# Patient Record
Sex: MALE | Race: WHITE | NOT HISPANIC OR LATINO | Employment: OTHER | URBAN - METROPOLITAN AREA
[De-identification: names, ages, dates, MRNs, and addresses within clinical notes are randomized per-mention and may not be internally consistent; named-entity substitution may affect disease eponyms.]

---

## 2017-01-02 ENCOUNTER — COMMUNICATION - HEALTHEAST (OUTPATIENT)
Dept: FAMILY MEDICINE | Facility: CLINIC | Age: 82
End: 2017-01-02

## 2017-01-05 ENCOUNTER — COMMUNICATION - HEALTHEAST (OUTPATIENT)
Dept: FAMILY MEDICINE | Facility: CLINIC | Age: 82
End: 2017-01-05

## 2017-01-05 ENCOUNTER — AMBULATORY - HEALTHEAST (OUTPATIENT)
Dept: CARDIOLOGY | Facility: CLINIC | Age: 82
End: 2017-01-05

## 2017-01-05 DIAGNOSIS — Z95.0 PACEMAKER: ICD-10-CM

## 2017-01-05 DIAGNOSIS — M79.89 LEFT LEG SWELLING: ICD-10-CM

## 2017-01-06 ENCOUNTER — RECORDS - HEALTHEAST (OUTPATIENT)
Dept: GENERAL RADIOLOGY | Facility: CLINIC | Age: 82
End: 2017-01-06

## 2017-01-06 ENCOUNTER — COMMUNICATION - HEALTHEAST (OUTPATIENT)
Dept: NURSING | Facility: CLINIC | Age: 82
End: 2017-01-06

## 2017-01-06 ENCOUNTER — OFFICE VISIT - HEALTHEAST (OUTPATIENT)
Dept: FAMILY MEDICINE | Facility: CLINIC | Age: 82
End: 2017-01-06

## 2017-01-06 DIAGNOSIS — Z95.0 PACEMAKER: ICD-10-CM

## 2017-01-06 DIAGNOSIS — M79.18 LEFT BUTTOCK PAIN: ICD-10-CM

## 2017-01-06 DIAGNOSIS — R60.9 EDEMA: ICD-10-CM

## 2017-01-06 DIAGNOSIS — I63.9 CEREBRAL INFARCTION (H): ICD-10-CM

## 2017-01-06 DIAGNOSIS — R26.89 BALANCE DISORDER: ICD-10-CM

## 2017-01-06 DIAGNOSIS — M25.552 PAIN IN LEFT HIP: ICD-10-CM

## 2017-01-06 DIAGNOSIS — M25.552 LEFT HIP PAIN: ICD-10-CM

## 2017-01-06 DIAGNOSIS — M79.10 MYALGIA: ICD-10-CM

## 2017-01-06 DIAGNOSIS — I48.91 A-FIB (H): ICD-10-CM

## 2017-01-06 DIAGNOSIS — I48.91 ATRIAL FIBRILLATION, UNSPECIFIED TYPE (H): ICD-10-CM

## 2017-01-07 ENCOUNTER — HOME CARE/HOSPICE - HEALTHEAST (OUTPATIENT)
Dept: HOME HEALTH SERVICES | Facility: HOME HEALTH | Age: 82
End: 2017-01-07

## 2017-01-09 ENCOUNTER — COMMUNICATION - HEALTHEAST (OUTPATIENT)
Dept: HOME HEALTH SERVICES | Facility: HOME HEALTH | Age: 82
End: 2017-01-09

## 2017-01-09 ENCOUNTER — HOME CARE/HOSPICE - HEALTHEAST (OUTPATIENT)
Dept: HOME HEALTH SERVICES | Facility: HOME HEALTH | Age: 82
End: 2017-01-09

## 2017-01-09 ENCOUNTER — COMMUNICATION - HEALTHEAST (OUTPATIENT)
Dept: FAMILY MEDICINE | Facility: CLINIC | Age: 82
End: 2017-01-09

## 2017-01-10 ENCOUNTER — HOME CARE/HOSPICE - HEALTHEAST (OUTPATIENT)
Dept: HOME HEALTH SERVICES | Facility: HOME HEALTH | Age: 82
End: 2017-01-10

## 2017-01-13 ENCOUNTER — HOME CARE/HOSPICE - HEALTHEAST (OUTPATIENT)
Dept: HOME HEALTH SERVICES | Facility: HOME HEALTH | Age: 82
End: 2017-01-13

## 2017-01-15 ENCOUNTER — HOME CARE/HOSPICE - HEALTHEAST (OUTPATIENT)
Dept: HOME HEALTH SERVICES | Facility: HOME HEALTH | Age: 82
End: 2017-01-15

## 2017-01-17 ENCOUNTER — HOME CARE/HOSPICE - HEALTHEAST (OUTPATIENT)
Dept: HOME HEALTH SERVICES | Facility: HOME HEALTH | Age: 82
End: 2017-01-17

## 2017-01-18 ENCOUNTER — HOME CARE/HOSPICE - HEALTHEAST (OUTPATIENT)
Dept: HOME HEALTH SERVICES | Facility: HOME HEALTH | Age: 82
End: 2017-01-18

## 2017-01-19 ENCOUNTER — HOME CARE/HOSPICE - HEALTHEAST (OUTPATIENT)
Dept: HOME HEALTH SERVICES | Facility: HOME HEALTH | Age: 82
End: 2017-01-19

## 2017-01-20 ENCOUNTER — HOME CARE/HOSPICE - HEALTHEAST (OUTPATIENT)
Dept: HOME HEALTH SERVICES | Facility: HOME HEALTH | Age: 82
End: 2017-01-20

## 2017-01-20 ENCOUNTER — COMMUNICATION - HEALTHEAST (OUTPATIENT)
Dept: FAMILY MEDICINE | Facility: CLINIC | Age: 82
End: 2017-01-20

## 2017-01-20 DIAGNOSIS — Z95.0 PACEMAKER: ICD-10-CM

## 2017-01-20 DIAGNOSIS — I63.9 CEREBRAL INFARCTION (H): ICD-10-CM

## 2017-01-20 DIAGNOSIS — I48.91 A-FIB (H): ICD-10-CM

## 2017-01-24 ENCOUNTER — HOME CARE/HOSPICE - HEALTHEAST (OUTPATIENT)
Dept: HOME HEALTH SERVICES | Facility: HOME HEALTH | Age: 82
End: 2017-01-24

## 2017-01-26 ENCOUNTER — HOME CARE/HOSPICE - HEALTHEAST (OUTPATIENT)
Dept: HOME HEALTH SERVICES | Facility: HOME HEALTH | Age: 82
End: 2017-01-26

## 2017-01-27 ENCOUNTER — HOME CARE/HOSPICE - HEALTHEAST (OUTPATIENT)
Dept: HOME HEALTH SERVICES | Facility: HOME HEALTH | Age: 82
End: 2017-01-27

## 2017-01-31 ENCOUNTER — HOME CARE/HOSPICE - HEALTHEAST (OUTPATIENT)
Dept: HOME HEALTH SERVICES | Facility: HOME HEALTH | Age: 82
End: 2017-01-31

## 2017-02-02 ENCOUNTER — HOME CARE/HOSPICE - HEALTHEAST (OUTPATIENT)
Dept: HOME HEALTH SERVICES | Facility: HOME HEALTH | Age: 82
End: 2017-02-02

## 2017-02-03 ENCOUNTER — HOME CARE/HOSPICE - HEALTHEAST (OUTPATIENT)
Dept: HOME HEALTH SERVICES | Facility: HOME HEALTH | Age: 82
End: 2017-02-03

## 2017-02-07 ENCOUNTER — AMBULATORY - HEALTHEAST (OUTPATIENT)
Dept: LAB | Facility: CLINIC | Age: 82
End: 2017-02-07

## 2017-02-07 ENCOUNTER — COMMUNICATION - HEALTHEAST (OUTPATIENT)
Dept: NURSING | Facility: CLINIC | Age: 82
End: 2017-02-07

## 2017-02-07 DIAGNOSIS — Z95.0 PACEMAKER: ICD-10-CM

## 2017-02-07 DIAGNOSIS — I63.9 CEREBRAL INFARCTION (H): ICD-10-CM

## 2017-02-07 DIAGNOSIS — I48.91 ATRIAL FIBRILLATION, UNSPECIFIED TYPE (H): ICD-10-CM

## 2017-02-07 DIAGNOSIS — I48.91 A-FIB (H): ICD-10-CM

## 2017-02-09 ENCOUNTER — COMMUNICATION - HEALTHEAST (OUTPATIENT)
Dept: FAMILY MEDICINE | Facility: CLINIC | Age: 82
End: 2017-02-09

## 2017-02-09 DIAGNOSIS — E78.5 HYPERLIPIDEMIA: ICD-10-CM

## 2017-03-14 ENCOUNTER — AMBULATORY - HEALTHEAST (OUTPATIENT)
Dept: LAB | Facility: CLINIC | Age: 82
End: 2017-03-14

## 2017-03-14 ENCOUNTER — COMMUNICATION - HEALTHEAST (OUTPATIENT)
Dept: NURSING | Facility: CLINIC | Age: 82
End: 2017-03-14

## 2017-03-14 DIAGNOSIS — Z95.0 PACEMAKER: ICD-10-CM

## 2017-03-14 DIAGNOSIS — I48.91 A-FIB (H): ICD-10-CM

## 2017-03-14 DIAGNOSIS — I48.91 ATRIAL FIBRILLATION, UNSPECIFIED TYPE (H): ICD-10-CM

## 2017-03-14 DIAGNOSIS — I63.9 CEREBRAL INFARCTION (H): ICD-10-CM

## 2017-04-13 ENCOUNTER — AMBULATORY - HEALTHEAST (OUTPATIENT)
Dept: CARDIOLOGY | Facility: CLINIC | Age: 82
End: 2017-04-13

## 2017-04-13 DIAGNOSIS — Z95.0 PACEMAKER: ICD-10-CM

## 2017-04-17 ENCOUNTER — COMMUNICATION - HEALTHEAST (OUTPATIENT)
Dept: FAMILY MEDICINE | Facility: CLINIC | Age: 82
End: 2017-04-17

## 2017-04-17 DIAGNOSIS — I63.9 CEREBRAL INFARCTION (H): ICD-10-CM

## 2017-04-17 DIAGNOSIS — I48.91 ATRIAL FIBRILLATION (H): ICD-10-CM

## 2017-04-20 ENCOUNTER — COMMUNICATION - HEALTHEAST (OUTPATIENT)
Dept: NURSING | Facility: CLINIC | Age: 82
End: 2017-04-20

## 2017-04-20 ENCOUNTER — OFFICE VISIT - HEALTHEAST (OUTPATIENT)
Dept: FAMILY MEDICINE | Facility: CLINIC | Age: 82
End: 2017-04-20

## 2017-04-20 DIAGNOSIS — R73.01 IMPAIRED FASTING GLUCOSE: ICD-10-CM

## 2017-04-20 DIAGNOSIS — Z95.0 PACEMAKER: ICD-10-CM

## 2017-04-20 DIAGNOSIS — I48.91 ATRIAL FIBRILLATION, UNSPECIFIED TYPE (H): ICD-10-CM

## 2017-04-20 DIAGNOSIS — I48.91 A-FIB (H): ICD-10-CM

## 2017-04-20 DIAGNOSIS — I10 ESSENTIAL HYPERTENSION WITH GOAL BLOOD PRESSURE LESS THAN 140/90: ICD-10-CM

## 2017-04-20 DIAGNOSIS — I63.9 CEREBRAL INFARCTION (H): ICD-10-CM

## 2017-04-20 LAB — HBA1C MFR BLD: 6.3 % (ref 3.5–6)

## 2017-05-25 ENCOUNTER — COMMUNICATION - HEALTHEAST (OUTPATIENT)
Dept: NURSING | Facility: CLINIC | Age: 82
End: 2017-05-25

## 2017-05-25 ENCOUNTER — AMBULATORY - HEALTHEAST (OUTPATIENT)
Dept: LAB | Facility: CLINIC | Age: 82
End: 2017-05-25

## 2017-05-25 DIAGNOSIS — Z95.0 PACEMAKER: ICD-10-CM

## 2017-05-25 DIAGNOSIS — I63.9 CEREBRAL INFARCTION (H): ICD-10-CM

## 2017-05-25 DIAGNOSIS — I48.91 A-FIB (H): ICD-10-CM

## 2017-05-25 DIAGNOSIS — I48.91 ATRIAL FIBRILLATION, UNSPECIFIED TYPE (H): ICD-10-CM

## 2017-06-06 ENCOUNTER — COMMUNICATION - HEALTHEAST (OUTPATIENT)
Dept: FAMILY MEDICINE | Facility: CLINIC | Age: 82
End: 2017-06-06

## 2017-06-06 DIAGNOSIS — I48.91 A-FIB (H): ICD-10-CM

## 2017-06-08 ENCOUNTER — AMBULATORY - HEALTHEAST (OUTPATIENT)
Dept: LAB | Facility: CLINIC | Age: 82
End: 2017-06-08

## 2017-06-08 ENCOUNTER — COMMUNICATION - HEALTHEAST (OUTPATIENT)
Dept: NURSING | Facility: CLINIC | Age: 82
End: 2017-06-08

## 2017-06-08 DIAGNOSIS — I48.91 A-FIB (H): ICD-10-CM

## 2017-06-08 DIAGNOSIS — I63.9 CEREBRAL INFARCTION (H): ICD-10-CM

## 2017-06-08 DIAGNOSIS — Z95.0 PACEMAKER: ICD-10-CM

## 2017-06-08 DIAGNOSIS — I48.91 ATRIAL FIBRILLATION, UNSPECIFIED TYPE (H): ICD-10-CM

## 2017-06-16 ENCOUNTER — COMMUNICATION - HEALTHEAST (OUTPATIENT)
Dept: FAMILY MEDICINE | Facility: CLINIC | Age: 82
End: 2017-06-16

## 2017-06-16 DIAGNOSIS — I48.91 ATRIAL FIBRILLATION (H): ICD-10-CM

## 2017-06-16 DIAGNOSIS — I63.9 CEREBRAL INFARCTION (H): ICD-10-CM

## 2017-06-22 ENCOUNTER — AMBULATORY - HEALTHEAST (OUTPATIENT)
Dept: LAB | Facility: CLINIC | Age: 82
End: 2017-06-22

## 2017-06-22 ENCOUNTER — COMMUNICATION - HEALTHEAST (OUTPATIENT)
Dept: NURSING | Facility: CLINIC | Age: 82
End: 2017-06-22

## 2017-06-22 DIAGNOSIS — I48.91 A-FIB (H): ICD-10-CM

## 2017-06-22 DIAGNOSIS — I48.91 ATRIAL FIBRILLATION, UNSPECIFIED TYPE (H): ICD-10-CM

## 2017-06-22 DIAGNOSIS — I63.9 CEREBRAL INFARCTION (H): ICD-10-CM

## 2017-06-22 DIAGNOSIS — Z95.0 PACEMAKER: ICD-10-CM

## 2017-07-06 ENCOUNTER — AMBULATORY - HEALTHEAST (OUTPATIENT)
Dept: LAB | Facility: CLINIC | Age: 82
End: 2017-07-06

## 2017-07-06 ENCOUNTER — COMMUNICATION - HEALTHEAST (OUTPATIENT)
Dept: INTERNAL MEDICINE | Facility: CLINIC | Age: 82
End: 2017-07-06

## 2017-07-06 DIAGNOSIS — I63.9 CEREBRAL INFARCTION (H): ICD-10-CM

## 2017-07-06 DIAGNOSIS — I48.91 ATRIAL FIBRILLATION, UNSPECIFIED TYPE (H): ICD-10-CM

## 2017-07-06 DIAGNOSIS — Z95.0 PACEMAKER: ICD-10-CM

## 2017-07-06 DIAGNOSIS — I48.91 A-FIB (H): ICD-10-CM

## 2017-07-17 ENCOUNTER — COMMUNICATION - HEALTHEAST (OUTPATIENT)
Dept: FAMILY MEDICINE | Facility: CLINIC | Age: 82
End: 2017-07-17

## 2017-07-17 DIAGNOSIS — I63.9 CEREBRAL INFARCTION (H): ICD-10-CM

## 2017-07-17 DIAGNOSIS — I48.91 ATRIAL FIBRILLATION (H): ICD-10-CM

## 2017-07-20 ENCOUNTER — AMBULATORY - HEALTHEAST (OUTPATIENT)
Dept: CARDIOLOGY | Facility: CLINIC | Age: 82
End: 2017-07-20

## 2017-07-20 DIAGNOSIS — Z95.0 PACEMAKER: ICD-10-CM

## 2017-07-20 LAB — HCC DEVICE COMMENTS: NORMAL

## 2017-08-01 ENCOUNTER — COMMUNICATION - HEALTHEAST (OUTPATIENT)
Dept: FAMILY MEDICINE | Facility: CLINIC | Age: 82
End: 2017-08-01

## 2017-08-01 DIAGNOSIS — N13.8 BPH (BENIGN PROSTATIC HYPERTROPHY) WITH URINARY OBSTRUCTION: ICD-10-CM

## 2017-08-01 DIAGNOSIS — N40.1 BPH (BENIGN PROSTATIC HYPERTROPHY) WITH URINARY OBSTRUCTION: ICD-10-CM

## 2017-08-03 ENCOUNTER — AMBULATORY - HEALTHEAST (OUTPATIENT)
Dept: LAB | Facility: CLINIC | Age: 82
End: 2017-08-03

## 2017-08-03 ENCOUNTER — COMMUNICATION - HEALTHEAST (OUTPATIENT)
Dept: NURSING | Facility: CLINIC | Age: 82
End: 2017-08-03

## 2017-08-03 DIAGNOSIS — I48.91 ATRIAL FIBRILLATION (H): ICD-10-CM

## 2017-08-03 DIAGNOSIS — I48.91 A-FIB (H): ICD-10-CM

## 2017-08-03 DIAGNOSIS — Z95.0 PACEMAKER: ICD-10-CM

## 2017-08-03 DIAGNOSIS — I63.9 CEREBRAL INFARCTION (H): ICD-10-CM

## 2017-08-11 ENCOUNTER — COMMUNICATION - HEALTHEAST (OUTPATIENT)
Dept: FAMILY MEDICINE | Facility: CLINIC | Age: 82
End: 2017-08-11

## 2017-08-11 DIAGNOSIS — E78.5 HYPERLIPIDEMIA: ICD-10-CM

## 2017-08-11 DIAGNOSIS — I10 HTN (HYPERTENSION): ICD-10-CM

## 2017-08-29 ENCOUNTER — RECORDS - HEALTHEAST (OUTPATIENT)
Dept: ADMINISTRATIVE | Facility: OTHER | Age: 82
End: 2017-08-29

## 2017-09-12 ENCOUNTER — COMMUNICATION - HEALTHEAST (OUTPATIENT)
Dept: NURSING | Facility: CLINIC | Age: 82
End: 2017-09-12

## 2017-09-12 ENCOUNTER — AMBULATORY - HEALTHEAST (OUTPATIENT)
Dept: LAB | Facility: CLINIC | Age: 82
End: 2017-09-12

## 2017-09-12 ENCOUNTER — AMBULATORY - HEALTHEAST (OUTPATIENT)
Dept: NURSING | Facility: CLINIC | Age: 82
End: 2017-09-12

## 2017-09-12 DIAGNOSIS — I63.9 CEREBRAL INFARCTION (H): ICD-10-CM

## 2017-09-12 DIAGNOSIS — Z00.00 HEALTHCARE MAINTENANCE: ICD-10-CM

## 2017-09-12 DIAGNOSIS — I48.91 ATRIAL FIBRILLATION (H): ICD-10-CM

## 2017-09-12 DIAGNOSIS — Z95.0 PACEMAKER: ICD-10-CM

## 2017-09-12 DIAGNOSIS — I48.91 A-FIB (H): ICD-10-CM

## 2017-10-09 ENCOUNTER — COMMUNICATION - HEALTHEAST (OUTPATIENT)
Dept: FAMILY MEDICINE | Facility: CLINIC | Age: 82
End: 2017-10-09

## 2017-10-10 ENCOUNTER — AMBULATORY - HEALTHEAST (OUTPATIENT)
Dept: LAB | Facility: CLINIC | Age: 82
End: 2017-10-10

## 2017-10-10 ENCOUNTER — COMMUNICATION - HEALTHEAST (OUTPATIENT)
Dept: NURSING | Facility: CLINIC | Age: 82
End: 2017-10-10

## 2017-10-10 DIAGNOSIS — Z95.0 PACEMAKER: ICD-10-CM

## 2017-10-10 DIAGNOSIS — I63.9 CEREBRAL INFARCTION (H): ICD-10-CM

## 2017-10-10 DIAGNOSIS — I48.91 A-FIB (H): ICD-10-CM

## 2017-10-10 DIAGNOSIS — I48.91 ATRIAL FIBRILLATION (H): ICD-10-CM

## 2017-10-18 ENCOUNTER — OFFICE VISIT - HEALTHEAST (OUTPATIENT)
Dept: FAMILY MEDICINE | Facility: CLINIC | Age: 82
End: 2017-10-18

## 2017-10-18 DIAGNOSIS — I10 ESSENTIAL HYPERTENSION WITH GOAL BLOOD PRESSURE LESS THAN 140/90: ICD-10-CM

## 2017-10-18 DIAGNOSIS — I48.91 ATRIAL FIBRILLATION, UNSPECIFIED TYPE (H): ICD-10-CM

## 2017-10-18 DIAGNOSIS — Z95.0 PACEMAKER: ICD-10-CM

## 2017-10-18 DIAGNOSIS — E78.5 HYPERLIPIDEMIA: ICD-10-CM

## 2017-10-18 DIAGNOSIS — I63.511 STROKE DUE TO OCCLUSION OF RIGHT MIDDLE CEREBRAL ARTERY (H): ICD-10-CM

## 2017-10-18 DIAGNOSIS — R73.01 IMPAIRED FASTING GLUCOSE: ICD-10-CM

## 2017-10-18 LAB
CHOLEST SERPL-MCNC: 115 MG/DL
FASTING STATUS PATIENT QL REPORTED: YES
HBA1C MFR BLD: 6.1 % (ref 3.5–6)
HDLC SERPL-MCNC: 50 MG/DL
LDLC SERPL CALC-MCNC: 41 MG/DL
TRIGL SERPL-MCNC: 119 MG/DL

## 2017-11-07 ENCOUNTER — AMBULATORY - HEALTHEAST (OUTPATIENT)
Dept: LAB | Facility: CLINIC | Age: 82
End: 2017-11-07

## 2017-11-07 ENCOUNTER — COMMUNICATION - HEALTHEAST (OUTPATIENT)
Dept: NURSING | Facility: CLINIC | Age: 82
End: 2017-11-07

## 2017-11-07 DIAGNOSIS — I48.91 ATRIAL FIBRILLATION (H): ICD-10-CM

## 2017-11-07 DIAGNOSIS — I63.9 CEREBRAL INFARCTION (H): ICD-10-CM

## 2017-11-07 DIAGNOSIS — Z95.0 PACEMAKER: ICD-10-CM

## 2017-11-07 DIAGNOSIS — I48.91 A-FIB (H): ICD-10-CM

## 2017-11-15 ENCOUNTER — OFFICE VISIT - HEALTHEAST (OUTPATIENT)
Dept: CARDIOLOGY | Facility: CLINIC | Age: 82
End: 2017-11-15

## 2017-11-15 ENCOUNTER — AMBULATORY - HEALTHEAST (OUTPATIENT)
Dept: CARDIOLOGY | Facility: CLINIC | Age: 82
End: 2017-11-15

## 2017-11-15 DIAGNOSIS — I10 ESSENTIAL HYPERTENSION: ICD-10-CM

## 2017-11-15 DIAGNOSIS — I48.0 PAROXYSMAL ATRIAL FIBRILLATION (H): ICD-10-CM

## 2017-11-15 DIAGNOSIS — I05.0 MITRAL VALVE STENOSIS, UNSPECIFIED ETIOLOGY: ICD-10-CM

## 2017-11-15 DIAGNOSIS — Z95.0 PACEMAKER: ICD-10-CM

## 2017-11-15 ASSESSMENT — MIFFLIN-ST. JEOR: SCORE: 1581.56

## 2017-11-16 LAB — HCC DEVICE COMMENTS: NORMAL

## 2017-12-01 ENCOUNTER — HOSPITAL ENCOUNTER (OUTPATIENT)
Dept: CARDIOLOGY | Facility: HOSPITAL | Age: 82
Discharge: HOME | End: 2017-12-01
Attending: INTERNAL MEDICINE

## 2017-12-01 DIAGNOSIS — I05.0 MITRAL VALVE STENOSIS, UNSPECIFIED ETIOLOGY: ICD-10-CM

## 2017-12-01 ASSESSMENT — MIFFLIN-ST. JEOR: SCORE: 1577.93

## 2017-12-04 LAB
AORTIC ROOT: 3.9 CM
AORTIC VALVE MEAN VELOCITY: 96.6 CM/S
AV DIMENSIONLESS INDEX VTI: 0.6
AV MEAN GRADIENT: 4 MMHG
AV PEAK GRADIENT: 7.5 MMHG
AV VALVE AREA: 2.2 CM2
AV VELOCITY RATIO: 0.5
BSA FOR ECHO PROCEDURE: 2.15 M2
CV BLOOD PRESSURE: NORMAL MMHG
CV ECHO HEIGHT: 69 IN
CV ECHO WEIGHT: 210 LBS
DOP CALC AO PEAK VEL: 137 CM/S
DOP CALC AO VTI: 33.3 CM
DOP CALC LVOT AREA: 3.8 CM2
DOP CALC LVOT DIAMETER: 2.2 CM
DOP CALC LVOT PEAK VEL: 75 CM/S
DOP CALC LVOT STROKE VOLUME: 73.3 CM3
DOP CALC MV VTI: 45.3 CM
DOP CALCLVOT PEAK VEL VTI: 19.3 CM
ECHO EJECTION FRACTION ESTIMATED: 60 %
EJECTION FRACTION: 60 % (ref 55–75)
FRACTIONAL SHORTENING: 50.8 % (ref 28–44)
INTERVENTRICULAR SEPTUM IN END DIASTOLE: 1.4 CM (ref 0.6–1)
IVS/PW RATIO: 1.1
LA AREA 1: 39 CM2
LA AREA 2: 33 CM2
LEFT ATRIUM LENGTH: 7.7 CM
LEFT ATRIUM SIZE: 5.6 CM
LEFT ATRIUM TO AORTIC ROOT RATIO: 1.44 NO UNITS
LEFT ATRIUM VOLUME INDEX: 66.1 ML/M2
LEFT ATRIUM VOLUME: 142.1 CM3
LEFT VENTRICLE CARDIAC INDEX: 2 L/MIN/M2
LEFT VENTRICLE CARDIAC OUTPUT: 4.4 L/MIN
LEFT VENTRICLE DIASTOLIC VOLUME INDEX: 32.5 CM3/M2 (ref 34–74)
LEFT VENTRICLE DIASTOLIC VOLUME: 69.9 CM3 (ref 62–150)
LEFT VENTRICLE HEART RATE: 60 BPM
LEFT VENTRICLE MASS INDEX: 135.8 G/M2
LEFT VENTRICLE SYSTOLIC VOLUME INDEX: 13.1 CM3/M2 (ref 11–31)
LEFT VENTRICLE SYSTOLIC VOLUME: 28.2 CM3 (ref 21–61)
LEFT VENTRICULAR INTERNAL DIMENSION IN DIASTOLE: 5.18 CM (ref 4.2–5.8)
LEFT VENTRICULAR INTERNAL DIMENSION IN SYSTOLE: 2.55 CM (ref 2.5–4)
LEFT VENTRICULAR MASS: 292.1 G
LEFT VENTRICULAR OUTFLOW TRACT MEAN GRADIENT: 1 MMHG
LEFT VENTRICULAR OUTFLOW TRACT MEAN VELOCITY: 48.7 CM/S
LEFT VENTRICULAR OUTFLOW TRACT PEAK GRADIENT: 2 MMHG
LEFT VENTRICULAR POSTERIOR WALL IN END DIASTOLE: 1.3 CM (ref 0.6–1)
LV STROKE VOLUME INDEX: 34.1 ML/M2
MITRAL VALVE DECELERATION SLOPE: 6800 MM/S2
MITRAL VALVE MEAN INFLOW VELOCITY: 85.7 CM/S
MITRAL VALVE PEAK VELOCITY: 192 CM/S
MITRAL VALVE PRESSURE HALF-TIME: 91 MS
MV AREA VTI: 1.62 CM2
MV AVERAGE E/E' RATIO: 19.3 CM/S
MV DECELERATION TIME: 208 MS
MV E'TISSUE VEL-LAT: 9.89 CM/S
MV E'TISSUE VEL-MED: 7.9 CM/S
MV LATERAL E/E' RATIO: 17.4
MV MEAN GRADIENT: 4 MMHG
MV MEDIAL E/E' RATIO: 21.8
MV PEAK E VELOCITY: 172 CM/S
MV PEAK GRADIENT: 14.7 MMHG
MV VALVE AREA BY CONTINUITY EQUATION: 1.6 CM2
MV VALVE AREA PRESSURE 1/2 METHOD: 2.4 CM2
NUC REST DIASTOLIC VOLUME INDEX: 3360 LBS
NUC REST SYSTOLIC VOLUME INDEX: 69 IN
RIGHT VENTRICULAR INTERNAL DIMENSION IN DYSTOLE: 2.53 CM
TRICUSPID REGURGITATION PEAK PRESSURE GRADIENT: 37.2 MMHG
TRICUSPID VALVE ANULAR PLANE SYSTOLIC EXCURSION: 1.7 CM
TRICUSPID VALVE PEAK REGURGITANT VELOCITY: 305 CM/S

## 2017-12-12 ENCOUNTER — COMMUNICATION - HEALTHEAST (OUTPATIENT)
Dept: NURSING | Facility: CLINIC | Age: 82
End: 2017-12-12

## 2017-12-12 ENCOUNTER — AMBULATORY - HEALTHEAST (OUTPATIENT)
Dept: LAB | Facility: CLINIC | Age: 82
End: 2017-12-12

## 2017-12-12 DIAGNOSIS — I63.9 CEREBRAL INFARCTION (H): ICD-10-CM

## 2017-12-12 DIAGNOSIS — I48.91 A-FIB (H): ICD-10-CM

## 2017-12-12 DIAGNOSIS — I48.91 ATRIAL FIBRILLATION (H): ICD-10-CM

## 2017-12-12 DIAGNOSIS — Z95.0 PACEMAKER: ICD-10-CM

## 2017-12-15 ENCOUNTER — COMMUNICATION - HEALTHEAST (OUTPATIENT)
Dept: FAMILY MEDICINE | Facility: CLINIC | Age: 82
End: 2017-12-15

## 2017-12-15 DIAGNOSIS — I48.91 ATRIAL FIBRILLATION (H): ICD-10-CM

## 2017-12-15 DIAGNOSIS — I63.9 CEREBRAL INFARCTION (H): ICD-10-CM

## 2018-01-15 ENCOUNTER — COMMUNICATION - HEALTHEAST (OUTPATIENT)
Dept: NURSING | Facility: CLINIC | Age: 83
End: 2018-01-15

## 2018-01-15 ENCOUNTER — AMBULATORY - HEALTHEAST (OUTPATIENT)
Dept: LAB | Facility: CLINIC | Age: 83
End: 2018-01-15

## 2018-01-15 DIAGNOSIS — Z95.0 PACEMAKER: ICD-10-CM

## 2018-01-15 DIAGNOSIS — I48.91 A-FIB (H): ICD-10-CM

## 2018-01-15 DIAGNOSIS — I63.9 CEREBRAL INFARCTION (H): ICD-10-CM

## 2018-01-15 DIAGNOSIS — I48.91 ATRIAL FIBRILLATION (H): ICD-10-CM

## 2018-01-15 LAB — INR PPP: 1.9 (ref 0.9–1.1)

## 2018-01-19 ENCOUNTER — COMMUNICATION - HEALTHEAST (OUTPATIENT)
Dept: FAMILY MEDICINE | Facility: CLINIC | Age: 83
End: 2018-01-19

## 2018-01-19 DIAGNOSIS — I10 HTN (HYPERTENSION): ICD-10-CM

## 2018-01-19 DIAGNOSIS — E78.5 HYPERLIPIDEMIA: ICD-10-CM

## 2018-01-30 ENCOUNTER — COMMUNICATION - HEALTHEAST (OUTPATIENT)
Dept: FAMILY MEDICINE | Facility: CLINIC | Age: 83
End: 2018-01-30

## 2018-01-30 ENCOUNTER — AMBULATORY - HEALTHEAST (OUTPATIENT)
Dept: LAB | Facility: CLINIC | Age: 83
End: 2018-01-30

## 2018-01-30 ENCOUNTER — COMMUNICATION - HEALTHEAST (OUTPATIENT)
Dept: NURSING | Facility: CLINIC | Age: 83
End: 2018-01-30

## 2018-01-30 DIAGNOSIS — Z95.0 PACEMAKER: ICD-10-CM

## 2018-01-30 DIAGNOSIS — I63.9 CEREBRAL INFARCTION (H): ICD-10-CM

## 2018-01-30 DIAGNOSIS — I48.91 ATRIAL FIBRILLATION (H): ICD-10-CM

## 2018-01-30 DIAGNOSIS — I48.91 A-FIB (H): ICD-10-CM

## 2018-01-30 LAB — INR PPP: 2.6 (ref 0.9–1.1)

## 2018-02-08 ENCOUNTER — AMBULATORY - HEALTHEAST (OUTPATIENT)
Dept: CARDIOLOGY | Facility: CLINIC | Age: 83
End: 2018-02-08

## 2018-02-08 DIAGNOSIS — Z95.0 PACEMAKER: ICD-10-CM

## 2018-02-08 LAB — HCC DEVICE COMMENTS: NORMAL

## 2018-02-12 ENCOUNTER — COMMUNICATION - HEALTHEAST (OUTPATIENT)
Dept: FAMILY MEDICINE | Facility: CLINIC | Age: 83
End: 2018-02-12

## 2018-02-12 ENCOUNTER — COMMUNICATION - HEALTHEAST (OUTPATIENT)
Dept: NURSING | Facility: CLINIC | Age: 83
End: 2018-02-12

## 2018-02-12 ENCOUNTER — AMBULATORY - HEALTHEAST (OUTPATIENT)
Dept: LAB | Facility: CLINIC | Age: 83
End: 2018-02-12

## 2018-02-12 DIAGNOSIS — I48.91 A-FIB (H): ICD-10-CM

## 2018-02-12 DIAGNOSIS — I63.9 CEREBRAL INFARCTION (H): ICD-10-CM

## 2018-02-12 DIAGNOSIS — Z95.0 PACEMAKER: ICD-10-CM

## 2018-02-12 DIAGNOSIS — I48.91 ATRIAL FIBRILLATION (H): ICD-10-CM

## 2018-02-12 LAB — INR PPP: 2.3 (ref 0.9–1.1)

## 2018-02-21 ENCOUNTER — COMMUNICATION - HEALTHEAST (OUTPATIENT)
Dept: NURSING | Facility: CLINIC | Age: 83
End: 2018-02-21

## 2018-02-21 ENCOUNTER — AMBULATORY - HEALTHEAST (OUTPATIENT)
Dept: LAB | Facility: CLINIC | Age: 83
End: 2018-02-21

## 2018-02-21 DIAGNOSIS — I63.9 CEREBRAL INFARCTION (H): ICD-10-CM

## 2018-02-21 DIAGNOSIS — I48.91 ATRIAL FIBRILLATION (H): ICD-10-CM

## 2018-02-21 DIAGNOSIS — Z95.0 PACEMAKER: ICD-10-CM

## 2018-02-21 DIAGNOSIS — I48.91 A-FIB (H): ICD-10-CM

## 2018-02-21 LAB — INR PPP: 2.4 (ref 0.9–1.1)

## 2018-03-26 ENCOUNTER — AMBULATORY - HEALTHEAST (OUTPATIENT)
Dept: LAB | Facility: CLINIC | Age: 83
End: 2018-03-26

## 2018-03-26 ENCOUNTER — COMMUNICATION - HEALTHEAST (OUTPATIENT)
Dept: NURSING | Facility: CLINIC | Age: 83
End: 2018-03-26

## 2018-03-26 DIAGNOSIS — I48.91 A-FIB (H): ICD-10-CM

## 2018-03-26 DIAGNOSIS — I63.9 CEREBRAL INFARCTION (H): ICD-10-CM

## 2018-03-26 DIAGNOSIS — I48.91 ATRIAL FIBRILLATION (H): ICD-10-CM

## 2018-03-26 DIAGNOSIS — Z95.0 PACEMAKER: ICD-10-CM

## 2018-03-26 LAB — INR PPP: 2.3 (ref 0.9–1.1)

## 2018-03-30 ENCOUNTER — COMMUNICATION - HEALTHEAST (OUTPATIENT)
Dept: FAMILY MEDICINE | Facility: CLINIC | Age: 83
End: 2018-03-30

## 2018-04-17 ENCOUNTER — COMMUNICATION - HEALTHEAST (OUTPATIENT)
Dept: ANTICOAGULATION | Facility: CLINIC | Age: 83
End: 2018-04-17

## 2018-04-17 ENCOUNTER — OFFICE VISIT - HEALTHEAST (OUTPATIENT)
Dept: FAMILY MEDICINE | Facility: CLINIC | Age: 83
End: 2018-04-17

## 2018-04-17 DIAGNOSIS — R04.0 EPISTAXIS: ICD-10-CM

## 2018-04-17 DIAGNOSIS — R73.01 IMPAIRED FASTING GLUCOSE: ICD-10-CM

## 2018-04-17 DIAGNOSIS — I48.91 ATRIAL FIBRILLATION (H): ICD-10-CM

## 2018-04-17 DIAGNOSIS — I63.9 CEREBRAL INFARCTION (H): ICD-10-CM

## 2018-04-17 DIAGNOSIS — Z95.0 PACEMAKER: ICD-10-CM

## 2018-04-17 DIAGNOSIS — I48.91 A-FIB (H): ICD-10-CM

## 2018-04-17 DIAGNOSIS — I48.91 ATRIAL FIBRILLATION, UNSPECIFIED TYPE (H): ICD-10-CM

## 2018-04-17 DIAGNOSIS — I63.511 STROKE DUE TO OCCLUSION OF RIGHT MIDDLE CEREBRAL ARTERY (H): ICD-10-CM

## 2018-04-17 DIAGNOSIS — I10 ESSENTIAL HYPERTENSION WITH GOAL BLOOD PRESSURE LESS THAN 140/90: ICD-10-CM

## 2018-04-17 LAB
ANION GAP SERPL CALCULATED.3IONS-SCNC: 8 MMOL/L (ref 5–18)
BUN SERPL-MCNC: 14 MG/DL (ref 8–28)
CALCIUM SERPL-MCNC: 8.6 MG/DL (ref 8.5–10.5)
CHLORIDE BLD-SCNC: 109 MMOL/L (ref 98–107)
CO2 SERPL-SCNC: 25 MMOL/L (ref 22–31)
CREAT SERPL-MCNC: 0.76 MG/DL (ref 0.7–1.3)
GFR SERPL CREATININE-BSD FRML MDRD: >60 ML/MIN/1.73M2
GLUCOSE BLD-MCNC: 117 MG/DL (ref 70–125)
HBA1C MFR BLD: 6.2 % (ref 3.5–6)
INR PPP: 2.7 (ref 0.9–1.1)
POTASSIUM BLD-SCNC: 4.2 MMOL/L (ref 3.5–5)
SODIUM SERPL-SCNC: 142 MMOL/L (ref 136–145)

## 2018-04-30 ENCOUNTER — COMMUNICATION - HEALTHEAST (OUTPATIENT)
Dept: FAMILY MEDICINE | Facility: CLINIC | Age: 83
End: 2018-04-30

## 2018-04-30 DIAGNOSIS — I63.9 CEREBRAL INFARCTION (H): ICD-10-CM

## 2018-04-30 DIAGNOSIS — I48.91 ATRIAL FIBRILLATION (H): ICD-10-CM

## 2018-05-17 ENCOUNTER — AMBULATORY - HEALTHEAST (OUTPATIENT)
Dept: CARDIOLOGY | Facility: CLINIC | Age: 83
End: 2018-05-17

## 2018-05-17 DIAGNOSIS — Z95.0 PACEMAKER: ICD-10-CM

## 2018-05-18 LAB
HCC DEVICE COMMENTS: NORMAL
HCC DEVICE IMPLANTING PROVIDER: NORMAL
HCC DEVICE MANUFACTURE: NORMAL
HCC DEVICE MODEL: NORMAL
HCC DEVICE SERIAL NUMBER: NORMAL
HCC DEVICE TYPE: NORMAL

## 2018-05-29 ENCOUNTER — COMMUNICATION - HEALTHEAST (OUTPATIENT)
Dept: ANTICOAGULATION | Facility: CLINIC | Age: 83
End: 2018-05-29

## 2018-05-29 ENCOUNTER — AMBULATORY - HEALTHEAST (OUTPATIENT)
Dept: LAB | Facility: CLINIC | Age: 83
End: 2018-05-29

## 2018-05-29 DIAGNOSIS — I63.9 CEREBRAL INFARCTION (H): ICD-10-CM

## 2018-05-29 DIAGNOSIS — Z95.0 PACEMAKER: ICD-10-CM

## 2018-05-29 DIAGNOSIS — I48.91 ATRIAL FIBRILLATION (H): ICD-10-CM

## 2018-05-29 DIAGNOSIS — I48.91 A-FIB (H): ICD-10-CM

## 2018-05-29 LAB — INR PPP: 2.5 (ref 0.9–1.1)

## 2018-06-22 ENCOUNTER — COMMUNICATION - HEALTHEAST (OUTPATIENT)
Dept: ANTICOAGULATION | Facility: CLINIC | Age: 83
End: 2018-06-22

## 2018-06-22 DIAGNOSIS — I48.20 CHRONIC ATRIAL FIBRILLATION (H): ICD-10-CM

## 2018-07-09 ENCOUNTER — AMBULATORY - HEALTHEAST (OUTPATIENT)
Dept: LAB | Facility: CLINIC | Age: 83
End: 2018-07-09

## 2018-07-09 ENCOUNTER — COMMUNICATION - HEALTHEAST (OUTPATIENT)
Dept: ANTICOAGULATION | Facility: CLINIC | Age: 83
End: 2018-07-09

## 2018-07-09 DIAGNOSIS — Z95.0 PACEMAKER: ICD-10-CM

## 2018-07-09 DIAGNOSIS — I48.91 A-FIB (H): ICD-10-CM

## 2018-07-09 DIAGNOSIS — I63.9 CEREBRAL INFARCTION (H): ICD-10-CM

## 2018-07-09 DIAGNOSIS — I48.20 CHRONIC ATRIAL FIBRILLATION (H): ICD-10-CM

## 2018-07-09 LAB — INR PPP: 3.5 (ref 0.9–1.1)

## 2018-07-23 ENCOUNTER — COMMUNICATION - HEALTHEAST (OUTPATIENT)
Dept: ANTICOAGULATION | Facility: CLINIC | Age: 83
End: 2018-07-23

## 2018-07-23 ENCOUNTER — AMBULATORY - HEALTHEAST (OUTPATIENT)
Dept: LAB | Facility: CLINIC | Age: 83
End: 2018-07-23

## 2018-07-23 DIAGNOSIS — I48.91 A-FIB (H): ICD-10-CM

## 2018-07-23 DIAGNOSIS — I48.20 CHRONIC ATRIAL FIBRILLATION (H): ICD-10-CM

## 2018-07-23 DIAGNOSIS — I63.9 CEREBRAL INFARCTION (H): ICD-10-CM

## 2018-07-23 DIAGNOSIS — Z95.0 PACEMAKER: ICD-10-CM

## 2018-07-23 LAB — INR PPP: 2.3 (ref 0.9–1.1)

## 2018-08-06 ENCOUNTER — AMBULATORY - HEALTHEAST (OUTPATIENT)
Dept: LAB | Facility: CLINIC | Age: 83
End: 2018-08-06

## 2018-08-06 ENCOUNTER — COMMUNICATION - HEALTHEAST (OUTPATIENT)
Dept: ANTICOAGULATION | Facility: CLINIC | Age: 83
End: 2018-08-06

## 2018-08-06 DIAGNOSIS — Z95.0 PACEMAKER: ICD-10-CM

## 2018-08-06 DIAGNOSIS — I63.9 CEREBRAL INFARCTION (H): ICD-10-CM

## 2018-08-06 DIAGNOSIS — I48.20 CHRONIC ATRIAL FIBRILLATION (H): ICD-10-CM

## 2018-08-06 DIAGNOSIS — I48.91 A-FIB (H): ICD-10-CM

## 2018-08-06 LAB — INR PPP: 2.9 (ref 0.9–1.1)

## 2018-08-20 ENCOUNTER — COMMUNICATION - HEALTHEAST (OUTPATIENT)
Dept: FAMILY MEDICINE | Facility: CLINIC | Age: 83
End: 2018-08-20

## 2018-08-20 DIAGNOSIS — E78.5 HYPERLIPIDEMIA: ICD-10-CM

## 2018-08-23 ENCOUNTER — AMBULATORY - HEALTHEAST (OUTPATIENT)
Dept: CARDIOLOGY | Facility: CLINIC | Age: 83
End: 2018-08-23

## 2018-08-23 DIAGNOSIS — Z95.0 PACEMAKER: ICD-10-CM

## 2018-09-04 ENCOUNTER — AMBULATORY - HEALTHEAST (OUTPATIENT)
Dept: LAB | Facility: CLINIC | Age: 83
End: 2018-09-04

## 2018-09-04 ENCOUNTER — COMMUNICATION - HEALTHEAST (OUTPATIENT)
Dept: ANTICOAGULATION | Facility: CLINIC | Age: 83
End: 2018-09-04

## 2018-09-04 DIAGNOSIS — I48.20 CHRONIC ATRIAL FIBRILLATION (H): ICD-10-CM

## 2018-09-04 DIAGNOSIS — I63.9 CEREBRAL INFARCTION (H): ICD-10-CM

## 2018-09-04 DIAGNOSIS — Z95.0 PACEMAKER: ICD-10-CM

## 2018-09-04 DIAGNOSIS — I48.91 A-FIB (H): ICD-10-CM

## 2018-09-04 LAB — INR PPP: 2.9 (ref 0.9–1.1)

## 2018-10-02 ENCOUNTER — AMBULATORY - HEALTHEAST (OUTPATIENT)
Dept: LAB | Facility: CLINIC | Age: 83
End: 2018-10-02

## 2018-10-02 ENCOUNTER — COMMUNICATION - HEALTHEAST (OUTPATIENT)
Dept: ANTICOAGULATION | Facility: CLINIC | Age: 83
End: 2018-10-02

## 2018-10-02 DIAGNOSIS — I48.91 A-FIB (H): ICD-10-CM

## 2018-10-02 DIAGNOSIS — I63.9 CEREBRAL INFARCTION (H): ICD-10-CM

## 2018-10-02 DIAGNOSIS — I48.20 CHRONIC ATRIAL FIBRILLATION (H): ICD-10-CM

## 2018-10-02 DIAGNOSIS — Z95.0 PACEMAKER: ICD-10-CM

## 2018-10-02 LAB — INR PPP: 3.4 (ref 0.9–1.1)

## 2018-10-18 ENCOUNTER — COMMUNICATION - HEALTHEAST (OUTPATIENT)
Dept: ANTICOAGULATION | Facility: CLINIC | Age: 83
End: 2018-10-18

## 2018-10-18 ENCOUNTER — AMBULATORY - HEALTHEAST (OUTPATIENT)
Dept: LAB | Facility: CLINIC | Age: 83
End: 2018-10-18

## 2018-10-18 DIAGNOSIS — Z95.0 PACEMAKER: ICD-10-CM

## 2018-10-18 DIAGNOSIS — I48.91 A-FIB (H): ICD-10-CM

## 2018-10-18 DIAGNOSIS — I48.20 CHRONIC ATRIAL FIBRILLATION (H): ICD-10-CM

## 2018-10-18 DIAGNOSIS — I63.9 CEREBRAL INFARCTION (H): ICD-10-CM

## 2018-10-18 LAB — INR PPP: 2.1 (ref 0.9–1.1)

## 2018-10-23 ENCOUNTER — OFFICE VISIT - HEALTHEAST (OUTPATIENT)
Dept: FAMILY MEDICINE | Facility: CLINIC | Age: 83
End: 2018-10-23

## 2018-10-23 DIAGNOSIS — I10 ESSENTIAL HYPERTENSION: ICD-10-CM

## 2018-10-23 DIAGNOSIS — R73.01 IMPAIRED FASTING GLUCOSE: ICD-10-CM

## 2018-10-23 DIAGNOSIS — I63.511 STROKE DUE TO OCCLUSION OF RIGHT MIDDLE CEREBRAL ARTERY (H): ICD-10-CM

## 2018-10-23 DIAGNOSIS — Z23 NEED FOR VACCINATION: ICD-10-CM

## 2018-10-23 LAB
ANION GAP SERPL CALCULATED.3IONS-SCNC: 9 MMOL/L (ref 5–18)
BUN SERPL-MCNC: 11 MG/DL (ref 8–28)
CALCIUM SERPL-MCNC: 8.6 MG/DL (ref 8.5–10.5)
CHLORIDE BLD-SCNC: 107 MMOL/L (ref 98–107)
CO2 SERPL-SCNC: 25 MMOL/L (ref 22–31)
CREAT SERPL-MCNC: 0.72 MG/DL (ref 0.7–1.3)
GFR SERPL CREATININE-BSD FRML MDRD: >60 ML/MIN/1.73M2
GLUCOSE BLD-MCNC: 109 MG/DL (ref 70–125)
HBA1C MFR BLD: 5.9 % (ref 3.5–6)
POTASSIUM BLD-SCNC: 4.3 MMOL/L (ref 3.5–5)
SODIUM SERPL-SCNC: 141 MMOL/L (ref 136–145)

## 2018-10-26 ENCOUNTER — OFFICE VISIT - HEALTHEAST (OUTPATIENT)
Dept: CARDIOLOGY | Facility: CLINIC | Age: 83
End: 2018-10-26

## 2018-10-26 ENCOUNTER — AMBULATORY - HEALTHEAST (OUTPATIENT)
Dept: CARDIOLOGY | Facility: CLINIC | Age: 83
End: 2018-10-26

## 2018-10-26 DIAGNOSIS — I48.0 PAROXYSMAL ATRIAL FIBRILLATION (H): ICD-10-CM

## 2018-10-26 DIAGNOSIS — Z45.018 FITTING AND ADJUSTMENT OF CARDIAC PACEMAKER: ICD-10-CM

## 2018-10-26 DIAGNOSIS — I10 ESSENTIAL HYPERTENSION: ICD-10-CM

## 2018-10-26 DIAGNOSIS — I34.2 NON-RHEUMATIC MITRAL VALVE STENOSIS: ICD-10-CM

## 2018-10-26 ASSESSMENT — MIFFLIN-ST. JEOR: SCORE: 1550.72

## 2018-11-05 ENCOUNTER — AMBULATORY - HEALTHEAST (OUTPATIENT)
Dept: LAB | Facility: CLINIC | Age: 83
End: 2018-11-05

## 2018-11-05 ENCOUNTER — COMMUNICATION - HEALTHEAST (OUTPATIENT)
Dept: ANTICOAGULATION | Facility: CLINIC | Age: 83
End: 2018-11-05

## 2018-11-05 DIAGNOSIS — I48.20 CHRONIC ATRIAL FIBRILLATION (H): ICD-10-CM

## 2018-11-05 DIAGNOSIS — I48.91 A-FIB (H): ICD-10-CM

## 2018-11-05 DIAGNOSIS — Z95.0 PACEMAKER: ICD-10-CM

## 2018-11-05 DIAGNOSIS — I63.9 CEREBRAL INFARCTION (H): ICD-10-CM

## 2018-11-05 LAB — INR PPP: 2.4 (ref 0.9–1.1)

## 2018-11-07 ENCOUNTER — COMMUNICATION - HEALTHEAST (OUTPATIENT)
Dept: FAMILY MEDICINE | Facility: CLINIC | Age: 83
End: 2018-11-07

## 2018-11-07 DIAGNOSIS — I48.91 A-FIB (H): ICD-10-CM

## 2018-12-11 ENCOUNTER — AMBULATORY - HEALTHEAST (OUTPATIENT)
Dept: LAB | Facility: CLINIC | Age: 83
End: 2018-12-11

## 2018-12-11 ENCOUNTER — COMMUNICATION - HEALTHEAST (OUTPATIENT)
Dept: ANTICOAGULATION | Facility: CLINIC | Age: 83
End: 2018-12-11

## 2018-12-11 DIAGNOSIS — Z95.0 PACEMAKER: ICD-10-CM

## 2018-12-11 DIAGNOSIS — I63.9 CEREBRAL INFARCTION (H): ICD-10-CM

## 2018-12-11 DIAGNOSIS — I48.91 A-FIB (H): ICD-10-CM

## 2018-12-11 DIAGNOSIS — I48.20 CHRONIC ATRIAL FIBRILLATION (H): ICD-10-CM

## 2018-12-11 LAB — INR PPP: 2.3 (ref 0.9–1.1)

## 2018-12-17 ENCOUNTER — HOME CARE/HOSPICE - HEALTHEAST (OUTPATIENT)
Dept: HOME HEALTH SERVICES | Facility: HOME HEALTH | Age: 83
End: 2018-12-17

## 2018-12-18 ENCOUNTER — HOME CARE/HOSPICE - HEALTHEAST (OUTPATIENT)
Dept: HOME HEALTH SERVICES | Facility: HOME HEALTH | Age: 83
End: 2018-12-18

## 2018-12-18 ENCOUNTER — COMMUNICATION - HEALTHEAST (OUTPATIENT)
Dept: CARE COORDINATION | Facility: CLINIC | Age: 83
End: 2018-12-18

## 2018-12-18 ENCOUNTER — COMMUNICATION - HEALTHEAST (OUTPATIENT)
Dept: ANTICOAGULATION | Facility: CLINIC | Age: 83
End: 2018-12-18

## 2018-12-18 DIAGNOSIS — I48.91 A-FIB (H): ICD-10-CM

## 2018-12-18 DIAGNOSIS — Z95.0 PACEMAKER: ICD-10-CM

## 2018-12-18 DIAGNOSIS — I63.9 CEREBRAL INFARCTION (H): ICD-10-CM

## 2018-12-19 ENCOUNTER — COMMUNICATION - HEALTHEAST (OUTPATIENT)
Dept: ANTICOAGULATION | Facility: CLINIC | Age: 83
End: 2018-12-19

## 2018-12-19 ENCOUNTER — OFFICE VISIT - HEALTHEAST (OUTPATIENT)
Dept: FAMILY MEDICINE | Facility: CLINIC | Age: 83
End: 2018-12-19

## 2018-12-19 DIAGNOSIS — N13.8 BPH WITH URINARY OBSTRUCTION: ICD-10-CM

## 2018-12-19 DIAGNOSIS — I48.91 A-FIB (H): ICD-10-CM

## 2018-12-19 DIAGNOSIS — N40.1 BPH WITH URINARY OBSTRUCTION: ICD-10-CM

## 2018-12-19 DIAGNOSIS — I63.9 CEREBRAL INFARCTION (H): ICD-10-CM

## 2018-12-19 DIAGNOSIS — D64.9 ANEMIA, UNSPECIFIED TYPE: ICD-10-CM

## 2018-12-19 DIAGNOSIS — I48.20 CHRONIC ATRIAL FIBRILLATION (H): ICD-10-CM

## 2018-12-19 DIAGNOSIS — Z95.0 PACEMAKER: ICD-10-CM

## 2018-12-19 DIAGNOSIS — T14.8XXA TRAUMATIC HEMATOMA: ICD-10-CM

## 2018-12-19 DIAGNOSIS — I10 ESSENTIAL HYPERTENSION: ICD-10-CM

## 2018-12-19 DIAGNOSIS — Z09 HOSPITAL DISCHARGE FOLLOW-UP: ICD-10-CM

## 2018-12-19 LAB — INR PPP: 1.3 (ref 0.9–1.1)

## 2018-12-20 LAB
ANION GAP SERPL CALCULATED.3IONS-SCNC: 5 MMOL/L (ref 5–18)
BUN SERPL-MCNC: 14 MG/DL (ref 8–28)
CALCIUM SERPL-MCNC: 8.4 MG/DL (ref 8.5–10.5)
CHLORIDE BLD-SCNC: 105 MMOL/L (ref 98–107)
CO2 SERPL-SCNC: 27 MMOL/L (ref 22–31)
CREAT SERPL-MCNC: 0.67 MG/DL (ref 0.7–1.3)
GFR SERPL CREATININE-BSD FRML MDRD: >60 ML/MIN/1.73M2
GLUCOSE BLD-MCNC: 119 MG/DL (ref 70–125)
HGB BLD-MCNC: 9.9 G/DL (ref 14–18)
POTASSIUM BLD-SCNC: 3.6 MMOL/L (ref 3.5–5)
SODIUM SERPL-SCNC: 137 MMOL/L (ref 136–145)

## 2018-12-21 ENCOUNTER — COMMUNICATION - HEALTHEAST (OUTPATIENT)
Dept: FAMILY MEDICINE | Facility: CLINIC | Age: 83
End: 2018-12-21

## 2018-12-22 ENCOUNTER — HOME CARE/HOSPICE - HEALTHEAST (OUTPATIENT)
Dept: HOME HEALTH SERVICES | Facility: HOME HEALTH | Age: 83
End: 2018-12-22

## 2019-01-04 ENCOUNTER — AMBULATORY - HEALTHEAST (OUTPATIENT)
Dept: ANTICOAGULATION | Facility: CLINIC | Age: 84
End: 2019-01-04

## 2019-01-04 ENCOUNTER — MEDICAL CORRESPONDENCE (OUTPATIENT)
Dept: HEALTH INFORMATION MANAGEMENT | Facility: CLINIC | Age: 84
End: 2019-01-04

## 2019-01-08 ENCOUNTER — AMBULATORY - HEALTHEAST (OUTPATIENT)
Dept: ANTICOAGULATION | Facility: CLINIC | Age: 84
End: 2019-01-08

## 2019-01-09 ENCOUNTER — RECORDS - HEALTHEAST (OUTPATIENT)
Dept: LAB | Facility: CLINIC | Age: 84
End: 2019-01-09

## 2019-01-09 ENCOUNTER — COMMUNICATION - HEALTHEAST (OUTPATIENT)
Dept: GERIATRICS | Facility: CLINIC | Age: 84
End: 2019-01-09

## 2019-01-09 LAB — INR PPP: 2.41 (ref 0.9–1.1)

## 2019-01-10 ENCOUNTER — RECORDS - HEALTHEAST (OUTPATIENT)
Dept: LAB | Facility: CLINIC | Age: 84
End: 2019-01-10

## 2019-01-11 ENCOUNTER — OFFICE VISIT - HEALTHEAST (OUTPATIENT)
Dept: GERIATRICS | Facility: CLINIC | Age: 84
End: 2019-01-11

## 2019-01-11 DIAGNOSIS — I48.91 ATRIAL FIBRILLATION, UNSPECIFIED TYPE (H): ICD-10-CM

## 2019-01-11 DIAGNOSIS — Z51.81 ANTICOAGULATION MANAGEMENT ENCOUNTER: ICD-10-CM

## 2019-01-11 DIAGNOSIS — Z79.01 ANTICOAGULATION MANAGEMENT ENCOUNTER: ICD-10-CM

## 2019-01-11 DIAGNOSIS — E87.6 HYPOKALEMIA: ICD-10-CM

## 2019-01-11 LAB
ALBUMIN SERPL-MCNC: 2.7 G/DL (ref 3.5–5)
ALP SERPL-CCNC: 49 U/L (ref 45–120)
ALT SERPL W P-5'-P-CCNC: 10 U/L (ref 0–45)
ANION GAP SERPL CALCULATED.3IONS-SCNC: 8 MMOL/L (ref 5–18)
AST SERPL W P-5'-P-CCNC: 20 U/L (ref 0–40)
BASOPHILS # BLD AUTO: 0 THOU/UL (ref 0–0.2)
BASOPHILS NFR BLD AUTO: 1 % (ref 0–2)
BILIRUB SERPL-MCNC: 0.6 MG/DL (ref 0–1)
BUN SERPL-MCNC: 6 MG/DL (ref 8–28)
CALCIUM SERPL-MCNC: 8.1 MG/DL (ref 8.5–10.5)
CHLORIDE BLD-SCNC: 107 MMOL/L (ref 98–107)
CO2 SERPL-SCNC: 23 MMOL/L (ref 22–31)
CREAT SERPL-MCNC: 0.66 MG/DL (ref 0.7–1.3)
EOSINOPHIL # BLD AUTO: 0.2 THOU/UL (ref 0–0.4)
EOSINOPHIL NFR BLD AUTO: 3 % (ref 0–6)
ERYTHROCYTE [DISTWIDTH] IN BLOOD BY AUTOMATED COUNT: 14.7 % (ref 11–14.5)
GFR SERPL CREATININE-BSD FRML MDRD: >60 ML/MIN/1.73M2
GLUCOSE BLD-MCNC: 99 MG/DL (ref 70–125)
HCT VFR BLD AUTO: 32.4 % (ref 40–54)
HGB BLD-MCNC: 10.5 G/DL (ref 14–18)
INR PPP: 2.09 (ref 0.9–1.1)
LYMPHOCYTES # BLD AUTO: 1.5 THOU/UL (ref 0.8–4.4)
LYMPHOCYTES NFR BLD AUTO: 24 % (ref 20–40)
MAGNESIUM SERPL-MCNC: 2 MG/DL (ref 1.8–2.6)
MCH RBC QN AUTO: 30.3 PG (ref 27–34)
MCHC RBC AUTO-ENTMCNC: 32.4 G/DL (ref 32–36)
MCV RBC AUTO: 94 FL (ref 80–100)
MONOCYTES # BLD AUTO: 0.8 THOU/UL (ref 0–0.9)
MONOCYTES NFR BLD AUTO: 12 % (ref 2–10)
NEUTROPHILS # BLD AUTO: 3.9 THOU/UL (ref 2–7.7)
NEUTROPHILS NFR BLD AUTO: 61 % (ref 50–70)
PHOSPHATE SERPL-MCNC: 2.6 MG/DL (ref 2.5–4.5)
PLATELET # BLD AUTO: 225 THOU/UL (ref 140–440)
PMV BLD AUTO: 9.9 FL (ref 8.5–12.5)
POTASSIUM BLD-SCNC: 3.4 MMOL/L (ref 3.5–5)
PROT SERPL-MCNC: 5.6 G/DL (ref 6–8)
RBC # BLD AUTO: 3.46 MILL/UL (ref 4.4–6.2)
SODIUM SERPL-SCNC: 138 MMOL/L (ref 136–145)
WBC: 6.5 THOU/UL (ref 4–11)

## 2019-01-12 ENCOUNTER — RECORDS - HEALTHEAST (OUTPATIENT)
Dept: LAB | Facility: CLINIC | Age: 84
End: 2019-01-12

## 2019-01-12 LAB — POTASSIUM BLD-SCNC: 3.8 MMOL/L (ref 3.5–5)

## 2019-01-14 ENCOUNTER — RECORDS - HEALTHEAST (OUTPATIENT)
Dept: ADMINISTRATIVE | Facility: OTHER | Age: 84
End: 2019-01-14

## 2019-01-14 ENCOUNTER — RECORDS - HEALTHEAST (OUTPATIENT)
Dept: LAB | Facility: CLINIC | Age: 84
End: 2019-01-14

## 2019-01-14 LAB — INR PPP: 2.06 (ref 0.9–1.1)

## 2019-01-15 ENCOUNTER — OFFICE VISIT - HEALTHEAST (OUTPATIENT)
Dept: GERIATRICS | Facility: CLINIC | Age: 84
End: 2019-01-15

## 2019-01-15 DIAGNOSIS — Z79.01 ANTICOAGULATION MANAGEMENT ENCOUNTER: ICD-10-CM

## 2019-01-15 DIAGNOSIS — Z51.81 ANTICOAGULATION MANAGEMENT ENCOUNTER: ICD-10-CM

## 2019-01-15 DIAGNOSIS — R33.9 RETENTION, URINE: ICD-10-CM

## 2019-01-15 DIAGNOSIS — A41.9 SEPSIS, DUE TO UNSPECIFIED ORGANISM: ICD-10-CM

## 2019-01-15 DIAGNOSIS — R31.9 URINARY TRACT INFECTION WITH HEMATURIA, SITE UNSPECIFIED: ICD-10-CM

## 2019-01-15 DIAGNOSIS — I48.91 ATRIAL FIBRILLATION, UNSPECIFIED TYPE (H): ICD-10-CM

## 2019-01-15 DIAGNOSIS — R33.9 RETENTION OF URINE, UNSPECIFIED: ICD-10-CM

## 2019-01-15 DIAGNOSIS — I63.532 ACUTE ISCHEMIC LEFT PCA STROKE (H): ICD-10-CM

## 2019-01-15 DIAGNOSIS — N39.0 URINARY TRACT INFECTION WITH HEMATURIA, SITE UNSPECIFIED: ICD-10-CM

## 2019-01-16 ENCOUNTER — OFFICE VISIT - HEALTHEAST (OUTPATIENT)
Dept: GERIATRICS | Facility: CLINIC | Age: 84
End: 2019-01-16

## 2019-01-16 DIAGNOSIS — R31.0 GROSS HEMATURIA: ICD-10-CM

## 2019-01-28 ENCOUNTER — OFFICE VISIT - HEALTHEAST (OUTPATIENT)
Dept: GERIATRICS | Facility: CLINIC | Age: 84
End: 2019-01-28

## 2019-01-28 ENCOUNTER — AMBULATORY - HEALTHEAST (OUTPATIENT)
Dept: ANTICOAGULATION | Facility: CLINIC | Age: 84
End: 2019-01-28

## 2019-01-28 DIAGNOSIS — Z93.59 SUPRAPUBIC CATHETER (H): ICD-10-CM

## 2019-01-28 DIAGNOSIS — Z51.81 ANTICOAGULATION MANAGEMENT ENCOUNTER: ICD-10-CM

## 2019-01-28 DIAGNOSIS — I48.91 ATRIAL FIBRILLATION, UNSPECIFIED TYPE (H): ICD-10-CM

## 2019-01-28 DIAGNOSIS — Z79.01 ANTICOAGULATION MANAGEMENT ENCOUNTER: ICD-10-CM

## 2019-01-29 ENCOUNTER — RECORDS - HEALTHEAST (OUTPATIENT)
Dept: LAB | Facility: CLINIC | Age: 84
End: 2019-01-29

## 2019-01-29 LAB — INR PPP: 1.89 (ref 0.9–1.1)

## 2019-01-31 ENCOUNTER — RECORDS - HEALTHEAST (OUTPATIENT)
Dept: ADMINISTRATIVE | Facility: OTHER | Age: 84
End: 2019-01-31

## 2019-02-01 ENCOUNTER — RECORDS - HEALTHEAST (OUTPATIENT)
Dept: LAB | Facility: CLINIC | Age: 84
End: 2019-02-01

## 2019-02-01 LAB
TSH SERPL DL<=0.005 MIU/L-ACNC: 1.91 UIU/ML (ref 0.3–5)
VIT B12 SERPL-MCNC: 273 PG/ML (ref 213–816)

## 2019-02-04 ENCOUNTER — RECORDS - HEALTHEAST (OUTPATIENT)
Dept: LAB | Facility: CLINIC | Age: 84
End: 2019-02-04

## 2019-02-05 LAB — INR PPP: 2.51 (ref 0.9–1.1)

## 2019-02-07 ENCOUNTER — AMBULATORY - HEALTHEAST (OUTPATIENT)
Dept: CARDIOLOGY | Facility: CLINIC | Age: 84
End: 2019-02-07

## 2019-02-07 ENCOUNTER — OFFICE VISIT - HEALTHEAST (OUTPATIENT)
Dept: GERIATRICS | Facility: CLINIC | Age: 84
End: 2019-02-07

## 2019-02-07 DIAGNOSIS — Z79.01 ANTICOAGULATION MANAGEMENT ENCOUNTER: ICD-10-CM

## 2019-02-07 DIAGNOSIS — Z51.81 ANTICOAGULATION MANAGEMENT ENCOUNTER: ICD-10-CM

## 2019-02-07 DIAGNOSIS — I48.91 ATRIAL FIBRILLATION, UNSPECIFIED TYPE (H): ICD-10-CM

## 2019-02-07 DIAGNOSIS — Z95.0 PACEMAKER: ICD-10-CM

## 2019-02-07 DIAGNOSIS — Z93.59 SUPRAPUBIC CATHETER (H): ICD-10-CM

## 2019-02-11 ENCOUNTER — RECORDS - HEALTHEAST (OUTPATIENT)
Dept: LAB | Facility: CLINIC | Age: 84
End: 2019-02-11

## 2019-02-12 ENCOUNTER — COMMUNICATION - HEALTHEAST (OUTPATIENT)
Dept: GERIATRICS | Facility: CLINIC | Age: 84
End: 2019-02-12

## 2019-02-12 LAB
HCC DEVICE COMMENTS: NORMAL
HCC DEVICE IMPLANTING PROVIDER: NORMAL
HCC DEVICE MANUFACTURE: NORMAL
HCC DEVICE MODEL: NORMAL
HCC DEVICE SERIAL NUMBER: NORMAL
HCC DEVICE TYPE: NORMAL
INR PPP: 3.09 (ref 0.9–1.1)

## 2019-02-14 ENCOUNTER — RECORDS - HEALTHEAST (OUTPATIENT)
Dept: LAB | Facility: CLINIC | Age: 84
End: 2019-02-14

## 2019-02-15 LAB — INR PPP: 2.48 (ref 0.9–1.1)

## 2019-02-21 ENCOUNTER — RECORDS - HEALTHEAST (OUTPATIENT)
Dept: LAB | Facility: CLINIC | Age: 84
End: 2019-02-21

## 2019-02-21 ENCOUNTER — RECORDS - HEALTHEAST (OUTPATIENT)
Dept: ADMINISTRATIVE | Facility: OTHER | Age: 84
End: 2019-02-21

## 2019-02-22 ENCOUNTER — COMMUNICATION - HEALTHEAST (OUTPATIENT)
Dept: GERIATRICS | Facility: CLINIC | Age: 84
End: 2019-02-22

## 2019-02-22 LAB — INR PPP: 2.4 (ref 0.9–1.1)

## 2019-02-26 ENCOUNTER — OFFICE VISIT - HEALTHEAST (OUTPATIENT)
Dept: GERIATRICS | Facility: CLINIC | Age: 84
End: 2019-02-26

## 2019-02-26 DIAGNOSIS — R33.9 URINARY RETENTION: ICD-10-CM

## 2019-02-26 DIAGNOSIS — I48.0 PAROXYSMAL ATRIAL FIBRILLATION (H): ICD-10-CM

## 2019-02-26 DIAGNOSIS — Z86.73 HISTORY OF STROKE: ICD-10-CM

## 2019-02-26 DIAGNOSIS — I10 ESSENTIAL HYPERTENSION: ICD-10-CM

## 2019-03-05 ENCOUNTER — RECORDS - HEALTHEAST (OUTPATIENT)
Dept: LAB | Facility: CLINIC | Age: 84
End: 2019-03-05

## 2019-03-06 LAB — INR PPP: 2.65 (ref 0.9–1.1)

## 2019-03-07 ENCOUNTER — OFFICE VISIT - HEALTHEAST (OUTPATIENT)
Dept: GERIATRICS | Facility: CLINIC | Age: 84
End: 2019-03-07

## 2019-03-07 DIAGNOSIS — I10 ESSENTIAL HYPERTENSION: ICD-10-CM

## 2019-03-07 DIAGNOSIS — R33.9 URINARY RETENTION: ICD-10-CM

## 2019-03-07 DIAGNOSIS — I48.91 ATRIAL FIBRILLATION, UNSPECIFIED TYPE (H): ICD-10-CM

## 2019-03-07 DIAGNOSIS — Z51.81 ANTICOAGULATION MANAGEMENT ENCOUNTER: ICD-10-CM

## 2019-03-07 DIAGNOSIS — Z79.01 ANTICOAGULATION MANAGEMENT ENCOUNTER: ICD-10-CM

## 2019-03-13 ENCOUNTER — RECORDS - HEALTHEAST (OUTPATIENT)
Dept: LAB | Facility: CLINIC | Age: 84
End: 2019-03-13

## 2019-03-13 LAB — INR PPP: 2.81 (ref 0.9–1.1)

## 2019-03-26 ENCOUNTER — RECORDS - HEALTHEAST (OUTPATIENT)
Dept: ADMINISTRATIVE | Facility: OTHER | Age: 84
End: 2019-03-26

## 2019-03-27 ENCOUNTER — RECORDS - HEALTHEAST (OUTPATIENT)
Dept: LAB | Facility: CLINIC | Age: 84
End: 2019-03-27

## 2019-03-27 ENCOUNTER — COMMUNICATION - HEALTHEAST (OUTPATIENT)
Dept: GERIATRICS | Facility: CLINIC | Age: 84
End: 2019-03-27

## 2019-03-27 LAB — INR PPP: 1.96 (ref 0.9–1.1)

## 2019-04-03 ENCOUNTER — RECORDS - HEALTHEAST (OUTPATIENT)
Dept: LAB | Facility: CLINIC | Age: 84
End: 2019-04-03

## 2019-04-03 ENCOUNTER — COMMUNICATION - HEALTHEAST (OUTPATIENT)
Dept: GERIATRICS | Facility: CLINIC | Age: 84
End: 2019-04-03

## 2019-04-03 LAB — INR PPP: 1.83 (ref 0.9–1.1)

## 2019-04-05 ENCOUNTER — COMMUNICATION - HEALTHEAST (OUTPATIENT)
Dept: ANTICOAGULATION | Facility: CLINIC | Age: 84
End: 2019-04-05

## 2019-04-05 DIAGNOSIS — I48.20 CHRONIC ATRIAL FIBRILLATION (H): ICD-10-CM

## 2019-04-05 DIAGNOSIS — Z95.0 PACEMAKER: ICD-10-CM

## 2019-04-05 DIAGNOSIS — I63.9 CEREBRAL INFARCTION (H): ICD-10-CM

## 2019-04-09 ENCOUNTER — RECORDS - HEALTHEAST (OUTPATIENT)
Dept: LAB | Facility: CLINIC | Age: 84
End: 2019-04-09

## 2019-04-10 LAB — INR PPP: 2.2 (ref 0.9–1.1)

## 2019-04-17 ENCOUNTER — RECORDS - HEALTHEAST (OUTPATIENT)
Dept: LAB | Facility: CLINIC | Age: 84
End: 2019-04-17

## 2019-04-17 LAB — INR PPP: 2.19 (ref 0.9–1.1)

## 2019-04-30 ENCOUNTER — OFFICE VISIT - HEALTHEAST (OUTPATIENT)
Dept: GERIATRICS | Facility: CLINIC | Age: 84
End: 2019-04-30

## 2019-04-30 ENCOUNTER — RECORDS - HEALTHEAST (OUTPATIENT)
Dept: LAB | Facility: CLINIC | Age: 84
End: 2019-04-30

## 2019-04-30 DIAGNOSIS — Z86.73 HISTORY OF STROKE: ICD-10-CM

## 2019-04-30 DIAGNOSIS — I48.0 PAROXYSMAL ATRIAL FIBRILLATION (H): ICD-10-CM

## 2019-04-30 DIAGNOSIS — I10 ESSENTIAL HYPERTENSION: ICD-10-CM

## 2019-04-30 DIAGNOSIS — R33.9 URINARY RETENTION: ICD-10-CM

## 2019-05-01 LAB — INR PPP: 2.25 (ref 0.9–1.1)

## 2019-05-09 ENCOUNTER — AMBULATORY - HEALTHEAST (OUTPATIENT)
Dept: CARDIOLOGY | Facility: CLINIC | Age: 84
End: 2019-05-09

## 2019-05-09 DIAGNOSIS — Z95.0 PACEMAKER: ICD-10-CM

## 2019-05-21 ENCOUNTER — RECORDS - HEALTHEAST (OUTPATIENT)
Dept: LAB | Facility: CLINIC | Age: 84
End: 2019-05-21

## 2019-05-22 LAB — INR PPP: 2.14 (ref 0.9–1.1)

## 2019-06-05 ENCOUNTER — OFFICE VISIT - HEALTHEAST (OUTPATIENT)
Dept: GERIATRICS | Facility: CLINIC | Age: 84
End: 2019-06-05

## 2019-06-05 DIAGNOSIS — R33.9 URINARY RETENTION: ICD-10-CM

## 2019-06-05 DIAGNOSIS — H35.30 MACULAR DEGENERATION (SENILE) OF RETINA: ICD-10-CM

## 2019-06-05 DIAGNOSIS — I48.0 PAROXYSMAL ATRIAL FIBRILLATION (H): ICD-10-CM

## 2019-06-19 ENCOUNTER — RECORDS - HEALTHEAST (OUTPATIENT)
Dept: LAB | Facility: CLINIC | Age: 84
End: 2019-06-19

## 2019-06-20 LAB — INR PPP: 2.09 (ref 0.9–1.1)

## 2019-06-25 ENCOUNTER — RECORDS - HEALTHEAST (OUTPATIENT)
Dept: ADMINISTRATIVE | Facility: OTHER | Age: 84
End: 2019-06-25

## 2019-07-16 ENCOUNTER — RECORDS - HEALTHEAST (OUTPATIENT)
Dept: LAB | Facility: CLINIC | Age: 84
End: 2019-07-16

## 2019-07-17 LAB — INR PPP: 2.22 (ref 0.9–1.1)

## 2019-08-06 ENCOUNTER — RECORDS - HEALTHEAST (OUTPATIENT)
Dept: ADMINISTRATIVE | Facility: OTHER | Age: 84
End: 2019-08-06

## 2019-08-14 ENCOUNTER — RECORDS - HEALTHEAST (OUTPATIENT)
Dept: LAB | Facility: CLINIC | Age: 84
End: 2019-08-14

## 2019-08-15 ENCOUNTER — COMMUNICATION - HEALTHEAST (OUTPATIENT)
Dept: GERIATRICS | Facility: CLINIC | Age: 84
End: 2019-08-15

## 2019-08-15 ENCOUNTER — AMBULATORY - HEALTHEAST (OUTPATIENT)
Dept: CARDIOLOGY | Facility: CLINIC | Age: 84
End: 2019-08-15

## 2019-08-15 DIAGNOSIS — Z95.0 PACEMAKER: ICD-10-CM

## 2019-08-15 LAB
HCC DEVICE COMMENTS: NORMAL
HCC DEVICE IMPLANTING PROVIDER: NORMAL
HCC DEVICE MANUFACTURE: NORMAL
HCC DEVICE MODEL: NORMAL
HCC DEVICE SERIAL NUMBER: NORMAL
HCC DEVICE TYPE: NORMAL
INR PPP: 1.91 (ref 0.9–1.1)

## 2019-08-20 ENCOUNTER — OFFICE VISIT - HEALTHEAST (OUTPATIENT)
Dept: GERIATRICS | Facility: CLINIC | Age: 84
End: 2019-08-20

## 2019-08-20 DIAGNOSIS — I10 ESSENTIAL HYPERTENSION: ICD-10-CM

## 2019-08-20 DIAGNOSIS — R33.9 URINARY RETENTION: ICD-10-CM

## 2019-08-20 DIAGNOSIS — Z86.73 HISTORY OF STROKE: ICD-10-CM

## 2019-08-20 DIAGNOSIS — I48.0 PAROXYSMAL ATRIAL FIBRILLATION (H): ICD-10-CM

## 2019-08-21 ENCOUNTER — RECORDS - HEALTHEAST (OUTPATIENT)
Dept: LAB | Facility: CLINIC | Age: 84
End: 2019-08-21

## 2019-08-22 LAB — INR PPP: 1.96 (ref 0.9–1.1)

## 2019-08-28 ENCOUNTER — RECORDS - HEALTHEAST (OUTPATIENT)
Dept: LAB | Facility: CLINIC | Age: 84
End: 2019-08-28

## 2019-08-29 LAB — INR PPP: 1.77 (ref 0.9–1.1)

## 2019-08-30 ENCOUNTER — RECORDS - HEALTHEAST (OUTPATIENT)
Dept: LAB | Facility: CLINIC | Age: 84
End: 2019-08-30

## 2019-09-03 LAB — INR PPP: 1.97 (ref 0.9–1.1)

## 2019-09-10 ENCOUNTER — RECORDS - HEALTHEAST (OUTPATIENT)
Dept: LAB | Facility: CLINIC | Age: 84
End: 2019-09-10

## 2019-09-10 ENCOUNTER — COMMUNICATION - HEALTHEAST (OUTPATIENT)
Dept: GERIATRICS | Facility: CLINIC | Age: 84
End: 2019-09-10

## 2019-09-10 LAB — INR PPP: 2.09 (ref 0.9–1.1)

## 2019-09-11 ENCOUNTER — OFFICE VISIT - HEALTHEAST (OUTPATIENT)
Dept: GERIATRICS | Facility: CLINIC | Age: 84
End: 2019-09-11

## 2019-09-11 DIAGNOSIS — G51.0 RIGHT-SIDED BELL'S PALSY: ICD-10-CM

## 2019-09-12 ENCOUNTER — RECORDS - HEALTHEAST (OUTPATIENT)
Dept: LAB | Facility: CLINIC | Age: 84
End: 2019-09-12

## 2019-09-12 LAB
ANION GAP SERPL CALCULATED.3IONS-SCNC: 7 MMOL/L (ref 5–18)
BASOPHILS # BLD AUTO: 0 THOU/UL (ref 0–0.2)
BASOPHILS NFR BLD AUTO: 0 % (ref 0–2)
BUN SERPL-MCNC: 12 MG/DL (ref 8–28)
CALCIUM SERPL-MCNC: 9.1 MG/DL (ref 8.5–10.5)
CHLORIDE BLD-SCNC: 104 MMOL/L (ref 98–107)
CO2 SERPL-SCNC: 24 MMOL/L (ref 22–31)
CREAT SERPL-MCNC: 0.71 MG/DL (ref 0.7–1.3)
EOSINOPHIL # BLD AUTO: 0 THOU/UL (ref 0–0.4)
EOSINOPHIL NFR BLD AUTO: 0 % (ref 0–6)
ERYTHROCYTE [DISTWIDTH] IN BLOOD BY AUTOMATED COUNT: 15.3 % (ref 11–14.5)
GFR SERPL CREATININE-BSD FRML MDRD: >60 ML/MIN/1.73M2
GLUCOSE BLD-MCNC: 138 MG/DL (ref 70–125)
HCT VFR BLD AUTO: 39.7 % (ref 40–54)
HGB BLD-MCNC: 13.3 G/DL (ref 14–18)
LYMPHOCYTES # BLD AUTO: 1.3 THOU/UL (ref 0.8–4.4)
LYMPHOCYTES NFR BLD AUTO: 19 % (ref 20–40)
MCH RBC QN AUTO: 27.9 PG (ref 27–34)
MCHC RBC AUTO-ENTMCNC: 33.5 G/DL (ref 32–36)
MCV RBC AUTO: 83 FL (ref 80–100)
MONOCYTES # BLD AUTO: 0.6 THOU/UL (ref 0–0.9)
MONOCYTES NFR BLD AUTO: 9 % (ref 2–10)
NEUTROPHILS # BLD AUTO: 4.7 THOU/UL (ref 2–7.7)
NEUTROPHILS NFR BLD AUTO: 71 % (ref 50–70)
PLATELET # BLD AUTO: 201 THOU/UL (ref 140–440)
PMV BLD AUTO: 10.6 FL (ref 8.5–12.5)
POTASSIUM BLD-SCNC: 4.2 MMOL/L (ref 3.5–5)
RBC # BLD AUTO: 4.77 MILL/UL (ref 4.4–6.2)
SODIUM SERPL-SCNC: 135 MMOL/L (ref 136–145)
WBC: 6.6 THOU/UL (ref 4–11)

## 2019-09-16 ENCOUNTER — COMMUNICATION - HEALTHEAST (OUTPATIENT)
Dept: GERIATRICS | Facility: CLINIC | Age: 84
End: 2019-09-16

## 2019-09-17 ENCOUNTER — RECORDS - HEALTHEAST (OUTPATIENT)
Dept: LAB | Facility: CLINIC | Age: 84
End: 2019-09-17

## 2019-09-17 LAB — INR PPP: 3.21 (ref 0.9–1.1)

## 2019-09-19 ENCOUNTER — RECORDS - HEALTHEAST (OUTPATIENT)
Dept: LAB | Facility: CLINIC | Age: 84
End: 2019-09-19

## 2019-09-20 LAB
ANION GAP SERPL CALCULATED.3IONS-SCNC: 7 MMOL/L (ref 5–18)
BUN SERPL-MCNC: 15 MG/DL (ref 8–28)
CALCIUM SERPL-MCNC: 8.9 MG/DL (ref 8.5–10.5)
CHLORIDE BLD-SCNC: 100 MMOL/L (ref 98–107)
CO2 SERPL-SCNC: 28 MMOL/L (ref 22–31)
CREAT SERPL-MCNC: 0.75 MG/DL (ref 0.7–1.3)
GFR SERPL CREATININE-BSD FRML MDRD: >60 ML/MIN/1.73M2
GLUCOSE BLD-MCNC: 115 MG/DL (ref 70–125)
POTASSIUM BLD-SCNC: 4.6 MMOL/L (ref 3.5–5)
SODIUM SERPL-SCNC: 135 MMOL/L (ref 136–145)

## 2019-09-23 ENCOUNTER — RECORDS - HEALTHEAST (OUTPATIENT)
Dept: LAB | Facility: CLINIC | Age: 84
End: 2019-09-23

## 2019-09-23 LAB — INR PPP: 2.8 (ref 0.9–1.1)

## 2019-09-28 ENCOUNTER — RECORDS - HEALTHEAST (OUTPATIENT)
Dept: LAB | Facility: CLINIC | Age: 84
End: 2019-09-28

## 2019-09-30 LAB — INR PPP: 2.42 (ref 0.9–1.1)

## 2019-10-04 ENCOUNTER — RECORDS - HEALTHEAST (OUTPATIENT)
Dept: LAB | Facility: CLINIC | Age: 84
End: 2019-10-04

## 2019-10-07 LAB — INR PPP: 2.81 (ref 0.9–1.1)

## 2019-10-08 ENCOUNTER — RECORDS - HEALTHEAST (OUTPATIENT)
Dept: LAB | Facility: CLINIC | Age: 84
End: 2019-10-08

## 2019-10-08 ENCOUNTER — OFFICE VISIT - HEALTHEAST (OUTPATIENT)
Dept: GERIATRICS | Facility: CLINIC | Age: 84
End: 2019-10-08

## 2019-10-08 DIAGNOSIS — R33.9 URINARY RETENTION: ICD-10-CM

## 2019-10-08 DIAGNOSIS — Z86.73 HISTORY OF STROKE: ICD-10-CM

## 2019-10-08 DIAGNOSIS — I48.0 PAROXYSMAL ATRIAL FIBRILLATION (H): ICD-10-CM

## 2019-10-08 DIAGNOSIS — R41.82 ALTERED MENTAL STATUS, UNSPECIFIED ALTERED MENTAL STATUS TYPE: ICD-10-CM

## 2019-10-08 LAB
ALBUMIN SERPL-MCNC: 3.3 G/DL (ref 3.5–5)
ALBUMIN UR-MCNC: ABNORMAL MG/DL
ALP SERPL-CCNC: 50 U/L (ref 45–120)
ALT SERPL W P-5'-P-CCNC: <9 U/L (ref 0–45)
AMORPH CRY #/AREA URNS HPF: ABNORMAL /[HPF]
ANION GAP SERPL CALCULATED.3IONS-SCNC: 8 MMOL/L (ref 5–18)
APPEARANCE UR: ABNORMAL
AST SERPL W P-5'-P-CCNC: 14 U/L (ref 0–40)
BACTERIA #/AREA URNS HPF: ABNORMAL HPF
BASOPHILS # BLD AUTO: 0 THOU/UL (ref 0–0.2)
BASOPHILS NFR BLD AUTO: 0 % (ref 0–2)
BILIRUB SERPL-MCNC: 0.8 MG/DL (ref 0–1)
BILIRUB UR QL STRIP: NEGATIVE
BUN SERPL-MCNC: 13 MG/DL (ref 8–28)
CALCIUM SERPL-MCNC: 8.2 MG/DL (ref 8.5–10.5)
CHLORIDE BLD-SCNC: 100 MMOL/L (ref 98–107)
CO2 SERPL-SCNC: 23 MMOL/L (ref 22–31)
COLOR UR AUTO: YELLOW
CREAT SERPL-MCNC: 0.74 MG/DL (ref 0.7–1.3)
EOSINOPHIL # BLD AUTO: 0.1 THOU/UL (ref 0–0.4)
EOSINOPHIL NFR BLD AUTO: 1 % (ref 0–6)
ERYTHROCYTE [DISTWIDTH] IN BLOOD BY AUTOMATED COUNT: 17.2 % (ref 11–14.5)
GFR SERPL CREATININE-BSD FRML MDRD: >60 ML/MIN/1.73M2
GLUCOSE BLD-MCNC: 100 MG/DL (ref 70–125)
GLUCOSE UR STRIP-MCNC: NEGATIVE MG/DL
HCT VFR BLD AUTO: 39 % (ref 40–54)
HGB BLD-MCNC: 12.7 G/DL (ref 14–18)
HGB UR QL STRIP: ABNORMAL
INR PPP: 2.83 (ref 0.9–1.1)
KETONES UR STRIP-MCNC: NEGATIVE MG/DL
LEUKOCYTE ESTERASE UR QL STRIP: ABNORMAL
LYMPHOCYTES # BLD AUTO: 1.5 THOU/UL (ref 0.8–4.4)
LYMPHOCYTES NFR BLD AUTO: 12 % (ref 20–40)
MCH RBC QN AUTO: 28 PG (ref 27–34)
MCHC RBC AUTO-ENTMCNC: 32.6 G/DL (ref 32–36)
MCV RBC AUTO: 86 FL (ref 80–100)
MONOCYTES # BLD AUTO: 1.4 THOU/UL (ref 0–0.9)
MONOCYTES NFR BLD AUTO: 12 % (ref 2–10)
NEUTROPHILS # BLD AUTO: 9.5 THOU/UL (ref 2–7.7)
NEUTROPHILS NFR BLD AUTO: 76 % (ref 50–70)
NITRATE UR QL: NEGATIVE
PH UR STRIP: 6 [PH] (ref 4.5–8)
PLATELET # BLD AUTO: 173 THOU/UL (ref 140–440)
PMV BLD AUTO: 10.5 FL (ref 8.5–12.5)
POTASSIUM BLD-SCNC: 4.2 MMOL/L (ref 3.5–5)
PROCALCITONIN SERPL-MCNC: 0.02 NG/ML (ref 0–0.49)
PROT SERPL-MCNC: 5.8 G/DL (ref 6–8)
RBC # BLD AUTO: 4.54 MILL/UL (ref 4.4–6.2)
RBC #/AREA URNS AUTO: ABNORMAL HPF
SODIUM SERPL-SCNC: 131 MMOL/L (ref 136–145)
SP GR UR STRIP: 1.03 (ref 1–1.03)
SQUAMOUS #/AREA URNS AUTO: ABNORMAL LPF
UROBILINOGEN UR STRIP-ACNC: ABNORMAL
WBC #/AREA URNS AUTO: >100 HPF
WBC CLUMPS #/AREA URNS HPF: PRESENT /[HPF]
WBC: 12.6 THOU/UL (ref 4–11)

## 2019-10-10 ENCOUNTER — RECORDS - HEALTHEAST (OUTPATIENT)
Dept: LAB | Facility: CLINIC | Age: 84
End: 2019-10-10

## 2019-10-10 ENCOUNTER — OFFICE VISIT - HEALTHEAST (OUTPATIENT)
Dept: GERIATRICS | Facility: CLINIC | Age: 84
End: 2019-10-10

## 2019-10-10 DIAGNOSIS — N39.0 URINARY TRACT INFECTION WITHOUT HEMATURIA, SITE UNSPECIFIED: ICD-10-CM

## 2019-10-10 DIAGNOSIS — R33.9 URINARY RETENTION: ICD-10-CM

## 2019-10-10 DIAGNOSIS — H35.30 MACULAR DEGENERATION (SENILE) OF RETINA: ICD-10-CM

## 2019-10-10 DIAGNOSIS — G51.0 BELL'S PALSY: ICD-10-CM

## 2019-10-11 ENCOUNTER — COMMUNICATION - HEALTHEAST (OUTPATIENT)
Dept: GERIATRICS | Facility: CLINIC | Age: 84
End: 2019-10-11

## 2019-10-11 LAB
ANION GAP SERPL CALCULATED.3IONS-SCNC: 7 MMOL/L (ref 5–18)
BACTERIA SPEC CULT: ABNORMAL
BUN SERPL-MCNC: 10 MG/DL (ref 8–28)
CALCIUM SERPL-MCNC: 8.6 MG/DL (ref 8.5–10.5)
CHLORIDE BLD-SCNC: 103 MMOL/L (ref 98–107)
CO2 SERPL-SCNC: 24 MMOL/L (ref 22–31)
CREAT SERPL-MCNC: 0.82 MG/DL (ref 0.7–1.3)
GFR SERPL CREATININE-BSD FRML MDRD: >60 ML/MIN/1.73M2
GLUCOSE BLD-MCNC: 86 MG/DL (ref 70–125)
INR PPP: 3.21 (ref 0.9–1.1)
POTASSIUM BLD-SCNC: 4.4 MMOL/L (ref 3.5–5)
SODIUM SERPL-SCNC: 134 MMOL/L (ref 136–145)

## 2019-10-14 ENCOUNTER — RECORDS - HEALTHEAST (OUTPATIENT)
Dept: LAB | Facility: CLINIC | Age: 84
End: 2019-10-14

## 2019-10-14 ENCOUNTER — COMMUNICATION - HEALTHEAST (OUTPATIENT)
Dept: GERIATRICS | Facility: CLINIC | Age: 84
End: 2019-10-14

## 2019-10-14 LAB — INR PPP: 3.44 (ref 0.9–1.1)

## 2019-10-21 ENCOUNTER — RECORDS - HEALTHEAST (OUTPATIENT)
Dept: LAB | Facility: CLINIC | Age: 84
End: 2019-10-21

## 2019-10-21 LAB — INR PPP: 2.14 (ref 0.9–1.1)

## 2019-10-24 ENCOUNTER — RECORDS - HEALTHEAST (OUTPATIENT)
Dept: LAB | Facility: CLINIC | Age: 84
End: 2019-10-24

## 2019-10-25 ENCOUNTER — COMMUNICATION - HEALTHEAST (OUTPATIENT)
Dept: GERIATRICS | Facility: CLINIC | Age: 84
End: 2019-10-25

## 2019-10-25 LAB — INR PPP: 2.32 (ref 0.9–1.1)

## 2019-10-31 ENCOUNTER — COMMUNICATION - HEALTHEAST (OUTPATIENT)
Dept: GERIATRICS | Facility: CLINIC | Age: 84
End: 2019-10-31

## 2019-11-05 ENCOUNTER — RECORDS - HEALTHEAST (OUTPATIENT)
Dept: LAB | Facility: CLINIC | Age: 84
End: 2019-11-05

## 2019-11-06 LAB — INR PPP: 1.96 (ref 0.9–1.1)

## 2019-11-12 ENCOUNTER — RECORDS - HEALTHEAST (OUTPATIENT)
Dept: LAB | Facility: CLINIC | Age: 84
End: 2019-11-12

## 2019-11-13 ENCOUNTER — COMMUNICATION - HEALTHEAST (OUTPATIENT)
Dept: GERIATRICS | Facility: CLINIC | Age: 84
End: 2019-11-13

## 2019-11-13 LAB — INR PPP: 2.24 (ref 0.9–1.1)

## 2019-11-22 ENCOUNTER — OFFICE VISIT - HEALTHEAST (OUTPATIENT)
Dept: CARDIOLOGY | Facility: CLINIC | Age: 84
End: 2019-11-22

## 2019-11-22 ENCOUNTER — AMBULATORY - HEALTHEAST (OUTPATIENT)
Dept: CARDIOLOGY | Facility: CLINIC | Age: 84
End: 2019-11-22

## 2019-11-22 DIAGNOSIS — I10 ESSENTIAL HYPERTENSION: ICD-10-CM

## 2019-11-22 DIAGNOSIS — I49.5 SSS (SICK SINUS SYNDROME) (H): ICD-10-CM

## 2019-11-22 DIAGNOSIS — I49.5 BRADY-TACHY SYNDROME (H): ICD-10-CM

## 2019-11-22 DIAGNOSIS — I34.2 NONRHEUMATIC MITRAL VALVE STENOSIS: ICD-10-CM

## 2019-11-22 DIAGNOSIS — I48.20 CHRONIC ATRIAL FIBRILLATION (H): ICD-10-CM

## 2019-11-22 DIAGNOSIS — Z95.0 CARDIAC PACEMAKER IN SITU: ICD-10-CM

## 2019-11-22 ASSESSMENT — MIFFLIN-ST. JEOR: SCORE: 1503.08

## 2019-11-26 ENCOUNTER — RECORDS - HEALTHEAST (OUTPATIENT)
Dept: LAB | Facility: CLINIC | Age: 84
End: 2019-11-26

## 2019-11-27 LAB — INR PPP: 2.27 (ref 0.9–1.1)

## 2019-12-10 ENCOUNTER — RECORDS - HEALTHEAST (OUTPATIENT)
Dept: LAB | Facility: CLINIC | Age: 84
End: 2019-12-10

## 2019-12-10 ENCOUNTER — OFFICE VISIT - HEALTHEAST (OUTPATIENT)
Dept: GERIATRICS | Facility: CLINIC | Age: 84
End: 2019-12-10

## 2019-12-10 DIAGNOSIS — G51.0 RIGHT-SIDED BELL'S PALSY: ICD-10-CM

## 2019-12-10 DIAGNOSIS — I10 ESSENTIAL HYPERTENSION: ICD-10-CM

## 2019-12-10 DIAGNOSIS — I48.0 PAROXYSMAL ATRIAL FIBRILLATION (H): ICD-10-CM

## 2019-12-10 DIAGNOSIS — S05.91XD RIGHT EYE INJURY, SUBSEQUENT ENCOUNTER: ICD-10-CM

## 2019-12-11 ENCOUNTER — COMMUNICATION - HEALTHEAST (OUTPATIENT)
Dept: GERIATRICS | Facility: CLINIC | Age: 84
End: 2019-12-11

## 2019-12-11 LAB — INR PPP: 2.42 (ref 0.9–1.1)

## 2019-12-16 ENCOUNTER — RECORDS - HEALTHEAST (OUTPATIENT)
Dept: LAB | Facility: CLINIC | Age: 84
End: 2019-12-16

## 2019-12-17 LAB — INR PPP: 1.14 (ref 0.9–1.1)

## 2019-12-18 ENCOUNTER — RECORDS - HEALTHEAST (OUTPATIENT)
Dept: LAB | Facility: CLINIC | Age: 84
End: 2019-12-18

## 2019-12-19 ENCOUNTER — COMMUNICATION - HEALTHEAST (OUTPATIENT)
Dept: GERIATRICS | Facility: CLINIC | Age: 84
End: 2019-12-19

## 2019-12-19 LAB — INR PPP: 1.27 (ref 0.9–1.1)

## 2019-12-20 ENCOUNTER — RECORDS - HEALTHEAST (OUTPATIENT)
Dept: ADMINISTRATIVE | Facility: OTHER | Age: 84
End: 2019-12-20

## 2019-12-23 ENCOUNTER — RECORDS - HEALTHEAST (OUTPATIENT)
Dept: LAB | Facility: CLINIC | Age: 84
End: 2019-12-23

## 2019-12-23 LAB — INR PPP: 1.56 (ref 0.9–1.1)

## 2019-12-26 ENCOUNTER — RECORDS - HEALTHEAST (OUTPATIENT)
Dept: LAB | Facility: CLINIC | Age: 84
End: 2019-12-26

## 2019-12-26 LAB — INR PPP: 1.82 (ref 0.9–1.1)

## 2020-01-02 ENCOUNTER — COMMUNICATION - HEALTHEAST (OUTPATIENT)
Dept: GERIATRICS | Facility: CLINIC | Age: 85
End: 2020-01-02

## 2020-01-02 ENCOUNTER — RECORDS - HEALTHEAST (OUTPATIENT)
Dept: LAB | Facility: CLINIC | Age: 85
End: 2020-01-02

## 2020-01-02 LAB — INR PPP: 2.31 (ref 0.9–1.1)

## 2020-01-06 ENCOUNTER — RECORDS - HEALTHEAST (OUTPATIENT)
Dept: LAB | Facility: CLINIC | Age: 85
End: 2020-01-06

## 2020-01-06 LAB — INR PPP: 2.32 (ref 0.9–1.1)

## 2020-01-17 ENCOUNTER — RECORDS - HEALTHEAST (OUTPATIENT)
Dept: LAB | Facility: CLINIC | Age: 85
End: 2020-01-17

## 2020-01-20 LAB — INR PPP: 2.71 (ref 0.9–1.1)

## 2020-02-07 ENCOUNTER — RECORDS - HEALTHEAST (OUTPATIENT)
Dept: LAB | Facility: CLINIC | Age: 85
End: 2020-02-07

## 2020-02-10 LAB — INR PPP: 2.21 (ref 0.9–1.1)

## 2020-02-20 ENCOUNTER — AMBULATORY - HEALTHEAST (OUTPATIENT)
Dept: CARDIOLOGY | Facility: CLINIC | Age: 85
End: 2020-02-20

## 2020-02-20 DIAGNOSIS — Z45.018 FITTING AND ADJUSTMENT OF CARDIAC PACEMAKER: ICD-10-CM

## 2020-02-20 DIAGNOSIS — I49.5 SSS (SICK SINUS SYNDROME) (H): ICD-10-CM

## 2020-02-25 ENCOUNTER — OFFICE VISIT - HEALTHEAST (OUTPATIENT)
Dept: GERIATRICS | Facility: CLINIC | Age: 85
End: 2020-02-25

## 2020-02-25 DIAGNOSIS — R53.81 DEBILITY: ICD-10-CM

## 2020-02-25 DIAGNOSIS — Z86.73 HISTORY OF STROKE: ICD-10-CM

## 2020-02-25 DIAGNOSIS — I48.0 PAROXYSMAL ATRIAL FIBRILLATION (H): ICD-10-CM

## 2020-02-25 DIAGNOSIS — R33.9 URINARY RETENTION: ICD-10-CM

## 2020-03-02 ENCOUNTER — RECORDS - HEALTHEAST (OUTPATIENT)
Dept: LAB | Facility: CLINIC | Age: 85
End: 2020-03-02

## 2020-03-02 LAB
ALBUMIN UR-MCNC: ABNORMAL MG/DL
AMORPH CRY #/AREA URNS HPF: ABNORMAL /[HPF]
APPEARANCE UR: ABNORMAL
BACTERIA #/AREA URNS HPF: ABNORMAL HPF
BILIRUB UR QL STRIP: NEGATIVE
COLOR UR AUTO: YELLOW
GLUCOSE UR STRIP-MCNC: NEGATIVE MG/DL
HGB UR QL STRIP: ABNORMAL
KETONES UR STRIP-MCNC: NEGATIVE MG/DL
LEUKOCYTE ESTERASE UR QL STRIP: ABNORMAL
MUCOUS THREADS #/AREA URNS LPF: ABNORMAL LPF
NITRATE UR QL: NEGATIVE
PH UR STRIP: 5 [PH] (ref 4.5–8)
RBC #/AREA URNS AUTO: ABNORMAL HPF
SP GR UR STRIP: 1.01 (ref 1–1.03)
SQUAMOUS #/AREA URNS AUTO: ABNORMAL LPF
UROBILINOGEN UR STRIP-ACNC: ABNORMAL
WBC #/AREA URNS AUTO: ABNORMAL HPF
WBC CLUMPS #/AREA URNS HPF: PRESENT /[HPF]

## 2020-03-05 LAB
BACTERIA SPEC CULT: ABNORMAL
BACTERIA SPEC CULT: ABNORMAL

## 2020-03-09 ENCOUNTER — RECORDS - HEALTHEAST (OUTPATIENT)
Dept: LAB | Facility: CLINIC | Age: 85
End: 2020-03-09

## 2020-03-10 ENCOUNTER — COMMUNICATION - HEALTHEAST (OUTPATIENT)
Dept: GERIATRICS | Facility: CLINIC | Age: 85
End: 2020-03-10

## 2020-03-10 LAB — INR PPP: 2.41 (ref 0.9–1.1)

## 2020-03-17 ENCOUNTER — RECORDS - HEALTHEAST (OUTPATIENT)
Dept: LAB | Facility: CLINIC | Age: 85
End: 2020-03-17

## 2020-03-18 LAB — INR PPP: 2.38 (ref 0.9–1.1)

## 2020-03-26 ENCOUNTER — AMBULATORY - HEALTHEAST (OUTPATIENT)
Dept: CARDIOLOGY | Facility: CLINIC | Age: 85
End: 2020-03-26

## 2020-03-26 DIAGNOSIS — I49.5 SSS (SICK SINUS SYNDROME) (H): ICD-10-CM

## 2020-03-26 DIAGNOSIS — Z95.0 CARDIAC PACEMAKER IN SITU: ICD-10-CM

## 2020-04-14 ENCOUNTER — OFFICE VISIT - HEALTHEAST (OUTPATIENT)
Dept: GERIATRICS | Facility: CLINIC | Age: 85
End: 2020-04-14

## 2020-04-14 DIAGNOSIS — R54 AGE-RELATED PHYSICAL DEBILITY: ICD-10-CM

## 2020-04-14 DIAGNOSIS — H35.30 MACULAR DEGENERATION (SENILE) OF RETINA: ICD-10-CM

## 2020-04-14 DIAGNOSIS — I10 ESSENTIAL HYPERTENSION WITH GOAL BLOOD PRESSURE LESS THAN 140/90: ICD-10-CM

## 2020-04-14 DIAGNOSIS — I48.91 ATRIAL FIBRILLATION, UNSPECIFIED TYPE (H): ICD-10-CM

## 2020-04-22 ENCOUNTER — RECORDS - HEALTHEAST (OUTPATIENT)
Dept: LAB | Facility: CLINIC | Age: 85
End: 2020-04-22

## 2020-04-22 ENCOUNTER — COMMUNICATION - HEALTHEAST (OUTPATIENT)
Dept: GERIATRICS | Facility: CLINIC | Age: 85
End: 2020-04-22

## 2020-04-22 LAB
INR PPP: 2.04 (ref 0.9–1.1)
TSH SERPL DL<=0.005 MIU/L-ACNC: 3.1 UIU/ML (ref 0.3–5)

## 2020-04-23 ENCOUNTER — AMBULATORY - HEALTHEAST (OUTPATIENT)
Dept: CARDIOLOGY | Facility: CLINIC | Age: 85
End: 2020-04-23

## 2020-04-23 DIAGNOSIS — I49.5 SICK SINUS SYNDROME (H): ICD-10-CM

## 2020-04-23 DIAGNOSIS — Z95.0 CARDIAC PACEMAKER IN SITU: ICD-10-CM

## 2020-05-18 ENCOUNTER — COMMUNICATION - HEALTHEAST (OUTPATIENT)
Dept: GERIATRICS | Facility: CLINIC | Age: 85
End: 2020-05-18

## 2020-05-18 ENCOUNTER — RECORDS - HEALTHEAST (OUTPATIENT)
Dept: LAB | Facility: CLINIC | Age: 85
End: 2020-05-18

## 2020-05-18 LAB — INR PPP: 2.37 (ref 0.9–1.1)

## 2020-05-28 ENCOUNTER — AMBULATORY - HEALTHEAST (OUTPATIENT)
Dept: CARDIOLOGY | Facility: CLINIC | Age: 85
End: 2020-05-28

## 2020-05-28 DIAGNOSIS — I48.91 ATRIAL FIBRILLATION, UNSPECIFIED TYPE (H): ICD-10-CM

## 2020-05-28 DIAGNOSIS — Z95.0 CARDIAC PACEMAKER IN SITU: ICD-10-CM

## 2020-06-08 ENCOUNTER — OFFICE VISIT - HEALTHEAST (OUTPATIENT)
Dept: GERIATRICS | Facility: CLINIC | Age: 85
End: 2020-06-08

## 2020-06-08 DIAGNOSIS — M62.81 GENERALIZED MUSCLE WEAKNESS: ICD-10-CM

## 2020-06-08 DIAGNOSIS — I48.91 ATRIAL FIBRILLATION, UNSPECIFIED TYPE (H): ICD-10-CM

## 2020-06-08 DIAGNOSIS — H35.30 MACULAR DEGENERATION (SENILE) OF RETINA: ICD-10-CM

## 2020-06-15 ENCOUNTER — RECORDS - HEALTHEAST (OUTPATIENT)
Dept: LAB | Facility: CLINIC | Age: 85
End: 2020-06-15

## 2020-06-15 ENCOUNTER — COMMUNICATION - HEALTHEAST (OUTPATIENT)
Dept: GERIATRICS | Facility: CLINIC | Age: 85
End: 2020-06-15

## 2020-06-15 LAB — INR PPP: 2.29 (ref 0.9–1.1)

## 2020-06-30 ENCOUNTER — OFFICE VISIT - HEALTHEAST (OUTPATIENT)
Dept: GERIATRICS | Facility: CLINIC | Age: 85
End: 2020-06-30

## 2020-06-30 DIAGNOSIS — R33.9 URINARY RETENTION: ICD-10-CM

## 2020-06-30 DIAGNOSIS — I48.0 PAROXYSMAL ATRIAL FIBRILLATION (H): ICD-10-CM

## 2020-06-30 DIAGNOSIS — Z86.73 HISTORY OF STROKE: ICD-10-CM

## 2020-06-30 DIAGNOSIS — R53.81 DEBILITY: ICD-10-CM

## 2020-07-02 ENCOUNTER — AMBULATORY - HEALTHEAST (OUTPATIENT)
Dept: CARDIOLOGY | Facility: CLINIC | Age: 85
End: 2020-07-02

## 2020-07-02 DIAGNOSIS — I48.91 ATRIAL FIBRILLATION, UNSPECIFIED TYPE (H): ICD-10-CM

## 2020-07-02 DIAGNOSIS — Z95.0 CARDIAC PACEMAKER IN SITU: ICD-10-CM

## 2020-07-13 ENCOUNTER — RECORDS - HEALTHEAST (OUTPATIENT)
Dept: LAB | Facility: CLINIC | Age: 85
End: 2020-07-13

## 2020-07-13 ENCOUNTER — COMMUNICATION - HEALTHEAST (OUTPATIENT)
Dept: GERIATRICS | Facility: CLINIC | Age: 85
End: 2020-07-13

## 2020-07-13 LAB — INR PPP: 2.45 (ref 0.9–1.1)

## 2020-08-10 ENCOUNTER — RECORDS - HEALTHEAST (OUTPATIENT)
Dept: LAB | Facility: CLINIC | Age: 85
End: 2020-08-10

## 2020-08-10 ENCOUNTER — COMMUNICATION - HEALTHEAST (OUTPATIENT)
Dept: GERIATRICS | Facility: CLINIC | Age: 85
End: 2020-08-10

## 2020-08-10 LAB
ALBUMIN UR-MCNC: NEGATIVE MG/DL
APPEARANCE UR: ABNORMAL
BACTERIA #/AREA URNS HPF: ABNORMAL HPF
BILIRUB UR QL STRIP: NEGATIVE
COLOR UR AUTO: YELLOW
GLUCOSE UR STRIP-MCNC: NEGATIVE MG/DL
HGB UR QL STRIP: NEGATIVE
INR PPP: 3.24 (ref 0.9–1.1)
KETONES UR STRIP-MCNC: NEGATIVE MG/DL
LEUKOCYTE ESTERASE UR QL STRIP: ABNORMAL
NITRATE UR QL: NEGATIVE
PH UR STRIP: 5.5 [PH] (ref 4.5–8)
RBC #/AREA URNS AUTO: ABNORMAL HPF
SP GR UR STRIP: 1.02 (ref 1–1.03)
SQUAMOUS #/AREA URNS AUTO: ABNORMAL LPF
UROBILINOGEN UR STRIP-ACNC: ABNORMAL
WBC #/AREA URNS AUTO: ABNORMAL HPF
WBC CLUMPS #/AREA URNS HPF: PRESENT /[HPF]

## 2020-08-11 ENCOUNTER — COMMUNICATION - HEALTHEAST (OUTPATIENT)
Dept: GERIATRICS | Facility: CLINIC | Age: 85
End: 2020-08-11

## 2020-08-11 LAB — BACTERIA SPEC CULT: NORMAL

## 2020-08-12 ENCOUNTER — COMMUNICATION - HEALTHEAST (OUTPATIENT)
Dept: GERIATRICS | Facility: CLINIC | Age: 85
End: 2020-08-12

## 2020-08-12 ENCOUNTER — RECORDS - HEALTHEAST (OUTPATIENT)
Dept: LAB | Facility: CLINIC | Age: 85
End: 2020-08-12

## 2020-08-12 LAB
ALBUMIN UR-MCNC: ABNORMAL MG/DL
APPEARANCE UR: ABNORMAL
BACTERIA #/AREA URNS HPF: ABNORMAL HPF
BILIRUB UR QL STRIP: NEGATIVE
COLOR UR AUTO: YELLOW
GLUCOSE UR STRIP-MCNC: NEGATIVE MG/DL
HGB UR QL STRIP: ABNORMAL
KETONES UR STRIP-MCNC: NEGATIVE MG/DL
LEUKOCYTE ESTERASE UR QL STRIP: ABNORMAL
MUCOUS THREADS #/AREA URNS LPF: ABNORMAL LPF
NITRATE UR QL: NEGATIVE
PH UR STRIP: 6 [PH] (ref 4.5–8)
RBC #/AREA URNS AUTO: ABNORMAL HPF
SP GR UR STRIP: 1.02 (ref 1–1.03)
SQUAMOUS #/AREA URNS AUTO: ABNORMAL LPF
UROBILINOGEN UR STRIP-ACNC: ABNORMAL
WBC #/AREA URNS AUTO: >100 HPF
WBC CLUMPS #/AREA URNS HPF: PRESENT /[HPF]

## 2020-08-13 ENCOUNTER — COMMUNICATION - HEALTHEAST (OUTPATIENT)
Dept: GERIATRICS | Facility: CLINIC | Age: 85
End: 2020-08-13

## 2020-08-13 ENCOUNTER — RECORDS - HEALTHEAST (OUTPATIENT)
Dept: LAB | Facility: CLINIC | Age: 85
End: 2020-08-13

## 2020-08-13 LAB
ANION GAP SERPL CALCULATED.3IONS-SCNC: 9 MMOL/L (ref 5–18)
BASOPHILS # BLD AUTO: 0 THOU/UL (ref 0–0.2)
BASOPHILS NFR BLD AUTO: 0 % (ref 0–2)
BUN SERPL-MCNC: 19 MG/DL (ref 8–28)
CALCIUM SERPL-MCNC: 8 MG/DL (ref 8.5–10.5)
CHLORIDE BLD-SCNC: 99 MMOL/L (ref 98–107)
CO2 SERPL-SCNC: 21 MMOL/L (ref 22–31)
CREAT SERPL-MCNC: 0.77 MG/DL (ref 0.7–1.3)
EOSINOPHIL # BLD AUTO: 0.1 THOU/UL (ref 0–0.4)
EOSINOPHIL NFR BLD AUTO: 1 % (ref 0–6)
ERYTHROCYTE [DISTWIDTH] IN BLOOD BY AUTOMATED COUNT: 14.3 % (ref 11–14.5)
GFR SERPL CREATININE-BSD FRML MDRD: >60 ML/MIN/1.73M2
GLUCOSE BLD-MCNC: 126 MG/DL (ref 70–125)
HCT VFR BLD AUTO: 19.3 % (ref 40–54)
HGB BLD-MCNC: 6.2 G/DL (ref 14–18)
LYMPHOCYTES # BLD AUTO: 1.5 THOU/UL (ref 0.8–4.4)
LYMPHOCYTES NFR BLD AUTO: 20 % (ref 20–40)
MAGNESIUM SERPL-MCNC: 2 MG/DL (ref 1.8–2.6)
MCH RBC QN AUTO: 29.8 PG (ref 27–34)
MCHC RBC AUTO-ENTMCNC: 32.1 G/DL (ref 32–36)
MCV RBC AUTO: 93 FL (ref 80–100)
MONOCYTES # BLD AUTO: 1.1 THOU/UL (ref 0–0.9)
MONOCYTES NFR BLD AUTO: 15 % (ref 2–10)
NEUTROPHILS # BLD AUTO: 4.7 THOU/UL (ref 2–7.7)
NEUTROPHILS NFR BLD AUTO: 63 % (ref 50–70)
PLATELET # BLD AUTO: 209 THOU/UL (ref 140–440)
PMV BLD AUTO: 10.5 FL (ref 8.5–12.5)
POTASSIUM BLD-SCNC: 3.9 MMOL/L (ref 3.5–5)
RBC # BLD AUTO: 2.08 MILL/UL (ref 4.4–6.2)
SODIUM SERPL-SCNC: 129 MMOL/L (ref 136–145)
WBC: 7.4 THOU/UL (ref 4–11)

## 2020-08-13 ASSESSMENT — MIFFLIN-ST. JEOR
SCORE: 1489.48
SCORE: 1461.81

## 2020-08-15 ENCOUNTER — COMMUNICATION - HEALTHEAST (OUTPATIENT)
Dept: SCHEDULING | Facility: CLINIC | Age: 85
End: 2020-08-15

## 2020-08-15 ENCOUNTER — SURGERY - HEALTHEAST (OUTPATIENT)
Dept: GASTROENTEROLOGY | Facility: HOSPITAL | Age: 85
End: 2020-08-15

## 2020-08-15 ASSESSMENT — MIFFLIN-ST. JEOR: SCORE: 1475.41

## 2020-08-16 LAB
BACTERIA SPEC CULT: ABNORMAL
BACTERIA SPEC CULT: ABNORMAL

## 2020-08-17 ASSESSMENT — MIFFLIN-ST. JEOR: SCORE: 1519.87

## 2020-08-20 ENCOUNTER — OFFICE VISIT - HEALTHEAST (OUTPATIENT)
Dept: GERIATRICS | Facility: CLINIC | Age: 85
End: 2020-08-20

## 2020-08-20 ENCOUNTER — RECORDS - HEALTHEAST (OUTPATIENT)
Dept: LAB | Facility: CLINIC | Age: 85
End: 2020-08-20

## 2020-08-20 DIAGNOSIS — K26.9 DUODENAL ULCER: ICD-10-CM

## 2020-08-20 DIAGNOSIS — D64.9 ANEMIA, UNSPECIFIED TYPE: ICD-10-CM

## 2020-08-20 DIAGNOSIS — Z86.73 HISTORY OF STROKE: ICD-10-CM

## 2020-08-20 DIAGNOSIS — I48.0 PAROXYSMAL ATRIAL FIBRILLATION (H): ICD-10-CM

## 2020-08-20 LAB — INR PPP: 1.8 (ref 0.9–1.1)

## 2020-08-21 ENCOUNTER — RECORDS - HEALTHEAST (OUTPATIENT)
Dept: LAB | Facility: CLINIC | Age: 85
End: 2020-08-21

## 2020-08-21 ENCOUNTER — OFFICE VISIT - HEALTHEAST (OUTPATIENT)
Dept: GERIATRICS | Facility: CLINIC | Age: 85
End: 2020-08-21

## 2020-08-21 DIAGNOSIS — D62 ANEMIA DUE TO BLOOD LOSS, ACUTE: ICD-10-CM

## 2020-08-21 DIAGNOSIS — K92.1 GASTROINTESTINAL HEMORRHAGE WITH MELENA: ICD-10-CM

## 2020-08-21 DIAGNOSIS — E03.9 HYPOTHYROIDISM, UNSPECIFIED TYPE: ICD-10-CM

## 2020-08-21 LAB
HGB BLD-MCNC: 7.9 G/DL (ref 14–18)
INR PPP: 1.65 (ref 0.9–1.1)

## 2020-08-21 RX ORDER — BISACODYL 10 MG
10 SUPPOSITORY, RECTAL RECTAL DAILY PRN
Status: ON HOLD | COMMUNITY
Start: 2020-08-21 | End: 2023-01-01

## 2020-08-24 ENCOUNTER — OFFICE VISIT - HEALTHEAST (OUTPATIENT)
Dept: GERIATRICS | Facility: CLINIC | Age: 85
End: 2020-08-24

## 2020-08-24 ENCOUNTER — RECORDS - HEALTHEAST (OUTPATIENT)
Dept: LAB | Facility: CLINIC | Age: 85
End: 2020-08-24

## 2020-08-24 DIAGNOSIS — R63.5 ABNORMAL WEIGHT GAIN: ICD-10-CM

## 2020-08-24 DIAGNOSIS — R71.0 DECREASED HEMOGLOBIN: ICD-10-CM

## 2020-08-24 DIAGNOSIS — R79.1 SUBTHERAPEUTIC INTERNATIONAL NORMALIZED RATIO (INR): ICD-10-CM

## 2020-08-24 LAB
HGB BLD-MCNC: 7.6 G/DL (ref 14–18)
INR PPP: 1.67 (ref 0.9–1.1)

## 2020-08-25 ENCOUNTER — RECORDS - HEALTHEAST (OUTPATIENT)
Dept: LAB | Facility: CLINIC | Age: 85
End: 2020-08-25

## 2020-08-26 ENCOUNTER — COMMUNICATION - HEALTHEAST (OUTPATIENT)
Dept: GERIATRICS | Facility: CLINIC | Age: 85
End: 2020-08-26

## 2020-08-26 LAB
HGB BLD-MCNC: 7.7 G/DL (ref 14–18)
INR PPP: 1.53 (ref 0.9–1.1)

## 2020-08-26 RX ORDER — FERROUS SULFATE 325(65) MG
1 TABLET ORAL DAILY
Status: SHIPPED | COMMUNITY
Start: 2020-08-26 | End: 2023-01-01

## 2020-08-29 ENCOUNTER — RECORDS - HEALTHEAST (OUTPATIENT)
Dept: LAB | Facility: CLINIC | Age: 85
End: 2020-08-29

## 2020-08-31 ENCOUNTER — OFFICE VISIT - HEALTHEAST (OUTPATIENT)
Dept: GERIATRICS | Facility: CLINIC | Age: 85
End: 2020-08-31

## 2020-08-31 DIAGNOSIS — Z71.89 ENCOUNTER FOR ANTICOAGULATION DISCUSSION AND COUNSELING: ICD-10-CM

## 2020-08-31 LAB
HGB BLD-MCNC: 8 G/DL (ref 14–18)
INR PPP: 1.59 (ref 0.9–1.1)

## 2020-09-06 ENCOUNTER — RECORDS - HEALTHEAST (OUTPATIENT)
Dept: LAB | Facility: CLINIC | Age: 85
End: 2020-09-06

## 2020-09-07 ENCOUNTER — RECORDS - HEALTHEAST (OUTPATIENT)
Dept: LAB | Facility: CLINIC | Age: 85
End: 2020-09-07

## 2020-09-08 ENCOUNTER — COMMUNICATION - HEALTHEAST (OUTPATIENT)
Dept: GERIATRICS | Facility: CLINIC | Age: 85
End: 2020-09-08

## 2020-09-08 LAB
HGB BLD-MCNC: 7.9 G/DL (ref 14–18)
INR PPP: 2.22 (ref 0.9–1.1)

## 2020-09-14 ENCOUNTER — RECORDS - HEALTHEAST (OUTPATIENT)
Dept: LAB | Facility: CLINIC | Age: 85
End: 2020-09-14

## 2020-09-16 ENCOUNTER — OFFICE VISIT - HEALTHEAST (OUTPATIENT)
Dept: GERIATRICS | Facility: CLINIC | Age: 85
End: 2020-09-16

## 2020-09-16 DIAGNOSIS — I10 ESSENTIAL HYPERTENSION: ICD-10-CM

## 2020-09-16 DIAGNOSIS — R53.81 PHYSICAL DECONDITIONING: ICD-10-CM

## 2020-09-16 DIAGNOSIS — D64.9 ANEMIA, UNSPECIFIED TYPE: ICD-10-CM

## 2020-09-17 ENCOUNTER — COMMUNICATION - HEALTHEAST (OUTPATIENT)
Dept: GERIATRICS | Facility: CLINIC | Age: 85
End: 2020-09-17

## 2020-09-17 LAB — INR PPP: 2.13 (ref 0.9–1.1)

## 2020-09-27 ENCOUNTER — RECORDS - HEALTHEAST (OUTPATIENT)
Dept: LAB | Facility: CLINIC | Age: 85
End: 2020-09-27

## 2020-09-28 ENCOUNTER — SURGERY - HEALTHEAST (OUTPATIENT)
Dept: CARDIOLOGY | Facility: CLINIC | Age: 85
End: 2020-09-28

## 2020-09-28 ENCOUNTER — AMBULATORY - HEALTHEAST (OUTPATIENT)
Dept: CARDIOLOGY | Facility: CLINIC | Age: 85
End: 2020-09-28

## 2020-09-28 ENCOUNTER — COMMUNICATION - HEALTHEAST (OUTPATIENT)
Dept: CARDIOLOGY | Facility: CLINIC | Age: 85
End: 2020-09-28

## 2020-09-28 ENCOUNTER — RECORDS - HEALTHEAST (OUTPATIENT)
Dept: ADMINISTRATIVE | Facility: OTHER | Age: 85
End: 2020-09-28

## 2020-09-28 DIAGNOSIS — Z45.010 PACEMAKER BATTERY DEPLETION: ICD-10-CM

## 2020-09-28 LAB — INR PPP: 1.99 (ref 0.9–1.1)

## 2020-09-29 ENCOUNTER — COMMUNICATION - HEALTHEAST (OUTPATIENT)
Dept: GERIATRICS | Facility: CLINIC | Age: 85
End: 2020-09-29

## 2020-09-29 ENCOUNTER — AMBULATORY - HEALTHEAST (OUTPATIENT)
Dept: CARDIOLOGY | Facility: CLINIC | Age: 85
End: 2020-09-29

## 2020-09-29 ENCOUNTER — COMMUNICATION - HEALTHEAST (OUTPATIENT)
Dept: CARDIOLOGY | Facility: CLINIC | Age: 85
End: 2020-09-29

## 2020-09-29 ENCOUNTER — OFFICE VISIT - HEALTHEAST (OUTPATIENT)
Dept: GERIATRICS | Facility: CLINIC | Age: 85
End: 2020-09-29

## 2020-09-29 DIAGNOSIS — Z11.59 ENCOUNTER FOR SCREENING FOR OTHER VIRAL DISEASES: ICD-10-CM

## 2020-09-29 DIAGNOSIS — I49.5 SSS (SICK SINUS SYNDROME) (H): ICD-10-CM

## 2020-09-29 DIAGNOSIS — I48.0 PAROXYSMAL ATRIAL FIBRILLATION (H): ICD-10-CM

## 2020-09-29 DIAGNOSIS — D64.9 ANEMIA, UNSPECIFIED TYPE: ICD-10-CM

## 2020-09-29 DIAGNOSIS — K26.9 DUODENAL ULCER: ICD-10-CM

## 2020-09-30 ENCOUNTER — RECORDS - HEALTHEAST (OUTPATIENT)
Dept: LAB | Facility: CLINIC | Age: 85
End: 2020-09-30

## 2020-10-01 ENCOUNTER — AMBULATORY - HEALTHEAST (OUTPATIENT)
Dept: CARDIOLOGY | Facility: CLINIC | Age: 85
End: 2020-10-01

## 2020-10-01 LAB
ANION GAP SERPL CALCULATED.3IONS-SCNC: 8 MMOL/L (ref 5–18)
BUN SERPL-MCNC: 12 MG/DL (ref 8–28)
CALCIUM SERPL-MCNC: 9.1 MG/DL (ref 8.5–10.5)
CHLORIDE BLD-SCNC: 102 MMOL/L (ref 98–107)
CO2 SERPL-SCNC: 28 MMOL/L (ref 22–31)
CREAT SERPL-MCNC: 0.79 MG/DL (ref 0.7–1.3)
ERYTHROCYTE [DISTWIDTH] IN BLOOD BY AUTOMATED COUNT: 17.9 % (ref 11–14.5)
GFR SERPL CREATININE-BSD FRML MDRD: >60 ML/MIN/1.73M2
GLUCOSE BLD-MCNC: 99 MG/DL (ref 70–125)
HCT VFR BLD AUTO: 35.9 % (ref 40–54)
HGB BLD-MCNC: 11.1 G/DL (ref 14–18)
MCH RBC QN AUTO: 26.2 PG (ref 27–34)
MCHC RBC AUTO-ENTMCNC: 30.9 G/DL (ref 32–36)
MCV RBC AUTO: 85 FL (ref 80–100)
PLATELET # BLD AUTO: 269 THOU/UL (ref 140–440)
PMV BLD AUTO: 10.7 FL (ref 8.5–12.5)
POTASSIUM BLD-SCNC: 4.1 MMOL/L (ref 3.5–5)
RBC # BLD AUTO: 4.23 MILL/UL (ref 4.4–6.2)
SODIUM SERPL-SCNC: 138 MMOL/L (ref 136–145)
WBC: 5.9 THOU/UL (ref 4–11)

## 2020-10-02 ENCOUNTER — SURGERY - HEALTHEAST (OUTPATIENT)
Dept: CARDIOLOGY | Facility: CLINIC | Age: 85
End: 2020-10-02

## 2020-10-02 ENCOUNTER — RECORDS - HEALTHEAST (OUTPATIENT)
Dept: LAB | Facility: CLINIC | Age: 85
End: 2020-10-02

## 2020-10-02 ASSESSMENT — MIFFLIN-ST. JEOR: SCORE: 1430.51

## 2020-10-05 ENCOUNTER — COMMUNICATION - HEALTHEAST (OUTPATIENT)
Dept: GERIATRICS | Facility: CLINIC | Age: 85
End: 2020-10-05

## 2020-10-05 LAB — HGB BLD-MCNC: 10.1 G/DL (ref 14–18)

## 2020-10-08 ENCOUNTER — COMMUNICATION - HEALTHEAST (OUTPATIENT)
Dept: CARDIOLOGY | Facility: CLINIC | Age: 85
End: 2020-10-08

## 2020-10-08 ENCOUNTER — AMBULATORY - HEALTHEAST (OUTPATIENT)
Dept: CARDIOLOGY | Facility: CLINIC | Age: 85
End: 2020-10-08

## 2020-10-08 DIAGNOSIS — Z95.0 CARDIAC PACEMAKER IN SITU: ICD-10-CM

## 2020-10-08 DIAGNOSIS — I48.20 CHRONIC ATRIAL FIBRILLATION (H): ICD-10-CM

## 2020-10-09 ENCOUNTER — RECORDS - HEALTHEAST (OUTPATIENT)
Dept: LAB | Facility: CLINIC | Age: 85
End: 2020-10-09

## 2020-10-12 ENCOUNTER — COMMUNICATION - HEALTHEAST (OUTPATIENT)
Dept: GERIATRICS | Facility: CLINIC | Age: 85
End: 2020-10-12

## 2020-10-12 LAB — INR PPP: 1.9 (ref 0.9–1.1)

## 2020-10-13 ENCOUNTER — AMBULATORY - HEALTHEAST (OUTPATIENT)
Dept: CARDIOLOGY | Facility: CLINIC | Age: 85
End: 2020-10-13

## 2020-10-13 DIAGNOSIS — Z95.0 CARDIAC PACEMAKER IN SITU: ICD-10-CM

## 2020-10-13 DIAGNOSIS — I48.20 CHRONIC ATRIAL FIBRILLATION (H): ICD-10-CM

## 2020-10-21 ENCOUNTER — OFFICE VISIT - HEALTHEAST (OUTPATIENT)
Dept: GERIATRICS | Facility: CLINIC | Age: 85
End: 2020-10-21

## 2020-10-21 DIAGNOSIS — Z00.00 ROUTINE GENERAL MEDICAL EXAMINATION AT A HEALTH CARE FACILITY: ICD-10-CM

## 2020-10-24 ENCOUNTER — RECORDS - HEALTHEAST (OUTPATIENT)
Dept: LAB | Facility: CLINIC | Age: 85
End: 2020-10-24

## 2020-10-26 ENCOUNTER — COMMUNICATION - HEALTHEAST (OUTPATIENT)
Dept: GERIATRICS | Facility: CLINIC | Age: 85
End: 2020-10-26

## 2020-10-26 LAB — INR PPP: 1.69 (ref 0.9–1.1)

## 2020-11-01 ENCOUNTER — RECORDS - HEALTHEAST (OUTPATIENT)
Dept: LAB | Facility: CLINIC | Age: 85
End: 2020-11-01

## 2020-11-02 ENCOUNTER — COMMUNICATION - HEALTHEAST (OUTPATIENT)
Dept: GERIATRICS | Facility: CLINIC | Age: 85
End: 2020-11-02

## 2020-11-02 LAB — INR PPP: 1.76 (ref 0.9–1.1)

## 2020-11-06 ENCOUNTER — RECORDS - HEALTHEAST (OUTPATIENT)
Dept: LAB | Facility: CLINIC | Age: 85
End: 2020-11-06

## 2020-11-09 LAB — INR PPP: 1.76 (ref 0.9–1.1)

## 2020-11-14 ENCOUNTER — RECORDS - HEALTHEAST (OUTPATIENT)
Dept: LAB | Facility: CLINIC | Age: 85
End: 2020-11-14

## 2020-11-16 ENCOUNTER — COMMUNICATION - HEALTHEAST (OUTPATIENT)
Dept: GERIATRICS | Facility: CLINIC | Age: 85
End: 2020-11-16

## 2020-11-16 ENCOUNTER — OFFICE VISIT - HEALTHEAST (OUTPATIENT)
Dept: GERIATRICS | Facility: CLINIC | Age: 85
End: 2020-11-16

## 2020-11-16 DIAGNOSIS — U07.1 LAB TEST POSITIVE FOR DETECTION OF COVID-19 VIRUS: ICD-10-CM

## 2020-11-16 LAB — INR PPP: 1.72 (ref 0.9–1.1)

## 2020-11-16 ASSESSMENT — MIFFLIN-ST. JEOR: SCORE: 1480.4

## 2020-11-20 ENCOUNTER — RECORDS - HEALTHEAST (OUTPATIENT)
Dept: LAB | Facility: CLINIC | Age: 85
End: 2020-11-20

## 2020-11-23 ENCOUNTER — COMMUNICATION - HEALTHEAST (OUTPATIENT)
Dept: GERIATRICS | Facility: CLINIC | Age: 85
End: 2020-11-23

## 2020-11-23 LAB — INR PPP: 2.03 (ref 0.9–1.1)

## 2020-11-28 ENCOUNTER — RECORDS - HEALTHEAST (OUTPATIENT)
Dept: LAB | Facility: CLINIC | Age: 85
End: 2020-11-28

## 2020-11-30 ENCOUNTER — COMMUNICATION - HEALTHEAST (OUTPATIENT)
Dept: GERIATRICS | Facility: CLINIC | Age: 85
End: 2020-11-30

## 2020-11-30 LAB — INR PPP: 2.63 (ref 0.9–1.1)

## 2020-12-12 ENCOUNTER — RECORDS - HEALTHEAST (OUTPATIENT)
Dept: LAB | Facility: CLINIC | Age: 85
End: 2020-12-12

## 2020-12-14 ENCOUNTER — COMMUNICATION - HEALTHEAST (OUTPATIENT)
Dept: GERIATRICS | Facility: CLINIC | Age: 85
End: 2020-12-14

## 2020-12-14 LAB — INR PPP: 2.69 (ref 0.9–1.1)

## 2020-12-21 ENCOUNTER — COMMUNICATION - HEALTHEAST (OUTPATIENT)
Dept: GERIATRICS | Facility: CLINIC | Age: 85
End: 2020-12-21

## 2020-12-30 ENCOUNTER — OFFICE VISIT - HEALTHEAST (OUTPATIENT)
Dept: GERIATRICS | Facility: CLINIC | Age: 85
End: 2020-12-30

## 2020-12-30 DIAGNOSIS — D64.9 ANEMIA, UNSPECIFIED TYPE: ICD-10-CM

## 2020-12-30 DIAGNOSIS — Z86.73 HISTORY OF STROKE: ICD-10-CM

## 2020-12-30 DIAGNOSIS — K26.9 DUODENAL ULCER: ICD-10-CM

## 2020-12-30 DIAGNOSIS — I48.20 CHRONIC ATRIAL FIBRILLATION (H): ICD-10-CM

## 2021-01-10 ENCOUNTER — RECORDS - HEALTHEAST (OUTPATIENT)
Dept: LAB | Facility: CLINIC | Age: 86
End: 2021-01-10

## 2021-01-13 ENCOUNTER — RECORDS - HEALTHEAST (OUTPATIENT)
Dept: LAB | Facility: CLINIC | Age: 86
End: 2021-01-13

## 2021-01-13 LAB — INR PPP: 2.73 (ref 0.9–1.1)

## 2021-01-14 LAB
HGB BLD-MCNC: 12.9 G/DL (ref 14–18)
IRON SERPL-MCNC: 62 UG/DL (ref 42–175)

## 2021-01-21 ENCOUNTER — COMMUNICATION - HEALTHEAST (OUTPATIENT)
Dept: GERIATRICS | Facility: CLINIC | Age: 86
End: 2021-01-21

## 2021-01-22 ENCOUNTER — AMBULATORY - HEALTHEAST (OUTPATIENT)
Dept: CARDIOLOGY | Facility: CLINIC | Age: 86
End: 2021-01-22

## 2021-01-22 DIAGNOSIS — Z95.0 CARDIAC PACEMAKER IN SITU: ICD-10-CM

## 2021-01-22 DIAGNOSIS — I48.20 CHRONIC ATRIAL FIBRILLATION (H): ICD-10-CM

## 2021-02-03 ENCOUNTER — OFFICE VISIT - HEALTHEAST (OUTPATIENT)
Dept: GERIATRICS | Facility: CLINIC | Age: 86
End: 2021-02-03

## 2021-02-03 DIAGNOSIS — D64.9 ANEMIA, UNSPECIFIED TYPE: ICD-10-CM

## 2021-02-03 DIAGNOSIS — E03.9 HYPOTHYROIDISM, UNSPECIFIED TYPE: ICD-10-CM

## 2021-02-03 DIAGNOSIS — R54 AGE-RELATED PHYSICAL DEBILITY: ICD-10-CM

## 2021-02-10 ENCOUNTER — RECORDS - HEALTHEAST (OUTPATIENT)
Dept: LAB | Facility: CLINIC | Age: 86
End: 2021-02-10

## 2021-02-11 ENCOUNTER — COMMUNICATION - HEALTHEAST (OUTPATIENT)
Dept: GERIATRICS | Facility: CLINIC | Age: 86
End: 2021-02-11

## 2021-02-11 LAB — INR PPP: 2.87 (ref 0.9–1.1)

## 2021-03-03 ENCOUNTER — RECORDS - HEALTHEAST (OUTPATIENT)
Dept: LAB | Facility: CLINIC | Age: 86
End: 2021-03-03

## 2021-03-04 LAB — FERRITIN SERPL-MCNC: 41 NG/ML (ref 27–300)

## 2021-03-10 ENCOUNTER — RECORDS - HEALTHEAST (OUTPATIENT)
Dept: LAB | Facility: CLINIC | Age: 86
End: 2021-03-10

## 2021-03-11 ENCOUNTER — COMMUNICATION - HEALTHEAST (OUTPATIENT)
Dept: GERIATRICS | Facility: CLINIC | Age: 86
End: 2021-03-11

## 2021-03-11 LAB — INR PPP: 2.53 (ref 0.9–1.1)

## 2021-03-13 ENCOUNTER — COMMUNICATION - HEALTHEAST (OUTPATIENT)
Dept: TELEHEALTH | Facility: CLINIC | Age: 86
End: 2021-03-13

## 2021-03-13 ENCOUNTER — COMMUNICATION - HEALTHEAST (OUTPATIENT)
Dept: FAMILY MEDICINE | Facility: CLINIC | Age: 86
End: 2021-03-13

## 2021-03-15 ENCOUNTER — OFFICE VISIT - HEALTHEAST (OUTPATIENT)
Dept: GERIATRICS | Facility: CLINIC | Age: 86
End: 2021-03-15

## 2021-03-15 DIAGNOSIS — D22.9 CHANGE IN SKIN MOLE: ICD-10-CM

## 2021-03-29 ENCOUNTER — OFFICE VISIT - HEALTHEAST (OUTPATIENT)
Dept: GERIATRICS | Facility: CLINIC | Age: 86
End: 2021-03-29

## 2021-03-29 DIAGNOSIS — L03.114 CELLULITIS OF LEFT UPPER EXTREMITY: ICD-10-CM

## 2021-04-01 ENCOUNTER — COMMUNICATION - HEALTHEAST (OUTPATIENT)
Dept: GERIATRICS | Facility: CLINIC | Age: 86
End: 2021-04-01

## 2021-04-01 ENCOUNTER — RECORDS - HEALTHEAST (OUTPATIENT)
Dept: LAB | Facility: CLINIC | Age: 86
End: 2021-04-01

## 2021-04-01 LAB — INR PPP: 2.44 (ref 0.9–1.1)

## 2021-04-02 ENCOUNTER — RECORDS - HEALTHEAST (OUTPATIENT)
Dept: LAB | Facility: CLINIC | Age: 86
End: 2021-04-02

## 2021-04-05 ENCOUNTER — COMMUNICATION - HEALTHEAST (OUTPATIENT)
Dept: GERIATRICS | Facility: CLINIC | Age: 86
End: 2021-04-05

## 2021-04-05 LAB — INR PPP: 2.39 (ref 0.9–1.1)

## 2021-04-14 ENCOUNTER — COMMUNICATION - HEALTHEAST (OUTPATIENT)
Dept: GERIATRICS | Facility: CLINIC | Age: 86
End: 2021-04-14

## 2021-04-15 ENCOUNTER — COMMUNICATION - HEALTHEAST (OUTPATIENT)
Dept: GERIATRICS | Facility: CLINIC | Age: 86
End: 2021-04-15

## 2021-04-15 ENCOUNTER — RECORDS - HEALTHEAST (OUTPATIENT)
Dept: LAB | Facility: CLINIC | Age: 86
End: 2021-04-15

## 2021-04-15 LAB — INR PPP: 2.81 (ref 0.9–1.1)

## 2021-04-23 ENCOUNTER — AMBULATORY - HEALTHEAST (OUTPATIENT)
Dept: CARDIOLOGY | Facility: CLINIC | Age: 86
End: 2021-04-23

## 2021-04-23 DIAGNOSIS — I49.5 SSS (SICK SINUS SYNDROME) (H): ICD-10-CM

## 2021-04-23 DIAGNOSIS — Z95.0 CARDIAC PACEMAKER IN SITU: ICD-10-CM

## 2021-04-30 ENCOUNTER — OFFICE VISIT - HEALTHEAST (OUTPATIENT)
Dept: GERIATRICS | Facility: CLINIC | Age: 86
End: 2021-04-30

## 2021-04-30 DIAGNOSIS — I48.20 CHRONIC ATRIAL FIBRILLATION (H): ICD-10-CM

## 2021-04-30 DIAGNOSIS — L98.9 SKIN LESION: ICD-10-CM

## 2021-04-30 DIAGNOSIS — D64.9 ANEMIA, UNSPECIFIED TYPE: ICD-10-CM

## 2021-04-30 DIAGNOSIS — K26.9 DUODENAL ULCER: ICD-10-CM

## 2021-05-04 ENCOUNTER — RECORDS - HEALTHEAST (OUTPATIENT)
Dept: LAB | Facility: CLINIC | Age: 86
End: 2021-05-04

## 2021-05-05 LAB — INR PPP: 2.81 (ref 0.9–1.1)

## 2021-05-22 ENCOUNTER — HEALTH MAINTENANCE LETTER (OUTPATIENT)
Age: 86
End: 2021-05-22

## 2021-05-26 ENCOUNTER — RECORDS - HEALTHEAST (OUTPATIENT)
Dept: ADMINISTRATIVE | Facility: CLINIC | Age: 86
End: 2021-05-26

## 2021-05-27 ENCOUNTER — RECORDS - HEALTHEAST (OUTPATIENT)
Dept: ADMINISTRATIVE | Facility: CLINIC | Age: 86
End: 2021-05-27

## 2021-05-27 VITALS
OXYGEN SATURATION: 98 % | HEART RATE: 62 BPM | RESPIRATION RATE: 18 BRPM | DIASTOLIC BLOOD PRESSURE: 63 MMHG | TEMPERATURE: 98.7 F | SYSTOLIC BLOOD PRESSURE: 122 MMHG

## 2021-05-27 NOTE — TELEPHONE ENCOUNTER
[facility] Nurse Triage Anticoagulation Note      Provider: YECENIA Olivera  Facility: [facility] Type: LT    Caller: Vilma  Call Back Number:  [phone]    Reason for call: INR    Today s INR: 1.96  Previous INR: 3/13 2.81(7mg Mon and Thurs and 5mg AOD), 3/6 2.65(7mg Mon and Thurs and 5mg AOD)    Diagnosis/Goal: AFIB  Heparin/Lovenox: No   Currently on ABX: No  Other interacting Medications: None  Missed or refused doses: No    Verbal Order/Direction given by Provider: Continue Warfarin 7mg on Mon and Thurs and 5mg all other days.  Next INR 4/3/19.      Provider giving order: YECENIA Olivera    Verbal order given to: Vilma Ramirez RN

## 2021-05-27 NOTE — TELEPHONE ENCOUNTER
ANTICOAGULATION  MANAGEMENT PROGRAM    Riley Rodriguez is being discharged from the [facility] Anticoagulation Management Program (AC).    Reason for discharge: care has been transferred to physician at LT facility    ACM referral closed, anticoagulation episode resolved and INR standing order discontinued.     If Riley needs warfarin management in the future, please send a new referral.    Audra Bui RN

## 2021-05-27 NOTE — TELEPHONE ENCOUNTER
[facility] Nurse Triage Anticoagulation Note      Provider: YECENIA Olivera  Facility: [facility] Type: LT    Caller: Zakiya  Call Back Number:  [phone]    Reason for call: INR    Today s INR: 1.83  Previous INR: 3/27 1.96(7mg Mon and Thurs and 5mg AOD)    Diagnosis/Goal: AFIB  Heparin/Lovenox: No   Currently on ABX: No  Other interacting Medications: None  Missed or refused doses: No    Verbal Order/Direction given by Provider: Warfarin 7mg M-W-F and 5mg all other days.  Next INR 4/10/19.      Provider giving order: YECENIA Olivera    Verbal order given to: Priscila Ramirez RN

## 2021-05-28 NOTE — PROGRESS NOTES
[facility]    Facility:   [facility] [[id number]]   Code Status: DNR/DNI     Chief Complaint   Patient presents with     Review Of Multiple Medical Conditions       HPI:   Riley is a 89 y.o. male with hx of Afib on warfarin, prior stroke, BPH, HTN, admitted to the hospital in late Dec 2018 with confusion, acute ischemic stroke. Warfarin had been on hold due to recent gluteal hematoma. He was ultimately sent to TCU. However, he was sent back to the hospital from TCU on 1/4/19 due to AMS. DC summary partially excerpted below.     89 years old male with history of A. fib on warfarin develop acute CVA from holding warfarin for right gluteal hematoma and found to have E. coli UTI at that time and discharged with Keflex came back for fever and white count and found to have catheter associated UTI and admitted for sepsis secondary to catheter associated UTI.  Urine culture growing Pseudomonas and change to ciprofloxacin and clinically improved and discharged back to TCU today. Because of the ciprofloxacin, INR has been elevated.  Holding warfarin since 1/6 and recent INR 3.24.  We will hold it today and recheck tomorrow.     Overall stabilized and discharged to TCU on 1/8/19 for PT, OT, nursing cares, medical management and monitoring.    Subsequently transitioned to LTC status.       Past Medical History:  Past Medical History:   Diagnosis Date     Atrial fibrillation (H)     On coumadin     CVA (cerebral vascular accident) (H)      Hypertension      Pacemaker      Urinary retention        Medications:  Current Outpatient Medications   Medication Sig     acetaminophen (TYLENOL) 325 MG tablet Take 2 tablets (650 mg total) by mouth every 6 (six) hours as needed for pain.     bisacodyl (DULCOLAX) 10 mg suppository Insert 10 mg into the rectum daily as needed.     carboxymethylcellulose sodium (REFRESH CELLUVISC) 1 % DpGe Administer 1 drop into the left eye 3 (three) times a  day.     carboxymethylcellulose sodium (REFRESH CELLUVISC) 1 % DpGe Administer 1 drop to the right eye every 3 (three) hours while awake.     cyanocobalamin 1000 MCG tablet Take 1 tablet (1,000 mcg total) by mouth daily. Low b12     dorzolamide-timolol (COSOPT) 22.3-6.8 mg/mL ophthalmic solution 1 drop to both eyes two times a day for macular degeneration and glaucoma     erythromycin base (ERYTHROMYCIN OPHT) Apply 5 mg to eye. Instill 1 ribbon in both eyes at HS     latanoprost (XALATAN) 0.005 % ophthalmic solution 1 drop both eyes at bedtime for macular degeneration/glaucoma     levothyroxine (SYNTHROID, LEVOTHROID) 25 MCG tablet Take 1 tablet (25 mcg total) by mouth Daily at 6:00 am. Hypothyroid     polyethylene glycol (MIRALAX) 17 gram packet Take 17 g by mouth daily as needed.     senna-docusate (SENNOSIDES-DOCUSATE SODIUM) 8.6-50 mg tablet Take 1 tablet by mouth 2 (two) times a day.     simvastatin (ZOCOR) 5 MG tablet TAKE ONE TABLET BY MOUTH AT BEDTIME     VIT C/MARIE AC/LUT/COPPER/ZNOX (PRESERVISION LUTEIN ORAL) Take 1 capsule by mouth 2 (two) times a day .           warfarin sodium (WARFARIN ORAL) Take by mouth. 4/3/19 INR 1.83  Take 7mg M-W-F and 5mg AOD.  Next INR 4/10/19.    3/27/19 INR 1.96  Cont 7mg Mon and Thurs and 5mg AOD.  Next INR 4/3/19.    3/13/19 INR 2.81  Cont 7mg Mon and Thurs and 5mg AOD.  Next INR 3/27/19.  3/6/19 INR 2.65  7mg Mon and Thurs and 5mg AOD.  Next INR 3/13/19.    2/22/19 INR 2.40 7mg M & TH, 5mg AOD. Next INR 3/6.  2/15/19 INR 2.48 7mg M & TH, 5mg AOD.             Physical Exam:   General: Patient is alert, elderly male, no distress.   HEENT: Head is NCAT. Eyes show no injection or icterus. Nares negative. Oropharynx well hydrated.  Neck: Supple. No tenderness or adenopathy. No JVD.  Lungs: Clear bilaterally. No wheezes.  Cardiovascular: Regular rate and rhythm, normal S1. S2.  Back: No spinal or CVA tenderness.  Abdomen: Soft, no tenderness on exam. Bowel sounds present. No  guarding rebound or rigidity.  : SP catheter.  Extremities: No edema is noted.  Musculoskeletal: Age related degen changes.   Skin: No rashes.   Psych: Mood appears good.      Labs:  Results for orders placed or performed during the hospital encounter of 01/16/19   Basic Metabolic Panel   Result Value Ref Range    Sodium 138 136 - 145 mmol/L    Potassium 3.7 3.5 - 5.0 mmol/L    Chloride 105 98 - 107 mmol/L    CO2 26 22 - 31 mmol/L    Anion Gap, Calculation 7 5 - 18 mmol/L    Glucose 105 70 - 125 mg/dL    Calcium 8.4 (L) 8.5 - 10.5 mg/dL    BUN 5 (L) 8 - 28 mg/dL    Creatinine 0.65 (L) 0.70 - 1.30 mg/dL    GFR MDRD Af Amer >60 >60 mL/min/1.73m2    GFR MDRD Non Af Amer >60 >60 mL/min/1.73m2       Lab Results   Component Value Date    WBC 5.3 01/25/2019    HGB 10.7 (L) 01/26/2019    HCT 31.4 (L) 01/25/2019    MCV 91 01/25/2019     01/25/2019       Assessment/Plan:  1. Urinary retention. Has SP cathter. Follow up per urology.   2. Hx of stroke. Ischemic in nature, Warfarin for Afib had been on hold due to a recent hematoma at the time.  3. Afib. On warfarin. Monitor and adjust per INRs.  4. HTN. On lisinopril. Monitor BPs, acceptable.  5. Hypothyroidism. Continue home replacement.    Overall he is stable, no new orders.         Electronically signed by: Nancy Fair MD

## 2021-05-29 NOTE — PROGRESS NOTES
[facility]    Facility:   [facility] [[id number]]   Code Status: DNR      CHIEF COMPLAINT/REASON FOR VISIT:  Chief Complaint   Patient presents with     Review Of Multiple Medical Conditions       HISTORY:      HPI: Riley is a 89 y.o. male now residing in the LTC at [facility]. He was recently discharged from the TCU.   He is  with history of A. fib on warfarin develop acute CVA from holding warfarin for right gluteal hematoma and found to have E. Coli UTI at that time and discharged with Keflex came back for fever and white count and found to have catheter associated UTI and admitted for sepsis secondary to catheter associated UTI.  Urine culture growing Pseudomonas and change to ciprofloxacin and clinically improved and discharged back to TCU    Today he being seen in his room as a routine visit to review multiple medical issues.  He denied CP or shortness of breath. He denied  constipation.   His suprapubic is intact and draining clear yellow urine. Site intact. INR due 6/20. He denied cough or congestion and tells me he has no concerns.     Past Medical History:   Diagnosis Date     Atrial fibrillation (H)     On coumadin     CVA (cerebral vascular accident) (H)      Hypertension      Pacemaker      Urinary retention              Family History   Problem Relation Age of Onset     COPD Father      Social History     Socioeconomic History     Marital status:      Spouse name: Not on file     Number of children: Not on file     Years of education: Not on file     Highest education level: Not on file   Occupational History     Not on file   Social Needs     Financial resource strain: Not on file     Food insecurity:     Worry: Not on file     Inability: Not on file     Transportation needs:     Medical: Not on file     Non-medical: Not on file   Tobacco Use     Smoking status: Former Smoker     Smokeless tobacco: Never Used   Substance and Sexual  Activity     Alcohol use: No     Drug use: No     Sexual activity: Not on file   Lifestyle     Physical activity:     Days per week: Not on file     Minutes per session: Not on file     Stress: Not on file   Relationships     Social connections:     Talks on phone: Not on file     Gets together: Not on file     Attends Rastafari service: Not on file     Active member of club or organization: Not on file     Attends meetings of clubs or organizations: Not on file     Relationship status: Not on file     Intimate partner violence:     Fear of current or ex partner: Not on file     Emotionally abused: Not on file     Physically abused: Not on file     Forced sexual activity: Not on file   Other Topics Concern     Not on file   Social History Narrative    03/07/15 - The patient lives alone in his own home.         Review of Systems   Constitutional: Positive for activity change and fatigue. Negative for appetite change, chills and fever.   HENT: Negative for congestion and sore throat.    Eyes: Negative for visual disturbance.   Respiratory: Negative for cough, shortness of breath and wheezing.    Cardiovascular: Negative for chest pain and leg swelling.        Pacemaker   Gastrointestinal: Negative for abdominal distention, abdominal pain, constipation, diarrhea and nausea.   Genitourinary: Negative for dysuria.   Musculoskeletal: Negative for arthralgias and myalgias.   Skin: Negative for color change, rash and wound.   Neurological: Negative for dizziness, weakness and numbness.   Psychiatric/Behavioral: Negative for agitation, behavioral problems and sleep disturbance.       .  Vitals:    06/05/19 0929   BP: 128/61   Pulse: 60   Resp: 22   Temp: 97.8  F (36.6  C)   SpO2: 96%   Weight: 195 lb 8 oz (88.7 kg)       Physical Exam   Constitutional: He appears well-developed and well-nourished.   Pleasant gentleman   HENT:   Head: Normocephalic and atraumatic.   Eyes: Conjunctivae are normal. Pupils are equal, round, and  reactive to light. poor vision with the right worse than the left   Neck: Normal range of motion. Neck supple.   Cardiovascular: Normal rate, regular rhythm and normal heart sounds.   No murmur heard.  Pulmonary/Chest: Effort normal and breath sounds normal. He has no wheezes. He has no rales.   Abdominal: Soft. Bowel sounds are normal. He exhibits no distension. There is no tenderness.   Genitourinary: suprapubic catheter  Musculoskeletal: Normal range of motion. He exhibits no edema.   Neurological: He is alert.   Skin: Skin is warm and dry.   Psychiatric: He has a normal mood and affect. His behavior is normal.         LABS:   No results found for this or any previous visit (from the past 240 hour(s)).  Current Outpatient Medications   Medication Sig     acetaminophen (TYLENOL) 325 MG tablet Take 2 tablets (650 mg total) by mouth every 6 (six) hours as needed for pain.     bisacodyl (DULCOLAX) 10 mg suppository Insert 10 mg into the rectum daily as needed.     carboxymethylcellulose sodium (REFRESH CELLUVISC) 1 % DpGe Administer 1 drop into the left eye 3 (three) times a day.     carboxymethylcellulose sodium (REFRESH CELLUVISC) 1 % DpGe Administer 1 drop to the right eye every 3 (three) hours while awake.     cyanocobalamin 1000 MCG tablet Take 1 tablet (1,000 mcg total) by mouth daily. Low b12     dorzolamide-timolol (COSOPT) 22.3-6.8 mg/mL ophthalmic solution 1 drop to both eyes two times a day for macular degeneration and glaucoma     erythromycin base (ERYTHROMYCIN OPHT) Apply 5 mg to eye. Instill 1 ribbon in both eyes at HS     latanoprost (XALATAN) 0.005 % ophthalmic solution 1 drop both eyes at bedtime for macular degeneration/glaucoma     levothyroxine (SYNTHROID, LEVOTHROID) 25 MCG tablet Take 1 tablet (25 mcg total) by mouth Daily at 6:00 am. Hypothyroid     polyethylene glycol (MIRALAX) 17 gram packet Take 17 g by mouth daily as needed.     senna-docusate (SENNOSIDES-DOCUSATE SODIUM) 8.6-50 mg tablet  Take 1 tablet by mouth 2 (two) times a day.     simvastatin (ZOCOR) 5 MG tablet TAKE ONE TABLET BY MOUTH AT BEDTIME     VIT C/MARIE AC/LUT/COPPER/ZNOX (PRESERVISION LUTEIN ORAL) Take 1 capsule by mouth 2 (two) times a day .           warfarin sodium (WARFARIN ORAL) Take by mouth. 4/3/19 INR 1.83  Take 7mg M-W-F and 5mg AOD.  Next INR 4/10/19.    3/27/19 INR 1.96  Cont 7mg Mon and Thurs and 5mg AOD.  Next INR 4/3/19.    3/13/19 INR 2.81  Cont 7mg Mon and Thurs and 5mg AOD.  Next INR 3/27/19.  3/6/19 INR 2.65  7mg Mon and Thurs and 5mg AOD.  Next INR 3/13/19.    2/22/19 INR 2.40 7mg M & TH, 5mg AOD. Next INR 3/6.  2/15/19 INR 2.48 7mg M & TH, 5mg AOD.             ASSESSMENT:    Suprapubic catheter- cleanse daily, monitor for infection  Atrial fibrillation   Anticoagulation management     PLAN:    Atrial fibrillation on coumadin and lisinopril  Pt  with a  suprapubic catheter  Anticoagulation management- monitor and adjust per INRS   Poor vision- is followed closely by the eye doctor.  on refresh and EES ointment   Constipation-resolved.   Hypothyroidism- TSH 1.91 on 2/1/19      Electronically signed by: Polly Marroquin CNP

## 2021-05-30 ENCOUNTER — RECORDS - HEALTHEAST (OUTPATIENT)
Dept: ADMINISTRATIVE | Facility: CLINIC | Age: 86
End: 2021-05-30

## 2021-05-30 VITALS — BODY MASS INDEX: 31.92 KG/M2 | WEIGHT: 213 LBS

## 2021-05-30 VITALS — BODY MASS INDEX: 30.57 KG/M2 | WEIGHT: 204 LBS

## 2021-05-31 VITALS — HEIGHT: 69 IN | BODY MASS INDEX: 31.1 KG/M2 | WEIGHT: 210 LBS

## 2021-05-31 VITALS — WEIGHT: 205 LBS | BODY MASS INDEX: 30.72 KG/M2

## 2021-05-31 VITALS — WEIGHT: 210.8 LBS | HEIGHT: 69 IN | BODY MASS INDEX: 31.22 KG/M2

## 2021-05-31 NOTE — TELEPHONE ENCOUNTER
[facility] Nurse Triage Anticoagulation Note      Provider: YECENIA Olivera  Facility: [facility] Type: LT    Caller: Gabriela  Call Back Number:  [phone]    Reason for call: INR    Today s INR: 1.91  Previous INR: 7/17 2.22(7mg M-W-F and 5mg AOD), 6/20 2.09(7mg M-W-F and 5mg AOD)    Diagnosis/Goal: AFIB  Heparin/Lovenox: No   Currently on ABX: No  Other interacting Medications: None  Missed or refused doses: No    Verbal Order/Direction given by Provider: Continue Warfarin 7mg M-W-F and 5mg all other days.  Next INR 8/22/19.      Provider giving order: YECENIA Olivera    Verbal order given to: Priscila Ramirez RN

## 2021-05-31 NOTE — PROGRESS NOTES
[facility]    Facility:   [facility] [[id number]]   Code Status: DNR/DNI       Chief Complaint   Patient presents with     Review Of Multiple Medical Conditions     LTC 8/20/19.       HPI:   Riley is a 89 y.o. male with hx of Afib on warfarin, prior stroke, BPH, HTN, admitted to the hospital in late Dec 2018 with confusion, acute ischemic stroke. Warfarin had been on hold due to recent gluteal hematoma. He was ultimately sent to TCU. However, he was sent back to the hospital from TCU on 1/4/19 due to AMS. DC summary partially excerpted below.     89 years old male with history of A. fib on warfarin develop acute CVA from holding warfarin for right gluteal hematoma and found to have E. coli UTI at that time and discharged with Keflex came back for fever and white count and found to have catheter associated UTI and admitted for sepsis secondary to catheter associated UTI.  Urine culture growing Pseudomonas and change to ciprofloxacin and clinically improved and discharged back to TCU today. Because of the ciprofloxacin, INR has been elevated.  Holding warfarin since 1/6 and recent INR 3.24.  We will hold it today and recheck tomorrow.     Overall stabilized and discharged to TCU on 1/8/19 for PT, OT, nursing cares, medical management and monitoring.    Subsequently transitioned to LTC status.     Today:  He has been stable. Patient has no complaints. No concerns per nursing. He has not been ill recently with cough cold or congestion. He continues on warfarin for afib. He has a long term suprapubic catheter. No functional concerns. Per documentation his latest BIMS score was 8 indicating cognitive impairment. Last CC on 8/13/19 with no concerns raised.       Past Medical History:  Past Medical History:   Diagnosis Date     Atrial fibrillation (H)     On coumadin     CVA (cerebral vascular accident) (H)      Hypertension      Pacemaker      Urinary retention         Medications:  Current Outpatient Medications   Medication Sig     acetaminophen (TYLENOL) 325 MG tablet Take 2 tablets (650 mg total) by mouth every 6 (six) hours as needed for pain.     bisacodyl (DULCOLAX) 10 mg suppository Insert 10 mg into the rectum daily as needed.     carboxymethylcellulose sodium (REFRESH CELLUVISC) 1 % DpGe Administer 1 drop into the left eye 3 (three) times a day.     carboxymethylcellulose sodium (REFRESH CELLUVISC) 1 % DpGe Administer 1 drop to the right eye every 3 (three) hours while awake.     cyanocobalamin 1000 MCG tablet Take 1 tablet (1,000 mcg total) by mouth daily. Low b12     dorzolamide-timolol (COSOPT) 22.3-6.8 mg/mL ophthalmic solution 1 drop to both eyes two times a day for macular degeneration and glaucoma     erythromycin base (ERYTHROMYCIN OPHT) Apply 5 mg to eye. Instill 1 ribbon in both eyes at HS     latanoprost (XALATAN) 0.005 % ophthalmic solution 1 drop both eyes at bedtime for macular degeneration/glaucoma     levothyroxine (SYNTHROID, LEVOTHROID) 25 MCG tablet Take 1 tablet (25 mcg total) by mouth Daily at 6:00 am. Hypothyroid     polyethylene glycol (MIRALAX) 17 gram packet Take 17 g by mouth daily as needed.     senna-docusate (SENNOSIDES-DOCUSATE SODIUM) 8.6-50 mg tablet Take 1 tablet by mouth 2 (two) times a day.     simvastatin (ZOCOR) 5 MG tablet TAKE ONE TABLET BY MOUTH AT BEDTIME     VIT C/MARIE AC/LUT/COPPER/ZNOX (PRESERVISION LUTEIN ORAL) Take 1 capsule by mouth 2 (two) times a day .           warfarin sodium (WARFARIN ORAL) Take by mouth. 8/15/19 INR 1.91  Cont 7mg M-W-F and 5mg AOD.  Next INR 8/22/19.    4/3/19 INR 1.83  Take 7mg M-W-F and 5mg AOD.  Next INR 4/10/19.    3/27/19 INR 1.96  Cont 7mg Mon and Thurs and 5mg AOD.  Next INR 4/3/19.    3/13/19 INR 2.81  Cont 7mg Mon and Thurs and 5mg AOD.  Next INR 3/27/19.  3/6/19 INR 2.65  7mg Mon and Thurs and 5mg AOD.  Next INR 3/13/19.    2/22/19 INR 2.40 7mg M & TH, 5mg AOD. Next INR 3/6.  2/15/19  INR 2.48 7mg M & TH, 5mg AOD.             Physical Exam:   General: Patient is alert, elderly male, no distress.   Vitals: /54, Temp 97.2, Pulse 68, RR 18, O2 sat 97% RA  HEENT: Head is NCAT. Eyes show no injection or icterus. Nares negative. Oropharynx well hydrated.  Neck: Supple. No tenderness or adenopathy. No JVD.  Lungs: Clear bilaterally. No wheezes.  Cardiovascular: Regular rate and rhythm, normal S1, S2.  Back: No spinal or CVA tenderness.  Abdomen: Soft, no tenderness on exam. Bowel sounds present. No guarding rebound or rigidity.  : SP catheter.  Extremities: No edema is noted.  Musculoskeletal: Age related degen changes.   Psych: Mood appears good.      Labs:  Lab Results   Component Value Date    WBC 5.3 01/25/2019    HGB 10.7 (L) 01/26/2019    HCT 31.4 (L) 01/25/2019    MCV 91 01/25/2019     01/25/2019     Results for orders placed or performed during the hospital encounter of 01/16/19   Basic Metabolic Panel   Result Value Ref Range    Sodium 138 136 - 145 mmol/L    Potassium 3.7 3.5 - 5.0 mmol/L    Chloride 105 98 - 107 mmol/L    CO2 26 22 - 31 mmol/L    Anion Gap, Calculation 7 5 - 18 mmol/L    Glucose 105 70 - 125 mg/dL    Calcium 8.4 (L) 8.5 - 10.5 mg/dL    BUN 5 (L) 8 - 28 mg/dL    Creatinine 0.65 (L) 0.70 - 1.30 mg/dL    GFR MDRD Af Amer >60 >60 mL/min/1.73m2    GFR MDRD Non Af Amer >60 >60 mL/min/1.73m2       Lab Results   Component Value Date    TSH 1.91 02/01/2019       Assessment/Plan:  1. Afib. Anticoagulated with warfarin. Adequate rate control.   2. Urinary retention. Has SP cathter. Follow up per urology.   3. Hx of stroke. Ischemic in nature. Warfarin for Afib had been on hold due to a recent hematoma at the time.  4. HTN. On lisinopril. BPs acceptable, continue to monitor.   5. Hypothyroidism. Continue home replacement.          Electronically signed by: Nancy Fair MD

## 2021-06-01 ENCOUNTER — RECORDS - HEALTHEAST (OUTPATIENT)
Dept: ADMINISTRATIVE | Facility: CLINIC | Age: 86
End: 2021-06-01

## 2021-06-01 VITALS — BODY MASS INDEX: 31.01 KG/M2 | WEIGHT: 210 LBS

## 2021-06-01 NOTE — TELEPHONE ENCOUNTER
[facility] Nurse Triage Anticoagulation Note      Provider: YECENIA Olivera  Facility: [facility] Type: LT    Caller: Priscila  Call Back Number:  [phone]    Reason for call: INR    ALSO R sided facial droop. VSS Talking normal, can stick out tongue normally. Ambulating per normal. Daughter notified & coming now to see Dad & decide if sending to ER. Pt ambulating per normal.      Today s INR: 2.09  Previous INR: 9/3/19 INR 1.97 7mg M-W-F, 5mg AOD.    Diagnosis/Goal: AFIB, H/O stroke  Heparin/Lovenox: No   Currently on ABX: No  Other interacting Medications: None  Missed or refused doses: No    Verbal Order/Direction given by Provider: Send to ER for Eval of Right sided facial droop if OK with daughter. Coumading 7,g M-W-F, 5mg AOD. Next INR 9/17.    Provider giving order: YECENIA Olivera    Verbal order given to: Priscila Pham RN

## 2021-06-01 NOTE — PROGRESS NOTES
[facility]    Facility:   [facility] [[id number]]   Code Status: DNR      CHIEF COMPLAINT/REASON FOR VISIT:  Chief Complaint   Patient presents with     Problem Visit     right facial paralysis        HISTORY:      HPI: Riley is a 89 y.o. male now residing in the LTC at [facility].  He is  with history of A. fib on warfarin develop acute CVA from holding warfarin for right gluteal hematoma. Hypertension , urinary retention has a suprapubic catheter.    Today he being seen right facial paralysis.  He was noted to have a right facial droop which started yesterday.  Yesterday his right eye was red and today the redness has resolved.  He does report having a recent cold and cough with clear sputum.  However staff was unaware of this.  I did have him raise his eyebrows and only the left one lifted.  He does have a noticeable right facial droop.  He denies any pain or any ear pain.  He did not have any pronator drift.  He was able to move all extremities.  He has been eating and reports no difficulties with swallowing I will treat him with prednisone for diagnosis of right-sided Bell's palsy he denied any numbness or tingling.  He is currently on lubricating eyedrops multiple times a day due to poor vision and is followed closely by an eye doctor that comes to the facility to see him.  He denied CP or shortness of breath. He denied  constipation.   His suprapubic is intact and draining clear yellow urine. Site intact.  He is up-to-date on his TSH and tells me he feels fine and has no concerns.  Will have speech evaluate him to be sure that he is swallowing properly due to that facial droop.    Past Medical History:   Diagnosis Date     Atrial fibrillation (H)     On coumadin     CVA (cerebral vascular accident) (H)      Hypertension      Pacemaker      Urinary retention              Family History   Problem Relation Age of Onset     COPD Father      Social  History     Socioeconomic History     Marital status:      Spouse name: Not on file     Number of children: Not on file     Years of education: Not on file     Highest education level: Not on file   Occupational History     Not on file   Social Needs     Financial resource strain: Not on file     Food insecurity:     Worry: Not on file     Inability: Not on file     Transportation needs:     Medical: Not on file     Non-medical: Not on file   Tobacco Use     Smoking status: Former Smoker     Smokeless tobacco: Never Used   Substance and Sexual Activity     Alcohol use: No     Drug use: No     Sexual activity: Not on file   Lifestyle     Physical activity:     Days per week: Not on file     Minutes per session: Not on file     Stress: Not on file   Relationships     Social connections:     Talks on phone: Not on file     Gets together: Not on file     Attends Gnosticist service: Not on file     Active member of club or organization: Not on file     Attends meetings of clubs or organizations: Not on file     Relationship status: Not on file     Intimate partner violence:     Fear of current or ex partner: Not on file     Emotionally abused: Not on file     Physically abused: Not on file     Forced sexual activity: Not on file   Other Topics Concern     Not on file   Social History Narrative    03/07/15 - The patient lives alone in his own home.         Review of Systems   Constitutional: Positive for activity change and fatigue. Negative for appetite change, chills and fever.   HENT: Negative for congestion and sore throat.    Eyes: positive  for visual disturbance. right sided facial paralysis   Respiratory: Negative for cough, shortness of breath and wheezing.    Cardiovascular: Negative for chest pain and leg swelling.        Pacemaker   Gastrointestinal: Negative for abdominal distention, abdominal pain, constipation, diarrhea and nausea.   Genitourinary: Negative for dysuria.   Musculoskeletal: Negative for  arthralgias and myalgias.   Skin: Negative for color change, rash and wound.   Neurological: Negative for dizziness, weakness and numbness.   Psychiatric/Behavioral: Negative for agitation, behavioral problems and sleep disturbance.       .  Vitals:    09/11/19 0930   BP: 140/76   Pulse: 72   Resp: 18   Temp: 97.4  F (36.3  C)   SpO2: 92%   Weight: 199 lb 6.4 oz (90.4 kg)       Physical Exam   Constitutional: He appears well-developed and well-nourished.   Pleasant gentleman   HENT:   Head: Normocephalic and atraumatic.   Eyes: Conjunctivae are normal. Pupils are equal, round, and reactive to light. poor vision with the right worse than the left right sided facial paralysis   Neck: Normal range of motion. Neck supple.   Cardiovascular: Normal rate, regular rhythm and normal heart sounds.   No murmur heard.  Pulmonary/Chest: Effort normal and breath sounds normal. He has no wheezes. He has no rales.   Abdominal: Soft. Bowel sounds are normal. He exhibits no distension. There is no tenderness.   Genitourinary: suprapubic catheter  Musculoskeletal: Normal range of motion. He exhibits no edema.   Neurological: He is alert.   Skin: Skin is warm and dry.   Psychiatric: He has a normal mood and affect. His behavior is normal.         LABS:   Recent Results (from the past 240 hour(s))   INR   Result Value Ref Range    INR 1.97 (H) 0.90 - 1.10   INR   Result Value Ref Range    INR 2.09 (H) 0.90 - 1.10     Current Outpatient Medications   Medication Sig     acetaminophen (TYLENOL) 325 MG tablet Take 2 tablets (650 mg total) by mouth every 6 (six) hours as needed for pain.     bisacodyl (DULCOLAX) 10 mg suppository Insert 10 mg into the rectum daily as needed.     cycloSPORINE (RESTASIS) 0.05 % ophthalmic emulsion Administer 1 drop to both eyes 2 (two) times a day.     dorzolamide-timolol (COSOPT) 22.3-6.8 mg/mL ophthalmic solution 1 drop to both eyes two times a day for macular degeneration and glaucoma     erythromycin base  (ERYTHROMYCIN OPHT) Apply 5 mg to eye. Instill 1 ribbon in both eyes at HS     latanoprost (XALATAN) 0.005 % ophthalmic solution 1 drop both eyes at bedtime for macular degeneration/glaucoma     levothyroxine (SYNTHROID, LEVOTHROID) 25 MCG tablet Take 1 tablet (25 mcg total) by mouth Daily at 6:00 am. Hypothyroid     polyethylene glycol (MIRALAX) 17 gram packet Take 17 g by mouth daily as needed.     PREDNISONE ORAL Take 60 mg by mouth. 60 mg po x 5 days then 50 mg x1 day, 40 mg x 1 day, 30 mg x 1 day, 20 mg x 1 day then 10 mg x 1 day then stop total 10 days of prednisone.     propylene glycol (SYSTANE COMPLETE OPHT) Apply 1 drop to eye 2 (two) times a day. Both eyes     simvastatin (ZOCOR) 5 MG tablet TAKE ONE TABLET BY MOUTH AT BEDTIME     warfarin sodium (WARFARIN ORAL) Take by mouth. 9/10/19  INR 2.09 Cont 7mg M-W-F, 5mg AOD. Next INR 9/17.  9/3/19 INR 1.97 Cont 7mg M-W-F, 5mg AOD.  8/15/19 INR 1.91  Cont 7mg M-W-F and 5mg AOD.  Next INR 8/22/19.             ASSESSMENT:    Plan:    Right sided [place] Palsy prednisone as above. Pt currently on eye lubricating drops multiple times a day.     Suprapubic catheter- cleanse daily, monitor for infection    Atrial fibrillation on coumadin and lisinopril    Anticoagulation management- monitor and adjust per INRS     Poor vision- is followed closely by the eye doctor.  on refresh and EES ointment      Hypothyroidism- TSH 1.91 on 2/1/19      Electronically signed by: Polly Marroquin, CNP

## 2021-06-01 NOTE — TELEPHONE ENCOUNTER
"[facility] Nurse Triage Telephone Note      Provider: YECENIA Olivera  Facility: [facility] Type: LTC    Caller: Zakiya  Call Back Number:  [phone]    Allergies: Patient has no known allergies.    Reason for call: Since Dx 9/11 of Abisai Thompson, having a lot of pain \"9\". On scheduled ES Tyl & Prednisone taper 60mg daily. Also unable to close the eyelid. Has many different eye qtts & artificial tear oin q 2hr PRN. Even dtr have tried to devise something to try to help. He does like using cool compress on the eye/face. But concerned about drooping lower lid & unable to close lid. Should they see his eye Dr.?    Verbal Order/Direction given by Provider: Yes, that is good idea.    Provider giving order: YECENIA Olivera    Verbal order given to: Priscila Pham RN      "

## 2021-06-02 VITALS — WEIGHT: 204 LBS | BODY MASS INDEX: 30.13 KG/M2

## 2021-06-02 VITALS — WEIGHT: 204 LBS | HEIGHT: 69 IN | BODY MASS INDEX: 30.21 KG/M2

## 2021-06-02 VITALS — BODY MASS INDEX: 27.29 KG/M2 | WEIGHT: 190.2 LBS

## 2021-06-02 VITALS — BODY MASS INDEX: 28.07 KG/M2 | WEIGHT: 195.6 LBS

## 2021-06-02 VITALS — BODY MASS INDEX: 27.49 KG/M2 | WEIGHT: 191.6 LBS

## 2021-06-02 VITALS — BODY MASS INDEX: 30.78 KG/M2 | WEIGHT: 214.5 LBS

## 2021-06-02 VITALS — WEIGHT: 190 LBS | BODY MASS INDEX: 27.26 KG/M2

## 2021-06-02 NOTE — PROGRESS NOTES
[facility]    Facility:   [facility] [[id number]]   Code Status: DNR/DNI      Chief Complaint   Patient presents with     Problem Visit     LTC 10/8/19.       HPI:   Riley is a 89 y.o. male with hx of Afib on warfarin, prior stroke, BPH, HTN, admitted to the hospital in late Dec 2018 with confusion, acute ischemic stroke. Warfarin had been on hold due to recent gluteal hematoma. He was ultimately sent to TCU. However, he was sent back to the hospital from TCU on 1/4/19 due to AMS.  Found to have E. coli UTI, admitted for sepsis secondary to catheter associated UTI. Urine culture grew Pseudomonas. Discharged back to TCU on 1/8/19 for PT, OT, nursing cares, medical management and monitoring. Subsequently transitioned to LTC status.     Today:  Was asked to see urgently due to AMS, lethargy, fever yesterday and today. daughters here, very concerned. Not acting like himself, tired, keeps eyes closed. Note that he has [place] palsy affecting right side, he has been seeing eye doctor for that and usual eye problems including lack of vision due to prior stroke and mac degen. He recently had sutures placed in right eyelid due to [place]. He is on multiple eye drops including antibiotic drops. He is right eye seems reddened. He had low grade fever yest and today. No cough noted. Has SP catheter, yellow urine, looks darker today though he hasn't been eating much. He is on warfarin for hx of afib.     After discussion with daughters and evaluation of patient, they do not want him sent to ED, will do stat labs, CXR, UA here. Discussed that he could be having stroke and imaging of head would be warranted due to anticoagulation with warfarin. Dtrs understand risks of not going to hospital. Discussed code status at length and goals of care, no hospitalization for now, await workup with labs, urine and xray in facility, follow up when results in, further treatment dependent on results  and clinical situation.      Past Medical History:  Past Medical History:   Diagnosis Date     Atrial fibrillation (H)     On coumadin     CVA (cerebral vascular accident) (H)      Hypertension      Pacemaker      Urinary retention        Medications:  Current Outpatient Medications   Medication Sig     acetaminophen (TYLENOL) 325 MG tablet Take 2 tablets (650 mg total) by mouth every 6 (six) hours as needed for pain.     bisacodyl (DULCOLAX) 10 mg suppository Insert 10 mg into the rectum daily as needed.     cycloSPORINE (RESTASIS) 0.05 % ophthalmic emulsion Administer 1 drop to both eyes 2 (two) times a day.     dorzolamide-timolol (COSOPT) 22.3-6.8 mg/mL ophthalmic solution 1 drop to both eyes two times a day for macular degeneration and glaucoma     erythromycin base (ERYTHROMYCIN OPHT) Apply 5 mg to eye. Instill 1 ribbon in both eyes at HS     latanoprost (XALATAN) 0.005 % ophthalmic solution 1 drop both eyes at bedtime for macular degeneration/glaucoma     levothyroxine (SYNTHROID, LEVOTHROID) 25 MCG tablet Take 1 tablet (25 mcg total) by mouth Daily at 6:00 am. Hypothyroid     moxifloxacin (VIGAMOX) 0.5 % ophthalmic solution Administer 1 drop to the right eye 4 (four) times a day.     petrolat,wht/min oil/sod chl (ARTIFICIAL TEARS OINTMENT OPHT) Apply 2 drops to eye every 2 (two) hours as needed. Right eye q 2 hours PRN     polyethylene glycol (MIRALAX) 17 gram packet Take 17 g by mouth daily as needed.     polymyxin B-trimethoprim (FOR POLYTRIM) 10,000 unit- 1 mg/mL Drop ophthalmic drops 1 drop 4 (four) times a day. Right eye     propylene glycol (SYSTANE COMPLETE OPHT) Apply 1 drop to eye 2 (two) times a day. Both eyes     simvastatin (ZOCOR) 5 MG tablet TAKE ONE TABLET BY MOUTH AT BEDTIME     traMADol (ULTRAM) 50 mg tablet Take 25 mg by mouth every 4 (four) hours as needed for pain.     warfarin sodium (WARFARIN ORAL) Take 4.5-7 mg by mouth. 10/14/19 INR 3.44 Cont 4.5mg daily. NExt INR 10/21.  10/11/19 INR  3.21 Give 4.5mg daily. Next INR 10/14.  10/7/19 INR 2.81 Cont 7mg M-W-F, 4.5mg AOD. Next INR 10/11  On Bactrim DS.  9/30/19 INR 2.42  9/23/19 INR 2.80             Physical Exam:   General: Patient is a lethargic, elderly male, in recliner, follows some commands with urging, would not open eyes however.   Vitals: /66, Temp 100.2, Pulse 60, RR 18, O2 sat 94% RA  HEENT: Right facial droop secondary to [place]. Eyes show mild right upper lid medial eversion with injection. No purulent drainage. Nares negative. Oropharynx moist. Frontal brow bone seems prominent, possibly swollen, question baseline, no erythema to suggest cellulitis.   Neck: Supple. No tenderness or adenopathy. No JVD.  Lungs: Clear bilaterally. No wheezes.  Cardiovascular: Regular rate and rhythm, normal S1, S2.  Back: No spinal or CVA tenderness.  Abdomen: Soft, no tenderness on exam. Bowel sounds present. No guarding rebound or rigidity.  : SP catheter, dark urine.  Extremities: No LE edema is noted.  Musculoskeletal: Age related degen changes.   Psych: Unable to assess.  Neuro:  weak though approx equal.      Labs:  Lab Results   Component Value Date    WBC 5.3 01/25/2019    HGB 10.7 (L) 01/26/2019    HCT 31.4 (L) 01/25/2019    MCV 91 01/25/2019     01/25/2019     Results for orders placed or performed during the hospital encounter of 01/16/19   Basic Metabolic Panel   Result Value Ref Range    Sodium 138 136 - 145 mmol/L    Potassium 3.7 3.5 - 5.0 mmol/L    Chloride 105 98 - 107 mmol/L    CO2 26 22 - 31 mmol/L    Anion Gap, Calculation 7 5 - 18 mmol/L    Glucose 105 70 - 125 mg/dL    Calcium 8.4 (L) 8.5 - 10.5 mg/dL    BUN 5 (L) 8 - 28 mg/dL    Creatinine 0.65 (L) 0.70 - 1.30 mg/dL    GFR MDRD Af Amer >60 >60 mL/min/1.73m2    GFR MDRD Non Af Amer >60 >60 mL/min/1.73m2       Lab Results   Component Value Date    TSH 1.91 02/01/2019       Assessment/Plan:  1. AMS. Lethargy, low grade fever. Slow recent decline with more rapid  presentation today. Discussion with dtrs-stat CXR, labs including procalcitonin, INR, CMP, Heme 1, UA. Family declines hospitalization, understand possible detriment due to not obtaining head imaging, patient on anticoagulation, cannot rule out stroke.  2. Afib. Anticoagulated with warfarin. Adequate rate control.   3. Urinary retention. Has SP cathter.   4. Hx of stroke. Ischemic in nature. Warfarin for Afib had been on hold due to a recent hematoma at the time.  5. HTN. On lisinopril. BPs acceptable, continue to monitor.   6. Hypothyroidism. Continue home replacement.  7. Herman palsy, right.  8. Code status confirmed DNR/DNI. Discussed with dtrs, patient. Comfort based approach, ok with workup, labs, abx if needed, no hospitalization now but possible consideration in the future.      Total time spent was greater than 35 minutes, more than 50% counseling and coordination of care including as above regarding disease state, work up, treatment, side effects, and coordination of care.  At least 17 additional minutes spent face to face discussing advanced care planning as above.         Electronically signed by: Nancy Fair MD

## 2021-06-02 NOTE — TELEPHONE ENCOUNTER
[facility] Nurse Triage Telephone Note      Provider: YECENIA Olivera  Facility: [facility] Type: LTC    Caller: Zakiya  Call Back Number:  [phone]    Allergies: Patient has no known allergies.    Reason for call: Pt on level 3 Diet since Bell's Palsy, request ST re-eval to see if diet can be advanced.     Verbal Order/Direction given by Provider: Ok St re-eval to advance diet.    Provider giving order: YECENIA Olivera    Verbal order given to: Priscila Pham RN

## 2021-06-02 NOTE — TELEPHONE ENCOUNTER
[facility] Nurse Triage Anticoagulation Note      Provider: YECENIA Olivera  Facility: [facility] Type: LTC    Caller: Zakiya  Call Back Number:  [phone]    Reason for call: INR    Today s INR: 3.44  Previous INR: 10/11/19 INR 3.21 4.5mg daily over the weekend.    Diagnosis/Goal: AFIB  Heparin/Lovenox: No   Currently on ABX: Yes on Bactrim  LD 10/13  Other interacting Medications: None  Missed or refused doses: No    Verbal Order/Direction given by Provider: Cont 4.5mg daily. NExt INR 10/21.    Provider giving order: YECENIA Ryan    Verbal order given to: Priscila Pham RN

## 2021-06-02 NOTE — PROGRESS NOTES
[facility]    Facility:   [facility] [[id number]]   Code Status: DNR      CHIEF COMPLAINT/REASON FOR VISIT:  Chief Complaint   Patient presents with     FVP Care Coordination - Regulatory       HISTORY:      HPI: Riley is a 89 y.o. male now residing in the LTC at [facility].  He is  with history of A. fib on warfarin develop acute CVA from holding warfarin for right gluteal hematoma. Hypertension , urinary retention has a suprapubic catheter.     Today he being seen  for routine visit to review multiple medical issues.  He was recently diagnosed with Bell's palsy and has a noted right facial droop.  He has been having issues with his right eye and has been out multiple times to see ophthalmology.  Currently part of his right eye is sutured to help keep it closed.  He was also recently diagnosed with a UTI and is currently completing a course of Bactrim DS.   He is able to move all extremities.  He has been eating and reports no difficulties with swallowing He denied any numbness or tingling.  He is followed closely by an eye doctor.  He denied CP or shortness of breath. He denied  constipation.   His suprapubic is intact and draining clear yellow urine today. Site intact.  He is up-to-date on his TSH. He reports his eye pain is getting better and he does get releif with the tramadol when he takes it.        Past Medical History:   Diagnosis Date     Atrial fibrillation (H)     On coumadin     CVA (cerebral vascular accident) (H)      Hypertension      Pacemaker      Urinary retention              Family History   Problem Relation Age of Onset     COPD Father      Social History     Socioeconomic History     Marital status:      Spouse name: Not on file     Number of children: Not on file     Years of education: Not on file     Highest education level: Not on file   Occupational History     Not on file   Social Needs     Financial resource strain:  Not on file     Food insecurity:     Worry: Not on file     Inability: Not on file     Transportation needs:     Medical: Not on file     Non-medical: Not on file   Tobacco Use     Smoking status: Former Smoker     Smokeless tobacco: Never Used   Substance and Sexual Activity     Alcohol use: No     Drug use: No     Sexual activity: Not on file   Lifestyle     Physical activity:     Days per week: Not on file     Minutes per session: Not on file     Stress: Not on file   Relationships     Social connections:     Talks on phone: Not on file     Gets together: Not on file     Attends Congregation service: Not on file     Active member of club or organization: Not on file     Attends meetings of clubs or organizations: Not on file     Relationship status: Not on file     Intimate partner violence:     Fear of current or ex partner: Not on file     Emotionally abused: Not on file     Physically abused: Not on file     Forced sexual activity: Not on file   Other Topics Concern     Not on file   Social History Narrative    03/07/15 - The patient lives alone in his own home.         Review of Systems   Eyes: Positive for pain, discharge, redness and visual disturbance.        Being followed closely by opthalmology. He did have part of his right sutured closed with an opening left to instill multiple eye medications.      Constitutional: Positive for activity change and fatigue. Negative for appetite change, chills and fever.   HENT: Negative for congestion and sore throat.    Eyes: positive  for visual disturbance. right sided facial paralysis   Respiratory: Negative for cough, shortness of breath and wheezing.    Cardiovascular: Negative for chest pain and leg swelling.        Pacemaker   Gastrointestinal: Negative for abdominal distention, abdominal pain, constipation, diarrhea and nausea.   Genitourinary: Negative for dysuria.   Musculoskeletal: Negative for arthralgias and myalgias.   Skin: Negative for color change, rash  and wound.   Neurological: Negative for dizziness, weakness and numbness.   Psychiatric/Behavioral: Negative for agitation, behavioral problems and sleep disturbance.   Vitals:    10/10/19 0844   BP: 150/74   Pulse: 64   Resp: 18   Temp: 97.6  F (36.4  C)   SpO2: 96%   Weight: 192 lb (87.1 kg)       Physical Exam   Constitutional: He appears well-developed and well-nourished.   Pleasant gentleman   HENT:   Head: Normocephalic and atraumatic.   Eyes: Conjunctivae are normal. Pupils are equal, round, and reactive to light. poor vision with the right worse than the left right sided facial paralysis   Neck: Normal range of motion. Neck supple.   Cardiovascular: Normal rate, regular rhythm and normal heart sounds.   No murmur heard.  Pulmonary/Chest: Effort normal and breath sounds normal. He has no wheezes. He has no rales.   Abdominal: Soft. Bowel sounds are normal. He exhibits no distension. There is no tenderness.   Genitourinary: suprapubic catheter  Musculoskeletal: Normal range of motion. He exhibits no edema.   Neurological: He is alert.   Skin: Skin is warm and dry.   Psychiatric: He has a normal mood and affect. His behavior is normal.       LABS:   Recent Results (from the past 240 hour(s))   INR   Result Value Ref Range    INR 2.81 (H) 0.90 - 1.10   Urinalysis-UC if Indicated   Result Value Ref Range    Color, UA Yellow Colorless, Yellow, Straw, Light Yellow    Clarity, UA Turbid (!) Clear    Glucose, UA Negative Negative    Bilirubin, UA Negative Negative    Ketones, UA Negative Negative    Specific Gravity, UA 1.033 (H) 1.001 - 1.030    Blood, UA Moderate (!) Negative    pH, UA 6.0 4.5 - 8.0    Protein,  mg/dL (!) Negative mg/dL    Urobilinogen, UA <2.0 E.U./dL <2.0 E.U./dL, 2.0 E.U./dL    Nitrite, UA Negative Negative    Leukocytes, UA Large (!) Negative    Bacteria, UA Many (!) None Seen hpf    RBC, UA 10-25 (!) None Seen, 0-2 hpf    WBC, UA >100 (!) None Seen, 0-5 hpf    Squam Epithel, UA 0-5 None  Seen, 0-5 lpf    WBC Clumps Present (!) None Seen    Amorphous, UA Few (!) None Seen   Culture, Urine   Result Value Ref Range    Culture Mixture of urogenital organisms with     Culture >100,000 col/ml Pseudomonas aeruginosa (!)     Culture >100,000 col/ml Enterococcus species (!)    Comprehensive Metabolic Panel   Result Value Ref Range    Sodium 131 (L) 136 - 145 mmol/L    Potassium 4.2 3.5 - 5.0 mmol/L    Chloride 100 98 - 107 mmol/L    CO2 23 22 - 31 mmol/L    Anion Gap, Calculation 8 5 - 18 mmol/L    Glucose 100 70 - 125 mg/dL    BUN 13 8 - 28 mg/dL    Creatinine 0.74 0.70 - 1.30 mg/dL    GFR MDRD Af Amer >60 >60 mL/min/1.73m2    GFR MDRD Non Af Amer >60 >60 mL/min/1.73m2    Bilirubin, Total 0.8 0.0 - 1.0 mg/dL    Calcium 8.2 (L) 8.5 - 10.5 mg/dL    Protein, Total 5.8 (L) 6.0 - 8.0 g/dL    Albumin 3.3 (L) 3.5 - 5.0 g/dL    Alkaline Phosphatase 50 45 - 120 U/L    AST 14 0 - 40 U/L    ALT <9 0 - 45 U/L   INR   Result Value Ref Range    INR 2.83 (H) 0.90 - 1.10   Procalcitonin   Result Value Ref Range    Procalcitonin 0.02 0.00 - 0.49 ng/mL   HM1 (CBC with Diff)   Result Value Ref Range    WBC 12.6 (H) 4.0 - 11.0 thou/uL    RBC 4.54 4.40 - 6.20 mill/uL    Hemoglobin 12.7 (L) 14.0 - 18.0 g/dL    Hematocrit 39.0 (L) 40.0 - 54.0 %    MCV 86 80 - 100 fL    MCH 28.0 27.0 - 34.0 pg    MCHC 32.6 32.0 - 36.0 g/dL    RDW 17.2 (H) 11.0 - 14.5 %    Platelets 173 140 - 440 thou/uL    MPV 10.5 8.5 - 12.5 fL    Neutrophils % 76 (H) 50 - 70 %    Lymphocytes % 12 (L) 20 - 40 %    Monocytes % 12 (H) 2 - 10 %    Eosinophils % 1 0 - 6 %    Basophils % 0 0 - 2 %    Neutrophils Absolute 9.5 (H) 2.0 - 7.7 thou/uL    Lymphocytes Absolute 1.5 0.8 - 4.4 thou/uL    Monocytes Absolute 1.4 (H) 0.0 - 0.9 thou/uL    Eosinophils Absolute 0.1 0.0 - 0.4 thou/uL    Basophils Absolute 0.0 0.0 - 0.2 thou/uL     Current Outpatient Medications   Medication Sig     acetaminophen (TYLENOL) 325 MG tablet Take 2 tablets (650 mg total) by mouth every 6  (six) hours as needed for pain.     bisacodyl (DULCOLAX) 10 mg suppository Insert 10 mg into the rectum daily as needed.     cycloSPORINE (RESTASIS) 0.05 % ophthalmic emulsion Administer 1 drop to both eyes 2 (two) times a day.     dorzolamide-timolol (COSOPT) 22.3-6.8 mg/mL ophthalmic solution 1 drop to both eyes two times a day for macular degeneration and glaucoma     erythromycin base (ERYTHROMYCIN OPHT) Apply 5 mg to eye. Instill 1 ribbon in both eyes at HS     latanoprost (XALATAN) 0.005 % ophthalmic solution 1 drop both eyes at bedtime for macular degeneration/glaucoma     levothyroxine (SYNTHROID, LEVOTHROID) 25 MCG tablet Take 1 tablet (25 mcg total) by mouth Daily at 6:00 am. Hypothyroid     moxifloxacin (VIGAMOX) 0.5 % ophthalmic solution Administer 1 drop to the right eye 4 (four) times a day.     petrolat,wht/min oil/sod chl (ARTIFICIAL TEARS OINTMENT OPHT) Apply 2 drops to eye every 2 (two) hours as needed. Right eye q 2 hours PRN     polyethylene glycol (MIRALAX) 17 gram packet Take 17 g by mouth daily as needed.     polymyxin B-trimethoprim (FOR POLYTRIM) 10,000 unit- 1 mg/mL Drop ophthalmic drops 1 drop 4 (four) times a day. Right eye     propylene glycol (SYSTANE COMPLETE OPHT) Apply 1 drop to eye 2 (two) times a day. Both eyes     simvastatin (ZOCOR) 5 MG tablet TAKE ONE TABLET BY MOUTH AT BEDTIME     sulfamethoxazole-trimethoprim (SEPTRA DS) 800-160 mg per tablet Take 1 tablet by mouth 2 (two) times a day.     traMADol (ULTRAM) 50 mg tablet Take 25 mg by mouth every 4 (four) hours as needed for pain.     warfarin sodium (WARFARIN ORAL) Take 4.5-7 mg by mouth. Next INR 10/20/19           Case Management:  I have reviewed the facility/SNF care plan/MDS which was done 2/1/19, including the falls risk, nutrition and pain screening. I also reviewed the current immunizations, and preventive care.. Future cancer screening is not clinically indicated secondary to age/goals of care.   Patient's desire to  return to the community is present, but is not able due to care needs .    Information reviewed:  Medications, vital signs, orders, and nursing notes.    ASSESSMENT:      ICD-10-CM    1. Bell's palsy G51.0    2. Urinary retention R33.9    3. Macular Degeneration H35.30    4. Urinary tract infection without hematuria, site unspecified N39.0        PLAN:      Right eye trauma - on multiple medications and being followed closely by opthalmology.    UTI- on Bactrim DS    Right sided [place] Palsy prednisone as above. Pt currently on eye lubricating drops multiple times a day.      Suprapubic catheter- cleanse daily, monitor for infection around  the site      Atrial fibrillation on coumadin and lisinopril     Anticoagulation management- monitor and adjust per INRS      Poor vision- is followed closely by the eye doctor.  on multiple eye gtts.      Hypothyroidism- TSH 1.91 on 2/1/19    Electronically signed by: Polly Marroquin CNP

## 2021-06-02 NOTE — TELEPHONE ENCOUNTER
[facility] Nurse Triage Anticoagulation Note      Provider: YECENIA Olivera  Facility: [facility] Type: LTC    Caller: Zakiya  Call Back Number:  [phone]    Reason for call: INR    Today s INR: 2.32  Previous INR:   10/21/19 INR 2.14 4.5mg daily,. 10/14/19 INR 3.44 4.5mg daily.    Diagnosis/Goal: AFIB, CVA  Heparin/Lovenox: No   Currently on ABX: No  Other interacting Medications: None  Missed or refused doses: No    Verbal Order/Direction given by Provider: Continue 4.5mg daily. NExt INR 11/6.    Provider giving order: YECENIA Olivera    Verbal order given to: Priscila Pham RN

## 2021-06-02 NOTE — TELEPHONE ENCOUNTER
[facility] Nurse Triage Anticoagulation Note      Provider: YECENIA Olivera  Facility: [facility] Type: LTC    Caller: Zakiya  Call Back Number:  [phone]    Reason for call: INR    Today s INR: 3.21  Previous INR: 10/7/19 INR 2.81 7mg M-W-F, 4.5mg AOD.    Diagnosis/Goal: AFIB  Heparin/Lovenox: No   Currently on ABX: Yes  Bactrim DS  Other interacting Medications: None  Missed or refused doses: No    Verbal Order/Direction given by Provider: Give only 4.5mg today & thru weekend. Next INR 10/14    Provider giving order: YECENIA Olivera    Verbal order given to: Priscila Pham RN

## 2021-06-03 ENCOUNTER — RECORDS - HEALTHEAST (OUTPATIENT)
Dept: ADMINISTRATIVE | Facility: CLINIC | Age: 86
End: 2021-06-03

## 2021-06-03 VITALS
HEART RATE: 72 BPM | TEMPERATURE: 97.4 F | WEIGHT: 199.4 LBS | OXYGEN SATURATION: 92 % | BODY MASS INDEX: 28.61 KG/M2 | RESPIRATION RATE: 18 BRPM | SYSTOLIC BLOOD PRESSURE: 140 MMHG | DIASTOLIC BLOOD PRESSURE: 76 MMHG

## 2021-06-03 VITALS
BODY MASS INDEX: 27.55 KG/M2 | TEMPERATURE: 97.6 F | SYSTOLIC BLOOD PRESSURE: 150 MMHG | HEART RATE: 64 BPM | OXYGEN SATURATION: 96 % | RESPIRATION RATE: 18 BRPM | DIASTOLIC BLOOD PRESSURE: 74 MMHG | WEIGHT: 192 LBS

## 2021-06-03 VITALS — BODY MASS INDEX: 28.05 KG/M2 | WEIGHT: 195.5 LBS

## 2021-06-03 NOTE — TELEPHONE ENCOUNTER
[facility] Nurse Triage Anticoagulation Note      Provider: YECENIA Olivera  Facility: [facility] Type: LT    Caller: Priscila   Call Back Number:  [phone]    Reason for call: INR    Today s INR: 2.24  Previous INR: 11/6 1.96 4.5mg daily     Diagnosis/Goal: AFIB and CVA  Heparin/Lovenox: No   Currently on ABX: No  Other interacting Medications: None  Missed or refused doses: No    Verbal Order/Direction given by Provider: 4.5mg daily Next INR 11/27    Provider giving order: YECENIA Olivera    Verbal order given to: Priscila Rosario RN

## 2021-06-03 NOTE — PROGRESS NOTES
In clinic device check with Dr. Carrillo.  Please see link for full device report.  Patient was informed of results and next follow up during today's visit.

## 2021-06-04 ENCOUNTER — RECORDS - HEALTHEAST (OUTPATIENT)
Dept: LAB | Facility: CLINIC | Age: 86
End: 2021-06-04

## 2021-06-04 ENCOUNTER — OFFICE VISIT - HEALTHEAST (OUTPATIENT)
Dept: GERIATRICS | Facility: CLINIC | Age: 86
End: 2021-06-04

## 2021-06-04 VITALS
BODY MASS INDEX: 27.2 KG/M2 | HEIGHT: 70 IN | SYSTOLIC BLOOD PRESSURE: 136 MMHG | DIASTOLIC BLOOD PRESSURE: 66 MMHG | WEIGHT: 190 LBS | HEART RATE: 64 BPM | RESPIRATION RATE: 16 BRPM

## 2021-06-04 VITALS
OXYGEN SATURATION: 98 % | BODY MASS INDEX: 28.01 KG/M2 | WEIGHT: 195.2 LBS | RESPIRATION RATE: 16 BRPM | DIASTOLIC BLOOD PRESSURE: 92 MMHG | SYSTOLIC BLOOD PRESSURE: 130 MMHG | TEMPERATURE: 98.9 F | HEART RATE: 81 BPM

## 2021-06-04 VITALS
TEMPERATURE: 99.2 F | BODY MASS INDEX: 27.58 KG/M2 | DIASTOLIC BLOOD PRESSURE: 80 MMHG | SYSTOLIC BLOOD PRESSURE: 147 MMHG | HEART RATE: 60 BPM | RESPIRATION RATE: 20 BRPM | WEIGHT: 192.2 LBS | OXYGEN SATURATION: 97 %

## 2021-06-04 VITALS
WEIGHT: 192 LBS | OXYGEN SATURATION: 95 % | HEART RATE: 68 BPM | RESPIRATION RATE: 20 BRPM | TEMPERATURE: 97 F | BODY MASS INDEX: 27.55 KG/M2 | DIASTOLIC BLOOD PRESSURE: 80 MMHG | SYSTOLIC BLOOD PRESSURE: 128 MMHG

## 2021-06-04 VITALS
DIASTOLIC BLOOD PRESSURE: 72 MMHG | OXYGEN SATURATION: 97 % | BODY MASS INDEX: 26.86 KG/M2 | RESPIRATION RATE: 16 BRPM | WEIGHT: 187.2 LBS | SYSTOLIC BLOOD PRESSURE: 140 MMHG | TEMPERATURE: 98.2 F | HEART RATE: 74 BPM

## 2021-06-04 VITALS
BODY MASS INDEX: 28.01 KG/M2 | OXYGEN SATURATION: 93 % | RESPIRATION RATE: 18 BRPM | SYSTOLIC BLOOD PRESSURE: 146 MMHG | TEMPERATURE: 98.4 F | WEIGHT: 195.2 LBS | HEART RATE: 63 BPM | DIASTOLIC BLOOD PRESSURE: 58 MMHG

## 2021-06-04 VITALS
WEIGHT: 186.8 LBS | BODY MASS INDEX: 26.8 KG/M2 | DIASTOLIC BLOOD PRESSURE: 80 MMHG | SYSTOLIC BLOOD PRESSURE: 144 MMHG | TEMPERATURE: 97.3 F | HEART RATE: 60 BPM | OXYGEN SATURATION: 97 % | RESPIRATION RATE: 18 BRPM

## 2021-06-04 VITALS — HEIGHT: 70 IN | BODY MASS INDEX: 27.73 KG/M2 | WEIGHT: 193.7 LBS

## 2021-06-04 DIAGNOSIS — E03.9 HYPOTHYROIDISM, UNSPECIFIED TYPE: ICD-10-CM

## 2021-06-04 DIAGNOSIS — L82.1 OTHER SEBORRHEIC KERATOSIS: ICD-10-CM

## 2021-06-04 DIAGNOSIS — I48.20 CHRONIC ATRIAL FIBRILLATION (H): ICD-10-CM

## 2021-06-04 RX ORDER — MENTHOL AND ZINC OXIDE .44; 20.625 G/100G; G/100G
OINTMENT TOPICAL PRN
Status: ON HOLD | COMMUNITY
Start: 2021-06-04 | End: 2023-01-01

## 2021-06-04 NOTE — PROGRESS NOTES
[facility]    Facility:   [facility] [[id number]]   Code Status: DNR      CHIEF COMPLAINT/REASON FOR VISIT:  Chief Complaint   Patient presents with     FVP Care Coordination - Regulatory       HISTORY:      HPI: Riley is a 89 y.o. male Riley is a 89 y.o. male now residing in the LTC at [facility].  He is  with history of A. fib on warfarin develop acute CVA from holding warfarin for right gluteal hematoma. Hypertension , urinary retention has a suprapubic catheter.     Today he being seen  for routine visit to review multiple medical issues.    He is currently experiencing a right eye injury.  He is being followed closely by ophthalmology And he will undergo surgery on 12/17/2019 for ectropion procedure.  He will be bridged with Lovenox and new orders were written.   Currently part of his right eye is sutured to help keep it closed.    He also just recently completed a course of Bactrim DS for a UTI.  He is able to move all extremities.  He has been eating and reports no difficulties with swallowing He denied any numbness or tingling.  He is followed closely by an eye doctor.  He denied CP or shortness of breath. He denied  constipation.   His suprapubic is intact and draining clear yellow urine today. Site intact.  He is up-to-date on his TSH. He reports his eye pain is getting better and he does get releif with the tramadol when he takes it.     Past Medical History:   Diagnosis Date     Atrial fibrillation (H)     On coumadin     CVA (cerebral vascular accident) (H)      Hypertension      Pacemaker      Urinary retention              Family History   Problem Relation Age of Onset     COPD Father      Social History     Socioeconomic History     Marital status:      Spouse name: Not on file     Number of children: Not on file     Years of education: Not on file     Highest education level: Not on file   Occupational History     Not on file    Social Needs     Financial resource strain: Not on file     Food insecurity:     Worry: Not on file     Inability: Not on file     Transportation needs:     Medical: Not on file     Non-medical: Not on file   Tobacco Use     Smoking status: Former Smoker     Smokeless tobacco: Never Used   Substance and Sexual Activity     Alcohol use: No     Drug use: No     Sexual activity: Not on file   Lifestyle     Physical activity:     Days per week: Not on file     Minutes per session: Not on file     Stress: Not on file   Relationships     Social connections:     Talks on phone: Not on file     Gets together: Not on file     Attends [employer] service: Not on file     Active member of club or organization: Not on file     Attends meetings of clubs or organizations: Not on file     Relationship status: Not on file     Intimate partner violence:     Fear of current or ex partner: Not on file     Emotionally abused: Not on file     Physically abused: Not on file     Forced sexual activity: Not on file   Other Topics Concern     Not on file   Social History Narrative    03/07/15 - The patient lives alone in his own home.         Review of Systems  Eyes: Positive for pain, discharge, redness and visual disturbance.        Being followed closely by opthalmology. He did have part of his right sutured closed with an opening left to instill multiple eye medications.       Constitutional: Positive for activity change and fatigue. Negative for appetite change, chills and fever.   HENT: Negative for congestion and sore throat.    Eyes: positive  for visual disturbance. right sided facial paralysis   Respiratory: Negative for cough, shortness of breath and wheezing.    Cardiovascular: Negative for chest pain and leg swelling.        Pacemaker   Gastrointestinal: Negative for abdominal distention, abdominal pain, constipation, diarrhea and nausea.   Genitourinary: Negative for dysuria.   Musculoskeletal: Negative for arthralgias and  myalgias.   Skin: Negative for color change, rash and wound.   Neurological: Negative for dizziness, weakness and numbness.   Psychiatric/Behavioral: Negative for agitation, behavioral problems and sleep disturbance.   Vitals:    12/10/19 0854   BP: 128/80   Pulse: 68   Resp: 20   Temp: 97  F (36.1  C)   SpO2: 95%   Weight: 192 lb (87.1 kg)       Physical Exam    Constitutional: He appears well-developed and well-nourished.   Pleasant gentleman   HENT:   Head: Normocephalic and atraumatic.   Eyes: Conjunctivae are normal. Pupils are equal, round, and reactive to light. poor vision with the right worse than the left right sided facial paralysis   Neck: Normal range of motion. Neck supple.   Cardiovascular: Normal rate, regular rhythm and normal heart sounds.   No murmur heard.  Pulmonary/Chest: Effort normal and breath sounds normal. He has no wheezes. He has no rales.   Abdominal: Soft. Bowel sounds are normal. He exhibits no distension. There is no tenderness.   Genitourinary: suprapubic catheter  Musculoskeletal: Normal range of motion. He exhibits no edema.   Neurological: He is alert.   Skin: Skin is warm and dry.   Psychiatric: He has a normal mood and affect. His behavior is normal.   LABS:   Recent Results (from the past 240 hour(s))   INR   Result Value Ref Range    INR 2.42 (H) 0.90 - 1.10     Case Management:  I have reviewed the facility/SNF care plan/MDS which was done 10/15/19, including the falls risk, nutrition and pain screening. I also reviewed the current immunizations, and preventive care.. Future cancer screening is not clinically indicated secondary to age/goals of care.   Patient's desire to return to the community is not assessible due to cognitive impairment.    Information reviewed:  Medications, vital signs, orders, and nursing notes.    ASSESSMENT:      ICD-10-CM    1. Paroxysmal atrial fibrillation (H) I48.0    2. Right-sided Bell's palsy G51.0    3. Essential hypertension I10    4. Right  eye injury, subsequent encounter S05.91XD        PLAN:    Right eye trauma - on multiple medications and being followed closely by opthalmology.     UTI- on Bactrim DS     Right sided [place] Palsy prednisone as above. Pt currently on eye lubricating drops multiple times a day.  Patient to have troponin on procedure done on December 17, 2019     Suprapubic catheter- cleanse daily, monitor for infection around  the site      Atrial fibrillation on coumadin and lisinopril     Anticoagulation management- monitor and adjust per INRS      Poor vision- is followed closely by the eye doctor.  on multiple eye gtts.      Hypothyroidism- TSH 1.91 on 2/1/19    Electronically signed by: Polly Marroquin CNP

## 2021-06-04 NOTE — TELEPHONE ENCOUNTER
[facility] Nurse Triage Anticoagulation Note      Provider: YECENIA Olivera  Facility: [facility] Type: LT    Caller: Gabriela   Call Back Number:  [phone]    Reason for call: INR    Today s INR: 1.27  Previous INR: 12/17 1.14 5mg daily     Diagnosis/Goal: AFIB  Heparin/Lovenox: No   Currently on ABX: No  Other interacting Medications: None  Missed or refused doses: No-- was on hold for a surgery and started warfarin again on 12/17 at 5mg daily was on 4.5mg daily prior to surgery     Verbal Order/Direction given by Provider: 5mg daily Next INR 12/23    Provider giving order: YECENIA Olivera    Verbal order given to: Gabriela Rosario RN

## 2021-06-04 NOTE — TELEPHONE ENCOUNTER
[facility] Nurse Triage Telephone Note      Provider: YECENIA Olivera  Facility: [facility] Type: LTC    Caller: Zakiya  Call Back Number:  [phone]    Allergies: Patient has no known allergies.    Reason for call: INR today 2.42 (on 4.5mg daily.) Pt having procedure 12/17 & have Lovenox orders for 87mg two times a day. Pharmacy states cant get that exact dose, to go with 80 or 90mg. Starting 12/14. Stopping Coumadin 12/12, need dose for tonight.     Verbal Order/Direction given by Provider: Give coumadin 4.5mg tonight, stop tomorrow. Change Lovenox dose to 80mg two times a day starting 12/14 as ordered.    Provider giving order: YECENIA Olivera    Verbal order given to: Priscila Pham RN

## 2021-06-04 NOTE — TELEPHONE ENCOUNTER
[facility] Nurse Triage Anticoagulation Note      Provider: YECENIA Olivera  Facility: [facility]    Facility Type: LT    Caller: Gabriela  Call Back Number:  [phone]    Reason for call: INR    Today s INR: 2.31  Previous INR: 12/26 1.82(5.5mg Mon and Wed and 5mg AOD), 12/23 1.56(5.5mg Mon and Wed and 5mg AOD), 12/19 1.27(5mg daily), 12/17 1.14(5mg daily---Warfarin restarted after eye surgery.  Was bridged with Lovenox prior to surgery while Warfarin on hold---had been on 4.5mg daily).      Diagnosis/Goal: AFIB/CVA  Heparin/Lovenox: No   Currently on ABX: No  Other interacting Medications: None  Missed or refused doses: No    Verbal Order/Direction given by Provider: Continue Warfarin 5.5mg Mon and Wed and 5mg all other days.  Next INR 1/6/20.      Provider giving order: YECENIA Arteaga    Verbal order given to: Priscila Ramirez RN

## 2021-06-05 ENCOUNTER — RECORDS - HEALTHEAST (OUTPATIENT)
Dept: LAB | Facility: CLINIC | Age: 86
End: 2021-06-05

## 2021-06-05 VITALS
DIASTOLIC BLOOD PRESSURE: 92 MMHG | TEMPERATURE: 98.9 F | HEART RATE: 72 BPM | RESPIRATION RATE: 18 BRPM | OXYGEN SATURATION: 97 % | WEIGHT: 180.8 LBS | BODY MASS INDEX: 25.94 KG/M2 | SYSTOLIC BLOOD PRESSURE: 160 MMHG

## 2021-06-05 VITALS
BODY MASS INDEX: 26.03 KG/M2 | OXYGEN SATURATION: 97 % | HEART RATE: 61 BPM | TEMPERATURE: 98.5 F | RESPIRATION RATE: 20 BRPM | SYSTOLIC BLOOD PRESSURE: 142 MMHG | WEIGHT: 181.4 LBS | DIASTOLIC BLOOD PRESSURE: 74 MMHG

## 2021-06-05 VITALS
TEMPERATURE: 99.1 F | OXYGEN SATURATION: 100 % | HEART RATE: 81 BPM | HEIGHT: 70 IN | DIASTOLIC BLOOD PRESSURE: 73 MMHG | SYSTOLIC BLOOD PRESSURE: 138 MMHG | BODY MASS INDEX: 26.48 KG/M2 | RESPIRATION RATE: 18 BRPM | WEIGHT: 185 LBS

## 2021-06-05 VITALS
BODY MASS INDEX: 27.09 KG/M2 | TEMPERATURE: 98.9 F | OXYGEN SATURATION: 96 % | HEART RATE: 72 BPM | DIASTOLIC BLOOD PRESSURE: 70 MMHG | SYSTOLIC BLOOD PRESSURE: 124 MMHG | RESPIRATION RATE: 18 BRPM | WEIGHT: 188.8 LBS

## 2021-06-05 VITALS
HEART RATE: 60 BPM | SYSTOLIC BLOOD PRESSURE: 114 MMHG | TEMPERATURE: 98.9 F | DIASTOLIC BLOOD PRESSURE: 79 MMHG | BODY MASS INDEX: 26.54 KG/M2 | OXYGEN SATURATION: 97 % | WEIGHT: 185 LBS | RESPIRATION RATE: 16 BRPM

## 2021-06-05 VITALS
DIASTOLIC BLOOD PRESSURE: 54 MMHG | SYSTOLIC BLOOD PRESSURE: 122 MMHG | HEART RATE: 59 BPM | TEMPERATURE: 98.4 F | OXYGEN SATURATION: 98 % | RESPIRATION RATE: 16 BRPM

## 2021-06-05 VITALS — BODY MASS INDEX: 24.91 KG/M2 | HEIGHT: 70 IN | WEIGHT: 174 LBS

## 2021-06-05 VITALS
RESPIRATION RATE: 18 BRPM | SYSTOLIC BLOOD PRESSURE: 165 MMHG | BODY MASS INDEX: 26.03 KG/M2 | WEIGHT: 181.4 LBS | HEART RATE: 80 BPM | DIASTOLIC BLOOD PRESSURE: 88 MMHG | OXYGEN SATURATION: 98 % | TEMPERATURE: 98.7 F

## 2021-06-06 NOTE — TELEPHONE ENCOUNTER
[facility] Nurse Triage Anticoagulation Note      Provider: YECENIA Olivera  Facility: [facility] Type: LTC    Caller: Zakiya  Call Back Number:  [phone]    Reason for call: INR    Today s INR: 2.41  Previous INR: 2/10/20 INR 2.21 5.5mg M & W, 5mg AOD.    Diagnosis/Goal: AFIB  Heparin/Lovenox: No   Currently on ABX: Yes  Tetra & Cipro for 2 more days  Other interacting Medications: None  Missed or refused doses: No    Verbal Order/Direction given by Provider: Cont 5.5mg M & W, 5mg AOD.    Provider giving order: YECENIA Olivera    Verbal order given to: Priscila Pham RN

## 2021-06-07 ENCOUNTER — COMMUNICATION - HEALTHEAST (OUTPATIENT)
Dept: GERIATRICS | Facility: CLINIC | Age: 86
End: 2021-06-07

## 2021-06-07 LAB
INR PPP: 2.53 (ref 0.9–1.1)
TSH SERPL DL<=0.005 MIU/L-ACNC: 3.14 UIU/ML (ref 0.3–5)

## 2021-06-07 NOTE — TELEPHONE ENCOUNTER
[facility] Nurse Triage Anticoagulation Note      Provider: YECENIA Olivera  Facility: [facility] Type: LT    Caller: Gabriela  Call Back Number:  [phone]    Reason for call: INR    Today s INR: 2.04  Previous INR: 3/18 2.38(5.5mg Mon and Wed and 5mg AOD)    Diagnosis/Goal: AFIB  Heparin/Lovenox: No   Currently on ABX: No  Other interacting Medications: None  Missed or refused doses: No    Verbal Order/Direction given by Provider: Continue Warfarin 5.5mg Mon and Wed and 5mg all other days.  Next INR 5/18/20.      Provider giving order: YECENIA Olivera    Verbal order given to: Sukhwinder Ramirez RN

## 2021-06-07 NOTE — PROGRESS NOTES
[facility]    Facility:   [facility] [[id number]]   Code Status: DNR      CHIEF COMPLAINT/REASON FOR VISIT:  Chief Complaint   Patient presents with     FVP Care Coordination - Regulatory       HISTORY:      HPI: Riley is a 89 y.o. male  now residing in the LTC at [facility].  He is  with history of A. fib on warfarin develop acute CVA from holding warfarin for right gluteal hematoma. Hypertension , urinary retention has a suprapubic catheter.     Today he being seen  for routine regulatory visit.  He denied any concerns  however he then reported slight discomfort  in his right hand in which he believes is from using a hand gripper.  He denied numbness or tingling but just said it felt weird.  He is on scheduled Tylenol and reports that helps.  He ambulates frequently with the restorative aid.  He did undergo  surgery on 12/17/2019 for an  ectropion procedure.   He is able to move all extremities.  He is eating well and reports no difficulties with swallowing.    He denied CP or shortness of breath. He denied  constipation.   His suprapubic is intact and draining clear yellow urine.  TSH pending.    Past Medical History:   Diagnosis Date     Atrial fibrillation (H)     On coumadin     CVA (cerebral vascular accident) (H)      Hypertension      Pacemaker      Urinary retention              Family History   Problem Relation Age of Onset     COPD Father      Social History     Socioeconomic History     Marital status:      Spouse name: Not on file     Number of children: Not on file     Years of education: Not on file     Highest education level: Not on file   Occupational History     Not on file   Social Needs     Financial resource strain: Not on file     Food insecurity     Worry: Not on file     Inability: Not on file     Transportation needs     Medical: Not on file     Non-medical: Not on file   Tobacco Use     Smoking status: Former Smoker      Smokeless tobacco: Never Used   Substance and Sexual Activity     Alcohol use: No     Drug use: No     Sexual activity: Not on file   Lifestyle     Physical activity     Days per week: Not on file     Minutes per session: Not on file     Stress: Not on file   Relationships     Social connections     Talks on phone: Not on file     Gets together: Not on file     Attends Advent service: Not on file     Active member of club or organization: Not on file     Attends meetings of clubs or organizations: Not on file     Relationship status: Not on file     Intimate partner violence     Fear of current or ex partner: Not on file     Emotionally abused: Not on file     Physically abused: Not on file     Forced sexual activity: Not on file   Other Topics Concern     Not on file   Social History Narrative    03/07/15 - The patient lives alone in his own home.         Review of Systems  Eyes: negative for pain, discharge, redness and visual disturbance.   He is followed  closely by opthalmology.  He has had multiple procedures done on his eye right  Constitutional: Negative for activity change and fatigue. Negative for appetite change, chills and fever.   HENT: Negative for congestion and sore throat.    Eyes: positive  for visual disturbance. right sided facial paralysis   Respiratory: Negative for cough, shortness of breath and wheezing.    Cardiovascular: Negative for chest pain and leg swelling.        Pacemaker   Gastrointestinal: Negative for abdominal distention, abdominal pain, constipation, diarrhea and nausea.   Genitourinary: Negative for dysuria.   Musculoskeletal: Negative for arthralgias and myalgias.   Skin: Negative for color change, rash and wound.   Neurological: Negative for dizziness, weakness and numbness.   Psychiatric/Behavioral: Negative for agitation, behavioral problems and sleep disturbance.   Vitals:    04/14/20 1128   BP: 144/80   Pulse: 60   Resp: 18   Temp: 97.3  F (36.3  C)   SpO2: 97%    Weight: 186 lb 12.8 oz (84.7 kg)       Physical Exam  Musculoskeletal:      Comments: Mild discomfort right hand     Constitutional: He appears well-developed and well-nourished.   Pleasant gentleman   HENT:   Head: Normocephalic and atraumatic.   Eyes: Conjunctivae are normal. Pupils are equal, round, and reactive to light. poor vision with the right worse than the left right sided facial paralysis   Neck: Normal range of motion. Neck supple.   Cardiovascular: Normal rate, regular rhythm and normal heart sounds.   No murmur heard.  Pulmonary/Chest: Effort normal and breath sounds normal. He has no wheezes. He has no rales.   Abdominal: Soft. Bowel sounds are normal. He exhibits no distension. There is no tenderness.   Genitourinary: suprapubic catheter  Musculoskeletal: Normal range of motion. He exhibits no edema.   Neurological: He is alert.   Skin: Skin is warm and dry.   Psychiatric: He has a normal mood and affect. His behavior is normal.   LABS:   No results found for this or any previous visit (from the past 240 hour(s)).  TSH pending     Current Outpatient Medications   Medication Sig     acetaminophen (TYLENOL) 325 MG tablet Take 2 tablets (650 mg total) by mouth every 6 (six) hours as needed for pain.     acetaminophen (TYLENOL) 325 MG tablet Take 650 mg by mouth 3 (three) times a day. Do not exceed 3000 mg daily     atorvastatin (LIPITOR) 40 MG tablet Take 40 mg by mouth at bedtime.     bisacodyl (DULCOLAX) 10 mg suppository Insert 10 mg into the rectum daily as needed.     dorzolamide-timolol (COSOPT) 22.3-6.8 mg/mL ophthalmic solution 1 drop to both eyes two times a day for macular degeneration and glaucoma     erythromycin base (ERYTHROMYCIN OPHT) Apply 5 mg to eye. Instill 1 ribbon in both eyes at HS and QID     latanoprost (XALATAN) 0.005 % ophthalmic solution 1 drop both eyes at bedtime for macular degeneration/glaucoma     levothyroxine (SYNTHROID, LEVOTHROID) 25 MCG tablet Take 1 tablet (25 mcg  total) by mouth Daily at 6:00 am. Hypothyroid     petrolat,wht/min oil/sod chl (ARTIFICIAL TEARS OINTMENT OPHT) Apply 2 drops to eye every 2 (two) hours as needed. Right eye q 2 hours PRN     polyethylene glycol (MIRALAX) 17 gram packet Take 17 g by mouth daily as needed.     polyvinyl alcohol (LIQUIFILM TEARS) 1.4 % ophthalmic solution Administer 1 drop to the right eye every 2 (two) hours as needed for dry eyes.     propylene glycol (SYSTANE COMPLETE OPHT) Apply 1 drop to eye 2 (two) times a day. Both eyes     traMADol (ULTRAM) 50 mg tablet Take 25 mg by mouth daily.     UNABLE TO FIND Med Name:docu liquis- 5 gtts each ear as needed prior to irrigation     warfarin sodium (WARFARIN ORAL) Take 5-5.5 mg by mouth. 3/10/20 INR 2.41 Cont 5.5 M & W, 5mg AOD. Next INR 3/18.  2/10/20 INR 2.21 Cont 5.5mg M & W, 5mg AOD. Next INR 3/10.  1/2/20 INR 2.31  Cont 5.5mg Mon and Wed and 5mg AOD.  Next INR 1/6/20.     traMADol (ULTRAM) 50 mg tablet Take 25 mg by mouth every 4 (four) hours as needed for pain.     Case Management:  I have reviewed the facility/SNF care plan/MDS which was done 4/9/2020 TSH pending including the falls risk, nutrition and pain screening. I also reviewed the current immunizations, and preventive care.. Future cancer screening is not clinically indicated secondary to age/goals of care.   Patient's desire to return to the community is not assessible due to cognitive impairment.    Information reviewed:  Medications, vital signs, orders, and nursing notes.    ASSESSMENT:      ICD-10-CM    1. Atrial fibrillation, unspecified type (H)  I48.91    2. Essential hypertension with goal blood pressure less than 140/90  I10    3. Macular Degeneration  H35.30    4. Age-related physical debility  R54        PLAN:      Right eye trauma - on eyedrops  he no longer has pain and being followed closely by opthalmology.    HX Right sided [place] Palsy . Pt currently on eye lubricating drops multiple times a day.        Suprapubic catheter- cleanse daily, monitor for infection around  the site.  Cares per protocol     Atrial fibrillation on coumadin and lisinopril     Anticoagulation management- monitor and adjust per INRS      Poor vision- is followed closely by ophthalmology.  on multiple eye gtts.      Hypothyroidism- TSH 1.91 on 2/1/19 TSH pending     Electronically signed by: Polly Marroquin CNP

## 2021-06-07 NOTE — PROGRESS NOTES
TTM device check.  Please see link for full device report.  Patient was informed of results and next follow up during today's visit.

## 2021-06-08 NOTE — TELEPHONE ENCOUNTER
[facility] Nurse Triage Anticoagulation Note      Provider: YECENIA Olivera  Facility: [facility] Type: LT    Caller: Zakiya  Call Back Number:  [phone]    Reason for call: INR    Today s INR: 2.29  Previous INR: 5/18/20 INR 2.37 5.5mg M & W, 5mg AOD.    Diagnosis/Goal: AFIB  Heparin/Lovenox: No   Currently on ABX: No  Other interacting Medications: None  Missed or refused doses: No    Verbal Order/Direction given by Provider: Cont 5.5mg M & We, 5mg AOD. Next INR 7/13.    Provider giving order: YECENIA Olivera    Verbal order given to: Zakiya Pham RN

## 2021-06-08 NOTE — PROGRESS NOTES
TTM device check.  Please see link for full device report.  Patient was informed of results and next follow up via phone call.

## 2021-06-08 NOTE — PROGRESS NOTES
"Subjective:    Riley Rodriguez is seen today for concern with recent fall.  Occurred last Thursday while at home.  Resides at [facility].  Does not provide assisted living but instead meant for independent living.  Unclear reason for fall.  Denies loss of consciousness.  No head injury.  Landed on left side and arm.  Not complaining of arm pain.  Was able to crawl and get up on his own.  Did not notify people initially.  Subsequently evaluated by family members with daughter being an RN.  Significant ecchymosis left gluteal region extending down into the thigh posteriorly as well as left ankle swelling.  Now using a walker for ambulation.  States that his balance has been off.  Patient is right handed.  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  Uses acetaminophen for pain control.  Not requiring stronger pain medication currently.  Had purchased a lift chair however it broke after 1 day and is hoping to get a replacement chair tomorrow.     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 fib   Mom -  (unknown reason after \"female operation\" with post-op blood clot)   Dad -  emphysema   1 sis -   Retired -    [place] [facility], visits frequently in the summer     Past Surgical History   Procedure Laterality Date     Pr remv pilonidal lesion " simple       Description: Pilonidal Cyst Resection;  Recorded: 03/24/2008;     Pr partial excision thyroid,unilat       Description: Thyroid Surgery Sub-Total Thyroidectomy;  Recorded: 03/24/2008;     Pr transurethral elec-surg prostatectom       Description: Transurethral Resection Of Prostate (TURP);  Recorded: 03/24/2008;     Total hip arthroplasty Right         Family History   Problem Relation Age of Onset     COPD Father         Past Medical History   Diagnosis Date     Atrial fibrillation      On coumadin     CVA (cerebral vascular accident)      Hypertension      Pacemaker         Social History   Substance Use Topics     Smoking status: Former Smoker     Smokeless tobacco: None     Alcohol use No        Current Outpatient Prescriptions   Medication Sig Dispense Refill     AVODART 0.5 mg capsule TAKE ONE CAPSULE BY MOUTH EVERY DAY 90 capsule 2     BILBERRY ORAL CAPS       colchicine (COLCRYS) 0.6 mg tablet Take 2 tablets by mouth at onset; may repeat every hour.  Max of 8 tablets per day.       dorzolamide-timolol (COSOPT) 2-0.5 % ophthalmic solution Administer 1 drop to both eyes 2 (two) times a day.       latanoprost (XALATAN) 0.005 % ophthalmic solution Administer 1 drop to both eyes bedtime.       lisinopril (PRINIVIL,ZESTRIL) 10 MG tablet TAKE ONE TABLET BY MOUTH EVERY DAY 30 tablet 11     simvastatin (ZOCOR) 5 MG tablet TAKE ONE TABLET BY MOUTH EVERY DAY AT BEDTIME 90 tablet 3     tamsulosin (FLOMAX) 0.4 mg Cp24 TAKE ONE CAPSULE BY MOUTH EVERY DAY 90 capsule 3     VIT C/MARIE AC/LUT/COPPER/ZNOX (PRESERVISION LUTEIN ORAL) Take 2 capsules by mouth daily.       warfarin (COUMADIN) 5 MG tablet 10 mg on Fri; 7.5 mg all other days or as last directed 60 tablet 2     No current facility-administered medications for this visit.           Objective:    Vitals:    01/06/17 1517   BP: 130/60   Pulse: 90   SpO2: 96%   Weight: 213 lb (96.6 kg)      Body mass index is 31.92 kg/(m^2).    Alert.  No apparent distress  at rest.  Significant difficulty standing however is able to do this on his own with use of arms.  Then uses walker to assist with standing or ambulation activities.  Is able to ambulate with walker relatively well however does favor her left side slightly.  This demonstrate left issue tuberosity tenderness on exam.  Large hematoma involving the left gluteal region.  Ecchymosis extending into the posterior left thigh.  1-2+ peripheral edema left lower extremity compared to trace to 1+ edema right lower extremity.  Left shoulder range of motion intact.  No other focal neurologic deficits identified.    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 Left hip otherwise negative.This report was electronically interpreted by: Dr. Nba Serrano MD ON 01/06/2017 at 16:24                 Assessment:    1. Left hip pain  XR Pelvis W 2 Vw Hip Left   2. Left buttock pain  XR Pelvis W 2 Vw Hip Left   3. Edema     4. Atrial fibrillation, unspecified type  INR   5. Pacemaker  INR   6. Cerebral infarction  INR   7. Balance disorder  Ambulatory referral to Home Health         Plan:    40 minutes total time with patient, > 50% with counseling and coordination of cares.  Did discuss recent fall with left hip contusion and significant hematoma.  Lower extremity deep tendon edema noted.  INR supratherapeutic at 4.1.  Anticoagulation nurse to adjust otherwise did recommend holding warfarin this evening.  X-ray of pelvis as well as bilateral hip without evidence for pelvis fracture or left hip fracture/dislocation.  Face-to-face encounter was completed today with ambulatory referral for home health services including skilled nursing, PT and OT evaluate and treat as well as safety evaluation.   Patient will ambulate with walker.  Daughter Homa available by cell phone [phone] to help arrange home health services etc.

## 2021-06-08 NOTE — PROGRESS NOTES
[facility]    Facility:   [facility] [[id number]]   Code Status: DNR      CHIEF COMPLAINT/REASON FOR VISIT:  Chief Complaint   Patient presents with     FVP Care Coordination - Regulatory       HISTORY:      HPI: Riley is a 89 y.o. male  now residing in the LTC at [facility].  He is  with history of A. fib on warfarin develop acute CVA from holding warfarin for right gluteal hematoma.  Currently back on Coumadin, Hypertension , urinary retention has a suprapubic catheter and with a pacemaker      Today he being seen  for routine regulatory visit.  He denied CP or shortness of breath, He no longer requires pain medication for his right eye.  He ambulates frequently with the restorative aid.  He did undergo  surgery on 12/17/2019 for an  ectropion procedure.   He is able to move all extremities.  He is eating well and reports no difficulties with swallowing.    He denied   constipation.   His suprapubic is intact and draining clear yellow urine.  TSH stable at 3.10 and done on 4/22/20.  His weights were reviewed and have been stable over the last month.    Past Medical History:   Diagnosis Date     Atrial fibrillation (H)     On coumadin     CVA (cerebral vascular accident) (H)      Hypertension      Pacemaker      Urinary retention              Family History   Problem Relation Age of Onset     COPD Father      Social History     Socioeconomic History     Marital status:      Spouse name: Not on file     Number of children: Not on file     Years of education: Not on file     Highest education level: Not on file   Occupational History     Not on file   Social Needs     Financial resource strain: Not on file     Food insecurity     Worry: Not on file     Inability: Not on file     Transportation needs     Medical: Not on file     Non-medical: Not on file   Tobacco Use     Smoking status: Former Smoker     Smokeless tobacco: Never Used   Substance and  Sexual Activity     Alcohol use: No     Drug use: No     Sexual activity: Not on file   Lifestyle     Physical activity     Days per week: Not on file     Minutes per session: Not on file     Stress: Not on file   Relationships     Social connections     Talks on phone: Not on file     Gets together: Not on file     Attends Synagogue service: Not on file     Active member of club or organization: Not on file     Attends meetings of clubs or organizations: Not on file     Relationship status: Not on file     Intimate partner violence     Fear of current or ex partner: Not on file     Emotionally abused: Not on file     Physically abused: Not on file     Forced sexual activity: Not on file   Other Topics Concern     Not on file   Social History Narrative    03/07/15 - The patient lives alone in his own home.         Review of Systems  Eyes: negative for pain, discharge, redness He is followed  closely by opthalmology.  He has had multiple procedures done on his eye right  Constitutional: Negative for activity change and fatigue. Negative for appetite change, chills and fever.   HENT: Negative for congestion and sore throat.    Eyes: positive  for visual disturbance. right sided facial paralysis   Respiratory: Negative for cough, shortness of breath and wheezing.    Cardiovascular: Negative for chest pain and leg swelling.        Pacemaker   Gastrointestinal: Negative for abdominal distention, abdominal pain, constipation, diarrhea and nausea.   Genitourinary: Negative for dysuria.   Musculoskeletal: Negative for arthralgias and myalgias.   Skin: Negative for color change, rash and wound.   Neurological: Negative for dizziness, weakness and numbness.   Psychiatric/Behavioral: Negative for agitation, behavioral problems and sleep disturbance.   Vitals:    06/08/20 0916   BP: 140/72   Pulse: 74   Resp: 16   Temp: 98.2  F (36.8  C)   SpO2: 97%   Weight: 187 lb 3.2 oz (84.9 kg)       Constitutional: He appears  well-developed and well-nourished.   Pleasant gentleman   HENT:   Head: Normocephalic and atraumatic.   Eyes: Conjunctivae are normal. Pupils are equal, round, and reactive to light. poor vision with the right worse than the left right sided facial paralysis   Neck: Normal range of motion. Neck supple.   Cardiovascular: Normal rate, regular rhythm and normal heart sounds.   No murmur heard.  Pulmonary/Chest: Effort normal and breath sounds normal. He has no wheezes. He has no rales.   Abdominal: Soft. Bowel sounds are normal. He exhibits no distension. There is no tenderness.   Genitourinary: suprapubic catheter  Musculoskeletal: Normal range of motion. He exhibits no edema.   Neurological: He is alert.   Skin: Skin is warm and dry.   Psychiatric: He has a normal mood and affect. His behavior is normal.     LABS:   No results found for this or any previous visit (from the past 240 hour(s)).     Current Outpatient Medications   Medication Sig     acetaminophen (TYLENOL) 325 MG tablet Take 2 tablets (650 mg total) by mouth every 6 (six) hours as needed for pain.     acetaminophen (TYLENOL) 325 MG tablet Take 650 mg by mouth 3 (three) times a day. Do not exceed 3000 mg daily     atorvastatin (LIPITOR) 40 MG tablet Take 40 mg by mouth at bedtime.     bisacodyl (DULCOLAX) 10 mg suppository Insert 10 mg into the rectum daily as needed.     dorzolamide-timolol (COSOPT) 22.3-6.8 mg/mL ophthalmic solution 1 drop to both eyes two times a day for macular degeneration and glaucoma     erythromycin base (ERYTHROMYCIN OPHT) Apply 5 mg to eye. Instill 1 ribbon in left eye at HS and right eye four times a day     latanoprost (XALATAN) 0.005 % ophthalmic solution 1 drop both eyes at bedtime for macular degeneration/glaucoma     levothyroxine (SYNTHROID, LEVOTHROID) 25 MCG tablet Take 1 tablet (25 mcg total) by mouth Daily at 6:00 am. Hypothyroid     petrolat,wht/min oil/sod chl (ARTIFICIAL TEARS OINTMENT OPHT) Apply 2 drops to eye  every 2 (two) hours as needed. Right eye q 2 hours PRN     polyethylene glycol (MIRALAX) 17 gram packet Take 17 g by mouth daily as needed.     polyvinyl alcohol (LIQUIFILM TEARS) 1.4 % ophthalmic solution Administer 1 drop to the right eye every 2 (two) hours as needed for dry eyes.     propylene glycol (SYSTANE COMPLETE OPHT) Apply 1 drop to eye 2 (two) times a day. Both eyes     UNABLE TO FIND Med Name:docu liquis- 5 gtts each ear as needed prior to irrigation     warfarin sodium (WARFARIN ORAL) Take 5-5.5 mg by mouth. 5/18/20 INR 2.37 Cont 5.5 M & W, 5mg AOD. Next INR 6/15.  4/22/20 INR 2.04  Cont 5.5mg Mon and Wed and 5mg AOD.  Next INR 5/18/20.    3/10/20 INR 2.41 Cont 5.5 M & W, 5mg AOD. Next INR 3/18.     Case Management:  I have reviewed the facility/SNF care plan/MDS which was done 4/9/2020 TSH pending including the falls risk, nutrition and pain screening. I also reviewed the current immunizations, and preventive care.. Future cancer screening is not clinically indicated secondary to age/goals of care.   Patient's desire to return to the community is not assessible due to cognitive impairment.    Information reviewed:  Medications, vital signs, orders, and nursing notes.    ASSESSMENT:      ICD-10-CM    1. Generalized muscle weakness  M62.81    2. Atrial fibrillation, unspecified type (H)  I48.91    3. Macular Degeneration  H35.30        PLAN:      Right eye trauma - on eyedrops  he no longer has pain and being followed closely by opthalmology.    HX Right sided [place] Palsy . Pt currently on eye lubricating drops multiple times a day.       Suprapubic catheter- cleanse daily, monitor for infection around  the site.  Cares per protocol     Atrial fibrillation on coumadin and lisinopril     Anticoagulation management- monitor and adjust per INRS      Poor vision- is followed closely by ophthalmology.  on multiple eye gtts.      Hypothyroidism- on Synthroid, TSH 4/22/20 was 3.10    Electronically signed by:  Polly Marroquin, CNP

## 2021-06-09 ENCOUNTER — RECORDS - HEALTHEAST (OUTPATIENT)
Dept: ADMINISTRATIVE | Facility: CLINIC | Age: 86
End: 2021-06-09

## 2021-06-09 NOTE — TELEPHONE ENCOUNTER
[facility] Nurse Triage Anticoagulation Note      Provider: YECENIA Olivera  Facility: [facility] Type: LT    Caller: Zakiya  Call Back Number:  [phone]    Reason for call: INR    Today s INR: 2.45  Previous INR: 6/15 2.29(5.5mg Mon and Wed and 5mg AOD)    Diagnosis/Goal: AFIB  Heparin/Lovenox: No   Currently on ABX: No  Other interacting Medications: None  Missed or refused doses: No    Verbal Order/Direction given by Provider: Continue Warfarin 5.5mg on Mondays and Wednesdays and 5mg all other days.  Next INR 8/10/20.      Provider giving order: YECENIA Olivera    Verbal order given to: Priscila Ramirez RN

## 2021-06-09 NOTE — PROGRESS NOTES
[facility]    Facility:   [facility] [[id number]]   Code Status: DNR/DNI      Chief Complaint   Patient presents with     Review Of Multiple Medical Conditions     LTC 6/30.2020. Afib, SP catheter, general debilitation.        HPI:   Riley is a 89 y.o. male with hx of Afib on warfarin, prior stroke, BPH, HTN, admitted to the hospital in late Dec 2018 with confusion, acute ischemic stroke. Warfarin had been on hold due to recent gluteal hematoma. He was ultimately sent to TCU. However, he was sent back to the hospital from TCU on 1/4/19 due to AMS.  Found to have E. coli UTI, admitted for sepsis secondary to catheter associated UTI. Urine culture grew Pseudomonas. Discharged back to TCU on 1/8/19 for PT, OT, nursing cares, medical management and monitoring. Subsequently transitioned to LTC status.     Today:  No acute concerns on todays visit. He is in his wheelchair, self propels in the hallways. He is pleasantly confused, cooperative per nursing staff. Checked frequently due to history of self transfers. No recent falls. Takes warfarin for hx of Afib, no BP meds, pulses generally mid 50-60s. He is on levothyroxine for hypothyroidism. Has chronic eye issues for which he sees ophtho. He has [place] palsy affecting right side. He is on multiple eye drops and some ointment. Has SP catheter. Appetite is good. No recent illness. No fever or cough. No open areas of skin.       Past Medical History:  Past Medical History:   Diagnosis Date     Atrial fibrillation (H)     On coumadin     CVA (cerebral vascular accident) (H)      Hypertension      Pacemaker      Urinary retention        Medications:  Current Outpatient Medications   Medication Sig     acetaminophen (TYLENOL) 325 MG tablet Take 2 tablets (650 mg total) by mouth every 6 (six) hours as needed for pain.     acetaminophen (TYLENOL) 325 MG tablet Take 650 mg by mouth 3 (three) times a day. Do not exceed 3000 mg daily      atorvastatin (LIPITOR) 40 MG tablet Take 40 mg by mouth at bedtime.     bisacodyl (DULCOLAX) 10 mg suppository Insert 10 mg into the rectum daily as needed.     dorzolamide-timolol (COSOPT) 22.3-6.8 mg/mL ophthalmic solution 1 drop to both eyes two times a day for macular degeneration and glaucoma     erythromycin base (ERYTHROMYCIN OPHT) Apply 5 mg to eye. Instill 1 ribbon in left eye at HS and right eye four times a day     latanoprost (XALATAN) 0.005 % ophthalmic solution 1 drop both eyes at bedtime for macular degeneration/glaucoma     levothyroxine (SYNTHROID, LEVOTHROID) 25 MCG tablet Take 1 tablet (25 mcg total) by mouth Daily at 6:00 am. Hypothyroid     petrolat,wht/min oil/sod chl (ARTIFICIAL TEARS OINTMENT OPHT) Apply 2 drops to eye every 2 (two) hours as needed. Right eye q 2 hours PRN     polyethylene glycol (MIRALAX) 17 gram packet Take 17 g by mouth daily as needed.     polyvinyl alcohol (LIQUIFILM TEARS) 1.4 % ophthalmic solution Administer 1 drop to the right eye every 2 (two) hours as needed for dry eyes.     propylene glycol (SYSTANE COMPLETE OPHT) Apply 1 drop to eye 2 (two) times a day. Both eyes     UNABLE TO FIND Med Name:docu liquis- 5 gtts each ear as needed prior to irrigation     warfarin sodium (WARFARIN ORAL) Take 5-5.5 mg by mouth. 6/15/20 INR 2.29 Cont 5.5mg M & W, 5mg AOD. Next INR 7/13.  5/18/20 INR 2.37 Cont 5.5 M & W, 5mg AOD. Next INR 6/15.  4/22/20 INR 2.04  Cont 5.5mg Mon and Wed and 5mg AOD.  Next INR 5/18/20.    3/10/20 INR 2.41 Cont 5.5 M & W, 5mg AOD. Next INR 3/18.       Physical Exam:   Note: COVID-19 pandemic precautions in place. Physical exam performed with social distancing considerations.  General: Patient is alert, elderly, [place] male, no distress.   Vitals: /83, Temp 98.5, Pulse 63, RR 16, O2 sat 98%RA.  HEENT: Right facial droop secondary to [place]. Right eye with ointment. Nares negative. Oropharynx moist.   Neck: No JVD.  Lungs: Non labored respirations.    Abdomen: Soft  : SP catheter.  Extremities: Mild LE edema is noted.  Musculoskeletal: Age related degen changes.       Labs:  Lab Results   Component Value Date    WBC 12.6 (H) 10/08/2019    HGB 12.7 (L) 10/08/2019    HCT 39.0 (L) 10/08/2019    MCV 86 10/08/2019     10/08/2019     Results for orders placed or performed in visit on 10/11/19   Basic Metabolic Panel   Result Value Ref Range    Sodium 134 (L) 136 - 145 mmol/L    Potassium 4.4 3.5 - 5.0 mmol/L    Chloride 103 98 - 107 mmol/L    CO2 24 22 - 31 mmol/L    Anion Gap, Calculation 7 5 - 18 mmol/L    Glucose 86 70 - 125 mg/dL    Calcium 8.6 8.5 - 10.5 mg/dL    BUN 10 8 - 28 mg/dL    Creatinine 0.82 0.70 - 1.30 mg/dL    GFR MDRD Af Amer >60 >60 mL/min/1.73m2    GFR MDRD Non Af Amer >60 >60 mL/min/1.73m2       Lab Results   Component Value Date    TSH 3.10 04/22/2020         Assessment/Plan:  1. Debilitation. Advanced age, multiple co morbidities.   2. Afib. Anticoagulated with warfarin. Adequate rate control. Monitor and adjust per INR.  3. Urinary retention. Has SP catheter. No issues.  4. Hx of stroke. Ischemic in nature. On warfarin.   5. HTN. Not on BP meds, previously had lisinopril. BPs acceptable, continue to monitor per protocol.  6. Hypothyroidism. Continue replacement. Last TSH within range.   7. [place] palsy, right.          Electronically signed by: Nancy Fair MD

## 2021-06-09 NOTE — PROGRESS NOTES
Heart Care Device Change-Out Checklist (SHERON Checklist)    Device Data  Pacemaker, single    : [employer] (SJM)  Model:  5626 [place] XL SR  Serial Number:  [id number]  Implant location: Left chest    Implant Date: 4/20/2009  SHERON Date:  Between 5/28/2020-7/2/2020  Device Diagnosis:  AF/SSS    Device Alert(s):  Yes  Description:  When this device is exposed to electrocautery (as well as the PEAK PlasmaBlade blade), they may exhibit a temporary change in function that could persist for 30 seconds or longer after the electrocautery exposure has been terminated. The most clinically significant observation has been loss of capture due to a transient reduction in the pacing output voltage. Placing a magnet over the device or programming to an asynchronous pacing mode will not prevent this temporary reduction in pacing output.    Lead Data  (Print Device History and attach to this document. Enter each lead  and model number.)  Last Interrogation Date: 11/22/19      Atrium: n/a         Right Ventricle: SJM 5626   Lead Imp:  409Ohms, Pacing Threshold:  0.75V @ 0.4ms and Sensing Threshold:  >12mV       Old Leads Present/Abandoned: No and See scanned Device Summary Data sheets for Model & Serial Number    Lead Alert(s):  No    Diagnostic Information  Intrinsic Rhythm:  AF, v-rate 40-60bpm    Atrial Fibrillation:  Chronic Atrial-Fib  Takes Anticoagulant? Yes  Description:  warfarin    Pacing Percentages  Ventricle Pacing 81%  Pacemaker Dependent? No      Ejection Fraction  Last EF Date:  12/1/2017    By Echocardiogram  Last EF Measurement:  60%      Special Instructions/Timeframe for change-out:  Within two months because we do not know the exact date of SHERON.    Routed to EP:  Yes: Dr. Byers Villa  [facility] staff asks that you call his daughter Homa to schedule the change out. Her number is [phone]     Device RN:   Frances Chiang RN BSN  [facility]

## 2021-06-10 NOTE — TELEPHONE ENCOUNTER
[facility] Nurse Triage Anticoagulation Note      Provider: Nancy Fair MD  Facility: [facility]    Facility Type: LT    Caller: Zakiya  Call Back Number:  [phone]    Reason for call: INR    Today s INR: 3.24  Previous INR: 7/13 2.45 5.5mg M, W and 5mg AOD.     Diagnosis/Goal: AFIB  Heparin/Lovenox: No   Currently on ABX: No  Other interacting Medications: None  Missed or refused doses: No    Pt is also c/o 7/8-10 mid low back pain, and aches all over. Vitals: BP:  100/73  P:: 71  R:: 18  SPO2: 99% R/A Temp.:  98.2   Pt has a s/p cath with clear yellow urine.   Decreased appetite, tired and lethargic. Last BM was this am. No abd pain or distension        Verbal Order/Direction given by Provider: Hold warfarin today, then resume 5.5mg M, W and 5mg AOD. Next INR 8/17  Change SP cath and collect a UA cond     Provider giving order: Nancy Fair MD    Verbal order given to: Zakiya Rosario, RN

## 2021-06-10 NOTE — TELEPHONE ENCOUNTER
[facility] Nurse Triage Telephone Note      Provider: YECENIA Olivera  Facility: [facility] Type: LT    Caller: Sukhwinder  Call Back Number:  [phone]    Allergies: Patient has no known allergies.    Reason for call: Nurse calling to report Heme 1, BMP, and Mg levels.  Patient recently started Cipro 250mg two times a day x 7 days with his first dose being yesterday evening.  Family was concerned about increased confusion and decline, however today he was sleepy this morning but perked up by the afternoon.  VS this afternoon:  T=98.7, P=55, R=16, MB=987/80, O2=95%RA.  Patient is also on Warfarin.  Last INR on 8/10 was 3.24 and was given orders to hold Warfarin on 8/10 then resume 5.5mg Mondays and Wednesdays and 5mg all other days with his next INR due on 8/17/20.  Of note, patient is DNR.  No signs of bleeding noted.       Verbal Order/Direction given by Provider: Update family on lab results.  We recommend the patient be sent to the ER for possible transfusion and work up, but if the family has other wishes, update MD tomorrow.      Provider giving order: Nancy Fair MD    Verbal order given to: Sukhwinder Ramirez RN

## 2021-06-10 NOTE — PROGRESS NOTES
[facility]    Facility:   [facility] [[id number]]   Code Status: DNR      CHIEF COMPLAINT/REASON FOR VISIT:  Chief Complaint   Patient presents with     FVP Care Coordination - Regulatory       HISTORY:      HPI: Riley is a 89 y.o. male  now residing in the LTC at [facility].  He is  with history of A. fib on warfarin develop acute CVA from holding warfarin for right gluteal hematoma.  Currently back on Coumadin, Hypertension , urinary retention has a suprapubic catheter and with a pacemaker       Today he being seen  for routine regulatory visit and follow up hgb and INR. He now on the TCU recovering from a recent hospitalization in which he developed a GI bleed. He was hospitalized from 8/13-8/19/20. He was found to have a duodenal ulcer.He was slowly restarted on his coumadin and being bridged with lovenox until he reaches 2..0.  INR subtherapeutic today at 1.65, adjustments made and INR check on 8/24/20.    He denied CP or shortness of breath,  He is able to move all extremities.  He is eating well and reports no difficulties with swallowing.    He denied   constipation.   His suprapubic is intact and draining clear yellow urine.  TSH stable at 3.10 and done on 4/22/20.  His weights were reviewed and he is up 7 pounds  In the last 2 weeks, LS clear and no shortness of breath>     Past Medical History:   Diagnosis Date     Atrial fibrillation (H)     On coumadin     Bell's palsy     right sided facial droop     CVA (cerebral vascular accident) (H)      Hypertension      Pacemaker      Urinary retention              Family History   Problem Relation Age of Onset     COPD Father      Social History     Socioeconomic History     Marital status:      Spouse name: Not on file     Number of children: Not on file     Years of education: Not on file     Highest education level: Not on file   Occupational History     Not on file   Social Needs      Financial resource strain: Not on file     Food insecurity     Worry: Not on file     Inability: Not on file     Transportation needs     Medical: Not on file     Non-medical: Not on file   Tobacco Use     Smoking status: Former Smoker     Smokeless tobacco: Never Used   Substance and Sexual Activity     Alcohol use: No     Drug use: No     Sexual activity: Not on file   Lifestyle     Physical activity     Days per week: Not on file     Minutes per session: Not on file     Stress: Not on file   Relationships     Social connections     Talks on phone: Not on file     Gets together: Not on file     Attends Jainism service: Not on file     Active member of club or organization: Not on file     Attends meetings of clubs or organizations: Not on file     Relationship status: Not on file     Intimate partner violence     Fear of current or ex partner: Not on file     Emotionally abused: Not on file     Physically abused: Not on file     Forced sexual activity: Not on file   Other Topics Concern     Not on file   Social History Narrative    03/07/15 - The patient lives alone in his own home.         Review of Systems  Eyes: negative for pain, discharge, redness He is followed  closely by opthalmology.  He has had multiple procedures done on his eye right  Constitutional: Negative for activity change and fatigue. Negative for appetite change, chills and fever.   HENT: Negative for congestion and sore throat.    Eyes: positive  for visual disturbance. right sided facial paralysis   Respiratory: Negative for cough, shortness of breath and wheezing.    Cardiovascular: Negative for chest pain and leg swelling.        Pacemaker   Gastrointestinal: Negative for abdominal distention, abdominal pain, constipation, diarrhea and nausea.   Genitourinary: Negative for dysuria.   Musculoskeletal: Negative for arthralgias and myalgias.   Skin: Negative for color change, rash and wound.   Neurological: Negative for dizziness, weakness  and numbness.   Psychiatric/Behavioral: Negative for agitation, behavioral problems and sleep disturbance.   Vitals:    08/21/20 0918   BP: (!) 130/92   Pulse: 81   Resp: 16   Temp: 98.9  F (37.2  C)   SpO2: 98%   Weight: 195 lb 3.2 oz (88.5 kg)       Constitutional: He appears well-developed and well-nourished.   Pleasant gentleman   HENT:   Head: Normocephalic and atraumatic.   Eyes: Conjunctivae are normal. Pupils are equal, round, and reactive to light. poor vision with the right worse than the left right sided facial paralysis   Neck: Normal range of motion. Neck supple.   Cardiovascular: Normal rate, regular rhythm and normal heart sounds.   No murmur heard.  Pulmonary/Chest: Effort normal and breath sounds normal. He has no wheezes. He has no rales.   Abdominal: Soft. Bowel sounds are normal. He exhibits no distension. There is no tenderness.   Genitourinary: suprapubic catheter  Musculoskeletal: Normal range of motion. He exhibits no edema.   Neurological: He is alert.   Skin: Skin is warm and dry.   Psychiatric: He has a normal mood and affect. His behavior is normal.     LABS:   Recent Results (from the past 240 hour(s))   Urinalysis-UC if Indicated   Result Value Ref Range    Color, UA Yellow Colorless, Yellow, Straw, Light Yellow    Clarity, UA Cloudy (!) Clear    Glucose, UA Negative Negative    Bilirubin, UA Negative Negative    Ketones, UA Negative Negative    Specific Gravity, UA 1.018 1.001 - 1.030    Blood, UA Trace (!) Negative    pH, UA 6.0 4.5 - 8.0    Protein, UA 30 mg/dL (!) Negative mg/dL    Urobilinogen, UA <2.0 E.U./dL <2.0 E.U./dL, 2.0 E.U./dL    Nitrite, UA Negative Negative    Leukocytes, UA Large (!) Negative    Bacteria, UA Moderate (!) None Seen hpf    RBC, UA 3-5 (!) None Seen, 0-2 hpf    WBC, UA >100 (!) None Seen, 0-5 hpf    Squam Epithel, UA 0-5 None Seen, 0-5 lpf    WBC Clumps Present (!) None Seen    Mucus, UA Few (!) None Seen lpf   Culture, Urine    Specimen: Urine   Result  Value Ref Range    Culture Mixture of urogenital organisms with     Culture 50,000-100,000 col/ml Staphylococcus aureus (!)        Susceptibility    Staphylococcus aureus - RACHAEL     Cefazolin <=2 Sensitive      Doxycycline <=0.5 Sensitive      Nitrofurantoin <=16 Sensitive      Levofloxacin <=0.5 Sensitive      Oxacillin 0.5 Sensitive      Trimethoprim + Sulfamethoxazole <=1/19 Sensitive      Vancomycin 1 Sensitive    Basic Metabolic Panel   Result Value Ref Range    Sodium 129 (L) 136 - 145 mmol/L    Potassium 3.9 3.5 - 5.0 mmol/L    Chloride 99 98 - 107 mmol/L    CO2 21 (L) 22 - 31 mmol/L    Anion Gap, Calculation 9 5 - 18 mmol/L    Glucose 126 (H) 70 - 125 mg/dL    Calcium 8.0 (L) 8.5 - 10.5 mg/dL    BUN 19 8 - 28 mg/dL    Creatinine 0.77 0.70 - 1.30 mg/dL    GFR MDRD Af Amer >60 >60 mL/min/1.73m2    GFR MDRD Non Af Amer >60 >60 mL/min/1.73m2   Magnesium   Result Value Ref Range    Magnesium 2.0 1.8 - 2.6 mg/dL   HM1 (CBC with Diff)   Result Value Ref Range    WBC 7.4 4.0 - 11.0 thou/uL    RBC 2.08 (L) 4.40 - 6.20 mill/uL    Hemoglobin 6.2 (LL) 14.0 - 18.0 g/dL    Hematocrit 19.3 (L) 40.0 - 54.0 %    MCV 93 80 - 100 fL    MCH 29.8 27.0 - 34.0 pg    MCHC 32.1 32.0 - 36.0 g/dL    RDW 14.3 11.0 - 14.5 %    Platelets 209 140 - 440 thou/uL    MPV 10.5 8.5 - 12.5 fL    Neutrophils % 63 50 - 70 %    Lymphocytes % 20 20 - 40 %    Monocytes % 15 (H) 2 - 10 %    Eosinophils % 1 0 - 6 %    Basophils % 0 0 - 2 %    Neutrophils Absolute 4.7 2.0 - 7.7 thou/uL    Lymphocytes Absolute 1.5 0.8 - 4.4 thou/uL    Monocytes Absolute 1.1 (H) 0.0 - 0.9 thou/uL    Eosinophils Absolute 0.1 0.0 - 0.4 thou/uL    Basophils Absolute 0.0 0.0 - 0.2 thou/uL   Basic Metabolic Panel   Result Value Ref Range    Sodium 128 (L) 136 - 145 mmol/L    Potassium 3.7 3.5 - 5.0 mmol/L    Chloride 98 98 - 107 mmol/L    CO2 19 (L) 22 - 31 mmol/L    Anion Gap, Calculation 11 5 - 18 mmol/L    Glucose 145 (H) 70 - 125 mg/dL    Calcium 7.8 (L) 8.5 - 10.5 mg/dL     BUN 18 8 - 28 mg/dL    Creatinine 0.77 0.70 - 1.30 mg/dL    GFR MDRD Af Amer >60 >60 mL/min/1.73m2    GFR MDRD Non Af Amer >60 >60 mL/min/1.73m2   INR   Result Value Ref Range    INR 3.59 (H) 0.90 - 1.10   APTT   Result Value Ref Range    PTT 43 (H) 24 - 37 seconds   Troponin I   Result Value Ref Range    Troponin I 0.06 0.00 - 0.29 ng/mL   HM1 (CBC with Diff)   Result Value Ref Range    WBC 7.1 4.0 - 11.0 thou/uL    RBC 1.99 (L) 4.40 - 6.20 mill/uL    Hemoglobin 5.9 (LL) 14.0 - 18.0 g/dL    Hematocrit 18.1 (L) 40.0 - 54.0 %    MCV 91 80 - 100 fL    MCH 29.6 27.0 - 34.0 pg    MCHC 32.6 32.0 - 36.0 g/dL    RDW 14.2 11.0 - 14.5 %    Platelets 196 140 - 440 thou/uL    MPV 10.0 8.5 - 12.5 fL    Neutrophils % 60 50 - 70 %    Lymphocytes % 23 20 - 40 %    Monocytes % 15 (H) 2 - 10 %    Eosinophils % 1 0 - 6 %    Basophils % 0 0 - 2 %    Neutrophils Absolute 4.2 2.0 - 7.7 thou/uL    Lymphocytes Absolute 1.6 0.8 - 4.4 thou/uL    Monocytes Absolute 1.1 (H) 0.0 - 0.9 thou/uL    Eosinophils Absolute 0.1 0.0 - 0.4 thou/uL    Basophils Absolute 0.0 0.0 - 0.2 thou/uL   Type and screen   Result Value Ref Range    ABORh A POS     Antibody Screen Negative Negative   ECG 12 lead nursing unit performed   Result Value Ref Range    SYSTOLIC BLOOD PRESSURE      DIASTOLIC BLOOD PRESSURE      VENTRICULAR RATE 62 BPM    ATRIAL RATE 60 BPM    P-R INTERVAL      QRS DURATION 98 ms    Q-T INTERVAL 440 ms    QTC CALCULATION (BEZET) 446 ms    P Axis      R AXIS 46 degrees    T AXIS 237 degrees    MUSE DIAGNOSIS       Demand pacemaker; interpretation is based on intrinsic rhythm  Atrial fibrillation with premature ventricular or aberrantly conducted complexes  Nonspecific ST and T wave abnormality , probably digitalis effect  Abnormal ECG  When compared with ECG of 20-DEC-2018 02:40,  Electronic demand pacing is now Present  Nonspecific T wave abnormality, worse in Inferior leads  T wave inversion no longer evident in Anterior leads  Confirmed by  SEE ED PROVIDER NOTE FOR, ECG INTERPRETATION ([id number]),  OSEAS RODRIGUEZ ([id number]) on 8/14/2020 12:51:47 AM     Folate, Serum   Result Value Ref Range    Folate 11.3 >=3.5 ng/mL   Urinalysis-UC if Indicated   Result Value Ref Range    Color, UA Yellow Colorless, Yellow, Straw, Light Yellow    Clarity, UA Cloudy (!) Clear    Glucose, UA Negative Negative    Bilirubin, UA Negative Negative    Ketones, UA Negative Negative, 60 mg/dL    Specific Gravity, UA 1.008 1.001 - 1.030    Blood, UA Trace (!) Negative    pH, UA 5.5 4.5 - 8.0    Protein, UA Negative Negative mg/dL    Urobilinogen, UA <2.0 E.U./dL <2.0 E.U./dL, 2.0 E.U./dL    Nitrite, UA Negative Negative    Leukocytes, UA Large (!) Negative    Bacteria, UA Few (!) None Seen hpf    RBC, UA 0-2 None Seen, 0-2 hpf    WBC, UA 25-50 (!) None Seen, 0-5 hpf    Squam Epithel, UA 0-5 None Seen, 0-5 lpf    Trans Epithel, UA 0-5 (!) None Seen lpf   Culture, Urine    Specimen: Urine, Catheter   Result Value Ref Range    Culture No Growth    COVID-19 Virus PCR MRF    Specimen: Respiratory   Result Value Ref Range    COVID-19 VIRUS SPECIMEN SOURCE Nasopharyngeal     2019-nCOV Not Detected    Troponin I   Result Value Ref Range    Troponin I 0.07 0.00 - 0.29 ng/mL   Hemoglobin   Result Value Ref Range    Hemoglobin 6.7 (LL) 14.0 - 18.0 g/dL   Basic Metabolic Panel   Result Value Ref Range    Sodium 135 (L) 136 - 145 mmol/L    Potassium 3.5 3.5 - 5.0 mmol/L    Chloride 104 98 - 107 mmol/L    CO2 23 22 - 31 mmol/L    Anion Gap, Calculation 8 5 - 18 mmol/L    Glucose 116 70 - 125 mg/dL    Calcium 7.7 (L) 8.5 - 10.5 mg/dL    BUN 12 8 - 28 mg/dL    Creatinine 0.72 0.70 - 1.30 mg/dL    GFR MDRD Af Amer >60 >60 mL/min/1.73m2    GFR MDRD Non Af Amer >60 >60 mL/min/1.73m2   HM2(CBC w/o Differential)   Result Value Ref Range    WBC 6.1 4.0 - 11.0 thou/uL    RBC 2.36 (L) 4.40 - 6.20 mill/uL    Hemoglobin 6.9 (LL) 14.0 - 18.0 g/dL    Hematocrit 20.7 (L) 40.0 - 54.0 %    MCV 88 80 - 100 fL     MCH 29.2 27.0 - 34.0 pg    MCHC 33.3 32.0 - 36.0 g/dL    RDW 14.6 (H) 11.0 - 14.5 %    Platelets 170 140 - 440 thou/uL    MPV 10.3 8.5 - 12.5 fL   Hepatic Profile   Result Value Ref Range    Bilirubin, Total 0.6 0.0 - 1.0 mg/dL    Bilirubin, Direct 0.3 <=0.5 mg/dL    Protein, Total 5.4 (L) 6.0 - 8.0 g/dL    Albumin 3.0 (L) 3.5 - 5.0 g/dL    Alkaline Phosphatase 48 45 - 120 U/L    AST 19 0 - 40 U/L    ALT 13 0 - 45 U/L   Protime-INR   Result Value Ref Range    INR 1.83 (H) 0.90 - 1.10   Troponin I   Result Value Ref Range    Troponin I 0.08 0.00 - 0.29 ng/mL   Morphology, Path Smear Review (MORP)   Result Value Ref Range    Pathology, Smear Review See Separate Pathology Report (!) (none)    WBC 6.1 4.0 - 11.0 thou/uL    RBC 2.36 (L) 4.40 - 6.20 mill/uL    Hemoglobin 6.9 (LL) 14.0 - 18.0 g/dL    Hematocrit 20.7 (L) 40.0 - 54.0 %    MCV 88 80 - 100 fL    MCH 29.2 27.0 - 34.0 pg    MCHC 33.3 32.0 - 36.0 g/dL    RDW 14.6 (H) 11.0 - 14.5 %    Platelets 170 140 - 440 thou/uL    MPV 10.3 8.5 - 12.5 fL    Neutrophils % 64 50 - 70 %    Lymphocytes % 19 (L) 20 - 40 %    Monocytes % 15 (H) 2 - 10 %    Eosinophils % 2 0 - 6 %    Basophils % 0 0 - 2 %    Neutrophils Absolute 3.9 2.0 - 7.7 thou/uL    Lymphocytes Absolute 1.1 0.8 - 4.4 thou/uL    Monocytes Absolute 0.9 0.0 - 0.9 thou/uL    Eosinophils Absolute 0.1 0.0 - 0.4 thou/uL    Basophils Absolute 0.0 0.0 - 0.2 thou/uL   Haptoglobin   Result Value Ref Range    Haptoglobin 154 33 - 171 mg/dL   Iron and Transferrin Iron Binding Capacity   Result Value Ref Range    Iron 9 (L) 42 - 175 ug/dL    Transferrin 276 212 - 360 mg/dL    Transferrin Saturation, Calculated 3 (L) 20 - 50 %    Transferrin IBC, Calculated 345 313 - 563 ug/dL   Reticulocytes   Result Value Ref Range    Retic Absolute Count 0.103 0.010 - 0.110 mill/uL    Retic Ct Pct 4.47 (H) 0.8 - 2.7 %   Peripheral Blood Smear, Path Review   Result Value Ref Range    Case Report       Peripheral Blood Morphology                        Case: [id number]                                   Authorizing Provider:  Dalia Elizabeth MD        Collected:           08/14/2020 0724              Ordering Location:     [facility] ICU    Received:            08/14/2020 1305                                     [place]                                                                         Pathologist:           Darren Mann MD                                                        Specimen:    Peripheral Blood                                                                           Final Diagnosis       PERIPHERAL BLOOD:     -  NORMOCHROMIC-NORMOCYTIC ANEMIA     -  OCCASIONAL SCHISTOCYTES ARE PRESENT    -  FEW SPHEROCYTES ARE PRESENT     -  NEGATIVE FOR ACUTE LEUKEMIA    -  UNREMARKABLE PLATELETS    -  PLEASE SEE COMMENT    Comment       Normocytic anemia can be caused by multiple etiologies including intrinsic bone marrow disease, renal disease, hepatic disease, endocrine disease, anemia of chronic disease, post-hemorrhagic anemia, and bone marrow infiltration by tumors. Due to the presence of schistocytes and spherocytes, the differential diagnosis must include hemolytic anemia with intravascular and extravascular components. Recommend assessment of serum haptoglobin, LDH and a direct antiglobulin test. Recommend clinical correlation and follow-up.    Clinical Information Not provided     Charges CPT:  11754    ICD-10:  D64.9    Lactate Dehydrogenase (LDH)   Result Value Ref Range    LD (LDH) 216 125 - 220 U/L   Vitamin B12   Result Value Ref Range    Vitamin B-12 486 213 - 816 pg/mL   Hemoglobin   Result Value Ref Range    Hemoglobin 8.3 (L) 14.0 - 18.0 g/dL   Potassium   Result Value Ref Range    Potassium 3.6 3.5 - 5.0 mmol/L   Hemoglobin   Result Value Ref Range    Hemoglobin 8.1 (L) 14.0 - 18.0 g/dL   Potassium - Next AM   Result Value Ref Range    Potassium 3.7 3.5 - 5.0 mmol/L   INR   Result Value Ref Range    INR 1.27 (H) 0.90 -  1.10   HM2(CBC w/o Differential)   Result Value Ref Range    WBC 7.5 4.0 - 11.0 thou/uL    RBC 2.62 (L) 4.40 - 6.20 mill/uL    Hemoglobin 7.8 (L) 14.0 - 18.0 g/dL    Hematocrit 23.4 (L) 40.0 - 54.0 %    MCV 89 80 - 100 fL    MCH 29.8 27.0 - 34.0 pg    MCHC 33.3 32.0 - 36.0 g/dL    RDW 14.6 (H) 11.0 - 14.5 %    Platelets 196 140 - 440 thou/uL    MPV 9.5 8.5 - 12.5 fL   Creatinine   Result Value Ref Range    Creatinine 0.67 (L) 0.70 - 1.30 mg/dL    GFR MDRD Af Amer >60 >60 mL/min/1.73m2    GFR MDRD Non Af Amer >60 >60 mL/min/1.73m2   Surgical Pathology Exam   Result Value Ref Range    Case Report       Surgical Pathology                                Case: [id number]                                    Authorizing Provider:  Paxton Villalpando MD      Collected:           08/15/2020 1348              Ordering Location:     [facility]     Received:            08/17/2020 0805              Pathologist:           Darren Mann MD                                                        Specimen:    Gastric, Biopsy, R/O H. pylori                                                             Final Diagnosis       STOMACH, BIOPSY:     -   CHRONIC GASTRITIS, MODERATE TO SEVERE     -   NO EVIDENCE OF INTESTINAL METAPLASIA OR NEOPLASIA     -   IMMUNOSTAIN FOR HELICOBACTER IS NEGATIVE    Microscopic Description       Microscopic examination performed, substantiating the above diagnosis. All controls stain appropriately.    Clinical Information       Pre-op Diagnosis:  Gastrointestinal hemorrhage with melena [K92.1]    Gross Description       Received in formalin labeled with the patient's name and gastric biopsy are three, minute to 0.3 cm, irregular, tan soft tissues. TE-1C RJR:sg    Charges CPT: 22842, 24629  ICD-10: K29.51     Result Flag     Hemoglobin   Result Value Ref Range    Hemoglobin 8.4 (L) 14.0 - 18.0 g/dL   Hemoglobin   Result Value Ref Range    Hemoglobin 8.1 (L) 14.0 - 18.0 g/dL   INR   Result Value  Ref Range    INR 1.18 (H) 0.90 - 1.10   HM2(CBC w/o Differential)   Result Value Ref Range    WBC 6.7 4.0 - 11.0 thou/uL    RBC 2.53 (L) 4.40 - 6.20 mill/uL    Hemoglobin 7.4 (L) 14.0 - 18.0 g/dL    Hematocrit 23.1 (L) 40.0 - 54.0 %    MCV 91 80 - 100 fL    MCH 29.2 27.0 - 34.0 pg    MCHC 32.0 32.0 - 36.0 g/dL    RDW 14.7 (H) 11.0 - 14.5 %    Platelets 210 140 - 440 thou/uL    MPV 9.5 8.5 - 12.5 fL   Basic Metabolic Panel   Result Value Ref Range    Sodium 136 136 - 145 mmol/L    Potassium 3.9 3.5 - 5.0 mmol/L    Chloride 107 98 - 107 mmol/L    CO2 24 22 - 31 mmol/L    Anion Gap, Calculation 5 5 - 18 mmol/L    Glucose 99 70 - 125 mg/dL    Calcium 7.4 (L) 8.5 - 10.5 mg/dL    BUN 9 8 - 28 mg/dL    Creatinine 0.70 0.70 - 1.30 mg/dL    GFR MDRD Af Amer >60 >60 mL/min/1.73m2    GFR MDRD Non Af Amer >60 >60 mL/min/1.73m2   Hemoglobin   Result Value Ref Range    Hemoglobin 8.1 (L) 14.0 - 18.0 g/dL   Crossmatch   Result Value Ref Range    Crossmatch Compatible     Unit Type A Pos     Unit Number [id number]     Status Transfused     Component Red Blood Cells     PRODUCT CODE H6749I72     Issue Date and Time [id number]     Blood Type 6200     CODING SYSTEM [id number]    Crossmatch   Result Value Ref Range    Crossmatch Compatible     Unit Type A Pos     Unit Number [id number]     Status Transfused     Component Red Blood Cells     PRODUCT CODE [id number]     Issue Date and Time [id number]     Blood Type 6200     CODING SYSTEM [id number]    Plasma Product Information   Result Value Ref Range    Unit Type A Pos     Unit Number [id number]     Status Transfused     Component FROZEN PLASMA     PRODUCT CODE [id number]     Issue Date and Time [id number]     Blood Type 6200     CODING SYSTEM [id number]    Plasma Product Information   Result Value Ref Range    Unit Type A Pos     Unit Number [id number]     Status Transfused     Component FROZEN PLASMA     PRODUCT CODE K8034Z83     Issue Date and Time [id number]      Blood Type 6200     CODING SYSTEM HAHX825    Crossmatch   Result Value Ref Range    Crossmatch Compatible     Unit Type A Pos     Unit Number [id number]     Status Transfused     Component Red Blood Cells     PRODUCT CODE I5013T09     Issue Date and Time [id number]     Blood Type 6200     CODING SYSTEM [id number]    INR   Result Value Ref Range    INR 1.32 (H) 0.90 - 1.10   Potassium - Next AM   Result Value Ref Range    Potassium 4.1 3.5 - 5.0 mmol/L   Hemoglobin   Result Value Ref Range    Hemoglobin 7.8 (L) 14.0 - 18.0 g/dL   COVID-19 Virus PCR MRF    Specimen: Respiratory   Result Value Ref Range    COVID-19 VIRUS SPECIMEN SOURCE Nasopharyngeal     2019-nCOV       Test received-See reflex to IDDL test SARS CoV2 (COVID-19) Virus RT-PCR   SARS-CoV-2 (COVID-19) RT-PCR-IDDL    Specimen: Respiratory   Result Value Ref Range    SARS-CoV-2 Virus Specimen Source Nasopharyngeal     SARS-CoV-2 PCR Result NEGATIVE     SARS-COV-2 PCR COMMENT       Testing was performed using the [employer] COVID-19 Direct Assay on the [employer]   INR   Result Value Ref Range    INR 1.39 (H) 0.90 - 1.10   Hemoglobin   Result Value Ref Range    Hemoglobin 7.8 (L) 14.0 - 18.0 g/dL   Basic Metabolic Panel   Result Value Ref Range    Sodium 137 136 - 145 mmol/L    Potassium 3.8 3.5 - 5.0 mmol/L    Chloride 106 98 - 107 mmol/L    CO2 26 22 - 31 mmol/L    Anion Gap, Calculation 5 5 - 18 mmol/L    Glucose 102 70 - 125 mg/dL    Calcium 7.9 (L) 8.5 - 10.5 mg/dL    BUN 10 8 - 28 mg/dL    Creatinine 0.70 0.70 - 1.30 mg/dL    GFR MDRD Af Amer >60 >60 mL/min/1.73m2    GFR MDRD Non Af Amer >60 >60 mL/min/1.73m2   Magnesium   Result Value Ref Range    Magnesium 2.0 1.8 - 2.6 mg/dL   INR   Result Value Ref Range    INR 1.67 (H) 0.90 - 1.10   Hemoglobin   Result Value Ref Range    Hemoglobin 8.2 (L) 14.0 - 18.0 g/dL   Basic Metabolic Panel   Result Value Ref Range    Sodium 138 136 - 145 mmol/L    Potassium 4.3 3.5 - 5.0 mmol/L    Chloride 107 98 - 107  mmol/L    CO2 26 22 - 31 mmol/L    Anion Gap, Calculation 5 5 - 18 mmol/L    Glucose 101 70 - 125 mg/dL    Calcium 8.0 (L) 8.5 - 10.5 mg/dL    BUN 9 8 - 28 mg/dL    Creatinine 0.74 0.70 - 1.30 mg/dL    GFR MDRD Af Amer >60 >60 mL/min/1.73m2    GFR MDRD Non Af Amer >60 >60 mL/min/1.73m2   INR   Result Value Ref Range    INR 1.80 (H) 0.90 - 1.10   Hemoglobin   Result Value Ref Range    Hemoglobin 7.9 (L) 14.0 - 18.0 g/dL   INR   Result Value Ref Range    INR 1.65 (H) 0.90 - 1.10        Current Outpatient Medications   Medication Sig     acetaminophen (TYLENOL) 325 MG tablet Take 2 tablets (650 mg total) by mouth every 6 (six) hours as needed for pain.     atorvastatin (LIPITOR) 40 MG tablet Take 1 tablet (40 mg total) by mouth at bedtime. For hx of cva     bisacodyL (DULCOLAX) 10 mg suppository Insert 10 mg into the rectum daily as needed.     docusate sodium (COLACE) 100 MG capsule Take 1 capsule (100 mg total) by mouth 2 (two) times a day. For constipation     dorzolamide-timoloL (COSOPT) 22.3-6.8 mg/mL ophthalmic solution Administer 1 drop into the left eye 2 (two) times a day. glaucoma     enoxaparin ANTICOAGULANT (LOVENOX) 80 mg/0.8 mL syringe Inject 0.8 mL (80 mg total) under the skin 2 (two) times a day for 8 doses. MUST HAVE INR CHECKED DAILY AND WHEN INR 2.0 OR HIGHER--STOP LOVENOX, HX OF AFIB, CVA GETTING BACK ON WARFARIN     erythromycin base (ERYTHROMYCIN OPHT) Apply 5 mg to eye. Instill 1 ribbon in left eye at HS and right eye four times a day     latanoprost (XALATAN) 0.005 % ophthalmic solution Administer 1 drop into the left eye at bedtime. glaucoma     levothyroxine (SYNTHROID, LEVOTHROID) 25 MCG tablet Take 1 tablet (25 mcg total) by mouth Daily at 6:00 am. Hypothyroid     omeprazole (PRILOSEC) 20 MG capsule Take 1 capsule (20 mg total) by mouth 2 (two) times a day before meals. Needs two times a day for 2 months, then daily for ulcer     polyethylene glycol (MIRALAX) 17 gram packet Take 1 packet (17  g total) by mouth daily as needed. For constipation     propylene glycol (SYSTANE COMPLETE) 0.6 % Drop Apply 1 drop to eye 2 (two) times a day. Both eyes for dry eyes     warfarin ANTICOAGULANT (COUMADIN/JANTOVEN) 5 MG tablet 5 mg po daily and call MD to dose daily.for hx of CVA (Patient taking differently: 5-5.5 mg. Next INR 8/24/20   call MD to dose daily.for hx of CVA)     Case Management:  I have reviewed the facility/SNF care plan/MDS which was done 8/19/20. TSH pending (3.10 0n 4/22/20)  including the falls risk, nutrition and pain screening. I also reviewed the current immunizations, and preventive care.. Future cancer screening is not clinically indicated secondary to age/goals of care.   Patient's desire to return to the community is not assessible due to cognitive impairment.    Information reviewed:  Medications, vital signs, orders, and nursing notes.    ASSESSMENT:      ICD-10-CM    1. Anemia due to blood loss, acute  D62    2. Gastrointestinal hemorrhage with melena  K92.1    3. Hypothyroidism, unspecified type  E03.9        PLAN:      Right eye trauma - on eyedrops  he no longer has pain and being followed closely by opthalmology.    HX Right sided [place] Palsy . Pt currently on eye lubricating drops multiple times a day.       Suprapubic catheter- cleanse daily, monitor for infection around  the site.  Cares per protocol     Atrial fibrillation on coumadin and lisinopril, currently being bridged with lovenox.      Anticoagulation management- monitor and adjust per INRS      Poor vision- is followed closely by ophthalmology.  on multiple eye gtts.      Hypothyroidism- on Synthroid, TSH 4/22/20 was 3.10    GI bleed- No active bleeding , received multiple blood  transfusions in the hospital HGB 8/21/20 was 7.9 monitor labs.     UTI- completed antibiotics.     Electronically signed by: Polly Marroquin CNP

## 2021-06-10 NOTE — TELEPHONE ENCOUNTER
[facility] Nurse Triage Anticoagulation Note      Provider: YECENIA Olivera  Facility: [facility]    Facility Type: [facility]    Caller: Margo  Call Back Number:  [phone]    Reason for call: INR    Today s INR: 1.53  Previous INR: 8/24 1.67(5mg Mon and Thurs and 5.5mg AOD), 8/21 1.65(5.5mg on 8/21 and 8/22 and 5mg on 8/23---Lovenox ended on 8/23).  Prior to hospitalization Warfarin dose:  5.5mg Mon and Wed and 5mg AOD.      Diagnosis/Goal: AFIB  Heparin/Lovenox: No   Currently on ABX: No  Other interacting Medications: None  Missed or refused doses: No    Nurse also reporting Hgb results.  Patient is not currently receiving an iron supplement nor is having dark stools.      Verbal Order/Direction given by Provider: Warfarin 5.5mg daily.  Next INR 8/31/20.  Start ferrous sulfate 325mg daily.  Check Hgb on 8/31/20.      Provider giving order: YECENIA Olivera    Verbal order given to: Margo Ramirez RN

## 2021-06-10 NOTE — TELEPHONE ENCOUNTER
[facility] Nurse Triage Telephone Note      Provider: YECENIA Olivera  Facility: [facility] Type: LTC    Caller: Zakiya  Call Back Number:  [phone]    Allergies: Patient has no known allergies.    Reason for call: Nurse reporting UA/UC results.  VSS today.  Patient continues to not eat much, but is drinking better today.  Staff also performed a COVID test and results are pending.       Verbal Order/Direction given by Provider: No new orders.      Provider giving order: Nancy Fair MD    Verbal order given to: Priscila Ramirez RN

## 2021-06-10 NOTE — PROGRESS NOTES
[facility]      Facility:    [facility] [[id number]]  Code Status: DNR/DNI       Chief Complaint/Reason for Visit:  Chief Complaint   Patient presents with     H & P     Re-admit to LTC-anemia sec to duodenal ulcer.        HPI:   Riley is a 89 y.o. male with hx of Afib on warfarin, prior stroke, BPH, HTN, [place] palsy, hypothyroidism, SP catheter, resides in LTC at [facility]. He was sent to the hospital from nursing home on 8/13/2020. He had labs drawn in NH as he was not doing well with general fatigue, decreased appetite. NO respiratory sx. He started feeling a little better on his own but then labs came back with hgb down to 6.2. He was worked up in the hospital with discharge summary as partially excerpted below.     89 y.o. male with history of stroke, cognitive impairment, visual impairment, Bell's palsy, chronic urine retention status post SP catheter, A. fib presented for evaluation of incidentally noted anemia found to have duodenal ulcer     1.Normocytic anemia  -had Melena  -found to have Duodenal ulcer  - baseline hemoglobin around 10, incidentally found to have hemoglobin of 6.2 at his nursing home, sent in for evaluation.  By the time he got here his hemoglobin had dropped to 5.9  He has received 3 units pRBCs since admission  -Underwent EGD showed duodenal ulcer, had already stopped bleeding  - cautiously restarted anticoagulation again and he seems to be tolerating  -Continue  PPI   -GI did not recommend any specific followup likely given his age. He should continue on oral PPI lifelong  -now on 8/19/20 hgb is 8.2     2.Iron deficiency  - He received 3 units pRBCs which will help replenish his iron stores.      3.Need for anticoagulation  -On warfarin chronically for history of A fib.  -He has history of stroke during a brief period that warfarin was held for bleeding in the past.  -As above presented with anemia unknown source, so INR  was reversed with vitamin K and FFP. He seemed to develop tarry stools earlier in hospital stay shortly after Lovenox was given.  -Had EGD and ulcer was nonbleeding. Ok to restart anticoagulation per GI. We restarted Lovenox--will stop lovenox when inr is 2.0      4.BPH   -CAUTI  -Has SP cath-changed on 8/17, gets change q 4 weeks  -Urine culture from 8/12 growing MSSA  -Had been on Vanc now amoxicillin  -Plan 7 days abx     5.Hx CVA  -Home statin  -Full anticoagulation as above (warfarin bridging with Lovenox)  -PT/OT     6.Hypothyroidism  -Home Synthroid     Overall stabilized and discharged back to LTC facility on 8/19/2020.     Today:  He came back to nursing home on the TCU unit for isolation due to covid precautions. He will be receiving therapies. He is on warfarin, currently bridged with Lovenox. INR today subtherapeutic, will be checked tomorrow along with hgb. He has not had any signs of bleeding. He is more alert and interactive, near baseline. No specific complaints. He has not had fever or cough. SP catheter with clear yellow urine. He will complete course of amoxicillin for catheter associated UTI per hospital notes. He had 8/12/2020 culture with  K Staph aureus though had UC with no growth from 8/13/2020. He is on PPI omeprazole with hospital directions per GI recommendations for two times a day dosing for 2 months then once daily lifetime. He has baseline vision and hearing impairments, on multiple eye drops. He denies dizziness, abdominal pain.       Past Medical History:  Past Medical History:   Diagnosis Date     Atrial fibrillation (H)     On coumadin     Bell's palsy     right sided facial droop     CVA (cerebral vascular accident) (H)      Hypertension      Pacemaker      Urinary retention            Surgical History:  Past Surgical History:   Procedure Laterality Date     IR BLADDER SUPRAPUBIC CATHETER INSERTION  1/18/2019     ME ESOPHAGOGASTRODUODENOSCOPY TRANSORAL DIAGNOSTIC N/A  8/15/2020    Procedure: ESOPHAGOGASTRODUODENOSCOPY (EGD) with biopsy;  Surgeon: Paxton Villalpando MD;  Location: [facility];  Service: Gastroenterology     RI PARTIAL EXCISION THYROID,UNILAT      Description: Thyroid Surgery Sub-Total Thyroidectomy;  Recorded: 03/24/2008;     RI REMV PILONIDAL LESION SIMPLE      Description: Pilonidal Cyst Resection;  Recorded: 03/24/2008;     RI TRANSURETHRAL ELEC-SURG PROSTATECTOM      Description: Transurethral Resection Of Prostate (TURP);  Recorded: 03/24/2008;     TOTAL HIP ARTHROPLASTY Right        Family History:   Family History   Problem Relation Age of Onset     COPD Father        Social History:    Social History     Socioeconomic History     Marital status:      Spouse name: Not on file     Number of children: Not on file     Years of education: Not on file     Highest education level: Not on file   Occupational History     Not on file   Social Needs     Financial resource strain: Not on file     Food insecurity     Worry: Not on file     Inability: Not on file     Transportation needs     Medical: Not on file     Non-medical: Not on file   Tobacco Use     Smoking status: Former Smoker     Smokeless tobacco: Never Used   Substance and Sexual Activity     Alcohol use: No     Drug use: No     Sexual activity: Not on file   Lifestyle     Physical activity     Days per week: Not on file     Minutes per session: Not on file     Stress: Not on file   Relationships     Social connections     Talks on phone: Not on file     Gets together: Not on file     Attends Zoroastrian service: Not on file     Active member of club or organization: Not on file     Attends meetings of clubs or organizations: Not on file     Relationship status: Not on file     Intimate partner violence     Fear of current or ex partner: Not on file     Emotionally abused: Not on file     Physically abused: Not on file     Forced sexual activity: Not on file   Other Topics Concern     Not on file    Social History Narrative    03/07/15 - The patient lives alone in his own home.       Medications:  Current Outpatient Medications   Medication Sig     acetaminophen (TYLENOL) 325 MG tablet Take 2 tablets (650 mg total) by mouth every 6 (six) hours as needed for pain.     atorvastatin (LIPITOR) 40 MG tablet Take 1 tablet (40 mg total) by mouth at bedtime. For hx of cva     bisacodyL (DULCOLAX) 10 mg suppository Insert 10 mg into the rectum daily as needed.     docusate sodium (COLACE) 100 MG capsule Take 1 capsule (100 mg total) by mouth 2 (two) times a day. For constipation     dorzolamide-timoloL (COSOPT) 22.3-6.8 mg/mL ophthalmic solution Administer 1 drop into the left eye 2 (two) times a day. glaucoma     erythromycin base (ERYTHROMYCIN OPHT) Apply 5 mg to eye. Instill 1 ribbon in left eye at HS and right eye four times a day     latanoprost (XALATAN) 0.005 % ophthalmic solution Administer 1 drop into the left eye at bedtime. glaucoma     levothyroxine (SYNTHROID, LEVOTHROID) 25 MCG tablet Take 1 tablet (25 mcg total) by mouth Daily at 6:00 am. Hypothyroid     omeprazole (PRILOSEC) 20 MG capsule Take 1 capsule (20 mg total) by mouth 2 (two) times a day before meals. Needs two times a day for 2 months, then daily for ulcer     polyethylene glycol (MIRALAX) 17 gram packet Take 1 packet (17 g total) by mouth daily as needed. For constipation     propylene glycol (SYSTANE COMPLETE) 0.6 % Drop Apply 1 drop to eye 2 (two) times a day. Both eyes for dry eyes     warfarin ANTICOAGULANT (COUMADIN/JANTOVEN) 5 MG tablet 5 mg po daily and call MD to dose daily.for hx of CVA (Patient taking differently: 5-5.5 mg. Next INR 8/24/20   call MD to dose daily.for hx of CVA)       Allergies:  No Known Allergies       Review of Systems:  Pertinent items are noted in HPI.   Cognitive impairment.      Physical Exam:   Note: COVID-19 pandemic precautions in place. Physical exam performed with social distancing  considerations.  General: Patient is alert, elderly male, no distress.    Vitals: /62, Temp 98.5, Pulse 61, RR 20, O2 sat 98%RA.  HEENT: Head is NCAT. Nares negative. Oropharynx well hydrated. Right facial droop, baseline.  Neck: No JVD.  Lungs: Nonlabored respirations.   Abdomen: Soft, no tenderness on exam. No guarding rebound or rigidity.  : SP cath with leg bag..  Extremities: Trace LE edema is noted.  Musculoskeletal: Age related degen changes.   Skin: No rashes noted.   Psych: Mood appears good.      Labs:  Component      Latest Ref Rng & Units 8/13/2020 8/13/2020 8/14/2020 8/14/2020           3:30 PM  7:36 PM  1:14 AM  7:24 AM   Pathology, Smear Review      (none)       WBC      4.0 - 11.0 thou/uL 7.4 7.1  6.1   RBC      4.40 - 6.20 mill/uL 2.08 (L) 1.99 (L)  2.36 (L)   Hemoglobin      14.0 - 18.0 g/dL 6.2 (LL) 5.9 (LL) 6.7 (LL) 6.9 (LL)   Hematocrit      40.0 - 54.0 % 19.3 (L) 18.1 (L)  20.7 (L)   MCV      80 - 100 fL 93 91  88   MCH      27.0 - 34.0 pg 29.8 29.6  29.2   MCHC      32.0 - 36.0 g/dL 32.1 32.6  33.3   RDW      11.0 - 14.5 % 14.3 14.2  14.6 (H)   Platelets      140 - 440 thou/uL 209 196  170   MPV      8.5 - 12.5 fL 10.5 10.0  10.3   Neutrophils %      50 - 70 % 63 60     Lymphocytes %      20 - 40 % 20 23     Monocytes %      2 - 10 % 15 (H) 15 (H)     Eosinophils %      0 - 6 % 1 1     Basophils %      0 - 2 % 0 0     Neutrophils Absolute      2.0 - 7.7 thou/uL 4.7 4.2     Lymphocytes Absolute      0.8 - 4.4 thou/uL 1.5 1.6     Monocytes Absolute      0.0 - 0.9 thou/uL 1.1 (H) 1.1 (H)     Eosinophils Absolute      0.0 - 0.4 thou/uL 0.1 0.1     Basophils Absolute      0.0 - 0.2 thou/uL 0.0 0.0       Component      Latest Ref Rng & Units 8/14/2020 8/14/2020 8/14/2020           7:24 AM  2:46 PM 11:39 PM   Pathology, Smear Review      (none) See Separate Pathology Report (A)     WBC      4.0 - 11.0 thou/uL 6.1     RBC      4.40 - 6.20 mill/uL 2.36 (L)     Hemoglobin      14.0 - 18.0 g/dL  6.9 (LL) 8.3 (L) 8.1 (L)   Hematocrit      40.0 - 54.0 % 20.7 (L)     MCV      80 - 100 fL 88     MCH      27.0 - 34.0 pg 29.2     MCHC      32.0 - 36.0 g/dL 33.3     RDW      11.0 - 14.5 % 14.6 (H)     Platelets      140 - 440 thou/uL 170     MPV      8.5 - 12.5 fL 10.3     Neutrophils %      50 - 70 % 64     Lymphocytes %      20 - 40 % 19 (L)     Monocytes %      2 - 10 % 15 (H)     Eosinophils %      0 - 6 % 2     Basophils %      0 - 2 % 0     Neutrophils Absolute      2.0 - 7.7 thou/uL 3.9     Lymphocytes Absolute      0.8 - 4.4 thou/uL 1.1     Monocytes Absolute      0.0 - 0.9 thou/uL 0.9     Eosinophils Absolute      0.0 - 0.4 thou/uL 0.1     Basophils Absolute      0.0 - 0.2 thou/uL 0.0       Component      Latest Ref Rng & Units 8/15/2020 8/15/2020 8/15/2020 8/16/2020           7:30 AM  3:40 PM 11:59 PM  7:56 AM   Pathology, Smear Review      (none)       WBC      4.0 - 11.0 thou/uL 7.5   6.7   RBC      4.40 - 6.20 mill/uL 2.62 (L)   2.53 (L)   Hemoglobin      14.0 - 18.0 g/dL 7.8 (L) 8.4 (L) 8.1 (L) 7.4 (L)   Hematocrit      40.0 - 54.0 % 23.4 (L)   23.1 (L)   MCV      80 - 100 fL 89   91   MCH      27.0 - 34.0 pg 29.8   29.2   MCHC      32.0 - 36.0 g/dL 33.3   32.0   RDW      11.0 - 14.5 % 14.6 (H)   14.7 (H)   Platelets      140 - 440 thou/uL 196   210   MPV      8.5 - 12.5 fL 9.5   9.5   Neutrophils %      50 - 70 %       Lymphocytes %      20 - 40 %       Monocytes %      2 - 10 %       Eosinophils %      0 - 6 %       Basophils %      0 - 2 %       Neutrophils Absolute      2.0 - 7.7 thou/uL       Lymphocytes Absolute      0.8 - 4.4 thou/uL       Monocytes Absolute      0.0 - 0.9 thou/uL       Eosinophils Absolute      0.0 - 0.4 thou/uL       Basophils Absolute      0.0 - 0.2 thou/uL         Component      Latest Ref Rng & Units 8/16/2020 8/17/2020 8/18/2020 8/19/2020           7:26 PM      Pathology, Smear Review      (none)       WBC      4.0 - 11.0 thou/uL       RBC      4.40 - 6.20 mill/uL        Hemoglobin      14.0 - 18.0 g/dL 8.1 (L) 7.8 (L) 7.8 (L) 8.2 (L)   Hematocrit      40.0 - 54.0 %       MCV      80 - 100 fL       MCH      27.0 - 34.0 pg       MCHC      32.0 - 36.0 g/dL       RDW      11.0 - 14.5 %       Platelets      140 - 440 thou/uL       MPV      8.5 - 12.5 fL       Neutrophils %      50 - 70 %       Lymphocytes %      20 - 40 %       Monocytes %      2 - 10 %       Eosinophils %      0 - 6 %       Basophils %      0 - 2 %       Neutrophils Absolute      2.0 - 7.7 thou/uL       Lymphocytes Absolute      0.8 - 4.4 thou/uL       Monocytes Absolute      0.0 - 0.9 thou/uL       Eosinophils Absolute      0.0 - 0.4 thou/uL       Basophils Absolute      0.0 - 0.2 thou/uL         Results for orders placed or performed during the hospital encounter of 08/13/20   Basic Metabolic Panel   Result Value Ref Range    Sodium 138 136 - 145 mmol/L    Potassium 4.3 3.5 - 5.0 mmol/L    Chloride 107 98 - 107 mmol/L    CO2 26 22 - 31 mmol/L    Anion Gap, Calculation 5 5 - 18 mmol/L    Glucose 101 70 - 125 mg/dL    Calcium 8.0 (L) 8.5 - 10.5 mg/dL    BUN 9 8 - 28 mg/dL    Creatinine 0.74 0.70 - 1.30 mg/dL    GFR MDRD Af Amer >60 >60 mL/min/1.73m2    GFR MDRD Non Af Amer >60 >60 mL/min/1.73m2     Lab Results   Component Value Date    TSH 3.10 04/22/2020       Assessment/Plan:  1. Anemia. He received transfusion in the hospital. Work up revealed duodenal ulcer, no active bleeding. Hgb pending for tomorrow.   2. Duodenal ulcer. Seen by GI, had EGD on 8/15/2020. No active bleeding so no specific procedural intervention needed. He Is on PPI two times a day for 2 months then once daily indefinitely.  3. Afib. Chronically anticoagulated with warfarin. Currently bridged with Lovenox. INR tomorrow.  4. Hx of stroke. Continue statin, also on warfarin as noted above.  5. Hypothyroidism. On replacement. Last TSH within goal range.  6. SP catheter. Finishing abx amoxicillin for CAUTI. Hx of urinary retention.   7. HTN. Not on  BP meds, had been on lisinopril in the past. BPs satisfactory.  8. [place] Palsy, right.   9. Vision impairment. On multiple drops, follows with ophthalmology.  10. Code status is DNR/DNI.          Electronically signed by: Nancy Fair MD

## 2021-06-10 NOTE — PROGRESS NOTES
"Subjective:    Riley Rodriguez is seen today for follow-up evaluation.  Had been seen 2017 status post fall.  No further concerns.  Using a walker for ambulation.  Did have home services which were very helpful.  Still has some dizziness however baseline.  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  No chest pain.  No shortness of breath.  Continues lisinopril 10 mg daily for hypertension and simvastatin 5 mg at bedtime for lipid management.  Patient is fasting today.  Prior fasting glucose 126 last fall as well as A1c of 6% 2016.     (\"[place]\") in  after 56 years   Lives in Senior Living environment (\"[facility]")   4 daughters (Homa, Elizabeth (she is a nurse), etc)   No smoke (h/o heavy smoker \"3 ppd\" but quit in age 40s)   No EtOH   Surgeries: pilonidal cyst (), left thyroid (), C6-7 laminectomy (10/93), left cataract (), s/p TURP (); pacemaker x    Hospitalizations: -11 ([facility]) for right middle cerebral artery CVA (likely cardioembolic);  - h/o spontaneous hemothorax while on Coumadin for h/o a. fib   Mom -  (unknown reason after \"female operation\" with post-op blood clot)   Dad -  emphysema   1 sis -   Retired -    [place] [facility], visits frequently in the summer     Past Surgical History:   Procedure Laterality Date     PA PARTIAL EXCISION THYROID,UNILAT      Description: Thyroid Surgery Sub-Total Thyroidectomy;  Recorded: 2008;     PA REMV PILONIDAL LESION SIMPLE      Description: Pilonidal Cyst Resection;  Recorded: 2008;     PA TRANSURETHRAL ELEC-SURG PROSTATECTOM      Description: Transurethral Resection Of Prostate (TURP);  Recorded: 2008;     TOTAL HIP ARTHROPLASTY Right         Family History   Problem Relation Age of Onset     COPD Father         Past Medical History:   Diagnosis Date     Atrial fibrillation     On coumadin "     CVA (cerebral vascular accident)      Hypertension      Pacemaker         Social History   Substance Use Topics     Smoking status: Former Smoker     Smokeless tobacco: None     Alcohol use No        Current Outpatient Prescriptions   Medication Sig Dispense Refill     AVODART 0.5 mg capsule TAKE ONE CAPSULE BY MOUTH EVERY DAY 90 capsule 2     BILBERRY ORAL CAPS       colchicine (COLCRYS) 0.6 mg tablet Take 2 tablets by mouth at onset; may repeat every hour.  Max of 8 tablets per day.       dorzolamide-timolol (COSOPT) 2-0.5 % ophthalmic solution Administer 1 drop to both eyes 2 (two) times a day.       latanoprost (XALATAN) 0.005 % ophthalmic solution Administer 1 drop to both eyes bedtime.       lisinopril (PRINIVIL,ZESTRIL) 10 MG tablet TAKE ONE TABLET BY MOUTH EVERY DAY 30 tablet 11     simvastatin (ZOCOR) 5 MG tablet TAKE ONE TABLET BY MOUTH EVERY DAY AT BEDTIME 90 tablet 1     tamsulosin (FLOMAX) 0.4 mg Cp24 TAKE ONE CAPSULE BY MOUTH EVERY DAY 90 capsule 3     VIT C/MARIE AC/LUT/COPPER/ZNOX (PRESERVISION LUTEIN ORAL) Take 2 capsules by mouth daily.       warfarin (COUMADIN) 5 MG tablet Take 10mg (2 tabs) on Fridays, and 7.5mg (1 and 1/2 tabs) on all other days.  OR AS DIRECTED.  Adjust dose based on INR. 140 tablet 1     No current facility-administered medications for this visit.           Objective:    Vitals:    04/20/17 0954   BP: 124/60   Pulse: 80   Weight: 204 lb (92.5 kg)      Body mass index is 30.57 kg/(m^2).    Alert.  No apparent distress.  HEENT exam with moist mucous membranes.  Chest appears clear.  Cardiac exam seems relatively regular however occasional cardiac ectopy with pacemaker in place with history of likely chronic atrial fibrillation.  Extremities warm and dry.  Right greater than left ankle swelling, baseline.  No calf tenderness.      Assessment:    1. Atrial fibrillation, unspecified type  HM2(CBC w/o Differential)   2. Impaired fasting glucose  Glycosylated Hemoglobin A1c     Basic Metabolic Panel   3. Essential hypertension with goal blood pressure less than 140/90  Basic Metabolic Panel   4. Pacemaker           Plan:    Check INR today for chronic A. fib management.  CBC for medication monitoring.  Basic metabolic panel for fasting glucose as well as A1c with history of impaired fasting glucose.  Ensure stable renal function on lisinopril 10 mg daily for hypertension management blood pressure at goal 124/60.  Anticipate recheck in office no later than 6 months.  Continue to use walker to assist with ambulation to decrease risk for falls.

## 2021-06-10 NOTE — TELEPHONE ENCOUNTER
[facility] Nurse Triage Telephone Note      Provider: Nancy Fair MD  Facility: [facility] Type: LTC    Caller: Zakiya  Call Back Number:  [phone]    Allergies: Patient has no known allergies.    Reason for call: Order to update Dr today. Pt is weak, appetite 50%, is drinking well. Has C/O pain L low back. Is on scheduled tylenol. UA/UC done, showed Mixture-non predominant. Nagy changed after urine collected. CoVID pending. Family questioning why labs not ordered until Thurs & wanting Antib for UA/UC .    Verbal Order/Direction given by Provider: No antibiotic ordered as urine specimen collected from nagy bag & showed Mixture of organisms, and pt is already improving. If able, clamp nagy tubing above bag & obtain new UA/UC specimen. Then start Cipro 250mg two times a day X 7 days. Can give Cipro 500mg from [facility] tonight after speciemen obtained. Call with CoVID results & UC culture results.    Provider giving order: Nancy Fair MD    Verbal order given to: Sukhwinder Pham, RN

## 2021-06-10 NOTE — PROGRESS NOTES
[facility]    Facility:   [facility] [[id number]]   Code Status: DNR      CHIEF COMPLAINT/REASON FOR VISIT:  Chief Complaint   Patient presents with     Problem Visit     lab review.       HISTORY:      HPI: Riley is a 89 y.o. male  now residing in the LTC at [facility].  He is  with history of A. fib on warfarin develop acute CVA from holding warfarin for right gluteal hematoma.  Currently back on Coumadin, Hypertension , urinary retention has a suprapubic catheter and with a pacemaker       Today he being seen  for  follow up hgb and INR along with review of weights. . He now on the TCU recovering from a recent hospitalization in which he developed a GI bleed. He was hospitalized from 8/13-8/19/20. He was found to have a duodenal ulcer.He was slowly restarted on his coumadin and and  Lovenox.  His lovenox has been stopped and  He is subtherapeutic today at 1.67. Coumadin adjusted.  INR check on 8/26/20.  His HGB is 7.6. previous HGB 7.9. No active bleeding noted and urine is clear yellow in nagy bag. HGB to be rechecked in 2 days. . .   He is able to move all extremities.  He is eating well and reports no difficulties with swallowing.    He denied   constipation.   His suprapubic is intact and draining clear yellow urine.   His weights were reviewed and he was up 7 pounds in 12 days and weight today he is down 3 pounds.     Past Medical History:   Diagnosis Date     Atrial fibrillation (H)     On coumadin     Bell's palsy     right sided facial droop     CVA (cerebral vascular accident) (H)      Hypertension      Pacemaker      Urinary retention              Family History   Problem Relation Age of Onset     COPD Father      Social History     Socioeconomic History     Marital status:      Spouse name: Not on file     Number of children: Not on file     Years of education: Not on file     Highest education level: Not on file   Occupational  History     Not on file   Social Needs     Financial resource strain: Not on file     Food insecurity     Worry: Not on file     Inability: Not on file     Transportation needs     Medical: Not on file     Non-medical: Not on file   Tobacco Use     Smoking status: Former Smoker     Smokeless tobacco: Never Used   Substance and Sexual Activity     Alcohol use: No     Drug use: No     Sexual activity: Not on file   Lifestyle     Physical activity     Days per week: Not on file     Minutes per session: Not on file     Stress: Not on file   Relationships     Social connections     Talks on phone: Not on file     Gets together: Not on file     Attends Congregation service: Not on file     Active member of club or organization: Not on file     Attends meetings of clubs or organizations: Not on file     Relationship status: Not on file     Intimate partner violence     Fear of current or ex partner: Not on file     Emotionally abused: Not on file     Physically abused: Not on file     Forced sexual activity: Not on file   Other Topics Concern     Not on file   Social History Narrative    03/07/15 - The patient lives alone in his own home.         Review of Systems  Eyes: negative for pain, discharge, redness He is followed  closely by opthalmology.  He has had multiple procedures done on his eye right  Constitutional: Negative for activity change and fatigue. Negative for appetite change, chills and fever.   HENT: Negative for congestion and sore throat.    Eyes: positive  for visual disturbance. right sided facial paralysis   Respiratory: Negative for cough, shortness of breath and wheezing.    Cardiovascular: Negative for chest pain and leg swelling.        Pacemaker   Gastrointestinal: Negative for abdominal distention, abdominal pain, constipation, diarrhea and nausea.   Genitourinary: Negative for dysuria.   Musculoskeletal: Negative for arthralgias and myalgias.   Skin: Negative for color change, rash and wound.    Neurological: Negative for dizziness, weakness and numbness.   Psychiatric/Behavioral: Negative for agitation, behavioral problems and sleep disturbance.   Vitals:    08/24/20 1052   BP: 147/80   Pulse: 60   Resp: 20   Temp: 99.2  F (37.3  C)   SpO2: 97%   Weight: 192 lb 3.2 oz (87.2 kg)       Constitutional: He appears well-developed and well-nourished.   Pleasant gentleman   HENT:   Head: Normocephalic and atraumatic.   Eyes: Conjunctivae are normal. Pupils are equal, round, and reactive to light. poor vision with the right worse than the left right sided facial paralysis   Neck: Normal range of motion. Neck supple.   Cardiovascular: Normal rate, regular rhythm and normal heart sounds.   No murmur heard.  Pulmonary/Chest: Effort normal and breath sounds normal. He has no wheezes. He has no rales.   Abdominal: Soft. Bowel sounds are normal. He exhibits no distension. There is no tenderness.   Genitourinary: suprapubic catheter  Musculoskeletal: Normal range of motion. He exhibits no edema.   Neurological: He is alert.   Skin: Skin is warm and dry.   Psychiatric: He has a normal mood and affect. His behavior is normal.     LABS:   Recent Results (from the past 240 hour(s))   Lactate Dehydrogenase (LDH)   Result Value Ref Range    LD (LDH) 216 125 - 220 U/L   Vitamin B12   Result Value Ref Range    Vitamin B-12 486 213 - 816 pg/mL   Hemoglobin   Result Value Ref Range    Hemoglobin 8.3 (L) 14.0 - 18.0 g/dL   Potassium   Result Value Ref Range    Potassium 3.6 3.5 - 5.0 mmol/L   Hemoglobin   Result Value Ref Range    Hemoglobin 8.1 (L) 14.0 - 18.0 g/dL   Potassium - Next AM   Result Value Ref Range    Potassium 3.7 3.5 - 5.0 mmol/L   INR   Result Value Ref Range    INR 1.27 (H) 0.90 - 1.10   HM2(CBC w/o Differential)   Result Value Ref Range    WBC 7.5 4.0 - 11.0 thou/uL    RBC 2.62 (L) 4.40 - 6.20 mill/uL    Hemoglobin 7.8 (L) 14.0 - 18.0 g/dL    Hematocrit 23.4 (L) 40.0 - 54.0 %    MCV 89 80 - 100 fL    MCH 29.8  27.0 - 34.0 pg    MCHC 33.3 32.0 - 36.0 g/dL    RDW 14.6 (H) 11.0 - 14.5 %    Platelets 196 140 - 440 thou/uL    MPV 9.5 8.5 - 12.5 fL   Creatinine   Result Value Ref Range    Creatinine 0.67 (L) 0.70 - 1.30 mg/dL    GFR MDRD Af Amer >60 >60 mL/min/1.73m2    GFR MDRD Non Af Amer >60 >60 mL/min/1.73m2   Surgical Pathology Exam   Result Value Ref Range    Case Report       Surgical Pathology                                Case: [id number]                                    Authorizing Provider:  Paxton Villalpando MD      Collected:           08/15/2020 1348              Ordering Location:     [facility]     Received:            08/17/2020 0805              Pathologist:           Darren Mann MD                                                        Specimen:    Gastric, Biopsy, R/O H. pylori                                                             Final Diagnosis       STOMACH, BIOPSY:     -   CHRONIC GASTRITIS, MODERATE TO SEVERE     -   NO EVIDENCE OF INTESTINAL METAPLASIA OR NEOPLASIA     -   IMMUNOSTAIN FOR HELICOBACTER IS NEGATIVE    Microscopic Description       Microscopic examination performed, substantiating the above diagnosis. All controls stain appropriately.    Clinical Information       Pre-op Diagnosis:  Gastrointestinal hemorrhage with melena [K92.1]    Gross Description       Received in formalin labeled with the patient's name and gastric biopsy are three, minute to 0.3 cm, irregular, tan soft tissues. TE-1C RJR:sg    Charges CPT: 88846, 98561  ICD-10: K29.51     Result Flag     Hemoglobin   Result Value Ref Range    Hemoglobin 8.4 (L) 14.0 - 18.0 g/dL   Hemoglobin   Result Value Ref Range    Hemoglobin 8.1 (L) 14.0 - 18.0 g/dL   INR   Result Value Ref Range    INR 1.18 (H) 0.90 - 1.10   HM2(CBC w/o Differential)   Result Value Ref Range    WBC 6.7 4.0 - 11.0 thou/uL    RBC 2.53 (L) 4.40 - 6.20 mill/uL    Hemoglobin 7.4 (L) 14.0 - 18.0 g/dL    Hematocrit 23.1 (L) 40.0 - 54.0 %     MCV 91 80 - 100 fL    MCH 29.2 27.0 - 34.0 pg    MCHC 32.0 32.0 - 36.0 g/dL    RDW 14.7 (H) 11.0 - 14.5 %    Platelets 210 140 - 440 thou/uL    MPV 9.5 8.5 - 12.5 fL   Basic Metabolic Panel   Result Value Ref Range    Sodium 136 136 - 145 mmol/L    Potassium 3.9 3.5 - 5.0 mmol/L    Chloride 107 98 - 107 mmol/L    CO2 24 22 - 31 mmol/L    Anion Gap, Calculation 5 5 - 18 mmol/L    Glucose 99 70 - 125 mg/dL    Calcium 7.4 (L) 8.5 - 10.5 mg/dL    BUN 9 8 - 28 mg/dL    Creatinine 0.70 0.70 - 1.30 mg/dL    GFR MDRD Af Amer >60 >60 mL/min/1.73m2    GFR MDRD Non Af Amer >60 >60 mL/min/1.73m2   Hemoglobin   Result Value Ref Range    Hemoglobin 8.1 (L) 14.0 - 18.0 g/dL   Crossmatch   Result Value Ref Range    Crossmatch Compatible     Unit Type A Pos     Unit Number [id number]     Status Transfused     Component Red Blood Cells     PRODUCT CODE [id number]     Issue Date and Time [id number]     Blood Type 6200     CODING SYSTEM [id number]    Crossmatch   Result Value Ref Range    Crossmatch Compatible     Unit Type A Pos     Unit Number [id number]     Status Transfused     Component Red Blood Cells     PRODUCT CODE L2896D17     Issue Date and Time [id number]     Blood Type 6200     CODING SYSTEM [id number]    Plasma Product Information   Result Value Ref Range    Unit Type A Pos     Unit Number [id number]     Status Transfused     Component FROZEN PLASMA     PRODUCT CODE Y2865P80     Issue Date and Time [id number]     Blood Type 6200     CODING SYSTEM [id number]    Plasma Product Information   Result Value Ref Range    Unit Type A Pos     Unit Number [id number]     Status Transfused     Component FROZEN PLASMA     PRODUCT CODE E5969M80     Issue Date and Time [id number]     Blood Type 6200     CODING SYSTEM MIEX849    Crossmatch   Result Value Ref Range    Crossmatch Compatible     Unit Type A Pos     Unit Number [id number]     Status Transfused     Component Red Blood Cells     PRODUCT CODE U4102A19      Issue Date and Time [id number]     Blood Type 6200     CODING SYSTEM IPHR795    INR   Result Value Ref Range    INR 1.32 (H) 0.90 - 1.10   Potassium - Next AM   Result Value Ref Range    Potassium 4.1 3.5 - 5.0 mmol/L   Hemoglobin   Result Value Ref Range    Hemoglobin 7.8 (L) 14.0 - 18.0 g/dL   COVID-19 Virus PCR MRF    Specimen: Respiratory   Result Value Ref Range    COVID-19 VIRUS SPECIMEN SOURCE Nasopharyngeal     2019-nCOV       Test received-See reflex to IDDL test SARS CoV2 (COVID-19) Virus RT-PCR   SARS-CoV-2 (COVID-19) RT-PCR-IDDL    Specimen: Respiratory   Result Value Ref Range    SARS-CoV-2 Virus Specimen Source Nasopharyngeal     SARS-CoV-2 PCR Result NEGATIVE     SARS-COV-2 PCR COMMENT       Testing was performed using the [employer] COVID-19 Direct Assay on the [employer]   INR   Result Value Ref Range    INR 1.39 (H) 0.90 - 1.10   Hemoglobin   Result Value Ref Range    Hemoglobin 7.8 (L) 14.0 - 18.0 g/dL   Basic Metabolic Panel   Result Value Ref Range    Sodium 137 136 - 145 mmol/L    Potassium 3.8 3.5 - 5.0 mmol/L    Chloride 106 98 - 107 mmol/L    CO2 26 22 - 31 mmol/L    Anion Gap, Calculation 5 5 - 18 mmol/L    Glucose 102 70 - 125 mg/dL    Calcium 7.9 (L) 8.5 - 10.5 mg/dL    BUN 10 8 - 28 mg/dL    Creatinine 0.70 0.70 - 1.30 mg/dL    GFR MDRD Af Amer >60 >60 mL/min/1.73m2    GFR MDRD Non Af Amer >60 >60 mL/min/1.73m2   Magnesium   Result Value Ref Range    Magnesium 2.0 1.8 - 2.6 mg/dL   INR   Result Value Ref Range    INR 1.67 (H) 0.90 - 1.10   Hemoglobin   Result Value Ref Range    Hemoglobin 8.2 (L) 14.0 - 18.0 g/dL   Basic Metabolic Panel   Result Value Ref Range    Sodium 138 136 - 145 mmol/L    Potassium 4.3 3.5 - 5.0 mmol/L    Chloride 107 98 - 107 mmol/L    CO2 26 22 - 31 mmol/L    Anion Gap, Calculation 5 5 - 18 mmol/L    Glucose 101 70 - 125 mg/dL    Calcium 8.0 (L) 8.5 - 10.5 mg/dL    BUN 9 8 - 28 mg/dL    Creatinine 0.74 0.70 - 1.30 mg/dL    GFR MDRD Af Amer >60 >60 mL/min/1.73m2     GFR MDRD Non Af Amer >60 >60 mL/min/1.73m2   INR   Result Value Ref Range    INR 1.80 (H) 0.90 - 1.10   Hemoglobin   Result Value Ref Range    Hemoglobin 7.9 (L) 14.0 - 18.0 g/dL   INR   Result Value Ref Range    INR 1.65 (H) 0.90 - 1.10   Hemoglobin   Result Value Ref Range    Hemoglobin 7.6 (L) 14.0 - 18.0 g/dL   INR   Result Value Ref Range    INR 1.67 (H) 0.90 - 1.10        Current Outpatient Medications   Medication Sig     acetaminophen (TYLENOL) 325 MG tablet Take 2 tablets (650 mg total) by mouth every 6 (six) hours as needed for pain.     atorvastatin (LIPITOR) 40 MG tablet Take 1 tablet (40 mg total) by mouth at bedtime. For hx of cva     bisacodyL (DULCOLAX) 10 mg suppository Insert 10 mg into the rectum daily as needed.     docusate sodium (COLACE) 100 MG capsule Take 1 capsule (100 mg total) by mouth 2 (two) times a day. For constipation     dorzolamide-timoloL (COSOPT) 22.3-6.8 mg/mL ophthalmic solution Administer 1 drop into the left eye 2 (two) times a day. glaucoma     erythromycin base (ERYTHROMYCIN OPHT) Apply 5 mg to eye. Instill 1 ribbon in left eye at HS and right eye four times a day     latanoprost (XALATAN) 0.005 % ophthalmic solution Administer 1 drop into the left eye at bedtime. glaucoma     levothyroxine (SYNTHROID, LEVOTHROID) 25 MCG tablet Take 1 tablet (25 mcg total) by mouth Daily at 6:00 am. Hypothyroid     omeprazole (PRILOSEC) 20 MG capsule Take 1 capsule (20 mg total) by mouth 2 (two) times a day before meals. Needs two times a day for 2 months, then daily for ulcer     polyethylene glycol (MIRALAX) 17 gram packet Take 1 packet (17 g total) by mouth daily as needed. For constipation     propylene glycol (SYSTANE COMPLETE) 0.6 % Drop Apply 1 drop to eye 2 (two) times a day. Both eyes for dry eyes     warfarin ANTICOAGULANT (COUMADIN/JANTOVEN) 5 MG tablet 5 mg po daily and call MD to dose daily.for hx of CVA (Patient taking differently: 5-5.5 mg. Next INR 8/24/20   call MD to  dose daily.for hx of CVA)     Case Management:  I have reviewed the facility/SNF care plan/MDS which was done 8/19/20. TSH pending (3.10 0n 4/22/20)  including the falls risk, nutrition and pain screening. I also reviewed the current immunizations, and preventive care.. Future cancer screening is not clinically indicated secondary to age/goals of care.   Patient's desire to return to the community is not assessible due to cognitive impairment.    Information reviewed:  Medications, vital signs, orders, and nursing notes.    ASSESSMENT:      ICD-10-CM    1. Subtherapeutic international normalized ratio (INR)  R79.1    2. Decreased hemoglobin  R71.0        PLAN:      Right eye trauma - on eyedrops  he no longer has pain and being followed closely by opthalmology.       Suprapubic catheter- cleanse daily, monitor for infection around  the site.  Cares per protocol     Atrial fibrillation on coumadin and lisinopril,       Anticoagulation management- monitor and adjust coumadin  per INRS      Poor vision- is followed closely by ophthalmology.  on multiple eye gtts.      Hypothyroidism- on Synthroid, TSH 4/22/20 was 3.10    GI bleed- No active bleeding , received multiple blood  transfusions in the hospital HGB 8/21/20 was 7.9 monitor labs however it has dropped to 7.6 on 8/24. Lab to be rechecked in 2 days.     UTI- completed antibiotics.     Electronically signed by: Polly Marroquin CNP

## 2021-06-11 NOTE — PROGRESS NOTES
H&P  Teach  I Order X    9-28 P Order X   9-28 Letter    COVID  Anticoag Warfarin- continue Meds  All ok am of procedure     4/15/1926  Home:[phone] (home) Cell:[phone] (mobile)  Emergency Contact: Homa Rodriguez [phone]    +++Important patient information for CSC/Cath Lab staff : None+++    [facility] EP Cath Lab Procedure Order     Device Implant/Revision:  Procedure: Generator Change Out-PLEASE CALL DAUGHTER TO SCHEDULE -Homa  Device Type: Single Pacemaker  Device Company/Device Rep Needed for Procedure: [employer]    Diagnosis:  Device at [facility]  Anticipated Case Duration:  Standard  Scheduling Needs/Timeframe:  COVID Scheduling- urgent within 1-2 weeks  Pre-Procedural Testing needed: COVID 19 nasal/lab test within 48hrs of procedure  Anesthesia:  Conscious Sedation  Research Protocol:  No    [facility] EP Cath Lab Prep   Ordering Provider: Dr Driver  Ordering Date: 9/28/2020  Orders Status: Intial order placed and Order set placed    H&P:  Pt to schedule with PMD to complete  PCP: Polly Marroquin, SACHA, [phone]    Pre-op Labs: Ordered AM of procedure    Medical Records Pertinent for Procedure:  Echo 12-1-17 EF 60%, EKG 8-13-20 paced @62 and Device Implant Record Implant 4-20-09 w/SWA    Patient Education:    Teach with Patient: Will be completed via phone prior to procedure, and letter was also sent to pt via mail/[employer] with written pre-procedural instructions.    Risks Reviewed:     Pacemaker Insertion    <1% for each of the following:  infection, bleeding, hematoma, pneumothorax, subclavian vein thrombosis, cardiac perforation, cardiac tamponade, arrhythmias, pectoral or diaphragmatic stimulation, air embolus, pocket erosion, device interactions.    <4% lead dislodgment, <1% lead fracture or generator  malfunction.    <0.5% CVA or MI.    <0.1% death.    If external defibrillation or CV is needed, 25% risk for superficial burn.    For patients on anticoagulation, the risk of bleeding, hematoma and tamponade  are increased.      Pre-Procedure Instructions that were Reviewed with Patient:  NPO after midnight, Remove all jewelry prior to coming in for procedure, Shower prior to arrival, Notified patient of time and date of procedure by CV , Transportation arrangements needed s/p procedure, Post-procedure follow up process, Sedation plan/orders and Pre-procedure letter was sent to pt by CV     Pre-Procedure Medication Instructions:  Instructions given to pt regarding anticoagulants: Coumadin- instructed to continue anticoagulation uninterrupted through their procedure  Instructions given to pt regarding antiarrhythmic medication: N/A for this type of procedure; N/A  Instructions for medication, other than anticoagulants/antiarrhythmics listed above, given to pt: to take all morning medications with small sips of water, with the exception of OTC supplements and MVI  Allergies: Reviewed allergies, no concerns regarding orders for procedure    No Known Allergies    Current Outpatient Medications:      acetaminophen (TYLENOL) 325 MG tablet, Take 2 tablets (650 mg total) by mouth every 6 (six) hours as needed for pain., Disp: 30 tablet, Rfl: 0     atorvastatin (LIPITOR) 40 MG tablet, Take 1 tablet (40 mg total) by mouth at bedtime. For hx of cva, Disp: 30 tablet, Rfl: 0     bisacodyL (DULCOLAX) 10 mg suppository, Insert 10 mg into the rectum daily as needed., Disp: , Rfl:      docusate sodium (COLACE) 100 MG capsule, Take 1 capsule (100 mg total) by mouth 2 (two) times a day. For constipation, Disp: 30 capsule, Rfl: 0     dorzolamide-timoloL (COSOPT) 22.3-6.8 mg/mL ophthalmic solution, Administer 1 drop into the left eye 2 (two) times a day. glaucoma, Disp: 10 mL, Rfl: 12     erythromycin base (ERYTHROMYCIN OPHT), Apply 5 mg to eye. Instill 1 ribbon in left eye at HS and right eye four times a day, Disp: , Rfl:      ferrous sulfate 325 (65 FE) MG tablet, Take 1 tablet by mouth daily., Disp: , Rfl:       latanoprost (XALATAN) 0.005 % ophthalmic solution, Administer 1 drop into the left eye at bedtime. glaucoma, Disp: 2.5 mL, Rfl: 12     levothyroxine (SYNTHROID, LEVOTHROID) 25 MCG tablet, Take 1 tablet (25 mcg total) by mouth Daily at 6:00 am. Hypothyroid, Disp: 30 tablet, Rfl: 0     omeprazole (PRILOSEC) 20 MG capsule, Take 1 capsule (20 mg total) by mouth 2 (two) times a day before meals. Needs two times a day for 2 months, then daily for ulcer, Disp: 60 capsule, Rfl: 0     polyethylene glycol (MIRALAX) 17 gram packet, Take 1 packet (17 g total) by mouth daily as needed. For constipation, Disp: 30 packet, Rfl: 0     propylene glycol (SYSTANE COMPLETE) 0.6 % Drop, Apply 1 drop to eye 2 (two) times a day. Both eyes for dry eyes, Disp: 1.5 mL, Rfl: 0     warfarin sodium (WARFARIN ORAL), Take by mouth. 9/17/20 INR 2.13 Take 6mg daily Next INR 9/28 9/8/20 INR 2.22  Cont 6mg daily.  Next INR 9/14/20.   8/31/20 INR 1.59  Take 6mg daily.  Next INR 9/8/20. 8/26/20 INR 1.53  Take 5.5mg daily.  Next INR 8/31/20., Disp: , Rfl:     Documentation Date:9/28/2020 3:51 PM  Chrissie Gonzalez RN

## 2021-06-11 NOTE — TELEPHONE ENCOUNTER
[facility] Nurse Triage Anticoagulation Note      Provider: YECENIA Olivera  Facility: [facility] Type: LT    Caller: Egdar  Call Back Number:  [phone]    Reason for call: INR    Today s INR: 2.13  Previous INR: 9/8 2.22 6mg daily     Diagnosis/Goal: AFIB  Heparin/Lovenox: No   Currently on ABX: No  Other interacting Medications: None  Missed or refused doses: No    Verbal Order/Direction given by Provider: 6mg daily Next INR 9/28    Provider giving order: YECENIA Olivera    Verbal order given to: Cora Rosario RN

## 2021-06-11 NOTE — PROGRESS NOTES
ANH Rob/Kimberly Piper RN     1. NPO after midnight night prior to procedure, meds ok with small sips of water morning of.    2. Patient needs COVID swab (was told this was done this morning 9/29, please fax results to [phone])    3. Patient needs pre-op physical by PMD (please fax results to [phone])    4. No need for further INRS, patient does not need to hold warfarin.     5. Please shower the night/day before procedure if able.    Please call with further questions or concerns, thank you!  CHITRA Barry [phone]

## 2021-06-11 NOTE — TELEPHONE ENCOUNTER
Phone call to patients daughter to discuss education prior to generator change scheduled for Friday 10-2-20.  No answer and left message for return call.

## 2021-06-11 NOTE — TELEPHONE ENCOUNTER
[facility] Nurse Triage Anticoagulation Note      Provider: YECENIA Olivera  Facility: [facility] Type: LT    Caller: Priscila  Call Back Number:  [phone]    Reason for call: INR    Today s INR: 2.22  Previous INR: 8/31 1.59(6mg daily), 8/26 1.53(5.5mg daily)    Diagnosis/Goal: AFIB  Heparin/Lovenox: No   Currently on ABX: No  Other interacting Medications: None  Missed or refused doses: No    Nurse also reporting Hgb result.  Currently on ferrous sulfate 325mg daily.  Of note, also taking Omeprazole 20mg two times a day.      Verbal Order/Direction given by Provider: Continue Warfarin 6mg daily.  Next INR 9/14/20.  Check Hgb on 10/5/20.      Provider giving order: YECENIA Olivera    Verbal order given to: Priscila Ramirez RN

## 2021-06-11 NOTE — PROGRESS NOTES
[facility]    Facility:   [facility] [[id number]]   Code Status: DNR      CHIEF COMPLAINT/REASON FOR VISIT:  Chief Complaint   Patient presents with     Problem Visit     INR review, monitor labs       HISTORY:      HPI: Riley is a 89 y.o. male  now residing in the LTC at [facility].  He is  with history of A. fib on warfarin develop acute CVA from holding warfarin for right gluteal hematoma.  Currently back on Coumadin, Hypertension , urinary retention has a suprapubic catheter and with a pacemaker       Today he being seen  for  follow up hgb and INR. Today he is subtherapeutic at 1.59, Coumadin increased. His HGB is 8.0 which is up from 7.7.  He now on the TCU recovering from a recent hospitalization in which he developed a GI bleed. He was hospitalized from 8/13-8/19/20. He was found to have a duodenal ulcer.He was slowly restarted on his coumadin and and  Lovenox.   No active bleeding noted and urine is clear yellow in nagy bag.He is able to move all extremities.  He is eating well and reports no difficulties with swallowing.    He denied   constipation. His suprapubic is intact and draining clear yellow urine.        Past Medical History:   Diagnosis Date     Atrial fibrillation (H)     On coumadin     Bell's palsy     right sided facial droop     CVA (cerebral vascular accident) (H)      Hypertension      Pacemaker      Urinary retention              Family History   Problem Relation Age of Onset     COPD Father      Social History     Socioeconomic History     Marital status:      Spouse name: Not on file     Number of children: Not on file     Years of education: Not on file     Highest education level: Not on file   Occupational History     Not on file   Social Needs     Financial resource strain: Not on file     Food insecurity     Worry: Not on file     Inability: Not on file     Transportation needs     Medical: Not on file      Non-medical: Not on file   Tobacco Use     Smoking status: Former Smoker     Smokeless tobacco: Never Used   Substance and Sexual Activity     Alcohol use: No     Drug use: No     Sexual activity: Not on file   Lifestyle     Physical activity     Days per week: Not on file     Minutes per session: Not on file     Stress: Not on file   Relationships     Social connections     Talks on phone: Not on file     Gets together: Not on file     Attends Yazdanism service: Not on file     Active member of club or organization: Not on file     Attends meetings of clubs or organizations: Not on file     Relationship status: Not on file     Intimate partner violence     Fear of current or ex partner: Not on file     Emotionally abused: Not on file     Physically abused: Not on file     Forced sexual activity: Not on file   Other Topics Concern     Not on file   Social History Narrative    03/07/15 - The patient lives alone in his own home.         Review of Systems  Eyes: negative for pain, discharge, redness He is followed  closely by opthalmology.  He has had multiple procedures done on his eye right  Constitutional: Negative for activity change and fatigue. Negative for appetite change, chills and fever.   HENT: Negative for congestion and sore throat.    Eyes: positive  for visual disturbance. right sided facial paralysis   Respiratory: Negative for cough, shortness of breath and wheezing.    Cardiovascular: Negative for chest pain and leg swelling.        Pacemaker   Gastrointestinal: Negative for abdominal distention, abdominal pain, constipation, diarrhea and nausea.   Genitourinary: Negative for dysuria.   Musculoskeletal: Negative for arthralgias and myalgias.   Skin: Negative for color change, rash and wound.   Neurological: Negative for dizziness, weakness and numbness.   Psychiatric/Behavioral: Negative for agitation, behavioral problems and sleep disturbance.   Vitals:    08/31/20 1030   BP: 146/58   Pulse: 63    Resp: 18   Temp: 98.4  F (36.9  C)   SpO2: 93%   Weight: 195 lb 3.2 oz (88.5 kg)       Constitutional: He appears well-developed and well-nourished.   Pleasant gentleman   HENT:   Head: Normocephalic and atraumatic.   Eyes: Conjunctivae are normal. Pupils are equal, round, and reactive to light. poor vision with the right worse than the left right sided facial paralysis   Neck: Normal range of motion. Neck supple.   Cardiovascular: Normal rate, regular rhythm and normal heart sounds.   No murmur heard.  Pulmonary/Chest: Effort normal and breath sounds normal. He has no wheezes. He has no rales.   Abdominal: Soft. Bowel sounds are normal. He exhibits no distension. There is no tenderness.   Genitourinary: suprapubic catheter  Musculoskeletal: Normal range of motion. He exhibits no edema.   Neurological: He is alert.   Skin: Skin is warm and dry.   Psychiatric: He has a normal mood and affect. His behavior is normal.     LABS:   Recent Results (from the past 240 hour(s))   Hemoglobin   Result Value Ref Range    Hemoglobin 7.6 (L) 14.0 - 18.0 g/dL   INR   Result Value Ref Range    INR 1.67 (H) 0.90 - 1.10   COVID-19 Virus PCR MRF    Specimen: Respiratory   Result Value Ref Range    COVID-19 VIRUS SPECIMEN SOURCE Nasopharyngeal     2019-nCOV Not Detected    Hemoglobin   Result Value Ref Range    Hemoglobin 7.7 (L) 14.0 - 18.0 g/dL   INR   Result Value Ref Range    INR 1.53 (H) 0.90 - 1.10   INR   Result Value Ref Range    INR 1.59 (H) 0.90 - 1.10   Hemoglobin   Result Value Ref Range    Hemoglobin 8.0 (L) 14.0 - 18.0 g/dL        Current Outpatient Medications   Medication Sig     acetaminophen (TYLENOL) 325 MG tablet Take 2 tablets (650 mg total) by mouth every 6 (six) hours as needed for pain.     atorvastatin (LIPITOR) 40 MG tablet Take 1 tablet (40 mg total) by mouth at bedtime. For hx of cva     bisacodyL (DULCOLAX) 10 mg suppository Insert 10 mg into the rectum daily as needed.     docusate sodium (COLACE) 100 MG  capsule Take 1 capsule (100 mg total) by mouth 2 (two) times a day. For constipation     dorzolamide-timoloL (COSOPT) 22.3-6.8 mg/mL ophthalmic solution Administer 1 drop into the left eye 2 (two) times a day. glaucoma     erythromycin base (ERYTHROMYCIN OPHT) Apply 5 mg to eye. Instill 1 ribbon in left eye at HS and right eye four times a day     ferrous sulfate 325 (65 FE) MG tablet Take 1 tablet by mouth daily.     latanoprost (XALATAN) 0.005 % ophthalmic solution Administer 1 drop into the left eye at bedtime. glaucoma     levothyroxine (SYNTHROID, LEVOTHROID) 25 MCG tablet Take 1 tablet (25 mcg total) by mouth Daily at 6:00 am. Hypothyroid     omeprazole (PRILOSEC) 20 MG capsule Take 1 capsule (20 mg total) by mouth 2 (two) times a day before meals. Needs two times a day for 2 months, then daily for ulcer     polyethylene glycol (MIRALAX) 17 gram packet Take 1 packet (17 g total) by mouth daily as needed. For constipation     propylene glycol (SYSTANE COMPLETE) 0.6 % Drop Apply 1 drop to eye 2 (two) times a day. Both eyes for dry eyes     warfarin sodium (WARFARIN ORAL) Take by mouth. 8/26/20 INR 1.53  Take 5.5mg daily.  Next INR 8/31/20.         ASSESSMENT:      ICD-10-CM    1. Encounter for anticoagulation discussion and counseling  Z71.89        PLAN:      Right eye trauma - on eyedrops  he no longer has pain and being followed closely by opthalmology.       Suprapubic catheter- cleanse daily, monitor for infection around  the site.  Cares per protocol     Atrial fibrillation on coumadin and lisinopril,       Anticoagulation management- monitor and adjust coumadin  per INRS      Poor vision- is followed closely by ophthalmology.  on multiple eye gtts.      Hypothyroidism- on Synthroid, TSH 4/22/20 was 3.10    GI bleed- No active bleeding , received multiple blood  transfusions in the hospital HGB up to 8.0 from 7.7     UTI- completed antibiotics.     Electronically signed by: Polly Marroquin CNP

## 2021-06-11 NOTE — TELEPHONE ENCOUNTER
Reviewed Pre-Intra-Post education and instructions reviewed via phone with Daughter, orders faxed to care center and PMD  COIVD scheduled on 9/29  Pre-Op has not been scheduled, pt instructed to arrange asap  9/29/2020 9:52 AM  Kimberly Piper RN

## 2021-06-11 NOTE — TELEPHONE ENCOUNTER
Homa, Pt's daughter calls to inquire when he is going to have his gen change completed. Device went to [facility] 7/2/2020 (approximately, due to TTM only). Discussed with EPRNs and answered Homa's questions the best I could. EP RNs will be calling her ASAP.     Verbalized understanding.       Nelly Montejo RN BSN PHN  Device Nurse   [facility]

## 2021-06-11 NOTE — PROGRESS NOTES
[facility]    Facility:   [facility] [[id number]]   Code Status: DNR/DNI  PCP: Polly Marroquin CNP   Phone: [phone]   Fax: [phone]      Assessment/Plan:        Visit for Preoperative Exam.      1. SSS. Has pacemaker, now in need of generator change, scheduled for Fri 10/2/2020. Dr. Rob.  2. Anemia. He received transfusion in the hospital in August due to duodenal ulcer. Labs to be checked 10/1/2020, day prior to procedure.    3. Duodenal ulcer. On PPI.   4. Afib. Chronically anticoagulated with warfarin, continue uninterrupted.  5. Hx of stroke. Continue statin, also on warfarin as noted above.  6. Hypothyroidism. On replacement. Last TSH within goal range.  7. SP catheter. Hx of urinary retention.   8. HTN. Not on BP meds, had been on lisinopril in the past. BPs satisfactory.  9. [place] Palsy, right.   10. Vision impairment. On multiple drops, follows with ophthalmology.  11. Code status is DNR/DNI.      Labs will be done as indicated.         Subjective:     Scheduled Procedure: Pacemaker generator change  Surgery Date:  10/2/2020  Surgery Location:  [facility]  Surgeon:  Dr. Rob      HPI:   Riley is a 89 y.o. male with hx of Afib on warfarin, prior stroke, BPH, HTN, [place] palsy, hypothyroidism, SP catheter, resides in LTC at [facility]. He had a recent hospitalization in which he was sent to the hospital from nursing home on 8/13/2020. He had labs drawn in NH as he was not doing well with general fatigue, decreased appetite. No respiratory sx. He started feeling a little better on his own but then labs came back with hgb down to 6.2. He was worked up in the hospital and noted to have duodenal ulcer. He required blood transfusion, had EGD noting the bleeding had already stopped. He was able to be restarted on chronic anticoagulation with warfarin. He continues on PPI. He returned to NH on 8/19/2020. No subsequent episodes of  apparent bleeding or bleeding concerns. His last hgb checked was 7.9 on 9/8/2020. He is on iron. Note he has hx of chronic suprapubic catheter. Hx of CVA, hypothyroidism.    Cardiology has identified that he needs pacemaker generator battery change which is scheduled for Fri 10/2/2020. He is cleared for this cardiac procedure. Cardiology has provided pre op instructions and medication guidance. He will continue on anticoagulation uninterrupted. Last INR was 1.99 on 9/28/2020. Labs will be checked on 10/1/2020 and cardiology to be updated if hgb is less than 10 due to surgery considerations.       Medications:  Current Outpatient Medications   Medication Sig Dispense Refill     acetaminophen (TYLENOL) 325 MG tablet Take 2 tablets (650 mg total) by mouth every 6 (six) hours as needed for pain. 30 tablet 0     atorvastatin (LIPITOR) 40 MG tablet Take 1 tablet (40 mg total) by mouth at bedtime. For hx of cva 30 tablet 0     bisacodyL (DULCOLAX) 10 mg suppository Insert 10 mg into the rectum daily as needed.       docusate sodium (COLACE) 100 MG capsule Take 1 capsule (100 mg total) by mouth 2 (two) times a day. For constipation 30 capsule 0     dorzolamide-timoloL (COSOPT) 22.3-6.8 mg/mL ophthalmic solution Administer 1 drop into the left eye 2 (two) times a day. glaucoma 10 mL 12     erythromycin base (ERYTHROMYCIN OPHT) Apply 5 mg to eye. Instill 1 ribbon in left eye at HS and right eye four times a day       ferrous sulfate 325 (65 FE) MG tablet Take 1 tablet by mouth daily.       latanoprost (XALATAN) 0.005 % ophthalmic solution Administer 1 drop into the left eye at bedtime. glaucoma 2.5 mL 12     levothyroxine (SYNTHROID, LEVOTHROID) 25 MCG tablet Take 1 tablet (25 mcg total) by mouth Daily at 6:00 am. Hypothyroid 30 tablet 0     omeprazole (PRILOSEC) 20 MG capsule Take 1 capsule (20 mg total) by mouth 2 (two) times a day before meals. Needs two times a day for 2 months, then daily for ulcer 60 capsule 0      polyethylene glycol (MIRALAX) 17 gram packet Take 1 packet (17 g total) by mouth daily as needed. For constipation 30 packet 0     propylene glycol (SYSTANE COMPLETE) 0.6 % Drop Apply 1 drop to eye 2 (two) times a day. Both eyes for dry eyes 1.5 mL 0     warfarin sodium (WARFARIN ORAL) Take by mouth. 9/17/20 INR 2.13 Take 6mg daily Next INR 9/28 9/8/20 INR 2.22  Cont 6mg daily.  Next INR 9/14/20.    8/31/20 INR 1.59  Take 6mg daily.  Next INR 9/8/20.  8/26/20 INR 1.53  Take 5.5mg daily.  Next INR 8/31/20.       No current facility-administered medications for this visit.        No Known Allergies    Immunization History   Administered Date(s) Administered     DT (pediatric) 10/11/2004     Influenza high dose,seasonal,PF, 65+ yrs 10/01/2010, 09/29/2015, 10/19/2016, 09/12/2017, 10/23/2018     Influenza, inj, historic,unspecified 10/12/2007, 10/13/2019     Influenza, seasonal,quad inj 6-35 mos 11/10/2008, 09/16/2009, 09/28/2011, 09/19/2012, 09/20/2013     Influenza,seasonal quad, PF 10/10/2014     Pneumo Conj 13-V (2010&after) 04/10/2015     Pneumo Polysac 23-V 11/01/2001     Td,adult,historic,unspecified 10/11/2004     Tdap 10/19/2012     ZOSTER, LIVE 10/27/2015       Patient Active Problem List   Diagnosis     Seborrheic Keratosis     Ventral Hernia     Macular Degeneration     Callus     Trochanteric Bursitis     Pacemaker Placement     Osteoarthrosis Of The Hip     Acute Urinary Retention     Gout     Normochromic, Normocytic Anemia     Thrombocytopenia     Tingling (Paresthesia)     Carpal Tunnel Syndrome     Abnormal Weight Loss     Hyperlipidemia     Impaired Fasting Glucose     Unspecified glaucoma     Osteoarthritis Of The Knee     Hypertension     Urinary tract infection associated with cystostomy catheter, initial encounter (H)     Benign prostatic hyperplasia with urinary hesitancy     Urinary Frequency     Atrial Fibrillation     Stroke due to occlusion of right middle cerebral artery (H)     A-fib (H)      Cardiac pacemaker in situ     CVA (cerebral infarction)     Anemia     Left inguinal hernia     Essential hypertension with goal blood pressure less than 140/90     Traumatic hematoma of buttock, initial encounter     Traumatic hematoma     Hospital discharge follow-up     Altered mental state     Confusion     Hyponatremia     TSH elevation     Encephalopathy     Acute ischemic left PCA stroke (H)     Sepsis (H)     Acute urinary retention     Pyelonephritis, acute     History of atrial fibrillation     History of CVA (cerebrovascular accident)     Generalized muscle weakness     Urinary retention     Gross hematuria     Bell's palsy     SSS (sick sinus syndrome) (H)     Normocytic anemia     Hypothyroidism, unspecified type     Gastrointestinal hemorrhage with melena     Anemia due to blood loss, acute     Iron deficiency     Pacemaker battery depletion       Past Medical History:   Diagnosis Date     Atrial fibrillation (H)     On coumadin     Bell's palsy     right sided facial droop     CVA (cerebral vascular accident) (H)      Hypertension      Pacemaker      Urinary retention        Social History     Socioeconomic History     Marital status:      Spouse name: Not on file     Number of children: Not on file     Years of education: Not on file     Highest education level: Not on file   Occupational History     Not on file   Social Needs     Financial resource strain: Not on file     Food insecurity     Worry: Not on file     Inability: Not on file     Transportation needs     Medical: Not on file     Non-medical: Not on file   Tobacco Use     Smoking status: Former Smoker     Smokeless tobacco: Never Used   Substance and Sexual Activity     Alcohol use: No     Drug use: No     Sexual activity: Not on file   Lifestyle     Physical activity     Days per week: Not on file     Minutes per session: Not on file     Stress: Not on file   Relationships     Social connections     Talks on phone: Not on file      Gets together: Not on file     Attends Caodaism service: Not on file     Active member of club or organization: Not on file     Attends meetings of clubs or organizations: Not on file     Relationship status: Not on file     Intimate partner violence     Fear of current or ex partner: Not on file     Emotionally abused: Not on file     Physically abused: Not on file     Forced sexual activity: Not on file   Other Topics Concern     Not on file   Social History Narrative    03/07/15 - The patient lives alone in his own home.       Past Surgical History:   Procedure Laterality Date     IR BLADDER SUPRAPUBIC CATHETER INSERTION  1/18/2019     SC ESOPHAGOGASTRODUODENOSCOPY TRANSORAL DIAGNOSTIC N/A 8/15/2020    Procedure: ESOPHAGOGASTRODUODENOSCOPY (EGD) with biopsy;  Surgeon: Paxton Villalpando MD;  Location: [facility];  Service: Gastroenterology     SC PARTIAL EXCISION THYROID,UNILAT      Description: Thyroid Surgery Sub-Total Thyroidectomy;  Recorded: 03/24/2008;     SC REMV PILONIDAL LESION SIMPLE      Description: Pilonidal Cyst Resection;  Recorded: 03/24/2008;     SC TRANSURETHRAL ELEC-SURG PROSTATECTOM      Description: Transurethral Resection Of Prostate (TURP);  Recorded: 03/24/2008;     TOTAL HIP ARTHROPLASTY Right        History of Present Illness  Recent Health  Fever: no  Chills: no  Fatigue: no  Chest Pain: no  Cough: no  Dyspnea: no  Urinary Frequency: no  Nausea: no  Vomiting: no  Diarrhea: no  Abdominal Pain: no  Easy Bruising: no  Lower Extremity Swelling: trace  Poor Exercise Tolerance: yes    Pertinent History  Prior Anesthesia: yes  Previous Anesthesia Reaction:  no  Diabetes: no  Cardiovascular Disease: yes  Pulmonary Disease: no  Renal Disease: no  GI Disease: yes, recent dx duodenal ulcer  Sleep Apnea: no  Thromboembolic Problems: no  Clotting Disorder: no  Bleeding Disorder: no  Transfusion Reaction: no  Impaired Immunity: no  Steroid use in the last 6 months: no  Frequent Aspirin use:  no      After surgery, the patient plans to recover at a nursing home.    Review of Systems  Review of Systems          Objective:         Vitals:    09/29/20 0800   BP: 122/63   Pulse: 62   Resp: 18   Temp: 98.7  F (37.1  C)   SpO2: 98%         Labs:  Lab Results   Component Value Date    HGB 11.1 (L) 10/01/2020       Physical Exam:  Physical Exam     General: Patient is alert, elderly male, no distress.    Vitals: Above.  HEENT: Head is NCAT. Nares negative. Oropharynx well hydrated. Right facial droop, baseline.  Neck: No JVD.  Lungs: Clear.  CV: Regular rate and rhythm..   Abdomen: Soft, no tenderness on exam. No guarding rebound or rigidity.  : SP cath with leg bag.  Extremities: Trace LE edema is noted.  Musculoskeletal: Age related degen changes.   Skin: No rashes noted.   Psych: Mood appears pretty good.          Electronically signed by: Nancy Fair MD

## 2021-06-11 NOTE — TELEPHONE ENCOUNTER
[facility] Nurse Triage Telephone Note      Provider: YECENIA Olivera  Facility: [facility] Type: LTC    Caller: Sukhwinder  Call Back Number:  [phone]    Allergies: Patient has no known allergies.    Reason for call: Dtr called to notify us that pt is having pacemaker replacement on Fri 10/2 & needs a Pre-op. He had the CoVID test this am.     Verbal Order/Direction given by Provider: Check with Cardiology about which meds to take or hold prior to procedure, buddy warfarin. I will see pt later today.    Provider giving order: Nancy Fair MD    Verbal order given to: Zakiya Pham RN

## 2021-06-11 NOTE — PROGRESS NOTES
Dr. Driver, this is the patient we spoke about over the phone today at 1pm. It would have been a documention encounter like this one.       Let me know if you have questions.     Nelly Montejo RN BSN PHN  Device Nurse   [facility]

## 2021-06-11 NOTE — PROGRESS NOTES
[facility]    Facility:   [facility] [[id number]]   Code Status: DNR      CHIEF COMPLAINT/REASON FOR VISIT:  Chief Complaint   Patient presents with     FVP Care Coordination - Regulatory       HISTORY:      HPI: Riley is a 89 y.o. male  now residing in the LTC at [facility].  He is  with history of A. fib on warfarin develop acute CVA from holding warfarin for right gluteal hematoma.  Currently back on Coumadin, Hypertension , urinary retention has a suprapubic catheter and with a pacemaker       Today he being seen  for routine regulatory visit and follow up HGB. He recently was on the TCU recovering from a recent hospitalization in which he developed a GI bleed. He was hospitalized from 8/13-8/19/20. He was found to have a duodenal ulcer.He was slowly restarted on his coumadin.     He denied CP or shortness of breath,  He is able to move all extremities.  He is eating well and reports no difficulties with swallowing.    He denied   constipation.   His suprapubic is intact and draining clear yellow urine.  TSH stable at 3.10 and done on 4/22/20. LS clear and no shortness of breath     Past Medical History:   Diagnosis Date     Atrial fibrillation (H)     On coumadin     Bell's palsy     right sided facial droop     CVA (cerebral vascular accident) (H)      Hypertension      Pacemaker      Urinary retention              Family History   Problem Relation Age of Onset     COPD Father      Social History     Socioeconomic History     Marital status:      Spouse name: Not on file     Number of children: Not on file     Years of education: Not on file     Highest education level: Not on file   Occupational History     Not on file   Social Needs     Financial resource strain: Not on file     Food insecurity     Worry: Not on file     Inability: Not on file     Transportation needs     Medical: Not on file     Non-medical: Not on file   Tobacco Use      Smoking status: Former Smoker     Smokeless tobacco: Never Used   Substance and Sexual Activity     Alcohol use: No     Drug use: No     Sexual activity: Not on file   Lifestyle     Physical activity     Days per week: Not on file     Minutes per session: Not on file     Stress: Not on file   Relationships     Social connections     Talks on phone: Not on file     Gets together: Not on file     Attends Cheondoism service: Not on file     Active member of club or organization: Not on file     Attends meetings of clubs or organizations: Not on file     Relationship status: Not on file     Intimate partner violence     Fear of current or ex partner: Not on file     Emotionally abused: Not on file     Physically abused: Not on file     Forced sexual activity: Not on file   Other Topics Concern     Not on file   Social History Narrative    03/07/15 - The patient lives alone in his own home.         Review of Systems  Eyes: negative for pain, discharge, redness He is followed  closely by opthalmology.  He has had multiple procedures done on his eye right  Constitutional: Negative for activity change and fatigue. Negative for appetite change, chills and fever.   HENT: Negative for congestion and sore throat.    Eyes: positive  for visual disturbance. right sided facial paralysis   Respiratory: Negative for cough, shortness of breath and wheezing.    Cardiovascular: Negative for chest pain and leg swelling.        Pacemaker   Gastrointestinal: Negative for abdominal distention, abdominal pain, constipation, diarrhea and nausea.   Genitourinary: Negative for dysuria.   Musculoskeletal: Negative for arthralgias and myalgias.   Skin: Negative for color change, rash and wound.   Neurological: Negative for dizziness, weakness and numbness.   Psychiatric/Behavioral: Negative for agitation, behavioral problems and sleep disturbance.   Vitals:    09/16/20 1011   BP: 124/70   Pulse: 72   Resp: 18   Temp: 98.9  F (37.2  C)   SpO2: 96%    Weight: 188 lb 12.8 oz (85.6 kg)       Constitutional: He appears well-developed and well-nourished.   Pleasant gentleman   HENT:   Head: Normocephalic and atraumatic.   Eyes: Conjunctivae are normal. Pupils are equal, round, and reactive to light. poor vision with the right worse than the left right sided facial paralysis   Neck: Normal range of motion. Neck supple.   Cardiovascular: Normal rate, regular rhythm and normal heart sounds.   No murmur heard.  Pulmonary/Chest: Effort normal and breath sounds normal. He has no wheezes. He has no rales.   Abdominal: Soft. Bowel sounds are normal. He exhibits no distension. There is no tenderness.   Genitourinary: suprapubic catheter  Musculoskeletal: Normal range of motion. He exhibits no edema.   Neurological: He is alert.   Skin: Skin is warm and dry.   Psychiatric: He has a normal mood and affect. His behavior is normal.     LABS:   Recent Results (from the past 240 hour(s))   INR   Result Value Ref Range    INR 2.22 (H) 0.90 - 1.10   Hemoglobin   Result Value Ref Range    Hemoglobin 7.9 (L) 14.0 - 18.0 g/dL        Current Outpatient Medications   Medication Sig     acetaminophen (TYLENOL) 325 MG tablet Take 2 tablets (650 mg total) by mouth every 6 (six) hours as needed for pain.     atorvastatin (LIPITOR) 40 MG tablet Take 1 tablet (40 mg total) by mouth at bedtime. For hx of cva     bisacodyL (DULCOLAX) 10 mg suppository Insert 10 mg into the rectum daily as needed.     docusate sodium (COLACE) 100 MG capsule Take 1 capsule (100 mg total) by mouth 2 (two) times a day. For constipation     dorzolamide-timoloL (COSOPT) 22.3-6.8 mg/mL ophthalmic solution Administer 1 drop into the left eye 2 (two) times a day. glaucoma     erythromycin base (ERYTHROMYCIN OPHT) Apply 5 mg to eye. Instill 1 ribbon in left eye at HS and right eye four times a day     ferrous sulfate 325 (65 FE) MG tablet Take 1 tablet by mouth daily.     latanoprost (XALATAN) 0.005 % ophthalmic solution  Administer 1 drop into the left eye at bedtime. glaucoma     levothyroxine (SYNTHROID, LEVOTHROID) 25 MCG tablet Take 1 tablet (25 mcg total) by mouth Daily at 6:00 am. Hypothyroid     omeprazole (PRILOSEC) 20 MG capsule Take 1 capsule (20 mg total) by mouth 2 (two) times a day before meals. Needs two times a day for 2 months, then daily for ulcer     polyethylene glycol (MIRALAX) 17 gram packet Take 1 packet (17 g total) by mouth daily as needed. For constipation     propylene glycol (SYSTANE COMPLETE) 0.6 % Drop Apply 1 drop to eye 2 (two) times a day. Both eyes for dry eyes     warfarin sodium (WARFARIN ORAL) Take by mouth. 9/8/20 INR 2.22  Cont 6mg daily.  Next INR 9/14/20.    8/31/20 INR 1.59  Take 6mg daily.  Next INR 9/8/20.  8/26/20 INR 1.53  Take 5.5mg daily.  Next INR 8/31/20.     Case Management:  I have reviewed the facility/SNF care plan/MDS which was done 8/19/20. TSH  (3.10 0n 4/22/20)  including the falls risk, nutrition and pain screening. I also reviewed the current immunizations, and preventive care.. Future cancer screening is not clinically indicated secondary to age/goals of care.   Patient's desire to return to the community is not assessible due to cognitive impairment.    Information reviewed:  Medications, vital signs, orders, and nursing notes.    ASSESSMENT:      ICD-10-CM    1. Anemia, unspecified type  D64.9    2. Hypertension  I10    3. Physical deconditioning  R53.81        PLAN:      Right eye trauma - on eyedrops  he no longer has pain and being followed closely by opthalmology.    HX Right sided [place] Palsy . Pt currently on eye lubricating drops multiple times a day.       Suprapubic catheter- cleanse daily, monitor for infection around  the site.  Cares per protocol     Atrial fibrillation on coumadin and lisinopril, currently being bridged with lovenox.      Anticoagulation management- monitor and adjust per INRS      Poor vision- is followed closely by ophthalmology.  on  multiple eye gtts.      Hypothyroidism- on Synthroid, TSH 4/22/20 was 3.10    GI bleed- No active bleeding , received multiple blood  transfusions in the hospital HGB 9/8/20 was 7.9 monitor labs.         Electronically signed by: Polly Marroquin CNP

## 2021-06-11 NOTE — PROGRESS NOTES
Nelly,  This response should appear in different format within the chart.  Per our conversation the patient should be scheduled as soon as possible for generator change out.

## 2021-06-12 NOTE — TELEPHONE ENCOUNTER
Type: routine PO pacemaker remote transmission.   Presenting rhythm: ventricular sensing and pacing 60-80 bpm.  Battery/lead status: RV lead sensing/impedance stable. Capture threshold unable to record recently, output is at max currently 5V and appropriately capturing.   Arrhythmias: since 10/2/20; no ventricular arrhythmias detected. 24 noise reversion episodes, EGMs show RV lead noise.  CommentS: Normal magnet and pacemaker function. Routed to device RN for review. DANA. ADD: See note in Epic. Routed to Dr. Rob for review. Changed post-op visit to in-clinic for further troubleshooting. MLG     Transmission reviewed, new RV lead noise seen via remotes post-gen change on 10/2/20. RV lead impedance/sensing stable. Auto-capture is on and currently at max output due to inability to get reading. Appropriate capture seen on presenting EGM. Noise reversion safety pacing is on/activated and in use. No significant pauses seen, not dependent.      Reviewed with patient's daughter, denies any troubles with lightheadedness/dizziness. States he has been doing quite well post-procedure. Back at [facility] and incision per daughter appears to be healing well (even though steri-strips were not placed at time of discharge).      Reviewed with daughter that we will need to change post-op visit to an in-clinic check to do some further lead testing at this time. Patient is now scheduled for next Tuesday 10/13 at  clinic for post-op visit. Verbalized understanding.     Also called Edward BOWMAN (Lucia RN) to discuss changes with RN. States she thinks the incision was looking good last time she saw it but had not seen recently. I asked they assess incision and if any question on healing to call back and send a picture for us to review. She understands, also advised to call if any changes in symptoms. Instructed to make sure home monitor is plugged in at all times.  Verbalized understanding.     Will update Dr. Rob at  this time.   Krystal Chun RN

## 2021-06-12 NOTE — PROGRESS NOTES
Assessment and Plan:        HX Right sided [place] Palsy . Pt currently on eye lubricating drops multiple times a day.       Suprapubic catheter- cleanse daily, monitor for infection around  the site.  Cares per protocol     Atrial fibrillation on coumadin and lisinopril,      Anticoagulation management- monitor and adjust per INRS      Poor vision- is followed closely by ophthalmology.  on multiple eye gtts.      Hypothyroidism- on Synthroid, TSH 4/22/20 was 3.10       Patient has been advised of split billing requirements and indicates understanding: No      The patient's current medical problems were reviewed.      The following health maintenance schedule was reviewed with the patient and provided in printed form in the after visit summary:   Health Maintenance   Topic Date Due     MEDICARE ANNUAL WELLNESS VISIT  04/15/1991     ZOSTER VACCINES (2 of 3) 12/22/2015     FALL RISK ASSESSMENT  12/19/2019     INFLUENZA VACCINE RULE BASED (1) 08/01/2020     ADVANCE CARE PLANNING  04/20/2022     TD 18+ HE  10/19/2022     Pneumococcal Vaccine: 65+ Years  Completed     Pneumococcal Vaccine: Pediatrics (0 to 5 Years) and At-Risk Patients (6 to 64 Years)  Aged Out     HEPATITIS B VACCINES  Aged Out        Subjective:   Chief Complaint:Annual Wellness Visit      Riley Rodriguez is an 89 y.o. male here for an Annual Wellness visit.   HPI:  Riley is a 89 y.o. male  now residing in the LTC at [facility].  He is  with history of A. fib on warfarin develop acute CVA from holding warfarin for right gluteal hematoma.  Currently back on Coumadin, Hypertension , urinary retention has a suprapubic catheter and with a pacemaker       Today he being seen  for an annual wellness visit    He denied CP or shortness of breath,  He is able to move all extremities.  He is eating well and reports no difficulties with swallowing.    He denied   constipation.   His suprapubic is intact and draining clear yellow urine.  TSH stable at  3.10 and done on 4/22/20. LS clear and no shortness of breath     Review of Systems:    Eyes: negative for pain, discharge, redness He is followed  closely by opthalmology.  He has had multiple procedures done on his eye right  Constitutional: Negative for activity change and fatigue. Negative for appetite change, chills and fever.   HENT: Negative for congestion and sore throat.    Eyes: positive  for visual disturbance. right sided facial paralysis   Respiratory: Negative for cough, shortness of breath and wheezing.    Cardiovascular: Negative for chest pain and leg swelling.        Pacemaker   Gastrointestinal: Negative for abdominal distention, abdominal pain, constipation, diarrhea and nausea.   Genitourinary: Negative for dysuria.   Musculoskeletal: Negative for arthralgias and myalgias.   Skin: Negative for color change, rash and wound.   Neurological: Negative for dizziness, weakness and numbness.   Psychiatric/Behavioral: Negative for agitation, behavioral problems and sleep disturbance.   Patient Care Team:  Polly Marroquin CNP as PCP - General (Nurse Practitioner)  Polly Marroquin CNP as Assigned PCP  [facility] as Nursing Home Facility (Generic Provider)     Patient Active Problem List   Diagnosis     Seborrheic Keratosis     Ventral Hernia     Macular Degeneration     Callus     Trochanteric Bursitis     Pacemaker Placement     Osteoarthrosis Of The Hip     Acute Urinary Retention     Gout     Normochromic, Normocytic Anemia     Thrombocytopenia     Tingling (Paresthesia)     Carpal Tunnel Syndrome     Abnormal Weight Loss     Hyperlipidemia     Impaired Fasting Glucose     Unspecified glaucoma     Osteoarthritis Of The Knee     Hypertension     Urinary tract infection associated with cystostomy catheter, initial encounter (H)     Benign prostatic hyperplasia with urinary hesitancy     Urinary Frequency     Atrial Fibrillation     Stroke due to occlusion of right  middle cerebral artery (H)     A-fib (H)     Cardiac pacemaker in situ, single chamber     CVA (cerebral infarction)     Anemia     Left inguinal hernia     Essential hypertension with goal blood pressure less than 140/90     Traumatic hematoma of buttock, initial encounter     Traumatic hematoma     Hospital discharge follow-up     Altered mental state     Confusion     Hyponatremia     TSH elevation     Encephalopathy     Acute ischemic left PCA stroke (H)     Sepsis (H)     Acute urinary retention     Pyelonephritis, acute     History of atrial fibrillation     History of CVA (cerebrovascular accident)     Generalized muscle weakness     Urinary retention     Gross hematuria     Bell's palsy     SSS (sick sinus syndrome) (H)     Normocytic anemia     Hypothyroidism, unspecified type     Gastrointestinal hemorrhage with melena     Anemia due to blood loss, acute     Iron deficiency     Pacemaker battery depletion     Past Medical History:   Diagnosis Date     Atrial fibrillation (H)     On coumadin     Bell's palsy     right sided facial droop     CVA (cerebral vascular accident) (H)      Hypertension      Pacemaker      Urinary retention       Past Surgical History:   Procedure Laterality Date     IR BLADDER SUPRAPUBIC CATHETER INSERTION  1/18/2019     ME ESOPHAGOGASTRODUODENOSCOPY TRANSORAL DIAGNOSTIC N/A 8/15/2020    Procedure: ESOPHAGOGASTRODUODENOSCOPY (EGD) with biopsy;  Surgeon: Paxton Villalpando MD;  Location: [facility];  Service: Gastroenterology     ME PARTIAL EXCISION THYROID,UNILAT      Description: Thyroid Surgery Sub-Total Thyroidectomy;  Recorded: 03/24/2008;     ME REMV PILONIDAL LESION SIMPLE      Description: Pilonidal Cyst Resection;  Recorded: 03/24/2008;     ME TRANSURETHRAL ELEC-SURG PROSTATECTOM      Description: Transurethral Resection Of Prostate (TURP);  Recorded: 03/24/2008;     TOTAL HIP ARTHROPLASTY Right       Family History   Problem Relation Age of Onset     COPD Father        Social History     Socioeconomic History     Marital status:      Spouse name: Not on file     Number of children: Not on file     Years of education: Not on file     Highest education level: Not on file   Occupational History     Not on file   Social Needs     Financial resource strain: Not on file     Food insecurity     Worry: Not on file     Inability: Not on file     Transportation needs     Medical: Not on file     Non-medical: Not on file   Tobacco Use     Smoking status: Former Smoker     Smokeless tobacco: Never Used   Substance and Sexual Activity     Alcohol use: No     Drug use: No     Sexual activity: Not on file   Lifestyle     Physical activity     Days per week: Not on file     Minutes per session: Not on file     Stress: Not on file   Relationships     Social connections     Talks on phone: Not on file     Gets together: Not on file     Attends Adventism service: Not on file     Active member of club or organization: Not on file     Attends meetings of clubs or organizations: Not on file     Relationship status: Not on file     Intimate partner violence     Fear of current or ex partner: Not on file     Emotionally abused: Not on file     Physically abused: Not on file     Forced sexual activity: Not on file   Other Topics Concern     Not on file   Social History Narrative    03/07/15 - The patient lives alone in his own home.      Current Outpatient Medications   Medication Sig Dispense Refill     acetaminophen (TYLENOL) 325 MG tablet Take 2 tablets (650 mg total) by mouth every 6 (six) hours as needed for pain. 30 tablet 0     atorvastatin (LIPITOR) 40 MG tablet Take 1 tablet (40 mg total) by mouth at bedtime. For hx of cva 30 tablet 0     bisacodyL (DULCOLAX) 10 mg suppository Insert 10 mg into the rectum daily as needed.       docusate sodium (COLACE) 100 MG capsule Take 1 capsule (100 mg total) by mouth 2 (two) times a day. For constipation 30 capsule 0     dorzolamide-timoloL (COSOPT)  22.3-6.8 mg/mL ophthalmic solution Administer 1 drop into the left eye 2 (two) times a day. glaucoma 10 mL 12     erythromycin base (ERYTHROMYCIN OPHT) Apply 5 mg to eye. Instill 1 ribbon in left eye at HS and right eye four times a day       ferrous sulfate 325 (65 FE) MG tablet Take 1 tablet by mouth daily.       latanoprost (XALATAN) 0.005 % ophthalmic solution Administer 1 drop into the left eye at bedtime. glaucoma 2.5 mL 12     levothyroxine (SYNTHROID, LEVOTHROID) 25 MCG tablet Take 1 tablet (25 mcg total) by mouth Daily at 6:00 am. Hypothyroid 30 tablet 0     omeprazole (PRILOSEC) 20 MG capsule Take 1 capsule (20 mg total) by mouth 2 (two) times a day before meals. Needs two times a day for 2 months, then daily for ulcer 60 capsule 0     polyethylene glycol (MIRALAX) 17 gram packet Take 1 packet (17 g total) by mouth daily as needed. For constipation 30 packet 0     propylene glycol (SYSTANE COMPLETE) 0.6 % Drop Apply 1 drop to eye 2 (two) times a day. Both eyes for dry eyes 1.5 mL 0     warfarin sodium (WARFARIN ORAL) Take by mouth. 10/12/20 INR 1.90 Cont 6mg daily. Next INR 10/26  9/28/20 1.99 6mg daily.  9/17/20 INR 2.13 Take 6mg daily Next INR 9/28 9/8/20 INR 2.22  Cont 6mg daily.  Next INR 9/14/20.    8/31/20 INR 1.59  Take 6mg daily.  Next INR 9/8/20.  8/26/20 INR 1.53  Take 5.5mg daily.  Next INR 8/31/20.       No current facility-administered medications for this visit.       Objective:   Vital Signs:   Visit Vitals  /79   Pulse 60   Temp 98.9  F (37.2  C)   Resp 16   Wt 185 lb (83.9 kg)   SpO2 97%   BMI 26.54 kg/m           VisionScreening:  No exam data present   He follows up closely with ophthalmology    PHYSICAL EXAM  Constitutional: He appears well-developed and well-nourished.   Pleasant gentleman   HENT:   Head: Normocephalic and atraumatic.   Eyes: Conjunctivae are normal. Pupils are equal, round, and reactive to light. poor vision with the right worse than the left right sided facial  paralysis   Neck: Normal range of motion. Neck supple.   Cardiovascular: Normal rate, regular rhythm and normal heart sounds.   No murmur heard.  Pulmonary/Chest: Effort normal and breath sounds normal. He has no wheezes. He has no rales.   Abdominal: Soft. Bowel sounds are normal. He exhibits no distension. There is no tenderness.   Genitourinary: suprapubic catheter  Musculoskeletal: Normal range of motion. He exhibits no edema.   Neurological: He is alert.   Skin: Skin is warm and dry.   Psychiatric: He has a normal mood and affect. His behavior is normal.     No flowsheet data found.  A Mini-Cog score of 0-2 suggests the possibility of dementia, score of 3-5 suggests no dementia  Cognitive Screen not completed due to known dementia.  Fall Risk not completed for medical reasons.    Identified Health Risks:       Polly Marroquin CNP

## 2021-06-12 NOTE — PATIENT INSTRUCTIONS - HE
Pacemaker Post-operative Checklist      The Device Registered Nurse (RN) reviewed the pacemaker function.      The Device RN did a wound assessment and wound care teaching.    Please call the Device Nurses with any signs of infection or questions regarding wound healing. Device Nurse Line: [phone], Option #3      The Device RN demonstrated and displayed the specific remote monitoring system for your pacemaker.      The Device RN reviewed the Partnership Agreement Form.    Patient Instructions    You have no arm or lifting restrictions because your pacing lead is not newly implanted.  You may apply an ice pack to the surgical site for 10 minutes at a time 4-6 times daily if needed for discomfort.      To reduce the risk of infection, try to avoid any dental procedures for the first 6 weeks, through 11/13/2020 after your pacemaker implant. If you have an emergent or urgent dental need during this time, contact the device clinic for a prescription for an antibiotic.      You will receive a device identification (ID) card in the mail from the device  within 6 weeks to replace the temporary ID card you were given in the hospital.      You may travel by any mode of transportation; just show your pacemaker ID card. You may be subject to a hand search or use of a handheld wand, but official should not keep the wand over the implant site for greater than 5-10 seconds.      For any surgery, let your doctor know you have a pacemaker.       Your pacemaker is Not MRI safe       Most household appliances, including a microwave, will not interfere with your pacemaker function. If you suspect interference, simply move away from the source. Cell phones do not cause a problem. Please refer to the device booklet from the  or their website under the section on electromagnetic interference (HOWARD) for further guidelines on things that may interfere with your pacemaker.       Device Clinic Contact  Information  Device Nurses: [phone], Option #3   Device Remote Specialists: [phone], Option #2. For questions about your Remote Transmission or Transmission Schedule  Device Schedulers: [phone], Option #1

## 2021-06-12 NOTE — TELEPHONE ENCOUNTER
[facility] Nurse Triage Anticoagulation Note      Provider: YECENIA Olivera  Facility: [facility] Type: LT    Caller: Zakiya  Call Back Number:  [phone]    Reason for call: INR    Today s INR: 1.76  Previous INR: 10/26/20 INR 1.69 8mg X 1 then 6mg daily.    Diagnosis/Goal: AFIB  Heparin/Lovenox: No   Currently on ABX: No  Other interacting Medications: None  Missed or refused doses: No    Verbal Order/Direction given by Provider: Coumadin 6.5mg M & Th, 6mg AOD. Next INR 11/9.    Provider giving order: YECENIA Olivera    Verbal order given to: Zakiya Pham RN

## 2021-06-12 NOTE — TELEPHONE ENCOUNTER
[facility] Nurse Triage Telephone Note      Provider: YECENIA Olivera  Facility: [facility] Type: LT    Caller: Zakiya  Call Back Number:  [phone]    Allergies: Patient has no known allergies.    Reason for call: Nurse reporting Hgb results.  Patient recently came back from the hospital after getting his pacemaker generator exchanged.  Of note, patient has a history of a recent GI bleed.  Notable meds:  Ferrous sulfate 325mg daily, Omeprazole 20mg daily.  VSS.       Verbal Order/Direction given by Provider: No new orders.      Provider giving order: YECENIA Olivera    Verbal order given to: Priscila Ramirez RN

## 2021-06-12 NOTE — TELEPHONE ENCOUNTER
[facility] Nurse Triage Anticoagulation Note      Provider: YECENIA Olivera  Facility: [facility] Type: LT    Caller: Zakiya  Call Back Number:  [phone]    Reason for call: INR    Today s INR: 1.69  Previous INR: 10/12/20 INR 1.90 6mg daily.  9/28/20 INR 1.99 6mg daily.    Diagnosis/Goal: AFIB  Heparin/Lovenox: No   Currently on ABX: No  Other interacting Medications: None  Missed or refused doses: No    Verbal Order/Direction given by Provider: Give Coumadin 8mg today, then resume 6mg daily. Next INR 11/2    Provider giving order: YECENIA Olivera    Verbal order given to: Zakiya Pham RN

## 2021-06-12 NOTE — TELEPHONE ENCOUNTER
[facility] Nurse Triage Anticoagulation Note      Provider: YECENIA Olivera  Facility: [facility] Type: LT    Caller: Zakiya  Call Back Number:  [phone]    Reason for call: INR    Today s INR: 1.90  Previous INR: 9/28/20    Diagnosis/Goal: AFIB  Heparin/Lovenox: No   Currently on ABX: No  Other interacting Medications: None  Missed or refused doses: No    Verbal Order/Direction given by Provider: Yes continue 6mg daily. Next INR 10/26    Provider giving order: YECENIA Olivera    Verbal order given to: Zakiya Pham RN

## 2021-06-13 NOTE — TELEPHONE ENCOUNTER
"[facility] Nurse Triage Telephone Note      Provider: YECENIA Olivera  Facility: [facility] Type: [facility]    Caller: Gabriela  Call Back Number:  [phone]    Allergies: Patient has no known allergies.    Reason for call: Daughter called saying Dad is C/O \"Pooping to much\". Is on Colace 100mg two times a day.     Verbal Order/Direction given by Provider: Change to 1 daily & 1 daily PRN.    Provider giving order: YECENIA Olivera    Verbal order given to: Gabriela Pham RN      "

## 2021-06-13 NOTE — TELEPHONE ENCOUNTER
[facility] Nurse Triage Anticoagulation Note      Provider: YECENIA Olivera  Facility: [facility] Type: LT    Caller: Zakiya  Call Back Number:  [phone]    Reason for call: INR    Today s INR: 2.69  Previous INR: 11/30/20 INR 2.63 7mg M & TH, 6.5mg AOD.    Diagnosis/Goal: AFIB  Heparin/Lovenox: No   Currently on ABX: No  Other interacting Medications: None  Missed or refused doses: No    Verbal Order/Direction given by Provider: Continue 7mg M & Th, 6.5mg AOD. Next INR 1/13/21    Provider giving order: YECENIA Olivera    Verbal order given to: Zakiya Pham RN

## 2021-06-13 NOTE — PROGRESS NOTES
[facility]    Facility:   [facility] [[id number]]   Code Status: unknown         Assessment and Plan:        HX Right sided Leeds Palsy . Pt currently on eye lubricating drops multiple times a day.       Suprapubic catheter- cleanse daily, monitor for infection around  the site.  Cares per protocol     Atrial fibrillation on coumadin and lisinopril,      Anticoagulation management- monitor and adjust per INRS      Poor vision- is followed closely by ophthalmology.  on multiple eye gtts.      Hypothyroidism- on Synthroid, TSH 4/22/20 was 3.10       Patient has been advised of split billing requirements and indicates understanding: No      The patient's current medical problems were reviewed.      The following health maintenance schedule was reviewed with the patient and provided in printed form in the after visit summary:   Health Maintenance   Topic Date Due     MEDICARE ANNUAL WELLNESS VISIT  04/15/1991     ZOSTER VACCINES (2 of 3) 12/22/2015     FALL RISK ASSESSMENT  12/19/2019     INFLUENZA VACCINE RULE BASED (1) 08/01/2020     ADVANCE CARE PLANNING  04/20/2022     TD 18+ HE  10/19/2022     Pneumococcal Vaccine: 65+ Years  Completed     Pneumococcal Vaccine: Pediatrics (0 to 5 Years) and At-Risk Patients (6 to 64 Years)  Aged Out     HEPATITIS B VACCINES  Aged Out        Subjective:   Chief Complaint: Riley Rodirguez is an 89 y.o. male here for an Annual Wellness visit.   HPI:  Riley is a 89 y.o. male  now residing in the LTC at [facility].  He is  with history of A. fib on warfarin develop acute CVA from holding warfarin for right gluteal hematoma.  Currently back on Coumadin, Hypertension , urinary retention has a suprapubic catheter and with a pacemaker       Today he being seen  for an annual wellness visit    He denied CP or shortness of breath,  He is able to move all extremities.  He is eating well and reports no difficulties with swallowing.     He denied   constipation.   His suprapubic is intact and draining clear yellow urine.  TSH stable at 3.10 and done on 4/22/20. LS clear and no shortness of breath     Review of Systems:    Eyes: negative for pain, discharge, redness He is followed  closely by opthalmology.  He has had multiple procedures done on his eye right  Constitutional: Negative for activity change and fatigue. Negative for appetite change, chills and fever.   HENT: Negative for congestion and sore throat.    Eyes: positive  for visual disturbance. right sided facial paralysis   Respiratory: Negative for cough, shortness of breath and wheezing.    Cardiovascular: Negative for chest pain and leg swelling.        Pacemaker   Gastrointestinal: Negative for abdominal distention, abdominal pain, constipation, diarrhea and nausea.   Genitourinary: Negative for dysuria.   Musculoskeletal: Negative for arthralgias and myalgias.   Skin: Negative for color change, rash and wound.   Neurological: Negative for dizziness, weakness and numbness.   Psychiatric/Behavioral: Negative for agitation, behavioral problems and sleep disturbance.       Patient Active Problem List   Diagnosis     Seborrheic Keratosis     Ventral Hernia     Macular Degeneration     Callus     Trochanteric Bursitis     Pacemaker Placement     Osteoarthrosis Of The Hip     Acute Urinary Retention     Gout     Normochromic, Normocytic Anemia     Thrombocytopenia     Tingling (Paresthesia)     Carpal Tunnel Syndrome     Abnormal Weight Loss     Hyperlipidemia     Impaired Fasting Glucose     Unspecified glaucoma     Osteoarthritis Of The Knee     Hypertension     Urinary tract infection associated with cystostomy catheter, initial encounter (H)     Benign prostatic hyperplasia with urinary hesitancy     Urinary Frequency     Atrial Fibrillation     Stroke due to occlusion of right middle cerebral artery (H)     A-fib (H)     Cardiac pacemaker in situ, single chamber     CVA (cerebral  infarction)     Anemia     Left inguinal hernia     Essential hypertension with goal blood pressure less than 140/90     Traumatic hematoma of buttock, initial encounter     Traumatic hematoma     Hospital discharge follow-up     Altered mental state     Confusion     Hyponatremia     TSH elevation     Encephalopathy     Acute ischemic left PCA stroke (H)     Sepsis (H)     Acute urinary retention     Pyelonephritis, acute     History of atrial fibrillation     History of CVA (cerebrovascular accident)     Generalized muscle weakness     Urinary retention     Gross hematuria     Bell's palsy     SSS (sick sinus syndrome) (H)     Normocytic anemia     Hypothyroidism, unspecified type     Gastrointestinal hemorrhage with melena     Anemia due to blood loss, acute     Iron deficiency     Pacemaker battery depletion     Past Medical History:   Diagnosis Date     Atrial fibrillation (H)     On coumadin     Bell's palsy     right sided facial droop     CVA (cerebral vascular accident) (H)      Hypertension      Pacemaker      Urinary retention       Past Surgical History:   Procedure Laterality Date     IR BLADDER SUPRAPUBIC CATHETER INSERTION  1/18/2019     NM ESOPHAGOGASTRODUODENOSCOPY TRANSORAL DIAGNOSTIC N/A 8/15/2020    Procedure: ESOPHAGOGASTRODUODENOSCOPY (EGD) with biopsy;  Surgeon: Paxton Villalpando MD;  Location: [facility];  Service: Gastroenterology     NM PARTIAL EXCISION THYROID,UNILAT      Description: Thyroid Surgery Sub-Total Thyroidectomy;  Recorded: 03/24/2008;     NM REMV PILONIDAL LESION SIMPLE      Description: Pilonidal Cyst Resection;  Recorded: 03/24/2008;     NM TRANSURETHRAL ELEC-SURG PROSTATECTOM      Description: Transurethral Resection Of Prostate (TURP);  Recorded: 03/24/2008;     TOTAL HIP ARTHROPLASTY Right       Family History   Problem Relation Age of Onset     COPD Father       Social History     Socioeconomic History     Marital status:      Spouse name: Not on file      Number of children: Not on file     Years of education: Not on file     Highest education level: Not on file   Occupational History     Not on file   Social Needs     Financial resource strain: Not on file     Food insecurity     Worry: Not on file     Inability: Not on file     Transportation needs     Medical: Not on file     Non-medical: Not on file   Tobacco Use     Smoking status: Former Smoker     Smokeless tobacco: Never Used   Substance and Sexual Activity     Alcohol use: No     Drug use: No     Sexual activity: Not on file   Lifestyle     Physical activity     Days per week: Not on file     Minutes per session: Not on file     Stress: Not on file   Relationships     Social connections     Talks on phone: Not on file     Gets together: Not on file     Attends Rastafarian service: Not on file     Active member of club or organization: Not on file     Attends meetings of clubs or organizations: Not on file     Relationship status: Not on file     Intimate partner violence     Fear of current or ex partner: Not on file     Emotionally abused: Not on file     Physically abused: Not on file     Forced sexual activity: Not on file   Other Topics Concern     Not on file   Social History Narrative    03/07/15 - The patient lives alone in his own home.      Current Outpatient Medications   Medication Sig Dispense Refill     acetaminophen (TYLENOL) 325 MG tablet Take 2 tablets (650 mg total) by mouth every 6 (six) hours as needed for pain. 30 tablet 0     atorvastatin (LIPITOR) 40 MG tablet Take 1 tablet (40 mg total) by mouth at bedtime. For hx of cva 30 tablet 0     bisacodyL (DULCOLAX) 10 mg suppository Insert 10 mg into the rectum daily as needed.       docusate sodium (COLACE) 100 MG capsule Take 1 capsule (100 mg total) by mouth 2 (two) times a day. For constipation 30 capsule 0     dorzolamide-timoloL (COSOPT) 22.3-6.8 mg/mL ophthalmic solution Administer 1 drop into the left eye 2 (two) times a day. glaucoma 10  "mL 12     erythromycin base (ERYTHROMYCIN OPHT) Apply 5 mg to eye. Instill 1 ribbon in left eye at HS and right eye four times a day       ferrous sulfate 325 (65 FE) MG tablet Take 1 tablet by mouth daily.       latanoprost (XALATAN) 0.005 % ophthalmic solution Administer 1 drop into the left eye at bedtime. glaucoma 2.5 mL 12     levothyroxine (SYNTHROID, LEVOTHROID) 25 MCG tablet Take 1 tablet (25 mcg total) by mouth Daily at 6:00 am. Hypothyroid 30 tablet 0     omeprazole (PRILOSEC) 20 MG capsule Take 1 capsule (20 mg total) by mouth 2 (two) times a day before meals. Needs two times a day for 2 months, then daily for ulcer 60 capsule 0     polyethylene glycol (MIRALAX) 17 gram packet Take 1 packet (17 g total) by mouth daily as needed. For constipation 30 packet 0     propylene glycol (SYSTANE COMPLETE) 0.6 % Drop Apply 1 drop to eye 2 (two) times a day. Both eyes for dry eyes 1.5 mL 0     warfarin sodium (WARFARIN ORAL) Take by mouth. 11/23/20 INR 2.03  Cont 7mg Mon and Thurs and 6.5mg AOD.  Next INR 11/30/20.    11/16/20 INR 1.72 Increase to 78mg M & Th, 6.5mg AOD. Next INR 11/23.  11/9/20 INR 1.76 6.5mg daily. Next 11/16 11/2/20 INR 1.76 6.5mg M & Th, 6mg AOD. Next INR 11/9.  10/26/20 INR 1.69 Give 8mg today, then resume 6mg daily. Next INR 11/2  10/12/20 INR 1.90 Cont 6mg daily. Next INR 10/26  9/28/20 1.99 6mg daily.       No current facility-administered medications for this visit.       Objective:   Vital Signs:   Visit Vitals  /73   Pulse 81   Temp 99.1  F (37.3  C)   Resp 18   Ht 5' 10\" (1.778 m)   Wt 185 lb (83.9 kg)   SpO2 100%   BMI 26.54 kg/m           VisionScreening:  No exam data present   He follows up closely with ophthalmology    PHYSICAL EXAM  Constitutional: He appears well-developed and well-nourished.   Pleasant gentleman   HENT:   Head: Normocephalic and atraumatic.   Eyes: Conjunctivae are normal. Pupils are equal, round, and reactive to light. poor vision with the right worse " than the left right sided facial paralysis   Neck: Normal range of motion. Neck supple.   Cardiovascular: Normal rate, regular rhythm and normal heart sounds.   No murmur heard.  Pulmonary/Chest: Effort normal and breath sounds normal. He has no wheezes. He has no rales.   Abdominal: Soft. Bowel sounds are normal. He exhibits no distension. There is no tenderness.   Genitourinary: suprapubic catheter  Musculoskeletal: Normal range of motion. He exhibits no edema.   Neurological: He is alert.   Skin: Skin is warm and dry.   Psychiatric: He has a normal mood and affect. His behavior is normal.              Electronically signed by: Polly Marroquin CNP

## 2021-06-13 NOTE — TELEPHONE ENCOUNTER
[facility] Nurse Triage Anticoagulation Note      Provider: YECENIA Olivera  Facility: [facility] Type: LT    Caller: Zakiya  Call Back Number:  [phone]    Reason for call: INR    Today s INR: 2.63  Previous INR: 11/23/20 INR 2.03 7mg M & TH, 6.5mg AOD.    Diagnosis/Goal: AFIB  Heparin/Lovenox: No   Currently on ABX: No  Other interacting Medications: None  Missed or refused doses: No    Verbal Order/Direction given by Provider: Cont 7mg M & Th, 6.5mg AOD. Next INR 12/14.    Provider giving order: YECENIA Olivera    Verbal order given to: Zakiya Phma RN

## 2021-06-13 NOTE — TELEPHONE ENCOUNTER
[facility] Nurse Triage Anticoagulation Note      Provider: YECENIA Olivera  Facility: [facility] Type: LT    Caller: Ros  Call Back Number:  [phone]    Reason for call: INR    Today s INR: 2.03  Previous INR: 11/16 1.72(7mg Mon and Thurs and 6.5mg AOD), 11/9 1.76(6.5mg daily), 11/2 1.76(6.5mg on Thurs and 6mg AOD).      Diagnosis/Goal: AFIB  Heparin/Lovenox: No   Currently on ABX: No  Other interacting Medications: None  Missed or refused doses: No    Verbal Order/Direction given by Provider: Continue Warfarin 7mg Mon and Thurs and 6.5mg all other days.  Next INR 11/30/20.      Provider giving order: YECENIA Olivera    Verbal order given to: Ros Ramirez RN

## 2021-06-13 NOTE — PROGRESS NOTES
Assessment:    1. Essential hypertension with goal blood pressure less than 140/90  Basic Metabolic Panel   2. Impaired fasting glucose  Glycosylated Hemoglobin A1c    Basic Metabolic Panel   3. Atrial fibrillation, unspecified type     4. Pacemaker     5. Hyperlipidemia  Lipid Cascade   6. Stroke due to occlusion of right middle cerebral artery           Plan:    Recent INR 2.00 on October 10, 2017 and will continue warfarin 5 mg using 2 tablets on Tuesdays Thursdays and Saturdays otherwise 1-1/2 tablets other days of the week.  Check lipid cascade today while fasting.  Check basic metabolic panel as well as A1c with history of impaired fasting glucose noted with prior A1c of 6.3% on April 20, 2017 as well as prior fasting glucose of 126 October 19, 2016.  Discussed left lateral hip discomfort since fall one year ago.  Had x-rays January 6, 2017 reviewed with moderate osteoarthritis of hip without obvious fracture.  Will  copies of x-rays and evaluate with Dr. Royal with [facility] orth[facility] next Tuesday as scheduled.  Patient continues remainder of home medication.  Pacemaker in place and is in regular rhythm at this time continue simvastatin 5 mg at bedtime for lipid management.        Subjective:    Riley Rodriguez is seen today for follow-up evaluation.  Known history of chronic atrial fibrillation.  Permanent pacemaker in place.  Continues warfarin anticoagulation 10 mg on Tuesdays Thursdays and Saturdays otherwise 7.5 mg daily.  Continues lisinopril 10 mg daily for hypertension.  Prior prediabetes with A1c of 6.3% April 2017 as well as fasting glucose 126 October 19, 2016.  Has eliminated hard candy that he used to enjoy.  Hoping that the blood sugars go down so that he can have root beer barrels that he has at home.  Simvastatin 5 mg at bedtime for lipid management.  Left hip pain more with direct pressure otherwise does better when he is ambulating.  Ambulates with walker.  Daughter has noticed  "that he limps some however.  Has an appointment with Dr. Crandall next Tuesday through [facility] who had performed prior right total hip arthroplasty in the past.     (\"[place]\") in  after 56 years   Lives in Senior Living environment (\"[place]")   4 daughters (Elizabeth Luther (she is a nurse), etc)   No smoke (h/o heavy smoker \"3 ppd\" but quit in age 40s)   No EtOH   Surgeries: pilonidal cyst (), left thyroid (), C6-7 laminectomy (10/93), left cataract (), s/p TURP (); pacemaker x    Hospitalizations: -11 ([facility]) for right middle cerebral artery CVA (likely cardioembolic);  - h/o spontaneous hemothorax while on Coumadin for h/o a. fib   Mom -  (unknown reason after \"female operation\" with post-op blood clot)   Dad -  emphysema   1 sis -   Retired -    [employer], visits frequently in the summer     [place] - [phone] (cell)    Past Surgical History:   Procedure Laterality Date     CO PARTIAL EXCISION THYROID,UNILAT      Description: Thyroid Surgery Sub-Total Thyroidectomy;  Recorded: 2008;     CO REMV PILONIDAL LESION SIMPLE      Description: Pilonidal Cyst Resection;  Recorded: 2008;     CO TRANSURETHRAL ELEC-SURG PROSTATECTOM      Description: Transurethral Resection Of Prostate (TURP);  Recorded: 2008;     TOTAL HIP ARTHROPLASTY Right         Family History   Problem Relation Age of Onset     COPD Father         Past Medical History:   Diagnosis Date     Atrial fibrillation     On coumadin     CVA (cerebral vascular accident)      Hypertension      Pacemaker         Social History   Substance Use Topics     Smoking status: Former Smoker     Smokeless tobacco: None     Alcohol use No        Current Outpatient Prescriptions   Medication Sig Dispense Refill     AVODART 0.5 mg capsule TAKE ONE CAPSULE BY MOUTH EVERY DAY 90 capsule 2     BILBERRY ORAL CAPS       dorzolamide-timolol (COSOPT) 2-0.5 % " ophthalmic solution Administer 1 drop to both eyes 2 (two) times a day.       latanoprost (XALATAN) 0.005 % ophthalmic solution Administer 1 drop to both eyes bedtime.       lisinopril (PRINIVIL,ZESTRIL) 10 MG tablet TAKE ONE TABLET BY MOUTH EVERY DAY 90 tablet 1     simvastatin (ZOCOR) 5 MG tablet TAKE ONE TABLET BY MOUTH AT BEDTIME 90 tablet 1     tamsulosin (FLOMAX) 0.4 mg Cp24 TAKE ONE CAPSULE BY MOUTH EVERY DAY 90 capsule 2     VIT C/MARIE AC/LUT/COPPER/ZNOX (PRESERVISION LUTEIN ORAL) Take 2 capsules by mouth daily.       warfarin (COUMADIN) 5 MG tablet Take 1.5 to 2 tablets (7.5 to 10 mg) by mouth daily. Adjust dose per INR results as instructed. 140 tablet 1     colchicine (COLCRYS) 0.6 mg tablet Take 2 tablets by mouth at onset; may repeat every hour.  Max of 8 tablets per day.       warfarin (COUMADIN) 5 MG tablet TAKE TWO TABLETS BY MOUTH EVERY FRIDAY, AND TAKE 1.5 TABLETS ALL OTHER DAYS OF THE WEEK OR AS DIRECTED 60 tablet 2     No current facility-administered medications for this visit.           Objective:    Vitals:    10/18/17 1036   BP: 120/60   Pulse: 64   Weight: 205 lb (93 kg)      Body mass index is 30.72 kg/(m^2).    Alert.  No apparent distress.  Does demonstrate ability to stand without assistance.  Does ambulate with slight limp.  Cardiac exam appears regular, rate controlled.  Chest clear.  Nasopharynx without evidence of recent epistaxis which was described from right nasopharynx.  Cooperative and forthcoming without cognitive deficit identified.        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 Left hip otherwise negative.This report was electronically interpreted by: Dr. Nba Serrano MD ON 01/06/2017 at 16:24

## 2021-06-14 NOTE — TELEPHONE ENCOUNTER
[facility] Nurse Triage Telephone Note      Provider: YECENIA Olivera  Facility: [facility] Type: LT    Caller: Zakiya  Call Back Number:  [phone]    Allergies: Patient has no known allergies.    Reason for call: Nurse calling to report that patient has a red, yeasty groin.       Verbal Order/Direction given by Provider: Nystatin powder to groin two times a day until clear, then two times a day PRN.      Provider giving order: YECENIA Olivera    Verbal order given to: Priscila Ramirez RN

## 2021-06-14 NOTE — PROGRESS NOTES
[facility]    Facility:   [facility] [[id number]]   Code Status: DNR/DNI       Chief Complaint   Patient presents with     Review Of Multiple Medical Conditions     LTC 12/30/2020. GIB Aug 2020. Anemia. Afib on warfarin.        HPI:   Riley is a 89 y.o. male with hx of Afib on warfarin, prior stroke, BPH, HTN, [place] palsy, hypothyroidism, SP catheter, resides in LTC at [facility]. He was sent to the hospital from nursing home on 8/13/2020. He had labs drawn in NH as he was not doing well with general fatigue, decreased appetite. NO respiratory sx. He started feeling a little better on his own but then labs came back with hgb down to 6.2. He was worked up in the hospital with discharge summary as partially excerpted below.     89 y.o. male with history of stroke, cognitive impairment, visual impairment, Bell's palsy, chronic urine retention status post SP catheter, A. fib presented for evaluation of incidentally noted anemia found to have duodenal ulcer     1.Normocytic anemia  -had Melena  -found to have Duodenal ulcer  - baseline hemoglobin around 10, incidentally found to have hemoglobin of 6.2 at his nursing home, sent in for evaluation.  By the time he got here his hemoglobin had dropped to 5.9  He has received 3 units pRBCs since admission  -Underwent EGD showed duodenal ulcer, had already stopped bleeding  - cautiously restarted anticoagulation again and he seems to be tolerating  -Continue  PPI   -GI did not recommend any specific followup likely given his age. He should continue on oral PPI lifelong  -now on 8/19/20 hgb is 8.2     2.Iron deficiency  - He received 3 units pRBCs which will help replenish his iron stores.      3.Need for anticoagulation  -On warfarin chronically for history of A fib.  -He has history of stroke during a brief period that warfarin was held for bleeding in the past.  -As above presented with anemia unknown source, so  "INR was reversed with vitamin K and FFP. He seemed to develop tarry stools earlier in hospital stay shortly after Lovenox was given.  -Had EGD and ulcer was nonbleeding. Ok to restart anticoagulation per GI. We restarted Lovenox--will stop lovenox when inr is 2.0      4.BPH   -CAUTI  -Has SP cath-changed on 8/17, gets change q 4 weeks  -Urine culture from 8/12 growing MSSA  -Had been on Vanc now amoxicillin  -Plan 7 days abx     5.Hx CVA  -Home statin  -Full anticoagulation as above (warfarin bridging with Lovenox)  -PT/OT     6.Hypothyroidism  -Home Synthroid     Overall stabilized and discharged back to LTC facility on 8/19/2020.     Today:  No interim concerns since my last visit. He seems stable. He has consistently tested negative on facility wide surveillance COVID-19 testing. He has no fever or cough. He continues on iron and PPI due to duodenal ulcer with GIB Aug 2020. No reports of abdominal pain. No complaints at all today. He stated he wanted to go to the bathroom, he believes he can do it himself, but he has called for help and states with a smile \"because thats what they want me to do\". Endorsed that is exactly correct, for his safety. He is pleasant and cooperative with staff. He has not had any falls. No open areas of skin. Has chronic SP catheter, no issues. His last BIMS core was 7 out of 15 in October. He has baseline vision impairment, on multiple eye drops.       Past Medical History:  Past Medical History:   Diagnosis Date     Atrial fibrillation (H)     On coumadin     Bell's palsy     right sided facial droop     CVA (cerebral vascular accident) (H)      Hypertension      Pacemaker      Urinary retention        Medications:  Current Outpatient Medications   Medication Sig     acetaminophen (TYLENOL) 325 MG tablet Take 2 tablets (650 mg total) by mouth every 6 (six) hours as needed for pain.     atorvastatin (LIPITOR) 40 MG tablet Take 1 tablet (40 mg total) by mouth at bedtime. For hx of cva "     bisacodyL (DULCOLAX) 10 mg suppository Insert 10 mg into the rectum daily as needed.     docusate sodium (COLACE) 100 MG capsule Take 1 capsule (100 mg total) by mouth 2 (two) times a day. For constipation (Patient taking differently: Take 100 mg by mouth daily. And daily PRN.)     dorzolamide-timoloL (COSOPT) 22.3-6.8 mg/mL ophthalmic solution Administer 1 drop into the left eye 2 (two) times a day. glaucoma     erythromycin base (ERYTHROMYCIN OPHT) Apply 5 mg to eye. Instill 1 ribbon in left eye at HS and right eye four times a day     ferrous sulfate 325 (65 FE) MG tablet Take 1 tablet by mouth daily.     latanoprost (XALATAN) 0.005 % ophthalmic solution Administer 1 drop into the left eye at bedtime. glaucoma     levothyroxine (SYNTHROID, LEVOTHROID) 25 MCG tablet Take 1 tablet (25 mcg total) by mouth Daily at 6:00 am. Hypothyroid     omeprazole (PRILOSEC) 20 MG capsule Take 1 capsule (20 mg total) by mouth 2 (two) times a day before meals. Needs two times a day for 2 months, then daily for ulcer     polyethylene glycol (MIRALAX) 17 gram packet Take 1 packet (17 g total) by mouth daily as needed. For constipation     propylene glycol (SYSTANE COMPLETE) 0.6 % Drop Apply 1 drop to eye 2 (two) times a day. Both eyes for dry eyes     warfarin sodium (WARFARIN ORAL) Take by mouth. 12/14/20 INR 2.69 Cont 7mg M & Th, 6.5mg AOD. Next INR 1/13/21 11/30/20 INR 2.63 Cont 7mg M & Th, 6.5mg AOD. NExt INR 12/14.  11/23/20 INR 2.03  Cont 7mg Mon and Thurs and 6.5mg AOD.  Next INR 11/30/20.    11/16/20 INR 1.72 Increase to 78mg M & Th, 6.5mg AOD. Next INR 11/23.  11/9/20 INR 1.76 6.5mg daily. Next 11/16       Physical Exam:   Note: COVID-19 pandemic precautions in place. Physical exam performed with social distancing considerations.  General: Patient is alert, elderly male, no distress.    Vitals: /74, Temp 98.8, Pulse 70, RR 18, O2 sat 98%RA.  HEENT: Head is NCAT. Nares negative. Oropharynx well hydrated. Right  facial droop, baseline.  Neck: No JVD.  Lungs: Non labored respirations.   : SP cath with leg bag.  Extremities: Trace LE edema is noted.  Musculoskeletal: Age related degen changes.   Skin: No rashes noted.   Psych: Mood appears good.      Labs:  Lab Results   Component Value Date    WBC 5.9 10/01/2020    HGB 10.1 (L) 10/05/2020    HCT 35.9 (L) 10/01/2020    MCV 85 10/01/2020     10/01/2020     Results for orders placed or performed in visit on 10/01/20   Basic Metabolic Panel   Result Value Ref Range    Sodium 138 136 - 145 mmol/L    Potassium 4.1 3.5 - 5.0 mmol/L    Chloride 102 98 - 107 mmol/L    CO2 28 22 - 31 mmol/L    Anion Gap, Calculation 8 5 - 18 mmol/L    Glucose 99 70 - 125 mg/dL    Calcium 9.1 8.5 - 10.5 mg/dL    BUN 12 8 - 28 mg/dL    Creatinine 0.79 0.70 - 1.30 mg/dL    GFR MDRD Af Amer >60 >60 mL/min/1.73m2    GFR MDRD Non Af Amer >60 >60 mL/min/1.73m2       Lab Results   Component Value Date    TSH 3.10 04/22/2020       Assessment/Plan:  1. Afib. Chronically anticoagulated with warfarin. Monitor and adjust per INR. He sees cardiology .  2. Duodenal ulcer. Aug 2020 hospitalization. Seen by GI, had EGD on 8/15/2020. No active bleeding so no specific procedural intervention needed. He Is on PPI.  3. Anemia. He received transfusion in the hospital. Work up revealed duodenal ulcer, no active bleeding. Continue iron. Last hgb noted   Lab Results   Component Value Date    HGB 10.1 (L) 10/05/2020     4. Hx of stroke. Continue statin, also on warfarin as noted above.  5. Hypothyroidism. On replacement. Last TSH within goal range.  6. SP catheter.   7. HTN. Not on BP meds, had been on lisinopril in the past. BPs satisfactory.  8. [place] Palsy, right.   9. Vision impairment. On multiple drops, follows with ophthalmology.          Electronically signed by: Nancy Fair MD

## 2021-06-14 NOTE — PROGRESS NOTES
"[facility]    Facility:   [facility] [[id number]]   Code Status: DNR      CHIEF COMPLAINT/REASON FOR VISIT:  Chief Complaint   Patient presents with     FVP Care Coordination - Regulatory       HISTORY:      HPI: Riley is a 89 y.o. male  now residing in the LTC at [facility].  He is  with history of A. fib on warfarin develop acute CVA from holding warfarin for right gluteal hematoma.  Currently back on Coumadin, Hypertension , urinary retention has a suprapubic catheter and with a pacemaker       Today he being seen  for routine regulatory visit. He was seen in his room listening to music. He had no concerns. When asked if he was moving his bowels he responded, \"you are the provider and should know this.\". He denied CP or shortness of breath,  He is able to move all extremities.  He is eating well and reports no difficulties with swallowing.    Staff reported he is moving his bowels and had no issues.   His suprapubic is intact and draining clear yellow urine.  TSH stable at 3.10 and done on 4/22/20. LS clear and no shortness of breath     Past Medical History:   Diagnosis Date     Atrial fibrillation (H)     On coumadin     Bell's palsy     right sided facial droop     CVA (cerebral vascular accident) (H)      Hypertension      Pacemaker      Urinary retention              Family History   Problem Relation Age of Onset     COPD Father      Social History     Socioeconomic History     Marital status:      Spouse name: Not on file     Number of children: Not on file     Years of education: Not on file     Highest education level: Not on file   Occupational History     Not on file   Social Needs     Financial resource strain: Not on file     Food insecurity     Worry: Not on file     Inability: Not on file     Transportation needs     Medical: Not on file     Non-medical: Not on file   Tobacco Use     Smoking status: Former Smoker     Smokeless " tobacco: Never Used   Substance and Sexual Activity     Alcohol use: No     Drug use: No     Sexual activity: Not on file   Lifestyle     Physical activity     Days per week: Not on file     Minutes per session: Not on file     Stress: Not on file   Relationships     Social connections     Talks on phone: Not on file     Gets together: Not on file     Attends Anabaptism service: Not on file     Active member of club or organization: Not on file     Attends meetings of clubs or organizations: Not on file     Relationship status: Not on file     Intimate partner violence     Fear of current or ex partner: Not on file     Emotionally abused: Not on file     Physically abused: Not on file     Forced sexual activity: Not on file   Other Topics Concern     Not on file   Social History Narrative    03/07/15 - The patient lives alone in his own home.         Review of Systems  Eyes:  He is followed  closely by opthalmology.  He has had multiple eye  procedures   Constitutional: Negative for activity change and fatigue. Negative for appetite change, chills and fever.   HENT: Negative for congestion and sore throat.    Eyes: positive  for visual disturbance. right sided facial paralysis   Respiratory: Negative for cough, shortness of breath and wheezing.    Cardiovascular: Negative for chest pain and leg swelling.        Pacemaker   Gastrointestinal: Negative for abdominal distention, abdominal pain, constipation, diarrhea and nausea.   Genitourinary: Negative for dysuria.   Musculoskeletal: Negative for arthralgias and myalgias.   Skin: Negative for color change, rash and wound.   Neurological: Negative for dizziness, weakness and numbness.   Psychiatric/Behavioral: Negative for agitation, behavioral problems and sleep disturbance.   Vitals:    02/03/21 0748   BP: (!) 160/92   Pulse: 72   Resp: 18   Temp: 98.9  F (37.2  C)   SpO2: 97%   Weight: 180 lb 12.8 oz (82 kg)       Constitutional: He appears well-developed and  well-nourished.   Pleasant gentleman   HENT:   Head: Normocephalic and atraumatic.   Eyes: Conjunctivae are normal. Pupils are equal, round, and reactive to light. poor vision with the right worse than the left right sided facial paralysis   Neck: Normal range of motion. Neck supple.   Cardiovascular: Normal rate, regular rhythm and normal heart sounds.   No murmur heard.  Pulmonary/Chest: Effort normal and breath sounds normal. He has no wheezes. He has no rales.   Abdominal: Soft. Bowel sounds are normal. He exhibits no distension. There is no tenderness.   Genitourinary: suprapubic catheter  Musculoskeletal: Normal range of motion. He exhibits no edema.   Neurological: He is alert.   Skin: Skin is warm and dry.   Psychiatric: He has a normal mood and affect. His behavior is normal.     LABS:   No results found for this or any previous visit (from the past 240 hour(s)).     Current Outpatient Medications   Medication Sig     acetaminophen (TYLENOL) 325 MG tablet Take 2 tablets (650 mg total) by mouth every 6 (six) hours as needed for pain.     atorvastatin (LIPITOR) 40 MG tablet Take 1 tablet (40 mg total) by mouth at bedtime. For hx of cva     bisacodyL (DULCOLAX) 10 mg suppository Insert 10 mg into the rectum daily as needed.     docusate sodium (COLACE) 100 MG capsule Take 1 capsule (100 mg total) by mouth 2 (two) times a day. For constipation (Patient taking differently: Take 100 mg by mouth daily. And daily PRN.)     dorzolamide-timoloL (COSOPT) 22.3-6.8 mg/mL ophthalmic solution Administer 1 drop into the left eye 2 (two) times a day. glaucoma     erythromycin base (ERYTHROMYCIN OPHT) Apply 5 mg to eye. Instill 1 ribbon in left eye at HS and right eye four times a day     ferrous sulfate 325 (65 FE) MG tablet Take 1 tablet by mouth daily.     guaiFENesin (ROBITUSSIN) 100 mg/5 mL syrup Take 200 mg by mouth every 4 (four) hours as needed for cough.     latanoprost (XALATAN) 0.005 % ophthalmic solution  Administer 1 drop into the left eye at bedtime. glaucoma     levothyroxine (SYNTHROID, LEVOTHROID) 25 MCG tablet Take 1 tablet (25 mcg total) by mouth Daily at 6:00 am. Hypothyroid     nystatin (MYCOSTATIN) powder Apply topically. (apply to groin two times a day until clear, then two times a day PRN)     omeprazole (PRILOSEC) 20 MG capsule Take 1 capsule (20 mg total) by mouth 2 (two) times a day before meals. Needs two times a day for 2 months, then daily for ulcer     propylene glycol (SYSTANE COMPLETE) 0.6 % Drop Apply 1 drop to eye 2 (two) times a day. Both eyes for dry eyes     warfarin sodium (WARFARIN ORAL) Take by mouth. 12/14/20 INR 2.69 Cont 7mg M & Th, 6.5mg AOD. Next INR 1/13/21 11/30/20 INR 2.63 Cont 7mg M & Th, 6.5mg AOD. NExt INR 12/14.  11/23/20 INR 2.03  Cont 7mg Mon and Thurs and 6.5mg AOD.  Next INR 11/30/20.    11/16/20 INR 1.72 Increase to 78mg M & Th, 6.5mg AOD. Next INR 11/23.  11/9/20 INR 1.76 6.5mg daily. Next 11/16     Case Management:  I have reviewed the facility/SNF care plan/MDS which was done 12/3/20 TSH  (3.10 0n 4/22/20)  including the falls risk, nutrition and pain screening. I also reviewed the current immunizations, and preventive care.. Future cancer screening is not clinically indicated secondary to age/goals of care.   Patient's desire to return to the community is not assessible due to cognitive impairment.    Information reviewed:  Medications, vital signs, orders, and nursing notes.    ASSESSMENT:      ICD-10-CM    1. Hypothyroidism, unspecified type  E03.9    2. Anemia, unspecified type  D64.9    3. Age-related physical debility  R54        PLAN:      Right eye trauma - on eyedrops  he no longer has pain and being followed closely by opthalmology.    HX Right sided [place] Palsy . Pt currently on eye lubricating drops multiple times a day.       Suprapubic catheter- cleanse daily, monitor for infection around  the site.  Cares per protocol     Atrial fibrillation on coumadin  and lisinopril, currently being bridged with lovenox.      Anticoagulation management- monitor and adjust per INRS      Poor vision- is followed closely by ophthalmology.  on multiple eye gtts.      Hypothyroidism- on Synthroid, TSH 4/22/20 was 3.10    Anemia HGB 12.9 on 1/14/21        Electronically signed by: Polly Marroquin CNP

## 2021-06-14 NOTE — PROGRESS NOTES
In clinic device check with Device Nurse followed by visit with Dr. Carrillo.  Please see link for full device report.  Patient was informed of results and next follow up during today's visit.

## 2021-06-15 NOTE — TELEPHONE ENCOUNTER
[facility] Nurse Triage Anticoagulation Note      Provider: YECENIA Olivera  Facility: [facility] Type: LT    Caller: Gabriela  Call Back Number:  [phone]    Reason for call: INR    Today s INR: 2.87  Previous INR: 1/3 2.73 7mg M,Th and 6.5mg AOD.     Diagnosis/Goal: AFIB  Heparin/Lovenox: No   Currently on ABX: No  Other interacting Medications: None  Missed or refused doses: No    Verbal Order/Direction given by Provider: Cont 7mg M, Th and 6.5mg AOD. Next INR 3/11    Provider giving order: YECENIA Olivera    Verbal order given to: Gabriela Rosario RN

## 2021-06-15 NOTE — TELEPHONE ENCOUNTER
[facility] Nurse Triage Anticoagulation Note      Provider: YECENIA Olivera  Facility: [facility] Type: LT    Caller: Gabriela  Call Back Number:  [phone]    Reason for call: INR    Today s INR: 2.53  Previous INR: 2.87    Diagnosis/Goal: AFIB  Heparin/Lovenox: No   Currently on ABX: No  Other interacting Medications: None  Missed or refused doses: No    Verbal Order/Direction given by Provider: Continue same Coumadin dose (7 mg po Mon/Thur and 6.5 mg AOD).  Re-check INR on 4/8/21.    Provider giving order: YECENIA Olivera    Verbal order given to: Gabriela Jose RN

## 2021-06-15 NOTE — PROGRESS NOTES
[facility]    Facility:   [facility] [[id number]]   Code Status: DNR      CHIEF COMPLAINT/REASON FOR VISIT:  Chief Complaint   Patient presents with     Problem Visit     chnage in mole uppr back        HISTORY:      HPI: Riley is a 89 y.o. male  now residing in the LTC at [facility].  He is  with history of A. fib on warfarin develop acute CVA from holding warfarin for right gluteal hematoma.  Currently back on Coumadin, Hypertension , urinary retention has a suprapubic catheter and with a pacemaker       Today he being seen  for a change in mole . He was brought to his room to review a mole on is back. He was found to have multiple moles entire back however he had one that was different and scabbed in color on his mid upper back. He denied  Pain.    He denied CP or shortness of breath,  He is able to move all extremities.  He is eating well and reports no difficulties with swallowing.      His suprapubic is intact and draining clear yellow urine.      Past Medical History:   Diagnosis Date     Atrial fibrillation (H)     On coumadin     Bell's palsy     right sided facial droop     CVA (cerebral vascular accident) (H)      Hypertension      Pacemaker      Urinary retention              Family History   Problem Relation Age of Onset     COPD Father      Social History     Socioeconomic History     Marital status:      Spouse name: Not on file     Number of children: Not on file     Years of education: Not on file     Highest education level: Not on file   Occupational History     Not on file   Social Needs     Financial resource strain: Not on file     Food insecurity     Worry: Not on file     Inability: Not on file     Transportation needs     Medical: Not on file     Non-medical: Not on file   Tobacco Use     Smoking status: Former Smoker     Smokeless tobacco: Never Used   Substance and Sexual Activity     Alcohol use: No     Drug use: No      Sexual activity: Not on file   Lifestyle     Physical activity     Days per week: Not on file     Minutes per session: Not on file     Stress: Not on file   Relationships     Social connections     Talks on phone: Not on file     Gets together: Not on file     Attends Holiness service: Not on file     Active member of club or organization: Not on file     Attends meetings of clubs or organizations: Not on file     Relationship status: Not on file     Intimate partner violence     Fear of current or ex partner: Not on file     Emotionally abused: Not on file     Physically abused: Not on file     Forced sexual activity: Not on file   Other Topics Concern     Not on file   Social History Narrative    03/07/15 - The patient lives alone in his own home.         Review of Systems  Eyes:  He is followed  closely by opthalmology.  He has had multiple eye  procedures   Constitutional: Negative for activity change and fatigue. Negative for appetite change, chills and fever.   HENT: Negative for congestion and sore throat.    Eyes: positive  for visual disturbance. right sided facial paralysis   Respiratory: Negative for cough, shortness of breath and wheezing.    Cardiovascular: Negative for chest pain and leg swelling.        Pacemaker   Gastrointestinal: Negative for abdominal distention, abdominal pain, constipation, diarrhea and nausea.   Genitourinary: Negative for dysuria.   Musculoskeletal: Negative for arthralgias and myalgias.   Skin: Negative for color change, rash and wound.   Neurological: Negative for dizziness, weakness and numbness.   Psychiatric/Behavioral: Negative for agitation, behavioral problems and sleep disturbance.   Vitals:    03/15/21 0930   BP: 142/74   Pulse: 61   Resp: 20   Temp: 98.5  F (36.9  C)   SpO2: 97%   Weight: 181 lb 6.4 oz (82.3 kg)       Constitutional: He appears well-developed and well-nourished.   Pleasant gentleman   HENT:   Head: Normocephalic and atraumatic.   Eyes: Conjunctivae  are normal. Pupils are equal, round, and reactive to light. poor vision with the right worse than the left right sided facial paralysis   Neck: Normal range of motion. Neck supple.   Cardiovascular: Normal rate, regular rhythm and normal heart sounds.   No murmur heard.  Pulmonary/Chest: Effort normal and breath sounds normal. He has no wheezes. He has no rales.   Abdominal: Soft. Bowel sounds are normal. He exhibits no distension. There is no tenderness.   Genitourinary: suprapubic catheter  Musculoskeletal: Normal range of motion. He exhibits no edema.   Neurological: He is alert.   Skin: Skin is warm and dry Multiple moles on his back with a change in color on one mid upper back .   Psychiatric: He has a normal mood and affect. His behavior is normal.      LABS:   Recent Results (from the past 240 hour(s))   INR   Result Value Ref Range    INR 2.53 (H) 0.90 - 1.10        Current Outpatient Medications   Medication Sig     acetaminophen (TYLENOL) 325 MG tablet Take 2 tablets (650 mg total) by mouth every 6 (six) hours as needed for pain.     atorvastatin (LIPITOR) 40 MG tablet Take 1 tablet (40 mg total) by mouth at bedtime. For hx of cva     bisacodyL (DULCOLAX) 10 mg suppository Insert 10 mg into the rectum daily as needed.     docusate sodium (COLACE) 100 MG capsule Take 1 capsule (100 mg total) by mouth 2 (two) times a day. For constipation (Patient taking differently: Take 100 mg by mouth daily. And daily PRN.)     dorzolamide-timoloL (COSOPT) 22.3-6.8 mg/mL ophthalmic solution Administer 1 drop into the left eye 2 (two) times a day. glaucoma     erythromycin base (ERYTHROMYCIN OPHT) Apply 5 mg to eye. Instill 1 ribbon in left eye at HS and right eye four times a day     ferrous sulfate 325 (65 FE) MG tablet Take 1 tablet by mouth daily.     guaiFENesin (ROBITUSSIN) 100 mg/5 mL syrup Take 200 mg by mouth every 4 (four) hours as needed for cough.     latanoprost (XALATAN) 0.005 % ophthalmic solution Administer  1 drop into the left eye at bedtime. glaucoma     levothyroxine (SYNTHROID, LEVOTHROID) 25 MCG tablet Take 1 tablet (25 mcg total) by mouth Daily at 6:00 am. Hypothyroid     nystatin (MYCOSTATIN) powder Apply topically. (apply to groin two times a day until clear, then two times a day PRN)     omeprazole (PRILOSEC) 20 MG capsule Take 1 capsule (20 mg total) by mouth 2 (two) times a day before meals. Needs two times a day for 2 months, then daily for ulcer     propylene glycol (SYSTANE COMPLETE) 0.6 % Drop Apply 1 drop to eye 2 (two) times a day. Both eyes for dry eyes     warfarin sodium (WARFARIN ORAL) Take by mouth. 3/11/21 INR 2.53 Cont 7 mg M, Th and 6.5 mg AOD.  Next INR 4/8/21.  2/11/21 INR 2.87 Cont 7mg M, Th and 6.5mg AOD. Next INR 3/11  12/14/20 INR 2.69 Cont 7mg M & Th, 6.5mg AOD. Next INR 1/13/21 11/30/20 INR 2.63 Cont 7mg M & Th, 6.5mg AOD. NExt INR 12/14.  11/23/20 INR 2.03  Cont 7mg Mon and Thurs and 6.5mg AOD.  Next INR 11/30/20.    11/16/20 INR 1.72 Increase to 78mg M & Th, 6.5mg AOD. Next INR 11/23.  11/9/20 INR 1.76 6.5mg daily. Next 11/16       ASSESSMENT:      ICD-10-CM    1. Change in skin mole  D22.9        PLAN:      Mole- Multiple moles on his back however one has changed in color mid upper back- F/U dermatology. [facility] number given unless pt has his own dermatologist.     HX Right sided [place] Palsy . Pt currently on eye lubricating drops multiple times a day.       Suprapubic catheter- cleanse daily, monitor for infection around  the site.  Cares per protocol     Atrial fibrillation on coumadin and lisinopril, currently being bridged with lovenox.      Anticoagulation management- monitor and adjust per INRS      Poor vision- is followed closely by ophthalmology.  on multiple eye gtts.      Hypothyroidism- on Synthroid, TSH 4/22/20 was 3.10    Anemia HGB 12.9 on 1/14/21        Electronically signed by: Polly Marroquin CNP

## 2021-06-16 PROBLEM — R31.0 GROSS HEMATURIA: Status: ACTIVE | Noted: 2019-01-16

## 2021-06-16 PROBLEM — R41.0 CONFUSION: Status: ACTIVE | Noted: 2018-12-20

## 2021-06-16 PROBLEM — S30.0XXA TRAUMATIC HEMATOMA OF BUTTOCK, INITIAL ENCOUNTER: Status: ACTIVE | Noted: 2018-12-13

## 2021-06-16 PROBLEM — D64.9 NORMOCYTIC ANEMIA: Status: ACTIVE | Noted: 2020-08-14

## 2021-06-16 PROBLEM — K92.1 GASTROINTESTINAL HEMORRHAGE WITH MELENA: Status: ACTIVE | Noted: 2020-08-13

## 2021-06-16 PROBLEM — Z09 HOSPITAL DISCHARGE FOLLOW-UP: Status: ACTIVE | Noted: 2018-12-19

## 2021-06-16 PROBLEM — R33.9 URINARY RETENTION: Status: ACTIVE | Noted: 2019-01-16

## 2021-06-16 PROBLEM — G51.0 BELL'S PALSY: Status: ACTIVE | Noted: 2019-10-10

## 2021-06-16 PROBLEM — R41.82 ALTERED MENTAL STATE: Status: ACTIVE | Noted: 2018-12-20

## 2021-06-16 PROBLEM — I49.5 SSS (SICK SINUS SYNDROME) (H): Status: ACTIVE | Noted: 2020-02-20

## 2021-06-16 PROBLEM — A41.9 SEPSIS (H): Status: ACTIVE | Noted: 2019-01-05

## 2021-06-16 PROBLEM — I63.532 ACUTE ISCHEMIC LEFT PCA STROKE (H): Status: ACTIVE | Noted: 2018-12-26

## 2021-06-16 PROBLEM — Z45.010 PACEMAKER BATTERY DEPLETION: Status: ACTIVE | Noted: 2020-09-28

## 2021-06-16 PROBLEM — G93.40 ENCEPHALOPATHY: Status: ACTIVE | Noted: 2018-12-21

## 2021-06-16 PROBLEM — T14.8XXA TRAUMATIC HEMATOMA: Status: ACTIVE | Noted: 2018-12-13

## 2021-06-16 NOTE — TELEPHONE ENCOUNTER
[facility] Nurse Triage Telephone Note      Provider: YECENIA Olivera  Facility: [facility] Type: LT    Caller: Zakiya  Call Back Number:  [phone]    Allergies: Patient has no known allergies.    Reason for call: Nurse reports INR was scheduled to be drawn today 4/14 however lab thought it was scheduled for 4/19. Need dosing for today 4/14 until INR can be checked tomorrow 4/15.     Verbal Order/Direction given by Provider: [facility] STANDING ORDER GIVEN:  - If missed blood draw or refusal and stable (if within therapeutic range on last blood draw) give recurring dose of Coumadin and check INR next day (4/15/21)  Give 6.5mg tonight 4/14/21.     Provider giving order: YECENIA Olivera    Verbal order given to: Zakiya Pat RN

## 2021-06-16 NOTE — TELEPHONE ENCOUNTER
[facility] Nurse Triage Anticoagulation Note      Provider: YECENIA Olivera  Facility: [facility] Type: LTC    Caller: Zakiya  Call Back Number:  [phone]    Reason for call: INR    Today s INR: 2.81  Previous INR: 4/5/21 INR 2.39 7mg M & Th, 6.5mg AOD    Diagnosis/Goal: AFIB  Heparin/Lovenox: No   Currently on ABX: No  Other interacting Medications:  none  Missed or refused doses: No    Verbal Order/Direction given by Provider: Cont 7mg M & TH, 6.5mg AOD. NExt INR 5/5.    Provider giving order: YECENIA Olivera    Verbal order given to: Priscila Pham RN

## 2021-06-16 NOTE — PROGRESS NOTES
[facility]    Facility:   [facility] [[id number]]   Code Status: DNR      CHIEF COMPLAINT/REASON FOR VISIT:  Chief Complaint   Patient presents with     Problem Visit     F/u cellulitis left arm        HISTORY:      HPI: Riley is a 89 y.o. male  now residing in the LTC at [facility].  He is  with history of A. fib on warfarin develop acute CVA from holding warfarin for right gluteal hematoma.  Currently back on Coumadin, Hypertension , urinary retention has a suprapubic catheter and with a pacemaker       Today he being seen  post mole removal. He recently had a mole removed from his upper back and left forearm. Back incision was C/D/I and left forearm was with slough and scant greenish drainage and redness.  Keflex started.  He is having minimal discomfort.    He denied CP or shortness of breath,  He is able to move all extremities.  He is eating well and reports no difficulties with swallowing. His suprapubic is intact and draining clear yellow urine.      Past Medical History:   Diagnosis Date     Atrial fibrillation (H)     On coumadin     Bell's palsy     right sided facial droop     CVA (cerebral vascular accident) (H)      Hypertension      Pacemaker      Urinary retention              Family History   Problem Relation Age of Onset     COPD Father      Social History     Socioeconomic History     Marital status:      Spouse name: Not on file     Number of children: Not on file     Years of education: Not on file     Highest education level: Not on file   Occupational History     Not on file   Social Needs     Financial resource strain: Not on file     Food insecurity     Worry: Not on file     Inability: Not on file     Transportation needs     Medical: Not on file     Non-medical: Not on file   Tobacco Use     Smoking status: Former Smoker     Smokeless tobacco: Never Used   Substance and Sexual Activity     Alcohol use: No     Drug use:  No     Sexual activity: Not on file   Lifestyle     Physical activity     Days per week: Not on file     Minutes per session: Not on file     Stress: Not on file   Relationships     Social connections     Talks on phone: Not on file     Gets together: Not on file     Attends [employer] service: Not on file     Active member of club or organization: Not on file     Attends meetings of clubs or organizations: Not on file     Relationship status: Not on file     Intimate partner violence     Fear of current or ex partner: Not on file     Emotionally abused: Not on file     Physically abused: Not on file     Forced sexual activity: Not on file   Other Topics Concern     Not on file   Social History Narrative    03/07/15 - The patient lives alone in his own home.         Review of Systems  Eyes:  He is followed  closely by opthalmology.  He has had multiple eye  procedures   Constitutional: Negative for activity change and fatigue. Negative for appetite change, chills and fever.   HENT: Negative for congestion and sore throat.    Eyes: positive  for visual disturbance. right sided facial paralysis   Respiratory: Negative for cough, shortness of breath and wheezing.    Cardiovascular: Negative for chest pain and leg swelling.        Pacemaker   Gastrointestinal: Negative for abdominal distention, abdominal pain, constipation, diarrhea and nausea.   Genitourinary: Negative for dysuria.   Musculoskeletal: Negative for arthralgias and myalgias.   Skin: Negative for color change, rash and wound.   Neurological: Negative for dizziness, weakness and numbness.   Psychiatric/Behavioral: Negative for agitation, behavioral problems and sleep disturbance.   Vitals:    03/29/21 1121   BP: 165/88   Pulse: 80   Resp: 18   Temp: 98.7  F (37.1  C)   SpO2: 98%   Weight: 181 lb 6.4 oz (82.3 kg)       Constitutional: He appears well-developed and well-nourished.   Pleasant gentleman   HENT:   Head: Normocephalic and atraumatic.   Eyes:  Conjunctivae are normal. Pupils are equal, round, and reactive to light. poor vision with the right worse than the left right sided facial paralysis   Neck: Normal range of motion. Neck supple.   Cardiovascular: Normal rate, regular rhythm and normal heart sounds.   No murmur heard.  Pulmonary/Chest: Effort normal and breath sounds normal. He has no wheezes. He has no rales.   Abdominal: Soft. Bowel sounds are normal. He exhibits no distension. There is no tenderness.   Genitourinary: suprapubic catheter  Musculoskeletal: Normal range of motion. He exhibits no edema.   Neurological: He is alert.   Skin: Skin is warm and dry Mole removed from upper back and left forearm. .   Psychiatric: He has a normal mood and affect. His behavior is normal.      LABS:   No results found for this or any previous visit (from the past 240 hour(s)).     Current Outpatient Medications   Medication Sig     acetaminophen (TYLENOL) 325 MG tablet Take 2 tablets (650 mg total) by mouth every 6 (six) hours as needed for pain.     atorvastatin (LIPITOR) 40 MG tablet Take 1 tablet (40 mg total) by mouth at bedtime. For hx of cva     bisacodyL (DULCOLAX) 10 mg suppository Insert 10 mg into the rectum daily as needed.     docusate sodium (COLACE) 100 MG capsule Take 1 capsule (100 mg total) by mouth 2 (two) times a day. For constipation (Patient taking differently: Take 100 mg by mouth daily. And daily PRN.)     dorzolamide-timoloL (COSOPT) 22.3-6.8 mg/mL ophthalmic solution Administer 1 drop into the left eye 2 (two) times a day. glaucoma     erythromycin base (ERYTHROMYCIN OPHT) Apply 5 mg to eye. Instill 1 ribbon in left eye at HS and right eye four times a day     ferrous sulfate 325 (65 FE) MG tablet Take 1 tablet by mouth daily.     guaiFENesin (ROBITUSSIN) 100 mg/5 mL syrup Take 200 mg by mouth every 4 (four) hours as needed for cough.     latanoprost (XALATAN) 0.005 % ophthalmic solution Administer 1 drop into the left eye at bedtime.  glaucoma     levothyroxine (SYNTHROID, LEVOTHROID) 25 MCG tablet Take 1 tablet (25 mcg total) by mouth Daily at 6:00 am. Hypothyroid     nystatin (MYCOSTATIN) powder Apply topically. (apply to groin two times a day until clear, then two times a day PRN)     omeprazole (PRILOSEC) 20 MG capsule Take 1 capsule (20 mg total) by mouth 2 (two) times a day before meals. Needs two times a day for 2 months, then daily for ulcer     propylene glycol (SYSTANE COMPLETE) 0.6 % Drop Apply 1 drop to eye 2 (two) times a day. Both eyes for dry eyes     warfarin sodium (WARFARIN ORAL) Take by mouth. 3/11/21 INR 2.53 Cont 7 mg M, Th and 6.5 mg AOD.  Next INR 4/8/21.  2/11/21 INR 2.87 Cont 7mg M, Th and 6.5mg AOD. Next INR 3/11  12/14/20 INR 2.69 Cont 7mg M & Th, 6.5mg AOD. Next INR 1/13/21 11/30/20 INR 2.63 Cont 7mg M & Th, 6.5mg AOD. NExt INR 12/14.  11/23/20 INR 2.03  Cont 7mg Mon and Thurs and 6.5mg AOD.  Next INR 11/30/20.    11/16/20 INR 1.72 Increase to 78mg M & Th, 6.5mg AOD. Next INR 11/23.  11/9/20 INR 1.76 6.5mg daily. Next 11/16       ASSESSMENT:      ICD-10-CM    1. Cellulitis of left upper extremity  L03.114        PLAN:      Mole- removed from upper back and left forearm. Left forearm wound with scant green drainage and redness - Keflex four times a day x 7 days     HX Right sided [place] Palsy . Pt currently on eye lubricating drops multiple times a day.       Suprapubic catheter- cleanse daily, monitor for infection around  the site.  Cares per protocol     Atrial fibrillation on coumadin and lisinopril, currently being bridged with lovenox.      Anticoagulation management- monitor and adjust per INRS      Poor vision- is followed closely by ophthalmology.  on multiple eye gtts.      Hypothyroidism- on Synthroid, TSH 4/22/20 was 3.10    Anemia  Last HGB 12.9 on 1/14/21        Electronically signed by: Polly Marroquin, CNP

## 2021-06-16 NOTE — TELEPHONE ENCOUNTER
[facility] Nurse Triage Anticoagulation Note      Provider: YECENIA Olivera  Facility: [facility] Type: LTC    Caller: Zakiya  Call Back Number:  [phone]    Reason for call: INR    Today s INR: 2.39  Previous INR: 4/1/21 INR 2.44 Cont 7mg M & Th, 6.5mg AOD.    Diagnosis/Goal: AFIB  Heparin/Lovenox: No   Currently on ABX: Yes  Last dose today  Other interacting Medications: None  Missed or refused doses: No    Verbal Order/Direction given by Provider: Cont 7mg M & Th, 6.5mg AOD. Next INR 4/14    Provider giving order: YECENIA Olivera    Verbal order given to: Sukhwinder Pham RN

## 2021-06-16 NOTE — TELEPHONE ENCOUNTER
[facility] Nurse Triage Anticoagulation Note      Provider: YECENIA Olivera  Facility: [facility] Type: LTC    Caller: Zakiya  Call Back Number:  [phone]    Reason for call: INR    Today s INR: 2.44  Previous INR: 3/11/21 INR 2.53  7mg M & Th, 6.5mg AOD.    Diagnosis/Goal: AFIB  Heparin/Lovenox: No   Currently on ABX: Yes  Keflex 3/29-4/5  Other interacting Medications: None  Missed or refused doses: No    Verbal Order/Direction given by Provider: Cont 7mg M & Th, 6.5mg AOD. Next INR 4/5.    Provider giving order: YECENIA Olivera    Verbal order given to: Priscila Pham RN

## 2021-06-17 NOTE — PROGRESS NOTES
[facility]    Facility:   [facility] [[id number]]   Code Status: DNR/DNI       Chief Complaint   Patient presents with     Review Of Multiple Medical Conditions     LTC 4/30/2021. Afib. General debility.        HPI:   Riley is a 89 y.o. male seen for routine physician follow up in LTC at [facility]. He has hx of Afib on warfarin, prior stroke, BPH, HTN, [place] palsy, hypothyroidism, SP catheter. He was last hospitalized 8/13/2020-8/19/2020. He had labs drawn in NH as he was not doing well with general fatigue, decreased appetite. No respiratory sx. He started feeling a little better on his own but then labs came back with hgb down to 6.2. He was worked up in the hospital found to have duodenal ulcer.       Today:  He has been pretty stable. New issue since my last visit of skin cancer. Had some moles for which he was sent to dermatology late March and then mid April had additional surgery for excision of a back lesion. Wounds now healing well. 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 He reports no pain at biopsy sites. No other skin concerns. He continues on iron and PPI due to duodenal ulcer with GIB Aug 2020. No reports of abdominal pain. No complaints at all today. He is pleasant and cooperative with staff. He has not had any falls. Has chronic SP catheter, no issues. He has baseline vision impairment, on multiple eye drops.       Past Medical History:  Past Medical History:   Diagnosis Date     Atrial fibrillation (H)     On coumadin     Bell's palsy     right sided facial droop     CVA (cerebral vascular accident) (H)      Hypertension      Pacemaker      Urinary retention        Medications:  Current Outpatient Medications   Medication Instructions     acetaminophen (TYLENOL) 650 mg, Oral, Every 6 hours PRN     atorvastatin (LIPITOR) 40 mg, Oral, Bedtime, For hx of  cva     bisacodyL (DULCOLAX) 10 mg, Rectal, Daily PRN     docusate sodium (COLACE) 100 mg, Oral, 2 times daily, For constipation     dorzolamide-timoloL (COSOPT) 22.3-6.8 mg/mL ophthalmic solution 1 drop, Left Eye, 2 times daily, glaucoma     erythromycin base (ERYTHROMYCIN OPHT) 5 mg, Ophthalmic, Instill 1 ribbon in left eye at HS and right eye four times a day     ferrous sulfate 325 (65 FE) MG tablet 1 tablet, Oral, DAILY     guaiFENesin (ROBITUSSIN) 200 mg, Oral, Every 4 hours PRN     latanoprost (XALATAN) 0.005 % ophthalmic solution 1 drop, Left Eye, Bedtime, glaucoma     levothyroxine (SYNTHROID, LEVOTHROID) 25 mcg, Oral, QDaily, Hypothyroid     nystatin (MYCOSTATIN) powder Topical, (apply to groin two times a day until clear, then two times a day PRN)      omeprazole (PRILOSEC) 20 mg, Oral, 2 times daily before meals, Needs two times a day for 2 months, then daily for ulcer     propylene glycol (SYSTANE COMPLETE) 0.6 % Drop 1 drop, Ophthalmic, 2 times daily, Both eyes for dry eyes     warfarin sodium (WARFARIN ORAL) Oral, 4/15/21 INR 2.81 Cont 7mg M & TH, 6.5mg AOD. Next INR 5/54/5/21 INR 2.39 Cont 7mg M & Th, 6.5mg AOD. Next INR 4/144/1/21 INR 2.44 Cont 7mg M & Th, 6.5mg AOD. Next INR 4/5.3/11/21 INR 2.53 Cont 7 mg M, Th and 6.5 mg AOD.  Next INR 4/8/21.2/11/21 INR 2.87 Cont 7mg M, Th and 6.5mg AOD. Next INR 3/11       Physical Exam:   Note: COVID-19 pandemic precautions in place. Physical exam performed with social distancing considerations.  General: Patient is alert, elderly male, no distress.    Vitals: /79, Temp 98.5, Pulse 60, RR 18, O2 sat 98%RA.  HEENT: Head is NCAT. Nares negative. Oropharynx well hydrated. Right facial droop, baseline.  Neck: No JVD.  Lungs: Non labored respirations.   : SP cath with leg bag.  Extremities: Trace LE edema is noted.  Musculoskeletal: Age related degen changes.   Skin: Bandage at biopsy site upper back.   Psych: Mood appears good.      Labs:  Lab Results    Component Value Date    WBC 5.9 10/01/2020    HGB 12.9 (L) 01/14/2021    HCT 35.9 (L) 10/01/2020    MCV 85 10/01/2020     10/01/2020     Results for orders placed or performed in visit on 10/01/20   Basic Metabolic Panel   Result Value Ref Range    Sodium 138 136 - 145 mmol/L    Potassium 4.1 3.5 - 5.0 mmol/L    Chloride 102 98 - 107 mmol/L    CO2 28 22 - 31 mmol/L    Anion Gap, Calculation 8 5 - 18 mmol/L    Glucose 99 70 - 125 mg/dL    Calcium 9.1 8.5 - 10.5 mg/dL    BUN 12 8 - 28 mg/dL    Creatinine 0.79 0.70 - 1.30 mg/dL    GFR MDRD Af Amer >60 >60 mL/min/1.73m2    GFR MDRD Non Af Amer >60 >60 mL/min/1.73m2     Lab Results   Component Value Date    TSH 3.10 04/22/2020         Assessment/Plan:  1. Moles, skin cancer, left arm and upper back. Has seen derm, continue wound treatment orders to biopsy sites. Follow up per derm instructions.   2. Afib. Chronically anticoagulated with warfarin. Monitor and adjust per INR. He sees cardiology.  3. Duodenal ulcer. Aug 2020 hospitalization. Seen by GI, had EGD on 8/15/2020. No active bleeding so no specific procedural intervention needed. He Is on PPI.  4. Anemia. He received transfusion in the hospital. Work up revealed duodenal ulcer, no active bleeding. Continue iron. Last hgb noted   Lab Results   Component Value Date    HGB 12.9 (L) 01/14/2021      5. Hx of stroke. Continue statin, also on warfarin as noted above.  6. Hypothyroidism. On replacement. Last TSH within goal range.  7. SP catheter.   8. HTN. Not on BP meds, had been on lisinopril in the past. BPs satisfactory.  9. [place] Palsy, right.   10. Vision impairment. On multiple drops, follows with ophthalmology.        Electronically signed by: Nancy Fair MD

## 2021-06-17 NOTE — PROGRESS NOTES
"Assessment/Plan:    1. Atrial fibrillation, unspecified type  Chronic atrial fibrillation, rate controlled.  Continues warfarin anticoagulation.  Check INR today as well as basic metabolic panel.  Follows up with Dr. Carrillo November, 2018.  - Basic Metabolic Panel    2. Essential hypertension with goal blood pressure less than 140/90  Hypertension at goal.  Continues lisinopril 10 mg daily  - Basic Metabolic Panel    3. Impaired fasting glucose  Check fasting glucose and A1c today otherwise dietary and exercise modifications for weight goal less than 200 pounds initially.  - Basic Metabolic Panel  - Glycosylated Hemoglobin A1c    4. Stroke due to occlusion of right middle cerebral artery  History of right middle cerebral artery CVA.  Daughter describes some loss of sensation distally at fingertips as residual.  Ambulating with walker.  No recurrent concerns identified.  Continues warfarin chronic anticoagulation for [id number] = 5 with embolic risk 7.2% to 10%.    5. Epistaxis  Epistaxis over winter months.  Right nasopharynx.  Mucosal irritation without ulceration or evidence for recent bleed.  We will continue to monitor and apply petroleum jelly and use moisturizing techniques to avoid recurrence.          Subjective:    Canton L Michael is seen today for follow-up evaluation.  Atrial fibrillation.  Continues chronic warfarin anticoagulation.  Did have epistaxis concerns more right nasopharynx over the winter months however no recent concerns.  Continues lisinopril 10 mg daily for hypertension simvastatin 5 mg at bedtime for lipid management.  History of impaired fasting glucose with prior blood sugar 122 and A1c of 6.1% October 18, 2017.  Dietary indiscretions where he resides in independent living.  Ambulating with walker to assist with history of right middle cerebral artery CVA historically.  No further concerns or recurrence.     (\"[place]\") in 2002 after 56 years   Lives in Senior Living independent " "environment (\"[place]")   4 daughters (Elizabeth Luther (she is a nurse), etc)   No smoke (h/o heavy smoker \"3 ppd\" but quit in age 40s)   No EtOH   Surgeries: pilonidal cyst (), left thyroid (), C6-7 laminectomy (10/93), left cataract (), s/p TURP (); pacemaker x ; right ARON (Dr. Crandall with [facility])  Hospitalizations: -11 ([facility]) for right middle cerebral artery CVA (likely cardioembolic);  - h/o spontaneous hemothorax while on Coumadin for h/o a. fib   Mom -  (unknown reason after \"female operation\" with post-op blood clot)   Dad -  emphysema   1 sis -   Retired - [employer]   [place] [facility], visits frequently in the summer     Past Surgical History:   Procedure Laterality Date     [place] PARTIAL EXCISION THYROID,UNILAT      Description: Thyroid Surgery Sub-Total Thyroidectomy;  Recorded: 2008;     SD REMV PILONIDAL LESION SIMPLE      Description: Pilonidal Cyst Resection;  Recorded: 2008;     SD TRANSURETHRAL ELEC-SURG PROSTATECTOM      Description: Transurethral Resection Of Prostate (TURP);  Recorded: 2008;     TOTAL HIP ARTHROPLASTY Right         Family History   Problem Relation Age of Onset     COPD Father         Past Medical History:   Diagnosis Date     Atrial fibrillation     On coumadin     CVA (cerebral vascular accident)      Hypertension      Pacemaker         Social History   Substance Use Topics     Smoking status: Former Smoker     Smokeless tobacco: Never Used     Alcohol use No        Current Outpatient Prescriptions   Medication Sig Dispense Refill     AVODART 0.5 mg capsule TAKE ONE CAPSULE BY MOUTH EVERY DAY 90 capsule 2     BILBERRY ORAL CAPS       colchicine (COLCRYS) 0.6 mg tablet Take 2 tablets by mouth at onset; may repeat every hour.  Max of 8 tablets per day.       dorzolamide-timolol (COSOPT) 2-0.5 % ophthalmic solution Administer 1 drop to both eyes 2 (two) times a day.       latanoprost (XALATAN) " 0.005 % ophthalmic solution Administer 1 drop to both eyes bedtime.       lisinopril (PRINIVIL,ZESTRIL) 10 MG tablet TAKE ONE TABLET BY MOUTH EVERY DAY 90 tablet 2     simvastatin (ZOCOR) 5 MG tablet TAKE ONE TABLET BY MOUTH EVERY DAY AT BEDTIME 90 tablet 2     tamsulosin (FLOMAX) 0.4 mg Cp24 TAKE ONE CAPSULE BY MOUTH EVERY DAY 90 capsule 2     VIT C/MARIE AC/LUT/COPPER/ZNOX (PRESERVISION LUTEIN ORAL) Take 2 capsules by mouth daily.       warfarin (COUMADIN) 5 MG tablet TAKE TWO TABLETS BY MOUTH EVERY FRIDAY, AND TAKE 1.5 TABLETS ALL OTHER DAYS OF THE WEEK OR AS DIRECTED 60 tablet 2     warfarin (COUMADIN) 5 MG tablet TAKE ONE AND ONE-HALF TO TWO TABLETS BY MOUTH ONCE A DAY, ADJUST DOSE PER INR RESULTS AS INSTRUCTED 140 tablet 1     No current facility-administered medications for this visit.           Objective:    Vitals:    04/17/18 0936   BP: 120/60   Pulse: 72   Weight: 210 lb (95.3 kg)      Body mass index is 31.01 kg/(m^2).    Alert.  No apparent distress.  Chest appears clear.  Cardiac exam rate controlled.  Rhythm appears relatively regular with pacemaker.  HEENT exam with normal left nasopharynx.  Right nasal septum with resolving irritation without ulceration or evidence for recent bleed.        Echocardiogram 10 September 2014:  1. Normal left ventricular size and systolic performance with ejection fraction of 60%.  2. Mild concentric increased LV wall thickness.  3. Mild mitral stenosis.  4. Severe left atrial enlargement. Moderate right atrial enlargement.        This note has been dictated using voice recognition software and as a result may contain minor grammatical errors and unintended word substitutions.

## 2021-06-17 NOTE — TELEPHONE ENCOUNTER
Telephone Encounter by Fior Alfaro MD at 3/13/2021  2:45 PM     Author: Fior Alfaro MD Service: -- Author Type: Physician    Filed: 3/13/2021  2:45 PM Encounter Date: 3/13/2021 Status: Signed    : Fior Alfaro MD (Physician)    From: Riley Rodriguez  Sent: 3/7/2021  1:07 PM CST  To: Fior Alfaro MD  Subject: RE:Covid vaccine appointments available    Riley Rodriguez has already received his two Moderna vaccine shots.

## 2021-06-18 NOTE — LETTER
Letter by Nancy Fair MD at      Author: Nancy Fair MD Service: -- Author Type: --    Filed:  Encounter Date: 1/15/2019 Status: (Other)         Patient: Riley Rodriguez   MR Number: [id number]   YOB: 1926   Date of Visit: 1/15/2019     [facility]      Facility:    [facility] [[id number]]  Code Status: DNR/DNI       Chief Complaint/Reason for Visit:  Chief Complaint   Patient presents with   ? H & P     New admit to TCU for confusion, stroke, AMS.       HPI:   Riley is a 89 y.o. male with hx of afib on warfarin, prior stroke, BPH, HTN, admitted to the hospital in late Dec 2018 with confusion, acute ischemic stroke. Warfarin had been on hold due to recent gluteal hematoma. He was ultimately sent to TCU. However, he was sent back to the hospital from TCU on 1/4/19 due to AMS. DC summary partially excerpted below.     89 years old male with history of A. fib on warfarin develop acute CVA from holding warfarin for right gluteal hematoma and found to have E. coli UTI at that time and discharged with Keflex came back for fever and white count and found to have catheter associated UTI and admitted for sepsis secondary to catheter associated UTI.  Urine culture growing Pseudomonas and change to ciprofloxacin and clinically improved and discharged back to TCU today. Because of the ciprofloxacin, INR has been elevated.  Holding warfarin since 1/6 and recent INR 3.24.  We will hold it today and recheck tomorrow.     Overall stabilized and discharged to TCU on 1/8/19 for PT, OT, nursing cares, medical management and monitoring.     Today:  He has nagy in place. Follow up with urology. Will finish course on Cipro for UTI with sepsis. Denies chest pain or shortness of breath. Does feel very tired. Appetite is poor. No hematuria noted. No abdominal pain. No dizziness reported. He denies new vision or hearing problems.       Past Medical  History:  Past Medical History:   Diagnosis Date   ? Atrial fibrillation (H)     On coumadin   ? CVA (cerebral vascular accident) (H)    ? Hypertension    ? Pacemaker    ? Urinary retention            Surgical History:  Past Surgical History:   Procedure Laterality Date   ? IR BLADDER SUPRAPUBIC CATHETER INSERTION  1/18/2019   ? [place] PARTIAL EXCISION THYROID,UNILAT      Description: Thyroid Surgery Sub-Total Thyroidectomy;  Recorded: 03/24/2008;   ? [place] REMV PILONIDAL LESION SIMPLE      Description: Pilonidal Cyst Resection;  Recorded: 03/24/2008;   ? [place] TRANSURETHRAL ELEC-SURG PROSTATECTOM      Description: Transurethral Resection Of Prostate (TURP);  Recorded: 03/24/2008;   ? TOTAL HIP ARTHROPLASTY Right        Family History:   Family History   Problem Relation Age of Onset   ? COPD Father        Social History:    Social History     Socioeconomic History   ? Marital status:      Spouse name: Not on file   ? Number of children: Not on file   ? Years of education: Not on file   ? Highest education level: Not on file   Social Needs   ? Financial resource strain: Not on file   ? Food insecurity - worry: Not on file   ? Food insecurity - inability: Not on file   ? Transportation needs - medical: Not on file   ? Transportation needs - non-medical: Not on file   Occupational History   ? Not on file   Tobacco Use   ? Smoking status: Former Smoker   ? Smokeless tobacco: Never Used   Substance and Sexual Activity   ? Alcohol use: No   ? Drug use: No   ? Sexual activity: Not on file   Other Topics Concern   ? Not on file   Social History Narrative    03/07/15 - The patient lives alone in his own home.       Medications:  Current Outpatient Medications   Medication Sig   ? acetaminophen (TYLENOL) 325 MG tablet Take 2 tablets (650 mg total) by mouth every 6 (six) hours as needed for pain.   ? bisacodyl (DULCOLAX) 10 mg suppository Insert 10 mg into the rectum daily as needed.   ? ciprofloxacin HCl (CIPRO) 500 MG tablet  Take 1 tablet (500 mg total) by mouth 2 (two) times a day for 10 days.   ? cyanocobalamin 1000 MCG tablet Take 1 tablet (1,000 mcg total) by mouth daily. Low b12   ? dorzolamide-timolol (COSOPT) 22.3-6.8 mg/mL ophthalmic solution 1 drop to both eyes two times a day for macular degeneration and glaucoma   ? latanoprost (XALATAN) 0.005 % ophthalmic solution 1 drop both eyes at bedtime for macular degeneration/glaucoma   ? levothyroxine (SYNTHROID, LEVOTHROID) 25 MCG tablet Take 1 tablet (25 mcg total) by mouth Daily at 6:00 am. Hypothyroid   ? lisinopril (PRINIVIL,ZESTRIL) 10 MG tablet Take 1 tablet (10 mg total) by mouth daily. For htn. Hold if sbp<110   ? polyethylene glycol (MIRALAX) 17 gram packet Take 17 g by mouth daily as needed.   ? senna-docusate (SENNOSIDES-DOCUSATE SODIUM) 8.6-50 mg tablet Take 1 tablet by mouth 2 (two) times a day.   ? simvastatin (ZOCOR) 5 MG tablet TAKE ONE TABLET BY MOUTH AT BEDTIME   ? tamsulosin (FLOMAX) 0.4 mg cap Take 1 capsule (0.4 mg total) by mouth daily after supper. bph   ? VIT C/MARIE AC/LUT/COPPER/ZNOX (PRESERVISION LUTEIN ORAL) Take 1 capsule by mouth 2 (two) times a day .         ? warfarin (COUMADIN/JANTOVEN) 1 MG tablet Hold warfarin tonight and check INR tomorrow (1/9) and further management per primary. (Patient taking differently: 5 mg. 5 mg daily until 1/20, next INR 1/21 1/11/19 5 mg daily next INR 1/14/19 1/9/19 INR 2.41 5mg daily, next INR 1/11.  1/8/19 INR 3.24 Hold warfarin tonight and check INR tomorrow (1/9)   1/7/19 INR 3.37 Held  1/6/19 INR 2.87 6mg  1/5/19 INR 2.36 7.5mg   (Home dose 7.5mg daily, 10mg q Wed.)      )       Allergies:  No Known Allergies       Review of Systems:  Pertinent items are noted in HPI.      Physical Exam:   General: Patient is alert elderly male, no distress.  Vitals: /68, Temp 98.8, Pulse 73, RR 16, O2 sat 97% RA.  HEENT: Head is NCAT. Eyes show no injection or icterus. Nares negative. Oropharynx well hydrated.  Neck:  Supple. No tenderness or adenopathy. No JVD.  Lungs: Clear bilaterally. No wheezes.  Cardiovascular: Regular rate and rhythm, normal S1, S2.  Back: No spinal or CVA tenderness.  Abdomen: Soft, no tenderness on exam. Bowel sounds present. No guarding rebound or rigidity.  : Nagy.  Extremities: No edema is noted.  Musculoskeletal: Age related degen changes.   Skin: No rashes.  Psych: Mood appears good.      Labs:  Results for orders placed or performed in visit on 01/11/19   Comprehensive Metabolic Panel   Result Value Ref Range    Sodium 138 136 - 145 mmol/L    Potassium 3.4 (L) 3.5 - 5.0 mmol/L    Chloride 107 98 - 107 mmol/L    CO2 23 22 - 31 mmol/L    Anion Gap, Calculation 8 5 - 18 mmol/L    Glucose 99 70 - 125 mg/dL    BUN 6 (L) 8 - 28 mg/dL    Creatinine 0.66 (L) 0.70 - 1.30 mg/dL    GFR MDRD Af Amer >60 >60 mL/min/1.73m2    GFR MDRD Non Af Amer >60 >60 mL/min/1.73m2    Bilirubin, Total 0.6 0.0 - 1.0 mg/dL    Calcium 8.1 (L) 8.5 - 10.5 mg/dL    Protein, Total 5.6 (L) 6.0 - 8.0 g/dL    Albumin 2.7 (L) 3.5 - 5.0 g/dL    Alkaline Phosphatase 49 45 - 120 U/L    AST 20 0 - 40 U/L    ALT 10 0 - 45 U/L         Assessment/Plan:  1. Stroke. Ischemic in nature, Warfarin for afib had been on hold due to recent hematoma.  2. Afib. On warfarin. Monitor and adjust per INRs.  3. Urinary retention. Has nagy in place. Follow up per urology.  4. UTI. With sepsis. Finish course of abx.   5. Hyponatremia. Monitor and trend sodium as needed.   6. HTN. On lisinopril. Monitor BPs.  7. Hypothyroidism. Continue home replacement.  8. Code status is DNR/DNI.      Total time greater than 60 minutes, greater than 50% counseling and coordination of care, time spent in interview and examination of patient, review of records, discussion with nursing staff. CC includes management of multiple medical conditions, follow up with urology, anticoagulation management, expected course in TCU.       Electronically signed by: Nancy Fair,  MD

## 2021-06-18 NOTE — LETTER
Letter by Polly Marroquin CNP at      Author: Polly Marroquin CNP Service: -- Author Type: --    Filed:  Encounter Date: 1/15/2019 Status: (Other)         Patient: Riley Rodriguez   MR Number: [id number]   YOB: 1926   Date of Visit: 1/15/2019     [facility]    Facility:   [facility] [[id number]]   Code Status: DNR      CHIEF COMPLAINT/REASON FOR VISIT:  Chief Complaint   Patient presents with   ? Review Of Multiple Medical Conditions       HISTORY:      HPI: Riley is a 89 y.o. male undergoing physical, occupational and speech therapy at [facility]. He is  with history of A. fib on warfarin develop acute CVA from holding warfarin for right gluteal hematoma and found to have E. coli UTI at that time and discharged with Keflex came back for fever and white count and found to have catheter associated UTI and admitted for sepsis secondary to catheter associated UTI.  Urine culture growing Pseudomonas and change to ciprofloxacin and clinically improved and discharged back to TCU    Today he being seen as a routine visit to review multiple medical issues and labs. His 2 daughters are present in the room  He denied CP or shortness of breath. He reported he is moving his bowels. . He is with a leg bag and it was draining clear yellow urine.  He followed up with Urology on 1/14/19 and he could either straight cath or keep the nagy. Dure to patients impaired cognition and visual disturbance he is unable to self cath. Family reported they have a care conference on Friday and will decide if they want to try a trial of void. His potassium was replaced my last visit and last potassium level was 3.8 on 1/12/19.      Past Medical History:   Diagnosis Date   ? Atrial fibrillation (H)     On coumadin   ? CVA (cerebral vascular accident) (H)    ? Hypertension    ? Pacemaker              Family History   Problem Relation Age of Onset   ? COPD Father       Social History     Socioeconomic History   ? Marital status:      Spouse name: Not on file   ? Number of children: Not on file   ? Years of education: Not on file   ? Highest education level: Not on file   Social Needs   ? Financial resource strain: Not on file   ? Food insecurity - worry: Not on file   ? Food insecurity - inability: Not on file   ? Transportation needs - medical: Not on file   ? Transportation needs - non-medical: Not on file   Occupational History   ? Not on file   Tobacco Use   ? Smoking status: Former Smoker   ? Smokeless tobacco: Never Used   Substance and Sexual Activity   ? Alcohol use: No   ? Drug use: No   ? Sexual activity: Not on file   Other Topics Concern   ? Not on file   Social History Narrative    03/07/15 - The patient lives alone in his own home.         Review of Systems   Constitutional: Positive for activity change and fatigue. Negative for appetite change, chills and fever.   HENT: Negative for congestion and sore throat.    Eyes: Negative for visual disturbance.   Respiratory: Negative for cough, shortness of breath and wheezing.    Cardiovascular: Negative for chest pain and leg swelling.        Pacemaker   Gastrointestinal: Negative for abdominal distention, abdominal pain, constipation, diarrhea and nausea.   Genitourinary: Negative for dysuria.   Musculoskeletal: Negative for arthralgias and myalgias.   Skin: Negative for color change, rash and wound.   Neurological: Negative for dizziness, weakness and numbness.   Psychiatric/Behavioral: Negative for agitation, behavioral problems and sleep disturbance.       .  Vitals:    01/15/19 0859   BP: 123/63   Pulse: 73   Resp: 16   Temp: 98.8  F (37.1  C)   SpO2: 97%   Weight: 195 lb 9.6 oz (88.7 kg)       Physical Exam   Constitutional: He appears well-developed and well-nourished.   Pleasant gentleman in no acute distress.    HENT:   Head: Normocephalic and atraumatic.   Eyes: Conjunctivae are normal. Pupils are  equal, round, and reactive to light.   Neck: Normal range of motion. Neck supple.   Cardiovascular: Normal rate, regular rhythm and normal heart sounds.   No murmur heard.  Pulmonary/Chest: Effort normal and breath sounds normal. He has no wheezes. He has no rales.   Abdominal: Soft. Bowel sounds are normal. He exhibits no distension. There is no tenderness.   Genitourinary:   Genitourinary Comments: [place]   Musculoskeletal: Normal range of motion. He exhibits no edema.   Neurological: He is alert.   Skin: Skin is warm and dry.   Psychiatric: He has a normal mood and affect. His behavior is normal.         LABS:   Recent Results (from the past 240 hour(s))   Comprehensive metabolic panel   Result Value Ref Range    Sodium 135 (L) 136 - 145 mmol/L    Potassium 3.8 3.5 - 5.0 mmol/L    Chloride 108 (H) 98 - 107 mmol/L    CO2 22 22 - 31 mmol/L    Anion Gap, Calculation 5 5 - 18 mmol/L    Glucose 103 70 - 125 mg/dL    BUN 12 8 - 28 mg/dL    Creatinine 0.73 0.70 - 1.30 mg/dL    GFR MDRD Af Amer >60 >60 mL/min/1.73m2    GFR MDRD Non Af Amer >60 >60 mL/min/1.73m2    Bilirubin, Total 0.7 0.0 - 1.0 mg/dL    Calcium 7.3 (L) 8.5 - 10.5 mg/dL    Protein, Total 5.1 (L) 6.0 - 8.0 g/dL    Albumin 2.5 (L) 3.5 - 5.0 g/dL    Alkaline Phosphatase 53 45 - 120 U/L    AST 18 0 - 40 U/L    ALT 10 0 - 45 U/L   INR   Result Value Ref Range    INR 2.87 (H) 0.90 - 1.10   HM1 (CBC with Diff)   Result Value Ref Range    WBC 14.1 (H) 4.0 - 11.0 thou/uL    RBC 3.13 (L) 4.40 - 6.20 mill/uL    Hemoglobin 9.9 (L) 14.0 - 18.0 g/dL    Hematocrit 29.3 (L) 40.0 - 54.0 %    MCV 94 80 - 100 fL    MCH 31.6 27.0 - 34.0 pg    MCHC 33.8 32.0 - 36.0 g/dL    RDW 14.9 (H) 11.0 - 14.5 %    Platelets 202 140 - 440 thou/uL    MPV 9.3 8.5 - 12.5 fL    Neutrophils % 79 (H) 50 - 70 %    Lymphocytes % 10 (L) 20 - 40 %    Monocytes % 10 2 - 10 %    Eosinophils % 1 0 - 6 %    Basophils % 0 0 - 2 %    Neutrophils Absolute 11.0 (H) 2.0 - 7.7 thou/uL    Lymphocytes Absolute  1.5 0.8 - 4.4 thou/uL    Monocytes Absolute 1.4 (H) 0.0 - 0.9 thou/uL    Eosinophils Absolute 0.1 0.0 - 0.4 thou/uL    Basophils Absolute 0.0 0.0 - 0.2 thou/uL   INR   Result Value Ref Range    INR 3.37 (H) 0.90 - 1.10   HM2(CBC w/o Differential)   Result Value Ref Range    WBC 8.8 4.0 - 11.0 thou/uL    RBC 3.23 (L) 4.40 - 6.20 mill/uL    Hemoglobin 9.8 (L) 14.0 - 18.0 g/dL    Hematocrit 30.0 (L) 40.0 - 54.0 %    MCV 93 80 - 100 fL    MCH 30.3 27.0 - 34.0 pg    MCHC 32.7 32.0 - 36.0 g/dL    RDW 14.8 (H) 11.0 - 14.5 %    Platelets 193 140 - 440 thou/uL    MPV 9.3 8.5 - 12.5 fL   Basic Metabolic Panel   Result Value Ref Range    Sodium 137 136 - 145 mmol/L    Potassium 3.7 3.5 - 5.0 mmol/L    Chloride 110 (H) 98 - 107 mmol/L    CO2 23 22 - 31 mmol/L    Anion Gap, Calculation 4 (L) 5 - 18 mmol/L    Glucose 105 70 - 125 mg/dL    Calcium 7.6 (L) 8.5 - 10.5 mg/dL    BUN 8 8 - 28 mg/dL    Creatinine 0.70 0.70 - 1.30 mg/dL    GFR MDRD Af Amer >60 >60 mL/min/1.73m2    GFR MDRD Non Af Amer >60 >60 mL/min/1.73m2   INR   Result Value Ref Range    INR 3.24 (H) 0.90 - 1.10   INR   Result Value Ref Range    INR 2.41 (H) 0.90 - 1.10   Comprehensive Metabolic Panel   Result Value Ref Range    Sodium 138 136 - 145 mmol/L    Potassium 3.4 (L) 3.5 - 5.0 mmol/L    Chloride 107 98 - 107 mmol/L    CO2 23 22 - 31 mmol/L    Anion Gap, Calculation 8 5 - 18 mmol/L    Glucose 99 70 - 125 mg/dL    BUN 6 (L) 8 - 28 mg/dL    Creatinine 0.66 (L) 0.70 - 1.30 mg/dL    GFR MDRD Af Amer >60 >60 mL/min/1.73m2    GFR MDRD Non Af Amer >60 >60 mL/min/1.73m2    Bilirubin, Total 0.6 0.0 - 1.0 mg/dL    Calcium 8.1 (L) 8.5 - 10.5 mg/dL    Protein, Total 5.6 (L) 6.0 - 8.0 g/dL    Albumin 2.7 (L) 3.5 - 5.0 g/dL    Alkaline Phosphatase 49 45 - 120 U/L    AST 20 0 - 40 U/L    ALT 10 0 - 45 U/L   Magnesium   Result Value Ref Range    Magnesium 2.0 1.8 - 2.6 mg/dL   Phosphorus   Result Value Ref Range    Phosphorus 2.6 2.5 - 4.5 mg/dL   INR   Result Value Ref Range     INR 2.09 (H) 0.90 - 1.10   HM1 (CBC with Diff)   Result Value Ref Range    WBC 6.5 4.0 - 11.0 thou/uL    RBC 3.46 (L) 4.40 - 6.20 mill/uL    Hemoglobin 10.5 (L) 14.0 - 18.0 g/dL    Hematocrit 32.4 (L) 40.0 - 54.0 %    MCV 94 80 - 100 fL    MCH 30.3 27.0 - 34.0 pg    MCHC 32.4 32.0 - 36.0 g/dL    RDW 14.7 (H) 11.0 - 14.5 %    Platelets 225 140 - 440 thou/uL    MPV 9.9 8.5 - 12.5 fL    Neutrophils % 61 50 - 70 %    Lymphocytes % 24 20 - 40 %    Monocytes % 12 (H) 2 - 10 %    Eosinophils % 3 0 - 6 %    Basophils % 1 0 - 2 %    Neutrophils Absolute 3.9 2.0 - 7.7 thou/uL    Lymphocytes Absolute 1.5 0.8 - 4.4 thou/uL    Monocytes Absolute 0.8 0.0 - 0.9 thou/uL    Eosinophils Absolute 0.2 0.0 - 0.4 thou/uL    Basophils Absolute 0.0 0.0 - 0.2 thou/uL   Potassium   Result Value Ref Range    Potassium 3.8 3.5 - 5.0 mmol/L   INR   Result Value Ref Range    INR 2.06 (H) 0.90 - 1.10     Current Outpatient Medications   Medication Sig   ? acetaminophen (TYLENOL) 325 MG tablet Take 2 tablets (650 mg total) by mouth every 6 (six) hours as needed for pain.   ? bisacodyl (DULCOLAX) 10 mg suppository Insert 10 mg into the rectum daily as needed.   ? ciprofloxacin HCl (CIPRO) 500 MG tablet Take 1 tablet (500 mg total) by mouth 2 (two) times a day for 10 days.   ? cyanocobalamin 1000 MCG tablet Take 1 tablet (1,000 mcg total) by mouth daily. Low b12   ? dorzolamide-timolol (COSOPT) 22.3-6.8 mg/mL ophthalmic solution 1 drop to both eyes two times a day for macular degeneration and glaucoma   ? latanoprost (XALATAN) 0.005 % ophthalmic solution 1 drop both eyes at bedtime for macular degeneration/glaucoma   ? levothyroxine (SYNTHROID, LEVOTHROID) 25 MCG tablet Take 1 tablet (25 mcg total) by mouth Daily at 6:00 am. Hypothyroid   ? lisinopril (PRINIVIL,ZESTRIL) 10 MG tablet Take 1 tablet (10 mg total) by mouth daily. For htn. Hold if sbp<110   ? polyethylene glycol (MIRALAX) 17 gram packet Take 17 g by mouth daily as needed.   ?  senna-docusate (SENNOSIDES-DOCUSATE SODIUM) 8.6-50 mg tablet Take 1 tablet by mouth 2 (two) times a day.   ? simvastatin (ZOCOR) 5 MG tablet TAKE ONE TABLET BY MOUTH AT BEDTIME   ? tamsulosin (FLOMAX) 0.4 mg cap Take 1 capsule (0.4 mg total) by mouth daily after supper. bph   ? VIT C/MARIE AC/LUT/COPPER/ZNOX (PRESERVISION LUTEIN ORAL) Take 1 capsule by mouth 2 (two) times a day .         ? warfarin (COUMADIN/JANTOVEN) 1 MG tablet Hold warfarin tonight and check INR tomorrow (1/9) and further management per primary. (Patient taking differently: 5 mg. 5 mg daily until 1/20, next INR 1/21 1/11/19 5 mg daily next INR 1/14/19 1/9/19 INR 2.41 5mg daily, next INR 1/11.  1/8/19 INR 3.24 Hold warfarin tonight and check INR tomorrow (1/9)   1/7/19 INR 3.37 Held  1/6/19 INR 2.87 6mg  1/5/19 INR 2.36 7.5mg   (Home dose 7.5mg daily, 10mg q Wed.)      )       ASSESSMENT:    Atrial fibrillation   UTI   Anticoagulation management  Hypokalemia      PLAN:    Atrial fibrillation on coumadin and lisinopril  UTI- on Cipro for a total of 10 days- ends 1/18.Has a nagy- followed up with Urology and will keep the catheter., family will decide on Friday if they want to try a trial of void. He will f/u with Urology in 2 months    Anticoagulation management- monitor and adjust per INRS   Hypokalemia- received  20 meq potassium and potassium now 3.8.      Electronically signed by: Polly Marroquin CNP

## 2021-06-18 NOTE — LETTER
Letter by Polly Marroquin CNP at      Author: Polly Marroquin CNP Service: -- Author Type: --    Filed:  Encounter Date: 2/7/2019 Status: (Other)         Patient: Riley Rodirguez   MR Number: [id number]   YOB: 1926   Date of Visit: 2/7/2019     [facility]    Facility:   [facility] [[id number]]   Code Status: DNR      CHIEF COMPLAINT/REASON FOR VISIT:  Chief Complaint   Patient presents with   ? Review Of Multiple Medical Conditions       HISTORY:      HPI: Riley is a 89 y.o. male undergoing physical, occupational and speech therapy at [facility]. He is  with history of A. fib on warfarin develop acute CVA from holding warfarin for right gluteal hematoma and found to have E. Coli UTI at that time and discharged with Keflex came back for fever and white count and found to have catheter associated UTI and admitted for sepsis secondary to catheter associated UTI.  Urine culture growing Pseudomonas and change to ciprofloxacin and clinically improved and discharged back to TCU    Today he being seen in his room as a routine visit to review multiple medical issues.He denied CP or shortness of breath. He is moving his bowels. He was recently seen by the eye doctor and placed on EES ointment and refresh.  His suprapubic is intact and draining clear yellow urine.     Past Medical History:   Diagnosis Date   ? Atrial fibrillation (H)     On coumadin   ? CVA (cerebral vascular accident) (H)    ? Hypertension    ? Pacemaker    ? Urinary retention              Family History   Problem Relation Age of Onset   ? COPD Father      Social History     Socioeconomic History   ? Marital status:      Spouse name: Not on file   ? Number of children: Not on file   ? Years of education: Not on file   ? Highest education level: Not on file   Social Needs   ? Financial resource strain: Not on file   ? Food insecurity - worry: Not on file   ? Food insecurity -  inability: Not on file   ? Transportation needs - medical: Not on file   ? Transportation needs - non-medical: Not on file   Occupational History   ? Not on file   Tobacco Use   ? Smoking status: Former Smoker   ? Smokeless tobacco: Never Used   Substance and Sexual Activity   ? Alcohol use: No   ? Drug use: No   ? Sexual activity: Not on file   Other Topics Concern   ? Not on file   Social History Narrative    03/07/15 - The patient lives alone in his own home.         Review of Systems   Constitutional: Positive for activity change and fatigue. Negative for appetite change, chills and fever.   HENT: Negative for congestion and sore throat.    Eyes: Negative for visual disturbance.   Respiratory: Negative for cough, shortness of breath and wheezing.    Cardiovascular: Negative for chest pain and leg swelling.        Pacemaker   Gastrointestinal: Negative for abdominal distention, abdominal pain, constipation, diarrhea and nausea.   Genitourinary: Negative for dysuria.   Musculoskeletal: Negative for arthralgias and myalgias.   Skin: Negative for color change, rash and wound.   Neurological: Negative for dizziness, weakness and numbness.   Psychiatric/Behavioral: Negative for agitation, behavioral problems and sleep disturbance.       .  Vitals:    02/07/19 0706   BP: 143/78   Pulse: 74   Resp: 16   Temp: 98.5  F (36.9  C)   SpO2: 97%   Weight: 190 lb 3.2 oz (86.3 kg)       Physical Exam   Constitutional: He appears well-developed and well-nourished.   Pleasant gentleman   HENT:   Head: Normocephalic and atraumatic.   Eyes: Conjunctivae are normal. Pupils are equal, round, and reactive to light. poor vision with the right worse than the left   Neck: Normal range of motion. Neck supple.   Cardiovascular: Normal rate, regular rhythm and normal heart sounds.   No murmur heard.  Pulmonary/Chest: Effort normal and breath sounds normal. He has no wheezes. He has no rales.   Abdominal: Soft. Bowel sounds are normal. He  exhibits no distension. There is no tenderness.   Genitourinary: suprapubic catheter  Musculoskeletal: Normal range of motion. He exhibits no edema.   Neurological: He is alert.   Skin: Skin is warm and dry.   Psychiatric: He has a normal mood and affect. His behavior is normal.         LABS:   Recent Results (from the past 240 hour(s))   INR   Result Value Ref Range    INR 1.89 (H) 0.90 - 1.10   Thyroid Stimulating Hormone (TSH)   Result Value Ref Range    TSH 1.91 0.30 - 5.00 uIU/mL   Vitamin B12   Result Value Ref Range    Vitamin B-12 273 213 - 816 pg/mL   INR   Result Value Ref Range    INR 2.51 (H) 0.90 - 1.10     Current Outpatient Medications   Medication Sig   ? acetaminophen (TYLENOL) 325 MG tablet Take 2 tablets (650 mg total) by mouth every 6 (six) hours as needed for pain.   ? bisacodyl (DULCOLAX) 10 mg suppository Insert 10 mg into the rectum daily as needed.   ? carboxymethylcellulose sodium (REFRESH CELLUVISC) 1 % DpGe Administer 1 drop into the left eye 3 (three) times a day.   ? carboxymethylcellulose sodium (REFRESH CELLUVISC) 1 % DpGe Administer 1 drop to the right eye every 3 (three) hours while awake.   ? cyanocobalamin 1000 MCG tablet Take 1 tablet (1,000 mcg total) by mouth daily. Low b12   ? dorzolamide-timolol (COSOPT) 22.3-6.8 mg/mL ophthalmic solution 1 drop to both eyes two times a day for macular degeneration and glaucoma   ? erythromycin base (ERYTHROMYCIN OPHT) Apply 5 mg to eye. Instill 1 ribbon in both eyes at HS   ? latanoprost (XALATAN) 0.005 % ophthalmic solution 1 drop both eyes at bedtime for macular degeneration/glaucoma   ? levothyroxine (SYNTHROID, LEVOTHROID) 25 MCG tablet Take 1 tablet (25 mcg total) by mouth Daily at 6:00 am. Hypothyroid   ? polyethylene glycol (MIRALAX) 17 gram packet Take 17 g by mouth daily as needed.   ? senna-docusate (SENNOSIDES-DOCUSATE SODIUM) 8.6-50 mg tablet Take 1 tablet by mouth 2 (two) times a day.   ? simvastatin (ZOCOR) 5 MG tablet TAKE ONE  TABLET BY MOUTH AT BEDTIME   ? VIT C/MARIE AC/LUT/COPPER/ZNOX (PRESERVISION LUTEIN ORAL) Take 1 capsule by mouth 2 (two) times a day .         ? warfarin (COUMADIN/JANTOVEN) 5 MG tablet Take 1.5 tab (7.5mg) PO daily on Tues & Sat, take 1 tab (5mg) PO daily the remaining days. (Patient taking differently: 5-7.5 mg. Next INR 2/12/19  Take 1.5 tab (7.5mg) PO daily on Tues & Sat, take 1 tab (5mg) PO daily the remaining days.      )       ASSESSMENT:    Suprapubic catheter- cleanse daily, monitor for infection  Atrial fibrillation   Anticoagulation management     PLAN:    Atrial fibrillation on coumadin and lisinopril  UTI- completed Cipro1/18. Pt now with a new suprapubic catheter  Anticoagulation management- monitor and adjust per INRS   Recently seen by the eye doctor and on refresh and EES ointment       Electronically signed by: Polly Marroquin CNP

## 2021-06-18 NOTE — LETTER
Letter by Polly Marroquin CNP at      Author: Polly Marroquin CNP Service: -- Author Type: --    Filed:  Encounter Date: 1/16/2019 Status: (Other)         Patient: Riley Rodriguez   MR Number: [id number]   YOB: 1926   Date of Visit: 1/16/2019     [facility]    Facility:   [facility] [[id number]]   Code Status: DNR      CHIEF COMPLAINT/REASON FOR VISIT:  Chief Complaint   Patient presents with   ? Problem Visit     gross hematuria       HISTORY:      HPI: Riley is a 89 y.o. male undergoing physical, occupational and speech therapy at [facility]. He is  with history of A. fib on warfarin develop acute CVA from holding warfarin for right gluteal hematoma and found to have E. coli UTI at that time and discharged with Keflex came back for fever and white count and found to have catheter associated UTI and admitted for sepsis secondary to catheter associated UTI.  Urine culture growing Pseudomonas and change to ciprofloxacin and clinically improved and discharged back to TCU    Today he being seen in the BR to observe hematuria.  Staff was unable to successfully irrigate his catheter due to non draining and was unable to advance the catheter.  It was pulled in hopes to replace it with a new one. As the catheter was being removed,   he developed gross hematuria with large clots.   He denied CP or shortness of breath. He was seen in the BR where large clots were noted in the toilet. He was sent to the ER for further evaluation.    Past Medical History:   Diagnosis Date   ? Atrial fibrillation (H)     On coumadin   ? CVA (cerebral vascular accident) (H)    ? Hypertension    ? Pacemaker    ? Urinary retention              Family History   Problem Relation Age of Onset   ? COPD Father      Social History     Socioeconomic History   ? Marital status:      Spouse name: Not on file   ? Number of children: Not on file   ? Years of education: Not on  file   ? Highest education level: Not on file   Social Needs   ? Financial resource strain: Not on file   ? Food insecurity - worry: Not on file   ? Food insecurity - inability: Not on file   ? Transportation needs - medical: Not on file   ? Transportation needs - non-medical: Not on file   Occupational History   ? Not on file   Tobacco Use   ? Smoking status: Former Smoker   ? Smokeless tobacco: Never Used   Substance and Sexual Activity   ? Alcohol use: No   ? Drug use: No   ? Sexual activity: Not on file   Other Topics Concern   ? Not on file   Social History Narrative    03/07/15 - The patient lives alone in his own home.         Review of Systems   Constitutional: Positive for activity change and fatigue. Negative for appetite change, chills and fever.   HENT: Negative for congestion and sore throat.    Eyes: Negative for visual disturbance.   Respiratory: Negative for cough, shortness of breath and wheezing.    Cardiovascular: Negative for chest pain and leg swelling.        Pacemaker   Gastrointestinal: Negative for abdominal distention, abdominal pain, constipation, diarrhea and nausea.   Genitourinary: Negative for dysuria.   Musculoskeletal: Negative for arthralgias and myalgias.   Skin: Negative for color change, rash and wound.   Neurological: Negative for dizziness, weakness and numbness.   Psychiatric/Behavioral: Negative for agitation, behavioral problems and sleep disturbance.       .  Vitals:    01/16/19 2125   BP: 117/68   Pulse: 82   Resp: 20   Temp: 99.5  F (37.5  C)   SpO2: 96%       Physical Exam   Constitutional: He appears well-developed and well-nourished.   Pleasant gentleman   HENT:   Head: Normocephalic and atraumatic.   Eyes: Conjunctivae are normal. Pupils are equal, round, and reactive to light.   Neck: Normal range of motion. Neck supple.   Cardiovascular: Normal rate, regular rhythm and normal heart sounds.   No murmur heard.  Pulmonary/Chest: Effort normal and breath sounds normal.  He has no wheezes. He has no rales.   Abdominal: Soft. Bowel sounds are normal. He exhibits no distension. There is no tenderness.   Genitourinary:   Genitourinary Comments: Pierson removed in an attempt to replace a new one and he suddenly developed gross hematuria   Musculoskeletal: Normal range of motion. He exhibits no edema.   Neurological: He is alert.   Skin: Skin is warm and dry.   Psychiatric: He has a normal mood and affect. His behavior is normal.         LABS:   Recent Results (from the past 240 hour(s))   INR   Result Value Ref Range    INR 3.37 (H) 0.90 - 1.10   HM2(CBC w/o Differential)   Result Value Ref Range    WBC 8.8 4.0 - 11.0 thou/uL    RBC 3.23 (L) 4.40 - 6.20 mill/uL    Hemoglobin 9.8 (L) 14.0 - 18.0 g/dL    Hematocrit 30.0 (L) 40.0 - 54.0 %    MCV 93 80 - 100 fL    MCH 30.3 27.0 - 34.0 pg    MCHC 32.7 32.0 - 36.0 g/dL    RDW 14.8 (H) 11.0 - 14.5 %    Platelets 193 140 - 440 thou/uL    MPV 9.3 8.5 - 12.5 fL   Basic Metabolic Panel   Result Value Ref Range    Sodium 137 136 - 145 mmol/L    Potassium 3.7 3.5 - 5.0 mmol/L    Chloride 110 (H) 98 - 107 mmol/L    CO2 23 22 - 31 mmol/L    Anion Gap, Calculation 4 (L) 5 - 18 mmol/L    Glucose 105 70 - 125 mg/dL    Calcium 7.6 (L) 8.5 - 10.5 mg/dL    BUN 8 8 - 28 mg/dL    Creatinine 0.70 0.70 - 1.30 mg/dL    GFR MDRD Af Amer >60 >60 mL/min/1.73m2    GFR MDRD Non Af Amer >60 >60 mL/min/1.73m2   INR   Result Value Ref Range    INR 3.24 (H) 0.90 - 1.10   INR   Result Value Ref Range    INR 2.41 (H) 0.90 - 1.10   Comprehensive Metabolic Panel   Result Value Ref Range    Sodium 138 136 - 145 mmol/L    Potassium 3.4 (L) 3.5 - 5.0 mmol/L    Chloride 107 98 - 107 mmol/L    CO2 23 22 - 31 mmol/L    Anion Gap, Calculation 8 5 - 18 mmol/L    Glucose 99 70 - 125 mg/dL    BUN 6 (L) 8 - 28 mg/dL    Creatinine 0.66 (L) 0.70 - 1.30 mg/dL    GFR MDRD Af Amer >60 >60 mL/min/1.73m2    GFR MDRD Non Af Amer >60 >60 mL/min/1.73m2    Bilirubin, Total 0.6 0.0 - 1.0 mg/dL     Calcium 8.1 (L) 8.5 - 10.5 mg/dL    Protein, Total 5.6 (L) 6.0 - 8.0 g/dL    Albumin 2.7 (L) 3.5 - 5.0 g/dL    Alkaline Phosphatase 49 45 - 120 U/L    AST 20 0 - 40 U/L    ALT 10 0 - 45 U/L   Magnesium   Result Value Ref Range    Magnesium 2.0 1.8 - 2.6 mg/dL   Phosphorus   Result Value Ref Range    Phosphorus 2.6 2.5 - 4.5 mg/dL   INR   Result Value Ref Range    INR 2.09 (H) 0.90 - 1.10   HM1 (CBC with Diff)   Result Value Ref Range    WBC 6.5 4.0 - 11.0 thou/uL    RBC 3.46 (L) 4.40 - 6.20 mill/uL    Hemoglobin 10.5 (L) 14.0 - 18.0 g/dL    Hematocrit 32.4 (L) 40.0 - 54.0 %    MCV 94 80 - 100 fL    MCH 30.3 27.0 - 34.0 pg    MCHC 32.4 32.0 - 36.0 g/dL    RDW 14.7 (H) 11.0 - 14.5 %    Platelets 225 140 - 440 thou/uL    MPV 9.9 8.5 - 12.5 fL    Neutrophils % 61 50 - 70 %    Lymphocytes % 24 20 - 40 %    Monocytes % 12 (H) 2 - 10 %    Eosinophils % 3 0 - 6 %    Basophils % 1 0 - 2 %    Neutrophils Absolute 3.9 2.0 - 7.7 thou/uL    Lymphocytes Absolute 1.5 0.8 - 4.4 thou/uL    Monocytes Absolute 0.8 0.0 - 0.9 thou/uL    Eosinophils Absolute 0.2 0.0 - 0.4 thou/uL    Basophils Absolute 0.0 0.0 - 0.2 thou/uL   Potassium   Result Value Ref Range    Potassium 3.8 3.5 - 5.0 mmol/L   INR   Result Value Ref Range    INR 2.06 (H) 0.90 - 1.10   Basic Metabolic Panel   Result Value Ref Range    Sodium 139 136 - 145 mmol/L    Potassium 3.8 3.5 - 5.0 mmol/L    Chloride 105 98 - 107 mmol/L    CO2 24 22 - 31 mmol/L    Anion Gap, Calculation 10 5 - 18 mmol/L    Glucose 127 (H) 70 - 125 mg/dL    Calcium 8.6 8.5 - 10.5 mg/dL    BUN 8 8 - 28 mg/dL    Creatinine 0.81 0.70 - 1.30 mg/dL    GFR MDRD Af Amer >60 >60 mL/min/1.73m2    GFR MDRD Non Af Amer >60 >60 mL/min/1.73m2   HM1 (CBC with Diff)   Result Value Ref Range    WBC 12.2 (H) 4.0 - 11.0 thou/uL    RBC 4.05 (L) 4.40 - 6.20 mill/uL    Hemoglobin 12.5 (L) 14.0 - 18.0 g/dL    Hematocrit 37.7 (L) 40.0 - 54.0 %    MCV 93 80 - 100 fL    MCH 30.9 27.0 - 34.0 pg    MCHC 33.2 32.0 - 36.0 g/dL     RDW 14.6 (H) 11.0 - 14.5 %    Platelets 255 140 - 440 thou/uL    MPV 9.8 8.5 - 12.5 fL    Neutrophils % 82 (H) 50 - 70 %    Lymphocytes % 8 (L) 20 - 40 %    Monocytes % 10 2 - 10 %    Eosinophils % 0 0 - 6 %    Basophils % 0 0 - 2 %    Neutrophils Absolute 9.9 (H) 2.0 - 7.7 thou/uL    Lymphocytes Absolute 1.0 0.8 - 4.4 thou/uL    Monocytes Absolute 1.2 (H) 0.0 - 0.9 thou/uL    Eosinophils Absolute 0.0 0.0 - 0.4 thou/uL    Basophils Absolute 0.0 0.0 - 0.2 thou/uL   INR   Result Value Ref Range    INR 2.20 (H) 0.90 - 1.10   Urinalysis-UC if Indicated   Result Value Ref Range    Color, UA Red (!) Colorless, Yellow, Straw, Light Yellow    Clarity, UA Cloudy (!) Clear    Glucose, UA Negative Negative    Bilirubin, UA Negative Negative    Ketones, UA Trace (!) Negative, 60 mg/dL    Specific Gravity, UA 1.016 1.001 - 1.030    Blood, UA Large (!) Negative    pH, UA 6.5 4.5 - 8.0    Protein, UA 30 mg/dL (!) Negative mg/dL    Urobilinogen, UA <2.0 E.U./dL <2.0 E.U./dL, 2.0 E.U./dL    Nitrite, UA Negative Negative    Leukocytes, UA Large (!) Negative    Bacteria, UA Few (!) None Seen hpf    RBC, UA >100 (!) None Seen, 0-2 hpf    WBC, UA >100 (!) None Seen, 0-5 hpf    Squam Epithel, UA 0-5 None Seen, 0-5 lpf    WBC Clumps Present (!) None Seen     No current outpatient medications on file.       ASSESSMENT:    Atrial fibrillation   UTI   Anticoagulation management        PLAN:    Gross hematuria- send to ER for further evaluation      Electronically signed by: Polly Marroquin CNP

## 2021-06-18 NOTE — LETTER
Letter by Polly Marroquin CNP at      Author: Polly Marroquin CNP Service: -- Author Type: --    Filed:  Encounter Date: 1/11/2019 Status: (Other)         Patient: Riley Rodriguez   MR Number: [id number]   YOB: 1926   Date of Visit: 1/11/2019       [facility]    Facility:   [facility] [[id number]]   Code Status: DNR      CHIEF COMPLAINT/REASON FOR VISIT:  Chief Complaint   Patient presents with   ? Review Of Multiple Medical Conditions       HISTORY:      HPI: Riley is a 89 y.o. male undergoing physical, occupational and speech therapy at [facility]. He is  with history of A. fib on warfarin develop acute CVA from holding warfarin for right gluteal hematoma and found to have E. coli UTI at that time and discharged with Keflex came back for fever and white count and found to have catheter associated UTI and admitted for sepsis secondary to catheter associated UTI.  Urine culture growing Pseudomonas and change to ciprofloxacin and clinically improved and discharged back to TCU    Today he being seen as a routine visit to review multiple medical issues and labs. He denied CP or shortness of breath. He reported he had a BM yesterday. He is with a leg bag and it was draining clear yellow urine. His potassium was noted to be at 3.4 and he was given 20 meq x 1. INR is stable at 2.09 and next INR 1/14/19. In review of his weights they were inconsistent. He was asked to be re-weighed  today and his weight was 201.8    Past Medical History:   Diagnosis Date   ? Atrial fibrillation (H)     On coumadin   ? CVA (cerebral vascular accident) (H)    ? Hypertension    ? Pacemaker              Family History   Problem Relation Age of Onset   ? COPD Father      Social History     Socioeconomic History   ? Marital status:      Spouse name: Not on file   ? Number of children: Not on file   ? Years of education: Not on file   ? Highest education level:  Not on file   Social Needs   ? Financial resource strain: Not on file   ? Food insecurity - worry: Not on file   ? Food insecurity - inability: Not on file   ? Transportation needs - medical: Not on file   ? Transportation needs - non-medical: Not on file   Occupational History   ? Not on file   Tobacco Use   ? Smoking status: Former Smoker   ? Smokeless tobacco: Never Used   Substance and Sexual Activity   ? Alcohol use: No   ? Drug use: No   ? Sexual activity: Not on file   Other Topics Concern   ? Not on file   Social History Narrative    03/07/15 - The patient lives alone in his own home.         Review of Systems   Constitutional: Positive for activity change and fatigue. Negative for appetite change, chills and fever.   HENT: Negative for congestion and sore throat.    Eyes: Negative for visual disturbance.   Respiratory: Negative for cough, shortness of breath and wheezing.    Cardiovascular: Negative for chest pain and leg swelling.        Pacemaker   Gastrointestinal: Negative for abdominal distention, abdominal pain, constipation, diarrhea and nausea.   Genitourinary: Negative for dysuria.   Musculoskeletal: Negative for arthralgias and myalgias.   Skin: Negative for color change, rash and wound.   Neurological: Negative for dizziness, weakness and numbness.   Psychiatric/Behavioral: Negative for agitation, behavioral problems and sleep disturbance.       .  Vitals:    01/11/19 1125   BP: 115/56   Pulse: 73   Resp: 18   Temp: 99  F (37.2  C)   SpO2: 96%   Weight: 214 lb 8 oz (97.3 kg)       Physical Exam   Constitutional: He appears well-developed and well-nourished.   Pleasant gentleman in no acute distress.    HENT:   Head: Normocephalic and atraumatic.   Eyes: Conjunctivae are normal. Pupils are equal, round, and reactive to light.   Neck: Normal range of motion. Neck supple.   Cardiovascular: Normal rate, regular rhythm and normal heart sounds.   No murmur heard.  Pulmonary/Chest: Effort normal and  breath sounds normal. He has no wheezes. He has no rales.   Abdominal: Soft. Bowel sounds are normal. He exhibits no distension. There is no tenderness.   Musculoskeletal: Normal range of motion. He exhibits no edema.   Neurological: He is alert.   Skin: Skin is warm and dry.   Psychiatric: He has a normal mood and affect. His behavior is normal.         LABS:   Recent Results (from the past 240 hour(s))   INR   Result Value Ref Range    INR 2.45 (H) 0.90 - 1.10   Phosphorus Level > 2.4 no replacement required   Result Value Ref Range    Phosphorus 3.2 2.5 - 4.5 mg/dL   Comprehensive Metabolic Panel   Result Value Ref Range    Sodium 134 (L) 136 - 145 mmol/L    Potassium 4.2 3.5 - 5.0 mmol/L    Chloride 102 98 - 107 mmol/L    CO2 24 22 - 31 mmol/L    Anion Gap, Calculation 8 5 - 18 mmol/L    Glucose 123 70 - 125 mg/dL    BUN 16 8 - 28 mg/dL    Creatinine 0.78 0.70 - 1.30 mg/dL    GFR MDRD Af Amer >60 >60 mL/min/1.73m2    GFR MDRD Non Af Amer >60 >60 mL/min/1.73m2    Bilirubin, Total 1.2 (H) 0.0 - 1.0 mg/dL    Calcium 8.3 (L) 8.5 - 10.5 mg/dL    Protein, Total 6.2 6.0 - 8.0 g/dL    Albumin 3.3 (L) 3.5 - 5.0 g/dL    Alkaline Phosphatase 71 45 - 120 U/L    AST 24 0 - 40 U/L    ALT 16 0 - 45 U/L   APTT   Result Value Ref Range    PTT 44 (H) 24 - 37 seconds   INR   Result Value Ref Range    INR 2.36 (H) 0.90 - 1.10   Type and Screen   Result Value Ref Range    ABORh A POS     Antibody Screen Negative Negative   Repeat Lactic Acid after 30 cc/kg bolus completion   Result Value Ref Range    Lactic Acid 1.2 0.5 - 2.2 mmol/L   Blood Culture 1st   Result Value Ref Range    Anaerobic Blood Culture Bottle No Growth No Growth, No organisms seen, bottle returned to instrument, Specimen not received    Aerobic Blood Culture Bottle Specimen not received No Growth, No organisms seen, bottle returned to instrument, Specimen not received   HM1 (CBC with Diff)   Result Value Ref Range    WBC 19.9 (H) 4.0 - 11.0 thou/uL    RBC 3.85 (L)  4.40 - 6.20 mill/uL    Hemoglobin 11.9 (L) 14.0 - 18.0 g/dL    Hematocrit 35.2 (L) 40.0 - 54.0 %    MCV 91 80 - 100 fL    MCH 30.9 27.0 - 34.0 pg    MCHC 33.8 32.0 - 36.0 g/dL    RDW 14.6 (H) 11.0 - 14.5 %    Platelets 270 140 - 440 thou/uL    MPV 9.4 8.5 - 12.5 fL    Neutrophils % 84 (H) 50 - 70 %    Lymphocytes % 3 (L) 20 - 40 %    Monocytes % 12 (H) 2 - 10 %    Eosinophils % 0 0 - 6 %    Basophils % 0 0 - 2 %    Neutrophils Absolute 16.5 (H) 2.0 - 7.7 thou/uL    Lymphocytes Absolute 0.7 (L) 0.8 - 4.4 thou/uL    Monocytes Absolute 2.4 (H) 0.0 - 0.9 thou/uL    Eosinophils Absolute 0.0 0.0 - 0.4 thou/uL    Basophils Absolute 0.0 0.0 - 0.2 thou/uL   C-reactive protein   Result Value Ref Range    CRP 0.9 (H) 0.0 - 0.8 mg/dL   Thyroid Cascade   Result Value Ref Range    TSH 1.47 0.30 - 5.00 uIU/mL   Urinalysis-UC if Indicated   Result Value Ref Range    Color, UA Yellow Colorless, Yellow, Straw, Light Yellow    Clarity, UA Turbid (!) Clear    Glucose, UA Negative Negative    Bilirubin, UA Negative Negative    Ketones, UA Negative Negative, 60 mg/dL    Specific Gravity, UA 1.022 1.001 - 1.030    Blood, UA Small (!) Negative    pH, UA 5.5 4.5 - 8.0    Protein, UA 50 mg/dL (!) Negative mg/dL    Urobilinogen, UA 2.0 E.U./dL <2.0 E.U./dL, 2.0 E.U./dL    Nitrite, UA Positive (!) Negative    Leukocytes, UA Large (!) Negative    Bacteria, UA Moderate (!) None Seen hpf    RBC, UA 5-10 (!) None Seen, 0-2 hpf    WBC, UA >100 (!) None Seen, 0-5 hpf    Squam Epithel, UA 25-50 (!) None Seen, 0-5 lpf    WBC Clumps Present (!) None Seen    Mucus, UA Many (!) None Seen lpf   Culture, Urine   Result Value Ref Range    Culture 50,000-100,000 col/ml Pseudomonas aeruginosa (!)        Susceptibility    Pseudomonas aeruginosa - RACHAEL     Aztreonam 8 Sensitive      Cefepime 2 Sensitive      Ceftazidime 4 Sensitive      Ciprofloxacin <=0.5 Sensitive      Gentamicin <=2 Sensitive      Levofloxacin <=1 Sensitive      Meropenem <=0.25 Sensitive       Nitrofurantoin  Resistant      Piperacillin + Tazobactam 8/4 Sensitive      Tobramycin <=2 Sensitive    Influenza A/B Rapid Test   Result Value Ref Range    Influenza  A, Rapid Antigen No Influenza A antigen detected No Influenza A antigen detected    Influenza B, Rapid Antigen No Influenza B antigen detected No Influenza B antigen detected   Comprehensive metabolic panel   Result Value Ref Range    Sodium 135 (L) 136 - 145 mmol/L    Potassium 3.8 3.5 - 5.0 mmol/L    Chloride 108 (H) 98 - 107 mmol/L    CO2 22 22 - 31 mmol/L    Anion Gap, Calculation 5 5 - 18 mmol/L    Glucose 103 70 - 125 mg/dL    BUN 12 8 - 28 mg/dL    Creatinine 0.73 0.70 - 1.30 mg/dL    GFR MDRD Af Amer >60 >60 mL/min/1.73m2    GFR MDRD Non Af Amer >60 >60 mL/min/1.73m2    Bilirubin, Total 0.7 0.0 - 1.0 mg/dL    Calcium 7.3 (L) 8.5 - 10.5 mg/dL    Protein, Total 5.1 (L) 6.0 - 8.0 g/dL    Albumin 2.5 (L) 3.5 - 5.0 g/dL    Alkaline Phosphatase 53 45 - 120 U/L    AST 18 0 - 40 U/L    ALT 10 0 - 45 U/L   INR   Result Value Ref Range    INR 2.87 (H) 0.90 - 1.10   HM1 (CBC with Diff)   Result Value Ref Range    WBC 14.1 (H) 4.0 - 11.0 thou/uL    RBC 3.13 (L) 4.40 - 6.20 mill/uL    Hemoglobin 9.9 (L) 14.0 - 18.0 g/dL    Hematocrit 29.3 (L) 40.0 - 54.0 %    MCV 94 80 - 100 fL    MCH 31.6 27.0 - 34.0 pg    MCHC 33.8 32.0 - 36.0 g/dL    RDW 14.9 (H) 11.0 - 14.5 %    Platelets 202 140 - 440 thou/uL    MPV 9.3 8.5 - 12.5 fL    Neutrophils % 79 (H) 50 - 70 %    Lymphocytes % 10 (L) 20 - 40 %    Monocytes % 10 2 - 10 %    Eosinophils % 1 0 - 6 %    Basophils % 0 0 - 2 %    Neutrophils Absolute 11.0 (H) 2.0 - 7.7 thou/uL    Lymphocytes Absolute 1.5 0.8 - 4.4 thou/uL    Monocytes Absolute 1.4 (H) 0.0 - 0.9 thou/uL    Eosinophils Absolute 0.1 0.0 - 0.4 thou/uL    Basophils Absolute 0.0 0.0 - 0.2 thou/uL   INR   Result Value Ref Range    INR 3.37 (H) 0.90 - 1.10   HM2(CBC w/o Differential)   Result Value Ref Range    WBC 8.8 4.0 - 11.0 thou/uL    RBC 3.23 (L)  4.40 - 6.20 mill/uL    Hemoglobin 9.8 (L) 14.0 - 18.0 g/dL    Hematocrit 30.0 (L) 40.0 - 54.0 %    MCV 93 80 - 100 fL    MCH 30.3 27.0 - 34.0 pg    MCHC 32.7 32.0 - 36.0 g/dL    RDW 14.8 (H) 11.0 - 14.5 %    Platelets 193 140 - 440 thou/uL    MPV 9.3 8.5 - 12.5 fL   Basic Metabolic Panel   Result Value Ref Range    Sodium 137 136 - 145 mmol/L    Potassium 3.7 3.5 - 5.0 mmol/L    Chloride 110 (H) 98 - 107 mmol/L    CO2 23 22 - 31 mmol/L    Anion Gap, Calculation 4 (L) 5 - 18 mmol/L    Glucose 105 70 - 125 mg/dL    Calcium 7.6 (L) 8.5 - 10.5 mg/dL    BUN 8 8 - 28 mg/dL    Creatinine 0.70 0.70 - 1.30 mg/dL    GFR MDRD Af Amer >60 >60 mL/min/1.73m2    GFR MDRD Non Af Amer >60 >60 mL/min/1.73m2   INR   Result Value Ref Range    INR 3.24 (H) 0.90 - 1.10   INR   Result Value Ref Range    INR 2.41 (H) 0.90 - 1.10   Comprehensive Metabolic Panel   Result Value Ref Range    Sodium 138 136 - 145 mmol/L    Potassium 3.4 (L) 3.5 - 5.0 mmol/L    Chloride 107 98 - 107 mmol/L    CO2 23 22 - 31 mmol/L    Anion Gap, Calculation 8 5 - 18 mmol/L    Glucose 99 70 - 125 mg/dL    BUN 6 (L) 8 - 28 mg/dL    Creatinine 0.66 (L) 0.70 - 1.30 mg/dL    GFR MDRD Af Amer >60 >60 mL/min/1.73m2    GFR MDRD Non Af Amer >60 >60 mL/min/1.73m2    Bilirubin, Total 0.6 0.0 - 1.0 mg/dL    Calcium 8.1 (L) 8.5 - 10.5 mg/dL    Protein, Total 5.6 (L) 6.0 - 8.0 g/dL    Albumin 2.7 (L) 3.5 - 5.0 g/dL    Alkaline Phosphatase 49 45 - 120 U/L    AST 20 0 - 40 U/L    ALT 10 0 - 45 U/L   Magnesium   Result Value Ref Range    Magnesium 2.0 1.8 - 2.6 mg/dL   Phosphorus   Result Value Ref Range    Phosphorus 2.6 2.5 - 4.5 mg/dL   INR   Result Value Ref Range    INR 2.09 (H) 0.90 - 1.10   HM1 (CBC with Diff)   Result Value Ref Range    WBC 6.5 4.0 - 11.0 thou/uL    RBC 3.46 (L) 4.40 - 6.20 mill/uL    Hemoglobin 10.5 (L) 14.0 - 18.0 g/dL    Hematocrit 32.4 (L) 40.0 - 54.0 %    MCV 94 80 - 100 fL    MCH 30.3 27.0 - 34.0 pg    MCHC 32.4 32.0 - 36.0 g/dL    RDW 14.7 (H) 11.0  - 14.5 %    Platelets 225 140 - 440 thou/uL    MPV 9.9 8.5 - 12.5 fL    Neutrophils % 61 50 - 70 %    Lymphocytes % 24 20 - 40 %    Monocytes % 12 (H) 2 - 10 %    Eosinophils % 3 0 - 6 %    Basophils % 1 0 - 2 %    Neutrophils Absolute 3.9 2.0 - 7.7 thou/uL    Lymphocytes Absolute 1.5 0.8 - 4.4 thou/uL    Monocytes Absolute 0.8 0.0 - 0.9 thou/uL    Eosinophils Absolute 0.2 0.0 - 0.4 thou/uL    Basophils Absolute 0.0 0.0 - 0.2 thou/uL   Potassium   Result Value Ref Range    Potassium 3.8 3.5 - 5.0 mmol/L     Current Outpatient Medications   Medication Sig   ? acetaminophen (TYLENOL) 325 MG tablet Take 2 tablets (650 mg total) by mouth every 6 (six) hours as needed for pain.   ? bisacodyl (DULCOLAX) 10 mg suppository Insert 10 mg into the rectum daily as needed.   ? ciprofloxacin HCl (CIPRO) 500 MG tablet Take 1 tablet (500 mg total) by mouth 2 (two) times a day for 10 days.   ? cyanocobalamin 1000 MCG tablet Take 1 tablet (1,000 mcg total) by mouth daily. Low b12   ? dorzolamide-timolol (COSOPT) 22.3-6.8 mg/mL ophthalmic solution 1 drop to both eyes two times a day for macular degeneration and glaucoma   ? latanoprost (XALATAN) 0.005 % ophthalmic solution 1 drop both eyes at bedtime for macular degeneration/glaucoma   ? levothyroxine (SYNTHROID, LEVOTHROID) 25 MCG tablet Take 1 tablet (25 mcg total) by mouth Daily at 6:00 am. Hypothyroid   ? lisinopril (PRINIVIL,ZESTRIL) 10 MG tablet Take 1 tablet (10 mg total) by mouth daily. For htn. Hold if sbp<110   ? polyethylene glycol (MIRALAX) 17 gram packet Take 17 g by mouth daily as needed.   ? senna-docusate (SENNOSIDES-DOCUSATE SODIUM) 8.6-50 mg tablet Take 1 tablet by mouth 2 (two) times a day.   ? simvastatin (ZOCOR) 5 MG tablet TAKE ONE TABLET BY MOUTH AT BEDTIME   ? tamsulosin (FLOMAX) 0.4 mg cap Take 1 capsule (0.4 mg total) by mouth daily after supper. bph   ? VIT C/MARIE AC/LUT/COPPER/ZNOX (PRESERVISION LUTEIN ORAL) Take 1 capsule by mouth 2 (two) times a day  .         ? warfarin (COUMADIN/JANTOVEN) 1 MG tablet Hold warfarin tonight and check INR tomorrow (1/9) and further management per primary. (Patient taking differently: 5 mg. 1/11/19 5 mg daily next INR 1/14/19 1/9/19 INR 2.41 5mg daily, next INR 1/11.  1/8/19 INR 3.24 Hold warfarin tonight and check INR tomorrow (1/9)   1/7/19 INR 3.37 Held  1/6/19 INR 2.87 6mg  1/5/19 INR 2.36 7.5mg   (Home dose 7.5mg daily, 10mg q Wed.)      )       ASSESSMENT:    Atrial fibrillation   UTI   Anticoagulation management  Hypokalemia      PLAN:    Atrial fibrillation on coumadin and lisinopril  UTI- on Cipro for a total of 10 days- ends 1/18.Has a nagy- follow up with Urology.   Anticoagulation management- monitor and adjust per INRS   Hypokalemia- give 20 meq potassium and check lab        Electronically signed by: Polly Marroquin CNP

## 2021-06-18 NOTE — LETTER
Letter by Polly Marroquin CNP at      Author: Polly Marroquin CNP Service: -- Author Type: --    Filed:  Encounter Date: 3/7/2019 Status: (Other)         Patient: Riley Rodriguez   MR Number: [id number]   YOB: 1926   Date of Visit: 3/7/2019     [facility]    Facility:   [facility] [[id number]]   Code Status: DNR      CHIEF COMPLAINT/REASON FOR VISIT:  Chief Complaint   Patient presents with   ? Review Of Multiple Medical Conditions       HISTORY:      HPI: Riley is a 89 y.o. male now residing in the LTC at [facility]. He was recently discharged from the TCU.   He is  with history of A. fib on warfarin develop acute CVA from holding warfarin for right gluteal hematoma and found to have E. Coli UTI at that time and discharged with Keflex came back for fever and white count and found to have catheter associated UTI and admitted for sepsis secondary to catheter associated UTI.  Urine culture growing Pseudomonas and change to ciprofloxacin and clinically improved and discharged back to TCU    Today he being seen in his room as a routine visit to review multiple medical issues. He is settling into his new environment . He denied CP or shortness of breath. He reports constipation.   His suprapubic is intact and draining clear yellow urine., which is fairly new. Prior to my visit with him he was ambulated the halls on the TCU. I am told he is walked daily by staff.      Past Medical History:   Diagnosis Date   ? Atrial fibrillation (H)     On coumadin   ? CVA (cerebral vascular accident) (H)    ? Hypertension    ? Pacemaker    ? Urinary retention              Family History   Problem Relation Age of Onset   ? COPD Father      Social History     Socioeconomic History   ? Marital status:      Spouse name: Not on file   ? Number of children: Not on file   ? Years of education: Not on file   ? Highest education level: Not on file   Occupational  History   ? Not on file   Social Needs   ? Financial resource strain: Not on file   ? Food insecurity:     Worry: Not on file     Inability: Not on file   ? Transportation needs:     Medical: Not on file     Non-medical: Not on file   Tobacco Use   ? Smoking status: Former Smoker   ? Smokeless tobacco: Never Used   Substance and Sexual Activity   ? Alcohol use: No   ? Drug use: No   ? Sexual activity: Not on file   Lifestyle   ? Physical activity:     Days per week: Not on file     Minutes per session: Not on file   ? Stress: Not on file   Relationships   ? Social connections:     Talks on phone: Not on file     Gets together: Not on file     Attends [employer] service: Not on file     Active member of club or organization: Not on file     Attends meetings of clubs or organizations: Not on file     Relationship status: Not on file   ? Intimate partner violence:     Fear of current or ex partner: Not on file     Emotionally abused: Not on file     Physically abused: Not on file     Forced sexual activity: Not on file   Other Topics Concern   ? Not on file   Social History Narrative    03/07/15 - The patient lives alone in his own home.         Review of Systems   Constitutional: Positive for activity change and fatigue. Negative for appetite change, chills and fever.   HENT: Negative for congestion and sore throat.    Eyes: Negative for visual disturbance.   Respiratory: Negative for cough, shortness of breath and wheezing.    Cardiovascular: Negative for chest pain and leg swelling.        Pacemaker   Gastrointestinal: Negative for abdominal distention, abdominal pain, constipation, diarrhea and nausea.   Genitourinary: Negative for dysuria.   Musculoskeletal: Negative for arthralgias and myalgias.   Skin: Negative for color change, rash and wound.   Neurological: Negative for dizziness, weakness and numbness.   Psychiatric/Behavioral: Negative for agitation, behavioral problems and sleep disturbance.        .  Vitals:    03/07/19 0939   BP: 132/67   Pulse: 72   Resp: 18   Temp: 97.9  F (36.6  C)   SpO2: 98%   Weight: 191 lb 9.6 oz (86.9 kg)       Physical Exam   Constitutional: He appears well-developed and well-nourished.   Pleasant gentleman   HENT:   Head: Normocephalic and atraumatic.   Eyes: Conjunctivae are normal. Pupils are equal, round, and reactive to light. poor vision with the right worse than the left   Neck: Normal range of motion. Neck supple.   Cardiovascular: Normal rate, regular rhythm and normal heart sounds.   No murmur heard.  Pulmonary/Chest: Effort normal and breath sounds normal. He has no wheezes. He has no rales.   Abdominal: Soft. Bowel sounds are normal. He exhibits no distension. There is no tenderness.   Genitourinary: suprapubic catheter  Musculoskeletal: Normal range of motion. He exhibits no edema.   Neurological: He is alert.   Skin: Skin is warm and dry.   Psychiatric: He has a normal mood and affect. His behavior is normal.         LABS:   Recent Results (from the past 240 hour(s))   INR   Result Value Ref Range    INR 2.65 (H) 0.90 - 1.10     Current Outpatient Medications   Medication Sig   ? acetaminophen (TYLENOL) 325 MG tablet Take 2 tablets (650 mg total) by mouth every 6 (six) hours as needed for pain.   ? bisacodyl (DULCOLAX) 10 mg suppository Insert 10 mg into the rectum daily as needed.   ? carboxymethylcellulose sodium (REFRESH CELLUVISC) 1 % DpGe Administer 1 drop into the left eye 3 (three) times a day.   ? carboxymethylcellulose sodium (REFRESH CELLUVISC) 1 % DpGe Administer 1 drop to the right eye every 3 (three) hours while awake.   ? cyanocobalamin 1000 MCG tablet Take 1 tablet (1,000 mcg total) by mouth daily. Low b12   ? dorzolamide-timolol (COSOPT) 22.3-6.8 mg/mL ophthalmic solution 1 drop to both eyes two times a day for macular degeneration and glaucoma   ? erythromycin base (ERYTHROMYCIN OPHT) Apply 5 mg to eye. Instill 1 ribbon in both eyes at HS   ?  latanoprost (XALATAN) 0.005 % ophthalmic solution 1 drop both eyes at bedtime for macular degeneration/glaucoma   ? levothyroxine (SYNTHROID, LEVOTHROID) 25 MCG tablet Take 1 tablet (25 mcg total) by mouth Daily at 6:00 am. Hypothyroid   ? polyethylene glycol (MIRALAX) 17 gram packet Take 17 g by mouth daily as needed.   ? senna-docusate (SENNOSIDES-DOCUSATE SODIUM) 8.6-50 mg tablet Take 1 tablet by mouth 2 (two) times a day.   ? simvastatin (ZOCOR) 5 MG tablet TAKE ONE TABLET BY MOUTH AT BEDTIME   ? VIT C/MARIE AC/LUT/COPPER/ZNOX (PRESERVISION LUTEIN ORAL) Take 1 capsule by mouth 2 (two) times a day .         ? warfarin sodium (WARFARIN ORAL) Take 5-7 mg by mouth. Next INR 3/13/19  2/22/19 INR 2.40 7mg M & TH, 5mg AOD. Next INR 3/6.  2/15/19 INR 2.48 7mg M & TH, 5mg AOD.  2/12/19 INR 3.09  Take 7mg Mon and Thurs and 5mg all other days.  Next INR 2/15/19.    Take 1.5 tab (7.5mg) PO daily on Tues & Sat, take 1 tab (5mg) PO daily the remaining days.  Next INR 2/12/19.             ASSESSMENT:    Suprapubic catheter- cleanse daily, monitor for infection  Atrial fibrillation   Anticoagulation management     PLAN:    Atrial fibrillation on coumadin and lisinopril  Pt now with a new suprapubic catheter  Anticoagulation management- monitor and adjust per INRS   Poor vision- is followed closely by the eye doctor. Recently placed on refresh and EES ointment   Constipation- increase bowel medications  Hypothyroidism- TSH 1.91 on 2/1/19      Electronically signed by: Polly Marroquin CNP

## 2021-06-18 NOTE — LETTER
Letter by Nancy Fair MD at      Author: Nancy Fair MD Service: -- Author Type: --    Filed:  Encounter Date: 2/26/2019 Status: (Other)         Patient: Riley Rodriguez   MR Number: [id number]   YOB: 1926   Date of Visit: 2/26/2019     [facility]      Facility:    [facility] [[id number]]  Code Status: DNR/DNI       Chief Complaint/Reason for Visit:  Chief Complaint   Patient presents with   ? H & P     Transfer to LTC following TCU stay.       HPI:   Riley is a 89 y.o. male with hx of Afib on warfarin, prior stroke, BPH, HTN, admitted to the hospital in late Dec 2018 with confusion, acute ischemic stroke. Warfarin had been on hold due to recent gluteal hematoma. He was ultimately sent to TCU. However, he was sent back to the hospital from TCU on 1/4/19 due to AMS. DC summary partially excerpted below.     89 years old male with history of A. fib on warfarin develop acute CVA from holding warfarin for right gluteal hematoma and found to have E. coli UTI at that time and discharged with Keflex came back for fever and white count and found to have catheter associated UTI and admitted for sepsis secondary to catheter associated UTI.  Urine culture growing Pseudomonas and change to ciprofloxacin and clinically improved and discharged back to TCU today. Because of the ciprofloxacin, INR has been elevated.  Holding warfarin since 1/6 and recent INR 3.24.  We will hold it today and recheck tomorrow.     Overall stabilized and discharged to TCU on 1/8/19 for PT, OT, nursing cares, medical management and monitoring.    Subsequently transitioned to LTC status.       Past Medical History:  Past Medical History:   Diagnosis Date   ? Atrial fibrillation (H)     On coumadin   ? CVA (cerebral vascular accident) (H)    ? Hypertension    ? Pacemaker    ? Urinary retention            Surgical History:  Past Surgical History:   Procedure Laterality Date    ? IR BLADDER SUPRAPUBIC CATHETER INSERTION  1/18/2019   ? FL PARTIAL EXCISION THYROID,UNILAT      Description: Thyroid Surgery Sub-Total Thyroidectomy;  Recorded: 03/24/2008;   ? FL REMV PILONIDAL LESION SIMPLE      Description: Pilonidal Cyst Resection;  Recorded: 03/24/2008;   ? FL TRANSURETHRAL ELEC-SURG PROSTATECTOM      Description: Transurethral Resection Of Prostate (TURP);  Recorded: 03/24/2008;   ? TOTAL HIP ARTHROPLASTY Right        Family History:   Family History   Problem Relation Age of Onset   ? COPD Father        Social History:    Social History     Socioeconomic History   ? Marital status:      Spouse name: Not on file   ? Number of children: Not on file   ? Years of education: Not on file   ? Highest education level: Not on file   Occupational History   ? Not on file   Social Needs   ? Financial resource strain: Not on file   ? Food insecurity:     Worry: Not on file     Inability: Not on file   ? Transportation needs:     Medical: Not on file     Non-medical: Not on file   Tobacco Use   ? Smoking status: Former Smoker   ? Smokeless tobacco: Never Used   Substance and Sexual Activity   ? Alcohol use: No   ? Drug use: No   ? Sexual activity: Not on file   Lifestyle   ? Physical activity:     Days per week: Not on file     Minutes per session: Not on file   ? Stress: Not on file   Relationships   ? Social connections:     Talks on phone: Not on file     Gets together: Not on file     Attends [employer] service: Not on file     Active member of club or organization: Not on file     Attends meetings of clubs or organizations: Not on file     Relationship status: Not on file   ? Intimate partner violence:     Fear of current or ex partner: Not on file     Emotionally abused: Not on file     Physically abused: Not on file     Forced sexual activity: Not on file   Other Topics Concern   ? Not on file   Social History Narrative    03/07/15 - The patient lives alone in his own home.        Medications:  Current Outpatient Medications   Medication Sig   ? acetaminophen (TYLENOL) 325 MG tablet Take 2 tablets (650 mg total) by mouth every 6 (six) hours as needed for pain.   ? bisacodyl (DULCOLAX) 10 mg suppository Insert 10 mg into the rectum daily as needed.   ? carboxymethylcellulose sodium (REFRESH CELLUVISC) 1 % DpGe Administer 1 drop into the left eye 3 (three) times a day.   ? carboxymethylcellulose sodium (REFRESH CELLUVISC) 1 % DpGe Administer 1 drop to the right eye every 3 (three) hours while awake.   ? cyanocobalamin 1000 MCG tablet Take 1 tablet (1,000 mcg total) by mouth daily. Low b12   ? dorzolamide-timolol (COSOPT) 22.3-6.8 mg/mL ophthalmic solution 1 drop to both eyes two times a day for macular degeneration and glaucoma   ? erythromycin base (ERYTHROMYCIN OPHT) Apply 5 mg to eye. Instill 1 ribbon in both eyes at HS   ? latanoprost (XALATAN) 0.005 % ophthalmic solution 1 drop both eyes at bedtime for macular degeneration/glaucoma   ? levothyroxine (SYNTHROID, LEVOTHROID) 25 MCG tablet Take 1 tablet (25 mcg total) by mouth Daily at 6:00 am. Hypothyroid   ? polyethylene glycol (MIRALAX) 17 gram packet Take 17 g by mouth daily as needed.   ? senna-docusate (SENNOSIDES-DOCUSATE SODIUM) 8.6-50 mg tablet Take 1 tablet by mouth 2 (two) times a day.   ? simvastatin (ZOCOR) 5 MG tablet TAKE ONE TABLET BY MOUTH AT BEDTIME   ? VIT C/MARIE AC/LUT/COPPER/ZNOX (PRESERVISION LUTEIN ORAL) Take 1 capsule by mouth 2 (two) times a day .         ? warfarin sodium (WARFARIN ORAL) Take by mouth. 2/22/19 INR 2.40 7mg M & TH, 5mg AOD. Next INR 3/6.  2/15/19 INR 2.48 7mg M & TH, 5mg AOD.  2/12/19 INR 3.09  Take 7mg Mon and Thurs and 5mg all other days.  Next INR 2/15/19.    Take 1.5 tab (7.5mg) PO daily on Tues & Sat, take 1 tab (5mg) PO daily the remaining days.  Next INR 2/12/19.             Allergies:  No Known Allergies       Review of Systems:  Pertinent items are noted in HPI.      Physical Exam:    General: Patient is alert, elderly male, no distress.   Vitals: Reviewed.  HEENT: Head is NCAT. Eyes show no injection or icterus. Nares negative. Oropharynx well hydrated.  Neck: Supple. No tenderness or adenopathy. No JVD.  Lungs: Clear bilaterally. No wheezes.  Cardiovascular: Regular rate and rhythm, normal S1. S2.  Back: No spinal or CVA tenderness.  Abdomen: Soft, no tenderness on exam. Bowel sounds present. No guarding rebound or rigidity.  : SP catheter.  Extremities: No edema is noted.  Musculoskeletal: Age related degen changes.   Skin: No rashes.   Psych: Mood appears good.      Labs:  Results for orders placed or performed during the hospital encounter of 01/16/19   Basic Metabolic Panel   Result Value Ref Range    Sodium 138 136 - 145 mmol/L    Potassium 3.7 3.5 - 5.0 mmol/L    Chloride 105 98 - 107 mmol/L    CO2 26 22 - 31 mmol/L    Anion Gap, Calculation 7 5 - 18 mmol/L    Glucose 105 70 - 125 mg/dL    Calcium 8.4 (L) 8.5 - 10.5 mg/dL    BUN 5 (L) 8 - 28 mg/dL    Creatinine 0.65 (L) 0.70 - 1.30 mg/dL    GFR MDRD Af Amer >60 >60 mL/min/1.73m2    GFR MDRD Non Af Amer >60 >60 mL/min/1.73m2       Lab Results   Component Value Date    WBC 5.3 01/25/2019    HGB 10.7 (L) 01/26/2019    HCT 31.4 (L) 01/25/2019    MCV 91 01/25/2019     01/25/2019         Assessment/Plan:  1. Urinary retention. Now with long term SP cathter. Follow up per urology.   2. Hx of stroke. Ischemic in nature, Warfarin for Afib had been on hold due to a recent hematoma.  3. Afib. On warfarin. Monitor and adjust per INRs.  4. HTN. On lisinopril. Monitor BPs.  5. Hypothyroidism. Continue home replacement.  6. Code status is DNR/DNI.    Total time greater than 35 minutes, greater than 50% counseling and coordination of care, time spent in interview and examination of patient, review of records, discussion with nursing staff. CC includes management of medical conditions, new SP catheter, transition to LTC  status.        Electronically signed by: Nancy Fair MD

## 2021-06-18 NOTE — LETTER
Letter by Polly Marroquin CNP at      Author: Polly Marroquin CNP Service: -- Author Type: --    Filed:  Encounter Date: 1/28/2019 Status: (Other)         Patient: Riley Rodriguez   MR Number: [id number]   YOB: 1926   Date of Visit: 1/28/2019     [facility]    Facility:   [facility] [[id number]]   Code Status: DNR      CHIEF COMPLAINT/REASON FOR VISIT:  Chief Complaint   Patient presents with   ? Review Of Multiple Medical Conditions       HISTORY:      HPI: Riley is a 89 y.o. male undergoing physical, occupational and speech therapy at [facility]. He is  with history of A. fib on warfarin develop acute CVA from holding warfarin for right gluteal hematoma and found to have E. coli UTI at that time and discharged with Keflex came back for fever and white count and found to have catheter associated UTI and admitted for sepsis secondary to catheter associated UTI.  Urine culture growing Pseudomonas and change to ciprofloxacin and clinically improved and discharged back to TCU    Today he being seen in his room after returning from a recent hospitalization in which a suprapubic catheter was placed after he developed gross hematuria at the TCU.  His catheter was intact and I did write to clean it daily and apply a ne drain sponge. He denied ant pain at the site and his urine was clear yellow. He denied CP or shortness of breath. He is moving his bowels. His only concern was his failing eye site. The right being worse than the left. He does follow up with a specialist.     Past Medical History:   Diagnosis Date   ? Atrial fibrillation (H)     On coumadin   ? CVA (cerebral vascular accident) (H)    ? Hypertension    ? Pacemaker    ? Urinary retention              Family History   Problem Relation Age of Onset   ? COPD Father      Social History     Socioeconomic History   ? Marital status:      Spouse name: Not on file   ? Number of  children: Not on file   ? Years of education: Not on file   ? Highest education level: Not on file   Social Needs   ? Financial resource strain: Not on file   ? Food insecurity - worry: Not on file   ? Food insecurity - inability: Not on file   ? Transportation needs - medical: Not on file   ? Transportation needs - non-medical: Not on file   Occupational History   ? Not on file   Tobacco Use   ? Smoking status: Former Smoker   ? Smokeless tobacco: Never Used   Substance and Sexual Activity   ? Alcohol use: No   ? Drug use: No   ? Sexual activity: Not on file   Other Topics Concern   ? Not on file   Social History Narrative    03/07/15 - The patient lives alone in his own home.         Review of Systems   Constitutional: Positive for activity change and fatigue. Negative for appetite change, chills and fever.   HENT: Negative for congestion and sore throat.    Eyes: Negative for visual disturbance.   Respiratory: Negative for cough, shortness of breath and wheezing.    Cardiovascular: Negative for chest pain and leg swelling.        Pacemaker   Gastrointestinal: Negative for abdominal distention, abdominal pain, constipation, diarrhea and nausea.   Genitourinary: Negative for dysuria.   Musculoskeletal: Negative for arthralgias and myalgias.   Skin: Negative for color change, rash and wound.   Neurological: Negative for dizziness, weakness and numbness.   Psychiatric/Behavioral: Negative for agitation, behavioral problems and sleep disturbance.       .  Vitals:    01/28/19 0827   BP: 132/73   Pulse: 74   Resp: 18   Temp: 97.7  F (36.5  C)   SpO2: 98%   Weight: 190 lb (86.2 kg)       Physical Exam   Constitutional: He appears well-developed and well-nourished.   Pleasant gentleman   HENT:   Head: Normocephalic and atraumatic.   Eyes: Conjunctivae are normal. Pupils are equal, round, and reactive to light. poor vision with the right worse than the left   Neck: Normal range of motion. Neck supple.   Cardiovascular:  Normal rate, regular rhythm and normal heart sounds.   No murmur heard.  Pulmonary/Chest: Effort normal and breath sounds normal. He has no wheezes. He has no rales.   Abdominal: Soft. Bowel sounds are normal. He exhibits no distension. There is no tenderness.   Genitourinary: suprapubic catheter  Musculoskeletal: Normal range of motion. He exhibits no edema.   Neurological: He is alert.   Skin: Skin is warm and dry.   Psychiatric: He has a normal mood and affect. His behavior is normal.         LABS:   Recent Results (from the past 240 hour(s))   INR   Result Value Ref Range    INR 1.23 (H) 0.90 - 1.10   Platelet Count - DO NOT remove unless platelet count already ordered by other source   Result Value Ref Range    Platelets 200 140 - 440 thou/uL   Hemoglobin   Result Value Ref Range    Hemoglobin 10.2 (L) 14.0 - 18.0 g/dL   Anti-Xa Heparin Level   Result Value Ref Range    Anti-Xa Heparin Assay 0.15 (L) 0.30 - 0.70 IU/mL   Basic Metabolic Panel   Result Value Ref Range    Sodium 136 136 - 145 mmol/L    Potassium 3.5 3.5 - 5.0 mmol/L    Chloride 105 98 - 107 mmol/L    CO2 26 22 - 31 mmol/L    Anion Gap, Calculation 5 5 - 18 mmol/L    Glucose 108 70 - 125 mg/dL    Calcium 8.1 (L) 8.5 - 10.5 mg/dL    BUN 6 (L) 8 - 28 mg/dL    Creatinine 0.65 (L) 0.70 - 1.30 mg/dL    GFR MDRD Af Amer >60 >60 mL/min/1.73m2    GFR MDRD Non Af Amer >60 >60 mL/min/1.73m2   Anti-Xa Heparin Level   Result Value Ref Range    Anti-Xa Heparin Assay 0.52 0.30 - 0.70 IU/mL   Hemoglobin   Result Value Ref Range    Hemoglobin 10.9 (L) 14.0 - 18.0 g/dL   Anti-Xa Heparin Level   Result Value Ref Range    Anti-Xa Heparin Assay <0.10 (L) 0.30 - 0.70 IU/mL   Hemoglobin   Result Value Ref Range    Hemoglobin 10.4 (L) 14.0 - 18.0 g/dL   INR   Result Value Ref Range    INR 1.29 (H) 0.90 - 1.10   Hemoglobin   Result Value Ref Range    Hemoglobin 10.3 (L) 14.0 - 18.0 g/dL   Basic Metabolic Panel   Result Value Ref Range    Sodium 138 136 - 145 mmol/L     Potassium 3.7 3.5 - 5.0 mmol/L    Chloride 105 98 - 107 mmol/L    CO2 26 22 - 31 mmol/L    Anion Gap, Calculation 7 5 - 18 mmol/L    Glucose 105 70 - 125 mg/dL    Calcium 8.4 (L) 8.5 - 10.5 mg/dL    BUN 5 (L) 8 - 28 mg/dL    Creatinine 0.65 (L) 0.70 - 1.30 mg/dL    GFR MDRD Af Amer >60 >60 mL/min/1.73m2    GFR MDRD Non Af Amer >60 >60 mL/min/1.73m2   Anti-Xa Heparin Level   Result Value Ref Range    Anti-Xa Heparin Assay 0.19 (L) 0.30 - 0.70 IU/mL   Anti-Xa Heparin Level   Result Value Ref Range    Anti-Xa Heparin Assay 0.20 (L) 0.30 - 0.70 IU/mL   Anti-Xa Heparin Level   Result Value Ref Range    Anti-Xa Heparin Assay 0.53 0.30 - 0.70 IU/mL   INR   Result Value Ref Range    INR 1.45 (H) 0.90 - 1.10   Platelet Count - DO NOT remove unless platelet count already ordered by other source   Result Value Ref Range    Platelets 220 140 - 440 thou/uL   Hemoglobin   Result Value Ref Range    Hemoglobin 10.4 (L) 14.0 - 18.0 g/dL   Anti-Xa Heparin Level   Result Value Ref Range    Anti-Xa Heparin Assay 0.56 0.30 - 0.70 IU/mL   INR   Result Value Ref Range    INR 1.75 (H) 0.90 - 1.10   Anti-Xa Heparin Level   Result Value Ref Range    Anti-Xa Heparin Assay 0.40 0.30 - 0.70 IU/mL   Hemoglobin   Result Value Ref Range    Hemoglobin 10.6 (L) 14.0 - 18.0 g/dL   Hemoglobin   Result Value Ref Range    Hemoglobin 10.3 (L) 14.0 - 18.0 g/dL   INR   Result Value Ref Range    INR 1.87 (H) 0.90 - 1.10   Anti-Xa Heparin Level   Result Value Ref Range    Anti-Xa Heparin Assay 0.39 0.30 - 0.70 IU/mL   Magnesium   Result Value Ref Range    Magnesium 1.9 1.8 - 2.6 mg/dL   Renal Function Profile   Result Value Ref Range    Albumin 2.9 (L) 3.5 - 5.0 g/dL    Calcium 8.5 8.5 - 10.5 mg/dL    Phosphorus 3.2 2.5 - 4.5 mg/dL    Glucose 111 70 - 125 mg/dL    BUN 5 (L) 8 - 28 mg/dL    Creatinine 0.68 (L) 0.70 - 1.30 mg/dL    Sodium 135 (L) 136 - 145 mmol/L    Potassium 4.1 3.5 - 5.0 mmol/L    Chloride 102 98 - 107 mmol/L    CO2 26 22 - 31 mmol/L     Anion Gap, Calculation 7 5 - 18 mmol/L    GFR MDRD Af Amer >60 >60 mL/min/1.73m2    GFR MDRD Non Af Amer >60 >60 mL/min/1.73m2   HM1 (CBC with Diff)   Result Value Ref Range    WBC 6.0 4.0 - 11.0 thou/uL    RBC 3.52 (L) 4.40 - 6.20 mill/uL    Hemoglobin 10.5 (L) 14.0 - 18.0 g/dL    Hematocrit 32.1 (L) 40.0 - 54.0 %    MCV 91 80 - 100 fL    MCH 29.8 27.0 - 34.0 pg    MCHC 32.7 32.0 - 36.0 g/dL    RDW 14.4 11.0 - 14.5 %    Platelets 243 140 - 440 thou/uL    MPV 9.5 8.5 - 12.5 fL    Neutrophils % 56 50 - 70 %    Lymphocytes % 27 20 - 40 %    Monocytes % 14 (H) 2 - 10 %    Eosinophils % 3 0 - 6 %    Basophils % 1 0 - 2 %    Neutrophils Absolute 3.3 2.0 - 7.7 thou/uL    Lymphocytes Absolute 1.6 0.8 - 4.4 thou/uL    Monocytes Absolute 0.8 0.0 - 0.9 thou/uL    Eosinophils Absolute 0.2 0.0 - 0.4 thou/uL    Basophils Absolute 0.0 0.0 - 0.2 thou/uL   INR   Result Value Ref Range    INR 2.05 (H) 0.90 - 1.10   Magnesium   Result Value Ref Range    Magnesium 1.9 1.8 - 2.6 mg/dL   Renal Function Profile   Result Value Ref Range    Albumin 2.9 (L) 3.5 - 5.0 g/dL    Calcium 8.6 8.5 - 10.5 mg/dL    Phosphorus 3.3 2.5 - 4.5 mg/dL    Glucose 103 70 - 125 mg/dL    BUN 6 (L) 8 - 28 mg/dL    Creatinine 0.67 (L) 0.70 - 1.30 mg/dL    Sodium 133 (L) 136 - 145 mmol/L    Potassium 4.0 3.5 - 5.0 mmol/L    Chloride 101 98 - 107 mmol/L    CO2 25 22 - 31 mmol/L    Anion Gap, Calculation 7 5 - 18 mmol/L    GFR MDRD Af Amer >60 >60 mL/min/1.73m2    GFR MDRD Non Af Amer >60 >60 mL/min/1.73m2   Anti-Xa Heparin Level   Result Value Ref Range    Anti-Xa Heparin Assay 0.50 0.30 - 0.70 IU/mL   HM1 (CBC with Diff)   Result Value Ref Range    WBC 5.3 4.0 - 11.0 thou/uL    RBC 3.45 (L) 4.40 - 6.20 mill/uL    Hemoglobin 10.4 (L) 14.0 - 18.0 g/dL    Hematocrit 31.4 (L) 40.0 - 54.0 %    MCV 91 80 - 100 fL    MCH 30.1 27.0 - 34.0 pg    MCHC 33.1 32.0 - 36.0 g/dL    RDW 14.3 11.0 - 14.5 %    Platelets 246 140 - 440 thou/uL    MPV 9.6 8.5 - 12.5 fL    Neutrophils  % 53 50 - 70 %    Lymphocytes % 29 20 - 40 %    Monocytes % 14 (H) 2 - 10 %    Eosinophils % 3 0 - 6 %    Basophils % 0 0 - 2 %    Neutrophils Absolute 2.8 2.0 - 7.7 thou/uL    Lymphocytes Absolute 1.5 0.8 - 4.4 thou/uL    Monocytes Absolute 0.7 0.0 - 0.9 thou/uL    Eosinophils Absolute 0.2 0.0 - 0.4 thou/uL    Basophils Absolute 0.0 0.0 - 0.2 thou/uL   INR   Result Value Ref Range    INR 1.98 (H) 0.90 - 1.10   Hemoglobin   Result Value Ref Range    Hemoglobin 10.7 (L) 14.0 - 18.0 g/dL   INR   Result Value Ref Range    INR 1.89 (H) 0.90 - 1.10     Current Outpatient Medications   Medication Sig   ? acetaminophen (TYLENOL) 325 MG tablet Take 2 tablets (650 mg total) by mouth every 6 (six) hours as needed for pain.   ? bisacodyl (DULCOLAX) 10 mg suppository Insert 10 mg into the rectum daily as needed.   ? cyanocobalamin 1000 MCG tablet Take 1 tablet (1,000 mcg total) by mouth daily. Low b12   ? dorzolamide-timolol (COSOPT) 22.3-6.8 mg/mL ophthalmic solution 1 drop to both eyes two times a day for macular degeneration and glaucoma   ? latanoprost (XALATAN) 0.005 % ophthalmic solution 1 drop both eyes at bedtime for macular degeneration/glaucoma   ? levothyroxine (SYNTHROID, LEVOTHROID) 25 MCG tablet Take 1 tablet (25 mcg total) by mouth Daily at 6:00 am. Hypothyroid   ? polyethylene glycol (MIRALAX) 17 gram packet Take 17 g by mouth daily as needed.   ? senna-docusate (SENNOSIDES-DOCUSATE SODIUM) 8.6-50 mg tablet Take 1 tablet by mouth 2 (two) times a day.   ? simvastatin (ZOCOR) 5 MG tablet TAKE ONE TABLET BY MOUTH AT BEDTIME   ? VIT C/MARIE AC/LUT/COPPER/ZNOX (PRESERVISION LUTEIN ORAL) Take 1 capsule by mouth 2 (two) times a day .         ? warfarin (COUMADIN/JANTOVEN) 5 MG tablet Take 1.5 tab (7.5mg) PO daily on Tues & Sat, take 1 tab (5mg) PO daily the remaining days.       ASSESSMENT:    Suprapubic catheter- cleanse daily, monitor for infection  Atrial fibrillation   Anticoagulation management     PLAN:    Atrial  fibrillation on coumadin and lisinopril  UTI- completed Cipro1/18. Pt now with a new suprapubic catheter  Anticoagulation management- monitor and adjust per INRS       Electronically signed by: Polly Marroquin CNP

## 2021-06-19 NOTE — LETTER
Letter by Polly Marroquin CNP at      Author: Polly Marroquin CNP Service: -- Author Type: --    Filed:  Encounter Date: 9/11/2019 Status: (Other)         Patient: Riley Rodriguez   MR Number: [id number]   YOB: 1926   Date of Visit: 9/11/2019     [facility]    Facility:   [facility] [[id number]]   Code Status: DNR      CHIEF COMPLAINT/REASON FOR VISIT:  Chief Complaint   Patient presents with   ? Problem Visit     right facial paralysis        HISTORY:      HPI: Riley is a 89 y.o. male now residing in the LTC at [facility].  He is  with history of A. fib on warfarin develop acute CVA from holding warfarin for right gluteal hematoma. Hypertension , urinary retention has a suprapubic catheter.    Today he being seen right facial paralysis.  He was noted to have a right facial droop which started yesterday.  Yesterday his right eye was red and today the redness has resolved.  He does report having a recent cold and cough with clear sputum.  However staff was unaware of this.  I did have him raise his eyebrows and only the left one lifted.  He does have a noticeable right facial droop.  He denies any pain or any ear pain.  He did not have any pronator drift.  He was able to move all extremities.  He has been eating and reports no difficulties with swallowing I will treat him with prednisone for diagnosis of right-sided Bell's palsy he denied any numbness or tingling.  He is currently on lubricating eyedrops multiple times a day due to poor vision and is followed closely by an eye doctor that comes to the facility to see him.  He denied CP or shortness of breath. He denied  constipation.   His suprapubic is intact and draining clear yellow urine. Site intact.  He is up-to-date on his TSH and tells me he feels fine and has no concerns.  Will have speech evaluate him to be sure that he is swallowing properly due to that facial droop.    Past  Medical History:   Diagnosis Date   ? Atrial fibrillation (H)     On coumadin   ? CVA (cerebral vascular accident) (H)    ? Hypertension    ? Pacemaker    ? Urinary retention              Family History   Problem Relation Age of Onset   ? COPD Father      Social History     Socioeconomic History   ? Marital status:      Spouse name: Not on file   ? Number of children: Not on file   ? Years of education: Not on file   ? Highest education level: Not on file   Occupational History   ? Not on file   Social Needs   ? Financial resource strain: Not on file   ? Food insecurity:     Worry: Not on file     Inability: Not on file   ? Transportation needs:     Medical: Not on file     Non-medical: Not on file   Tobacco Use   ? Smoking status: Former Smoker   ? Smokeless tobacco: Never Used   Substance and Sexual Activity   ? Alcohol use: No   ? Drug use: No   ? Sexual activity: Not on file   Lifestyle   ? Physical activity:     Days per week: Not on file     Minutes per session: Not on file   ? Stress: Not on file   Relationships   ? Social connections:     Talks on phone: Not on file     Gets together: Not on file     Attends [employer] service: Not on file     Active member of club or organization: Not on file     Attends meetings of clubs or organizations: Not on file     Relationship status: Not on file   ? Intimate partner violence:     Fear of current or ex partner: Not on file     Emotionally abused: Not on file     Physically abused: Not on file     Forced sexual activity: Not on file   Other Topics Concern   ? Not on file   Social History Narrative    03/07/15 - The patient lives alone in his own home.         Review of Systems   Constitutional: Positive for activity change and fatigue. Negative for appetite change, chills and fever.   HENT: Negative for congestion and sore throat.    Eyes: positive  for visual disturbance. right sided facial paralysis   Respiratory: Negative for cough, shortness of breath and  wheezing.    Cardiovascular: Negative for chest pain and leg swelling.        Pacemaker   Gastrointestinal: Negative for abdominal distention, abdominal pain, constipation, diarrhea and nausea.   Genitourinary: Negative for dysuria.   Musculoskeletal: Negative for arthralgias and myalgias.   Skin: Negative for color change, rash and wound.   Neurological: Negative for dizziness, weakness and numbness.   Psychiatric/Behavioral: Negative for agitation, behavioral problems and sleep disturbance.       .  Vitals:    09/11/19 0930   BP: 140/76   Pulse: 72   Resp: 18   Temp: 97.4  F (36.3  C)   SpO2: 92%   Weight: 199 lb 6.4 oz (90.4 kg)       Physical Exam   Constitutional: He appears well-developed and well-nourished.   Pleasant gentleman   HENT:   Head: Normocephalic and atraumatic.   Eyes: Conjunctivae are normal. Pupils are equal, round, and reactive to light. poor vision with the right worse than the left right sided facial paralysis   Neck: Normal range of motion. Neck supple.   Cardiovascular: Normal rate, regular rhythm and normal heart sounds.   No murmur heard.  Pulmonary/Chest: Effort normal and breath sounds normal. He has no wheezes. He has no rales.   Abdominal: Soft. Bowel sounds are normal. He exhibits no distension. There is no tenderness.   Genitourinary: suprapubic catheter  Musculoskeletal: Normal range of motion. He exhibits no edema.   Neurological: He is alert.   Skin: Skin is warm and dry.   Psychiatric: He has a normal mood and affect. His behavior is normal.         LABS:   Recent Results (from the past 240 hour(s))   INR   Result Value Ref Range    INR 1.97 (H) 0.90 - 1.10   INR   Result Value Ref Range    INR 2.09 (H) 0.90 - 1.10     Current Outpatient Medications   Medication Sig   ? acetaminophen (TYLENOL) 325 MG tablet Take 2 tablets (650 mg total) by mouth every 6 (six) hours as needed for pain.   ? bisacodyl (DULCOLAX) 10 mg suppository Insert 10 mg into the rectum daily as needed.   ?  cycloSPORINE (RESTASIS) 0.05 % ophthalmic emulsion Administer 1 drop to both eyes 2 (two) times a day.   ? dorzolamide-timolol (COSOPT) 22.3-6.8 mg/mL ophthalmic solution 1 drop to both eyes two times a day for macular degeneration and glaucoma   ? erythromycin base (ERYTHROMYCIN OPHT) Apply 5 mg to eye. Instill 1 ribbon in both eyes at HS   ? latanoprost (XALATAN) 0.005 % ophthalmic solution 1 drop both eyes at bedtime for macular degeneration/glaucoma   ? levothyroxine (SYNTHROID, LEVOTHROID) 25 MCG tablet Take 1 tablet (25 mcg total) by mouth Daily at 6:00 am. Hypothyroid   ? polyethylene glycol (MIRALAX) 17 gram packet Take 17 g by mouth daily as needed.   ? PREDNISONE ORAL Take 60 mg by mouth. 60 mg po x 5 days then 50 mg x1 day, 40 mg x 1 day, 30 mg x 1 day, 20 mg x 1 day then 10 mg x 1 day then stop total 10 days of prednisone.   ? propylene glycol (SYSTANE COMPLETE OPHT) Apply 1 drop to eye 2 (two) times a day. Both eyes   ? simvastatin (ZOCOR) 5 MG tablet TAKE ONE TABLET BY MOUTH AT BEDTIME   ? warfarin sodium (WARFARIN ORAL) Take by mouth. 9/10/19  INR 2.09 Cont 7mg M-W-F, 5mg AOD. Next INR 9/17.  9/3/19 INR 1.97 Cont 7mg M-W-F, 5mg AOD.  8/15/19 INR 1.91  Cont 7mg M-W-F and 5mg AOD.  Next INR 8/22/19.             ASSESSMENT:    Plan:    Right sided [place] Palsy prednisone as above. Pt currently on eye lubricating drops multiple times a day.     Suprapubic catheter- cleanse daily, monitor for infection    Atrial fibrillation on coumadin and lisinopril    Anticoagulation management- monitor and adjust per INRS     Poor vision- is followed closely by the eye doctor.  on refresh and EES ointment      Hypothyroidism- TSH 1.91 on 2/1/19      Electronically signed by: Polly Marroquin CNP

## 2021-06-19 NOTE — LETTER
Letter by Polly Marroquin CNP at      Author: Polly Marroquin CNP Service: -- Author Type: --    Filed:  Encounter Date: 6/5/2019 Status: (Other)         Patient: Riley Rodriguez   MR Number: [id number]   YOB: 1926   Date of Visit: 6/5/2019     [facility]    Facility:   [facility] [[id number]]   Code Status: DNR      CHIEF COMPLAINT/REASON FOR VISIT:  Chief Complaint   Patient presents with   ? Review Of Multiple Medical Conditions       HISTORY:      HPI: Riley is a 89 y.o. male now residing in the LTC at [facility]. He was recently discharged from the TCU.   He is  with history of A. fib on warfarin develop acute CVA from holding warfarin for right gluteal hematoma and found to have E. Coli UTI at that time and discharged with Keflex came back for fever and white count and found to have catheter associated UTI and admitted for sepsis secondary to catheter associated UTI.  Urine culture growing Pseudomonas and change to ciprofloxacin and clinically improved and discharged back to TCU    Today he being seen in his room as a routine visit to review multiple medical issues.  He denied CP or shortness of breath. He denied  constipation.   His suprapubic is intact and draining clear yellow urine. Site intact. INR due 6/20. He denied cough or congestion and tells me he has no concerns.     Past Medical History:   Diagnosis Date   ? Atrial fibrillation (H)     On coumadin   ? CVA (cerebral vascular accident) (H)    ? Hypertension    ? Pacemaker    ? Urinary retention              Family History   Problem Relation Age of Onset   ? COPD Father      Social History     Socioeconomic History   ? Marital status:      Spouse name: Not on file   ? Number of children: Not on file   ? Years of education: Not on file   ? Highest education level: Not on file   Occupational History   ? Not on file   Social Needs   ? Financial resource strain: Not on  file   ? Food insecurity:     Worry: Not on file     Inability: Not on file   ? Transportation needs:     Medical: Not on file     Non-medical: Not on file   Tobacco Use   ? Smoking status: Former Smoker   ? Smokeless tobacco: Never Used   Substance and Sexual Activity   ? Alcohol use: No   ? Drug use: No   ? Sexual activity: Not on file   Lifestyle   ? Physical activity:     Days per week: Not on file     Minutes per session: Not on file   ? Stress: Not on file   Relationships   ? Social connections:     Talks on phone: Not on file     Gets together: Not on file     Attends Sikhism service: Not on file     Active member of club or organization: Not on file     Attends meetings of clubs or organizations: Not on file     Relationship status: Not on file   ? Intimate partner violence:     Fear of current or ex partner: Not on file     Emotionally abused: Not on file     Physically abused: Not on file     Forced sexual activity: Not on file   Other Topics Concern   ? Not on file   Social History Narrative    03/07/15 - The patient lives alone in his own home.         Review of Systems   Constitutional: Positive for activity change and fatigue. Negative for appetite change, chills and fever.   HENT: Negative for congestion and sore throat.    Eyes: Negative for visual disturbance.   Respiratory: Negative for cough, shortness of breath and wheezing.    Cardiovascular: Negative for chest pain and leg swelling.        Pacemaker   Gastrointestinal: Negative for abdominal distention, abdominal pain, constipation, diarrhea and nausea.   Genitourinary: Negative for dysuria.   Musculoskeletal: Negative for arthralgias and myalgias.   Skin: Negative for color change, rash and wound.   Neurological: Negative for dizziness, weakness and numbness.   Psychiatric/Behavioral: Negative for agitation, behavioral problems and sleep disturbance.       .  Vitals:    06/05/19 0929   BP: 128/61   Pulse: 60   Resp: 22   Temp: 97.8  F (36.6   C)   SpO2: 96%   Weight: 195 lb 8 oz (88.7 kg)       Physical Exam   Constitutional: He appears well-developed and well-nourished.   Pleasant gentleman   HENT:   Head: Normocephalic and atraumatic.   Eyes: Conjunctivae are normal. Pupils are equal, round, and reactive to light. poor vision with the right worse than the left   Neck: Normal range of motion. Neck supple.   Cardiovascular: Normal rate, regular rhythm and normal heart sounds.   No murmur heard.  Pulmonary/Chest: Effort normal and breath sounds normal. He has no wheezes. He has no rales.   Abdominal: Soft. Bowel sounds are normal. He exhibits no distension. There is no tenderness.   Genitourinary: suprapubic catheter  Musculoskeletal: Normal range of motion. He exhibits no edema.   Neurological: He is alert.   Skin: Skin is warm and dry.   Psychiatric: He has a normal mood and affect. His behavior is normal.         LABS:   No results found for this or any previous visit (from the past 240 hour(s)).  Current Outpatient Medications   Medication Sig   ? acetaminophen (TYLENOL) 325 MG tablet Take 2 tablets (650 mg total) by mouth every 6 (six) hours as needed for pain.   ? bisacodyl (DULCOLAX) 10 mg suppository Insert 10 mg into the rectum daily as needed.   ? carboxymethylcellulose sodium (REFRESH CELLUVISC) 1 % DpGe Administer 1 drop into the left eye 3 (three) times a day.   ? carboxymethylcellulose sodium (REFRESH CELLUVISC) 1 % DpGe Administer 1 drop to the right eye every 3 (three) hours while awake.   ? cyanocobalamin 1000 MCG tablet Take 1 tablet (1,000 mcg total) by mouth daily. Low b12   ? dorzolamide-timolol (COSOPT) 22.3-6.8 mg/mL ophthalmic solution 1 drop to both eyes two times a day for macular degeneration and glaucoma   ? erythromycin base (ERYTHROMYCIN OPHT) Apply 5 mg to eye. Instill 1 ribbon in both eyes at HS   ? latanoprost (XALATAN) 0.005 % ophthalmic solution 1 drop both eyes at bedtime for macular degeneration/glaucoma   ?  levothyroxine (SYNTHROID, LEVOTHROID) 25 MCG tablet Take 1 tablet (25 mcg total) by mouth Daily at 6:00 am. Hypothyroid   ? polyethylene glycol (MIRALAX) 17 gram packet Take 17 g by mouth daily as needed.   ? senna-docusate (SENNOSIDES-DOCUSATE SODIUM) 8.6-50 mg tablet Take 1 tablet by mouth 2 (two) times a day.   ? simvastatin (ZOCOR) 5 MG tablet TAKE ONE TABLET BY MOUTH AT BEDTIME   ? VIT C/MARIE AC/LUT/COPPER/ZNOX (PRESERVISION LUTEIN ORAL) Take 1 capsule by mouth 2 (two) times a day .         ? warfarin sodium (WARFARIN ORAL) Take by mouth. 4/3/19 INR 1.83  Take 7mg M-W-F and 5mg AOD.  Next INR 4/10/19.    3/27/19 INR 1.96  Cont 7mg Mon and Thurs and 5mg AOD.  Next INR 4/3/19.    3/13/19 INR 2.81  Cont 7mg Mon and Thurs and 5mg AOD.  Next INR 3/27/19.  3/6/19 INR 2.65  7mg Mon and Thurs and 5mg AOD.  Next INR 3/13/19.    2/22/19 INR 2.40 7mg M & TH, 5mg AOD. Next INR 3/6.  2/15/19 INR 2.48 7mg M & TH, 5mg AOD.             ASSESSMENT:    Suprapubic catheter- cleanse daily, monitor for infection  Atrial fibrillation   Anticoagulation management     PLAN:    Atrial fibrillation on coumadin and lisinopril  Pt  with a  suprapubic catheter  Anticoagulation management- monitor and adjust per INRS   Poor vision- is followed closely by the eye doctor.  on refresh and EES ointment   Constipation-resolved.   Hypothyroidism- TSH 1.91 on 2/1/19    Electronically signed by: Polly Marroquin CNP

## 2021-06-19 NOTE — LETTER
Letter by Polly Marroquin CNP at      Author: Polly Marroquin CNP Service: -- Author Type: --    Filed:  Encounter Date: 10/10/2019 Status: Signed         Patient: Riley Rodriguez   MR Number: [id number]   YOB: 1926   Date of Visit: 10/10/2019     [facility]    Facility:   [facility] [[id number]]   Code Status: DNR      CHIEF COMPLAINT/REASON FOR VISIT:  Chief Complaint   Patient presents with   ? FVP Care Coordination - Regulatory       HISTORY:      HPI: Riley is a 89 y.o. male now residing in the LTC at [facility].  He is  with history of A. fib on warfarin develop acute CVA from holding warfarin for right gluteal hematoma. Hypertension , urinary retention has a suprapubic catheter.     Today he being seen   for routine visit to review multiple medical issues.  He was recently diagnosed with Bell's palsy and has a noted right facial droop.  He has been having issues with his right eye and has been out multiple times to see ophthalmology.  Currently part of his right eye is sutured to help keep it closed.  He was also recently diagnosed with a UTI and is currently completing a course of Bactrim DS.   He  is able to move all extremities.  He has been eating and reports no difficulties with swallowing He denied any numbness or tingling.  He is followed closely by an eye doctor.  He denied CP or shortness of breath. He denied  constipation.   His suprapubic is intact and draining clear yellow urine today. Site intact.  He is up-to-date on his TSH. He reports his eye pain is getting better and he does get releif with the tramadol when he takes it.        Past Medical History:   Diagnosis Date   ? Atrial fibrillation (H)     On coumadin   ? CVA (cerebral vascular accident) (H)    ? Hypertension    ? Pacemaker    ? Urinary retention              Family History   Problem Relation Age of Onset   ? COPD Father      Social History     Socioeconomic  History   ? Marital status:      Spouse name: Not on file   ? Number of children: Not on file   ? Years of education: Not on file   ? Highest education level: Not on file   Occupational History   ? Not on file   Social Needs   ? Financial resource strain: Not on file   ? Food insecurity:     Worry: Not on file     Inability: Not on file   ? Transportation needs:     Medical: Not on file     Non-medical: Not on file   Tobacco Use   ? Smoking status: Former Smoker   ? Smokeless tobacco: Never Used   Substance and Sexual Activity   ? Alcohol use: No   ? Drug use: No   ? Sexual activity: Not on file   Lifestyle   ? Physical activity:     Days per week: Not on file     Minutes per session: Not on file   ? Stress: Not on file   Relationships   ? Social connections:     Talks on phone: Not on file     Gets together: Not on file     Attends Restorationist service: Not on file     Active member of club or organization: Not on file     Attends meetings of clubs or organizations: Not on file     Relationship status: Not on file   ? Intimate partner violence:     Fear of current or ex partner: Not on file     Emotionally abused: Not on file     Physically abused: Not on file     Forced sexual activity: Not on file   Other Topics Concern   ? Not on file   Social History Narrative    03/07/15 - The patient lives alone in his own home.         Review of Systems   Eyes: Positive for pain, discharge, redness and visual disturbance.        Being followed closely by opthalmology. He did have part of his right sutured closed with an opening left to instill multiple eye medications.      Constitutional: Positive for activity change and fatigue. Negative for appetite change, chills and fever.   HENT: Negative for congestion and sore throat.    Eyes: positive  for visual disturbance. right sided facial paralysis   Respiratory: Negative for cough, shortness of breath and wheezing.    Cardiovascular: Negative for chest pain and leg  swelling.        Pacemaker   Gastrointestinal: Negative for abdominal distention, abdominal pain, constipation, diarrhea and nausea.   Genitourinary: Negative for dysuria.   Musculoskeletal: Negative for arthralgias and myalgias.   Skin: Negative for color change, rash and wound.   Neurological: Negative for dizziness, weakness and numbness.   Psychiatric/Behavioral: Negative for agitation, behavioral problems and sleep disturbance.   Vitals:    10/10/19 0844   BP: 150/74   Pulse: 64   Resp: 18   Temp: 97.6  F (36.4  C)   SpO2: 96%   Weight: 192 lb (87.1 kg)       Physical Exam   Constitutional: He appears well-developed and well-nourished.   Pleasant gentleman   HENT:   Head: Normocephalic and atraumatic.   Eyes: Conjunctivae are normal. Pupils are equal, round, and reactive to light. poor vision with the right worse than the left right sided facial paralysis   Neck: Normal range of motion. Neck supple.   Cardiovascular: Normal rate, regular rhythm and normal heart sounds.   No murmur heard.  Pulmonary/Chest: Effort normal and breath sounds normal. He has no wheezes. He has no rales.   Abdominal: Soft. Bowel sounds are normal. He exhibits no distension. There is no tenderness.   Genitourinary: suprapubic catheter  Musculoskeletal: Normal range of motion. He exhibits no edema.   Neurological: He is alert.   Skin: Skin is warm and dry.   Psychiatric: He has a normal mood and affect. His behavior is normal.       LABS:   Recent Results (from the past 240 hour(s))   INR   Result Value Ref Range    INR 2.81 (H) 0.90 - 1.10   Urinalysis-UC if Indicated   Result Value Ref Range    Color, UA Yellow Colorless, Yellow, Straw, Light Yellow    Clarity, UA Turbid (!) Clear    Glucose, UA Negative Negative    Bilirubin, UA Negative Negative    Ketones, UA Negative Negative    Specific Gravity, UA 1.033 (H) 1.001 - 1.030    Blood, UA Moderate (!) Negative    pH, UA 6.0 4.5 - 8.0    Protein,  mg/dL (!) Negative mg/dL     Urobilinogen, UA <2.0 E.U./dL <2.0 E.U./dL, 2.0 E.U./dL    Nitrite, UA Negative Negative    Leukocytes, UA Large (!) Negative    Bacteria, UA Many (!) None Seen hpf    RBC, UA 10-25 (!) None Seen, 0-2 hpf    WBC, UA >100 (!) None Seen, 0-5 hpf    Squam Epithel, UA 0-5 None Seen, 0-5 lpf    WBC Clumps Present (!) None Seen    Amorphous, UA Few (!) None Seen   Culture, Urine   Result Value Ref Range    Culture Mixture of urogenital organisms with     Culture >100,000 col/ml Pseudomonas aeruginosa (!)     Culture >100,000 col/ml Enterococcus species (!)    Comprehensive Metabolic Panel   Result Value Ref Range    Sodium 131 (L) 136 - 145 mmol/L    Potassium 4.2 3.5 - 5.0 mmol/L    Chloride 100 98 - 107 mmol/L    CO2 23 22 - 31 mmol/L    Anion Gap, Calculation 8 5 - 18 mmol/L    Glucose 100 70 - 125 mg/dL    BUN 13 8 - 28 mg/dL    Creatinine 0.74 0.70 - 1.30 mg/dL    GFR MDRD Af Amer >60 >60 mL/min/1.73m2    GFR MDRD Non Af Amer >60 >60 mL/min/1.73m2    Bilirubin, Total 0.8 0.0 - 1.0 mg/dL    Calcium 8.2 (L) 8.5 - 10.5 mg/dL    Protein, Total 5.8 (L) 6.0 - 8.0 g/dL    Albumin 3.3 (L) 3.5 - 5.0 g/dL    Alkaline Phosphatase 50 45 - 120 U/L    AST 14 0 - 40 U/L    ALT <9 0 - 45 U/L   INR   Result Value Ref Range    INR 2.83 (H) 0.90 - 1.10   Procalcitonin   Result Value Ref Range    Procalcitonin 0.02 0.00 - 0.49 ng/mL   HM1 (CBC with Diff)   Result Value Ref Range    WBC 12.6 (H) 4.0 - 11.0 thou/uL    RBC 4.54 4.40 - 6.20 mill/uL    Hemoglobin 12.7 (L) 14.0 - 18.0 g/dL    Hematocrit 39.0 (L) 40.0 - 54.0 %    MCV 86 80 - 100 fL    MCH 28.0 27.0 - 34.0 pg    MCHC 32.6 32.0 - 36.0 g/dL    RDW 17.2 (H) 11.0 - 14.5 %    Platelets 173 140 - 440 thou/uL    MPV 10.5 8.5 - 12.5 fL    Neutrophils % 76 (H) 50 - 70 %    Lymphocytes % 12 (L) 20 - 40 %    Monocytes % 12 (H) 2 - 10 %    Eosinophils % 1 0 - 6 %    Basophils % 0 0 - 2 %    Neutrophils Absolute 9.5 (H) 2.0 - 7.7 thou/uL    Lymphocytes Absolute 1.5 0.8 - 4.4 thou/uL     Monocytes Absolute 1.4 (H) 0.0 - 0.9 thou/uL    Eosinophils Absolute 0.1 0.0 - 0.4 thou/uL    Basophils Absolute 0.0 0.0 - 0.2 thou/uL     Current Outpatient Medications   Medication Sig   ? acetaminophen (TYLENOL) 325 MG tablet Take 2 tablets (650 mg total) by mouth every 6 (six) hours as needed for pain.   ? bisacodyl (DULCOLAX) 10 mg suppository Insert 10 mg into the rectum daily as needed.   ? cycloSPORINE (RESTASIS) 0.05 % ophthalmic emulsion Administer 1 drop to both eyes 2 (two) times a day.   ? dorzolamide-timolol (COSOPT) 22.3-6.8 mg/mL ophthalmic solution 1 drop to both eyes two times a day for macular degeneration and glaucoma   ? erythromycin base (ERYTHROMYCIN OPHT) Apply 5 mg to eye. Instill 1 ribbon in both eyes at HS   ? latanoprost (XALATAN) 0.005 % ophthalmic solution 1 drop both eyes at bedtime for macular degeneration/glaucoma   ? levothyroxine (SYNTHROID, LEVOTHROID) 25 MCG tablet Take 1 tablet (25 mcg total) by mouth Daily at 6:00 am. Hypothyroid   ? moxifloxacin (VIGAMOX) 0.5 % ophthalmic solution Administer 1 drop to the right eye 4 (four) times a day.   ? petrolat,wht/min oil/sod chl (ARTIFICIAL TEARS OINTMENT OPHT) Apply 2 drops to eye every 2 (two) hours as needed. Right eye q 2 hours PRN   ? polyethylene glycol (MIRALAX) 17 gram packet Take 17 g by mouth daily as needed.   ? polymyxin B-trimethoprim (FOR POLYTRIM) 10,000 unit- 1 mg/mL Drop ophthalmic drops 1 drop 4 (four) times a day. Right eye   ? propylene glycol (SYSTANE COMPLETE OPHT) Apply 1 drop to eye 2 (two) times a day. Both eyes   ? simvastatin (ZOCOR) 5 MG tablet TAKE ONE TABLET BY MOUTH AT BEDTIME   ? sulfamethoxazole-trimethoprim (SEPTRA DS) 800-160 mg per tablet Take 1 tablet by mouth 2 (two) times a day.   ? traMADol (ULTRAM) 50 mg tablet Take 25 mg by mouth every 4 (four) hours as needed for pain.   ? warfarin sodium (WARFARIN ORAL) Take 4.5-7 mg by mouth. Next INR 10/20/19           Case Management:  I have reviewed the  facility/SNF care plan/MDS which was done 2/1/19, including the falls risk, nutrition and pain screening. I also reviewed the current immunizations, and preventive care.. Future cancer screening is not clinically indicated secondary to age/goals of care.   Patient's desire to return to the community is present, but is not able due to care needs .    Information reviewed:  Medications, vital signs, orders, and nursing notes.    ASSESSMENT:      ICD-10-CM    1. Bell's palsy G51.0    2. Urinary retention R33.9    3. Macular Degeneration H35.30    4. Urinary tract infection without hematuria, site unspecified N39.0        PLAN:      Right eye trauma - on multiple medications and being followed closely by opthalmology.    UTI- on Bactrim DS    Right sided [place] Palsy prednisone as above. Pt currently on eye lubricating drops multiple times a day.      Suprapubic catheter- cleanse daily, monitor for infection around  the site      Atrial fibrillation on coumadin and lisinopril     Anticoagulation management- monitor and adjust per INRS      Poor vision- is followed closely by the eye doctor.   on multiple eye gtts.      Hypothyroidism- TSH 1.91 on 2/1/19    Electronically signed by: Polly Marroquin CNP

## 2021-06-19 NOTE — LETTER
Letter by Nancy Fair MD at      Author: Nancy Fair MD Service: -- Author Type: --    Filed:  Encounter Date: 10/8/2019 Status: Signed         Patient: Riley Rodriguez   MR Number: [id number]   YOB: 1926   Date of Visit: 10/8/2019     [facility]    Facility:   [facility] [[id number]]   Code Status: DNR/DNI      Chief Complaint   Patient presents with   ? Problem Visit     LTC 10/8/19.       HPI:   Riley is a 89 y.o. male with hx of Afib on warfarin, prior stroke, BPH, HTN, admitted to the hospital in late Dec 2018 with confusion, acute ischemic stroke. Warfarin had been on hold due to recent gluteal hematoma. He was ultimately sent to TCU. However, he was sent back to the hospital from TCU on 1/4/19 due to AMS.  Found to have E. coli UTI, admitted for sepsis secondary to catheter associated UTI. Urine culture grew Pseudomonas. Discharged back to TCU on 1/8/19 for PT, OT, nursing cares, medical management and monitoring. Subsequently transitioned to LTC status.     Today:  Was asked to see urgently due to AMS, lethargy, fever yesterday and today. daughters here, very concerned. Not acting like himself, tired, keeps eyes closed. Note that he has [place] palsy affecting right side, he has been seeing eye doctor for that and usual eye problems including lack of vision due to prior stroke and mac degen. He recently had sutures placed in right eyelid due to [place]. He is on multiple eye drops including antibiotic drops. He is right eye seems reddened. He had low grade fever yest and today. No cough noted. Has SP catheter, yellow urine, looks darker today though he hasn't been eating much. He is on warfarin for hx of afib.     After discussion with daughters and evaluation of patient, they do not want him sent to ED, will do stat labs, CXR, UA here. Discussed that he could be having stroke and imaging of head would be warranted due to  anticoagulation with warfarin. Dtrs understand risks of not going to hospital. Discussed code status at length and goals of care, no hospitalization for now, await workup with labs, urine and xray in facility, follow up when results in, further treatment dependent on results and clinical situation.      Past Medical History:  Past Medical History:   Diagnosis Date   ? Atrial fibrillation (H)     On coumadin   ? CVA (cerebral vascular accident) (H)    ? Hypertension    ? Pacemaker    ? Urinary retention        Medications:  Current Outpatient Medications   Medication Sig   ? acetaminophen (TYLENOL) 325 MG tablet Take 2 tablets (650 mg total) by mouth every 6 (six) hours as needed for pain.   ? bisacodyl (DULCOLAX) 10 mg suppository Insert 10 mg into the rectum daily as needed.   ? cycloSPORINE (RESTASIS) 0.05 % ophthalmic emulsion Administer 1 drop to both eyes 2 (two) times a day.   ? dorzolamide-timolol (COSOPT) 22.3-6.8 mg/mL ophthalmic solution 1 drop to both eyes two times a day for macular degeneration and glaucoma   ? erythromycin base (ERYTHROMYCIN OPHT) Apply 5 mg to eye. Instill 1 ribbon in both eyes at HS   ? latanoprost (XALATAN) 0.005 % ophthalmic solution 1 drop both eyes at bedtime for macular degeneration/glaucoma   ? levothyroxine (SYNTHROID, LEVOTHROID) 25 MCG tablet Take 1 tablet (25 mcg total) by mouth Daily at 6:00 am. Hypothyroid   ? moxifloxacin (VIGAMOX) 0.5 % ophthalmic solution Administer 1 drop to the right eye 4 (four) times a day.   ? petrolat,wht/min oil/sod chl (ARTIFICIAL TEARS OINTMENT OPHT) Apply 2 drops to eye every 2 (two) hours as needed. Right eye q 2 hours PRN   ? polyethylene glycol (MIRALAX) 17 gram packet Take 17 g by mouth daily as needed.   ? polymyxin B-trimethoprim (FOR POLYTRIM) 10,000 unit- 1 mg/mL Drop ophthalmic drops 1 drop 4 (four) times a day. Right eye   ? propylene glycol (SYSTANE COMPLETE OPHT) Apply 1 drop to eye 2 (two) times a day. Both eyes   ? simvastatin  (ZOCOR) 5 MG tablet TAKE ONE TABLET BY MOUTH AT BEDTIME   ? traMADol (ULTRAM) 50 mg tablet Take 25 mg by mouth every 4 (four) hours as needed for pain.   ? warfarin sodium (WARFARIN ORAL) Take 4.5-7 mg by mouth. 10/14/19 INR 3.44 Cont 4.5mg daily. NExt INR 10/21.  10/11/19 INR 3.21 Give 4.5mg daily. Next INR 10/14.  10/7/19 INR 2.81 Cont 7mg M-W-F, 4.5mg AOD. Next INR 10/11  On Bactrim DS.  9/30/19 INR 2.42  9/23/19 INR 2.80             Physical Exam:   General: Patient is a lethargic, elderly male, in recliner, follows some commands with urging, would not open eyes however.   Vitals: /66, Temp 100.2, Pulse 60, RR 18, O2 sat 94% RA  HEENT: Right facial droop secondary to [place]. Eyes show mild right upper lid medial eversion with injection. No purulent drainage. Nares negative. Oropharynx moist. Frontal brow bone seems prominent, possibly swollen, question baseline, no erythema to suggest cellulitis.   Neck: Supple. No tenderness or adenopathy. No JVD.  Lungs: Clear bilaterally. No wheezes.  Cardiovascular: Regular rate and rhythm, normal S1, S2.  Back: No spinal or CVA tenderness.  Abdomen: Soft, no tenderness on exam. Bowel sounds present. No guarding rebound or rigidity.  : SP catheter, dark urine.  Extremities: No LE edema is noted.  Musculoskeletal: Age related degen changes.   Psych: Unable to assess.  Neuro:  weak though approx equal.      Labs:  Lab Results   Component Value Date    WBC 5.3 01/25/2019    HGB 10.7 (L) 01/26/2019    HCT 31.4 (L) 01/25/2019    MCV 91 01/25/2019     01/25/2019     Results for orders placed or performed during the hospital encounter of 01/16/19   Basic Metabolic Panel   Result Value Ref Range    Sodium 138 136 - 145 mmol/L    Potassium 3.7 3.5 - 5.0 mmol/L    Chloride 105 98 - 107 mmol/L    CO2 26 22 - 31 mmol/L    Anion Gap, Calculation 7 5 - 18 mmol/L    Glucose 105 70 - 125 mg/dL    Calcium 8.4 (L) 8.5 - 10.5 mg/dL    BUN 5 (L) 8 - 28 mg/dL    Creatinine  0.65 (L) 0.70 - 1.30 mg/dL    GFR MDRD Af Amer >60 >60 mL/min/1.73m2    GFR MDRD Non Af Amer >60 >60 mL/min/1.73m2       Lab Results   Component Value Date    TSH 1.91 02/01/2019       Assessment/Plan:  1. AMS. Lethargy, low grade fever. Slow recent decline with more rapid presentation today. Discussion with dtrs-stat CXR, labs including procalcitonin, INR, CMP, Heme 1, UA. Family declines hospitalization, understand possible detriment due to not obtaining head imaging, patient on anticoagulation, cannot rule out stroke.  2. Afib. Anticoagulated with warfarin. Adequate rate control.   3. Urinary retention. Has SP cathter.   4. Hx of stroke. Ischemic in nature. Warfarin for Afib had been on hold due to a recent hematoma at the time.  5. HTN. On lisinopril. BPs acceptable, continue to monitor.   6. Hypothyroidism. Continue home replacement.  7. [place] palsy, right.  8. Code status confirmed DNR/DNI. Discussed with dtrs, patient. Comfort based approach, ok with workup, labs, abx if needed, no hospitalization now but possible consideration in the future.      Total time spent was greater than 35 minutes, more than 50% counseling and coordination of care including as above regarding disease state, work up, treatment, side effects, and coordination of care.  At least 17 additional minutes spent face to face discussing advanced care planning as above.         Electronically signed by: Nancy Fair MD

## 2021-06-19 NOTE — LETTER
Letter by Nancy Fair MD at      Author: Nancy Fair MD Service: -- Author Type: --    Filed:  Encounter Date: 4/30/2019 Status: (Other)         Patient: Riley Rodriguez   MR Number: [id number]   YOB: 1926   Date of Visit: 4/30/2019     [facility]    Facility:   [facility] [[id number]]   Code Status: DNR/DNI     Chief Complaint   Patient presents with   ? Review Of Multiple Medical Conditions       HPI:   Riley is a 89 y.o. male with hx of Afib on warfarin, prior stroke, BPH, HTN, admitted to the hospital in late Dec 2018 with confusion, acute ischemic stroke. Warfarin had been on hold due to recent gluteal hematoma. He was ultimately sent to TCU. However, he was sent back to the hospital from TCU on 1/4/19 due to AMS. DC summary partially excerpted below.     89 years old male with history of A. fib on warfarin develop acute CVA from holding warfarin for right gluteal hematoma and found to have E. coli UTI at that time and discharged with Keflex came back for fever and white count and found to have catheter associated UTI and admitted for sepsis secondary to catheter associated UTI.  Urine culture growing Pseudomonas and change to ciprofloxacin and clinically improved and discharged back to TCU today. Because of the ciprofloxacin, INR has been elevated.  Holding warfarin since 1/6 and recent INR 3.24.  We will hold it today and recheck tomorrow.     Overall stabilized and discharged to TCU on 1/8/19 for PT, OT, nursing cares, medical management and monitoring.    Subsequently transitioned to LTC status.       Past Medical History:  Past Medical History:   Diagnosis Date   ? Atrial fibrillation (H)     On coumadin   ? CVA (cerebral vascular accident) (H)    ? Hypertension    ? Pacemaker    ? Urinary retention        Medications:  Current Outpatient Medications   Medication Sig   ? acetaminophen (TYLENOL) 325 MG tablet Take 2 tablets (650  mg total) by mouth every 6 (six) hours as needed for pain.   ? bisacodyl (DULCOLAX) 10 mg suppository Insert 10 mg into the rectum daily as needed.   ? carboxymethylcellulose sodium (REFRESH CELLUVISC) 1 % DpGe Administer 1 drop into the left eye 3 (three) times a day.   ? carboxymethylcellulose sodium (REFRESH CELLUVISC) 1 % DpGe Administer 1 drop to the right eye every 3 (three) hours while awake.   ? cyanocobalamin 1000 MCG tablet Take 1 tablet (1,000 mcg total) by mouth daily. Low b12   ? dorzolamide-timolol (COSOPT) 22.3-6.8 mg/mL ophthalmic solution 1 drop to both eyes two times a day for macular degeneration and glaucoma   ? erythromycin base (ERYTHROMYCIN OPHT) Apply 5 mg to eye. Instill 1 ribbon in both eyes at HS   ? latanoprost (XALATAN) 0.005 % ophthalmic solution 1 drop both eyes at bedtime for macular degeneration/glaucoma   ? levothyroxine (SYNTHROID, LEVOTHROID) 25 MCG tablet Take 1 tablet (25 mcg total) by mouth Daily at 6:00 am. Hypothyroid   ? polyethylene glycol (MIRALAX) 17 gram packet Take 17 g by mouth daily as needed.   ? senna-docusate (SENNOSIDES-DOCUSATE SODIUM) 8.6-50 mg tablet Take 1 tablet by mouth 2 (two) times a day.   ? simvastatin (ZOCOR) 5 MG tablet TAKE ONE TABLET BY MOUTH AT BEDTIME   ? VIT C/MARIE AC/LUT/COPPER/ZNOX (PRESERVISION LUTEIN ORAL) Take 1 capsule by mouth 2 (two) times a day .         ? warfarin sodium (WARFARIN ORAL) Take by mouth. 4/3/19 INR 1.83  Take 7mg M-W-F and 5mg AOD.  Next INR 4/10/19.    3/27/19 INR 1.96  Cont 7mg Mon and Thurs and 5mg AOD.  Next INR 4/3/19.    3/13/19 INR 2.81  Cont 7mg Mon and Thurs and 5mg AOD.  Next INR 3/27/19.  3/6/19 INR 2.65  7mg Mon and Thurs and 5mg AOD.  Next INR 3/13/19.    2/22/19 INR 2.40 7mg M & TH, 5mg AOD. Next INR 3/6.  2/15/19 INR 2.48 7mg M & TH, 5mg AOD.             Physical Exam:   General: Patient is alert, elderly male, no distress.   HEENT: Head is NCAT. Eyes show no injection or icterus. Nares negative. Oropharynx  well hydrated.  Neck: Supple. No tenderness or adenopathy. No JVD.  Lungs: Clear bilaterally. No wheezes.  Cardiovascular: Regular rate and rhythm, normal S1. S2.  Back: No spinal or CVA tenderness.  Abdomen: Soft, no tenderness on exam. Bowel sounds present. No guarding rebound or rigidity.  : SP catheter.  Extremities: No edema is noted.  Musculoskeletal: Age related degen changes.   Skin: No rashes.   Psych: Mood appears good.      Labs:  Results for orders placed or performed during the hospital encounter of 01/16/19   Basic Metabolic Panel   Result Value Ref Range    Sodium 138 136 - 145 mmol/L    Potassium 3.7 3.5 - 5.0 mmol/L    Chloride 105 98 - 107 mmol/L    CO2 26 22 - 31 mmol/L    Anion Gap, Calculation 7 5 - 18 mmol/L    Glucose 105 70 - 125 mg/dL    Calcium 8.4 (L) 8.5 - 10.5 mg/dL    BUN 5 (L) 8 - 28 mg/dL    Creatinine 0.65 (L) 0.70 - 1.30 mg/dL    GFR MDRD Af Amer >60 >60 mL/min/1.73m2    GFR MDRD Non Af Amer >60 >60 mL/min/1.73m2       Lab Results   Component Value Date    WBC 5.3 01/25/2019    HGB 10.7 (L) 01/26/2019    HCT 31.4 (L) 01/25/2019    MCV 91 01/25/2019     01/25/2019       Assessment/Plan:  1. Urinary retention. Has SP cathter. Follow up per urology.   2. Hx of stroke. Ischemic in nature, Warfarin for Afib had been on hold due to a recent hematoma at the time.  3. Afib. On warfarin. Monitor and adjust per INRs.  4. HTN. On lisinopril. Monitor BPs, acceptable.  5. Hypothyroidism. Continue home replacement.    Overall he is stable, no new orders.         Electronically signed by: Nancy Fair MD

## 2021-06-19 NOTE — LETTER
Letter by Nancy Fair MD at      Author: Nancy Fair MD Service: -- Author Type: --    Filed:  Encounter Date: 8/20/2019 Status: (Other)         Patient: Rilye Rodriguez   MR Number: [id number]   YOB: 1926   Date of Visit: 8/20/2019     [facility]    Facility:   [facility] [[id number]]   Code Status: DNR/DNI       Chief Complaint   Patient presents with   ? Review Of Multiple Medical Conditions     LTC 8/20/19.       HPI:   Riley is a 89 y.o. male with hx of Afib on warfarin, prior stroke, BPH, HTN, admitted to the hospital in late Dec 2018 with confusion, acute ischemic stroke. Warfarin had been on hold due to recent gluteal hematoma. He was ultimately sent to TCU. However, he was sent back to the hospital from TCU on 1/4/19 due to AMS. DC summary partially excerpted below.     89 years old male with history of A. fib on warfarin develop acute CVA from holding warfarin for right gluteal hematoma and found to have E. coli UTI at that time and discharged with Keflex came back for fever and white count and found to have catheter associated UTI and admitted for sepsis secondary to catheter associated UTI.  Urine culture growing Pseudomonas and change to ciprofloxacin and clinically improved and discharged back to TCU today. Because of the ciprofloxacin, INR has been elevated.  Holding warfarin since 1/6 and recent INR 3.24.  We will hold it today and recheck tomorrow.     Overall stabilized and discharged to TCU on 1/8/19 for PT, OT, nursing cares, medical management and monitoring.    Subsequently transitioned to LTC status.     Today:  He has been stable. Patient has no complaints. No concerns per nursing. He has not been ill recently with cough cold or congestion. He continues on warfarin for afib. He has a long term suprapubic catheter. No functional concerns. Per documentation his latest BIMS score was 8 indicating cognitive  impairment. Last CC on 8/13/19 with no concerns raised.       Past Medical History:  Past Medical History:   Diagnosis Date   ? Atrial fibrillation (H)     On coumadin   ? CVA (cerebral vascular accident) (H)    ? Hypertension    ? Pacemaker    ? Urinary retention        Medications:  Current Outpatient Medications   Medication Sig   ? acetaminophen (TYLENOL) 325 MG tablet Take 2 tablets (650 mg total) by mouth every 6 (six) hours as needed for pain.   ? bisacodyl (DULCOLAX) 10 mg suppository Insert 10 mg into the rectum daily as needed.   ? carboxymethylcellulose sodium (REFRESH CELLUVISC) 1 % DpGe Administer 1 drop into the left eye 3 (three) times a day.   ? carboxymethylcellulose sodium (REFRESH CELLUVISC) 1 % DpGe Administer 1 drop to the right eye every 3 (three) hours while awake.   ? cyanocobalamin 1000 MCG tablet Take 1 tablet (1,000 mcg total) by mouth daily. Low b12   ? dorzolamide-timolol (COSOPT) 22.3-6.8 mg/mL ophthalmic solution 1 drop to both eyes two times a day for macular degeneration and glaucoma   ? erythromycin base (ERYTHROMYCIN OPHT) Apply 5 mg to eye. Instill 1 ribbon in both eyes at HS   ? latanoprost (XALATAN) 0.005 % ophthalmic solution 1 drop both eyes at bedtime for macular degeneration/glaucoma   ? levothyroxine (SYNTHROID, LEVOTHROID) 25 MCG tablet Take 1 tablet (25 mcg total) by mouth Daily at 6:00 am. Hypothyroid   ? polyethylene glycol (MIRALAX) 17 gram packet Take 17 g by mouth daily as needed.   ? senna-docusate (SENNOSIDES-DOCUSATE SODIUM) 8.6-50 mg tablet Take 1 tablet by mouth 2 (two) times a day.   ? simvastatin (ZOCOR) 5 MG tablet TAKE ONE TABLET BY MOUTH AT BEDTIME   ? VIT C/MARIE AC/LUT/COPPER/ZNOX (PRESERVISION LUTEIN ORAL) Take 1 capsule by mouth 2 (two) times a day .         ? warfarin sodium (WARFARIN ORAL) Take by mouth. 8/15/19 INR 1.91  Cont 7mg M-W-F and 5mg AOD.  Next INR 8/22/19.    4/3/19 INR 1.83  Take 7mg M-W-F and 5mg AOD.  Next INR 4/10/19.    3/27/19 INR  1.96  Cont 7mg Mon and Thurs and 5mg AOD.  Next INR 4/3/19.    3/13/19 INR 2.81  Cont 7mg Mon and Thurs and 5mg AOD.  Next INR 3/27/19.  3/6/19 INR 2.65  7mg Mon and Thurs and 5mg AOD.  Next INR 3/13/19.    2/22/19 INR 2.40 7mg M & TH, 5mg AOD. Next INR 3/6.  2/15/19 INR 2.48 7mg M & TH, 5mg AOD.             Physical Exam:   General: Patient is alert, elderly male, no distress.   Vitals: /54, Temp 97.2, Pulse 68, RR 18, O2 sat 97% RA  HEENT: Head is NCAT. Eyes show no injection or icterus. Nares negative. Oropharynx well hydrated.  Neck: Supple. No tenderness or adenopathy. No JVD.  Lungs: Clear bilaterally. No wheezes.  Cardiovascular: Regular rate and rhythm, normal S1, S2.  Back: No spinal or CVA tenderness.  Abdomen: Soft, no tenderness on exam. Bowel sounds present. No guarding rebound or rigidity.  : SP catheter.  Extremities: No edema is noted.  Musculoskeletal: Age related degen changes.   Psych: Mood appears good.      Labs:  Lab Results   Component Value Date    WBC 5.3 01/25/2019    HGB 10.7 (L) 01/26/2019    HCT 31.4 (L) 01/25/2019    MCV 91 01/25/2019     01/25/2019     Results for orders placed or performed during the hospital encounter of 01/16/19   Basic Metabolic Panel   Result Value Ref Range    Sodium 138 136 - 145 mmol/L    Potassium 3.7 3.5 - 5.0 mmol/L    Chloride 105 98 - 107 mmol/L    CO2 26 22 - 31 mmol/L    Anion Gap, Calculation 7 5 - 18 mmol/L    Glucose 105 70 - 125 mg/dL    Calcium 8.4 (L) 8.5 - 10.5 mg/dL    BUN 5 (L) 8 - 28 mg/dL    Creatinine 0.65 (L) 0.70 - 1.30 mg/dL    GFR MDRD Af Amer >60 >60 mL/min/1.73m2    GFR MDRD Non Af Amer >60 >60 mL/min/1.73m2       Lab Results   Component Value Date    TSH 1.91 02/01/2019       Assessment/Plan:  1. Afib. Anticoagulated with warfarin. Adequate rate control.   2. Urinary retention. Has SP cathter. Follow up per urology.   3. Hx of stroke. Ischemic in nature. Warfarin for Afib had been on hold due to a recent hematoma at the  time.  4. HTN. On lisinopril. BPs acceptable, continue to monitor.   5. Hypothyroidism. Continue home replacement.          Electronically signed by: Nancy Fair MD

## 2021-06-20 NOTE — LETTER
Letter by Polly Marroquin CNP at      Author: Polly Marroquin CNP Service: -- Author Type: --    Filed:  Encounter Date: 12/10/2019 Status: Signed         Patient: Riley Rodriguez   MR Number: [id number]   YOB: 1926   Date of Visit: 12/10/2019     [facility]    Facility:   [facility] [[id number]]   Code Status: DNR      CHIEF COMPLAINT/REASON FOR VISIT:  Chief Complaint   Patient presents with   ? FVP Care Coordination - Regulatory       HISTORY:      HPI: Riley is a 89 y.o. male Riley is a 89 y.o. male now residing in the LTC at [facility].  He is  with history of A. fib on warfarin develop acute CVA from holding warfarin for right gluteal hematoma. Hypertension , urinary retention has a suprapubic catheter.     Today he being seen  for routine visit to review multiple medical issues.     He is currently experiencing a right eye injury.  He is being followed closely by ophthalmology And he will undergo surgery on 12/17/2019 for ectropion procedure.  He will be bridged with Lovenox and new orders were written.   Currently part of his right eye is sutured to help keep it closed.     He also just recently completed a course of Bactrim DS for a UTI.  He is able to move all extremities.  He has been eating and reports no difficulties with swallowing He denied any numbness or tingling.  He is followed closely by an eye doctor.  He denied CP or shortness of breath. He denied  constipation.   His suprapubic is intact and draining clear yellow urine today. Site intact.  He is up-to-date on his TSH. He reports his eye pain is getting better and he does get releif with the tramadol when he takes it.     Past Medical History:   Diagnosis Date   ? Atrial fibrillation (H)     On coumadin   ? CVA (cerebral vascular accident) (H)    ? Hypertension    ? Pacemaker    ? Urinary retention              Family History   Problem Relation Age of Onset   ? COPD  Father      Social History     Socioeconomic History   ? Marital status:      Spouse name: Not on file   ? Number of children: Not on file   ? Years of education: Not on file   ? Highest education level: Not on file   Occupational History   ? Not on file   Social Needs   ? Financial resource strain: Not on file   ? Food insecurity:     Worry: Not on file     Inability: Not on file   ? Transportation needs:     Medical: Not on file     Non-medical: Not on file   Tobacco Use   ? Smoking status: Former Smoker   ? Smokeless tobacco: Never Used   Substance and Sexual Activity   ? Alcohol use: No   ? Drug use: No   ? Sexual activity: Not on file   Lifestyle   ? Physical activity:     Days per week: Not on file     Minutes per session: Not on file   ? Stress: Not on file   Relationships   ? Social connections:     Talks on phone: Not on file     Gets together: Not on file     Attends Scientologist service: Not on file     Active member of club or organization: Not on file     Attends meetings of clubs or organizations: Not on file     Relationship status: Not on file   ? Intimate partner violence:     Fear of current or ex partner: Not on file     Emotionally abused: Not on file     Physically abused: Not on file     Forced sexual activity: Not on file   Other Topics Concern   ? Not on file   Social History Narrative    03/07/15 - The patient lives alone in his own home.         Review of Systems  Eyes: Positive for pain, discharge, redness and visual disturbance.        Being followed closely by opthalmology. He did have part of his right sutured closed with an opening left to instill multiple eye medications.       Constitutional: Positive for activity change and fatigue. Negative for appetite change, chills and fever.   HENT: Negative for congestion and sore throat.    Eyes: positive  for visual disturbance. right sided facial paralysis   Respiratory: Negative for cough, shortness of breath and wheezing.     Cardiovascular: Negative for chest pain and leg swelling.        Pacemaker   Gastrointestinal: Negative for abdominal distention, abdominal pain, constipation, diarrhea and nausea.   Genitourinary: Negative for dysuria.   Musculoskeletal: Negative for arthralgias and myalgias.   Skin: Negative for color change, rash and wound.   Neurological: Negative for dizziness, weakness and numbness.   Psychiatric/Behavioral: Negative for agitation, behavioral problems and sleep disturbance.   Vitals:    12/10/19 0854   BP: 128/80   Pulse: 68   Resp: 20   Temp: 97  F (36.1  C)   SpO2: 95%   Weight: 192 lb (87.1 kg)       Physical Exam    Constitutional: He appears well-developed and well-nourished.   Pleasant gentleman   HENT:   Head: Normocephalic and atraumatic.   Eyes: Conjunctivae are normal. Pupils are equal, round, and reactive to light. poor vision with the right worse than the left right sided facial paralysis   Neck: Normal range of motion. Neck supple.   Cardiovascular: Normal rate, regular rhythm and normal heart sounds.   No murmur heard.  Pulmonary/Chest: Effort normal and breath sounds normal. He has no wheezes. He has no rales.   Abdominal: Soft. Bowel sounds are normal. He exhibits no distension. There is no tenderness.   Genitourinary: suprapubic catheter  Musculoskeletal: Normal range of motion. He exhibits no edema.   Neurological: He is alert.   Skin: Skin is warm and dry.   Psychiatric: He has a normal mood and affect. His behavior is normal.   LABS:   Recent Results (from the past 240 hour(s))   INR   Result Value Ref Range    INR 2.42 (H) 0.90 - 1.10     Case Management:  I have reviewed the facility/SNF care plan/MDS which was done 10/15/19, including the falls risk, nutrition and pain screening. I also reviewed the current immunizations, and preventive care.. Future cancer screening is not clinically indicated secondary to age/goals of care.   Patient's desire to return to the community is not  assessible due to cognitive impairment.    Information reviewed:  Medications, vital signs, orders, and nursing notes.    ASSESSMENT:      ICD-10-CM    1. Paroxysmal atrial fibrillation (H) I48.0    2. Right-sided Bell's palsy G51.0    3. Essential hypertension I10    4. Right eye injury, subsequent encounter S05.91XD        PLAN:    Right eye trauma - on multiple medications and being followed closely by opthalmology.     UTI- on Bactrim DS     Right sided [place] Palsy prednisone as above. Pt currently on eye lubricating drops multiple times a day.  Patient to have troponin on procedure done on December 17, 2019     Suprapubic catheter- cleanse daily, monitor for infection around  the site      Atrial fibrillation on coumadin and lisinopril     Anticoagulation management- monitor and adjust per INRS      Poor vision- is followed closely by the eye doctor.  on multiple eye gtts.      Hypothyroidism- TSH 1.91 on 2/1/19    Electronically signed by: Polly Marroquin CNP

## 2021-06-20 NOTE — LETTER
Letter by Nancy Fair MD at      Author: Nancy Fair MD Service: -- Author Type: --    Filed:  Encounter Date: 2/25/2020 Status: (Other)         Patient: Riley Rodriguez   MR Number: [id number]   YOB: 1926   Date of Visit: 2/25/2020     [facility]    Facility:   [facility] [[id number]]   Code Status: DNR/DNI       Chief Complaint   Patient presents with   ? Review Of Multiple Medical Conditions     LTC 2/25/20.       HPI:   Riley is a 89 y.o. male with hx of Afib on warfarin, prior stroke, BPH, HTN, admitted to the hospital in late Dec 2018 with confusion, acute ischemic stroke. Warfarin had been on hold due to recent gluteal hematoma. He was ultimately sent to TCU. However, he was sent back to the hospital from TCU on 1/4/19 due to AMS.  Found to have E. coli UTI, admitted for sepsis secondary to catheter associated UTI. Urine culture grew Pseudomonas. Discharged back to TCU on 1/8/19 for PT, OT, nursing cares, medical management and monitoring. Subsequently transitioned to LTC status.     Today:  He has been fairly stable. Chronic eye issues for which he sees ophtho. Daughters here often, visit and support. He has [place] palsy affecting right side. He is on multiple eye drops and some ointment. Has SP catheter. He is on warfarin for hx of afib. He self propels in wheelchair. Appetite is good. No recent illness. No fever or cough. No concerns per nursing. No open areas of skin.       Past Medical History:  Past Medical History:   Diagnosis Date   ? Atrial fibrillation (H)     On coumadin   ? CVA (cerebral vascular accident) (H)    ? Hypertension    ? Pacemaker    ? Urinary retention        Medications:  Current Outpatient Medications   Medication Sig   ? acetaminophen (TYLENOL) 325 MG tablet Take 2 tablets (650 mg total) by mouth every 6 (six) hours as needed for pain.   ? acetaminophen (TYLENOL) 325 MG tablet Take 650 mg by mouth 3  (three) times a day. Do not exceed 3000 mg daily   ? atorvastatin (LIPITOR) 40 MG tablet Take 40 mg by mouth at bedtime.   ? bisacodyl (DULCOLAX) 10 mg suppository Insert 10 mg into the rectum daily as needed.   ? cycloSPORINE (RESTASIS) 0.05 % ophthalmic emulsion Administer 1 drop to both eyes 2 (two) times a day.   ? dorzolamide-timolol (COSOPT) 22.3-6.8 mg/mL ophthalmic solution 1 drop to both eyes two times a day for macular degeneration and glaucoma   ? erythromycin base (ERYTHROMYCIN OPHT) Apply 5 mg to eye. Instill 1 ribbon in both eyes at HS and QID   ? latanoprost (XALATAN) 0.005 % ophthalmic solution 1 drop both eyes at bedtime for macular degeneration/glaucoma   ? levothyroxine (SYNTHROID, LEVOTHROID) 25 MCG tablet Take 1 tablet (25 mcg total) by mouth Daily at 6:00 am. Hypothyroid   ? moxifloxacin (VIGAMOX) 0.5 % ophthalmic solution Administer 1 drop to the right eye 4 (four) times a day.   ? petrolat,wht/min oil/sod chl (ARTIFICIAL TEARS OINTMENT OPHT) Apply 2 drops to eye every 2 (two) hours as needed. Right eye q 2 hours PRN   ? polyethylene glycol (MIRALAX) 17 gram packet Take 17 g by mouth daily as needed.   ? polymyxin B-trimethoprim (FOR POLYTRIM) 10,000 unit- 1 mg/mL Drop ophthalmic drops 1 drop 4 (four) times a day. Right eye   ? polyvinyl alcohol (LIQUIFILM TEARS) 1.4 % ophthalmic solution Administer 1 drop to the right eye every 2 (two) hours as needed for dry eyes.   ? propylene glycol (SYSTANE COMPLETE OPHT) Apply 1 drop to eye 2 (two) times a day. Both eyes   ? traMADol (ULTRAM) 50 mg tablet Take 25 mg by mouth every 4 (four) hours as needed for pain.   ? traMADol (ULTRAM) 50 mg tablet Take 25 mg by mouth daily.   ? UNABLE TO FIND Med Name:docu liquis- 5 gtts each ear as needed prior to irrigation   ? warfarin sodium (WARFARIN ORAL) Take by mouth. 1/2/20 INR 2.31  Cont 5.5mg Mon and Wed and 5mg AOD.  Next INR 1/6/20.       Physical Exam:   General: Patient is alert, elderly, [place] male, no  distress.   Vitals: /66, Temp 97.6, Pulse 64, RR 18.  HEENT: Right facial droop secondary to [place]. Eyes show mild right upper lid medial eversion with injection. Has ointment in eye. Nares negative. Oropharynx moist.   Neck: Supple. No tenderness or adenopathy. No JVD.  Lungs: Clear bilaterally. No wheezes.  Cardiovascular: Regular rate and rhythm, normal S1, S2.  Back: No spinal or CVA tenderness.  Abdomen: Soft, no tenderness on exam. Bowel sounds present. No guarding rebound or rigidity.  : SP catheter.  Extremities: Mild LE edema is noted.  Musculoskeletal: Age related degen changes.       Labs:  Lab Results   Component Value Date    WBC 12.6 (H) 10/08/2019    HGB 12.7 (L) 10/08/2019    HCT 39.0 (L) 10/08/2019    MCV 86 10/08/2019     10/08/2019     Results for orders placed or performed in visit on 10/11/19   Basic Metabolic Panel   Result Value Ref Range    Sodium 134 (L) 136 - 145 mmol/L    Potassium 4.4 3.5 - 5.0 mmol/L    Chloride 103 98 - 107 mmol/L    CO2 24 22 - 31 mmol/L    Anion Gap, Calculation 7 5 - 18 mmol/L    Glucose 86 70 - 125 mg/dL    Calcium 8.6 8.5 - 10.5 mg/dL    BUN 10 8 - 28 mg/dL    Creatinine 0.82 0.70 - 1.30 mg/dL    GFR MDRD Af Amer >60 >60 mL/min/1.73m2    GFR MDRD Non Af Amer >60 >60 mL/min/1.73m2         Assessment/Plan:  1. Debilitation. Advanced age, cognitive and physical decline, multiple co morbidities.   2. Afib. Anticoagulated with warfarin. Adequate rate control. Monitor and adjust per INR.  3. Urinary retention. Has SP catheter.   4. Hx of stroke. Ischemic in nature. On warfarin.   5. HTN. On lisinopril. BPs acceptable, continue to monitor per protocol..   6. Hypothyroidism. Continue replacement.  7. [place] palsy, right.          Electronically signed by: Nancy Fair MD

## 2021-06-20 NOTE — LETTER
Letter by Chrissie Gonzalez RN at      Author: Chrissie Gonzalez RN Service: -- Author Type: --    Filed:  Encounter Date: 9/29/2020 Status: (Other)       Riley Rodriguez  [place] [facility] [place]  [address]  [place] [address]                          Dear Riley Rodriguez,    Important Information for Your Procedure    You are having a Pacemaker Battery Change Procedure:    Your procedure is scheduled for Friday 10-2-20    Please arrive at 6:00 am for registration and preporation for your procedure.   Getting Ready for Your Procedure:    You will need to contact your primary doctor Polly Marroquin CNP ([phone]), and schedule a pre-operative history within 30 days of your procedure.    You will need to bring a current list of your medications with you for our admissions process the day of your procedure, please do not bring any of your own medications  with you to the hospital    The hospital cannot store your valuables, so please leave them at home    You will need to take off your jewelry prior to your procedure, we advise leaving your jewelry at home, as we cannot store your valuables    Please shower the morning of your procedure, or the night before    This is a same day procedure, in which you can expect to go home the same day. In any unforseen circumstances this plan can change, but is unlikely.    Please make arrangements for transportation home, as you will not be able to drive following your procedure  The Night Prior to Your Procedure:    Please do not have anything to eat or drink after Midnight (12:00am) prior to your procedure    It is important to stay well hydrated, and consume balanced meals the day prior to your procedure  Arriving for Your Procedure:    Please arrive at [facility], located at: [address] [place] [id number]     Due to our current COVID 19 restriction,  is no longer available to park your car. We apologize for this unconvinced, we are taking  all steps necessary to keep patients and staff safe during this time    If you park in the ramp located on [address], take the elevator to level one. Enter the doors to the atrium/lobby area and check in at the main reception desk.     Please check in at the main reception desk in the lobby    You will be assisted to Cardiac Special [facility] on the third floor.  Going Home:    The physician and/or nurse will review any restrictions, follow-up requirements, and medication instructions prior to going home from the hospital in detail    Listed below are the restrictions that you can expect after your procedure:  o You will not be able to shower for 3 days after your procedure, to reduce the risk for infection and disrupting your incision site.  o You will not be allowed to drive for 24 hours after your procedure.  Clinic Contact Information:    You can contact our main clinic line at any time with questions, concerns, or if you need further information: [facility] [phone]    If you have further questions in regards to the scheduling of you procedure, please contact [facility] at [phone]    Medication prior to your procedure:    Please take your morning medications the day of your procedure with sips of water, except any multivitamins or supplements.    Continue taking your Coumadin as ordered, if your Coumadin dose needs to be adjusted I will contact you prior to your procedure. and You will need to have your INR checked 2-3 days prior to your procedure, to make sure your INR is suitable for your procedure. Please call your clinic that manages your INR to make this appointment        Sincerely,  Chrissie Gonzalez RN  Please call with any questions or concerns regarding the procedure at : [phone]

## 2021-06-20 NOTE — LETTER
Letter by Nancy Fair MD at      Author: Nancy Fair MD Service: -- Author Type: --    Filed:  Encounter Date: 9/29/2020 Status: (Other)         Patient: Riley Rodriguez   MR Number: [id number]   YOB: 1926   Date of Visit: 9/29/2020     [facility]    Facility:   [facility] [[id number]]   Code Status: DNR/DNI  PCP: Polly Marroquin CNP   Phone: [phone]   Fax: [phone]      Assessment/Plan:        Visit for Preoperative Exam.      1. SSS. Has pacemaker, now in need of generator change, scheduled for Fri 10/2/2020. Dr. Rob.  2. Anemia. He received transfusion in the hospital in August due to duodenal ulcer. Labs to be checked 10/1/2020, day prior to procedure.    3. Duodenal ulcer. On PPI.   4. Afib. Chronically anticoagulated with warfarin, continue uninterrupted.  5. Hx of stroke. Continue statin, also on warfarin as noted above.  6. Hypothyroidism. On replacement. Last TSH within goal range.  7. SP catheter. Hx of urinary retention.   8. HTN. Not on BP meds, had been on lisinopril in the past. BPs satisfactory.  9. [place] Palsy, right.   10. Vision impairment. On multiple drops, follows with ophthalmology.  11. Code status is DNR/DNI.      Labs will be done as indicated.         Subjective:     Scheduled Procedure: Pacemaker generator change  Surgery Date:  10/2/2020  Surgery Location:  [facility]  Surgeon:  Dr. Rob      HPI:   Riley is a 89 y.o. male with hx of Afib on warfarin, prior stroke, BPH, HTN, [place] palsy, hypothyroidism, SP catheter, resides in LTC at [facility]. He had a recent hospitalization in which he was sent to the hospital from nursing home on 8/13/2020. He had labs drawn in NH as he was not doing well with general fatigue, decreased appetite. No respiratory sx. He started feeling a little better on his own but then labs came back with hgb down to 6.2. He was worked up in the  hospital and noted to have duodenal ulcer. He required blood transfusion, had EGD noting the bleeding had already stopped. He was able to be restarted on chronic anticoagulation with warfarin. He continues on PPI. He returned to NH on 8/19/2020. No subsequent episodes of apparent bleeding or bleeding concerns. His last hgb checked was 7.9 on 9/8/2020. He is on iron. Note he has hx of chronic suprapubic catheter. Hx of CVA, hypothyroidism.    Cardiology has identified that he needs pacemaker generator battery change which is scheduled for Fri 10/2/2020. He is cleared for this cardiac procedure. Cardiology has provided pre op instructions and medication guidance. He will continue on anticoagulation uninterrupted. Last INR was 1.99 on 9/28/2020. Labs will be checked on 10/1/2020 and cardiology to be updated if hgb is less than 10 due to surgery considerations.       Medications:  Current Outpatient Medications   Medication Sig Dispense Refill   ? acetaminophen (TYLENOL) 325 MG tablet Take 2 tablets (650 mg total) by mouth every 6 (six) hours as needed for pain. 30 tablet 0   ? atorvastatin (LIPITOR) 40 MG tablet Take 1 tablet (40 mg total) by mouth at bedtime. For hx of cva 30 tablet 0   ? bisacodyL (DULCOLAX) 10 mg suppository Insert 10 mg into the rectum daily as needed.     ? docusate sodium (COLACE) 100 MG capsule Take 1 capsule (100 mg total) by mouth 2 (two) times a day. For constipation 30 capsule 0   ? dorzolamide-timoloL (COSOPT) 22.3-6.8 mg/mL ophthalmic solution Administer 1 drop into the left eye 2 (two) times a day. glaucoma 10 mL 12   ? erythromycin base (ERYTHROMYCIN OPHT) Apply 5 mg to eye. Instill 1 ribbon in left eye at HS and right eye four times a day     ? ferrous sulfate 325 (65 FE) MG tablet Take 1 tablet by mouth daily.     ? latanoprost (XALATAN) 0.005 % ophthalmic solution Administer 1 drop into the left eye at bedtime. glaucoma 2.5 mL 12   ? levothyroxine (SYNTHROID, LEVOTHROID) 25 MCG tablet  Take 1 tablet (25 mcg total) by mouth Daily at 6:00 am. Hypothyroid 30 tablet 0   ? omeprazole (PRILOSEC) 20 MG capsule Take 1 capsule (20 mg total) by mouth 2 (two) times a day before meals. Needs two times a day for 2 months, then daily for ulcer 60 capsule 0   ? polyethylene glycol (MIRALAX) 17 gram packet Take 1 packet (17 g total) by mouth daily as needed. For constipation 30 packet 0   ? propylene glycol (SYSTANE COMPLETE) 0.6 % Drop Apply 1 drop to eye 2 (two) times a day. Both eyes for dry eyes 1.5 mL 0   ? warfarin sodium (WARFARIN ORAL) Take by mouth. 9/17/20 INR 2.13 Take 6mg daily Next INR 9/28 9/8/20 INR 2.22  Cont 6mg daily.  Next INR 9/14/20.    8/31/20 INR 1.59  Take 6mg daily.  Next INR 9/8/20.  8/26/20 INR 1.53  Take 5.5mg daily.  Next INR 8/31/20.       No current facility-administered medications for this visit.        No Known Allergies    Immunization History   Administered Date(s) Administered   ? DT (pediatric) 10/11/2004   ? Influenza high dose,seasonal,PF, 65+ yrs 10/01/2010, 09/29/2015, 10/19/2016, 09/12/2017, 10/23/2018   ? Influenza, inj, historic,unspecified 10/12/2007, 10/13/2019   ? Influenza, seasonal,quad inj 6-35 mos 11/10/2008, 09/16/2009, 09/28/2011, 09/19/2012, 09/20/2013   ? Influenza,seasonal quad, PF 10/10/2014   ? Pneumo Conj 13-V (2010&after) 04/10/2015   ? Pneumo Polysac 23-V 11/01/2001   ? Td,adult,historic,unspecified 10/11/2004   ? Tdap 10/19/2012   ? ZOSTER, LIVE 10/27/2015       Patient Active Problem List   Diagnosis   ? Seborrheic Keratosis   ? Ventral Hernia   ? Macular Degeneration   ? Callus   ? Trochanteric Bursitis   ? Pacemaker Placement   ? Osteoarthrosis Of The Hip   ? Acute Urinary Retention   ? Gout   ? Normochromic, Normocytic Anemia   ? Thrombocytopenia   ? Tingling (Paresthesia)   ? Carpal Tunnel Syndrome   ? Abnormal Weight Loss   ? Hyperlipidemia   ? Impaired Fasting Glucose   ? Unspecified glaucoma   ? Osteoarthritis Of The Knee   ? Hypertension   ?  Urinary tract infection associated with cystostomy catheter, initial encounter (H)   ? Benign prostatic hyperplasia with urinary hesitancy   ? Urinary Frequency   ? Atrial Fibrillation   ? Stroke due to occlusion of right middle cerebral artery (H)   ? A-fib (H)   ? Cardiac pacemaker in situ   ? CVA (cerebral infarction)   ? Anemia   ? Left inguinal hernia   ? Essential hypertension with goal blood pressure less than 140/90   ? Traumatic hematoma of buttock, initial encounter   ? Traumatic hematoma   ? Hospital discharge follow-up   ? Altered mental state   ? Confusion   ? Hyponatremia   ? TSH elevation   ? Encephalopathy   ? Acute ischemic left PCA stroke (H)   ? Sepsis (H)   ? Acute urinary retention   ? Pyelonephritis, acute   ? History of atrial fibrillation   ? History of CVA (cerebrovascular accident)   ? Generalized muscle weakness   ? Urinary retention   ? Gross hematuria   ? Bell's palsy   ? SSS (sick sinus syndrome) (H)   ? Normocytic anemia   ? Hypothyroidism, unspecified type   ? Gastrointestinal hemorrhage with melena   ? Anemia due to blood loss, acute   ? Iron deficiency   ? Pacemaker battery depletion       Past Medical History:   Diagnosis Date   ? Atrial fibrillation (H)     On coumadin   ? Bell's palsy     right sided facial droop   ? CVA (cerebral vascular accident) (H)    ? Hypertension    ? Pacemaker    ? Urinary retention        Social History     Socioeconomic History   ? Marital status:      Spouse name: Not on file   ? Number of children: Not on file   ? Years of education: Not on file   ? Highest education level: Not on file   Occupational History   ? Not on file   Social Needs   ? Financial resource strain: Not on file   ? Food insecurity     Worry: Not on file     Inability: Not on file   ? Transportation needs     Medical: Not on file     Non-medical: Not on file   Tobacco Use   ? Smoking status: Former Smoker   ? Smokeless tobacco: Never Used   Substance and Sexual Activity   ?  Alcohol use: No   ? Drug use: No   ? Sexual activity: Not on file   Lifestyle   ? Physical activity     Days per week: Not on file     Minutes per session: Not on file   ? Stress: Not on file   Relationships   ? Social connections     Talks on phone: Not on file     Gets together: Not on file     Attends Orthodoxy service: Not on file     Active member of club or organization: Not on file     Attends meetings of clubs or organizations: Not on file     Relationship status: Not on file   ? Intimate partner violence     Fear of current or ex partner: Not on file     Emotionally abused: Not on file     Physically abused: Not on file     Forced sexual activity: Not on file   Other Topics Concern   ? Not on file   Social History Narrative    03/07/15 - The patient lives alone in his own home.       Past Surgical History:   Procedure Laterality Date   ? IR BLADDER SUPRAPUBIC CATHETER INSERTION  1/18/2019   ? TX ESOPHAGOGASTRODUODENOSCOPY TRANSORAL DIAGNOSTIC N/A 8/15/2020    Procedure: ESOPHAGOGASTRODUODENOSCOPY (EGD) with biopsy;  Surgeon: Paxton Villalpando MD;  Location: [facility];  Service: Gastroenterology   ? TX PARTIAL EXCISION THYROID,UNILAT      Description: Thyroid Surgery Sub-Total Thyroidectomy;  Recorded: 03/24/2008;   ? TX REMV PILONIDAL LESION SIMPLE      Description: Pilonidal Cyst Resection;  Recorded: 03/24/2008;   ? TX TRANSURETHRAL ELEC-SURG PROSTATECTOM      Description: Transurethral Resection Of Prostate (TURP);  Recorded: 03/24/2008;   ? TOTAL HIP ARTHROPLASTY Right        History of Present Illness  Recent Health  Fever: no  Chills: no  Fatigue: no  Chest Pain: no  Cough: no  Dyspnea: no  Urinary Frequency: no  Nausea: no  Vomiting: no  Diarrhea: no  Abdominal Pain: no  Easy Bruising: no  Lower Extremity Swelling: trace  Poor Exercise Tolerance: yes    Pertinent History  Prior Anesthesia: yes  Previous Anesthesia Reaction:  no  Diabetes: no  Cardiovascular Disease: yes  Pulmonary Disease:  no  Renal Disease: no  GI Disease: yes, recent dx duodenal ulcer  Sleep Apnea: no  Thromboembolic Problems: no  Clotting Disorder: no  Bleeding Disorder: no  Transfusion Reaction: no  Impaired Immunity: no  Steroid use in the last 6 months: no  Frequent Aspirin use: no      After surgery, the patient plans to recover at a nursing home.    Review of Systems  Review of Systems          Objective:         Vitals:    09/29/20 0800   BP: 122/63   Pulse: 62   Resp: 18   Temp: 98.7  F (37.1  C)   SpO2: 98%         Labs:  Lab Results   Component Value Date    HGB 11.1 (L) 10/01/2020       Physical Exam:  Physical Exam     General: Patient is alert, elderly male, no distress.    Vitals: Above.  HEENT: Head is NCAT. Nares negative. Oropharynx well hydrated. Right facial droop, baseline.  Neck: No JVD.  Lungs: Clear.  CV: Regular rate and rhythm..   Abdomen: Soft, no tenderness on exam. No guarding rebound or rigidity.  : SP cath with leg bag.  Extremities: Trace LE edema is noted.  Musculoskeletal: Age related degen changes.   Skin: No rashes noted.   Psych: Mood appears pretty good.          Electronically signed by: Nancy Fair MD

## 2021-06-20 NOTE — LETTER
Letter by Nancy Fair MD at      Author: Nancy Fair MD Service: -- Author Type: --    Filed:  Encounter Date: 6/30/2020 Status: (Other)         Patient: Riley Rodriguez   MR Number: [id number]   YOB: 1926   Date of Visit: 6/30/2020     [facility]    Facility:   [facility] [[id number]]   Code Status: DNR/DNI      Chief Complaint   Patient presents with   ? Review Of Multiple Medical Conditions     LTC 6/30.2020. Afib, SP catheter, general debilitation.        HPI:   Riley is a 89 y.o. male with hx of Afib on warfarin, prior stroke, BPH, HTN, admitted to the hospital in late Dec 2018 with confusion, acute ischemic stroke. Warfarin had been on hold due to recent gluteal hematoma. He was ultimately sent to TCU. However, he was sent back to the hospital from TCU on 1/4/19 due to AMS.  Found to have E. coli UTI, admitted for sepsis secondary to catheter associated UTI. Urine culture grew Pseudomonas. Discharged back to TCU on 1/8/19 for PT, OT, nursing cares, medical management and monitoring. Subsequently transitioned to LTC status.     Today:  No acute concerns on todays visit. He is in his wheelchair, self propels in the hallways. He is pleasantly confused, cooperative per nursing staff. Checked frequently due to history of self transfers. No recent falls. Takes warfarin for hx of Afib, no BP meds, pulses generally mid 50-60s. He is on levothyroxine for hypothyroidism. Has chronic eye issues for which he sees ophtho. He has [place] palsy affecting right side. He is on multiple eye drops and some ointment. Has SP catheter. Appetite is good. No recent illness. No fever or cough. No open areas of skin.       Past Medical History:  Past Medical History:   Diagnosis Date   ? Atrial fibrillation (H)     On coumadin   ? CVA (cerebral vascular accident) (H)    ? Hypertension    ? Pacemaker    ? Urinary retention        Medications:  Current  Outpatient Medications   Medication Sig   ? acetaminophen (TYLENOL) 325 MG tablet Take 2 tablets (650 mg total) by mouth every 6 (six) hours as needed for pain.   ? acetaminophen (TYLENOL) 325 MG tablet Take 650 mg by mouth 3 (three) times a day. Do not exceed 3000 mg daily   ? atorvastatin (LIPITOR) 40 MG tablet Take 40 mg by mouth at bedtime.   ? bisacodyl (DULCOLAX) 10 mg suppository Insert 10 mg into the rectum daily as needed.   ? dorzolamide-timolol (COSOPT) 22.3-6.8 mg/mL ophthalmic solution 1 drop to both eyes two times a day for macular degeneration and glaucoma   ? erythromycin base (ERYTHROMYCIN OPHT) Apply 5 mg to eye. Instill 1 ribbon in left eye at HS and right eye four times a day   ? latanoprost (XALATAN) 0.005 % ophthalmic solution 1 drop both eyes at bedtime for macular degeneration/glaucoma   ? levothyroxine (SYNTHROID, LEVOTHROID) 25 MCG tablet Take 1 tablet (25 mcg total) by mouth Daily at 6:00 am. Hypothyroid   ? petrolat,wht/min oil/sod chl (ARTIFICIAL TEARS OINTMENT OPHT) Apply 2 drops to eye every 2 (two) hours as needed. Right eye q 2 hours PRN   ? polyethylene glycol (MIRALAX) 17 gram packet Take 17 g by mouth daily as needed.   ? polyvinyl alcohol (LIQUIFILM TEARS) 1.4 % ophthalmic solution Administer 1 drop to the right eye every 2 (two) hours as needed for dry eyes.   ? propylene glycol (SYSTANE COMPLETE OPHT) Apply 1 drop to eye 2 (two) times a day. Both eyes   ? UNABLE TO FIND Med Name:docu liquis- 5 gtts each ear as needed prior to irrigation   ? warfarin sodium (WARFARIN ORAL) Take 5-5.5 mg by mouth. 6/15/20 INR 2.29 Cont 5.5mg M & W, 5mg AOD. Next INR 7/13.  5/18/20 INR 2.37 Cont 5.5 M & W, 5mg AOD. Next INR 6/15.  4/22/20 INR 2.04  Cont 5.5mg Mon and Wed and 5mg AOD.  Next INR 5/18/20.    3/10/20 INR 2.41 Cont 5.5 M & W, 5mg AOD. Next INR 3/18.       Physical Exam:   Note: COVID-19 pandemic precautions in place. Physical exam performed with social distancing  considerations.  General: Patient is alert, elderly, [place] male, no distress.   Vitals: /83, Temp 98.5, Pulse 63, RR 16, O2 sat 98%RA.  HEENT: Right facial droop secondary to [place]. Right eye with ointment. Nares negative. Oropharynx moist.   Neck: No JVD.  Lungs: Non labored respirations.   Abdomen: Soft  : SP catheter.  Extremities: Mild LE edema is noted.  Musculoskeletal: Age related degen changes.       Labs:  Lab Results   Component Value Date    WBC 12.6 (H) 10/08/2019    HGB 12.7 (L) 10/08/2019    HCT 39.0 (L) 10/08/2019    MCV 86 10/08/2019     10/08/2019     Results for orders placed or performed in visit on 10/11/19   Basic Metabolic Panel   Result Value Ref Range    Sodium 134 (L) 136 - 145 mmol/L    Potassium 4.4 3.5 - 5.0 mmol/L    Chloride 103 98 - 107 mmol/L    CO2 24 22 - 31 mmol/L    Anion Gap, Calculation 7 5 - 18 mmol/L    Glucose 86 70 - 125 mg/dL    Calcium 8.6 8.5 - 10.5 mg/dL    BUN 10 8 - 28 mg/dL    Creatinine 0.82 0.70 - 1.30 mg/dL    GFR MDRD Af Amer >60 >60 mL/min/1.73m2    GFR MDRD Non Af Amer >60 >60 mL/min/1.73m2       Lab Results   Component Value Date    TSH 3.10 04/22/2020         Assessment/Plan:  1. Debilitation. Advanced age, multiple co morbidities.   2. Afib. Anticoagulated with warfarin. Adequate rate control. Monitor and adjust per INR.  3. Urinary retention. Has SP catheter. No issues.  4. Hx of stroke. Ischemic in nature. On warfarin.   5. HTN. Not on BP meds, previously had lisinopril. BPs acceptable, continue to monitor per protocol.  6. Hypothyroidism. Continue replacement. Last TSH within range.   7. [place] palsy, right.          Electronically signed by: Nancy Fair MD

## 2021-06-20 NOTE — LETTER
Letter by Nancy Fair MD at      Author: Nancy Fair MD Service: -- Author Type: --    Filed:  Encounter Date: 8/20/2020 Status: (Other)         Patient: Riley Rodriguez   MR Number: [id number]   YOB: 1926   Date of Visit: 8/20/2020     [facility]      Facility:    [facility] [[id number]]  Code Status: DNR/DNI       Chief Complaint/Reason for Visit:  Chief Complaint   Patient presents with   ? H & P     Re-admit to LTC-anemia sec to duodenal ulcer.        HPI:   Riley is a 89 y.o. male with hx of Afib on warfarin, prior stroke, BPH, HTN, [place] palsy, hypothyroidism, SP catheter, resides in LTC at [facility]. He was sent to the hospital from nursing home on 8/13/2020. He had labs drawn in NH as he was not doing well with general fatigue, decreased appetite. NO respiratory sx. He started feeling a little better on his own but then labs came back with hgb down to 6.2. He was worked up in the hospital with discharge summary as partially excerpted below.     89 y.o. male with history of stroke, cognitive impairment, visual impairment, Bell's palsy, chronic urine retention status post SP catheter, A. fib presented for evaluation of incidentally noted anemia found to have duodenal ulcer     1.Normocytic anemia  -had Melena  -found to have Duodenal ulcer  - baseline hemoglobin around 10, incidentally found to have hemoglobin of 6.2 at his nursing home, sent in for evaluation.  By the time he got here his hemoglobin had dropped to 5.9  He has received 3 units pRBCs since admission  -Underwent EGD showed duodenal ulcer, had already stopped bleeding  - cautiously restarted anticoagulation again and he seems to be tolerating  -Continue  PPI   -GI did not recommend any specific followup likely given his age. He should continue on oral PPI lifelong  -now on 8/19/20 hgb is 8.2     2.Iron deficiency  - He received 3 units pRBCs which will  help replenish his iron stores.      3.Need for anticoagulation  -On warfarin chronically for history of A fib.  -He has history of stroke during a brief period that warfarin was held for bleeding in the past.  -As above presented with anemia unknown source, so INR was reversed with vitamin K and FFP. He seemed to develop tarry stools earlier in hospital stay shortly after Lovenox was given.  -Had EGD and ulcer was nonbleeding. Ok to restart anticoagulation per GI. We restarted Lovenox--will stop lovenox when inr is 2.0      4.BPH   -CAUTI  -Has SP cath-changed on 8/17, gets change q 4 weeks  -Urine culture from 8/12 growing MSSA  -Had been on Vanc now amoxicillin  -Plan 7 days abx     5.Hx CVA  -Home statin  -Full anticoagulation as above (warfarin bridging with Lovenox)  -PT/OT     6.Hypothyroidism  -Home Synthroid     Overall stabilized and discharged back to LTC facility on 8/19/2020.     Today:  He came back to nursing home on the TCU unit for isolation due to covid precautions. He will be receiving therapies. He is on warfarin, currently bridged with Lovenox. INR today subtherapeutic, will be checked tomorrow along with hgb. He has not had any signs of bleeding. He is more alert and interactive, near baseline. No specific complaints. He has not had fever or cough. SP catheter with clear yellow urine. He will complete course of amoxicillin for catheter associated UTI per hospital notes. He had 8/12/2020 culture with  K Staph aureus though had UC with no growth from 8/13/2020. He is on PPI omeprazole with hospital directions per GI recommendations for two times a day dosing for 2 months then once daily lifetime. He has baseline vision and hearing impairments, on multiple eye drops. He denies dizziness, abdominal pain.       Past Medical History:  Past Medical History:   Diagnosis Date   ? Atrial fibrillation (H)     On coumadin   ? Bell's palsy     right sided facial droop   ? CVA (cerebral vascular  accident) (H)    ? Hypertension    ? Pacemaker    ? Urinary retention            Surgical History:  Past Surgical History:   Procedure Laterality Date   ? IR BLADDER SUPRAPUBIC CATHETER INSERTION  1/18/2019   ? KY ESOPHAGOGASTRODUODENOSCOPY TRANSORAL DIAGNOSTIC N/A 8/15/2020    Procedure: ESOPHAGOGASTRODUODENOSCOPY (EGD) with biopsy;  Surgeon: Paxton Villalpando MD;  Location: [facility];  Service: Gastroenterology   ? KY PARTIAL EXCISION THYROID,UNILAT      Description: Thyroid Surgery Sub-Total Thyroidectomy;  Recorded: 03/24/2008;   ? KY REMV PILONIDAL LESION SIMPLE      Description: Pilonidal Cyst Resection;  Recorded: 03/24/2008;   ? KY TRANSURETHRAL ELEC-SURG PROSTATECTOM      Description: Transurethral Resection Of Prostate (TURP);  Recorded: 03/24/2008;   ? TOTAL HIP ARTHROPLASTY Right        Family History:   Family History   Problem Relation Age of Onset   ? COPD Father        Social History:    Social History     Socioeconomic History   ? Marital status:      Spouse name: Not on file   ? Number of children: Not on file   ? Years of education: Not on file   ? Highest education level: Not on file   Occupational History   ? Not on file   Social Needs   ? Financial resource strain: Not on file   ? Food insecurity     Worry: Not on file     Inability: Not on file   ? Transportation needs     Medical: Not on file     Non-medical: Not on file   Tobacco Use   ? Smoking status: Former Smoker   ? Smokeless tobacco: Never Used   Substance and Sexual Activity   ? Alcohol use: No   ? Drug use: No   ? Sexual activity: Not on file   Lifestyle   ? Physical activity     Days per week: Not on file     Minutes per session: Not on file   ? Stress: Not on file   Relationships   ? Social connections     Talks on phone: Not on file     Gets together: Not on file     Attends Ep[employer]opalian service: Not on file     Active member of club or organization: Not on file     Attends meetings of clubs or organizations: Not on file      Relationship status: Not on file   ? Intimate partner violence     Fear of current or ex partner: Not on file     Emotionally abused: Not on file     Physically abused: Not on file     Forced sexual activity: Not on file   Other Topics Concern   ? Not on file   Social History Narrative    03/07/15 - The patient lives alone in his own home.       Medications:  Current Outpatient Medications   Medication Sig   ? acetaminophen (TYLENOL) 325 MG tablet Take 2 tablets (650 mg total) by mouth every 6 (six) hours as needed for pain.   ? atorvastatin (LIPITOR) 40 MG tablet Take 1 tablet (40 mg total) by mouth at bedtime. For hx of cva   ? bisacodyL (DULCOLAX) 10 mg suppository Insert 10 mg into the rectum daily as needed.   ? docusate sodium (COLACE) 100 MG capsule Take 1 capsule (100 mg total) by mouth 2 (two) times a day. For constipation   ? dorzolamide-timoloL (COSOPT) 22.3-6.8 mg/mL ophthalmic solution Administer 1 drop into the left eye 2 (two) times a day. glaucoma   ? erythromycin base (ERYTHROMYCIN OPHT) Apply 5 mg to eye. Instill 1 ribbon in left eye at HS and right eye four times a day   ? latanoprost (XALATAN) 0.005 % ophthalmic solution Administer 1 drop into the left eye at bedtime. glaucoma   ? levothyroxine (SYNTHROID, LEVOTHROID) 25 MCG tablet Take 1 tablet (25 mcg total) by mouth Daily at 6:00 am. Hypothyroid   ? omeprazole (PRILOSEC) 20 MG capsule Take 1 capsule (20 mg total) by mouth 2 (two) times a day before meals. Needs two times a day for 2 months, then daily for ulcer   ? polyethylene glycol (MIRALAX) 17 gram packet Take 1 packet (17 g total) by mouth daily as needed. For constipation   ? propylene glycol (SYSTANE COMPLETE) 0.6 % Drop Apply 1 drop to eye 2 (two) times a day. Both eyes for dry eyes   ? warfarin ANTICOAGULANT (COUMADIN/JANTOVEN) 5 MG tablet 5 mg po daily and call MD to dose daily.for hx of CVA (Patient taking differently: 5-5.5 mg. Next INR 8/24/20   call MD to dose daily.for hx of  CVA)       Allergies:  No Known Allergies       Review of Systems:  Pertinent items are noted in HPI.   Cognitive impairment.      Physical Exam:   Note: COVID-19 pandemic precautions in place. Physical exam performed with social distancing considerations.  General: Patient is alert, elderly male, no distress.    Vitals: /62, Temp 98.5, Pulse 61, RR 20, O2 sat 98%RA.  HEENT: Head is NCAT. Nares negative. Oropharynx well hydrated. Right facial droop, baseline.  Neck: No JVD.  Lungs: Nonlabored respirations.   Abdomen: Soft, no tenderness on exam. No guarding rebound or rigidity.  : SP cath with leg bag..  Extremities: Trace LE edema is noted.  Musculoskeletal: Age related degen changes.   Skin: No rashes noted.   Psych: Mood appears good.      Labs:  Component      Latest Ref Rng & Units 8/13/2020 8/13/2020 8/14/2020 8/14/2020           3:30 PM  7:36 PM  1:14 AM  7:24 AM   Pathology, Smear Review      (none)       WBC      4.0 - 11.0 thou/uL 7.4 7.1  6.1   RBC      4.40 - 6.20 mill/uL 2.08 (L) 1.99 (L)  2.36 (L)   Hemoglobin      14.0 - 18.0 g/dL 6.2 (LL) 5.9 (LL) 6.7 (LL) 6.9 (LL)   Hematocrit      40.0 - 54.0 % 19.3 (L) 18.1 (L)  20.7 (L)   MCV      80 - 100 fL 93 91  88   MCH      27.0 - 34.0 pg 29.8 29.6  29.2   MCHC      32.0 - 36.0 g/dL 32.1 32.6  33.3   RDW      11.0 - 14.5 % 14.3 14.2  14.6 (H)   Platelets      140 - 440 thou/uL 209 196  170   MPV      8.5 - 12.5 fL 10.5 10.0  10.3   Neutrophils %      50 - 70 % 63 60     Lymphocytes %      20 - 40 % 20 23     Monocytes %      2 - 10 % 15 (H) 15 (H)     Eosinophils %      0 - 6 % 1 1     Basophils %      0 - 2 % 0 0     Neutrophils Absolute      2.0 - 7.7 thou/uL 4.7 4.2     Lymphocytes Absolute      0.8 - 4.4 thou/uL 1.5 1.6     Monocytes Absolute      0.0 - 0.9 thou/uL 1.1 (H) 1.1 (H)     Eosinophils Absolute      0.0 - 0.4 thou/uL 0.1 0.1     Basophils Absolute      0.0 - 0.2 thou/uL 0.0 0.0       Component      Latest Ref Rng & Units 8/14/2020  8/14/2020 8/14/2020           7:24 AM  2:46 PM 11:39 PM   Pathology, Smear Review      (none) See Separate Pathology Report (A)     WBC      4.0 - 11.0 thou/uL 6.1     RBC      4.40 - 6.20 mill/uL 2.36 (L)     Hemoglobin      14.0 - 18.0 g/dL 6.9 (LL) 8.3 (L) 8.1 (L)   Hematocrit      40.0 - 54.0 % 20.7 (L)     MCV      80 - 100 fL 88     MCH      27.0 - 34.0 pg 29.2     MCHC      32.0 - 36.0 g/dL 33.3     RDW      11.0 - 14.5 % 14.6 (H)     Platelets      140 - 440 thou/uL 170     MPV      8.5 - 12.5 fL 10.3     Neutrophils %      50 - 70 % 64     Lymphocytes %      20 - 40 % 19 (L)     Monocytes %      2 - 10 % 15 (H)     Eosinophils %      0 - 6 % 2     Basophils %      0 - 2 % 0     Neutrophils Absolute      2.0 - 7.7 thou/uL 3.9     Lymphocytes Absolute      0.8 - 4.4 thou/uL 1.1     Monocytes Absolute      0.0 - 0.9 thou/uL 0.9     Eosinophils Absolute      0.0 - 0.4 thou/uL 0.1     Basophils Absolute      0.0 - 0.2 thou/uL 0.0       Component      Latest Ref Rng & Units 8/15/2020 8/15/2020 8/15/2020 8/16/2020           7:30 AM  3:40 PM 11:59 PM  7:56 AM   Pathology, Smear Review      (none)       WBC      4.0 - 11.0 thou/uL 7.5   6.7   RBC      4.40 - 6.20 mill/uL 2.62 (L)   2.53 (L)   Hemoglobin      14.0 - 18.0 g/dL 7.8 (L) 8.4 (L) 8.1 (L) 7.4 (L)   Hematocrit      40.0 - 54.0 % 23.4 (L)   23.1 (L)   MCV      80 - 100 fL 89   91   MCH      27.0 - 34.0 pg 29.8   29.2   MCHC      32.0 - 36.0 g/dL 33.3   32.0   RDW      11.0 - 14.5 % 14.6 (H)   14.7 (H)   Platelets      140 - 440 thou/uL 196   210   MPV      8.5 - 12.5 fL 9.5   9.5   Neutrophils %      50 - 70 %       Lymphocytes %      20 - 40 %       Monocytes %      2 - 10 %       Eosinophils %      0 - 6 %       Basophils %      0 - 2 %       Neutrophils Absolute      2.0 - 7.7 thou/uL       Lymphocytes Absolute      0.8 - 4.4 thou/uL       Monocytes Absolute      0.0 - 0.9 thou/uL       Eosinophils Absolute      0.0 - 0.4 thou/uL       Basophils  Absolute      0.0 - 0.2 thou/uL         Component      Latest Ref Rng & Units 8/16/2020 8/17/2020 8/18/2020 8/19/2020           7:26 PM      Pathology, Smear Review      (none)       WBC      4.0 - 11.0 thou/uL       RBC      4.40 - 6.20 mill/uL       Hemoglobin      14.0 - 18.0 g/dL 8.1 (L) 7.8 (L) 7.8 (L) 8.2 (L)   Hematocrit      40.0 - 54.0 %       MCV      80 - 100 fL       MCH      27.0 - 34.0 pg       MCHC      32.0 - 36.0 g/dL       RDW      11.0 - 14.5 %       Platelets      140 - 440 thou/uL       MPV      8.5 - 12.5 fL       Neutrophils %      50 - 70 %       Lymphocytes %      20 - 40 %       Monocytes %      2 - 10 %       Eosinophils %      0 - 6 %       Basophils %      0 - 2 %       Neutrophils Absolute      2.0 - 7.7 thou/uL       Lymphocytes Absolute      0.8 - 4.4 thou/uL       Monocytes Absolute      0.0 - 0.9 thou/uL       Eosinophils Absolute      0.0 - 0.4 thou/uL       Basophils Absolute      0.0 - 0.2 thou/uL         Results for orders placed or performed during the hospital encounter of 08/13/20   Basic Metabolic Panel   Result Value Ref Range    Sodium 138 136 - 145 mmol/L    Potassium 4.3 3.5 - 5.0 mmol/L    Chloride 107 98 - 107 mmol/L    CO2 26 22 - 31 mmol/L    Anion Gap, Calculation 5 5 - 18 mmol/L    Glucose 101 70 - 125 mg/dL    Calcium 8.0 (L) 8.5 - 10.5 mg/dL    BUN 9 8 - 28 mg/dL    Creatinine 0.74 0.70 - 1.30 mg/dL    GFR MDRD Af Amer >60 >60 mL/min/1.73m2    GFR MDRD Non Af Amer >60 >60 mL/min/1.73m2     Lab Results   Component Value Date    TSH 3.10 04/22/2020       Assessment/Plan:  1. Anemia. He received transfusion in the hospital. Work up revealed duodenal ulcer, no active bleeding. Hgb pending for tomorrow.   2. Duodenal ulcer. Seen by GI, had EGD on 8/15/2020. No active bleeding so no specific procedural intervention needed. He Is on PPI two times a day for 2 months then once daily indefinitely.  3. Afib. Chronically anticoagulated with warfarin. Currently bridged with  Lovenox. INR tomorrow.  4. Hx of stroke. Continue statin, also on warfarin as noted above.  5. Hypothyroidism. On replacement. Last TSH within goal range.  6. SP catheter. Finishing abx amoxicillin for CAUTI. Hx of urinary retention.   7. HTN. Not on BP meds, had been on lisinopril in the past. BPs satisfactory.  8. [place] Palsy, right.   9. Vision impairment. On multiple drops, follows with ophthalmology.  10. Code status is DNR/DNI.          Electronically signed by: Nancy Fair MD                no

## 2021-06-20 NOTE — LETTER
Letter by Polly Marroquin CNP at      Author: Polly Marroquin CNP Service: -- Author Type: --    Filed:  Encounter Date: 8/31/2020 Status: (Other)         Patient: Riley Rodriguez   MR Number: [id number]   YOB: 1926   Date of Visit: 8/31/2020     [facility]    Facility:   [facility] [[id number]]   Code Status: DNR      CHIEF COMPLAINT/REASON FOR VISIT:  Chief Complaint   Patient presents with   ? Problem Visit     INR review, monitor labs       HISTORY:      HPI: Riley is a 89 y.o. male  now residing in the LTC at [facility].  He is  with history of A. fib on warfarin develop acute CVA from holding warfarin for right gluteal hematoma.  Currently back on Coumadin, Hypertension , urinary retention has a suprapubic catheter and with a pacemaker       Today he being seen  for  follow up hgb and INR. Today he is subtherapeutic at 1.59, Coumadin increased. His HGB is 8.0 which is up from 7.7.  He now on the TCU recovering from a recent hospitalization in which he developed a GI bleed. He was hospitalized from 8/13-8/19/20. He was found to have a duodenal ulcer.He was slowly restarted on his coumadin and and  Lovenox.   No active bleeding noted and urine is clear yellow in nagy bag.He is able to move all extremities.  He is eating well and reports no difficulties with swallowing.    He denied   constipation. His suprapubic is intact and draining clear yellow urine.        Past Medical History:   Diagnosis Date   ? Atrial fibrillation (H)     On coumadin   ? Bell's palsy     right sided facial droop   ? CVA (cerebral vascular accident) (H)    ? Hypertension    ? Pacemaker    ? Urinary retention              Family History   Problem Relation Age of Onset   ? COPD Father      Social History     Socioeconomic History   ? Marital status:      Spouse name: Not on file   ? Number of children: Not on file   ? Years of education: Not on file   ?  Highest education level: Not on file   Occupational History   ? Not on file   Social Needs   ? Financial resource strain: Not on file   ? Food insecurity     Worry: Not on file     Inability: Not on file   ? Transportation needs     Medical: Not on file     Non-medical: Not on file   Tobacco Use   ? Smoking status: Former Smoker   ? Smokeless tobacco: Never Used   Substance and Sexual Activity   ? Alcohol use: No   ? Drug use: No   ? Sexual activity: Not on file   Lifestyle   ? Physical activity     Days per week: Not on file     Minutes per session: Not on file   ? Stress: Not on file   Relationships   ? Social connections     Talks on phone: Not on file     Gets together: Not on file     Attends Gnosticist service: Not on file     Active member of club or organization: Not on file     Attends meetings of clubs or organizations: Not on file     Relationship status: Not on file   ? Intimate partner violence     Fear of current or ex partner: Not on file     Emotionally abused: Not on file     Physically abused: Not on file     Forced sexual activity: Not on file   Other Topics Concern   ? Not on file   Social History Narrative    03/07/15 - The patient lives alone in his own home.         Review of Systems  Eyes: negative for pain, discharge, redness He is followed  closely by opthalmology.  He has had multiple procedures done on his eye right  Constitutional: Negative for activity change and fatigue. Negative for appetite change, chills and fever.   HENT: Negative for congestion and sore throat.    Eyes: positive  for visual disturbance. right sided facial paralysis   Respiratory: Negative for cough, shortness of breath and wheezing.    Cardiovascular: Negative for chest pain and leg swelling.        Pacemaker   Gastrointestinal: Negative for abdominal distention, abdominal pain, constipation, diarrhea and nausea.   Genitourinary: Negative for dysuria.   Musculoskeletal: Negative for arthralgias and myalgias.    Skin: Negative for color change, rash and wound.   Neurological: Negative for dizziness, weakness and numbness.   Psychiatric/Behavioral: Negative for agitation, behavioral problems and sleep disturbance.   Vitals:    08/31/20 1030   BP: 146/58   Pulse: 63   Resp: 18   Temp: 98.4  F (36.9  C)   SpO2: 93%   Weight: 195 lb 3.2 oz (88.5 kg)       Constitutional: He appears well-developed and well-nourished.   Pleasant gentleman   HENT:   Head: Normocephalic and atraumatic.   Eyes: Conjunctivae are normal. Pupils are equal, round, and reactive to light. poor vision with the right worse than the left right sided facial paralysis   Neck: Normal range of motion. Neck supple.   Cardiovascular: Normal rate, regular rhythm and normal heart sounds.   No murmur heard.  Pulmonary/Chest: Effort normal and breath sounds normal. He has no wheezes. He has no rales.   Abdominal: Soft. Bowel sounds are normal. He exhibits no distension. There is no tenderness.   Genitourinary: suprapubic catheter  Musculoskeletal: Normal range of motion. He exhibits no edema.   Neurological: He is alert.   Skin: Skin is warm and dry.   Psychiatric: He has a normal mood and affect. His behavior is normal.     LABS:   Recent Results (from the past 240 hour(s))   Hemoglobin   Result Value Ref Range    Hemoglobin 7.6 (L) 14.0 - 18.0 g/dL   INR   Result Value Ref Range    INR 1.67 (H) 0.90 - 1.10   COVID-19 Virus PCR MRF    Specimen: Respiratory   Result Value Ref Range    COVID-19 VIRUS SPECIMEN SOURCE Nasopharyngeal     2019-nCOV Not Detected    Hemoglobin   Result Value Ref Range    Hemoglobin 7.7 (L) 14.0 - 18.0 g/dL   INR   Result Value Ref Range    INR 1.53 (H) 0.90 - 1.10   INR   Result Value Ref Range    INR 1.59 (H) 0.90 - 1.10   Hemoglobin   Result Value Ref Range    Hemoglobin 8.0 (L) 14.0 - 18.0 g/dL        Current Outpatient Medications   Medication Sig   ? acetaminophen (TYLENOL) 325 MG tablet Take 2 tablets (650 mg total) by mouth every 6  (six) hours as needed for pain.   ? atorvastatin (LIPITOR) 40 MG tablet Take 1 tablet (40 mg total) by mouth at bedtime. For hx of cva   ? bisacodyL (DULCOLAX) 10 mg suppository Insert 10 mg into the rectum daily as needed.   ? docusate sodium (COLACE) 100 MG capsule Take 1 capsule (100 mg total) by mouth 2 (two) times a day. For constipation   ? dorzolamide-timoloL (COSOPT) 22.3-6.8 mg/mL ophthalmic solution Administer 1 drop into the left eye 2 (two) times a day. glaucoma   ? erythromycin base (ERYTHROMYCIN OPHT) Apply 5 mg to eye. Instill 1 ribbon in left eye at HS and right eye four times a day   ? ferrous sulfate 325 (65 FE) MG tablet Take 1 tablet by mouth daily.   ? latanoprost (XALATAN) 0.005 % ophthalmic solution Administer 1 drop into the left eye at bedtime. glaucoma   ? levothyroxine (SYNTHROID, LEVOTHROID) 25 MCG tablet Take 1 tablet (25 mcg total) by mouth Daily at 6:00 am. Hypothyroid   ? omeprazole (PRILOSEC) 20 MG capsule Take 1 capsule (20 mg total) by mouth 2 (two) times a day before meals. Needs two times a day for 2 months, then daily for ulcer   ? polyethylene glycol (MIRALAX) 17 gram packet Take 1 packet (17 g total) by mouth daily as needed. For constipation   ? propylene glycol (SYSTANE COMPLETE) 0.6 % Drop Apply 1 drop to eye 2 (two) times a day. Both eyes for dry eyes   ? warfarin sodium (WARFARIN ORAL) Take by mouth. 8/26/20 INR 1.53  Take 5.5mg daily.  Next INR 8/31/20.         ASSESSMENT:      ICD-10-CM    1. Encounter for anticoagulation discussion and counseling  Z71.89        PLAN:      Right eye trauma - on eyedrops  he no longer has pain and being followed closely by opthalmology.       Suprapubic catheter- cleanse daily, monitor for infection around  the site.  Cares per protocol     Atrial fibrillation on coumadin and lisinopril,       Anticoagulation management- monitor and adjust coumadin  per INRS      Poor vision- is followed closely by ophthalmology.  on multiple eye gtts.       Hypothyroidism- on Synthroid, TSH 4/22/20 was 3.10    GI bleed- No active bleeding , received multiple blood  transfusions in the hospital HGB up to 8.0 from 7.7     UTI- completed antibiotics.     Electronically signed by: Polly Marroquin CNP

## 2021-06-20 NOTE — LETTER
Letter by Polly Marroquin CNP at      Author: Polly Marroquin CNP Service: -- Author Type: --    Filed:  Encounter Date: 6/8/2020 Status: (Other)         Patient: Riley Rodriguez   MR Number: [id number]   YOB: 1926   Date of Visit: 6/8/2020     [facility]    Facility:   [facility] [[id number]]   Code Status: DNR      CHIEF COMPLAINT/REASON FOR VISIT:  Chief Complaint   Patient presents with   ? FVP Care Coordination - Regulatory       HISTORY:      HPI: Riley is a 89 y.o. male  now residing in the LTC at [facility].  He is  with history of A. fib on warfarin develop acute CVA from holding warfarin for right gluteal hematoma.  Currently back on Coumadin, Hypertension , urinary retention has a suprapubic catheter and with a pacemaker      Today he being seen  for routine regulatory visit.  He denied CP or shortness of breath, He no longer requires pain medication for his right eye.  He ambulates frequently with the restorative aid.  He did undergo  surgery on 12/17/2019 for an  ectropion procedure.   He is able to move all extremities.  He is eating well and reports no difficulties with swallowing.    He denied   constipation.   His suprapubic is intact and draining clear yellow urine.  TSH stable at 3.10 and done on 4/22/20.  His weights were reviewed and have been stable over the last month.    Past Medical History:   Diagnosis Date   ? Atrial fibrillation (H)     On coumadin   ? CVA (cerebral vascular accident) (H)    ? Hypertension    ? Pacemaker    ? Urinary retention              Family History   Problem Relation Age of Onset   ? COPD Father      Social History     Socioeconomic History   ? Marital status:      Spouse name: Not on file   ? Number of children: Not on file   ? Years of education: Not on file   ? Highest education level: Not on file   Occupational History   ? Not on file   Social Needs   ? Financial resource strain:  Not on file   ? Food insecurity     Worry: Not on file     Inability: Not on file   ? Transportation needs     Medical: Not on file     Non-medical: Not on file   Tobacco Use   ? Smoking status: Former Smoker   ? Smokeless tobacco: Never Used   Substance and Sexual Activity   ? Alcohol use: No   ? Drug use: No   ? Sexual activity: Not on file   Lifestyle   ? Physical activity     Days per week: Not on file     Minutes per session: Not on file   ? Stress: Not on file   Relationships   ? Social connections     Talks on phone: Not on file     Gets together: Not on file     Attends [employer] witness service: Not on file     Active member of club or organization: Not on file     Attends meetings of clubs or organizations: Not on file     Relationship status: Not on file   ? Intimate partner violence     Fear of current or ex partner: Not on file     Emotionally abused: Not on file     Physically abused: Not on file     Forced sexual activity: Not on file   Other Topics Concern   ? Not on file   Social History Narrative    03/07/15 - The patient lives alone in his own home.         Review of Systems  Eyes: negative for pain, discharge, redness He is followed  closely by opthalmology.  He has had multiple procedures done on his eye right  Constitutional: Negative for activity change and fatigue. Negative for appetite change, chills and fever.   HENT: Negative for congestion and sore throat.    Eyes: positive  for visual disturbance. right sided facial paralysis   Respiratory: Negative for cough, shortness of breath and wheezing.    Cardiovascular: Negative for chest pain and leg swelling.        Pacemaker   Gastrointestinal: Negative for abdominal distention, abdominal pain, constipation, diarrhea and nausea.   Genitourinary: Negative for dysuria.   Musculoskeletal: Negative for arthralgias and myalgias.   Skin: Negative for color change, rash and wound.   Neurological: Negative for dizziness, weakness and numbness.    Psychiatric/Behavioral: Negative for agitation, behavioral problems and sleep disturbance.   Vitals:    06/08/20 0916   BP: 140/72   Pulse: 74   Resp: 16   Temp: 98.2  F (36.8  C)   SpO2: 97%   Weight: 187 lb 3.2 oz (84.9 kg)       Constitutional: He appears well-developed and well-nourished.   Pleasant gentleman   HENT:   Head: Normocephalic and atraumatic.   Eyes: Conjunctivae are normal. Pupils are equal, round, and reactive to light. poor vision with the right worse than the left right sided facial paralysis   Neck: Normal range of motion. Neck supple.   Cardiovascular: Normal rate, regular rhythm and normal heart sounds.   No murmur heard.  Pulmonary/Chest: Effort normal and breath sounds normal. He has no wheezes. He has no rales.   Abdominal: Soft. Bowel sounds are normal. He exhibits no distension. There is no tenderness.   Genitourinary: suprapubic catheter  Musculoskeletal: Normal range of motion. He exhibits no edema.   Neurological: He is alert.   Skin: Skin is warm and dry.   Psychiatric: He has a normal mood and affect. His behavior is normal.     LABS:   No results found for this or any previous visit (from the past 240 hour(s)).     Current Outpatient Medications   Medication Sig   ? acetaminophen (TYLENOL) 325 MG tablet Take 2 tablets (650 mg total) by mouth every 6 (six) hours as needed for pain.   ? acetaminophen (TYLENOL) 325 MG tablet Take 650 mg by mouth 3 (three) times a day. Do not exceed 3000 mg daily   ? atorvastatin (LIPITOR) 40 MG tablet Take 40 mg by mouth at bedtime.   ? bisacodyl (DULCOLAX) 10 mg suppository Insert 10 mg into the rectum daily as needed.   ? dorzolamide-timolol (COSOPT) 22.3-6.8 mg/mL ophthalmic solution 1 drop to both eyes two times a day for macular degeneration and glaucoma   ? erythromycin base (ERYTHROMYCIN OPHT) Apply 5 mg to eye. Instill 1 ribbon in left eye at HS and right eye four times a day   ? latanoprost (XALATAN) 0.005 % ophthalmic solution 1 drop both  eyes at bedtime for macular degeneration/glaucoma   ? levothyroxine (SYNTHROID, LEVOTHROID) 25 MCG tablet Take 1 tablet (25 mcg total) by mouth Daily at 6:00 am. Hypothyroid   ? petrolat,wht/min oil/sod chl (ARTIFICIAL TEARS OINTMENT OPHT) Apply 2 drops to eye every 2 (two) hours as needed. Right eye q 2 hours PRN   ? polyethylene glycol (MIRALAX) 17 gram packet Take 17 g by mouth daily as needed.   ? polyvinyl alcohol (LIQUIFILM TEARS) 1.4 % ophthalmic solution Administer 1 drop to the right eye every 2 (two) hours as needed for dry eyes.   ? propylene glycol (SYSTANE COMPLETE OPHT) Apply 1 drop to eye 2 (two) times a day. Both eyes   ? UNABLE TO FIND Med Name:docu liquis- 5 gtts each ear as needed prior to irrigation   ? warfarin sodium (WARFARIN ORAL) Take 5-5.5 mg by mouth. 5/18/20 INR 2.37 Cont 5.5 M & W, 5mg AOD. Next INR 6/15.  4/22/20 INR 2.04  Cont 5.5mg Mon and Wed and 5mg AOD.  Next INR 5/18/20.    3/10/20 INR 2.41 Cont 5.5 M & W, 5mg AOD. Next INR 3/18.     Case Management:  I have reviewed the facility/SNF care plan/MDS which was done 4/9/2020 TSH pending including the falls risk, nutrition and pain screening. I also reviewed the current immunizations, and preventive care.. Future cancer screening is not clinically indicated secondary to age/goals of care.   Patient's desire to return to the community is not assessible due to cognitive impairment.    Information reviewed:  Medications, vital signs, orders, and nursing notes.    ASSESSMENT:      ICD-10-CM    1. Generalized muscle weakness  M62.81    2. Atrial fibrillation, unspecified type (H)  I48.91    3. Macular Degeneration  H35.30        PLAN:      Right eye trauma - on eyedrops  he no longer has pain and being followed closely by opthalmology.    HX Right sided Newcomb Palsy . Pt currently on eye lubricating drops multiple times a day.       Suprapubic catheter- cleanse daily, monitor for infection around  the site.  Cares per protocol     Atrial  fibrillation on coumadin and lisinopril     Anticoagulation management- monitor and adjust per INRS      Poor vision- is followed closely by ophthalmology.  on multiple eye gtts.      Hypothyroidism- on Synthroid, TSH 4/22/20 was 3.10    Electronically signed by: Polly Marroquin CNP

## 2021-06-20 NOTE — LETTER
Letter by Polly Marroquin CNP at      Author: Polly Marroquin CNP Service: -- Author Type: --    Filed:  Encounter Date: 8/21/2020 Status: (Other)         Patient: Riley Rodriguez   MR Number: [id number]   YOB: 1926   Date of Visit: 8/21/2020     [facility]    Facility:   [facility] [[id number]]   Code Status: DNR      CHIEF COMPLAINT/REASON FOR VISIT:  Chief Complaint   Patient presents with   ? FVP Care Coordination - Regulatory       HISTORY:      HPI: Riley is a 89 y.o. male  now residing in the LTC at [facility].  He is  with history of A. fib on warfarin develop acute CVA from holding warfarin for right gluteal hematoma.  Currently back on Coumadin, Hypertension , urinary retention has a suprapubic catheter and with a pacemaker       Today he being seen  for routine regulatory visit and follow up hgb and INR. He now on the TCU recovering from a recent hospitalization in which he developed a GI bleed. He was hospitalized from 8/13-8/19/20. He was found to have a duodenal ulcer.He was slowly restarted on his coumadin and being bridged with lovenox until he reaches 2..0.  INR subtherapeutic today at 1.65, adjustments made and INR check on 8/24/20.    He denied CP or shortness of breath,  He is able to move all extremities.  He is eating well and reports no difficulties with swallowing.    He denied   constipation.   His suprapubic is intact and draining clear yellow urine.  TSH stable at 3.10 and done on 4/22/20.  His weights were reviewed and he is up 7 pounds  In the last 2 weeks, LS clear and no shortness of breath>     Past Medical History:   Diagnosis Date   ? Atrial fibrillation (H)     On coumadin   ? Bell's palsy     right sided facial droop   ? CVA (cerebral vascular accident) (H)    ? Hypertension    ? Pacemaker    ? Urinary retention              Family History   Problem Relation Age of Onset   ? COPD Father      Social History      Socioeconomic History   ? Marital status:      Spouse name: Not on file   ? Number of children: Not on file   ? Years of education: Not on file   ? Highest education level: Not on file   Occupational History   ? Not on file   Social Needs   ? Financial resource strain: Not on file   ? Food insecurity     Worry: Not on file     Inability: Not on file   ? Transportation needs     Medical: Not on file     Non-medical: Not on file   Tobacco Use   ? Smoking status: Former Smoker   ? Smokeless tobacco: Never Used   Substance and Sexual Activity   ? Alcohol use: No   ? Drug use: No   ? Sexual activity: Not on file   Lifestyle   ? Physical activity     Days per week: Not on file     Minutes per session: Not on file   ? Stress: Not on file   Relationships   ? Social connections     Talks on phone: Not on file     Gets together: Not on file     Attends L[employer]-day service: Not on file     Active member of club or organization: Not on file     Attends meetings of clubs or organizations: Not on file     Relationship status: Not on file   ? Intimate partner violence     Fear of current or ex partner: Not on file     Emotionally abused: Not on file     Physically abused: Not on file     Forced sexual activity: Not on file   Other Topics Concern   ? Not on file   Social History Narrative    03/07/15 - The patient lives alone in his own home.         Review of Systems  Eyes: negative for pain, discharge, redness He is followed  closely by opthalmology.  He has had multiple procedures done on his eye right  Constitutional: Negative for activity change and fatigue. Negative for appetite change, chills and fever.   HENT: Negative for congestion and sore throat.    Eyes: positive  for visual disturbance. right sided facial paralysis   Respiratory: Negative for cough, shortness of breath and wheezing.    Cardiovascular: Negative for chest pain and leg swelling.        Pacemaker   Gastrointestinal: Negative for abdominal  distention, abdominal pain, constipation, diarrhea and nausea.   Genitourinary: Negative for dysuria.   Musculoskeletal: Negative for arthralgias and myalgias.   Skin: Negative for color change, rash and wound.   Neurological: Negative for dizziness, weakness and numbness.   Psychiatric/Behavioral: Negative for agitation, behavioral problems and sleep disturbance.   Vitals:    08/21/20 0918   BP: (!) 130/92   Pulse: 81   Resp: 16   Temp: 98.9  F (37.2  C)   SpO2: 98%   Weight: 195 lb 3.2 oz (88.5 kg)       Constitutional: He appears well-developed and well-nourished.   Pleasant gentleman   HENT:   Head: Normocephalic and atraumatic.   Eyes: Conjunctivae are normal. Pupils are equal, round, and reactive to light. poor vision with the right worse than the left right sided facial paralysis   Neck: Normal range of motion. Neck supple.   Cardiovascular: Normal rate, regular rhythm and normal heart sounds.   No murmur heard.  Pulmonary/Chest: Effort normal and breath sounds normal. He has no wheezes. He has no rales.   Abdominal: Soft. Bowel sounds are normal. He exhibits no distension. There is no tenderness.   Genitourinary: suprapubic catheter  Musculoskeletal: Normal range of motion. He exhibits no edema.   Neurological: He is alert.   Skin: Skin is warm and dry.   Psychiatric: He has a normal mood and affect. His behavior is normal.     LABS:   Recent Results (from the past 240 hour(s))   Urinalysis-UC if Indicated   Result Value Ref Range    Color, UA Yellow Colorless, Yellow, Straw, Light Yellow    Clarity, UA Cloudy (!) Clear    Glucose, UA Negative Negative    Bilirubin, UA Negative Negative    Ketones, UA Negative Negative    Specific Gravity, UA 1.018 1.001 - 1.030    Blood, UA Trace (!) Negative    pH, UA 6.0 4.5 - 8.0    Protein, UA 30 mg/dL (!) Negative mg/dL    Urobilinogen, UA <2.0 E.U./dL <2.0 E.U./dL, 2.0 E.U./dL    Nitrite, UA Negative Negative    Leukocytes, UA Large (!) Negative    Bacteria, UA  Moderate (!) None Seen hpf    RBC, UA 3-5 (!) None Seen, 0-2 hpf    WBC, UA >100 (!) None Seen, 0-5 hpf    Squam Epithel, UA 0-5 None Seen, 0-5 lpf    WBC Clumps Present (!) None Seen    Mucus, UA Few (!) None Seen lpf   Culture, Urine    Specimen: Urine   Result Value Ref Range    Culture Mixture of urogenital organisms with     Culture 50,000-100,000 col/ml Staphylococcus aureus (!)        Susceptibility    Staphylococcus aureus - RACHAEL     Cefazolin <=2 Sensitive      Doxycycline <=0.5 Sensitive      Nitrofurantoin <=16 Sensitive      Levofloxacin <=0.5 Sensitive      Oxacillin 0.5 Sensitive      Trimethoprim + Sulfamethoxazole <=1/19 Sensitive      Vancomycin 1 Sensitive    Basic Metabolic Panel   Result Value Ref Range    Sodium 129 (L) 136 - 145 mmol/L    Potassium 3.9 3.5 - 5.0 mmol/L    Chloride 99 98 - 107 mmol/L    CO2 21 (L) 22 - 31 mmol/L    Anion Gap, Calculation 9 5 - 18 mmol/L    Glucose 126 (H) 70 - 125 mg/dL    Calcium 8.0 (L) 8.5 - 10.5 mg/dL    BUN 19 8 - 28 mg/dL    Creatinine 0.77 0.70 - 1.30 mg/dL    GFR MDRD Af Amer >60 >60 mL/min/1.73m2    GFR MDRD Non Af Amer >60 >60 mL/min/1.73m2   Magnesium   Result Value Ref Range    Magnesium 2.0 1.8 - 2.6 mg/dL   HM1 (CBC with Diff)   Result Value Ref Range    WBC 7.4 4.0 - 11.0 thou/uL    RBC 2.08 (L) 4.40 - 6.20 mill/uL    Hemoglobin 6.2 (LL) 14.0 - 18.0 g/dL    Hematocrit 19.3 (L) 40.0 - 54.0 %    MCV 93 80 - 100 fL    MCH 29.8 27.0 - 34.0 pg    MCHC 32.1 32.0 - 36.0 g/dL    RDW 14.3 11.0 - 14.5 %    Platelets 209 140 - 440 thou/uL    MPV 10.5 8.5 - 12.5 fL    Neutrophils % 63 50 - 70 %    Lymphocytes % 20 20 - 40 %    Monocytes % 15 (H) 2 - 10 %    Eosinophils % 1 0 - 6 %    Basophils % 0 0 - 2 %    Neutrophils Absolute 4.7 2.0 - 7.7 thou/uL    Lymphocytes Absolute 1.5 0.8 - 4.4 thou/uL    Monocytes Absolute 1.1 (H) 0.0 - 0.9 thou/uL    Eosinophils Absolute 0.1 0.0 - 0.4 thou/uL    Basophils Absolute 0.0 0.0 - 0.2 thou/uL   Basic Metabolic Panel    Result Value Ref Range    Sodium 128 (L) 136 - 145 mmol/L    Potassium 3.7 3.5 - 5.0 mmol/L    Chloride 98 98 - 107 mmol/L    CO2 19 (L) 22 - 31 mmol/L    Anion Gap, Calculation 11 5 - 18 mmol/L    Glucose 145 (H) 70 - 125 mg/dL    Calcium 7.8 (L) 8.5 - 10.5 mg/dL    BUN 18 8 - 28 mg/dL    Creatinine 0.77 0.70 - 1.30 mg/dL    GFR MDRD Af Amer >60 >60 mL/min/1.73m2    GFR MDRD Non Af Amer >60 >60 mL/min/1.73m2   INR   Result Value Ref Range    INR 3.59 (H) 0.90 - 1.10   APTT   Result Value Ref Range    PTT 43 (H) 24 - 37 seconds   Troponin I   Result Value Ref Range    Troponin I 0.06 0.00 - 0.29 ng/mL   HM1 (CBC with Diff)   Result Value Ref Range    WBC 7.1 4.0 - 11.0 thou/uL    RBC 1.99 (L) 4.40 - 6.20 mill/uL    Hemoglobin 5.9 (LL) 14.0 - 18.0 g/dL    Hematocrit 18.1 (L) 40.0 - 54.0 %    MCV 91 80 - 100 fL    MCH 29.6 27.0 - 34.0 pg    MCHC 32.6 32.0 - 36.0 g/dL    RDW 14.2 11.0 - 14.5 %    Platelets 196 140 - 440 thou/uL    MPV 10.0 8.5 - 12.5 fL    Neutrophils % 60 50 - 70 %    Lymphocytes % 23 20 - 40 %    Monocytes % 15 (H) 2 - 10 %    Eosinophils % 1 0 - 6 %    Basophils % 0 0 - 2 %    Neutrophils Absolute 4.2 2.0 - 7.7 thou/uL    Lymphocytes Absolute 1.6 0.8 - 4.4 thou/uL    Monocytes Absolute 1.1 (H) 0.0 - 0.9 thou/uL    Eosinophils Absolute 0.1 0.0 - 0.4 thou/uL    Basophils Absolute 0.0 0.0 - 0.2 thou/uL   Type and screen   Result Value Ref Range    ABORh A POS     Antibody Screen Negative Negative   ECG 12 lead nursing unit performed   Result Value Ref Range    SYSTOLIC BLOOD PRESSURE      DIASTOLIC BLOOD PRESSURE      VENTRICULAR RATE 62 BPM    ATRIAL RATE 60 BPM    P-R INTERVAL      QRS DURATION 98 ms    Q-T INTERVAL 440 ms    QTC CALCULATION (BEZET) 446 ms    P Axis      R AXIS 46 degrees    T AXIS 237 degrees    MUSE DIAGNOSIS       Demand pacemaker; interpretation is based on intrinsic rhythm  Atrial fibrillation with premature ventricular or aberrantly conducted complexes  Nonspecific ST and T  wave abnormality , probably digitalis effect  Abnormal ECG  When compared with ECG of 20-DEC-2018 02:40,  Electronic demand pacing is now Present  Nonspecific T wave abnormality, worse in Inferior leads  T wave inversion no longer evident in Anterior leads  Confirmed by SEE ED PROVIDER NOTE FOR, ECG INTERPRETATION ([id number]),  OSEAS RODRIGUEZ ([id number]) on 8/14/2020 12:51:47 AM     Folate, Serum   Result Value Ref Range    Folate 11.3 >=3.5 ng/mL   Urinalysis-UC if Indicated   Result Value Ref Range    Color, UA Yellow Colorless, Yellow, Straw, Light Yellow    Clarity, UA Cloudy (!) Clear    Glucose, UA Negative Negative    Bilirubin, UA Negative Negative    Ketones, UA Negative Negative, 60 mg/dL    Specific Gravity, UA 1.008 1.001 - 1.030    Blood, UA Trace (!) Negative    pH, UA 5.5 4.5 - 8.0    Protein, UA Negative Negative mg/dL    Urobilinogen, UA <2.0 E.U./dL <2.0 E.U./dL, 2.0 E.U./dL    Nitrite, UA Negative Negative    Leukocytes, UA Large (!) Negative    Bacteria, UA Few (!) None Seen hpf    RBC, UA 0-2 None Seen, 0-2 hpf    WBC, UA 25-50 (!) None Seen, 0-5 hpf    Squam Epithel, UA 0-5 None Seen, 0-5 lpf    Trans Epithel, UA 0-5 (!) None Seen lpf   Culture, Urine    Specimen: Urine, Catheter   Result Value Ref Range    Culture No Growth    COVID-19 Virus PCR MRF    Specimen: Respiratory   Result Value Ref Range    COVID-19 VIRUS SPECIMEN SOURCE Nasopharyngeal     2019-nCOV Not Detected    Troponin I   Result Value Ref Range    Troponin I 0.07 0.00 - 0.29 ng/mL   Hemoglobin   Result Value Ref Range    Hemoglobin 6.7 (LL) 14.0 - 18.0 g/dL   Basic Metabolic Panel   Result Value Ref Range    Sodium 135 (L) 136 - 145 mmol/L    Potassium 3.5 3.5 - 5.0 mmol/L    Chloride 104 98 - 107 mmol/L    CO2 23 22 - 31 mmol/L    Anion Gap, Calculation 8 5 - 18 mmol/L    Glucose 116 70 - 125 mg/dL    Calcium 7.7 (L) 8.5 - 10.5 mg/dL    BUN 12 8 - 28 mg/dL    Creatinine 0.72 0.70 - 1.30 mg/dL    GFR MDRD Af Amer >60 >60  mL/min/1.73m2    GFR MDRD Non Af Amer >60 >60 mL/min/1.73m2   HM2(CBC w/o Differential)   Result Value Ref Range    WBC 6.1 4.0 - 11.0 thou/uL    RBC 2.36 (L) 4.40 - 6.20 mill/uL    Hemoglobin 6.9 (LL) 14.0 - 18.0 g/dL    Hematocrit 20.7 (L) 40.0 - 54.0 %    MCV 88 80 - 100 fL    MCH 29.2 27.0 - 34.0 pg    MCHC 33.3 32.0 - 36.0 g/dL    RDW 14.6 (H) 11.0 - 14.5 %    Platelets 170 140 - 440 thou/uL    MPV 10.3 8.5 - 12.5 fL   Hepatic Profile   Result Value Ref Range    Bilirubin, Total 0.6 0.0 - 1.0 mg/dL    Bilirubin, Direct 0.3 <=0.5 mg/dL    Protein, Total 5.4 (L) 6.0 - 8.0 g/dL    Albumin 3.0 (L) 3.5 - 5.0 g/dL    Alkaline Phosphatase 48 45 - 120 U/L    AST 19 0 - 40 U/L    ALT 13 0 - 45 U/L   Protime-INR   Result Value Ref Range    INR 1.83 (H) 0.90 - 1.10   Troponin I   Result Value Ref Range    Troponin I 0.08 0.00 - 0.29 ng/mL   Morphology, Path Smear Review (MORP)   Result Value Ref Range    Pathology, Smear Review See Separate Pathology Report (!) (none)    WBC 6.1 4.0 - 11.0 thou/uL    RBC 2.36 (L) 4.40 - 6.20 mill/uL    Hemoglobin 6.9 (LL) 14.0 - 18.0 g/dL    Hematocrit 20.7 (L) 40.0 - 54.0 %    MCV 88 80 - 100 fL    MCH 29.2 27.0 - 34.0 pg    MCHC 33.3 32.0 - 36.0 g/dL    RDW 14.6 (H) 11.0 - 14.5 %    Platelets 170 140 - 440 thou/uL    MPV 10.3 8.5 - 12.5 fL    Neutrophils % 64 50 - 70 %    Lymphocytes % 19 (L) 20 - 40 %    Monocytes % 15 (H) 2 - 10 %    Eosinophils % 2 0 - 6 %    Basophils % 0 0 - 2 %    Neutrophils Absolute 3.9 2.0 - 7.7 thou/uL    Lymphocytes Absolute 1.1 0.8 - 4.4 thou/uL    Monocytes Absolute 0.9 0.0 - 0.9 thou/uL    Eosinophils Absolute 0.1 0.0 - 0.4 thou/uL    Basophils Absolute 0.0 0.0 - 0.2 thou/uL   Haptoglobin   Result Value Ref Range    Haptoglobin 154 33 - 171 mg/dL   Iron and Transferrin Iron Binding Capacity   Result Value Ref Range    Iron 9 (L) 42 - 175 ug/dL    Transferrin 276 212 - 360 mg/dL    Transferrin Saturation, Calculated 3 (L) 20 - 50 %    Transferrin IBC,  Calculated 345 313 - 563 ug/dL   Reticulocytes   Result Value Ref Range    Retic Absolute Count 0.103 0.010 - 0.110 mill/uL    Retic Ct Pct 4.47 (H) 0.8 - 2.7 %   Peripheral Blood Smear, Path Review   Result Value Ref Range    Case Report       Peripheral Blood Morphology                       Case: [id number]                                   Authorizing Provider:  Dalia Elizabeth MD        Collected:           08/14/2020 0724              Ordering Location:     [facility] ICU    Received:            08/14/2020 1305                                     [place]                                                                         Pathologist:           Darren Mann MD                                                        Specimen:    Peripheral Blood                                                                           Final Diagnosis       PERIPHERAL BLOOD:     -  NORMOCHROMIC-NORMOCYTIC ANEMIA     -  OCCASIONAL SCHISTOCYTES ARE PRESENT    -  FEW SPHEROCYTES ARE PRESENT     -  NEGATIVE FOR ACUTE LEUKEMIA    -  UNREMARKABLE PLATELETS    -  PLEASE SEE COMMENT    Comment       Normocytic anemia can be caused by multiple etiologies including intrinsic bone marrow disease, renal disease, hepatic disease, endocrine disease, anemia of chronic disease, post-hemorrhagic anemia, and bone marrow infiltration by tumors. Due to the presence of schistocytes and spherocytes, the differential diagnosis must include hemolytic anemia with intravascular and extravascular components. Recommend assessment of serum haptoglobin, LDH and a direct antiglobulin test. Recommend clinical correlation and follow-up.    Clinical Information Not provided     Charges CPT:  79093    ICD-10:  D64.9    Lactate Dehydrogenase (LDH)   Result Value Ref Range    LD (LDH) 216 125 - 220 U/L   Vitamin B12   Result Value Ref Range    Vitamin B-12 486 213 - 816 pg/mL   Hemoglobin   Result Value Ref Range    Hemoglobin 8.3 (L) 14.0 - 18.0 g/dL    Potassium   Result Value Ref Range    Potassium 3.6 3.5 - 5.0 mmol/L   Hemoglobin   Result Value Ref Range    Hemoglobin 8.1 (L) 14.0 - 18.0 g/dL   Potassium - Next AM   Result Value Ref Range    Potassium 3.7 3.5 - 5.0 mmol/L   INR   Result Value Ref Range    INR 1.27 (H) 0.90 - 1.10   HM2(CBC w/o Differential)   Result Value Ref Range    WBC 7.5 4.0 - 11.0 thou/uL    RBC 2.62 (L) 4.40 - 6.20 mill/uL    Hemoglobin 7.8 (L) 14.0 - 18.0 g/dL    Hematocrit 23.4 (L) 40.0 - 54.0 %    MCV 89 80 - 100 fL    MCH 29.8 27.0 - 34.0 pg    MCHC 33.3 32.0 - 36.0 g/dL    RDW 14.6 (H) 11.0 - 14.5 %    Platelets 196 140 - 440 thou/uL    MPV 9.5 8.5 - 12.5 fL   Creatinine   Result Value Ref Range    Creatinine 0.67 (L) 0.70 - 1.30 mg/dL    GFR MDRD Af Amer >60 >60 mL/min/1.73m2    GFR MDRD Non Af Amer >60 >60 mL/min/1.73m2   Surgical Pathology Exam   Result Value Ref Range    Case Report       Surgical Pathology                                Case: [id number]                                    Authorizing Provider:  Paxton Villalpando MD      Collected:           08/15/2020 1348              Ordering Location:     [facility]     Received:            08/17/2020 0805              Pathologist:           Darren Mann MD                                                        Specimen:    Gastric, Biopsy, R/O H. pylori                                                             Final Diagnosis       STOMACH, BIOPSY:     -   CHRONIC GASTRITIS, MODERATE TO SEVERE     -   NO EVIDENCE OF INTESTINAL METAPLASIA OR NEOPLASIA     -   IMMUNOSTAIN FOR HELICOBACTER IS NEGATIVE    Microscopic Description       Microscopic examination performed, substantiating the above diagnosis. All controls stain appropriately.    Clinical Information       Pre-op Diagnosis:  Gastrointestinal hemorrhage with melena [K92.1]    Gross Description       Received in formalin labeled with the patient's name and gastric biopsy are three, minute to 0.3 cm,  irregular, tan soft tissues. TE-1C RJR:sg    Charges CPT: 89932, 94827  ICD-10: K29.51     Result Flag     Hemoglobin   Result Value Ref Range    Hemoglobin 8.4 (L) 14.0 - 18.0 g/dL   Hemoglobin   Result Value Ref Range    Hemoglobin 8.1 (L) 14.0 - 18.0 g/dL   INR   Result Value Ref Range    INR 1.18 (H) 0.90 - 1.10   HM2(CBC w/o Differential)   Result Value Ref Range    WBC 6.7 4.0 - 11.0 thou/uL    RBC 2.53 (L) 4.40 - 6.20 mill/uL    Hemoglobin 7.4 (L) 14.0 - 18.0 g/dL    Hematocrit 23.1 (L) 40.0 - 54.0 %    MCV 91 80 - 100 fL    MCH 29.2 27.0 - 34.0 pg    MCHC 32.0 32.0 - 36.0 g/dL    RDW 14.7 (H) 11.0 - 14.5 %    Platelets 210 140 - 440 thou/uL    MPV 9.5 8.5 - 12.5 fL   Basic Metabolic Panel   Result Value Ref Range    Sodium 136 136 - 145 mmol/L    Potassium 3.9 3.5 - 5.0 mmol/L    Chloride 107 98 - 107 mmol/L    CO2 24 22 - 31 mmol/L    Anion Gap, Calculation 5 5 - 18 mmol/L    Glucose 99 70 - 125 mg/dL    Calcium 7.4 (L) 8.5 - 10.5 mg/dL    BUN 9 8 - 28 mg/dL    Creatinine 0.70 0.70 - 1.30 mg/dL    GFR MDRD Af Amer >60 >60 mL/min/1.73m2    GFR MDRD Non Af Amer >60 >60 mL/min/1.73m2   Hemoglobin   Result Value Ref Range    Hemoglobin 8.1 (L) 14.0 - 18.0 g/dL   Crossmatch   Result Value Ref Range    Crossmatch Compatible     Unit Type A Pos     Unit Number [id number]     Status Transfused     Component Red Blood Cells     PRODUCT CODE [id number]     Issue Date and Time [id number]     Blood Type 6200     CODING SYSTEM [id number]    Crossmatch   Result Value Ref Range    Crossmatch Compatible     Unit Type A Pos     Unit Number [id number]     Status Transfused     Component Red Blood Cells     PRODUCT CODE [id number]     Issue Date and Time [id number]     Blood Type 6200     CODING SYSTEM [id number]    Plasma Product Information   Result Value Ref Range    Unit Type A Pos     Unit Number [id number]     Status Transfused     Component FROZEN PLASMA     PRODUCT CODE [id number]     Issue Date and Time  [id number]     Blood Type 6200     CODING SYSTEM [id number]    Plasma Product Information   Result Value Ref Range    Unit Type A Pos     Unit Number [id number]     Status Transfused     Component FROZEN PLASMA     PRODUCT CODE [id number]     Issue Date and Time [id number]     Blood Type 6200     CODING SYSTEM [id number]    Crossmatch   Result Value Ref Range    Crossmatch Compatible     Unit Type A Pos     Unit Number [id number]     Status Transfused     Component Red Blood Cells     PRODUCT CODE G7200O20     Issue Date and Time [id number]     Blood Type 6200     CODING SYSTEM [id number]    INR   Result Value Ref Range    INR 1.32 (H) 0.90 - 1.10   Potassium - Next AM   Result Value Ref Range    Potassium 4.1 3.5 - 5.0 mmol/L   Hemoglobin   Result Value Ref Range    Hemoglobin 7.8 (L) 14.0 - 18.0 g/dL   COVID-19 Virus PCR MRF    Specimen: Respiratory   Result Value Ref Range    COVID-19 VIRUS SPECIMEN SOURCE Nasopharyngeal     2019-nCOV       Test received-See reflex to IDDL test SARS CoV2 (COVID-19) Virus RT-PCR   SARS-CoV-2 (COVID-19) RT-PCR-IDDL    Specimen: Respiratory   Result Value Ref Range    SARS-CoV-2 Virus Specimen Source Nasopharyngeal     SARS-CoV-2 PCR Result NEGATIVE     SARS-COV-2 PCR COMMENT       Testing was performed using the [employer] COVID-19 Direct Assay on the [employer]   INR   Result Value Ref Range    INR 1.39 (H) 0.90 - 1.10   Hemoglobin   Result Value Ref Range    Hemoglobin 7.8 (L) 14.0 - 18.0 g/dL   Basic Metabolic Panel   Result Value Ref Range    Sodium 137 136 - 145 mmol/L    Potassium 3.8 3.5 - 5.0 mmol/L    Chloride 106 98 - 107 mmol/L    CO2 26 22 - 31 mmol/L    Anion Gap, Calculation 5 5 - 18 mmol/L    Glucose 102 70 - 125 mg/dL    Calcium 7.9 (L) 8.5 - 10.5 mg/dL    BUN 10 8 - 28 mg/dL    Creatinine 0.70 0.70 - 1.30 mg/dL    GFR MDRD Af Amer >60 >60 mL/min/1.73m2    GFR MDRD Non Af Amer >60 >60 mL/min/1.73m2   Magnesium   Result Value Ref Range    Magnesium 2.0 1.8 - 2.6  mg/dL   INR   Result Value Ref Range    INR 1.67 (H) 0.90 - 1.10   Hemoglobin   Result Value Ref Range    Hemoglobin 8.2 (L) 14.0 - 18.0 g/dL   Basic Metabolic Panel   Result Value Ref Range    Sodium 138 136 - 145 mmol/L    Potassium 4.3 3.5 - 5.0 mmol/L    Chloride 107 98 - 107 mmol/L    CO2 26 22 - 31 mmol/L    Anion Gap, Calculation 5 5 - 18 mmol/L    Glucose 101 70 - 125 mg/dL    Calcium 8.0 (L) 8.5 - 10.5 mg/dL    BUN 9 8 - 28 mg/dL    Creatinine 0.74 0.70 - 1.30 mg/dL    GFR MDRD Af Amer >60 >60 mL/min/1.73m2    GFR MDRD Non Af Amer >60 >60 mL/min/1.73m2   INR   Result Value Ref Range    INR 1.80 (H) 0.90 - 1.10   Hemoglobin   Result Value Ref Range    Hemoglobin 7.9 (L) 14.0 - 18.0 g/dL   INR   Result Value Ref Range    INR 1.65 (H) 0.90 - 1.10        Current Outpatient Medications   Medication Sig   ? acetaminophen (TYLENOL) 325 MG tablet Take 2 tablets (650 mg total) by mouth every 6 (six) hours as needed for pain.   ? atorvastatin (LIPITOR) 40 MG tablet Take 1 tablet (40 mg total) by mouth at bedtime. For hx of cva   ? bisacodyL (DULCOLAX) 10 mg suppository Insert 10 mg into the rectum daily as needed.   ? docusate sodium (COLACE) 100 MG capsule Take 1 capsule (100 mg total) by mouth 2 (two) times a day. For constipation   ? dorzolamide-timoloL (COSOPT) 22.3-6.8 mg/mL ophthalmic solution Administer 1 drop into the left eye 2 (two) times a day. glaucoma   ? enoxaparin ANTICOAGULANT (LOVENOX) 80 mg/0.8 mL syringe Inject 0.8 mL (80 mg total) under the skin 2 (two) times a day for 8 doses. MUST HAVE INR CHECKED DAILY AND WHEN INR 2.0 OR HIGHER--STOP LOVENOX, HX OF AFIB, CVA GETTING BACK ON WARFARIN   ? erythromycin base (ERYTHROMYCIN OPHT) Apply 5 mg to eye. Instill 1 ribbon in left eye at HS and right eye four times a day   ? latanoprost (XALATAN) 0.005 % ophthalmic solution Administer 1 drop into the left eye at bedtime. glaucoma   ? levothyroxine (SYNTHROID, LEVOTHROID) 25 MCG tablet Take 1 tablet (25 mcg  total) by mouth Daily at 6:00 am. Hypothyroid   ? omeprazole (PRILOSEC) 20 MG capsule Take 1 capsule (20 mg total) by mouth 2 (two) times a day before meals. Needs two times a day for 2 months, then daily for ulcer   ? polyethylene glycol (MIRALAX) 17 gram packet Take 1 packet (17 g total) by mouth daily as needed. For constipation   ? propylene glycol (SYSTANE COMPLETE) 0.6 % Drop Apply 1 drop to eye 2 (two) times a day. Both eyes for dry eyes   ? warfarin ANTICOAGULANT (COUMADIN/JANTOVEN) 5 MG tablet 5 mg po daily and call MD to dose daily.for hx of CVA (Patient taking differently: 5-5.5 mg. Next INR 8/24/20   call MD to dose daily.for hx of CVA)     Case Management:  I have reviewed the facility/SNF care plan/MDS which was done 8/19/20. TSH pending (3.10 0n 4/22/20)  including the falls risk, nutrition and pain screening. I also reviewed the current immunizations, and preventive care.. Future cancer screening is not clinically indicated secondary to age/goals of care.   Patient's desire to return to the community is not assessible due to cognitive impairment.    Information reviewed:  Medications, vital signs, orders, and nursing notes.    ASSESSMENT:      ICD-10-CM    1. Anemia due to blood loss, acute  D62    2. Gastrointestinal hemorrhage with melena  K92.1    3. Hypothyroidism, unspecified type  E03.9        PLAN:      Right eye trauma - on eyedrops  he no longer has pain and being followed closely by opthalmology.    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 Pt currently on eye lubricating drops multiple times a day.       Suprapubic catheter- cleanse daily, monitor for infection around  the site.  Cares per protocol     Atrial fibrillation on coumadin and lisinopril, currently being bridged with lovenox.      Anticoagulation management- monitor and adjust per INRS      Poor vision- is followed closely by ophthalmology.  on multiple eye gtts.      Hypothyroidism- on Synthroid, TSH 4/22/20 was 3.10    GI bleed- No active  bleeding , received multiple blood  transfusions in the hospital HGB 8/21/20 was 7.9 monitor labs.     UTI- completed antibiotics.     Electronically signed by: Polly Marroquin CNP

## 2021-06-20 NOTE — LETTER
Letter by Polly Marroquin CNP at      Author: Polly Marroquin CNP Service: -- Author Type: --    Filed:  Encounter Date: 4/14/2020 Status: (Other)         Patient: Riley Rodriguez   MR Number: [id number]   YOB: 1926   Date of Visit: 4/14/2020     [facility]    Facility:   [facility] [[id number]]   Code Status: DNR      CHIEF COMPLAINT/REASON FOR VISIT:  Chief Complaint   Patient presents with   ? FVP Care Coordination - Regulatory       HISTORY:      HPI: Riley is a 89 y.o. male  now residing in the LTC at [facility].  He is  with history of A. fib on warfarin develop acute CVA from holding warfarin for right gluteal hematoma. Hypertension , urinary retention has a suprapubic catheter.     Today he being seen  for routine regulatory visit.  He denied any concerns  however he then reported slight discomfort  in his right hand in which he believes is from using a hand gripper.  He denied numbness or tingling but just said it felt weird.  He is on scheduled Tylenol and reports that helps.  He ambulates frequently with the restorative aid.  He did undergo  surgery on 12/17/2019 for an  ectropion procedure.   He is able to move all extremities.  He is eating well and reports no difficulties with swallowing.    He denied CP or shortness of breath. He denied  constipation.   His suprapubic is intact and draining clear yellow urine.  TSH pending.    Past Medical History:   Diagnosis Date   ? Atrial fibrillation (H)     On coumadin   ? CVA (cerebral vascular accident) (H)    ? Hypertension    ? Pacemaker    ? Urinary retention              Family History   Problem Relation Age of Onset   ? COPD Father      Social History     Socioeconomic History   ? Marital status:      Spouse name: Not on file   ? Number of children: Not on file   ? Years of education: Not on file   ? Highest education level: Not on file   Occupational History   ? Not on  file   Social Needs   ? Financial resource strain: Not on file   ? Food insecurity     Worry: Not on file     Inability: Not on file   ? Transportation needs     Medical: Not on file     Non-medical: Not on file   Tobacco Use   ? Smoking status: Former Smoker   ? Smokeless tobacco: Never Used   Substance and Sexual Activity   ? Alcohol use: No   ? Drug use: No   ? Sexual activity: Not on file   Lifestyle   ? Physical activity     Days per week: Not on file     Minutes per session: Not on file   ? Stress: Not on file   Relationships   ? Social connections     Talks on phone: Not on file     Gets together: Not on file     Attends [employer] service: Not on file     Active member of club or organization: Not on file     Attends meetings of clubs or organizations: Not on file     Relationship status: Not on file   ? Intimate partner violence     Fear of current or ex partner: Not on file     Emotionally abused: Not on file     Physically abused: Not on file     Forced sexual activity: Not on file   Other Topics Concern   ? Not on file   Social History Narrative    03/07/15 - The patient lives alone in his own home.         Review of Systems  Eyes: negative for pain, discharge, redness and visual disturbance.   He is followed  closely by opthalmology.  He has had multiple procedures done on his eye right  Constitutional: Negative for activity change and fatigue. Negative for appetite change, chills and fever.   HENT: Negative for congestion and sore throat.    Eyes: positive  for visual disturbance. right sided facial paralysis   Respiratory: Negative for cough, shortness of breath and wheezing.    Cardiovascular: Negative for chest pain and leg swelling.        Pacemaker   Gastrointestinal: Negative for abdominal distention, abdominal pain, constipation, diarrhea and nausea.   Genitourinary: Negative for dysuria.   Musculoskeletal: Negative for arthralgias and myalgias.   Skin: Negative for color change, rash and wound.    Neurological: Negative for dizziness, weakness and numbness.   Psychiatric/Behavioral: Negative for agitation, behavioral problems and sleep disturbance.   Vitals:    04/14/20 1128   BP: 144/80   Pulse: 60   Resp: 18   Temp: 97.3  F (36.3  C)   SpO2: 97%   Weight: 186 lb 12.8 oz (84.7 kg)       Physical Exam  Musculoskeletal:      Comments: Mild discomfort right hand     Constitutional: He appears well-developed and well-nourished.   Pleasant gentleman   HENT:   Head: Normocephalic and atraumatic.   Eyes: Conjunctivae are normal. Pupils are equal, round, and reactive to light. poor vision with the right worse than the left right sided facial paralysis   Neck: Normal range of motion. Neck supple.   Cardiovascular: Normal rate, regular rhythm and normal heart sounds.   No murmur heard.  Pulmonary/Chest: Effort normal and breath sounds normal. He has no wheezes. He has no rales.   Abdominal: Soft. Bowel sounds are normal. He exhibits no distension. There is no tenderness.   Genitourinary: suprapubic catheter  Musculoskeletal: Normal range of motion. He exhibits no edema.   Neurological: He is alert.   Skin: Skin is warm and dry.   Psychiatric: He has a normal mood and affect. His behavior is normal.   LABS:   No results found for this or any previous visit (from the past 240 hour(s)).  TSH pending     Current Outpatient Medications   Medication Sig   ? acetaminophen (TYLENOL) 325 MG tablet Take 2 tablets (650 mg total) by mouth every 6 (six) hours as needed for pain.   ? acetaminophen (TYLENOL) 325 MG tablet Take 650 mg by mouth 3 (three) times a day. Do not exceed 3000 mg daily   ? atorvastatin (LIPITOR) 40 MG tablet Take 40 mg by mouth at bedtime.   ? bisacodyl (DULCOLAX) 10 mg suppository Insert 10 mg into the rectum daily as needed.   ? dorzolamide-timolol (COSOPT) 22.3-6.8 mg/mL ophthalmic solution 1 drop to both eyes two times a day for macular degeneration and glaucoma   ? erythromycin base (ERYTHROMYCIN OPHT)  Apply 5 mg to eye. Instill 1 ribbon in both eyes at HS and QID   ? latanoprost (XALATAN) 0.005 % ophthalmic solution 1 drop both eyes at bedtime for macular degeneration/glaucoma   ? levothyroxine (SYNTHROID, LEVOTHROID) 25 MCG tablet Take 1 tablet (25 mcg total) by mouth Daily at 6:00 am. Hypothyroid   ? petrolat,wht/min oil/sod chl (ARTIFICIAL TEARS OINTMENT OPHT) Apply 2 drops to eye every 2 (two) hours as needed. Right eye q 2 hours PRN   ? polyethylene glycol (MIRALAX) 17 gram packet Take 17 g by mouth daily as needed.   ? polyvinyl alcohol (LIQUIFILM TEARS) 1.4 % ophthalmic solution Administer 1 drop to the right eye every 2 (two) hours as needed for dry eyes.   ? propylene glycol (SYSTANE COMPLETE OPHT) Apply 1 drop to eye 2 (two) times a day. Both eyes   ? traMADol (ULTRAM) 50 mg tablet Take 25 mg by mouth daily.   ? UNABLE TO FIND Med Name:docu liquis- 5 gtts each ear as needed prior to irrigation   ? warfarin sodium (WARFARIN ORAL) Take 5-5.5 mg by mouth. 3/10/20 INR 2.41 Cont 5.5 M & W, 5mg AOD. Next INR 3/18.  2/10/20 INR 2.21 Cont 5.5mg M & W, 5mg AOD. Next INR 3/10.  1/2/20 INR 2.31  Cont 5.5mg Mon and Wed and 5mg AOD.  Next INR 1/6/20.   ? traMADol (ULTRAM) 50 mg tablet Take 25 mg by mouth every 4 (four) hours as needed for pain.     Case Management:  I have reviewed the facility/SNF care plan/MDS which was done 4/9/2020 TSH pending including the falls risk, nutrition and pain screening. I also reviewed the current immunizations, and preventive care.. Future cancer screening is not clinically indicated secondary to age/goals of care.   Patient's desire to return to the community is not assessible due to cognitive impairment.    Information reviewed:  Medications, vital signs, orders, and nursing notes.    ASSESSMENT:      ICD-10-CM    1. Atrial fibrillation, unspecified type (H)  I48.91    2. Essential hypertension with goal blood pressure less than 140/90  I10    3. Macular Degeneration  H35.30    4.  Age-related physical debility  R54        PLAN:      Right eye trauma - on eyedrops  he no longer has pain and being followed closely by opthalmology.    HX Right sided [place] Palsy . Pt currently on eye lubricating drops multiple times a day.       Suprapubic catheter- cleanse daily, monitor for infection around  the site.  Cares per protocol     Atrial fibrillation on coumadin and lisinopril     Anticoagulation management- monitor and adjust per INRS      Poor vision- is followed closely by ophthalmology.  on multiple eye gtts.      Hypothyroidism- TSH 1.91 on 2/1/19 TSH pending     Electronically signed by: Polly Marroquin CNP

## 2021-06-20 NOTE — LETTER
Letter by Polly Marroquin CNP at      Author: Polly Marroquin CNP Service: -- Author Type: --    Filed:  Encounter Date: 9/16/2020 Status: (Other)         Patient: Riley Rodriguez   MR Number: [id number]   YOB: 1926   Date of Visit: 9/16/2020     [facility]    Facility:   [facility] [[id number]]   Code Status: DNR      CHIEF COMPLAINT/REASON FOR VISIT:  Chief Complaint   Patient presents with   ? FVP Care Coordination - Regulatory       HISTORY:      HPI: Riley is a 89 y.o. male  now residing in the LTC at [facility].  He is  with history of A. fib on warfarin develop acute CVA from holding warfarin for right gluteal hematoma.  Currently back on Coumadin, Hypertension , urinary retention has a suprapubic catheter and with a pacemaker       Today he being seen  for routine regulatory visit and follow up HGB. He recently was on the TCU recovering from a recent hospitalization in which he developed a GI bleed. He was hospitalized from 8/13-8/19/20. He was found to have a duodenal ulcer.He was slowly restarted on his coumadin.     He denied CP or shortness of breath,  He is able to move all extremities.  He is eating well and reports no difficulties with swallowing.    He denied   constipation.   His suprapubic is intact and draining clear yellow urine.  TSH stable at 3.10 and done on 4/22/20. LS clear and no shortness of breath     Past Medical History:   Diagnosis Date   ? Atrial fibrillation (H)     On coumadin   ? Bell's palsy     right sided facial droop   ? CVA (cerebral vascular accident) (H)    ? Hypertension    ? Pacemaker    ? Urinary retention              Family History   Problem Relation Age of Onset   ? COPD Father      Social History     Socioeconomic History   ? Marital status:      Spouse name: Not on file   ? Number of children: Not on file   ? Years of education: Not on file   ? Highest education level: Not on file    Occupational History   ? Not on file   Social Needs   ? Financial resource strain: Not on file   ? Food insecurity     Worry: Not on file     Inability: Not on file   ? Transportation needs     Medical: Not on file     Non-medical: Not on file   Tobacco Use   ? Smoking status: Former Smoker   ? Smokeless tobacco: Never Used   Substance and Sexual Activity   ? Alcohol use: No   ? Drug use: No   ? Sexual activity: Not on file   Lifestyle   ? Physical activity     Days per week: Not on file     Minutes per session: Not on file   ? Stress: Not on file   Relationships   ? Social connections     Talks on phone: Not on file     Gets together: Not on file     Attends Mandaen service: Not on file     Active member of club or organization: Not on file     Attends meetings of clubs or organizations: Not on file     Relationship status: Not on file   ? Intimate partner violence     Fear of current or ex partner: Not on file     Emotionally abused: Not on file     Physically abused: Not on file     Forced sexual activity: Not on file   Other Topics Concern   ? Not on file   Social History Narrative    03/07/15 - The patient lives alone in his own home.         Review of Systems  Eyes: negative for pain, discharge, redness He is followed  closely by opthalmology.  He has had multiple procedures done on his eye right  Constitutional: Negative for activity change and fatigue. Negative for appetite change, chills and fever.   HENT: Negative for congestion and sore throat.    Eyes: positive  for visual disturbance. right sided facial paralysis   Respiratory: Negative for cough, shortness of breath and wheezing.    Cardiovascular: Negative for chest pain and leg swelling.        Pacemaker   Gastrointestinal: Negative for abdominal distention, abdominal pain, constipation, diarrhea and nausea.   Genitourinary: Negative for dysuria.   Musculoskeletal: Negative for arthralgias and myalgias.   Skin: Negative for color change, rash and  wound.   Neurological: Negative for dizziness, weakness and numbness.   Psychiatric/Behavioral: Negative for agitation, behavioral problems and sleep disturbance.   Vitals:    09/16/20 1011   BP: 124/70   Pulse: 72   Resp: 18   Temp: 98.9  F (37.2  C)   SpO2: 96%   Weight: 188 lb 12.8 oz (85.6 kg)       Constitutional: He appears well-developed and well-nourished.   Pleasant gentleman   HENT:   Head: Normocephalic and atraumatic.   Eyes: Conjunctivae are normal. Pupils are equal, round, and reactive to light. poor vision with the right worse than the left right sided facial paralysis   Neck: Normal range of motion. Neck supple.   Cardiovascular: Normal rate, regular rhythm and normal heart sounds.   No murmur heard.  Pulmonary/Chest: Effort normal and breath sounds normal. He has no wheezes. He has no rales.   Abdominal: Soft. Bowel sounds are normal. He exhibits no distension. There is no tenderness.   Genitourinary: suprapubic catheter  Musculoskeletal: Normal range of motion. He exhibits no edema.   Neurological: He is alert.   Skin: Skin is warm and dry.   Psychiatric: He has a normal mood and affect. His behavior is normal.     LABS:   Recent Results (from the past 240 hour(s))   INR   Result Value Ref Range    INR 2.22 (H) 0.90 - 1.10   Hemoglobin   Result Value Ref Range    Hemoglobin 7.9 (L) 14.0 - 18.0 g/dL        Current Outpatient Medications   Medication Sig   ? acetaminophen (TYLENOL) 325 MG tablet Take 2 tablets (650 mg total) by mouth every 6 (six) hours as needed for pain.   ? atorvastatin (LIPITOR) 40 MG tablet Take 1 tablet (40 mg total) by mouth at bedtime. For hx of cva   ? bisacodyL (DULCOLAX) 10 mg suppository Insert 10 mg into the rectum daily as needed.   ? docusate sodium (COLACE) 100 MG capsule Take 1 capsule (100 mg total) by mouth 2 (two) times a day. For constipation   ? dorzolamide-timoloL (COSOPT) 22.3-6.8 mg/mL ophthalmic solution Administer 1 drop into the left eye 2 (two) times a  day. glaucoma   ? erythromycin base (ERYTHROMYCIN OPHT) Apply 5 mg to eye. Instill 1 ribbon in left eye at HS and right eye four times a day   ? ferrous sulfate 325 (65 FE) MG tablet Take 1 tablet by mouth daily.   ? latanoprost (XALATAN) 0.005 % ophthalmic solution Administer 1 drop into the left eye at bedtime. glaucoma   ? levothyroxine (SYNTHROID, LEVOTHROID) 25 MCG tablet Take 1 tablet (25 mcg total) by mouth Daily at 6:00 am. Hypothyroid   ? omeprazole (PRILOSEC) 20 MG capsule Take 1 capsule (20 mg total) by mouth 2 (two) times a day before meals. Needs two times a day for 2 months, then daily for ulcer   ? polyethylene glycol (MIRALAX) 17 gram packet Take 1 packet (17 g total) by mouth daily as needed. For constipation   ? propylene glycol (SYSTANE COMPLETE) 0.6 % Drop Apply 1 drop to eye 2 (two) times a day. Both eyes for dry eyes   ? warfarin sodium (WARFARIN ORAL) Take by mouth. 9/8/20 INR 2.22  Cont 6mg daily.  Next INR 9/14/20.    8/31/20 INR 1.59  Take 6mg daily.  Next INR 9/8/20.  8/26/20 INR 1.53  Take 5.5mg daily.  Next INR 8/31/20.     Case Management:  I have reviewed the facility/SNF care plan/MDS which was done 8/19/20. TSH  (3.10 0n 4/22/20)  including the falls risk, nutrition and pain screening. I also reviewed the current immunizations, and preventive care.. Future cancer screening is not clinically indicated secondary to age/goals of care.   Patient's desire to return to the community is not assessible due to cognitive impairment.    Information reviewed:  Medications, vital signs, orders, and nursing notes.    ASSESSMENT:      ICD-10-CM    1. Anemia, unspecified type  D64.9    2. Hypertension  I10    3. Physical deconditioning  R53.81        PLAN:      Right eye trauma - on eyedrops  he no longer has pain and being followed closely by opthalmology.    HX Right sided [place] Palsy . Pt currently on eye lubricating drops multiple times a day.       Suprapubic catheter- cleanse daily, monitor  for infection around  the site.  Cares per protocol     Atrial fibrillation on coumadin and lisinopril, currently being bridged with lovenox.      Anticoagulation management- monitor and adjust per INRS      Poor vision- is followed closely by ophthalmology.  on multiple eye gtts.      Hypothyroidism- on Synthroid, TSH 4/22/20 was 3.10    GI bleed- No active bleeding , received multiple blood  transfusions in the hospital HGB 9/8/20 was 7.9 monitor labs.         Electronically signed by: Ploly Marroquin CNP

## 2021-06-20 NOTE — LETTER
Letter by Polly Marroquin CNP at      Author: Polly Marroquin CNP Service: -- Author Type: --    Filed:  Encounter Date: 8/24/2020 Status: (Other)         Patient: Riley Rodriguez   MR Number: [id number]   YOB: 1926   Date of Visit: 8/24/2020     [facility]    Facility:   [facility] [place] [facility] [[id number]]   Code Status: DNR      CHIEF COMPLAINT/REASON FOR VISIT:  Chief Complaint   Patient presents with   ? Problem Visit     lab review.       HISTORY:      HPI: Riley is a 89 y.o. male  now residing in the LTC at [facility].  He is  with history of A. fib on warfarin develop acute CVA from holding warfarin for right gluteal hematoma.  Currently back on Coumadin, Hypertension , urinary retention has a suprapubic catheter and with a pacemaker       Today he being seen  for  follow up hgb and INR along with review of weights. . He now on the TCU recovering from a recent hospitalization in which he developed a GI bleed. He was hospitalized from 8/13-8/19/20. He was found to have a duodenal ulcer.He was slowly restarted on his coumadin and and  Lovenox.  His lovenox has been stopped and  He is subtherapeutic today at 1.67. Coumadin adjusted.  INR check on 8/26/20.  His HGB is 7.6. previous HGB 7.9. No active bleeding noted and urine is clear yellow in nagy bag. HGB to be rechecked in 2 days. . .   He is able to move all extremities.  He is eating well and reports no difficulties with swallowing.    He denied   constipation.   His suprapubic is intact and draining clear yellow urine.   His weights were reviewed and he was up 7 pounds in 12 days and weight today he is down 3 pounds.     Past Medical History:   Diagnosis Date   ? Atrial fibrillation (H)     On coumadin   ? Bell's palsy     right sided facial droop   ? CVA (cerebral vascular accident) (H)    ? Hypertension    ? Pacemaker    ? Urinary retention              Family History   Problem Relation Age  of Onset   ? COPD Father      Social History     Socioeconomic History   ? Marital status:      Spouse name: Not on file   ? Number of children: Not on file   ? Years of education: Not on file   ? Highest education level: Not on file   Occupational History   ? Not on file   Social Needs   ? Financial resource strain: Not on file   ? Food insecurity     Worry: Not on file     Inability: Not on file   ? Transportation needs     Medical: Not on file     Non-medical: Not on file   Tobacco Use   ? Smoking status: Former Smoker   ? Smokeless tobacco: Never Used   Substance and Sexual Activity   ? Alcohol use: No   ? Drug use: No   ? Sexual activity: Not on file   Lifestyle   ? Physical activity     Days per week: Not on file     Minutes per session: Not on file   ? Stress: Not on file   Relationships   ? Social connections     Talks on phone: Not on file     Gets together: Not on file     Attends [employer] service: Not on file     Active member of club or organization: Not on file     Attends meetings of clubs or organizations: Not on file     Relationship status: Not on file   ? Intimate partner violence     Fear of current or ex partner: Not on file     Emotionally abused: Not on file     Physically abused: Not on file     Forced sexual activity: Not on file   Other Topics Concern   ? Not on file   Social History Narrative    03/07/15 - The patient lives alone in his own home.         Review of Systems  Eyes: negative for pain, discharge, redness He is followed  closely by opthalmology.  He has had multiple procedures done on his eye right  Constitutional: Negative for activity change and fatigue. Negative for appetite change, chills and fever.   HENT: Negative for congestion and sore throat.    Eyes: positive  for visual disturbance. right sided facial paralysis   Respiratory: Negative for cough, shortness of breath and wheezing.    Cardiovascular: Negative for chest pain and leg swelling.         Pacemaker   Gastrointestinal: Negative for abdominal distention, abdominal pain, constipation, diarrhea and nausea.   Genitourinary: Negative for dysuria.   Musculoskeletal: Negative for arthralgias and myalgias.   Skin: Negative for color change, rash and wound.   Neurological: Negative for dizziness, weakness and numbness.   Psychiatric/Behavioral: Negative for agitation, behavioral problems and sleep disturbance.   Vitals:    08/24/20 1052   BP: 147/80   Pulse: 60   Resp: 20   Temp: 99.2  F (37.3  C)   SpO2: 97%   Weight: 192 lb 3.2 oz (87.2 kg)       Constitutional: He appears well-developed and well-nourished.   Pleasant gentleman   HENT:   Head: Normocephalic and atraumatic.   Eyes: Conjunctivae are normal. Pupils are equal, round, and reactive to light. poor vision with the right worse than the left right sided facial paralysis   Neck: Normal range of motion. Neck supple.   Cardiovascular: Normal rate, regular rhythm and normal heart sounds.   No murmur heard.  Pulmonary/Chest: Effort normal and breath sounds normal. He has no wheezes. He has no rales.   Abdominal: Soft. Bowel sounds are normal. He exhibits no distension. There is no tenderness.   Genitourinary: suprapubic catheter  Musculoskeletal: Normal range of motion. He exhibits no edema.   Neurological: He is alert.   Skin: Skin is warm and dry.   Psychiatric: He has a normal mood and affect. His behavior is normal.     LABS:   Recent Results (from the past 240 hour(s))   Lactate Dehydrogenase (LDH)   Result Value Ref Range    LD (LDH) 216 125 - 220 U/L   Vitamin B12   Result Value Ref Range    Vitamin B-12 486 213 - 816 pg/mL   Hemoglobin   Result Value Ref Range    Hemoglobin 8.3 (L) 14.0 - 18.0 g/dL   Potassium   Result Value Ref Range    Potassium 3.6 3.5 - 5.0 mmol/L   Hemoglobin   Result Value Ref Range    Hemoglobin 8.1 (L) 14.0 - 18.0 g/dL   Potassium - Next AM   Result Value Ref Range    Potassium 3.7 3.5 - 5.0 mmol/L   INR   Result  Value Ref Range    INR 1.27 (H) 0.90 - 1.10   HM2(CBC w/o Differential)   Result Value Ref Range    WBC 7.5 4.0 - 11.0 thou/uL    RBC 2.62 (L) 4.40 - 6.20 mill/uL    Hemoglobin 7.8 (L) 14.0 - 18.0 g/dL    Hematocrit 23.4 (L) 40.0 - 54.0 %    MCV 89 80 - 100 fL    MCH 29.8 27.0 - 34.0 pg    MCHC 33.3 32.0 - 36.0 g/dL    RDW 14.6 (H) 11.0 - 14.5 %    Platelets 196 140 - 440 thou/uL    MPV 9.5 8.5 - 12.5 fL   Creatinine   Result Value Ref Range    Creatinine 0.67 (L) 0.70 - 1.30 mg/dL    GFR MDRD Af Amer >60 >60 mL/min/1.73m2    GFR MDRD Non Af Amer >60 >60 mL/min/1.73m2   Surgical Pathology Exam   Result Value Ref Range    Case Report       Surgical Pathology                                Case: [id number]                                    Authorizing Provider:  Paxton Villalpando MD      Collected:           08/15/2020 1348              Ordering Location:     [facility]     Received:            08/17/2020 0805              Pathologist:           Darren Mann MD                                                        Specimen:    Gastric, Biopsy, R/O H. pylori                                                             Final Diagnosis       STOMACH, BIOPSY:     -   CHRONIC GASTRITIS, MODERATE TO SEVERE     -   NO EVIDENCE OF INTESTINAL METAPLASIA OR NEOPLASIA     -   IMMUNOSTAIN FOR HELICOBACTER IS NEGATIVE    Microscopic Description       Microscopic examination performed, substantiating the above diagnosis. All controls stain appropriately.    Clinical Information       Pre-op Diagnosis:  Gastrointestinal hemorrhage with melena [K92.1]    Gross Description       Received in formalin labeled with the patient's name and gastric biopsy are three, minute to 0.3 cm, irregular, tan soft tissues. TE-1C RJR:sg    Charges CPT: 24149, 01766  ICD-10: K29.51     Result Flag     Hemoglobin   Result Value Ref Range    Hemoglobin 8.4 (L) 14.0 - 18.0 g/dL   Hemoglobin   Result Value Ref Range    Hemoglobin 8.1 (L)  14.0 - 18.0 g/dL   INR   Result Value Ref Range    INR 1.18 (H) 0.90 - 1.10   HM2(CBC w/o Differential)   Result Value Ref Range    WBC 6.7 4.0 - 11.0 thou/uL    RBC 2.53 (L) 4.40 - 6.20 mill/uL    Hemoglobin 7.4 (L) 14.0 - 18.0 g/dL    Hematocrit 23.1 (L) 40.0 - 54.0 %    MCV 91 80 - 100 fL    MCH 29.2 27.0 - 34.0 pg    MCHC 32.0 32.0 - 36.0 g/dL    RDW 14.7 (H) 11.0 - 14.5 %    Platelets 210 140 - 440 thou/uL    MPV 9.5 8.5 - 12.5 fL   Basic Metabolic Panel   Result Value Ref Range    Sodium 136 136 - 145 mmol/L    Potassium 3.9 3.5 - 5.0 mmol/L    Chloride 107 98 - 107 mmol/L    CO2 24 22 - 31 mmol/L    Anion Gap, Calculation 5 5 - 18 mmol/L    Glucose 99 70 - 125 mg/dL    Calcium 7.4 (L) 8.5 - 10.5 mg/dL    BUN 9 8 - 28 mg/dL    Creatinine 0.70 0.70 - 1.30 mg/dL    GFR MDRD Af Amer >60 >60 mL/min/1.73m2    GFR MDRD Non Af Amer >60 >60 mL/min/1.73m2   Hemoglobin   Result Value Ref Range    Hemoglobin 8.1 (L) 14.0 - 18.0 g/dL   Crossmatch   Result Value Ref Range    Crossmatch Compatible     Unit Type A Pos     Unit Number [id number]     Status Transfused     Component Red Blood Cells     PRODUCT CODE F9123H63     Issue Date and Time [id number]     Blood Type 6200     CODING SYSTEM [id number]    Crossmatch   Result Value Ref Range    Crossmatch Compatible     Unit Type A Pos     Unit Number [id number]     Status Transfused     Component Red Blood Cells     PRODUCT CODE [id number]     Issue Date and Time [id number]     Blood Type 6200     CODING SYSTEM [id number]    Plasma Product Information   Result Value Ref Range    Unit Type A Pos     Unit Number [id number]     Status Transfused     Component FROZEN PLASMA     PRODUCT CODE [id number]     Issue Date and Time [id number]     Blood Type 6200     CODING SYSTEM [id number]    Plasma Product Information   Result Value Ref Range    Unit Type A Pos     Unit Number [id number]     Status Transfused     Component FROZEN PLASMA     PRODUCT CODE H2721E37     Issue  Date and Time [id number]     Blood Type 6200     CODING SYSTEM [id number]    Crossmatch   Result Value Ref Range    Crossmatch Compatible     Unit Type A Pos     Unit Number [id number]     Status Transfused     Component Red Blood Cells     PRODUCT CODE G4395A81     Issue Date and Time [id number]     Blood Type 6200     CODING SYSTEM JHRH254    INR   Result Value Ref Range    INR 1.32 (H) 0.90 - 1.10   Potassium - Next AM   Result Value Ref Range    Potassium 4.1 3.5 - 5.0 mmol/L   Hemoglobin   Result Value Ref Range    Hemoglobin 7.8 (L) 14.0 - 18.0 g/dL   COVID-19 Virus PCR MRF    Specimen: Respiratory   Result Value Ref Range    COVID-19 VIRUS SPECIMEN SOURCE Nasopharyngeal     2019-nCOV       Test received-See reflex to IDDL test SARS CoV2 (COVID-19) Virus RT-PCR   SARS-CoV-2 (COVID-19) RT-PCR-IDDL    Specimen: Respiratory   Result Value Ref Range    SARS-CoV-2 Virus Specimen Source Nasopharyngeal     SARS-CoV-2 PCR Result NEGATIVE     SARS-COV-2 PCR COMMENT       Testing was performed using the [employer] COVID-19 Direct Assay on the [employer]   INR   Result Value Ref Range    INR 1.39 (H) 0.90 - 1.10   Hemoglobin   Result Value Ref Range    Hemoglobin 7.8 (L) 14.0 - 18.0 g/dL   Basic Metabolic Panel   Result Value Ref Range    Sodium 137 136 - 145 mmol/L    Potassium 3.8 3.5 - 5.0 mmol/L    Chloride 106 98 - 107 mmol/L    CO2 26 22 - 31 mmol/L    Anion Gap, Calculation 5 5 - 18 mmol/L    Glucose 102 70 - 125 mg/dL    Calcium 7.9 (L) 8.5 - 10.5 mg/dL    BUN 10 8 - 28 mg/dL    Creatinine 0.70 0.70 - 1.30 mg/dL    GFR MDRD Af Amer >60 >60 mL/min/1.73m2    GFR MDRD Non Af Amer >60 >60 mL/min/1.73m2   Magnesium   Result Value Ref Range    Magnesium 2.0 1.8 - 2.6 mg/dL   INR   Result Value Ref Range    INR 1.67 (H) 0.90 - 1.10   Hemoglobin   Result Value Ref Range    Hemoglobin 8.2 (L) 14.0 - 18.0 g/dL   Basic Metabolic Panel   Result Value Ref Range    Sodium 138 136 - 145 mmol/L    Potassium 4.3 3.5 - 5.0  mmol/L    Chloride 107 98 - 107 mmol/L    CO2 26 22 - 31 mmol/L    Anion Gap, Calculation 5 5 - 18 mmol/L    Glucose 101 70 - 125 mg/dL    Calcium 8.0 (L) 8.5 - 10.5 mg/dL    BUN 9 8 - 28 mg/dL    Creatinine 0.74 0.70 - 1.30 mg/dL    GFR MDRD Af Amer >60 >60 mL/min/1.73m2    GFR MDRD Non Af Amer >60 >60 mL/min/1.73m2   INR   Result Value Ref Range    INR 1.80 (H) 0.90 - 1.10   Hemoglobin   Result Value Ref Range    Hemoglobin 7.9 (L) 14.0 - 18.0 g/dL   INR   Result Value Ref Range    INR 1.65 (H) 0.90 - 1.10   Hemoglobin   Result Value Ref Range    Hemoglobin 7.6 (L) 14.0 - 18.0 g/dL   INR   Result Value Ref Range    INR 1.67 (H) 0.90 - 1.10        Current Outpatient Medications   Medication Sig   ? acetaminophen (TYLENOL) 325 MG tablet Take 2 tablets (650 mg total) by mouth every 6 (six) hours as needed for pain.   ? atorvastatin (LIPITOR) 40 MG tablet Take 1 tablet (40 mg total) by mouth at bedtime. For hx of cva   ? bisacodyL (DULCOLAX) 10 mg suppository Insert 10 mg into the rectum daily as needed.   ? docusate sodium (COLACE) 100 MG capsule Take 1 capsule (100 mg total) by mouth 2 (two) times a day. For constipation   ? dorzolamide-timoloL (COSOPT) 22.3-6.8 mg/mL ophthalmic solution Administer 1 drop into the left eye 2 (two) times a day. glaucoma   ? erythromycin base (ERYTHROMYCIN OPHT) Apply 5 mg to eye. Instill 1 ribbon in left eye at HS and right eye four times a day   ? latanoprost (XALATAN) 0.005 % ophthalmic solution Administer 1 drop into the left eye at bedtime. glaucoma   ? levothyroxine (SYNTHROID, LEVOTHROID) 25 MCG tablet Take 1 tablet (25 mcg total) by mouth Daily at 6:00 am. Hypothyroid   ? omeprazole (PRILOSEC) 20 MG capsule Take 1 capsule (20 mg total) by mouth 2 (two) times a day before meals. Needs two times a day for 2 months, then daily for ulcer   ? polyethylene glycol (MIRALAX) 17 gram packet Take 1 packet (17 g total) by mouth daily as needed. For constipation   ? propylene glycol  (SYSTANE COMPLETE) 0.6 % Drop Apply 1 drop to eye 2 (two) times a day. Both eyes for dry eyes   ? warfarin ANTICOAGULANT (COUMADIN/JANTOVEN) 5 MG tablet 5 mg po daily and call MD to dose daily.for hx of CVA (Patient taking differently: 5-5.5 mg. Next INR 8/24/20   call MD to dose daily.for hx of CVA)     Case Management:  I have reviewed the facility/SNF care plan/MDS which was done 8/19/20. TSH pending (3.10 0n 4/22/20)  including the falls risk, nutrition and pain screening. I also reviewed the current immunizations, and preventive care.. Future cancer screening is not clinically indicated secondary to age/goals of care.   Patient's desire to return to the community is not assessible due to cognitive impairment.    Information reviewed:  Medications, vital signs, orders, and nursing notes.    ASSESSMENT:      ICD-10-CM    1. Subtherapeutic international normalized ratio (INR)  R79.1    2. Decreased hemoglobin  R71.0        PLAN:      Right eye trauma - on eyedrops  he no longer has pain and being followed closely by opthalmology.       Suprapubic catheter- cleanse daily, monitor for infection around  the site.  Cares per protocol     Atrial fibrillation on coumadin and lisinopril,       Anticoagulation management- monitor and adjust coumadin  per INRS      Poor vision- is followed closely by ophthalmology.  on multiple eye gtts.      Hypothyroidism- on Synthroid, TSH 4/22/20 was 3.10    GI bleed- No active bleeding , received multiple blood  transfusions in the hospital HGB 8/21/20 was 7.9 monitor labs however it has dropped to 7.6 on 8/24. Lab to be rechecked in 2 days.     UTI- completed antibiotics.     Electronically signed by: Polly Marroquin, CNP

## 2021-06-21 NOTE — LETTER
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 Pt currently on eye lubricating drops multiple times a day.       Suprapubic catheter- cleanse daily, monitor for infection around  the site.  Cares per protocol     Atrial fibrillation on coumadin and lisinopril,      Anticoagulation management- monitor and adjust per INRS      Poor vision- is followed closely by ophthalmology.  on multiple eye gtts.      Hypothyroidism- on Synthroid, TSH 4/22/20 was 3.10      Health Maintenance   Topic Date Due   ? MEDICARE ANNUAL WELLNESS VISIT  04/15/1991   ? ZOSTER VACCINES (2 of 3) 12/22/2015   ? FALL RISK ASSESSMENT  12/19/2019   ? INFLUENZA VACCINE RULE BASED (1) 08/01/2020   ? ADVANCE CARE PLANNING  04/20/2022   ? TD 18+ HE  10/19/2022   ? Pneumococcal Vaccine: 65+ Years  Completed   ? Pneumococcal Vaccine: Pediatrics (0 to 5 Years) and At-Risk Patients (6 to 64 Years)  Aged Out   ? HEPATITIS B VACCINES  Aged Out        Subjective:     HPI:  Riley is a 89 y.o. male  who resides in the LTC at [facility].  However today he is seen on the Covid unit on the TCU.  He is  with history of A. fib on warfarin develop acute CVA from holding warfarin for right gluteal hematoma.  Currently back on Coumadin, Hypertension , urinary retention has a suprapubic catheter and with a pacemaker       Today he being seen  for follow-up due to positive Covid and also INR review.    He denied CP or shortness of breath,   he denies fever chills constipation or diarrhea.  He is able to move all extremities.  He is eating well and reports no difficulties with swallowing.   His suprapubic is intact and draining clear yellow urine.   LS clear     Review of Systems:    Eyes: negative for pain, discharge, redness He is followed  closely by opthalmology.  He has had multiple procedures  done on his eye right  Constitutional: Negative for activity change and fatigue. Negative for appetite change, chills and fever.   HENT: Negative for congestion and sore throat.    Eyes: positive  for visual disturbance. right sided facial paralysis   Respiratory: Negative for cough, shortness of breath and wheezing.    Cardiovascular: Negative for chest pain and leg swelling.        Pacemaker   Gastrointestinal: Negative for abdominal distention, abdominal pain, constipation, diarrhea and nausea.   Genitourinary: Negative for dysuria.   Musculoskeletal: Negative for arthralgias and myalgias.   Skin: Negative for color change, rash and wound.   Neurological: Negative for dizziness, weakness and numbness.   Psychiatric/Behavioral: Negative for agitation, behavioral problems and sleep disturbance.       Patient Active Problem List   Diagnosis   ? Seborrheic Keratosis   ? Ventral Hernia   ? Macular Degeneration   ? Callus   ? Trochanteric Bursitis   ? Pacemaker Placement   ? Osteoarthrosis Of The Hip   ? Acute Urinary Retention   ? Gout   ? Normochromic, Normocytic Anemia   ? Thrombocytopenia   ? Tingling (Paresthesia)   ? Carpal Tunnel Syndrome   ? Abnormal Weight Loss   ? Hyperlipidemia   ? Impaired Fasting Glucose   ? Unspecified glaucoma   ? Osteoarthritis Of The Knee   ? Hypertension   ? Urinary tract infection associated with cystostomy catheter, initial encounter (H)   ? Benign prostatic hyperplasia with urinary hesitancy   ? Urinary Frequency   ? Atrial Fibrillation   ? Stroke due to occlusion of right middle cerebral artery (H)   ? A-fib (H)   ? Cardiac pacemaker in situ, single chamber   ? CVA (cerebral infarction)   ? Anemia   ? Left inguinal hernia   ? Essential hypertension with goal blood pressure less than 140/90   ? Traumatic hematoma of buttock, initial encounter   ? Traumatic hematoma   ? Hospital discharge follow-up   ? Altered mental state   ? Confusion   ? Hyponatremia   ? TSH elevation   ?  Encephalopathy   ? Acute ischemic left PCA stroke (H)   ? Sepsis (H)   ? Acute urinary retention   ? Pyelonephritis, acute   ? History of atrial fibrillation   ? History of CVA (cerebrovascular accident)   ? Generalized muscle weakness   ? Urinary retention   ? Gross hematuria   ? Bell's palsy   ? SSS (sick sinus syndrome) (H)   ? Normocytic anemia   ? Hypothyroidism, unspecified type   ? Gastrointestinal hemorrhage with melena   ? Anemia due to blood loss, acute   ? Iron deficiency   ? Pacemaker battery depletion     Past Medical History:   Diagnosis Date   ? Atrial fibrillation (H)     On coumadin   ? Bell's palsy     right sided facial droop   ? CVA (cerebral vascular accident) (H)    ? Hypertension    ? Pacemaker    ? Urinary retention       Past Surgical History:   Procedure Laterality Date   ? IR BLADDER SUPRAPUBIC CATHETER INSERTION  1/18/2019   ? AL ESOPHAGOGASTRODUODENOSCOPY TRANSORAL DIAGNOSTIC N/A 8/15/2020    Procedure: ESOPHAGOGASTRODUODENOSCOPY (EGD) with biopsy;  Surgeon: Paxton Villalpando MD;  Location: [facility];  Service: Gastroenterology   ? AL PARTIAL EXCISION THYROID,UNILAT      Description: Thyroid Surgery Sub-Total Thyroidectomy;  Recorded: 03/24/2008;   ? AL REMV PILONIDAL LESION SIMPLE      Description: Pilonidal Cyst Resection;  Recorded: 03/24/2008;   ? AL TRANSURETHRAL ELEC-SURG PROSTATECTOM      Description: Transurethral Resection Of Prostate (TURP);  Recorded: 03/24/2008;   ? TOTAL HIP ARTHROPLASTY Right       Family History   Problem Relation Age of Onset   ? COPD Father       Social History     Socioeconomic History   ? Marital status:      Spouse name: Not on file   ? Number of children: Not on file   ? Years of education: Not on file   ? Highest education level: Not on file   Occupational History   ? Not on file   Social Needs   ? Financial resource strain: Not on file   ? Food insecurity     Worry: Not on file     Inability: Not on file   ? Transportation needs      Medical: Not on file     Non-medical: Not on file   Tobacco Use   ? Smoking status: Former Smoker   ? Smokeless tobacco: Never Used   Substance and Sexual Activity   ? Alcohol use: No   ? Drug use: No   ? Sexual activity: Not on file   Lifestyle   ? Physical activity     Days per week: Not on file     Minutes per session: Not on file   ? Stress: Not on file   Relationships   ? Social connections     Talks on phone: Not on file     Gets together: Not on file     Attends Spiritism service: Not on file     Active member of club or organization: Not on file     Attends meetings of clubs or organizations: Not on file     Relationship status: Not on file   ? Intimate partner violence     Fear of current or ex partner: Not on file     Emotionally abused: Not on file     Physically abused: Not on file     Forced sexual activity: Not on file   Other Topics Concern   ? Not on file   Social History Narrative    03/07/15 - The patient lives alone in his own home.      Current Outpatient Medications   Medication Sig Dispense Refill   ? acetaminophen (TYLENOL) 325 MG tablet Take 2 tablets (650 mg total) by mouth every 6 (six) hours as needed for pain. 30 tablet 0   ? atorvastatin (LIPITOR) 40 MG tablet Take 1 tablet (40 mg total) by mouth at bedtime. For hx of cva 30 tablet 0   ? bisacodyL (DULCOLAX) 10 mg suppository Insert 10 mg into the rectum daily as needed.     ? docusate sodium (COLACE) 100 MG capsule Take 1 capsule (100 mg total) by mouth 2 (two) times a day. For constipation 30 capsule 0   ? dorzolamide-timoloL (COSOPT) 22.3-6.8 mg/mL ophthalmic solution Administer 1 drop into the left eye 2 (two) times a day. glaucoma 10 mL 12   ? erythromycin base (ERYTHROMYCIN OPHT) Apply 5 mg to eye. Instill 1 ribbon in left eye at HS and right eye four times a day     ? ferrous sulfate 325 (65 FE) MG tablet Take 1 tablet by mouth daily.     ? latanoprost (XALATAN) 0.005 % ophthalmic solution Administer 1 drop into the left eye at  "bedtime. glaucoma 2.5 mL 12   ? levothyroxine (SYNTHROID, LEVOTHROID) 25 MCG tablet Take 1 tablet (25 mcg total) by mouth Daily at 6:00 am. Hypothyroid 30 tablet 0   ? omeprazole (PRILOSEC) 20 MG capsule Take 1 capsule (20 mg total) by mouth 2 (two) times a day before meals. Needs two times a day for 2 months, then daily for ulcer 60 capsule 0   ? polyethylene glycol (MIRALAX) 17 gram packet Take 1 packet (17 g total) by mouth daily as needed. For constipation 30 packet 0   ? propylene glycol (SYSTANE COMPLETE) 0.6 % Drop Apply 1 drop to eye 2 (two) times a day. Both eyes for dry eyes 1.5 mL 0   ? warfarin sodium (WARFARIN ORAL) Take by mouth. 11/23/20 INR 2.03  Cont 7mg Mon and Thurs and 6.5mg AOD.  Next INR 11/30/20.    11/16/20 INR 1.72 Increase to 78mg M & Th, 6.5mg AOD. Next INR 11/23.  11/9/20 INR 1.76 6.5mg daily. Next 11/16 11/2/20 INR 1.76 6.5mg M & Th, 6mg AOD. Next INR 11/9.  10/26/20 INR 1.69 Give 8mg today, then resume 6mg daily. Next INR 11/2  10/12/20 INR 1.90 Cont 6mg daily. Next INR 10/26  9/28/20 1.99 6mg daily.       No current facility-administered medications for this visit.       Objective:   Vital Signs:   Visit Vitals  /73   Pulse 81   Temp 99.1  F (37.3  C)   Resp 18   Ht 5' 10\" (1.778 m)   Wt 185 lb (83.9 kg)   SpO2 100%   BMI 26.54 kg/m      He follows up closely with ophthalmology    PHYSICAL EXAM  Constitutional: He appears well-developed and well-nourished.   Pleasant gentleman   HENT:   Head: Normocephalic and atraumatic.   Eyes: Conjunctivae are normal. Pupils are equal, round, and reactive to light. poor vision with the right worse than the left right sided facial paralysis   Neck: Normal range of motion. Neck supple.   Cardiovascular: Normal rate, regular rhythm and normal heart sounds.   No murmur heard.  Pulmonary/Chest: Effort normal and breath sounds normal. He has no wheezes. He has no rales.   Abdominal: Soft. Bowel sounds are normal. He exhibits no distension. There is " no tenderness.   Genitourinary: suprapubic catheter  Musculoskeletal: Normal range of motion. He exhibits no edema.   Neurological: He is alert.   Skin: Skin is warm and dry.   Psychiatric: He has a normal mood and affect. His behavior is normal.     Identified Health Risks:     [address] [address]                                  November 24, 2020    Patient: Riley Rodriguez   MR Number: [id number]   YOB: 1926   Date of Visit: 11/16/2020     Dear Dr. Home:    Thank you for referring Riley Rodriguez to me for evaluation. Below are the relevant portions of my assessment and plan of care.    If you have questions, please do not hesitate to call me. I look forward to following Riley along with you.    Sincerely,        Polly Marroquin, CNP          CC  No Recipients

## 2021-06-21 NOTE — PROGRESS NOTES
In clinic device check with Device RN followed by office visit with Dr. Carrillo.  Please see link for full device report.  Patient was informed of results and next follow up during today's visit.

## 2021-06-21 NOTE — LETTER
Letter by Nancy Fair MD at      Author: Nancy Fair MD Service: -- Author Type: --    Filed:  Encounter Date: 12/30/2020 Status: (Other)         Patient: Riley Rodriguez   MR Number: [id number]   YOB: 1926   Date of Visit: 12/30/2020     [facility]    Facility:   [facility] [[id number]]   Code Status: DNR/DNI       Chief Complaint   Patient presents with   ? Review Of Multiple Medical Conditions     LTC 12/30/2020. GIB Aug 2020. Anemia. Afib on warfarin.        HPI:   Riley is a 89 y.o. male with hx of Afib on warfarin, prior stroke, BPH, HTN, [place] palsy, hypothyroidism, SP catheter, resides in LTC at [facility]. He was sent to the hospital from nursing home on 8/13/2020. He had labs drawn in NH as he was not doing well with general fatigue, decreased appetite. NO respiratory sx. He started feeling a little better on his own but then labs came back with hgb down to 6.2. He was worked up in the hospital with discharge summary as partially excerpted below.     89 y.o. male with history of stroke, cognitive impairment, visual impairment, Bell's palsy, chronic urine retention status post SP catheter, A. fib presented for evaluation of incidentally noted anemia found to have duodenal ulcer     1.Normocytic anemia  -had Melena  -found to have Duodenal ulcer  - baseline hemoglobin around 10, incidentally found to have hemoglobin of 6.2 at his nursing home, sent in for evaluation.  By the time he got here his hemoglobin had dropped to 5.9  He has received 3 units pRBCs since admission  -Underwent EGD showed duodenal ulcer, had already stopped bleeding  - cautiously restarted anticoagulation again and he seems to be tolerating  -Continue  PPI   -GI did not recommend any specific followup likely given his age. He should continue on oral PPI lifelong  -now on 8/19/20 hgb is 8.2     2.Iron deficiency  - He received 3 units pRBCs which  "will help replenish his iron stores.      3.Need for anticoagulation  -On warfarin chronically for history of A fib.  -He has history of stroke during a brief period that warfarin was held for bleeding in the past.  -As above presented with anemia unknown source, so INR was reversed with vitamin K and FFP. He seemed to develop tarry stools earlier in hospital stay shortly after Lovenox was given.  -Had EGD and ulcer was nonbleeding. Ok to restart anticoagulation per GI. We restarted Lovenox--will stop lovenox when inr is 2.0      4.BPH   -CAUTI  -Has SP cath-changed on 8/17, gets change q 4 weeks  -Urine culture from 8/12 growing MSSA  -Had been on Vanc now amoxicillin  -Plan 7 days abx     5.Hx CVA  -Home statin  -Full anticoagulation as above (warfarin bridging with Lovenox)  -PT/OT     6.Hypothyroidism  -Home Synthroid     Overall stabilized and discharged back to LTC facility on 8/19/2020.     Today:  No interim concerns since my last visit. He seems stable. He has consistently tested negative on facility wide surveillance COVID-19 testing. He has no fever or cough. He continues on iron and PPI due to duodenal ulcer with GIB Aug 2020. No reports of abdominal pain. No complaints at all today. He stated he wanted to go to the bathroom, he believes he can do it himself, but he has called for help and states with a smile \"because thats what they want me to do\". Endorsed that is exactly correct, for his safety. He is pleasant and cooperative with staff. He has not had any falls. No open areas of skin. Has chronic SP catheter, no issues. His last BIMS core was 7 out of 15 in October. He has baseline vision impairment, on multiple eye drops.       Past Medical History:  Past Medical History:   Diagnosis Date   ? Atrial fibrillation (H)     On coumadin   ? Bell's palsy     right sided facial droop   ? CVA (cerebral vascular accident) (H)    ? Hypertension    ? Pacemaker    ? Urinary retention  "       Medications:  Current Outpatient Medications   Medication Sig   ? acetaminophen (TYLENOL) 325 MG tablet Take 2 tablets (650 mg total) by mouth every 6 (six) hours as needed for pain.   ? atorvastatin (LIPITOR) 40 MG tablet Take 1 tablet (40 mg total) by mouth at bedtime. For hx of cva   ? bisacodyL (DULCOLAX) 10 mg suppository Insert 10 mg into the rectum daily as needed.   ? docusate sodium (COLACE) 100 MG capsule Take 1 capsule (100 mg total) by mouth 2 (two) times a day. For constipation (Patient taking differently: Take 100 mg by mouth daily. And daily PRN.)   ? dorzolamide-timoloL (COSOPT) 22.3-6.8 mg/mL ophthalmic solution Administer 1 drop into the left eye 2 (two) times a day. glaucoma   ? erythromycin base (ERYTHROMYCIN OPHT) Apply 5 mg to eye. Instill 1 ribbon in left eye at HS and right eye four times a day   ? ferrous sulfate 325 (65 FE) MG tablet Take 1 tablet by mouth daily.   ? latanoprost (XALATAN) 0.005 % ophthalmic solution Administer 1 drop into the left eye at bedtime. glaucoma   ? levothyroxine (SYNTHROID, LEVOTHROID) 25 MCG tablet Take 1 tablet (25 mcg total) by mouth Daily at 6:00 am. Hypothyroid   ? omeprazole (PRILOSEC) 20 MG capsule Take 1 capsule (20 mg total) by mouth 2 (two) times a day before meals. Needs two times a day for 2 months, then daily for ulcer   ? polyethylene glycol (MIRALAX) 17 gram packet Take 1 packet (17 g total) by mouth daily as needed. For constipation   ? propylene glycol (SYSTANE COMPLETE) 0.6 % Drop Apply 1 drop to eye 2 (two) times a day. Both eyes for dry eyes   ? warfarin sodium (WARFARIN ORAL) Take by mouth. 12/14/20 INR 2.69 Cont 7mg M & Th, 6.5mg AOD. Next INR 1/13/21 11/30/20 INR 2.63 Cont 7mg M & Th, 6.5mg AOD. NExt INR 12/14.  11/23/20 INR 2.03  Cont 7mg Mon and Thurs and 6.5mg AOD.  Next INR 11/30/20.    11/16/20 INR 1.72 Increase to 78mg M & Th, 6.5mg AOD. Next INR 11/23.  11/9/20 INR 1.76 6.5mg daily. Next 11/16       Physical Exam:   Note:  COVID-19 pandemic precautions in place. Physical exam performed with social distancing considerations.  General: Patient is alert, elderly male, no distress.    Vitals: /74, Temp 98.8, Pulse 70, RR 18, O2 sat 98%RA.  HEENT: Head is NCAT. Nares negative. Oropharynx well hydrated. Right facial droop, baseline.  Neck: No JVD.  Lungs: Non labored respirations.   : SP cath with leg bag.  Extremities: Trace LE edema is noted.  Musculoskeletal: Age related degen changes.   Skin: No rashes noted.   Psych: Mood appears good.      Labs:  Lab Results   Component Value Date    WBC 5.9 10/01/2020    HGB 10.1 (L) 10/05/2020    HCT 35.9 (L) 10/01/2020    MCV 85 10/01/2020     10/01/2020     Results for orders placed or performed in visit on 10/01/20   Basic Metabolic Panel   Result Value Ref Range    Sodium 138 136 - 145 mmol/L    Potassium 4.1 3.5 - 5.0 mmol/L    Chloride 102 98 - 107 mmol/L    CO2 28 22 - 31 mmol/L    Anion Gap, Calculation 8 5 - 18 mmol/L    Glucose 99 70 - 125 mg/dL    Calcium 9.1 8.5 - 10.5 mg/dL    BUN 12 8 - 28 mg/dL    Creatinine 0.79 0.70 - 1.30 mg/dL    GFR MDRD Af Amer >60 >60 mL/min/1.73m2    GFR MDRD Non Af Amer >60 >60 mL/min/1.73m2       Lab Results   Component Value Date    TSH 3.10 04/22/2020       Assessment/Plan:  1. Afib. Chronically anticoagulated with warfarin. Monitor and adjust per INR. He sees cardiology .  2. Duodenal ulcer. Aug 2020 hospitalization. Seen by GI, had EGD on 8/15/2020. No active bleeding so no specific procedural intervention needed. He Is on PPI.  3. Anemia. He received transfusion in the hospital. Work up revealed duodenal ulcer, no active bleeding. Continue iron. Last hgb noted   Lab Results   Component Value Date    HGB 10.1 (L) 10/05/2020     4. Hx of stroke. Continue statin, also on warfarin as noted above.  5. Hypothyroidism. On replacement. Last TSH within goal range.  6. SP catheter.   7. HTN. Not on BP meds, had been on lisinopril in the past. BPs  satisfactory.  8. [place] Palsy, right.   9. Vision impairment. On multiple drops, follows with ophthalmology.          Electronically signed by: Nancy Fair MD

## 2021-06-21 NOTE — LETTER
Letter by Polly Marroquin CNP at      Author: Polly Marroquin CNP Service: -- Author Type: --    Filed:  Encounter Date: 3/29/2021 Status: (Other)         Patient: Riley Rodriguez   MR Number: [id number]   YOB: 1926   Date of Visit: 3/29/2021     [facility]    Facility:   [facility] [[id number]]   Code Status: DNR      CHIEF COMPLAINT/REASON FOR VISIT:  Chief Complaint   Patient presents with   ? Problem Visit     F/u cellulitis left arm        HISTORY:      HPI: Riley is a 89 y.o. male  now residing in the LTC at [facility].  He is  with history of A. fib on warfarin develop acute CVA from holding warfarin for right gluteal hematoma.  Currently back on Coumadin, Hypertension , urinary retention has a suprapubic catheter and with a pacemaker       Today he being seen  post mole removal. He recently had a mole removed from his upper back and left forearm. Back incision was C/D/I and left forearm was with slough and scant greenish drainage and redness.  Keflex started.  He is having minimal discomfort.    He denied CP or shortness of breath,  He is able to move all extremities.  He is eating well and reports no difficulties with swallowing. His suprapubic is intact and draining clear yellow urine.      Past Medical History:   Diagnosis Date   ? Atrial fibrillation (H)     On coumadin   ? Bell's palsy     right sided facial droop   ? CVA (cerebral vascular accident) (H)    ? Hypertension    ? Pacemaker    ? Urinary retention              Family History   Problem Relation Age of Onset   ? COPD Father      Social History     Socioeconomic History   ? Marital status:      Spouse name: Not on file   ? Number of children: Not on file   ? Years of education: Not on file   ? Highest education level: Not on file   Occupational History   ? Not on file   Social Needs   ? Financial resource strain: Not on file   ? Food insecurity     Worry: Not on  file     Inability: Not on file   ? Transportation needs     Medical: Not on file     Non-medical: Not on file   Tobacco Use   ? Smoking status: Former Smoker   ? Smokeless tobacco: Never Used   Substance and Sexual Activity   ? Alcohol use: No   ? Drug use: No   ? Sexual activity: Not on file   Lifestyle   ? Physical activity     Days per week: Not on file     Minutes per session: Not on file   ? Stress: Not on file   Relationships   ? Social connections     Talks on phone: Not on file     Gets together: Not on file     Attends [employer] service: Not on file     Active member of club or organization: Not on file     Attends meetings of clubs or organizations: Not on file     Relationship status: Not on file   ? Intimate partner violence     Fear of current or ex partner: Not on file     Emotionally abused: Not on file     Physically abused: Not on file     Forced sexual activity: Not on file   Other Topics Concern   ? Not on file   Social History Narrative    03/07/15 - The patient lives alone in his own home.         Review of Systems  Eyes:  He is followed  closely by opthalmology.  He has had multiple eye  procedures   Constitutional: Negative for activity change and fatigue. Negative for appetite change, chills and fever.   HENT: Negative for congestion and sore throat.    Eyes: positive  for visual disturbance. right sided facial paralysis   Respiratory: Negative for cough, shortness of breath and wheezing.    Cardiovascular: Negative for chest pain and leg swelling.        Pacemaker   Gastrointestinal: Negative for abdominal distention, abdominal pain, constipation, diarrhea and nausea.   Genitourinary: Negative for dysuria.   Musculoskeletal: Negative for arthralgias and myalgias.   Skin: Negative for color change, rash and wound.   Neurological: Negative for dizziness, weakness and numbness.   Psychiatric/Behavioral: Negative for agitation, behavioral problems and sleep disturbance.   Vitals:    03/29/21  1121   BP: 165/88   Pulse: 80   Resp: 18   Temp: 98.7  F (37.1  C)   SpO2: 98%   Weight: 181 lb 6.4 oz (82.3 kg)       Constitutional: He appears well-developed and well-nourished.   Pleasant gentleman   HENT:   Head: Normocephalic and atraumatic.   Eyes: Conjunctivae are normal. Pupils are equal, round, and reactive to light. poor vision with the right worse than the left right sided facial paralysis   Neck: Normal range of motion. Neck supple.   Cardiovascular: Normal rate, regular rhythm and normal heart sounds.   No murmur heard.  Pulmonary/Chest: Effort normal and breath sounds normal. He has no wheezes. He has no rales.   Abdominal: Soft. Bowel sounds are normal. He exhibits no distension. There is no tenderness.   Genitourinary: suprapubic catheter  Musculoskeletal: Normal range of motion. He exhibits no edema.   Neurological: He is alert.   Skin: Skin is warm and dry Mole removed from upper back and left forearm. .   Psychiatric: He has a normal mood and affect. His behavior is normal.      LABS:   No results found for this or any previous visit (from the past 240 hour(s)).     Current Outpatient Medications   Medication Sig   ? acetaminophen (TYLENOL) 325 MG tablet Take 2 tablets (650 mg total) by mouth every 6 (six) hours as needed for pain.   ? atorvastatin (LIPITOR) 40 MG tablet Take 1 tablet (40 mg total) by mouth at bedtime. For hx of cva   ? bisacodyL (DULCOLAX) 10 mg suppository Insert 10 mg into the rectum daily as needed.   ? docusate sodium (COLACE) 100 MG capsule Take 1 capsule (100 mg total) by mouth 2 (two) times a day. For constipation (Patient taking differently: Take 100 mg by mouth daily. And daily PRN.)   ? dorzolamide-timoloL (COSOPT) 22.3-6.8 mg/mL ophthalmic solution Administer 1 drop into the left eye 2 (two) times a day. glaucoma   ? erythromycin base (ERYTHROMYCIN OPHT) Apply 5 mg to eye. Instill 1 ribbon in left eye at HS and right eye four times a day   ? ferrous sulfate 325 (65  FE) MG tablet Take 1 tablet by mouth daily.   ? guaiFENesin (ROBITUSSIN) 100 mg/5 mL syrup Take 200 mg by mouth every 4 (four) hours as needed for cough.   ? latanoprost (XALATAN) 0.005 % ophthalmic solution Administer 1 drop into the left eye at bedtime. glaucoma   ? levothyroxine (SYNTHROID, LEVOTHROID) 25 MCG tablet Take 1 tablet (25 mcg total) by mouth Daily at 6:00 am. Hypothyroid   ? nystatin (MYCOSTATIN) powder Apply topically. (apply to groin two times a day until clear, then two times a day PRN)   ? omeprazole (PRILOSEC) 20 MG capsule Take 1 capsule (20 mg total) by mouth 2 (two) times a day before meals. Needs two times a day for 2 months, then daily for ulcer   ? propylene glycol (SYSTANE COMPLETE) 0.6 % Drop Apply 1 drop to eye 2 (two) times a day. Both eyes for dry eyes   ? warfarin sodium (WARFARIN ORAL) Take by mouth. 3/11/21 INR 2.53 Cont 7 mg M, Th and 6.5 mg AOD.  Next INR 4/8/21.  2/11/21 INR 2.87 Cont 7mg M, Th and 6.5mg AOD. Next INR 3/11  12/14/20 INR 2.69 Cont 7mg M & Th, 6.5mg AOD. Next INR 1/13/21 11/30/20 INR 2.63 Cont 7mg M & Th, 6.5mg AOD. NExt INR 12/14.  11/23/20 INR 2.03  Cont 7mg Mon and Thurs and 6.5mg AOD.  Next INR 11/30/20.    11/16/20 INR 1.72 Increase to 78mg M & Th, 6.5mg AOD. Next INR 11/23.  11/9/20 INR 1.76 6.5mg daily. Next 11/16       ASSESSMENT:      ICD-10-CM    1. Cellulitis of left upper extremity  L03.114        PLAN:      Mole- removed from upper back and left forearm. Left forearm wound with scant green drainage and redness - Keflex four times a day x 7 days     HX Right sided [place] Palsy . Pt currently on eye lubricating drops multiple times a day.       Suprapubic catheter- cleanse daily, monitor for infection around  the site.  Cares per protocol     Atrial fibrillation on coumadin and lisinopril, currently being bridged with lovenox.      Anticoagulation management- monitor and adjust per INRS      Poor vision- is followed closely by ophthalmology.  on multiple  eye gtts.      Hypothyroidism- on Synthroid, TSH 4/22/20 was 3.10    Anemia  Last HGB 12.9 on 1/14/21        Electronically signed by: Polly Marroquin CNP

## 2021-06-21 NOTE — LETTER
Letter by Polly Marroquin CNP at      Author: Polly Marroquin CNP Service: -- Author Type: --    Filed:  Encounter Date: 3/15/2021 Status: (Other)         Patient: Riley Rodriguez   MR Number: [id number]   YOB: 1926   Date of Visit: 3/15/2021     [facility]    Facility:   [facility] [[id number]]   Code Status: DNR      CHIEF COMPLAINT/REASON FOR VISIT:  Chief Complaint   Patient presents with   ? Problem Visit     chnage in mole uppr back        HISTORY:      HPI: Riley is a 89 y.o. male  now residing in the LTC at [facility].  He is  with history of A. fib on warfarin develop acute CVA from holding warfarin for right gluteal hematoma.  Currently back on Coumadin, Hypertension , urinary retention has a suprapubic catheter and with a pacemaker       Today he being seen  for a change in mole . He was brought to his room to review a mole on is back. He was found to have multiple moles entire back however he had one that was different and scabbed in color on his mid upper back. He denied  Pain.    He denied CP or shortness of breath,  He is able to move all extremities.  He is eating well and reports no difficulties with swallowing.      His suprapubic is intact and draining clear yellow urine.      Past Medical History:   Diagnosis Date   ? Atrial fibrillation (H)     On coumadin   ? Bell's palsy     right sided facial droop   ? CVA (cerebral vascular accident) (H)    ? Hypertension    ? Pacemaker    ? Urinary retention              Family History   Problem Relation Age of Onset   ? COPD Father      Social History     Socioeconomic History   ? Marital status:      Spouse name: Not on file   ? Number of children: Not on file   ? Years of education: Not on file   ? Highest education level: Not on file   Occupational History   ? Not on file   Social Needs   ? Financial resource strain: Not on file   ? Food insecurity     Worry: Not on file      Inability: Not on file   ? Transportation needs     Medical: Not on file     Non-medical: Not on file   Tobacco Use   ? Smoking status: Former Smoker   ? Smokeless tobacco: Never Used   Substance and Sexual Activity   ? Alcohol use: No   ? Drug use: No   ? Sexual activity: Not on file   Lifestyle   ? Physical activity     Days per week: Not on file     Minutes per session: Not on file   ? Stress: Not on file   Relationships   ? Social connections     Talks on phone: Not on file     Gets together: Not on file     Attends Cheondoism service: Not on file     Active member of club or organization: Not on file     Attends meetings of clubs or organizations: Not on file     Relationship status: Not on file   ? Intimate partner violence     Fear of current or ex partner: Not on file     Emotionally abused: Not on file     Physically abused: Not on file     Forced sexual activity: Not on file   Other Topics Concern   ? Not on file   Social History Narrative    03/07/15 - The patient lives alone in his own home.         Review of Systems  Eyes:  He is followed  closely by opthalmology.  He has had multiple eye  procedures   Constitutional: Negative for activity change and fatigue. Negative for appetite change, chills and fever.   HENT: Negative for congestion and sore throat.    Eyes: positive  for visual disturbance. right sided facial paralysis   Respiratory: Negative for cough, shortness of breath and wheezing.    Cardiovascular: Negative for chest pain and leg swelling.        Pacemaker   Gastrointestinal: Negative for abdominal distention, abdominal pain, constipation, diarrhea and nausea.   Genitourinary: Negative for dysuria.   Musculoskeletal: Negative for arthralgias and myalgias.   Skin: Negative for color change, rash and wound.   Neurological: Negative for dizziness, weakness and numbness.   Psychiatric/Behavioral: Negative for agitation, behavioral problems and sleep disturbance.   Vitals:    03/15/21 0930    BP: 142/74   Pulse: 61   Resp: 20   Temp: 98.5  F (36.9  C)   SpO2: 97%   Weight: 181 lb 6.4 oz (82.3 kg)       Constitutional: He appears well-developed and well-nourished.   Pleasant gentleman   HENT:   Head: Normocephalic and atraumatic.   Eyes: Conjunctivae are normal. Pupils are equal, round, and reactive to light. poor vision with the right worse than the left right sided facial paralysis   Neck: Normal range of motion. Neck supple.   Cardiovascular: Normal rate, regular rhythm and normal heart sounds.   No murmur heard.  Pulmonary/Chest: Effort normal and breath sounds normal. He has no wheezes. He has no rales.   Abdominal: Soft. Bowel sounds are normal. He exhibits no distension. There is no tenderness.   Genitourinary: suprapubic catheter  Musculoskeletal: Normal range of motion. He exhibits no edema.   Neurological: He is alert.   Skin: Skin is warm and dry Multiple moles on his back with a change in color on one mid upper back .   Psychiatric: He has a normal mood and affect. His behavior is normal.      LABS:   Recent Results (from the past 240 hour(s))   INR   Result Value Ref Range    INR 2.53 (H) 0.90 - 1.10        Current Outpatient Medications   Medication Sig   ? acetaminophen (TYLENOL) 325 MG tablet Take 2 tablets (650 mg total) by mouth every 6 (six) hours as needed for pain.   ? atorvastatin (LIPITOR) 40 MG tablet Take 1 tablet (40 mg total) by mouth at bedtime. For hx of cva   ? bisacodyL (DULCOLAX) 10 mg suppository Insert 10 mg into the rectum daily as needed.   ? docusate sodium (COLACE) 100 MG capsule Take 1 capsule (100 mg total) by mouth 2 (two) times a day. For constipation (Patient taking differently: Take 100 mg by mouth daily. And daily PRN.)   ? dorzolamide-timoloL (COSOPT) 22.3-6.8 mg/mL ophthalmic solution Administer 1 drop into the left eye 2 (two) times a day. glaucoma   ? erythromycin base (ERYTHROMYCIN OPHT) Apply 5 mg to eye. Instill 1 ribbon in left eye at HS and right eye  four times a day   ? ferrous sulfate 325 (65 FE) MG tablet Take 1 tablet by mouth daily.   ? guaiFENesin (ROBITUSSIN) 100 mg/5 mL syrup Take 200 mg by mouth every 4 (four) hours as needed for cough.   ? latanoprost (XALATAN) 0.005 % ophthalmic solution Administer 1 drop into the left eye at bedtime. glaucoma   ? levothyroxine (SYNTHROID, LEVOTHROID) 25 MCG tablet Take 1 tablet (25 mcg total) by mouth Daily at 6:00 am. Hypothyroid   ? nystatin (MYCOSTATIN) powder Apply topically. (apply to groin two times a day until clear, then two times a day PRN)   ? omeprazole (PRILOSEC) 20 MG capsule Take 1 capsule (20 mg total) by mouth 2 (two) times a day before meals. Needs two times a day for 2 months, then daily for ulcer   ? propylene glycol (SYSTANE COMPLETE) 0.6 % Drop Apply 1 drop to eye 2 (two) times a day. Both eyes for dry eyes   ? warfarin sodium (WARFARIN ORAL) Take by mouth. 3/11/21 INR 2.53 Cont 7 mg M, Th and 6.5 mg AOD.  Next INR 4/8/21.  2/11/21 INR 2.87 Cont 7mg M, Th and 6.5mg AOD. Next INR 3/11  12/14/20 INR 2.69 Cont 7mg M & Th, 6.5mg AOD. Next INR 1/13/21 11/30/20 INR 2.63 Cont 7mg M & Th, 6.5mg AOD. NExt INR 12/14.  11/23/20 INR 2.03  Cont 7mg Mon and Thurs and 6.5mg AOD.  Next INR 11/30/20.    11/16/20 INR 1.72 Increase to 78mg M & Th, 6.5mg AOD. Next INR 11/23.  11/9/20 INR 1.76 6.5mg daily. Next 11/16       ASSESSMENT:      ICD-10-CM    1. Change in skin mole  D22.9        PLAN:      Mole- Multiple moles on his back however one has changed in color mid upper back- F/U dermatology. [facility] number given unless pt has his own dermatologist.     HX Right sided [place] Palsy . Pt currently on eye lubricating drops multiple times a day.       Suprapubic catheter- cleanse daily, monitor for infection around  the site.  Cares per protocol     Atrial fibrillation on coumadin and lisinopril, currently being bridged with lovenox.      Anticoagulation management- monitor and adjust per INRS       Poor vision- is followed closely by ophthalmology.  on multiple eye gtts.      Hypothyroidism- on Synthroid, TSH 4/22/20 was 3.10    Anemia HGB 12.9 on 1/14/21        Electronically signed by: Polly Marroquin CNP

## 2021-06-21 NOTE — LETTER
Letter by Nancy Fair MD at      Author: Nancy Fair MD Service: -- Author Type: --    Filed:  Encounter Date: 4/30/2021 Status: (Other)         Patient: Riley Rodriguez   MR Number: [id number]   YOB: 1926   Date of Visit: 4/30/2021              [facility]    Facility:   [facility] [[id number]]   Code Status: DNR/DNI       Chief Complaint   Patient presents with   ? Review Of Multiple Medical Conditions     LT 4/30/2021. Afib. General debility.        HPI:   Riley is a 89 y.o. male seen for routine physician follow up in LTC at [facility]. He has hx of Afib on warfarin, prior stroke, BPH, HTN, [place] palsy, hypothyroidism, SP catheter. He was last hospitalized 8/13/2020-8/19/2020. He had labs drawn in NH as he was not doing well with general fatigue, decreased appetite. No respiratory sx. He started feeling a little better on his own but then labs came back with hgb down to 6.2. He was worked up in the hospital found to have duodenal ulcer.       Today:  He has been pretty stable. New issue since my last visit of skin cancer. Had some moles for which he was sent to dermatology late March and then mid April had additional surgery for excision of a back lesion. Wounds now healing well. 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 He reports no pain at biopsy sites. No other skin concerns. He continues on iron and PPI due to duodenal ulcer with GIB Aug 2020. No reports of abdominal pain. No complaints at all today. He is pleasant and cooperative with staff. He has not had any falls. Has chronic SP catheter, no issues. He has baseline vision impairment, on multiple eye drops.       Past Medical History:  Past Medical History:   Diagnosis Date   ? Atrial fibrillation (H)     On coumadin   ? Bell's palsy     right sided facial droop   ? CVA (cerebral vascular  accident) (H)    ? Hypertension    ? Pacemaker    ? Urinary retention        Medications:  Current Outpatient Medications   Medication Instructions   ? acetaminophen (TYLENOL) 650 mg, Oral, Every 6 hours PRN   ? atorvastatin (LIPITOR) 40 mg, Oral, Bedtime, For hx of cva   ? bisacodyL (DULCOLAX) 10 mg, Rectal, Daily PRN   ? docusate sodium (COLACE) 100 mg, Oral, 2 times daily, For constipation   ? dorzolamide-timoloL (COSOPT) 22.3-6.8 mg/mL ophthalmic solution 1 drop, Left Eye, 2 times daily, glaucoma   ? erythromycin base (ERYTHROMYCIN OPHT) 5 mg, Ophthalmic, Instill 1 ribbon in left eye at HS and right eye four times a day   ? ferrous sulfate 325 (65 FE) MG tablet 1 tablet, Oral, DAILY   ? guaiFENesin (ROBITUSSIN) 200 mg, Oral, Every 4 hours PRN   ? latanoprost (XALATAN) 0.005 % ophthalmic solution 1 drop, Left Eye, Bedtime, glaucoma   ? levothyroxine (SYNTHROID, LEVOTHROID) 25 mcg, Oral, QDaily, Hypothyroid   ? nystatin (MYCOSTATIN) powder Topical, (apply to groin two times a day until clear, then two times a day PRN)    ? omeprazole (PRILOSEC) 20 mg, Oral, 2 times daily before meals, Needs two times a day for 2 months, then daily for ulcer   ? propylene glycol (SYSTANE COMPLETE) 0.6 % Drop 1 drop, Ophthalmic, 2 times daily, Both eyes for dry eyes   ? warfarin sodium (WARFARIN ORAL) Oral, 4/15/21 INR 2.81 Cont 7mg M & TH, 6.5mg AOD. Next INR 5/54/5/21 INR 2.39 Cont 7mg M & Th, 6.5mg AOD. Next INR 4/144/1/21 INR 2.44 Cont 7mg M & Th, 6.5mg AOD. Next INR 4/5.3/11/21 INR 2.53 Cont 7 mg M, Th and 6.5 mg AOD.  Next INR 4/8/21.2/11/21 INR 2.87 Cont 7mg M, Th and 6.5mg AOD. Next INR 3/11       Physical Exam:   Note: COVID-19 pandemic precautions in place. Physical exam performed with social distancing considerations.  General: Patient is alert, elderly male, no distress.    Vitals: /79, Temp 98.5, Pulse 60, RR 18, O2 sat 98%RA.  HEENT: Head is NCAT. Nares negative. Oropharynx well hydrated. Right facial droop,  baseline.  Neck: No JVD.  Lungs: Non labored respirations.   : SP cath with leg bag.  Extremities: Trace LE edema is noted.  Musculoskeletal: Age related degen changes.   Skin: Bandage at biopsy site upper back.   Psych: Mood appears good.      Labs:  Lab Results   Component Value Date    WBC 5.9 10/01/2020    HGB 12.9 (L) 01/14/2021    HCT 35.9 (L) 10/01/2020    MCV 85 10/01/2020     10/01/2020     Results for orders placed or performed in visit on 10/01/20   Basic Metabolic Panel   Result Value Ref Range    Sodium 138 136 - 145 mmol/L    Potassium 4.1 3.5 - 5.0 mmol/L    Chloride 102 98 - 107 mmol/L    CO2 28 22 - 31 mmol/L    Anion Gap, Calculation 8 5 - 18 mmol/L    Glucose 99 70 - 125 mg/dL    Calcium 9.1 8.5 - 10.5 mg/dL    BUN 12 8 - 28 mg/dL    Creatinine 0.79 0.70 - 1.30 mg/dL    GFR MDRD Af Amer >60 >60 mL/min/1.73m2    GFR MDRD Non Af Amer >60 >60 mL/min/1.73m2     Lab Results   Component Value Date    TSH 3.10 04/22/2020         Assessment/Plan:  1. Moles, skin cancer, left arm and upper back. Has seen derm, continue wound treatment orders to biopsy sites. Follow up per derm instructions.   2. Afib. Chronically anticoagulated with warfarin. Monitor and adjust per INR. He sees cardiology.  3. Duodenal ulcer. Aug 2020 hospitalization. Seen by GI, had EGD on 8/15/2020. No active bleeding so no specific procedural intervention needed. He Is on PPI.  4. Anemia. He received transfusion in the hospital. Work up revealed duodenal ulcer, no active bleeding. Continue iron. Last hgb noted   Lab Results   Component Value Date    HGB 12.9 (L) 01/14/2021      5. Hx of stroke. Continue statin, also on warfarin as noted above.  6. Hypothyroidism. On replacement. Last TSH within goal range.  7. SP catheter.   8. HTN. Not on BP meds, had been on lisinopril in the past. BPs satisfactory.  9. [place] Palsy, right.   10. Vision impairment. On multiple drops, follows with ophthalmology.        Electronically signed by:  Nancy Fair MD

## 2021-06-21 NOTE — LETTER
"Letter by Polly Marroquin CNP at      Author: Polly Marroquin CNP Service: -- Author Type: --    Filed:  Encounter Date: 2/3/2021 Status: (Other)         Patient: Riley Rodriguez   MR Number: [id number]   YOB: 1926   Date of Visit: 2/3/2021     [facility]    Facility:   [facility] [[id number]]   Code Status: DNR      CHIEF COMPLAINT/REASON FOR VISIT:  Chief Complaint   Patient presents with   ? FVP Care Coordination - Regulatory       HISTORY:      HPI: Riley is a 89 y.o. male  now residing in the LTC at [facility].  He is  with history of A. fib on warfarin develop acute CVA from holding warfarin for right gluteal hematoma.  Currently back on Coumadin, Hypertension , urinary retention has a suprapubic catheter and with a pacemaker       Today he being seen  for routine regulatory visit. He was seen in his room listening to music. He had no concerns. When asked if he was moving his bowels he responded, \"you are the provider and should know this.\". He denied CP or shortness of breath,  He is able to move all extremities.  He is eating well and reports no difficulties with swallowing.    Staff reported he is moving his bowels and had no issues.   His suprapubic is intact and draining clear yellow urine.  TSH stable at 3.10 and done on 4/22/20. LS clear and no shortness of breath     Past Medical History:   Diagnosis Date   ? Atrial fibrillation (H)     On coumadin   ? Bell's palsy     right sided facial droop   ? CVA (cerebral vascular accident) (H)    ? Hypertension    ? Pacemaker    ? Urinary retention              Family History   Problem Relation Age of Onset   ? COPD Father      Social History     Socioeconomic History   ? Marital status:      Spouse name: Not on file   ? Number of children: Not on file   ? Years of education: Not on file   ? Highest education level: Not on file   Occupational History   ? Not on file   Social Needs "   ? Financial resource strain: Not on file   ? Food insecurity     Worry: Not on file     Inability: Not on file   ? Transportation needs     Medical: Not on file     Non-medical: Not on file   Tobacco Use   ? Smoking status: Former Smoker   ? Smokeless tobacco: Never Used   Substance and Sexual Activity   ? Alcohol use: No   ? Drug use: No   ? Sexual activity: Not on file   Lifestyle   ? Physical activity     Days per week: Not on file     Minutes per session: Not on file   ? Stress: Not on file   Relationships   ? Social connections     Talks on phone: Not on file     Gets together: Not on file     Attends [employer] service: Not on file     Active member of club or organization: Not on file     Attends meetings of clubs or organizations: Not on file     Relationship status: Not on file   ? Intimate partner violence     Fear of current or ex partner: Not on file     Emotionally abused: Not on file     Physically abused: Not on file     Forced sexual activity: Not on file   Other Topics Concern   ? Not on file   Social History Narrative    03/07/15 - The patient lives alone in his own home.         Review of Systems  Eyes:  He is followed  closely by opthalmology.  He has had multiple eye  procedures   Constitutional: Negative for activity change and fatigue. Negative for appetite change, chills and fever.   HENT: Negative for congestion and sore throat.    Eyes: positive  for visual disturbance. right sided facial paralysis   Respiratory: Negative for cough, shortness of breath and wheezing.    Cardiovascular: Negative for chest pain and leg swelling.        Pacemaker   Gastrointestinal: Negative for abdominal distention, abdominal pain, constipation, diarrhea and nausea.   Genitourinary: Negative for dysuria.   Musculoskeletal: Negative for arthralgias and myalgias.   Skin: Negative for color change, rash and wound.   Neurological: Negative for dizziness, weakness and numbness.   Psychiatric/Behavioral: Negative  for agitation, behavioral problems and sleep disturbance.   Vitals:    02/03/21 0748   BP: (!) 160/92   Pulse: 72   Resp: 18   Temp: 98.9  F (37.2  C)   SpO2: 97%   Weight: 180 lb 12.8 oz (82 kg)       Constitutional: He appears well-developed and well-nourished.   Pleasant gentleman   HENT:   Head: Normocephalic and atraumatic.   Eyes: Conjunctivae are normal. Pupils are equal, round, and reactive to light. poor vision with the right worse than the left right sided facial paralysis   Neck: Normal range of motion. Neck supple.   Cardiovascular: Normal rate, regular rhythm and normal heart sounds.   No murmur heard.  Pulmonary/Chest: Effort normal and breath sounds normal. He has no wheezes. He has no rales.   Abdominal: Soft. Bowel sounds are normal. He exhibits no distension. There is no tenderness.   Genitourinary: suprapubic catheter  Musculoskeletal: Normal range of motion. He exhibits no edema.   Neurological: He is alert.   Skin: Skin is warm and dry.   Psychiatric: He has a normal mood and affect. His behavior is normal.     LABS:   No results found for this or any previous visit (from the past 240 hour(s)).     Current Outpatient Medications   Medication Sig   ? acetaminophen (TYLENOL) 325 MG tablet Take 2 tablets (650 mg total) by mouth every 6 (six) hours as needed for pain.   ? atorvastatin (LIPITOR) 40 MG tablet Take 1 tablet (40 mg total) by mouth at bedtime. For hx of cva   ? bisacodyL (DULCOLAX) 10 mg suppository Insert 10 mg into the rectum daily as needed.   ? docusate sodium (COLACE) 100 MG capsule Take 1 capsule (100 mg total) by mouth 2 (two) times a day. For constipation (Patient taking differently: Take 100 mg by mouth daily. And daily PRN.)   ? dorzolamide-timoloL (COSOPT) 22.3-6.8 mg/mL ophthalmic solution Administer 1 drop into the left eye 2 (two) times a day. glaucoma   ? erythromycin base (ERYTHROMYCIN OPHT) Apply 5 mg to eye. Instill 1 ribbon in left eye at HS and right eye four times a  day   ? ferrous sulfate 325 (65 FE) MG tablet Take 1 tablet by mouth daily.   ? guaiFENesin (ROBITUSSIN) 100 mg/5 mL syrup Take 200 mg by mouth every 4 (four) hours as needed for cough.   ? latanoprost (XALATAN) 0.005 % ophthalmic solution Administer 1 drop into the left eye at bedtime. glaucoma   ? levothyroxine (SYNTHROID, LEVOTHROID) 25 MCG tablet Take 1 tablet (25 mcg total) by mouth Daily at 6:00 am. Hypothyroid   ? nystatin (MYCOSTATIN) powder Apply topically. (apply to groin two times a day until clear, then two times a day PRN)   ? omeprazole (PRILOSEC) 20 MG capsule Take 1 capsule (20 mg total) by mouth 2 (two) times a day before meals. Needs two times a day for 2 months, then daily for ulcer   ? propylene glycol (SYSTANE COMPLETE) 0.6 % Drop Apply 1 drop to eye 2 (two) times a day. Both eyes for dry eyes   ? warfarin sodium (WARFARIN ORAL) Take by mouth. 12/14/20 INR 2.69 Cont 7mg M & Th, 6.5mg AOD. Next INR 1/13/21 11/30/20 INR 2.63 Cont 7mg M & Th, 6.5mg AOD. NExt INR 12/14.  11/23/20 INR 2.03  Cont 7mg Mon and Thurs and 6.5mg AOD.  Next INR 11/30/20.    11/16/20 INR 1.72 Increase to 78mg M & Th, 6.5mg AOD. Next INR 11/23.  11/9/20 INR 1.76 6.5mg daily. Next 11/16     Case Management:  I have reviewed the facility/SNF care plan/MDS which was done 12/3/20 TSH  (3.10 0n 4/22/20)  including the falls risk, nutrition and pain screening. I also reviewed the current immunizations, and preventive care.. Future cancer screening is not clinically indicated secondary to age/goals of care.   Patient's desire to return to the community is not assessible due to cognitive impairment.    Information reviewed:  Medications, vital signs, orders, and nursing notes.    ASSESSMENT:      ICD-10-CM    1. Hypothyroidism, unspecified type  E03.9    2. Anemia, unspecified type  D64.9    3. Age-related physical debility  R54        PLAN:      Right eye trauma - on eyedrops  he no longer has pain and being followed closely by  opthalmology.    HX Right sided [place] Palsy . Pt currently on eye lubricating drops multiple times a day.       Suprapubic catheter- cleanse daily, monitor for infection around  the site.  Cares per protocol     Atrial fibrillation on coumadin and lisinopril, currently being bridged with lovenox.      Anticoagulation management- monitor and adjust per INRS      Poor vision- is followed closely by ophthalmology.  on multiple eye gtts.      Hypothyroidism- on Synthroid, TSH 4/22/20 was 3.10    Anemia HGB 12.9 on 1/14/21        Electronically signed by: Polly Marroquin CNP

## 2021-06-21 NOTE — LETTER
Letter by Polly Marroquin CNP at      Author: Polly Marroquin CNP Service: -- Author Type: --    Filed:  Encounter Date: 10/21/2020 Status: (Other)         Patient: Riley Rodriguez   MR Number: [id number]   YOB: 1926   Date of Visit: 10/21/2020     Assessment and Plan:        HX Right sided [place] Palsy . Pt currently on eye lubricating drops multiple times a day.       Suprapubic catheter- cleanse daily, monitor for infection around  the site.  Cares per protocol     Atrial fibrillation on coumadin and lisinopril,      Anticoagulation management- monitor and adjust per INRS      Poor vision- is followed closely by ophthalmology.  on multiple eye gtts.      Hypothyroidism- on Synthroid, TSH 4/22/20 was 3.10       Patient has been advised of split billing requirements and indicates understanding: No      The patient's current medical problems were reviewed.      The following health maintenance schedule was reviewed with the patient and provided in printed form in the after visit summary:   Health Maintenance   Topic Date Due   ? MEDICARE ANNUAL WELLNESS VISIT  04/15/1991   ? ZOSTER VACCINES (2 of 3) 12/22/2015   ? FALL RISK ASSESSMENT  12/19/2019   ? INFLUENZA VACCINE RULE BASED (1) 08/01/2020   ? ADVANCE CARE PLANNING  04/20/2022   ? TD 18+ HE  10/19/2022   ? Pneumococcal Vaccine: 65+ Years  Completed   ? Pneumococcal Vaccine: Pediatrics (0 to 5 Years) and At-Risk Patients (6 to 64 Years)  Aged Out   ? HEPATITIS B VACCINES  Aged Out        Subjective:   Chief Complaint: Riley Rodriguez is an 89 y.o. male here for an Annual Wellness visit.   HPI:  Riley is a 89 y.o. male  now residing in the LTC at [facility].  He is  with history of A. fib on warfarin develop acute CVA from holding warfarin for right gluteal hematoma.  Currently back on Coumadin, Hypertension , urinary retention has a suprapubic catheter and with a pacemaker       Today he being seen  for an annual wellness visit    He  denied CP or shortness of breath,  He is able to move all extremities.  He is eating well and reports no difficulties with swallowing.    He denied   constipation.   His suprapubic is intact and draining clear yellow urine.  TSH stable at 3.10 and done on 4/22/20. LS clear and no shortness of breath     Review of Systems:    Eyes: negative for pain, discharge, redness He is followed  closely by opthalmology.  He has had multiple procedures done on his eye right  Constitutional: Negative for activity change and fatigue. Negative for appetite change, chills and fever.   HENT: Negative for congestion and sore throat.    Eyes: positive  for visual disturbance. right sided facial paralysis   Respiratory: Negative for cough, shortness of breath and wheezing.    Cardiovascular: Negative for chest pain and leg swelling.        Pacemaker   Gastrointestinal: Negative for abdominal distention, abdominal pain, constipation, diarrhea and nausea.   Genitourinary: Negative for dysuria.   Musculoskeletal: Negative for arthralgias and myalgias.   Skin: Negative for color change, rash and wound.   Neurological: Negative for dizziness, weakness and numbness.   Psychiatric/Behavioral: Negative for agitation, behavioral problems and sleep disturbance.   Patient Care Team:  Polly Marroquin CNP as PCP - General (Nurse Practitioner)  Polly Marroquin CNP as Assigned PCP  [facility] (Generic Provider)     Patient Active Problem List   Diagnosis   ? Seborrheic Keratosis   ? Ventral Hernia   ? Macular Degeneration   ? Callus   ? Trochanteric Bursitis   ? Pacemaker Placement   ? Osteoarthrosis Of The Hip   ? Acute Urinary Retention   ? Gout   ? Normochromic, Normocytic Anemia   ? Thrombocytopenia   ? Tingling (Paresthesia)   ? Carpal Tunnel Syndrome   ? Abnormal Weight Loss   ? Hyperlipidemia   ? Impaired Fasting Glucose   ? Unspecified glaucoma   ? Osteoarthritis Of The Knee   ?  Hypertension   ? Urinary tract infection associated with cystostomy catheter, initial encounter (H)   ? Benign prostatic hyperplasia with urinary hesitancy   ? Urinary Frequency   ? Atrial Fibrillation   ? Stroke due to occlusion of right middle cerebral artery (H)   ? A-fib (H)   ? Cardiac pacemaker in situ, single chamber   ? CVA (cerebral infarction)   ? Anemia   ? Left inguinal hernia   ? Essential hypertension with goal blood pressure less than 140/90   ? Traumatic hematoma of buttock, initial encounter   ? Traumatic hematoma   ? Hospital discharge follow-up   ? Altered mental state   ? Confusion   ? Hyponatremia   ? TSH elevation   ? Encephalopathy   ? Acute ischemic left PCA stroke (H)   ? Sepsis (H)   ? Acute urinary retention   ? Pyelonephritis, acute   ? History of atrial fibrillation   ? History of CVA (cerebrovascular accident)   ? Generalized muscle weakness   ? Urinary retention   ? Gross hematuria   ? Bell's palsy   ? SSS (sick sinus syndrome) (H)   ? Normocytic anemia   ? Hypothyroidism, unspecified type   ? Gastrointestinal hemorrhage with melena   ? Anemia due to blood loss, acute   ? Iron deficiency   ? Pacemaker battery depletion     Past Medical History:   Diagnosis Date   ? Atrial fibrillation (H)     On coumadin   ? Bell's palsy     right sided facial droop   ? CVA (cerebral vascular accident) (H)    ? Hypertension    ? Pacemaker    ? Urinary retention       Past Surgical History:   Procedure Laterality Date   ? IR BLADDER SUPRAPUBIC CATHETER INSERTION  1/18/2019   ? CO ESOPHAGOGASTRODUODENOSCOPY TRANSORAL DIAGNOSTIC N/A 8/15/2020    Procedure: ESOPHAGOGASTRODUODENOSCOPY (EGD) with biopsy;  Surgeon: Paxton Villalpando MD;  Location: [facility];  Service: Gastroenterology   ? CO PARTIAL EXCISION THYROID,UNILAT      Description: Thyroid Surgery Sub-Total Thyroidectomy;  Recorded: 03/24/2008;   ? CO REMV PILONIDAL LESION SIMPLE      Description: Pilonidal Cyst Resection;  Recorded: 03/24/2008;    ? MS TRANSURETHRAL ELEC-SURG PROSTATECTOM      Description: Transurethral Resection Of Prostate (TURP);  Recorded: 03/24/2008;   ? TOTAL HIP ARTHROPLASTY Right       Family History   Problem Relation Age of Onset   ? COPD Father       Social History     Socioeconomic History   ? Marital status:      Spouse name: Not on file   ? Number of children: Not on file   ? Years of education: Not on file   ? Highest education level: Not on file   Occupational History   ? Not on file   Social Needs   ? Financial resource strain: Not on file   ? Food insecurity     Worry: Not on file     Inability: Not on file   ? Transportation needs     Medical: Not on file     Non-medical: Not on file   Tobacco Use   ? Smoking status: Former Smoker   ? Smokeless tobacco: Never Used   Substance and Sexual Activity   ? Alcohol use: No   ? Drug use: No   ? Sexual activity: Not on file   Lifestyle   ? Physical activity     Days per week: Not on file     Minutes per session: Not on file   ? Stress: Not on file   Relationships   ? Social connections     Talks on phone: Not on file     Gets together: Not on file     Attends Buddhist service: Not on file     Active member of club or organization: Not on file     Attends meetings of clubs or organizations: Not on file     Relationship status: Not on file   ? Intimate partner violence     Fear of current or ex partner: Not on file     Emotionally abused: Not on file     Physically abused: Not on file     Forced sexual activity: Not on file   Other Topics Concern   ? Not on file   Social History Narrative    03/07/15 - The patient lives alone in his own home.      Current Outpatient Medications   Medication Sig Dispense Refill   ? acetaminophen (TYLENOL) 325 MG tablet Take 2 tablets (650 mg total) by mouth every 6 (six) hours as needed for pain. 30 tablet 0   ? atorvastatin (LIPITOR) 40 MG tablet Take 1 tablet (40 mg total) by mouth at bedtime. For hx of cva 30 tablet 0   ? bisacodyL  (DULCOLAX) 10 mg suppository Insert 10 mg into the rectum daily as needed.     ? docusate sodium (COLACE) 100 MG capsule Take 1 capsule (100 mg total) by mouth 2 (two) times a day. For constipation 30 capsule 0   ? dorzolamide-timoloL (COSOPT) 22.3-6.8 mg/mL ophthalmic solution Administer 1 drop into the left eye 2 (two) times a day. glaucoma 10 mL 12   ? erythromycin base (ERYTHROMYCIN OPHT) Apply 5 mg to eye. Instill 1 ribbon in left eye at HS and right eye four times a day     ? ferrous sulfate 325 (65 FE) MG tablet Take 1 tablet by mouth daily.     ? latanoprost (XALATAN) 0.005 % ophthalmic solution Administer 1 drop into the left eye at bedtime. glaucoma 2.5 mL 12   ? levothyroxine (SYNTHROID, LEVOTHROID) 25 MCG tablet Take 1 tablet (25 mcg total) by mouth Daily at 6:00 am. Hypothyroid 30 tablet 0   ? omeprazole (PRILOSEC) 20 MG capsule Take 1 capsule (20 mg total) by mouth 2 (two) times a day before meals. Needs two times a day for 2 months, then daily for ulcer 60 capsule 0   ? polyethylene glycol (MIRALAX) 17 gram packet Take 1 packet (17 g total) by mouth daily as needed. For constipation 30 packet 0   ? propylene glycol (SYSTANE COMPLETE) 0.6 % Drop Apply 1 drop to eye 2 (two) times a day. Both eyes for dry eyes 1.5 mL 0   ? warfarin sodium (WARFARIN ORAL) Take by mouth. 10/12/20 INR 1.90 Cont 6mg daily. Next INR 10/26  9/28/20 1.99 6mg daily.  9/17/20 INR 2.13 Take 6mg daily Next INR 9/28 9/8/20 INR 2.22  Cont 6mg daily.  Next INR 9/14/20.    8/31/20 INR 1.59  Take 6mg daily.  Next INR 9/8/20.  8/26/20 INR 1.53  Take 5.5mg daily.  Next INR 8/31/20.       No current facility-administered medications for this visit.       Objective:   Vital Signs:   Visit Vitals  /79   Pulse 60   Temp 98.9  F (37.2  C)   Resp 16   Wt 185 lb (83.9 kg)   SpO2 97%   BMI 26.54 kg/m           VisionScreening:  No exam data present   He follows up closely with ophthalmology    PHYSICAL EXAM  Constitutional: He appears  well-developed and well-nourished.   Pleasant gentleman   HENT:   Head: Normocephalic and atraumatic.   Eyes: Conjunctivae are normal. Pupils are equal, round, and reactive to light. poor vision with the right worse than the left right sided facial paralysis   Neck: Normal range of motion. Neck supple.   Cardiovascular: Normal rate, regular rhythm and normal heart sounds.   No murmur heard.  Pulmonary/Chest: Effort normal and breath sounds normal. He has no wheezes. He has no rales.   Abdominal: Soft. Bowel sounds are normal. He exhibits no distension. There is no tenderness.   Genitourinary: suprapubic catheter  Musculoskeletal: Normal range of motion. He exhibits no edema.   Neurological: He is alert.   Skin: Skin is warm and dry.   Psychiatric: He has a normal mood and affect. His behavior is normal.     No flowsheet data found.  A Mini-Cog score of 0-2 suggests the possibility of dementia, score of 3-5 suggests no dementia  Cognitive Screen not completed due to known dementia.  Fall Risk not completed for medical reasons.    Identified Health Risks:

## 2021-06-21 NOTE — PROGRESS NOTES
Assessment/Plan:    1. Impaired fasting glucose  Impaired fasting glucose, improved with A1c decreasing from 6.2% down to 5.9% with dietary modifications and 6 pound weight loss since April 17, 2018.  The check basic metabolic panel as well today for med monitoring and fasting glucose.  - Glycosylated Hemoglobin A1c; Standing  - Glycosylated Hemoglobin A1c  - Basic Metabolic Panel    2. Essential hypertension  Refill provided on lisinopril 10 mg daily.  Blood pressure 144/66 on recheck.  Ensure systolic blood pressure remains less than 150.  - lisinopril (PRINIVIL,ZESTRIL) 10 MG tablet; Take 1 tablet (10 mg total) by mouth daily.  Dispense: 90 tablet; Refill: 3  - Basic Metabolic Panel    3. Stroke due to occlusion of right middle cerebral artery (H)  Stroke prevention reviewed.  Continues noted medication with underlying history of atrial fibrillation.  Continues warfarin 7.5 mg daily with recent INR of 2.1 October 18, 2018.    4. Need for vaccination  High-dose flu shot given.  Shingrix immunization through local pharmacy discussed.  - Influenza High Dose, Seasonal 65+ yrs          Subjective:    Riley Rodriguez is seen today for follow-up evaluation.  Impaired fasting glucose.  Prior A1c of 6.2%.  Has cut back on dietary indiscretions however did have a jelly filled donut yesterday where he resides.  Not checking blood sugars.  No chest pain or palpitations noted.  Cold symptoms however not into his chest.  No cough.  No shortness of breath.  History of right middle cerebral artery CVA.  Daughter describes some loss of sensation distally at fingertips as residual.  Ambulating with walker.  No recurrent concerns identified.  Continues warfarin chronic anticoagulation for [id number] = 5 with embolic risk 7.2% to 10%.  Continues lisinopril 10 mg daily for hypertension simvastatin 5 mg at bedtime for lipid management.  History of impaired fasting glucose with prior blood sugar 122 and A1c of 6.1% October 18, 2017.   "Dietary indiscretions where he resides in independent living.  Ambulating with walker to assist with history of right middle cerebral artery CVA historically.  No further concerns or recurrence.     (\"Nayeli\") in  after 56 years   Lives in Senior Living independent environment (\"[facility]")   4 daughters (Elizabeth Luther (she is a nurse), etc)   No smoke (h/o heavy smoker \"3 ppd\" but quit in age 40s)   No EtOH   Surgeries: pilonidal cyst (), left thyroid (), C6-7 laminectomy (10/93), left cataract (), s/p TURP (); pacemaker x ; right ARON (Dr. Crandall with [facility])  Hospitalizations: -11 ([facility]) for right middle cerebral artery CVA (likely cardioembolic);  - h/o spontaneous hemothorax while on Coumadin for h/o a. fib   Mom -  (unknown reason after \"female operation\" with post-op blood clot)   Dad -  emphysema   1 sis -   Retired -    [place] [facility], visits frequently in the summer     Past Surgical History:   Procedure Laterality Date     [place] PARTIAL EXCISION THYROID,UNILAT      Description: Thyroid Surgery Sub-Total Thyroidectomy;  Recorded: 2008;     [place] REMV PILONIDAL LESION SIMPLE      Description: Pilonidal Cyst Resection;  Recorded: 2008;     [place] TRANSURETHRAL ELEC-SURG PROSTATECTOM      Description: Transurethral Resection Of Prostate (TURP);  Recorded: 2008;     TOTAL HIP ARTHROPLASTY Right         Family History   Problem Relation Age of Onset     COPD Father         Past Medical History:   Diagnosis Date     Atrial fibrillation (H)     On coumadin     CVA (cerebral vascular accident) (H)      Hypertension      Pacemaker         Social History   Substance Use Topics     Smoking status: Former Smoker     Smokeless tobacco: Never Used     Alcohol use No        Current Outpatient Prescriptions   Medication Sig Dispense Refill     AVODART 0.5 mg capsule TAKE ONE CAPSULE BY MOUTH EVERY DAY 90 capsule 2     " BILBERRY ORAL CAPS       colchicine (COLCRYS) 0.6 mg tablet Take 2 tablets by mouth at onset; may repeat every hour.  Max of 8 tablets per day.       dorzolamide-timolol (COSOPT) 2-0.5 % ophthalmic solution Administer 1 drop to both eyes 2 (two) times a day.       latanoprost (XALATAN) 0.005 % ophthalmic solution Administer 1 drop to both eyes bedtime.       lisinopril (PRINIVIL,ZESTRIL) 10 MG tablet Take 1 tablet (10 mg total) by mouth daily. 90 tablet 3     simvastatin (ZOCOR) 5 MG tablet TAKE ONE TABLET BY MOUTH AT BEDTIME 90 tablet 2     tamsulosin (FLOMAX) 0.4 mg Cp24 TAKE ONE CAPSULE BY MOUTH EVERY DAY 90 capsule 2     VIT C/MARIE AC/LUT/COPPER/ZNOX (PRESERVISION LUTEIN ORAL) Take 2 capsules by mouth daily.       warfarin (COUMADIN) 5 MG tablet TAKE TWO TABLETS BY MOUTH EVERY FRIDAY, AND TAKE 1.5 TABLETS ALL OTHER DAYS OF THE WEEK OR AS DIRECTED 60 tablet 2     warfarin (COUMADIN) 5 MG tablet Take 1.5-2 tablets (7.5-10 mg total) by mouth daily. Adjust dose per INR result as directed 150 tablet 1     No current facility-administered medications for this visit.           Objective:    Vitals:    10/23/18 1049   BP: 148/70   Pulse: 72   SpO2: 97%   Weight: 204 lb (92.5 kg)      Body mass index is 30.13 kg/(m^2).    Alert.  No apparent distress.  Pleasant.  Transfer slowly however utilizes walker to assist with ambulation.  Chest appears clear.  Cardiac exam currently regular.  Extremities warm and dry.      This note has been dictated using voice recognition software and as a result may contain minor grammatical errors and unintended word substitutions.

## 2021-06-21 NOTE — LETTER
Letter by Jessa Guan RDCS at      Author: Jessa Guan RDCS Service: -- Author Type: --    Filed:  Encounter Date: 4/23/2021 Status: (Other)         Riley Skinneraritan  [address] [place] [address]      April 23, 2021      Dear Mr. Rodriguez,    RE: Remote Results    We are writing to you regarding your recent Remote Pacemaker check from home. Your transmission was received successfully. Battery status is satisfactory at this time.     Your results are showing no significant changes.    Your next device appointment will be a remote check on August 3, 2021; this will occur automatically.    To schedule or reschedule, please call [phone] and press 1.    NOTE: If you would like to do an extra transmission, please call [phone] and press 3 to speak to a nurse BEFORE transmitting. This ensures that the Device Clinic staff is aware of the reason you are sending a transmission, and can follow-up with you after it has been reviewed.    We will be checking your implanted device from home (remotely) every three months unless otherwise instructed. We will need to see you in the clinic at least once a year. You may need to be seen in the clinic sooner depending on the results of your check.    Please be aware:    The follow-up schedule is like a Physician prescription.    Your remote monitor is paired to your specific implanted device.      Sincerely,    [facility]

## 2021-06-22 ENCOUNTER — RECORDS - HEALTHEAST (OUTPATIENT)
Dept: ADMINISTRATIVE | Facility: OTHER | Age: 86
End: 2021-06-22

## 2021-06-22 NOTE — PROGRESS NOTES
ANTICOAGULATION  MANAGEMENT: Discharge Continuity of Care Review    Hospital admission on  12/20-1/4 for acute ischemic left PCA stroke    Discharge disposition: TCU    INR Results:       Recent labs: (last 7 days)     12/29/18  0738 12/30/18  0722 12/31/18  0652 01/01/19  0737 01/02/19  0730 01/03/19  0631 01/04/19  0707   INR 2.82* 2.66* 2.37* 2.24* 2.35* 2.46* 2.45*       Warfarin inpatient management: Home regimen continued    Warfarin discharge instructions: home regimen continued     Medication Changes Affecting Anticoagulation: Yes: Keflex x2 days    Additional Factors Affecting Anticoagulation: No    Plan       ACM will resume monitoring upon discharge from TCU        Ze Vargas RN

## 2021-06-22 NOTE — PROGRESS NOTES
ANTICOAGULATION  MANAGEMENT: Discharge Continuity of Care Review    Hospital admission on  1/5-1/8 for Sepsis.    Discharge disposition: TCU    INR Results:       Recent labs: (last 7 days)     01/02/19  0730 01/03/19  0631 01/04/19  0707 01/05/19  1238 01/06/19  0610 01/07/19  0611 01/08/19  0618   INR 2.35* 2.46* 2.45* 2.36* 2.87* 3.37* 3.24*       Warfarin inpatient management: Less warfarin administered than maintenance regimen    Warfarin discharge instructions: hold warfarin today and check INR tomorrow.      Medication Changes Affecting Anticoagulation: Yes: Cipro x10 days    Additional Factors Affecting Anticoagulation: Yes: sepsis    Plan       ACM will resume monitoring upon discharge from TCU      Ze Vargas RN

## 2021-06-22 NOTE — PROGRESS NOTES
"TCM DISCHARGE FOLLOW UP CALL    Discharge Date:  12/17/2018  Reason for hospital stay (discharge diagnosis)::  Traumatic hematoma of buttock  Are you feeling better, the same or worse since your discharge?:  Patient is feeling better (Hands still sore.  Pierson cath draining well, no problems.  Still needs assistance w/mobility, using his walker, and doing his exercises. Sleeping in his recliner as it's easier for him to get out of.)  Do you feel like you have a plan in the event of a health emergency?: Yes (\"Has a health alert button, and calls Homa roberson, the oldest daughter.\" )    As part of your discharge plan, were  home care services ordered for you?: Yes    Have you seen them yet, or are they scheduled to visit?: Yes    Do you have any follow up visits scheduled with your PCP or Specialist?:  Yes, with PCP (Zelda Ordonez CNP 12/19/18)  (RN) Is PCP appt scheduled soon enough (within 14 days of discharge date)?: Yes    RN NOTES::  Daughter's doing \"shiftwork\" taking care of Riley.  Still needs to schedule f/u w/urology and will discuss at appt tomorrow.      Kelly Hightower RN Care Manager, Population Health    "

## 2021-06-22 NOTE — PROGRESS NOTES
I spent over 40 minutes with the patient with greater than 50% spent discussing symptoms, treatment options, and coordination of care.     Assessment/Plan:    1. Hospital discharge follow-up  2. Traumatic hematoma  Hospital discharge completed today.  Hematoma is healing as expected.  Will check INR and hemoglobin today as noted.  Continue to monitor for any persisting or worsening symptoms.  We will continue with lidocaine patches and exercising Tylenol for pain management.  - Hemoglobin    3. Anemia, unspecified type  History of anemia.  Will check hemoglobin today.  - Hemoglobin    4. BPH with urinary obstruction  Pierson catheter removed today in clinic.  Patient caregivers are instructed to monitor intake and output and follow-up with urology if he experiences any urinary retention.  Continue with Flomax daily for management of BPH.    5. Hypertension  Blood pressure remains stable on lisinopril 10 mg.  Will check basic metabolic panel today to ensure stability.  - Basic Metabolic Panel    6. Chronic atrial fibrillation (H)  Patient has resumed warfarin per hospital discharge instructions.  Will check INR today.  - INR      Subjective:    Riley Rodriguez is a 89-year-old male seen today for discharge follow-up.  He is accompanied by his 2 daughters today.  Patient was seen in the emergency department on 12/13/2018 after falling.  He reports losing his balance and falling on his right side onto his right hip and buttock.  He denies hitting his head.  He was using his walker at the time to help ambulate.  Daughter was able to help him get to the couch afterwards.  Chest x-ray, CT of pelvis, x-ray of pelvis were obtained in the hospital.  All of these were normal and negative for any fractures.  History of atrial fibrillation, on warfarin.  His warfarin was initially held but later resumed.  Patient had some urinary retention in the hospital.  Pierson was placed and remains today.  He was told to follow-up with  "urology to remove Pierson catheter.  Daughter is wondering if this can be removed today.  They will follow-up with urology if he has any urinary retention issues going forward.  Urine output has been adequate.  Patient reports feeling well today.  Daughters also feel that he is improving.  Daughter reports that home care nurse will be visiting patient this upcoming Friday.  Participating in physical therapy.  Denies any significant pain at this time.  He continues all of her medications as prescribed.  Does not have any additional concerns today.  Review of systems is as stated in HPI, and the remainder of the 10 system review is otherwise unremarkable.      Per discharge note : 89 years old male with history of CVA, A. fib on Coumadin started after CVA came in for status post mechanical fall and found to have right gluteal hematoma.  Patient was seen by surgeon.  Holding off/reverse warfarin.  Hemoglobin stable and restarted warfarin per surgery recommendation but without bridging per surgery recommendation.  Patient hemoglobin stable, cleared by surgeon and discharged home today. Patient was also found to have urinary retention and Pierson was placed.  Urology consulted and try voiding trial.  Failed and replace Pierson's again.  Discharge home with Pierson's and outpatient urology follow-up.      Past Medical History, Family History, and Social History reviewed.   (\"Nayeli\") in 2002 after 56 years   Lives in Senior Living independent environment (\"[facility]")   4 daughters (Elizabeth Luther (she is a nurse), etc)   No smoke (h/o heavy smoker \"3 ppd\" but quit in age 40s)   No EtOH   Surgeries: pilonidal cyst (1950), left thyroid (1972), C6-7 laminectomy (10/93), left cataract (1994), s/p TURP (1/98); pacemaker x April, 2009; right ARON (Dr. Crandall with [facility])  Hospitalizations: 6/6-6/9/11 ([facility]) for right middle cerebral artery CVA (likely cardioembolic); 12/03 - h/o spontaneous hemothorax while on Coumadin " "for h/o a. fib   Mom -  (unknown reason after \"female operation\" with post-op blood clot)   Dad -  emphysema   1 sis -   Retired -    [place] [facility], visits frequently in the summer     Past Surgical History:   Procedure Laterality Date     MD PARTIAL EXCISION THYROID,UNILAT      Description: Thyroid Surgery Sub-Total Thyroidectomy;  Recorded: 2008;     MD REMV PILONIDAL LESION SIMPLE      Description: Pilonidal Cyst Resection;  Recorded: 2008;     MD TRANSURETHRAL ELEC-SURG PROSTATECTOM      Description: Transurethral Resection Of Prostate (TURP);  Recorded: 2008;     TOTAL HIP ARTHROPLASTY Right         Family History   Problem Relation Age of Onset     COPD Father         Past Medical History:   Diagnosis Date     Atrial fibrillation (H)     On coumadin     CVA (cerebral vascular accident) (H)      Hypertension      Pacemaker         Social History     Tobacco Use     Smoking status: Former Smoker     Smokeless tobacco: Never Used   Substance Use Topics     Alcohol use: No     Drug use: No        Current Outpatient Medications   Medication Sig Dispense Refill     acetaminophen (TYLENOL EXTRA STRENGTH) 500 MG tablet Take 1,000 mg by mouth every 6 (six) hours as needed for pain.       BILBERRY ORAL Take 1 capsule by mouth daily .             dorzolamide-timolol (COSOPT) 2-0.5 % ophthalmic solution Administer 1 drop to both eyes 2 (two) times a day.       latanoprost (XALATAN) 0.005 % ophthalmic solution Administer 1 drop to both eyes bedtime.       lidocaine 4 % patch Place 1 patch on the skin daily Remove and discard patch with 12 hours or as directed by MD.. 7 patch 0     lisinopril (PRINIVIL,ZESTRIL) 10 MG tablet Take 1 tablet (10 mg total) by mouth daily. 90 tablet 3     simvastatin (ZOCOR) 5 MG tablet TAKE ONE TABLET BY MOUTH AT BEDTIME 90 tablet 2     tamsulosin (FLOMAX) 0.4 mg cap Take 0.4 mg by mouth every other day At Suppertime .       VIT C/MARIE " AC/LUT/COPPER/ZNOX (PRESERVISION LUTEIN ORAL) Take 1 capsule by mouth 2 (two) times a day .             warfarin (COUMADIN/JANTOVEN) 5 MG tablet Take 1.5 tablets (7.5 mg) by mouth daily. Adjust dose per INR results as instructed. 130 tablet 1     No current facility-administered medications for this visit.           Objective:    Vitals:    12/19/18 0855   BP: 126/58   Patient Site: Left Arm   Patient Position: Sitting   Cuff Size: Adult Regular   Pulse: 72   SpO2: 98%      There is no height or weight on file to calculate BMI.      General Appearance:  Alert, cooperative, no distress, appears stated age   Lungs:   Clear to auscultation bilaterally, respirations unlabored.  No expiratory wheeze or inspiratory crackles noted.   Heart:  Regular rate and rhythm, S1, S2 normal, no murmur, rub or gallop   Extremities:        Urinary/genital:  Hematoma noted on right buttock with mild swelling, ecchymosis extending down right leg towards posterior knee.  Extremities otherwise normal.  Pulses intact.  No cyanosis or edema  Normal male without lesion, discharge or tenderness.  Pierson catheter removed today.   Skin: Warm, dry.  Skin color, texture, turgor normal, no rashes or lesions   Neurologic:  Alert and oriented x3.  Normal motor and sensory function.  No focal deficits noted.     This note has been dictated using voice recognition software. Any grammatical or context distortions are unintentional and inherent to the use of this software.

## 2021-06-23 NOTE — PROGRESS NOTES
[facility]    Facility:   [facility] [[id number]]   Code Status: DNR      CHIEF COMPLAINT/REASON FOR VISIT:  Chief Complaint   Patient presents with     Review Of Multiple Medical Conditions       HISTORY:      HPI: Riley is a 89 y.o. male undergoing physical, occupational and speech therapy at [facility]. He is  with history of A. fib on warfarin develop acute CVA from holding warfarin for right gluteal hematoma and found to have E. Coli UTI at that time and discharged with Keflex came back for fever and white count and found to have catheter associated UTI and admitted for sepsis secondary to catheter associated UTI.  Urine culture growing Pseudomonas and change to ciprofloxacin and clinically improved and discharged back to TCU    Today he being seen in his room as a routine visit to review multiple medical issues.He denied CP or shortness of breath. He is moving his bowels. He was recently seen by the eye doctor and placed on EES ointment and refresh.  His suprapubic is intact and draining clear yellow urine.     Past Medical History:   Diagnosis Date     Atrial fibrillation (H)     On coumadin     CVA (cerebral vascular accident) (H)      Hypertension      Pacemaker      Urinary retention              Family History   Problem Relation Age of Onset     COPD Father      Social History     Socioeconomic History     Marital status:      Spouse name: Not on file     Number of children: Not on file     Years of education: Not on file     Highest education level: Not on file   Social Needs     Financial resource strain: Not on file     Food insecurity - worry: Not on file     Food insecurity - inability: Not on file     Transportation needs - medical: Not on file     Transportation needs - non-medical: Not on file   Occupational History     Not on file   Tobacco Use     Smoking status: Former Smoker     Smokeless tobacco: Never Used   Substance and  Sexual Activity     Alcohol use: No     Drug use: No     Sexual activity: Not on file   Other Topics Concern     Not on file   Social History Narrative    03/07/15 - The patient lives alone in his own home.         Review of Systems   Constitutional: Positive for activity change and fatigue. Negative for appetite change, chills and fever.   HENT: Negative for congestion and sore throat.    Eyes: Negative for visual disturbance.   Respiratory: Negative for cough, shortness of breath and wheezing.    Cardiovascular: Negative for chest pain and leg swelling.        Pacemaker   Gastrointestinal: Negative for abdominal distention, abdominal pain, constipation, diarrhea and nausea.   Genitourinary: Negative for dysuria.   Musculoskeletal: Negative for arthralgias and myalgias.   Skin: Negative for color change, rash and wound.   Neurological: Negative for dizziness, weakness and numbness.   Psychiatric/Behavioral: Negative for agitation, behavioral problems and sleep disturbance.       .  Vitals:    02/07/19 0706   BP: 143/78   Pulse: 74   Resp: 16   Temp: 98.5  F (36.9  C)   SpO2: 97%   Weight: 190 lb 3.2 oz (86.3 kg)       Physical Exam   Constitutional: He appears well-developed and well-nourished.   Pleasant gentleman   HENT:   Head: Normocephalic and atraumatic.   Eyes: Conjunctivae are normal. Pupils are equal, round, and reactive to light. poor vision with the right worse than the left   Neck: Normal range of motion. Neck supple.   Cardiovascular: Normal rate, regular rhythm and normal heart sounds.   No murmur heard.  Pulmonary/Chest: Effort normal and breath sounds normal. He has no wheezes. He has no rales.   Abdominal: Soft. Bowel sounds are normal. He exhibits no distension. There is no tenderness.   Genitourinary: suprapubic catheter  Musculoskeletal: Normal range of motion. He exhibits no edema.   Neurological: He is alert.   Skin: Skin is warm and dry.   Psychiatric: He has a normal mood and affect. His  behavior is normal.         LABS:   Recent Results (from the past 240 hour(s))   INR   Result Value Ref Range    INR 1.89 (H) 0.90 - 1.10   Thyroid Stimulating Hormone (TSH)   Result Value Ref Range    TSH 1.91 0.30 - 5.00 uIU/mL   Vitamin B12   Result Value Ref Range    Vitamin B-12 273 213 - 816 pg/mL   INR   Result Value Ref Range    INR 2.51 (H) 0.90 - 1.10     Current Outpatient Medications   Medication Sig     acetaminophen (TYLENOL) 325 MG tablet Take 2 tablets (650 mg total) by mouth every 6 (six) hours as needed for pain.     bisacodyl (DULCOLAX) 10 mg suppository Insert 10 mg into the rectum daily as needed.     carboxymethylcellulose sodium (REFRESH CELLUVISC) 1 % DpGe Administer 1 drop into the left eye 3 (three) times a day.     carboxymethylcellulose sodium (REFRESH CELLUVISC) 1 % DpGe Administer 1 drop to the right eye every 3 (three) hours while awake.     cyanocobalamin 1000 MCG tablet Take 1 tablet (1,000 mcg total) by mouth daily. Low b12     dorzolamide-timolol (COSOPT) 22.3-6.8 mg/mL ophthalmic solution 1 drop to both eyes two times a day for macular degeneration and glaucoma     erythromycin base (ERYTHROMYCIN OPHT) Apply 5 mg to eye. Instill 1 ribbon in both eyes at HS     latanoprost (XALATAN) 0.005 % ophthalmic solution 1 drop both eyes at bedtime for macular degeneration/glaucoma     levothyroxine (SYNTHROID, LEVOTHROID) 25 MCG tablet Take 1 tablet (25 mcg total) by mouth Daily at 6:00 am. Hypothyroid     polyethylene glycol (MIRALAX) 17 gram packet Take 17 g by mouth daily as needed.     senna-docusate (SENNOSIDES-DOCUSATE SODIUM) 8.6-50 mg tablet Take 1 tablet by mouth 2 (two) times a day.     simvastatin (ZOCOR) 5 MG tablet TAKE ONE TABLET BY MOUTH AT BEDTIME     VIT C/MARIE AC/LUT/COPPER/ZNOX (PRESERVISION LUTEIN ORAL) Take 1 capsule by mouth 2 (two) times a day .           warfarin (COUMADIN/JANTOVEN) 5 MG tablet Take 1.5 tab (7.5mg) PO daily on Tues & Sat, take 1 tab (5mg) PO daily the  remaining days. (Patient taking differently: 5-7.5 mg. Next INR 2/12/19  Take 1.5 tab (7.5mg) PO daily on Tues & Sat, take 1 tab (5mg) PO daily the remaining days.      )       ASSESSMENT:    Suprapubic catheter- cleanse daily, monitor for infection  Atrial fibrillation   Anticoagulation management     PLAN:    Atrial fibrillation on coumadin and lisinopril  UTI- completed Cipro1/18. Pt now with a new suprapubic catheter  Anticoagulation management- monitor and adjust per INRS   Recently seen by the eye doctor and on refresh and EES ointment       Electronically signed by: oPlly Marroquin, CNP

## 2021-06-23 NOTE — PROGRESS NOTES
[facility]    Facility:   [facility] [[id number]]   Code Status: DNR      CHIEF COMPLAINT/REASON FOR VISIT:  Chief Complaint   Patient presents with     Problem Visit     gross hematuria       HISTORY:      HPI: Riley is a 89 y.o. male undergoing physical, occupational and speech therapy at [facility]. He is  with history of A. fib on warfarin develop acute CVA from holding warfarin for right gluteal hematoma and found to have E. coli UTI at that time and discharged with Keflex came back for fever and white count and found to have catheter associated UTI and admitted for sepsis secondary to catheter associated UTI.  Urine culture growing Pseudomonas and change to ciprofloxacin and clinically improved and discharged back to TCU    Today he being seen in the BR to observe hematuria.  Staff was unable to successfully irrigate his catheter due to non draining and was unable to advance the catheter.  It was pulled in hopes to replace it with a new one. As the catheter was being removed,   he developed gross hematuria with large clots.   He denied CP or shortness of breath. He was seen in the BR where large clots were noted in the toilet. He was sent to the ER for further evaluation.    Past Medical History:   Diagnosis Date     Atrial fibrillation (H)     On coumadin     CVA (cerebral vascular accident) (H)      Hypertension      Pacemaker      Urinary retention              Family History   Problem Relation Age of Onset     COPD Father      Social History     Socioeconomic History     Marital status:      Spouse name: Not on file     Number of children: Not on file     Years of education: Not on file     Highest education level: Not on file   Social Needs     Financial resource strain: Not on file     Food insecurity - worry: Not on file     Food insecurity - inability: Not on file     Transportation needs - medical: Not on file     Transportation  needs - non-medical: Not on file   Occupational History     Not on file   Tobacco Use     Smoking status: Former Smoker     Smokeless tobacco: Never Used   Substance and Sexual Activity     Alcohol use: No     Drug use: No     Sexual activity: Not on file   Other Topics Concern     Not on file   Social History Narrative    03/07/15 - The patient lives alone in his own home.         Review of Systems   Constitutional: Positive for activity change and fatigue. Negative for appetite change, chills and fever.   HENT: Negative for congestion and sore throat.    Eyes: Negative for visual disturbance.   Respiratory: Negative for cough, shortness of breath and wheezing.    Cardiovascular: Negative for chest pain and leg swelling.        Pacemaker   Gastrointestinal: Negative for abdominal distention, abdominal pain, constipation, diarrhea and nausea.   Genitourinary: Negative for dysuria.   Musculoskeletal: Negative for arthralgias and myalgias.   Skin: Negative for color change, rash and wound.   Neurological: Negative for dizziness, weakness and numbness.   Psychiatric/Behavioral: Negative for agitation, behavioral problems and sleep disturbance.       .  Vitals:    01/16/19 2125   BP: 117/68   Pulse: 82   Resp: 20   Temp: 99.5  F (37.5  C)   SpO2: 96%       Physical Exam   Constitutional: He appears well-developed and well-nourished.   Pleasant gentleman   HENT:   Head: Normocephalic and atraumatic.   Eyes: Conjunctivae are normal. Pupils are equal, round, and reactive to light.   Neck: Normal range of motion. Neck supple.   Cardiovascular: Normal rate, regular rhythm and normal heart sounds.   No murmur heard.  Pulmonary/Chest: Effort normal and breath sounds normal. He has no wheezes. He has no rales.   Abdominal: Soft. Bowel sounds are normal. He exhibits no distension. There is no tenderness.   Genitourinary:   Genitourinary Comments: Pierson removed in an attempt to replace a new one and he suddenly developed gross  hematuria   Musculoskeletal: Normal range of motion. He exhibits no edema.   Neurological: He is alert.   Skin: Skin is warm and dry.   Psychiatric: He has a normal mood and affect. His behavior is normal.         LABS:   Recent Results (from the past 240 hour(s))   INR   Result Value Ref Range    INR 3.37 (H) 0.90 - 1.10   HM2(CBC w/o Differential)   Result Value Ref Range    WBC 8.8 4.0 - 11.0 thou/uL    RBC 3.23 (L) 4.40 - 6.20 mill/uL    Hemoglobin 9.8 (L) 14.0 - 18.0 g/dL    Hematocrit 30.0 (L) 40.0 - 54.0 %    MCV 93 80 - 100 fL    MCH 30.3 27.0 - 34.0 pg    MCHC 32.7 32.0 - 36.0 g/dL    RDW 14.8 (H) 11.0 - 14.5 %    Platelets 193 140 - 440 thou/uL    MPV 9.3 8.5 - 12.5 fL   Basic Metabolic Panel   Result Value Ref Range    Sodium 137 136 - 145 mmol/L    Potassium 3.7 3.5 - 5.0 mmol/L    Chloride 110 (H) 98 - 107 mmol/L    CO2 23 22 - 31 mmol/L    Anion Gap, Calculation 4 (L) 5 - 18 mmol/L    Glucose 105 70 - 125 mg/dL    Calcium 7.6 (L) 8.5 - 10.5 mg/dL    BUN 8 8 - 28 mg/dL    Creatinine 0.70 0.70 - 1.30 mg/dL    GFR MDRD Af Amer >60 >60 mL/min/1.73m2    GFR MDRD Non Af Amer >60 >60 mL/min/1.73m2   INR   Result Value Ref Range    INR 3.24 (H) 0.90 - 1.10   INR   Result Value Ref Range    INR 2.41 (H) 0.90 - 1.10   Comprehensive Metabolic Panel   Result Value Ref Range    Sodium 138 136 - 145 mmol/L    Potassium 3.4 (L) 3.5 - 5.0 mmol/L    Chloride 107 98 - 107 mmol/L    CO2 23 22 - 31 mmol/L    Anion Gap, Calculation 8 5 - 18 mmol/L    Glucose 99 70 - 125 mg/dL    BUN 6 (L) 8 - 28 mg/dL    Creatinine 0.66 (L) 0.70 - 1.30 mg/dL    GFR MDRD Af Amer >60 >60 mL/min/1.73m2    GFR MDRD Non Af Amer >60 >60 mL/min/1.73m2    Bilirubin, Total 0.6 0.0 - 1.0 mg/dL    Calcium 8.1 (L) 8.5 - 10.5 mg/dL    Protein, Total 5.6 (L) 6.0 - 8.0 g/dL    Albumin 2.7 (L) 3.5 - 5.0 g/dL    Alkaline Phosphatase 49 45 - 120 U/L    AST 20 0 - 40 U/L    ALT 10 0 - 45 U/L   Magnesium   Result Value Ref Range    Magnesium 2.0 1.8 - 2.6  mg/dL   Phosphorus   Result Value Ref Range    Phosphorus 2.6 2.5 - 4.5 mg/dL   INR   Result Value Ref Range    INR 2.09 (H) 0.90 - 1.10   HM1 (CBC with Diff)   Result Value Ref Range    WBC 6.5 4.0 - 11.0 thou/uL    RBC 3.46 (L) 4.40 - 6.20 mill/uL    Hemoglobin 10.5 (L) 14.0 - 18.0 g/dL    Hematocrit 32.4 (L) 40.0 - 54.0 %    MCV 94 80 - 100 fL    MCH 30.3 27.0 - 34.0 pg    MCHC 32.4 32.0 - 36.0 g/dL    RDW 14.7 (H) 11.0 - 14.5 %    Platelets 225 140 - 440 thou/uL    MPV 9.9 8.5 - 12.5 fL    Neutrophils % 61 50 - 70 %    Lymphocytes % 24 20 - 40 %    Monocytes % 12 (H) 2 - 10 %    Eosinophils % 3 0 - 6 %    Basophils % 1 0 - 2 %    Neutrophils Absolute 3.9 2.0 - 7.7 thou/uL    Lymphocytes Absolute 1.5 0.8 - 4.4 thou/uL    Monocytes Absolute 0.8 0.0 - 0.9 thou/uL    Eosinophils Absolute 0.2 0.0 - 0.4 thou/uL    Basophils Absolute 0.0 0.0 - 0.2 thou/uL   Potassium   Result Value Ref Range    Potassium 3.8 3.5 - 5.0 mmol/L   INR   Result Value Ref Range    INR 2.06 (H) 0.90 - 1.10   Basic Metabolic Panel   Result Value Ref Range    Sodium 139 136 - 145 mmol/L    Potassium 3.8 3.5 - 5.0 mmol/L    Chloride 105 98 - 107 mmol/L    CO2 24 22 - 31 mmol/L    Anion Gap, Calculation 10 5 - 18 mmol/L    Glucose 127 (H) 70 - 125 mg/dL    Calcium 8.6 8.5 - 10.5 mg/dL    BUN 8 8 - 28 mg/dL    Creatinine 0.81 0.70 - 1.30 mg/dL    GFR MDRD Af Amer >60 >60 mL/min/1.73m2    GFR MDRD Non Af Amer >60 >60 mL/min/1.73m2   HM1 (CBC with Diff)   Result Value Ref Range    WBC 12.2 (H) 4.0 - 11.0 thou/uL    RBC 4.05 (L) 4.40 - 6.20 mill/uL    Hemoglobin 12.5 (L) 14.0 - 18.0 g/dL    Hematocrit 37.7 (L) 40.0 - 54.0 %    MCV 93 80 - 100 fL    MCH 30.9 27.0 - 34.0 pg    MCHC 33.2 32.0 - 36.0 g/dL    RDW 14.6 (H) 11.0 - 14.5 %    Platelets 255 140 - 440 thou/uL    MPV 9.8 8.5 - 12.5 fL    Neutrophils % 82 (H) 50 - 70 %    Lymphocytes % 8 (L) 20 - 40 %    Monocytes % 10 2 - 10 %    Eosinophils % 0 0 - 6 %    Basophils % 0 0 - 2 %    Neutrophils  Absolute 9.9 (H) 2.0 - 7.7 thou/uL    Lymphocytes Absolute 1.0 0.8 - 4.4 thou/uL    Monocytes Absolute 1.2 (H) 0.0 - 0.9 thou/uL    Eosinophils Absolute 0.0 0.0 - 0.4 thou/uL    Basophils Absolute 0.0 0.0 - 0.2 thou/uL   INR   Result Value Ref Range    INR 2.20 (H) 0.90 - 1.10   Urinalysis-UC if Indicated   Result Value Ref Range    Color, UA Red (!) Colorless, Yellow, Straw, Light Yellow    Clarity, UA Cloudy (!) Clear    Glucose, UA Negative Negative    Bilirubin, UA Negative Negative    Ketones, UA Trace (!) Negative, 60 mg/dL    Specific Gravity, UA 1.016 1.001 - 1.030    Blood, UA Large (!) Negative    pH, UA 6.5 4.5 - 8.0    Protein, UA 30 mg/dL (!) Negative mg/dL    Urobilinogen, UA <2.0 E.U./dL <2.0 E.U./dL, 2.0 E.U./dL    Nitrite, UA Negative Negative    Leukocytes, UA Large (!) Negative    Bacteria, UA Few (!) None Seen hpf    RBC, UA >100 (!) None Seen, 0-2 hpf    WBC, UA >100 (!) None Seen, 0-5 hpf    Squam Epithel, UA 0-5 None Seen, 0-5 lpf    WBC Clumps Present (!) None Seen     No current outpatient medications on file.       ASSESSMENT:    Atrial fibrillation   UTI   Anticoagulation management        PLAN:    Gross hematuria- send to ER for further evaluation      Electronically signed by: Polly Marroquin CNP

## 2021-06-23 NOTE — PROGRESS NOTES
[facility]    Facility:   [facility] [[id number]]   Code Status: DNR      CHIEF COMPLAINT/REASON FOR VISIT:  Chief Complaint   Patient presents with     Review Of Multiple Medical Conditions       HISTORY:      HPI: Riley is a 89 y.o. male undergoing physical, occupational and speech therapy at [facility]. He is  with history of A. fib on warfarin develop acute CVA from holding warfarin for right gluteal hematoma and found to have E. coli UTI at that time and discharged with Keflex came back for fever and white count and found to have catheter associated UTI and admitted for sepsis secondary to catheter associated UTI.  Urine culture growing Pseudomonas and change to ciprofloxacin and clinically improved and discharged back to TCU    Today he being seen in his room after returning from a recent hospitalization in which a suprapubic catheter was placed after he developed gross hematuria at the TCU.  His catheter was intact and I did write to clean it daily and apply a ne drain sponge. He denied ant pain at the site and his urine was clear yellow. He denied CP or shortness of breath. He is moving his bowels. His only concern was his failing eye site. The right being worse than the left. He does follow up with a specialist.     Past Medical History:   Diagnosis Date     Atrial fibrillation (H)     On coumadin     CVA (cerebral vascular accident) (H)      Hypertension      Pacemaker      Urinary retention              Family History   Problem Relation Age of Onset     COPD Father      Social History     Socioeconomic History     Marital status:      Spouse name: Not on file     Number of children: Not on file     Years of education: Not on file     Highest education level: Not on file   Social Needs     Financial resource strain: Not on file     Food insecurity - worry: Not on file     Food insecurity - inability: Not on file     Transportation needs  - medical: Not on file     Transportation needs - non-medical: Not on file   Occupational History     Not on file   Tobacco Use     Smoking status: Former Smoker     Smokeless tobacco: Never Used   Substance and Sexual Activity     Alcohol use: No     Drug use: No     Sexual activity: Not on file   Other Topics Concern     Not on file   Social History Narrative    03/07/15 - The patient lives alone in his own home.         Review of Systems   Constitutional: Positive for activity change and fatigue. Negative for appetite change, chills and fever.   HENT: Negative for congestion and sore throat.    Eyes: Negative for visual disturbance.   Respiratory: Negative for cough, shortness of breath and wheezing.    Cardiovascular: Negative for chest pain and leg swelling.        Pacemaker   Gastrointestinal: Negative for abdominal distention, abdominal pain, constipation, diarrhea and nausea.   Genitourinary: Negative for dysuria.   Musculoskeletal: Negative for arthralgias and myalgias.   Skin: Negative for color change, rash and wound.   Neurological: Negative for dizziness, weakness and numbness.   Psychiatric/Behavioral: Negative for agitation, behavioral problems and sleep disturbance.       .  Vitals:    01/28/19 0827   BP: 132/73   Pulse: 74   Resp: 18   Temp: 97.7  F (36.5  C)   SpO2: 98%   Weight: 190 lb (86.2 kg)       Physical Exam   Constitutional: He appears well-developed and well-nourished.   Pleasant gentleman   HENT:   Head: Normocephalic and atraumatic.   Eyes: Conjunctivae are normal. Pupils are equal, round, and reactive to light. poor vision with the right worse than the left   Neck: Normal range of motion. Neck supple.   Cardiovascular: Normal rate, regular rhythm and normal heart sounds.   No murmur heard.  Pulmonary/Chest: Effort normal and breath sounds normal. He has no wheezes. He has no rales.   Abdominal: Soft. Bowel sounds are normal. He exhibits no distension. There is no tenderness.    Genitourinary: suprapubic catheter  Musculoskeletal: Normal range of motion. He exhibits no edema.   Neurological: He is alert.   Skin: Skin is warm and dry.   Psychiatric: He has a normal mood and affect. His behavior is normal.         LABS:   Recent Results (from the past 240 hour(s))   INR   Result Value Ref Range    INR 1.23 (H) 0.90 - 1.10   Platelet Count - DO NOT remove unless platelet count already ordered by other source   Result Value Ref Range    Platelets 200 140 - 440 thou/uL   Hemoglobin   Result Value Ref Range    Hemoglobin 10.2 (L) 14.0 - 18.0 g/dL   Anti-Xa Heparin Level   Result Value Ref Range    Anti-Xa Heparin Assay 0.15 (L) 0.30 - 0.70 IU/mL   Basic Metabolic Panel   Result Value Ref Range    Sodium 136 136 - 145 mmol/L    Potassium 3.5 3.5 - 5.0 mmol/L    Chloride 105 98 - 107 mmol/L    CO2 26 22 - 31 mmol/L    Anion Gap, Calculation 5 5 - 18 mmol/L    Glucose 108 70 - 125 mg/dL    Calcium 8.1 (L) 8.5 - 10.5 mg/dL    BUN 6 (L) 8 - 28 mg/dL    Creatinine 0.65 (L) 0.70 - 1.30 mg/dL    GFR MDRD Af Amer >60 >60 mL/min/1.73m2    GFR MDRD Non Af Amer >60 >60 mL/min/1.73m2   Anti-Xa Heparin Level   Result Value Ref Range    Anti-Xa Heparin Assay 0.52 0.30 - 0.70 IU/mL   Hemoglobin   Result Value Ref Range    Hemoglobin 10.9 (L) 14.0 - 18.0 g/dL   Anti-Xa Heparin Level   Result Value Ref Range    Anti-Xa Heparin Assay <0.10 (L) 0.30 - 0.70 IU/mL   Hemoglobin   Result Value Ref Range    Hemoglobin 10.4 (L) 14.0 - 18.0 g/dL   INR   Result Value Ref Range    INR 1.29 (H) 0.90 - 1.10   Hemoglobin   Result Value Ref Range    Hemoglobin 10.3 (L) 14.0 - 18.0 g/dL   Basic Metabolic Panel   Result Value Ref Range    Sodium 138 136 - 145 mmol/L    Potassium 3.7 3.5 - 5.0 mmol/L    Chloride 105 98 - 107 mmol/L    CO2 26 22 - 31 mmol/L    Anion Gap, Calculation 7 5 - 18 mmol/L    Glucose 105 70 - 125 mg/dL    Calcium 8.4 (L) 8.5 - 10.5 mg/dL    BUN 5 (L) 8 - 28 mg/dL    Creatinine 0.65 (L) 0.70 - 1.30 mg/dL     GFR MDRD Af Amer >60 >60 mL/min/1.73m2    GFR MDRD Non Af Amer >60 >60 mL/min/1.73m2   Anti-Xa Heparin Level   Result Value Ref Range    Anti-Xa Heparin Assay 0.19 (L) 0.30 - 0.70 IU/mL   Anti-Xa Heparin Level   Result Value Ref Range    Anti-Xa Heparin Assay 0.20 (L) 0.30 - 0.70 IU/mL   Anti-Xa Heparin Level   Result Value Ref Range    Anti-Xa Heparin Assay 0.53 0.30 - 0.70 IU/mL   INR   Result Value Ref Range    INR 1.45 (H) 0.90 - 1.10   Platelet Count - DO NOT remove unless platelet count already ordered by other source   Result Value Ref Range    Platelets 220 140 - 440 thou/uL   Hemoglobin   Result Value Ref Range    Hemoglobin 10.4 (L) 14.0 - 18.0 g/dL   Anti-Xa Heparin Level   Result Value Ref Range    Anti-Xa Heparin Assay 0.56 0.30 - 0.70 IU/mL   INR   Result Value Ref Range    INR 1.75 (H) 0.90 - 1.10   Anti-Xa Heparin Level   Result Value Ref Range    Anti-Xa Heparin Assay 0.40 0.30 - 0.70 IU/mL   Hemoglobin   Result Value Ref Range    Hemoglobin 10.6 (L) 14.0 - 18.0 g/dL   Hemoglobin   Result Value Ref Range    Hemoglobin 10.3 (L) 14.0 - 18.0 g/dL   INR   Result Value Ref Range    INR 1.87 (H) 0.90 - 1.10   Anti-Xa Heparin Level   Result Value Ref Range    Anti-Xa Heparin Assay 0.39 0.30 - 0.70 IU/mL   Magnesium   Result Value Ref Range    Magnesium 1.9 1.8 - 2.6 mg/dL   Renal Function Profile   Result Value Ref Range    Albumin 2.9 (L) 3.5 - 5.0 g/dL    Calcium 8.5 8.5 - 10.5 mg/dL    Phosphorus 3.2 2.5 - 4.5 mg/dL    Glucose 111 70 - 125 mg/dL    BUN 5 (L) 8 - 28 mg/dL    Creatinine 0.68 (L) 0.70 - 1.30 mg/dL    Sodium 135 (L) 136 - 145 mmol/L    Potassium 4.1 3.5 - 5.0 mmol/L    Chloride 102 98 - 107 mmol/L    CO2 26 22 - 31 mmol/L    Anion Gap, Calculation 7 5 - 18 mmol/L    GFR MDRD Af Amer >60 >60 mL/min/1.73m2    GFR MDRD Non Af Amer >60 >60 mL/min/1.73m2   HM1 (CBC with Diff)   Result Value Ref Range    WBC 6.0 4.0 - 11.0 thou/uL    RBC 3.52 (L) 4.40 - 6.20 mill/uL    Hemoglobin 10.5 (L) 14.0  - 18.0 g/dL    Hematocrit 32.1 (L) 40.0 - 54.0 %    MCV 91 80 - 100 fL    MCH 29.8 27.0 - 34.0 pg    MCHC 32.7 32.0 - 36.0 g/dL    RDW 14.4 11.0 - 14.5 %    Platelets 243 140 - 440 thou/uL    MPV 9.5 8.5 - 12.5 fL    Neutrophils % 56 50 - 70 %    Lymphocytes % 27 20 - 40 %    Monocytes % 14 (H) 2 - 10 %    Eosinophils % 3 0 - 6 %    Basophils % 1 0 - 2 %    Neutrophils Absolute 3.3 2.0 - 7.7 thou/uL    Lymphocytes Absolute 1.6 0.8 - 4.4 thou/uL    Monocytes Absolute 0.8 0.0 - 0.9 thou/uL    Eosinophils Absolute 0.2 0.0 - 0.4 thou/uL    Basophils Absolute 0.0 0.0 - 0.2 thou/uL   INR   Result Value Ref Range    INR 2.05 (H) 0.90 - 1.10   Magnesium   Result Value Ref Range    Magnesium 1.9 1.8 - 2.6 mg/dL   Renal Function Profile   Result Value Ref Range    Albumin 2.9 (L) 3.5 - 5.0 g/dL    Calcium 8.6 8.5 - 10.5 mg/dL    Phosphorus 3.3 2.5 - 4.5 mg/dL    Glucose 103 70 - 125 mg/dL    BUN 6 (L) 8 - 28 mg/dL    Creatinine 0.67 (L) 0.70 - 1.30 mg/dL    Sodium 133 (L) 136 - 145 mmol/L    Potassium 4.0 3.5 - 5.0 mmol/L    Chloride 101 98 - 107 mmol/L    CO2 25 22 - 31 mmol/L    Anion Gap, Calculation 7 5 - 18 mmol/L    GFR MDRD Af Amer >60 >60 mL/min/1.73m2    GFR MDRD Non Af Amer >60 >60 mL/min/1.73m2   Anti-Xa Heparin Level   Result Value Ref Range    Anti-Xa Heparin Assay 0.50 0.30 - 0.70 IU/mL   HM1 (CBC with Diff)   Result Value Ref Range    WBC 5.3 4.0 - 11.0 thou/uL    RBC 3.45 (L) 4.40 - 6.20 mill/uL    Hemoglobin 10.4 (L) 14.0 - 18.0 g/dL    Hematocrit 31.4 (L) 40.0 - 54.0 %    MCV 91 80 - 100 fL    MCH 30.1 27.0 - 34.0 pg    MCHC 33.1 32.0 - 36.0 g/dL    RDW 14.3 11.0 - 14.5 %    Platelets 246 140 - 440 thou/uL    MPV 9.6 8.5 - 12.5 fL    Neutrophils % 53 50 - 70 %    Lymphocytes % 29 20 - 40 %    Monocytes % 14 (H) 2 - 10 %    Eosinophils % 3 0 - 6 %    Basophils % 0 0 - 2 %    Neutrophils Absolute 2.8 2.0 - 7.7 thou/uL    Lymphocytes Absolute 1.5 0.8 - 4.4 thou/uL    Monocytes Absolute 0.7 0.0 - 0.9 thou/uL     Eosinophils Absolute 0.2 0.0 - 0.4 thou/uL    Basophils Absolute 0.0 0.0 - 0.2 thou/uL   INR   Result Value Ref Range    INR 1.98 (H) 0.90 - 1.10   Hemoglobin   Result Value Ref Range    Hemoglobin 10.7 (L) 14.0 - 18.0 g/dL   INR   Result Value Ref Range    INR 1.89 (H) 0.90 - 1.10     Current Outpatient Medications   Medication Sig     acetaminophen (TYLENOL) 325 MG tablet Take 2 tablets (650 mg total) by mouth every 6 (six) hours as needed for pain.     bisacodyl (DULCOLAX) 10 mg suppository Insert 10 mg into the rectum daily as needed.     cyanocobalamin 1000 MCG tablet Take 1 tablet (1,000 mcg total) by mouth daily. Low b12     dorzolamide-timolol (COSOPT) 22.3-6.8 mg/mL ophthalmic solution 1 drop to both eyes two times a day for macular degeneration and glaucoma     latanoprost (XALATAN) 0.005 % ophthalmic solution 1 drop both eyes at bedtime for macular degeneration/glaucoma     levothyroxine (SYNTHROID, LEVOTHROID) 25 MCG tablet Take 1 tablet (25 mcg total) by mouth Daily at 6:00 am. Hypothyroid     polyethylene glycol (MIRALAX) 17 gram packet Take 17 g by mouth daily as needed.     senna-docusate (SENNOSIDES-DOCUSATE SODIUM) 8.6-50 mg tablet Take 1 tablet by mouth 2 (two) times a day.     simvastatin (ZOCOR) 5 MG tablet TAKE ONE TABLET BY MOUTH AT BEDTIME     VIT C/MARIE AC/LUT/COPPER/ZNOX (PRESERVISION LUTEIN ORAL) Take 1 capsule by mouth 2 (two) times a day .           warfarin (COUMADIN/JANTOVEN) 5 MG tablet Take 1.5 tab (7.5mg) PO daily on Tues & Sat, take 1 tab (5mg) PO daily the remaining days.       ASSESSMENT:    Suprapubic catheter- cleanse daily, monitor for infection  Atrial fibrillation   Anticoagulation management     PLAN:    Atrial fibrillation on coumadin and lisinopril  UTI- completed Cipro1/18. Pt now with a new suprapubic catheter  Anticoagulation management- monitor and adjust per INRS       Electronically signed by: Polly Marroquin CNP

## 2021-06-23 NOTE — PROGRESS NOTES
[facility]    Facility:   [facility] [[id number]]   Code Status: DNR      CHIEF COMPLAINT/REASON FOR VISIT:  Chief Complaint   Patient presents with     Review Of Multiple Medical Conditions       HISTORY:      HPI: Riley is a 89 y.o. male undergoing physical, occupational and speech therapy at [facility]. He is  with history of A. fib on warfarin develop acute CVA from holding warfarin for right gluteal hematoma and found to have E. coli UTI at that time and discharged with Keflex came back for fever and white count and found to have catheter associated UTI and admitted for sepsis secondary to catheter associated UTI.  Urine culture growing Pseudomonas and change to ciprofloxacin and clinically improved and discharged back to TCU    Today he being seen as a routine visit to review multiple medical issues and labs. He denied CP or shortness of breath. He reported he had a BM yesterday. He is with a leg bag and it was draining clear yellow urine. His potassium was noted to be at 3.4 and he was given 20 meq x 1. INR is stable at 2.09 and next INR 1/14/19. In review of his weights they were inconsistent. He was asked to be re-weighed  today and his weight was 201.8    Past Medical History:   Diagnosis Date     Atrial fibrillation (H)     On coumadin     CVA (cerebral vascular accident) (H)      Hypertension      Pacemaker              Family History   Problem Relation Age of Onset     COPD Father      Social History     Socioeconomic History     Marital status:      Spouse name: Not on file     Number of children: Not on file     Years of education: Not on file     Highest education level: Not on file   Social Needs     Financial resource strain: Not on file     Food insecurity - worry: Not on file     Food insecurity - inability: Not on file     Transportation needs - medical: Not on file     Transportation needs - non-medical: Not on file    Occupational History     Not on file   Tobacco Use     Smoking status: Former Smoker     Smokeless tobacco: Never Used   Substance and Sexual Activity     Alcohol use: No     Drug use: No     Sexual activity: Not on file   Other Topics Concern     Not on file   Social History Narrative    03/07/15 - The patient lives alone in his own home.         Review of Systems   Constitutional: Positive for activity change and fatigue. Negative for appetite change, chills and fever.   HENT: Negative for congestion and sore throat.    Eyes: Negative for visual disturbance.   Respiratory: Negative for cough, shortness of breath and wheezing.    Cardiovascular: Negative for chest pain and leg swelling.        Pacemaker   Gastrointestinal: Negative for abdominal distention, abdominal pain, constipation, diarrhea and nausea.   Genitourinary: Negative for dysuria.   Musculoskeletal: Negative for arthralgias and myalgias.   Skin: Negative for color change, rash and wound.   Neurological: Negative for dizziness, weakness and numbness.   Psychiatric/Behavioral: Negative for agitation, behavioral problems and sleep disturbance.       .  Vitals:    01/11/19 1125   BP: 115/56   Pulse: 73   Resp: 18   Temp: 99  F (37.2  C)   SpO2: 96%   Weight: 214 lb 8 oz (97.3 kg)       Physical Exam   Constitutional: He appears well-developed and well-nourished.   Pleasant gentleman in no acute distress.    HENT:   Head: Normocephalic and atraumatic.   Eyes: Conjunctivae are normal. Pupils are equal, round, and reactive to light.   Neck: Normal range of motion. Neck supple.   Cardiovascular: Normal rate, regular rhythm and normal heart sounds.   No murmur heard.  Pulmonary/Chest: Effort normal and breath sounds normal. He has no wheezes. He has no rales.   Abdominal: Soft. Bowel sounds are normal. He exhibits no distension. There is no tenderness.   Musculoskeletal: Normal range of motion. He exhibits no edema.   Neurological: He is alert.   Skin: Skin  is warm and dry.   Psychiatric: He has a normal mood and affect. His behavior is normal.         LABS:   Recent Results (from the past 240 hour(s))   INR   Result Value Ref Range    INR 2.45 (H) 0.90 - 1.10   Phosphorus Level > 2.4 no replacement required   Result Value Ref Range    Phosphorus 3.2 2.5 - 4.5 mg/dL   Comprehensive Metabolic Panel   Result Value Ref Range    Sodium 134 (L) 136 - 145 mmol/L    Potassium 4.2 3.5 - 5.0 mmol/L    Chloride 102 98 - 107 mmol/L    CO2 24 22 - 31 mmol/L    Anion Gap, Calculation 8 5 - 18 mmol/L    Glucose 123 70 - 125 mg/dL    BUN 16 8 - 28 mg/dL    Creatinine 0.78 0.70 - 1.30 mg/dL    GFR MDRD Af Amer >60 >60 mL/min/1.73m2    GFR MDRD Non Af Amer >60 >60 mL/min/1.73m2    Bilirubin, Total 1.2 (H) 0.0 - 1.0 mg/dL    Calcium 8.3 (L) 8.5 - 10.5 mg/dL    Protein, Total 6.2 6.0 - 8.0 g/dL    Albumin 3.3 (L) 3.5 - 5.0 g/dL    Alkaline Phosphatase 71 45 - 120 U/L    AST 24 0 - 40 U/L    ALT 16 0 - 45 U/L   APTT   Result Value Ref Range    PTT 44 (H) 24 - 37 seconds   INR   Result Value Ref Range    INR 2.36 (H) 0.90 - 1.10   Type and Screen   Result Value Ref Range    ABORh A POS     Antibody Screen Negative Negative   Repeat Lactic Acid after 30 cc/kg bolus completion   Result Value Ref Range    Lactic Acid 1.2 0.5 - 2.2 mmol/L   Blood Culture 1st   Result Value Ref Range    Anaerobic Blood Culture Bottle No Growth No Growth, No organisms seen, bottle returned to instrument, Specimen not received    Aerobic Blood Culture Bottle Specimen not received No Growth, No organisms seen, bottle returned to instrument, Specimen not received   HM1 (CBC with Diff)   Result Value Ref Range    WBC 19.9 (H) 4.0 - 11.0 thou/uL    RBC 3.85 (L) 4.40 - 6.20 mill/uL    Hemoglobin 11.9 (L) 14.0 - 18.0 g/dL    Hematocrit 35.2 (L) 40.0 - 54.0 %    MCV 91 80 - 100 fL    MCH 30.9 27.0 - 34.0 pg    MCHC 33.8 32.0 - 36.0 g/dL    RDW 14.6 (H) 11.0 - 14.5 %    Platelets 270 140 - 440 thou/uL    MPV 9.4 8.5 -  12.5 fL    Neutrophils % 84 (H) 50 - 70 %    Lymphocytes % 3 (L) 20 - 40 %    Monocytes % 12 (H) 2 - 10 %    Eosinophils % 0 0 - 6 %    Basophils % 0 0 - 2 %    Neutrophils Absolute 16.5 (H) 2.0 - 7.7 thou/uL    Lymphocytes Absolute 0.7 (L) 0.8 - 4.4 thou/uL    Monocytes Absolute 2.4 (H) 0.0 - 0.9 thou/uL    Eosinophils Absolute 0.0 0.0 - 0.4 thou/uL    Basophils Absolute 0.0 0.0 - 0.2 thou/uL   C-reactive protein   Result Value Ref Range    CRP 0.9 (H) 0.0 - 0.8 mg/dL   Thyroid Cascade   Result Value Ref Range    TSH 1.47 0.30 - 5.00 uIU/mL   Urinalysis-UC if Indicated   Result Value Ref Range    Color, UA Yellow Colorless, Yellow, Straw, Light Yellow    Clarity, UA Turbid (!) Clear    Glucose, UA Negative Negative    Bilirubin, UA Negative Negative    Ketones, UA Negative Negative, 60 mg/dL    Specific Gravity, UA 1.022 1.001 - 1.030    Blood, UA Small (!) Negative    pH, UA 5.5 4.5 - 8.0    Protein, UA 50 mg/dL (!) Negative mg/dL    Urobilinogen, UA 2.0 E.U./dL <2.0 E.U./dL, 2.0 E.U./dL    Nitrite, UA Positive (!) Negative    Leukocytes, UA Large (!) Negative    Bacteria, UA Moderate (!) None Seen hpf    RBC, UA 5-10 (!) None Seen, 0-2 hpf    WBC, UA >100 (!) None Seen, 0-5 hpf    Squam Epithel, UA 25-50 (!) None Seen, 0-5 lpf    WBC Clumps Present (!) None Seen    Mucus, UA Many (!) None Seen lpf   Culture, Urine   Result Value Ref Range    Culture 50,000-100,000 col/ml Pseudomonas aeruginosa (!)        Susceptibility    Pseudomonas aeruginosa - RACHAEL     Aztreonam 8 Sensitive      Cefepime 2 Sensitive      Ceftazidime 4 Sensitive      Ciprofloxacin <=0.5 Sensitive      Gentamicin <=2 Sensitive      Levofloxacin <=1 Sensitive      Meropenem <=0.25 Sensitive      Nitrofurantoin  Resistant      Piperacillin + Tazobactam 8/4 Sensitive      Tobramycin <=2 Sensitive    Influenza A/B Rapid Test   Result Value Ref Range    Influenza  A, Rapid Antigen No Influenza A antigen detected No Influenza A antigen detected     Influenza B, Rapid Antigen No Influenza B antigen detected No Influenza B antigen detected   Comprehensive metabolic panel   Result Value Ref Range    Sodium 135 (L) 136 - 145 mmol/L    Potassium 3.8 3.5 - 5.0 mmol/L    Chloride 108 (H) 98 - 107 mmol/L    CO2 22 22 - 31 mmol/L    Anion Gap, Calculation 5 5 - 18 mmol/L    Glucose 103 70 - 125 mg/dL    BUN 12 8 - 28 mg/dL    Creatinine 0.73 0.70 - 1.30 mg/dL    GFR MDRD Af Amer >60 >60 mL/min/1.73m2    GFR MDRD Non Af Amer >60 >60 mL/min/1.73m2    Bilirubin, Total 0.7 0.0 - 1.0 mg/dL    Calcium 7.3 (L) 8.5 - 10.5 mg/dL    Protein, Total 5.1 (L) 6.0 - 8.0 g/dL    Albumin 2.5 (L) 3.5 - 5.0 g/dL    Alkaline Phosphatase 53 45 - 120 U/L    AST 18 0 - 40 U/L    ALT 10 0 - 45 U/L   INR   Result Value Ref Range    INR 2.87 (H) 0.90 - 1.10   HM1 (CBC with Diff)   Result Value Ref Range    WBC 14.1 (H) 4.0 - 11.0 thou/uL    RBC 3.13 (L) 4.40 - 6.20 mill/uL    Hemoglobin 9.9 (L) 14.0 - 18.0 g/dL    Hematocrit 29.3 (L) 40.0 - 54.0 %    MCV 94 80 - 100 fL    MCH 31.6 27.0 - 34.0 pg    MCHC 33.8 32.0 - 36.0 g/dL    RDW 14.9 (H) 11.0 - 14.5 %    Platelets 202 140 - 440 thou/uL    MPV 9.3 8.5 - 12.5 fL    Neutrophils % 79 (H) 50 - 70 %    Lymphocytes % 10 (L) 20 - 40 %    Monocytes % 10 2 - 10 %    Eosinophils % 1 0 - 6 %    Basophils % 0 0 - 2 %    Neutrophils Absolute 11.0 (H) 2.0 - 7.7 thou/uL    Lymphocytes Absolute 1.5 0.8 - 4.4 thou/uL    Monocytes Absolute 1.4 (H) 0.0 - 0.9 thou/uL    Eosinophils Absolute 0.1 0.0 - 0.4 thou/uL    Basophils Absolute 0.0 0.0 - 0.2 thou/uL   INR   Result Value Ref Range    INR 3.37 (H) 0.90 - 1.10   HM2(CBC w/o Differential)   Result Value Ref Range    WBC 8.8 4.0 - 11.0 thou/uL    RBC 3.23 (L) 4.40 - 6.20 mill/uL    Hemoglobin 9.8 (L) 14.0 - 18.0 g/dL    Hematocrit 30.0 (L) 40.0 - 54.0 %    MCV 93 80 - 100 fL    MCH 30.3 27.0 - 34.0 pg    MCHC 32.7 32.0 - 36.0 g/dL    RDW 14.8 (H) 11.0 - 14.5 %    Platelets 193 140 - 440 thou/uL    MPV 9.3 8.5 -  12.5 fL   Basic Metabolic Panel   Result Value Ref Range    Sodium 137 136 - 145 mmol/L    Potassium 3.7 3.5 - 5.0 mmol/L    Chloride 110 (H) 98 - 107 mmol/L    CO2 23 22 - 31 mmol/L    Anion Gap, Calculation 4 (L) 5 - 18 mmol/L    Glucose 105 70 - 125 mg/dL    Calcium 7.6 (L) 8.5 - 10.5 mg/dL    BUN 8 8 - 28 mg/dL    Creatinine 0.70 0.70 - 1.30 mg/dL    GFR MDRD Af Amer >60 >60 mL/min/1.73m2    GFR MDRD Non Af Amer >60 >60 mL/min/1.73m2   INR   Result Value Ref Range    INR 3.24 (H) 0.90 - 1.10   INR   Result Value Ref Range    INR 2.41 (H) 0.90 - 1.10   Comprehensive Metabolic Panel   Result Value Ref Range    Sodium 138 136 - 145 mmol/L    Potassium 3.4 (L) 3.5 - 5.0 mmol/L    Chloride 107 98 - 107 mmol/L    CO2 23 22 - 31 mmol/L    Anion Gap, Calculation 8 5 - 18 mmol/L    Glucose 99 70 - 125 mg/dL    BUN 6 (L) 8 - 28 mg/dL    Creatinine 0.66 (L) 0.70 - 1.30 mg/dL    GFR MDRD Af Amer >60 >60 mL/min/1.73m2    GFR MDRD Non Af Amer >60 >60 mL/min/1.73m2    Bilirubin, Total 0.6 0.0 - 1.0 mg/dL    Calcium 8.1 (L) 8.5 - 10.5 mg/dL    Protein, Total 5.6 (L) 6.0 - 8.0 g/dL    Albumin 2.7 (L) 3.5 - 5.0 g/dL    Alkaline Phosphatase 49 45 - 120 U/L    AST 20 0 - 40 U/L    ALT 10 0 - 45 U/L   Magnesium   Result Value Ref Range    Magnesium 2.0 1.8 - 2.6 mg/dL   Phosphorus   Result Value Ref Range    Phosphorus 2.6 2.5 - 4.5 mg/dL   INR   Result Value Ref Range    INR 2.09 (H) 0.90 - 1.10   HM1 (CBC with Diff)   Result Value Ref Range    WBC 6.5 4.0 - 11.0 thou/uL    RBC 3.46 (L) 4.40 - 6.20 mill/uL    Hemoglobin 10.5 (L) 14.0 - 18.0 g/dL    Hematocrit 32.4 (L) 40.0 - 54.0 %    MCV 94 80 - 100 fL    MCH 30.3 27.0 - 34.0 pg    MCHC 32.4 32.0 - 36.0 g/dL    RDW 14.7 (H) 11.0 - 14.5 %    Platelets 225 140 - 440 thou/uL    MPV 9.9 8.5 - 12.5 fL    Neutrophils % 61 50 - 70 %    Lymphocytes % 24 20 - 40 %    Monocytes % 12 (H) 2 - 10 %    Eosinophils % 3 0 - 6 %    Basophils % 1 0 - 2 %    Neutrophils Absolute 3.9 2.0 - 7.7  thou/uL    Lymphocytes Absolute 1.5 0.8 - 4.4 thou/uL    Monocytes Absolute 0.8 0.0 - 0.9 thou/uL    Eosinophils Absolute 0.2 0.0 - 0.4 thou/uL    Basophils Absolute 0.0 0.0 - 0.2 thou/uL   Potassium   Result Value Ref Range    Potassium 3.8 3.5 - 5.0 mmol/L     Current Outpatient Medications   Medication Sig     acetaminophen (TYLENOL) 325 MG tablet Take 2 tablets (650 mg total) by mouth every 6 (six) hours as needed for pain.     bisacodyl (DULCOLAX) 10 mg suppository Insert 10 mg into the rectum daily as needed.     ciprofloxacin HCl (CIPRO) 500 MG tablet Take 1 tablet (500 mg total) by mouth 2 (two) times a day for 10 days.     cyanocobalamin 1000 MCG tablet Take 1 tablet (1,000 mcg total) by mouth daily. Low b12     dorzolamide-timolol (COSOPT) 22.3-6.8 mg/mL ophthalmic solution 1 drop to both eyes two times a day for macular degeneration and glaucoma     latanoprost (XALATAN) 0.005 % ophthalmic solution 1 drop both eyes at bedtime for macular degeneration/glaucoma     levothyroxine (SYNTHROID, LEVOTHROID) 25 MCG tablet Take 1 tablet (25 mcg total) by mouth Daily at 6:00 am. Hypothyroid     lisinopril (PRINIVIL,ZESTRIL) 10 MG tablet Take 1 tablet (10 mg total) by mouth daily. For htn. Hold if sbp<110     polyethylene glycol (MIRALAX) 17 gram packet Take 17 g by mouth daily as needed.     senna-docusate (SENNOSIDES-DOCUSATE SODIUM) 8.6-50 mg tablet Take 1 tablet by mouth 2 (two) times a day.     simvastatin (ZOCOR) 5 MG tablet TAKE ONE TABLET BY MOUTH AT BEDTIME     tamsulosin (FLOMAX) 0.4 mg cap Take 1 capsule (0.4 mg total) by mouth daily after supper. bph     VIT C/MARIE AC/LUT/COPPER/ZNOX (PRESERVISION LUTEIN ORAL) Take 1 capsule by mouth 2 (two) times a day .           warfarin (COUMADIN/JANTOVEN) 1 MG tablet Hold warfarin tonight and check INR tomorrow (1/9) and further management per primary. (Patient taking differently: 5 mg. 1/11/19 5 mg daily next INR 1/14/19 1/9/19 INR 2.41 5mg daily, next INR  1/11.  1/8/19 INR 3.24 Hold warfarin tonight and check INR tomorrow (1/9)   1/7/19 INR 3.37 Held  1/6/19 INR 2.87 6mg  1/5/19 INR 2.36 7.5mg   (Home dose 7.5mg daily, 10mg q Wed.)      )       ASSESSMENT:    Atrial fibrillation   UTI   Anticoagulation management  Hypokalemia      PLAN:    Atrial fibrillation on coumadin and lisinopril  UTI- on Cipro for a total of 10 days- ends 1/18.Has a nagy- follow up with Urology.   Anticoagulation management- monitor and adjust per INRS   Hypokalemia- give 20 meq potassium and check lab        Electronically signed by: Polly Marroquin CNP

## 2021-06-23 NOTE — PROGRESS NOTES
[facility]    Facility:   [facility] [[id number]]   Code Status: DNR      CHIEF COMPLAINT/REASON FOR VISIT:  Chief Complaint   Patient presents with     Review Of Multiple Medical Conditions       HISTORY:      HPI: Riley is a 89 y.o. male undergoing physical, occupational and speech therapy at [facility]. He is  with history of A. fib on warfarin develop acute CVA from holding warfarin for right gluteal hematoma and found to have E. coli UTI at that time and discharged with Keflex came back for fever and white count and found to have catheter associated UTI and admitted for sepsis secondary to catheter associated UTI.  Urine culture growing Pseudomonas and change to ciprofloxacin and clinically improved and discharged back to TCU    Today he being seen as a routine visit to review multiple medical issues and labs. His 2 daughters are present in the room  He denied CP or shortness of breath. He reported he is moving his bowels. . He is with a leg bag and it was draining clear yellow urine.  He followed up with Urology on 1/14/19 and he could either straight cath or keep the nagy. Dure to patients impaired cognition and visual disturbance he is unable to self cath. Family reported they have a care conference on Friday and will decide if they want to try a trial of void. His potassium was replaced my last visit and last potassium level was 3.8 on 1/12/19.      Past Medical History:   Diagnosis Date     Atrial fibrillation (H)     On coumadin     CVA (cerebral vascular accident) (H)      Hypertension      Pacemaker              Family History   Problem Relation Age of Onset     COPD Father      Social History     Socioeconomic History     Marital status:      Spouse name: Not on file     Number of children: Not on file     Years of education: Not on file     Highest education level: Not on file   Social Needs     Financial resource strain: Not on  file     Food insecurity - worry: Not on file     Food insecurity - inability: Not on file     Transportation needs - medical: Not on file     Transportation needs - non-medical: Not on file   Occupational History     Not on file   Tobacco Use     Smoking status: Former Smoker     Smokeless tobacco: Never Used   Substance and Sexual Activity     Alcohol use: No     Drug use: No     Sexual activity: Not on file   Other Topics Concern     Not on file   Social History Narrative    03/07/15 - The patient lives alone in his own home.         Review of Systems   Constitutional: Positive for activity change and fatigue. Negative for appetite change, chills and fever.   HENT: Negative for congestion and sore throat.    Eyes: Negative for visual disturbance.   Respiratory: Negative for cough, shortness of breath and wheezing.    Cardiovascular: Negative for chest pain and leg swelling.        Pacemaker   Gastrointestinal: Negative for abdominal distention, abdominal pain, constipation, diarrhea and nausea.   Genitourinary: Negative for dysuria.   Musculoskeletal: Negative for arthralgias and myalgias.   Skin: Negative for color change, rash and wound.   Neurological: Negative for dizziness, weakness and numbness.   Psychiatric/Behavioral: Negative for agitation, behavioral problems and sleep disturbance.       .  Vitals:    01/15/19 0859   BP: 123/63   Pulse: 73   Resp: 16   Temp: 98.8  F (37.1  C)   SpO2: 97%   Weight: 195 lb 9.6 oz (88.7 kg)       Physical Exam   Constitutional: He appears well-developed and well-nourished.   Pleasant gentleman in no acute distress.    HENT:   Head: Normocephalic and atraumatic.   Eyes: Conjunctivae are normal. Pupils are equal, round, and reactive to light.   Neck: Normal range of motion. Neck supple.   Cardiovascular: Normal rate, regular rhythm and normal heart sounds.   No murmur heard.  Pulmonary/Chest: Effort normal and breath sounds normal. He has no wheezes. He has no rales.    Abdominal: Soft. Bowel sounds are normal. He exhibits no distension. There is no tenderness.   Genitourinary:   Genitourinary Comments: Pi[place]   Musculoskeletal: Normal range of motion. He exhibits no edema.   Neurological: He is alert.   Skin: Skin is warm and dry.   Psychiatric: He has a normal mood and affect. His behavior is normal.         LABS:   Recent Results (from the past 240 hour(s))   Comprehensive metabolic panel   Result Value Ref Range    Sodium 135 (L) 136 - 145 mmol/L    Potassium 3.8 3.5 - 5.0 mmol/L    Chloride 108 (H) 98 - 107 mmol/L    CO2 22 22 - 31 mmol/L    Anion Gap, Calculation 5 5 - 18 mmol/L    Glucose 103 70 - 125 mg/dL    BUN 12 8 - 28 mg/dL    Creatinine 0.73 0.70 - 1.30 mg/dL    GFR MDRD Af Amer >60 >60 mL/min/1.73m2    GFR MDRD Non Af Amer >60 >60 mL/min/1.73m2    Bilirubin, Total 0.7 0.0 - 1.0 mg/dL    Calcium 7.3 (L) 8.5 - 10.5 mg/dL    Protein, Total 5.1 (L) 6.0 - 8.0 g/dL    Albumin 2.5 (L) 3.5 - 5.0 g/dL    Alkaline Phosphatase 53 45 - 120 U/L    AST 18 0 - 40 U/L    ALT 10 0 - 45 U/L   INR   Result Value Ref Range    INR 2.87 (H) 0.90 - 1.10   HM1 (CBC with Diff)   Result Value Ref Range    WBC 14.1 (H) 4.0 - 11.0 thou/uL    RBC 3.13 (L) 4.40 - 6.20 mill/uL    Hemoglobin 9.9 (L) 14.0 - 18.0 g/dL    Hematocrit 29.3 (L) 40.0 - 54.0 %    MCV 94 80 - 100 fL    MCH 31.6 27.0 - 34.0 pg    MCHC 33.8 32.0 - 36.0 g/dL    RDW 14.9 (H) 11.0 - 14.5 %    Platelets 202 140 - 440 thou/uL    MPV 9.3 8.5 - 12.5 fL    Neutrophils % 79 (H) 50 - 70 %    Lymphocytes % 10 (L) 20 - 40 %    Monocytes % 10 2 - 10 %    Eosinophils % 1 0 - 6 %    Basophils % 0 0 - 2 %    Neutrophils Absolute 11.0 (H) 2.0 - 7.7 thou/uL    Lymphocytes Absolute 1.5 0.8 - 4.4 thou/uL    Monocytes Absolute 1.4 (H) 0.0 - 0.9 thou/uL    Eosinophils Absolute 0.1 0.0 - 0.4 thou/uL    Basophils Absolute 0.0 0.0 - 0.2 thou/uL   INR   Result Value Ref Range    INR 3.37 (H) 0.90 - 1.10   HM2(CBC w/o Differential)   Result Value Ref  Range    WBC 8.8 4.0 - 11.0 thou/uL    RBC 3.23 (L) 4.40 - 6.20 mill/uL    Hemoglobin 9.8 (L) 14.0 - 18.0 g/dL    Hematocrit 30.0 (L) 40.0 - 54.0 %    MCV 93 80 - 100 fL    MCH 30.3 27.0 - 34.0 pg    MCHC 32.7 32.0 - 36.0 g/dL    RDW 14.8 (H) 11.0 - 14.5 %    Platelets 193 140 - 440 thou/uL    MPV 9.3 8.5 - 12.5 fL   Basic Metabolic Panel   Result Value Ref Range    Sodium 137 136 - 145 mmol/L    Potassium 3.7 3.5 - 5.0 mmol/L    Chloride 110 (H) 98 - 107 mmol/L    CO2 23 22 - 31 mmol/L    Anion Gap, Calculation 4 (L) 5 - 18 mmol/L    Glucose 105 70 - 125 mg/dL    Calcium 7.6 (L) 8.5 - 10.5 mg/dL    BUN 8 8 - 28 mg/dL    Creatinine 0.70 0.70 - 1.30 mg/dL    GFR MDRD Af Amer >60 >60 mL/min/1.73m2    GFR MDRD Non Af Amer >60 >60 mL/min/1.73m2   INR   Result Value Ref Range    INR 3.24 (H) 0.90 - 1.10   INR   Result Value Ref Range    INR 2.41 (H) 0.90 - 1.10   Comprehensive Metabolic Panel   Result Value Ref Range    Sodium 138 136 - 145 mmol/L    Potassium 3.4 (L) 3.5 - 5.0 mmol/L    Chloride 107 98 - 107 mmol/L    CO2 23 22 - 31 mmol/L    Anion Gap, Calculation 8 5 - 18 mmol/L    Glucose 99 70 - 125 mg/dL    BUN 6 (L) 8 - 28 mg/dL    Creatinine 0.66 (L) 0.70 - 1.30 mg/dL    GFR MDRD Af Amer >60 >60 mL/min/1.73m2    GFR MDRD Non Af Amer >60 >60 mL/min/1.73m2    Bilirubin, Total 0.6 0.0 - 1.0 mg/dL    Calcium 8.1 (L) 8.5 - 10.5 mg/dL    Protein, Total 5.6 (L) 6.0 - 8.0 g/dL    Albumin 2.7 (L) 3.5 - 5.0 g/dL    Alkaline Phosphatase 49 45 - 120 U/L    AST 20 0 - 40 U/L    ALT 10 0 - 45 U/L   Magnesium   Result Value Ref Range    Magnesium 2.0 1.8 - 2.6 mg/dL   Phosphorus   Result Value Ref Range    Phosphorus 2.6 2.5 - 4.5 mg/dL   INR   Result Value Ref Range    INR 2.09 (H) 0.90 - 1.10   HM1 (CBC with Diff)   Result Value Ref Range    WBC 6.5 4.0 - 11.0 thou/uL    RBC 3.46 (L) 4.40 - 6.20 mill/uL    Hemoglobin 10.5 (L) 14.0 - 18.0 g/dL    Hematocrit 32.4 (L) 40.0 - 54.0 %    MCV 94 80 - 100 fL    MCH 30.3 27.0 - 34.0  pg    MCHC 32.4 32.0 - 36.0 g/dL    RDW 14.7 (H) 11.0 - 14.5 %    Platelets 225 140 - 440 thou/uL    MPV 9.9 8.5 - 12.5 fL    Neutrophils % 61 50 - 70 %    Lymphocytes % 24 20 - 40 %    Monocytes % 12 (H) 2 - 10 %    Eosinophils % 3 0 - 6 %    Basophils % 1 0 - 2 %    Neutrophils Absolute 3.9 2.0 - 7.7 thou/uL    Lymphocytes Absolute 1.5 0.8 - 4.4 thou/uL    Monocytes Absolute 0.8 0.0 - 0.9 thou/uL    Eosinophils Absolute 0.2 0.0 - 0.4 thou/uL    Basophils Absolute 0.0 0.0 - 0.2 thou/uL   Potassium   Result Value Ref Range    Potassium 3.8 3.5 - 5.0 mmol/L   INR   Result Value Ref Range    INR 2.06 (H) 0.90 - 1.10     Current Outpatient Medications   Medication Sig     acetaminophen (TYLENOL) 325 MG tablet Take 2 tablets (650 mg total) by mouth every 6 (six) hours as needed for pain.     bisacodyl (DULCOLAX) 10 mg suppository Insert 10 mg into the rectum daily as needed.     ciprofloxacin HCl (CIPRO) 500 MG tablet Take 1 tablet (500 mg total) by mouth 2 (two) times a day for 10 days.     cyanocobalamin 1000 MCG tablet Take 1 tablet (1,000 mcg total) by mouth daily. Low b12     dorzolamide-timolol (COSOPT) 22.3-6.8 mg/mL ophthalmic solution 1 drop to both eyes two times a day for macular degeneration and glaucoma     latanoprost (XALATAN) 0.005 % ophthalmic solution 1 drop both eyes at bedtime for macular degeneration/glaucoma     levothyroxine (SYNTHROID, LEVOTHROID) 25 MCG tablet Take 1 tablet (25 mcg total) by mouth Daily at 6:00 am. Hypothyroid     lisinopril (PRINIVIL,ZESTRIL) 10 MG tablet Take 1 tablet (10 mg total) by mouth daily. For htn. Hold if sbp<110     polyethylene glycol (MIRALAX) 17 gram packet Take 17 g by mouth daily as needed.     senna-docusate (SENNOSIDES-DOCUSATE SODIUM) 8.6-50 mg tablet Take 1 tablet by mouth 2 (two) times a day.     simvastatin (ZOCOR) 5 MG tablet TAKE ONE TABLET BY MOUTH AT BEDTIME     tamsulosin (FLOMAX) 0.4 mg cap Take 1 capsule (0.4 mg total) by mouth daily after supper.  bph     VIT C/MARIE AC/LUT/COPPER/ZNOX (PRESERVISION LUTEIN ORAL) Take 1 capsule by mouth 2 (two) times a day .           warfarin (COUMADIN/JANTOVEN) 1 MG tablet Hold warfarin tonight and check INR tomorrow (1/9) and further management per primary. (Patient taking differently: 5 mg. 5 mg daily until 1/20, next INR 1/21 1/11/19 5 mg daily next INR 1/14/19 1/9/19 INR 2.41 5mg daily, next INR 1/11.  1/8/19 INR 3.24 Hold warfarin tonight and check INR tomorrow (1/9)   1/7/19 INR 3.37 Held  1/6/19 INR 2.87 6mg  1/5/19 INR 2.36 7.5mg   (Home dose 7.5mg daily, 10mg q Wed.)      )       ASSESSMENT:    Atrial fibrillation   UTI   Anticoagulation management  Hypokalemia      PLAN:    Atrial fibrillation on coumadin and lisinopril  UTI- on Cipro for a total of 10 days- ends 1/18.Has a ngay- followed up with Urology and will keep the catheter., family will decide on Friday if they want to try a trial of void. He will f/u with Urology in 2 months    Anticoagulation management- monitor and adjust per INRS   Hypokalemia- received  20 meq potassium and potassium now 3.8.      Electronically signed by: Polly Marroquin CNP

## 2021-06-23 NOTE — PROGRESS NOTES
[facility]      Facility:    [facility] [[id number]]  Code Status: DNR/DNI       Chief Complaint/Reason for Visit:  Chief Complaint   Patient presents with     H & P     New admit to TCU for confusion, stroke, AMS.       HPI:   Riley is a 89 y.o. male with hx of afib on warfarin, prior stroke, BPH, HTN, admitted to the hospital in late Dec 2018 with confusion, acute ischemic stroke. Warfarin had been on hold due to recent gluteal hematoma. He was ultimately sent to TCU. However, he was sent back to the hospital from TCU on 1/4/19 due to AMS. DC summary partially excerpted below.     89 years old male with history of A. fib on warfarin develop acute CVA from holding warfarin for right gluteal hematoma and found to have E. coli UTI at that time and discharged with Keflex came back for fever and white count and found to have catheter associated UTI and admitted for sepsis secondary to catheter associated UTI.  Urine culture growing Pseudomonas and change to ciprofloxacin and clinically improved and discharged back to TCU today. Because of the ciprofloxacin, INR has been elevated.  Holding warfarin since 1/6 and recent INR 3.24.  We will hold it today and recheck tomorrow.     Overall stabilized and discharged to TCU on 1/8/19 for PT, OT, nursing cares, medical management and monitoring.     Today:  He has nagy in place. Follow up with urology. Will finish course on Cipro for UTI with sepsis. Denies chest pain or shortness of breath. Does feel very tired. Appetite is poor. No hematuria noted. No abdominal pain. No dizziness reported. He denies new vision or hearing problems.       Past Medical History:  Past Medical History:   Diagnosis Date     Atrial fibrillation (H)     On coumadin     CVA (cerebral vascular accident) (H)      Hypertension      Pacemaker      Urinary retention            Surgical History:  Past Surgical History:   Procedure Laterality Date     IR  BLADDER SUPRAPUBIC CATHETER INSERTION  1/18/2019     MO PARTIAL EXCISION THYROID,UNILAT      Description: Thyroid Surgery Sub-Total Thyroidectomy;  Recorded: 03/24/2008;     MO REMV PILONIDAL LESION SIMPLE      Description: Pilonidal Cyst Resection;  Recorded: 03/24/2008;     MO TRANSURETHRAL ELEC-SURG PROSTATECTOM      Description: Transurethral Resection Of Prostate (TURP);  Recorded: 03/24/2008;     TOTAL HIP ARTHROPLASTY Right        Family History:   Family History   Problem Relation Age of Onset     COPD Father        Social History:    Social History     Socioeconomic History     Marital status:      Spouse name: Not on file     Number of children: Not on file     Years of education: Not on file     Highest education level: Not on file   Social Needs     Financial resource strain: Not on file     Food insecurity - worry: Not on file     Food insecurity - inability: Not on file     Transportation needs - medical: Not on file     Transportation needs - non-medical: Not on file   Occupational History     Not on file   Tobacco Use     Smoking status: Former Smoker     Smokeless tobacco: Never Used   Substance and Sexual Activity     Alcohol use: No     Drug use: No     Sexual activity: Not on file   Other Topics Concern     Not on file   Social History Narrative    03/07/15 - The patient lives alone in his own home.       Medications:  Current Outpatient Medications   Medication Sig     acetaminophen (TYLENOL) 325 MG tablet Take 2 tablets (650 mg total) by mouth every 6 (six) hours as needed for pain.     bisacodyl (DULCOLAX) 10 mg suppository Insert 10 mg into the rectum daily as needed.     ciprofloxacin HCl (CIPRO) 500 MG tablet Take 1 tablet (500 mg total) by mouth 2 (two) times a day for 10 days.     cyanocobalamin 1000 MCG tablet Take 1 tablet (1,000 mcg total) by mouth daily. Low b12     dorzolamide-timolol (COSOPT) 22.3-6.8 mg/mL ophthalmic solution 1 drop to both eyes two times a day for macular  degeneration and glaucoma     latanoprost (XALATAN) 0.005 % ophthalmic solution 1 drop both eyes at bedtime for macular degeneration/glaucoma     levothyroxine (SYNTHROID, LEVOTHROID) 25 MCG tablet Take 1 tablet (25 mcg total) by mouth Daily at 6:00 am. Hypothyroid     lisinopril (PRINIVIL,ZESTRIL) 10 MG tablet Take 1 tablet (10 mg total) by mouth daily. For htn. Hold if sbp<110     polyethylene glycol (MIRALAX) 17 gram packet Take 17 g by mouth daily as needed.     senna-docusate (SENNOSIDES-DOCUSATE SODIUM) 8.6-50 mg tablet Take 1 tablet by mouth 2 (two) times a day.     simvastatin (ZOCOR) 5 MG tablet TAKE ONE TABLET BY MOUTH AT BEDTIME     tamsulosin (FLOMAX) 0.4 mg cap Take 1 capsule (0.4 mg total) by mouth daily after supper. bph     VIT C/MARIE AC/LUT/COPPER/ZNOX (PRESERVISION LUTEIN ORAL) Take 1 capsule by mouth 2 (two) times a day .           warfarin (COUMADIN/JANTOVEN) 1 MG tablet Hold warfarin tonight and check INR tomorrow (1/9) and further management per primary. (Patient taking differently: 5 mg. 5 mg daily until 1/20, next INR 1/21 1/11/19 5 mg daily next INR 1/14/19 1/9/19 INR 2.41 5mg daily, next INR 1/11.  1/8/19 INR 3.24 Hold warfarin tonight and check INR tomorrow (1/9)   1/7/19 INR 3.37 Held  1/6/19 INR 2.87 6mg  1/5/19 INR 2.36 7.5mg   (Home dose 7.5mg daily, 10mg q Wed.)      )       Allergies:  No Known Allergies       Review of Systems:  Pertinent items are noted in HPI.      Physical Exam:   General: Patient is alert elderly male, no distress.  Vitals: /68, Temp 98.8, Pulse 73, RR 16, O2 sat 97% RA.  HEENT: Head is NCAT. Eyes show no injection or icterus. Nares negative. Oropharynx well hydrated.  Neck: Supple. No tenderness or adenopathy. No JVD.  Lungs: Clear bilaterally. No wheezes.  Cardiovascular: Regular rate and rhythm, normal S1, S2.  Back: No spinal or CVA tenderness.  Abdomen: Soft, no tenderness on exam. Bowel sounds present. No guarding rebound or rigidity.  :  Nagy.  Extremities: No edema is noted.  Musculoskeletal: Age related degen changes.   Skin: No rashes.  Psych: Mood appears good.      Labs:  Results for orders placed or performed in visit on 01/11/19   Comprehensive Metabolic Panel   Result Value Ref Range    Sodium 138 136 - 145 mmol/L    Potassium 3.4 (L) 3.5 - 5.0 mmol/L    Chloride 107 98 - 107 mmol/L    CO2 23 22 - 31 mmol/L    Anion Gap, Calculation 8 5 - 18 mmol/L    Glucose 99 70 - 125 mg/dL    BUN 6 (L) 8 - 28 mg/dL    Creatinine 0.66 (L) 0.70 - 1.30 mg/dL    GFR MDRD Af Amer >60 >60 mL/min/1.73m2    GFR MDRD Non Af Amer >60 >60 mL/min/1.73m2    Bilirubin, Total 0.6 0.0 - 1.0 mg/dL    Calcium 8.1 (L) 8.5 - 10.5 mg/dL    Protein, Total 5.6 (L) 6.0 - 8.0 g/dL    Albumin 2.7 (L) 3.5 - 5.0 g/dL    Alkaline Phosphatase 49 45 - 120 U/L    AST 20 0 - 40 U/L    ALT 10 0 - 45 U/L         Assessment/Plan:  1. Stroke. Ischemic in nature, Warfarin for afib had been on hold due to recent hematoma.  2. Afib. On warfarin. Monitor and adjust per INRs.  3. Urinary retention. Has nagy in place. Follow up per urology.  4. UTI. With sepsis. Finish course of abx.   5. Hyponatremia. Monitor and trend sodium as needed.   6. HTN. On lisinopril. Monitor BPs.  7. Hypothyroidism. Continue home replacement.  8. Code status is DNR/DNI.      Total time greater than 60 minutes, greater than 50% counseling and coordination of care, time spent in interview and examination of patient, review of records, discussion with nursing staff. CC includes management of multiple medical conditions, follow up with urology, anticoagulation management, expected course in TCU.       Electronically signed by: Nancy Fair MD

## 2021-06-24 NOTE — PROGRESS NOTES
[facility]    Facility:   [facility] [[id number]]   Code Status: DNR      CHIEF COMPLAINT/REASON FOR VISIT:  Chief Complaint   Patient presents with     Review Of Multiple Medical Conditions       HISTORY:      HPI: Riley is a 89 y.o. male now residing in the LTC at [facility]. He was recently discharged from the TCU.   He is  with history of A. fib on warfarin develop acute CVA from holding warfarin for right gluteal hematoma and found to have E. Coli UTI at that time and discharged with Keflex came back for fever and white count and found to have catheter associated UTI and admitted for sepsis secondary to catheter associated UTI.  Urine culture growing Pseudomonas and change to ciprofloxacin and clinically improved and discharged back to TCU    Today he being seen in his room as a routine visit to review multiple medical issues. He is settling into his new environment . He denied CP or shortness of breath. He reports constipation.   His suprapubic is intact and draining clear yellow urine., which is fairly new. Prior to my visit with him he was ambulated the halls on the TCU. I am told he is walked daily by staff.      Past Medical History:   Diagnosis Date     Atrial fibrillation (H)     On coumadin     CVA (cerebral vascular accident) (H)      Hypertension      Pacemaker      Urinary retention              Family History   Problem Relation Age of Onset     COPD Father      Social History     Socioeconomic History     Marital status:      Spouse name: Not on file     Number of children: Not on file     Years of education: Not on file     Highest education level: Not on file   Occupational History     Not on file   Social Needs     Financial resource strain: Not on file     Food insecurity:     Worry: Not on file     Inability: Not on file     Transportation needs:     Medical: Not on file     Non-medical: Not on file   Tobacco Use     Smoking  status: Former Smoker     Smokeless tobacco: Never Used   Substance and Sexual Activity     Alcohol use: No     Drug use: No     Sexual activity: Not on file   Lifestyle     Physical activity:     Days per week: Not on file     Minutes per session: Not on file     Stress: Not on file   Relationships     Social connections:     Talks on phone: Not on file     Gets together: Not on file     Attends [employer] service: Not on file     Active member of club or organization: Not on file     Attends meetings of clubs or organizations: Not on file     Relationship status: Not on file     Intimate partner violence:     Fear of current or ex partner: Not on file     Emotionally abused: Not on file     Physically abused: Not on file     Forced sexual activity: Not on file   Other Topics Concern     Not on file   Social History Narrative    03/07/15 - The patient lives alone in his own home.         Review of Systems   Constitutional: Positive for activity change and fatigue. Negative for appetite change, chills and fever.   HENT: Negative for congestion and sore throat.    Eyes: Negative for visual disturbance.   Respiratory: Negative for cough, shortness of breath and wheezing.    Cardiovascular: Negative for chest pain and leg swelling.        Pacemaker   Gastrointestinal: Negative for abdominal distention, abdominal pain, constipation, diarrhea and nausea.   Genitourinary: Negative for dysuria.   Musculoskeletal: Negative for arthralgias and myalgias.   Skin: Negative for color change, rash and wound.   Neurological: Negative for dizziness, weakness and numbness.   Psychiatric/Behavioral: Negative for agitation, behavioral problems and sleep disturbance.       .  Vitals:    03/07/19 0939   BP: 132/67   Pulse: 72   Resp: 18   Temp: 97.9  F (36.6  C)   SpO2: 98%   Weight: 191 lb 9.6 oz (86.9 kg)       Physical Exam   Constitutional: He appears well-developed and well-nourished.   Pleasant gentleman   HENT:   Head:  Normocephalic and atraumatic.   Eyes: Conjunctivae are normal. Pupils are equal, round, and reactive to light. poor vision with the right worse than the left   Neck: Normal range of motion. Neck supple.   Cardiovascular: Normal rate, regular rhythm and normal heart sounds.   No murmur heard.  Pulmonary/Chest: Effort normal and breath sounds normal. He has no wheezes. He has no rales.   Abdominal: Soft. Bowel sounds are normal. He exhibits no distension. There is no tenderness.   Genitourinary: suprapubic catheter  Musculoskeletal: Normal range of motion. He exhibits no edema.   Neurological: He is alert.   Skin: Skin is warm and dry.   Psychiatric: He has a normal mood and affect. His behavior is normal.         LABS:   Recent Results (from the past 240 hour(s))   INR   Result Value Ref Range    INR 2.65 (H) 0.90 - 1.10     Current Outpatient Medications   Medication Sig     acetaminophen (TYLENOL) 325 MG tablet Take 2 tablets (650 mg total) by mouth every 6 (six) hours as needed for pain.     bisacodyl (DULCOLAX) 10 mg suppository Insert 10 mg into the rectum daily as needed.     carboxymethylcellulose sodium (REFRESH CELLUVISC) 1 % DpGe Administer 1 drop into the left eye 3 (three) times a day.     carboxymethylcellulose sodium (REFRESH CELLUVISC) 1 % DpGe Administer 1 drop to the right eye every 3 (three) hours while awake.     cyanocobalamin 1000 MCG tablet Take 1 tablet (1,000 mcg total) by mouth daily. Low b12     dorzolamide-timolol (COSOPT) 22.3-6.8 mg/mL ophthalmic solution 1 drop to both eyes two times a day for macular degeneration and glaucoma     erythromycin base (ERYTHROMYCIN OPHT) Apply 5 mg to eye. Instill 1 ribbon in both eyes at HS     latanoprost (XALATAN) 0.005 % ophthalmic solution 1 drop both eyes at bedtime for macular degeneration/glaucoma     levothyroxine (SYNTHROID, LEVOTHROID) 25 MCG tablet Take 1 tablet (25 mcg total) by mouth Daily at 6:00 am. Hypothyroid     polyethylene glycol  (MIRALAX) 17 gram packet Take 17 g by mouth daily as needed.     senna-docusate (SENNOSIDES-DOCUSATE SODIUM) 8.6-50 mg tablet Take 1 tablet by mouth 2 (two) times a day.     simvastatin (ZOCOR) 5 MG tablet TAKE ONE TABLET BY MOUTH AT BEDTIME     VIT C/MARIE AC/LUT/COPPER/ZNOX (PRESERVISION LUTEIN ORAL) Take 1 capsule by mouth 2 (two) times a day .           warfarin sodium (WARFARIN ORAL) Take 5-7 mg by mouth. Next INR 3/13/19  2/22/19 INR 2.40 7mg M & TH, 5mg AOD. Next INR 3/6.  2/15/19 INR 2.48 7mg M & TH, 5mg AOD.  2/12/19 INR 3.09  Take 7mg Mon and Thurs and 5mg all other days.  Next INR 2/15/19.    Take 1.5 tab (7.5mg) PO daily on Tues & Sat, take 1 tab (5mg) PO daily the remaining days.  Next INR 2/12/19.             ASSESSMENT:    Suprapubic catheter- cleanse daily, monitor for infection  Atrial fibrillation   Anticoagulation management     PLAN:    Atrial fibrillation on coumadin and lisinopril  Pt now with a new suprapubic catheter  Anticoagulation management- monitor and adjust per INRS   Poor vision- is followed closely by the eye doctor. Recently placed on refresh and EES ointment   Constipation- increase bowel medications  Hypothyroidism- TSH 1.91 on 2/1/19      Electronically signed by: Polly Marroquin CNP

## 2021-06-24 NOTE — TELEPHONE ENCOUNTER
[facility] Nurse Triage Anticoagulation Note      Provider: YECENIA Olivera  Facility: [facility] Type: LT    Caller: Vilma  Call Back Number:  [phone]    Reason for call: INR    Today s INR: 2.40  Previous INR: 2/15/19 INR 2.48 7mg M & TH, 5mg AOD.    Diagnosis/Goal: AFIB ,CVA  Heparin/Lovenox: No   Currently on ABX: No  Other interacting Medications: None  Missed or refused doses: No    Verbal Order/Direction given by Provider: Cont 7mg M & TH, 5mg AOD. Next INR 3/6.    Provider giving order: YECENIA Olivera    Verbal order given to: Vilma Pham RN

## 2021-06-24 NOTE — PROGRESS NOTES
[facility]      Facility:    [facility] [[id number]]  Code Status: DNR/DNI       Chief Complaint/Reason for Visit:  Chief Complaint   Patient presents with     H & P     Transfer to LTC following TCU stay.       HPI:   Riley is a 89 y.o. male with hx of Afib on warfarin, prior stroke, BPH, HTN, admitted to the hospital in late Dec 2018 with confusion, acute ischemic stroke. Warfarin had been on hold due to recent gluteal hematoma. He was ultimately sent to TCU. However, he was sent back to the hospital from TCU on 1/4/19 due to AMS. DC summary partially excerpted below.     89 years old male with history of A. fib on warfarin develop acute CVA from holding warfarin for right gluteal hematoma and found to have E. coli UTI at that time and discharged with Keflex came back for fever and white count and found to have catheter associated UTI and admitted for sepsis secondary to catheter associated UTI.  Urine culture growing Pseudomonas and change to ciprofloxacin and clinically improved and discharged back to TCU today. Because of the ciprofloxacin, INR has been elevated.  Holding warfarin since 1/6 and recent INR 3.24.  We will hold it today and recheck tomorrow.     Overall stabilized and discharged to TCU on 1/8/19 for PT, OT, nursing cares, medical management and monitoring.    Subsequently transitioned to LTC status.       Past Medical History:  Past Medical History:   Diagnosis Date     Atrial fibrillation (H)     On coumadin     CVA (cerebral vascular accident) (H)      Hypertension      Pacemaker      Urinary retention            Surgical History:  Past Surgical History:   Procedure Laterality Date     IR BLADDER SUPRAPUBIC CATHETER INSERTION  1/18/2019     MD PARTIAL EXCISION THYROID,UNILAT      Description: Thyroid Surgery Sub-Total Thyroidectomy;  Recorded: 03/24/2008;     MD REMV PILONIDAL LESION SIMPLE      Description: Pilonidal Cyst Resection;  Recorded:  03/24/2008;     N[place] TRANSURETHRAL ELEC-SURG PROSTATECTOM      Description: Transurethral Resection Of Prostate (TURP);  Recorded: 03/24/2008;     TOTAL HIP ARTHROPLASTY Right        Family History:   Family History   Problem Relation Age of Onset     COPD Father        Social History:    Social History     Socioeconomic History     Marital status:      Spouse name: Not on file     Number of children: Not on file     Years of education: Not on file     Highest education level: Not on file   Occupational History     Not on file   Social Needs     Financial resource strain: Not on file     Food insecurity:     Worry: Not on file     Inability: Not on file     Transportation needs:     Medical: Not on file     Non-medical: Not on file   Tobacco Use     Smoking status: Former Smoker     Smokeless tobacco: Never Used   Substance and Sexual Activity     Alcohol use: No     Drug use: No     Sexual activity: Not on file   Lifestyle     Physical activity:     Days per week: Not on file     Minutes per session: Not on file     Stress: Not on file   Relationships     Social connections:     Talks on phone: Not on file     Gets together: Not on file     Attends Christianity service: Not on file     Active member of club or organization: Not on file     Attends meetings of clubs or organizations: Not on file     Relationship status: Not on file     Intimate partner violence:     Fear of current or ex partner: Not on file     Emotionally abused: Not on file     Physically abused: Not on file     Forced sexual activity: Not on file   Other Topics Concern     Not on file   Social History Narrative    03/07/15 - The patient lives alone in his own home.       Medications:  Current Outpatient Medications   Medication Sig     acetaminophen (TYLENOL) 325 MG tablet Take 2 tablets (650 mg total) by mouth every 6 (six) hours as needed for pain.     bisacodyl (DULCOLAX) 10 mg suppository Insert 10 mg into the rectum daily as needed.      carboxymethylcellulose sodium (REFRESH CELLUVISC) 1 % DpGe Administer 1 drop into the left eye 3 (three) times a day.     carboxymethylcellulose sodium (REFRESH CELLUVISC) 1 % DpGe Administer 1 drop to the right eye every 3 (three) hours while awake.     cyanocobalamin 1000 MCG tablet Take 1 tablet (1,000 mcg total) by mouth daily. Low b12     dorzolamide-timolol (COSOPT) 22.3-6.8 mg/mL ophthalmic solution 1 drop to both eyes two times a day for macular degeneration and glaucoma     erythromycin base (ERYTHROMYCIN OPHT) Apply 5 mg to eye. Instill 1 ribbon in both eyes at HS     latanoprost (XALATAN) 0.005 % ophthalmic solution 1 drop both eyes at bedtime for macular degeneration/glaucoma     levothyroxine (SYNTHROID, LEVOTHROID) 25 MCG tablet Take 1 tablet (25 mcg total) by mouth Daily at 6:00 am. Hypothyroid     polyethylene glycol (MIRALAX) 17 gram packet Take 17 g by mouth daily as needed.     senna-docusate (SENNOSIDES-DOCUSATE SODIUM) 8.6-50 mg tablet Take 1 tablet by mouth 2 (two) times a day.     simvastatin (ZOCOR) 5 MG tablet TAKE ONE TABLET BY MOUTH AT BEDTIME     VIT C/MARIE AC/LUT/COPPER/ZNOX (PRESERVISION LUTEIN ORAL) Take 1 capsule by mouth 2 (two) times a day .           warfarin sodium (WARFARIN ORAL) Take by mouth. 2/22/19 INR 2.40 7mg M & TH, 5mg AOD. Next INR 3/6.  2/15/19 INR 2.48 7mg M & TH, 5mg AOD.  2/12/19 INR 3.09  Take 7mg Mon and Thurs and 5mg all other days.  Next INR 2/15/19.    Take 1.5 tab (7.5mg) PO daily on Tues & Sat, take 1 tab (5mg) PO daily the remaining days.  Next INR 2/12/19.             Allergies:  No Known Allergies       Review of Systems:  Pertinent items are noted in HPI.      Physical Exam:   General: Patient is alert, elderly male, no distress.   Vitals: Reviewed.  HEENT: Head is NCAT. Eyes show no injection or icterus. Nares negative. Oropharynx well hydrated.  Neck: Supple. No tenderness or adenopathy. No JVD.  Lungs: Clear bilaterally. No wheezes.  Cardiovascular:  Regular rate and rhythm, normal S1. S2.  Back: No spinal or CVA tenderness.  Abdomen: Soft, no tenderness on exam. Bowel sounds present. No guarding rebound or rigidity.  : SP catheter.  Extremities: No edema is noted.  Musculoskeletal: Age related degen changes.   Skin: No rashes.   Psych: Mood appears good.      Labs:  Results for orders placed or performed during the hospital encounter of 01/16/19   Basic Metabolic Panel   Result Value Ref Range    Sodium 138 136 - 145 mmol/L    Potassium 3.7 3.5 - 5.0 mmol/L    Chloride 105 98 - 107 mmol/L    CO2 26 22 - 31 mmol/L    Anion Gap, Calculation 7 5 - 18 mmol/L    Glucose 105 70 - 125 mg/dL    Calcium 8.4 (L) 8.5 - 10.5 mg/dL    BUN 5 (L) 8 - 28 mg/dL    Creatinine 0.65 (L) 0.70 - 1.30 mg/dL    GFR MDRD Af Amer >60 >60 mL/min/1.73m2    GFR MDRD Non Af Amer >60 >60 mL/min/1.73m2       Lab Results   Component Value Date    WBC 5.3 01/25/2019    HGB 10.7 (L) 01/26/2019    HCT 31.4 (L) 01/25/2019    MCV 91 01/25/2019     01/25/2019         Assessment/Plan:  1. Urinary retention. Now with long term SP cathter. Follow up per urology.   2. Hx of stroke. Ischemic in nature, Warfarin for Afib had been on hold due to a recent hematoma.  3. Afib. On warfarin. Monitor and adjust per INRs.  4. HTN. On lisinopril. Monitor BPs.  5. Hypothyroidism. Continue home replacement.  6. Code status is DNR/DNI.    Total time greater than 35 minutes, greater than 50% counseling and coordination of care, time spent in interview and examination of patient, review of records, discussion with nursing staff. CC includes management of medical conditions, new SP catheter, transition to LTC status.        Electronically signed by: Nancy Fair MD

## 2021-06-24 NOTE — TELEPHONE ENCOUNTER
[facility] Nurse Triage Anticoagulation Note      Provider: YECENIA Olivera  Facility: [facility] Type: LT    Caller: Priscila  Call Back Number:  [phone]    Reason for call: INR    Today s INR: 3.09  Previous INR: 2/5 2.51(7.5mg Tues and Sat and 5mg AOD), 1/29 1.89(7.5mg Tues and Sat and 5mg AOD)    Diagnosis/Goal: AFIB  Heparin/Lovenox: No   Currently on ABX: No  Other interacting Medications: None  Missed or refused doses: No    Verbal Order/Direction given by Provider: Warfarin 7mg Mon and Thur and 5mg all other days.  Next INR 2/15/19.      Provider giving order: Polly KEENE    Verbal order given to: Priscila Ramirez RN

## 2021-06-25 NOTE — PROGRESS NOTES
[facility]    Facility:   [facility] [[id number]]   Code Status: DNR      CHIEF COMPLAINT/REASON FOR VISIT:  Chief Complaint   Patient presents with     FVP Care Coordination - Regulatory       HISTORY:      HPI: Riley is a 89 y.o. male  now residing in the LTC at [facility].  He is  with history of A. fib on warfarin develop acute CVA from holding warfarin for right gluteal hematoma.  Currently back on Coumadin, Hypertension , urinary retention has a suprapubic catheter and with a pacemaker       Today he being seen  for routine regulatory visit. He was seen in his room.   He Recently had 2 moles biopsied on his back.  Dressings were intact.  Per the patient he tells me they were benign.  He had no concerns.  He denied CP or shortness of breath,  He is able to move all extremities.  He is eating well and reports no difficulties with swallowing.    Staff reported he is moving his bowels and had no issues.   His suprapubic is intact and draining clear yellow urine.  TSH rechecked on 6/7/2021 is 3.14.   LS clear and no shortness of breath.  He was with no lower extremity edema and his weights have been stable over the last month.    Past Medical History:   Diagnosis Date     Atrial fibrillation (H)     On coumadin     Bell's palsy     right sided facial droop     CVA (cerebral vascular accident) (H)      Hypertension      Pacemaker      Urinary retention              Family History   Problem Relation Age of Onset     COPD Father      Social History     Socioeconomic History     Marital status:      Spouse name: Not on file     Number of children: Not on file     Years of education: Not on file     Highest education level: Not on file   Occupational History     Not on file   Social Needs     Financial resource strain: Not on file     Food insecurity     Worry: Not on file     Inability: Not on file     Transportation needs     Medical: Not on file      Non-medical: Not on file   Tobacco Use     Smoking status: Former Smoker     Smokeless tobacco: Never Used   Substance and Sexual Activity     Alcohol use: No     Drug use: No     Sexual activity: Not on file   Lifestyle     Physical activity     Days per week: Not on file     Minutes per session: Not on file     Stress: Not on file   Relationships     Social connections     Talks on phone: Not on file     Gets together: Not on file     Attends [employer] service: Not on file     Active member of club or organization: Not on file     Attends meetings of clubs or organizations: Not on file     Relationship status: Not on file     Intimate partner violence     Fear of current or ex partner: Not on file     Emotionally abused: Not on file     Physically abused: Not on file     Forced sexual activity: Not on file   Other Topics Concern     Not on file   Social History Narrative    03/07/15 - The patient lives alone in his own home.         Review of Systems  Eyes:  He is followed  closely by opthalmology.  He has had multiple eye  procedures   Constitutional: Negative for activity change and fatigue. Negative for appetite change, chills and fever.   HENT: Negative for congestion and sore throat.    Eyes: positive  for visual disturbance. right sided facial paralysis   Respiratory: Negative for cough, shortness of breath and wheezing.    Cardiovascular: Negative for chest pain and leg swelling.        Pacemaker   Gastrointestinal: Negative for abdominal distention, abdominal pain, constipation, diarrhea and nausea.   Genitourinary: Negative for dysuria.   Musculoskeletal: Negative for arthralgias and myalgias.   Skin: Negative for color change, rash and wound.   Neurological: Negative for dizziness, weakness and numbness.   Psychiatric/Behavioral: Negative for agitation, behavioral problems and sleep disturbance.   Vitals:    06/04/21 0824   BP: 122/70   Pulse: 60   Resp: 18   Temp: 98.5  F (36.9  C)   SpO2: 99%    Weight: 183 lb 6.4 oz (83.2 kg)       Constitutional: He appears well-developed and well-nourished.   Pleasant gentleman   HENT:   Head: Normocephalic and atraumatic.   Eyes: Conjunctivae are normal. Pupils are equal, round, and reactive to light. poor vision with the right worse than the left right sided facial paralysis   Neck: Normal range of motion. Neck supple.   Cardiovascular: Normal rate, regular rhythm and normal heart sounds.   No murmur heard.  Pulmonary/Chest: Effort normal and breath sounds normal. He has no wheezes. He has no rales.   Abdominal: Soft. Bowel sounds are normal. He exhibits no distension. There is no tenderness.   Genitourinary: suprapubic catheter  Musculoskeletal: Normal range of motion. He exhibits no edema.   Neurological: He is alert.   Skin: Skin is warm and dry.   Psychiatric: He has a normal mood and affect. His behavior is normal.     LABS:   No results found for this or any previous visit (from the past 240 hour(s)).     Current Outpatient Medications   Medication Sig     acetaminophen (TYLENOL) 325 MG tablet Take 2 tablets (650 mg total) by mouth every 6 (six) hours as needed for pain.     atorvastatin (LIPITOR) 40 MG tablet Take 1 tablet (40 mg total) by mouth at bedtime. For hx of cva     bisacodyL (DULCOLAX) 10 mg suppository Insert 10 mg into the rectum daily as needed.     docusate sodium (COLACE) 100 MG capsule Take 1 capsule (100 mg total) by mouth 2 (two) times a day. For constipation (Patient taking differently: Take 100 mg by mouth daily. And daily PRN.)     dorzolamide-timoloL (COSOPT) 22.3-6.8 mg/mL ophthalmic solution Administer 1 drop into the left eye 2 (two) times a day. glaucoma     erythromycin base (ERYTHROMYCIN OPHT) Apply 5 mg to eye. Instill 1 ribbon in left eye at HS and right eye four times a day     ferrous sulfate 325 (65 FE) MG tablet Take 1 tablet by mouth daily.     guaiFENesin (ROBITUSSIN) 100 mg/5 mL syrup Take 200 mg by mouth every 4 (four)  hours as needed for cough.     latanoprost (XALATAN) 0.005 % ophthalmic solution Administer 1 drop into the left eye at bedtime. glaucoma     levothyroxine (SYNTHROID, LEVOTHROID) 25 MCG tablet Take 1 tablet (25 mcg total) by mouth Daily at 6:00 am. Hypothyroid     menthol-zinc oxide (CALMOSEPTINE) 0.44-20.6 % Oint ointment Apply 1 application topically as needed.     omeprazole (PRILOSEC) 20 MG capsule Take 1 capsule (20 mg total) by mouth 2 (two) times a day before meals. Needs two times a day for 2 months, then daily for ulcer     propylene glycol (SYSTANE COMPLETE) 0.6 % Drop Apply 1 drop to eye 2 (two) times a day. Both eyes for dry eyes     warfarin sodium (WARFARIN ORAL) Take by mouth. 4/15/21 INR 2.81 Cont 7mg M & TH, 6.5mg AOD. Next INR 5/5  4/5/21 INR 2.39 Cont 7mg M & Th, 6.5mg AOD. Next INR 4/14  4/1/21 INR 2.44 Cont 7mg M & Th, 6.5mg AOD. Next INR 4/5.  3/11/21 INR 2.53 Cont 7 mg M, Th and 6.5 mg AOD.  Next INR 4/8/21.  2/11/21 INR 2.87 Cont 7mg M, Th and 6.5mg AOD. Next INR 3/11     Case Management:  I have reviewed the facility/SNF care plan/MDS which was done on 3/29/21 TSH  (3.14 0n 6/7/21)  including the falls risk, nutrition and pain screening. I also reviewed the current immunizations, and preventive care.. Future cancer screening is not clinically indicated secondary to age/goals of care.   Patient's desire to return to the community is not assessible due to cognitive impairment.    Information reviewed:  Medications, vital signs, orders, and nursing notes.    ASSESSMENT:      ICD-10-CM    1. Chronic atrial fibrillation (H)  I48.20    2. Hypothyroidism, unspecified type  E03.9    3. Seborrheic Keratosis  L82.1        PLAN:      Right eye trauma - on eyedrops  he no longer has pain and being followed closely by opthalmology.    HX Right sided [place] Palsy . Pt currently on eye lubricating drops multiple times a day.       Suprapubic catheter- cleanse daily, monitor for infection around  the site.   Cares per protocol     Atrial fibrillation on coumadin and lisinopril, currently being bridged with lovenox.      Anticoagulation management- monitor and adjust per INRS      Poor vision- is followed closely by ophthalmology.  on multiple eye gtts.      Hypothyroidism- on Synthroid, TSH 6/7/2021 was 3.14    Multiple moles on back being followed by dermatology        Electronically signed by: Polly Marroquin CNP

## 2021-06-25 NOTE — TELEPHONE ENCOUNTER
[facility] Nurse Triage Anticoagulation Note      Provider: YECENIA Olivera  Facility: [facility] Type: LT    Caller: Gabriela  Call Back Number:  [phone]    Reason for call: INR    Today s INR: 2.53  Previous INR: 5/5 2.81(7mg Mon and Thurs and 6.5mg AOD).      Diagnosis/Goal: AFIB  Heparin/Lovenox: No   Currently on ABX: No  Other interacting Medications: None  Missed or refused doses: No      Nurse also calling to report TSH results.  Currently taking Synthroid 25mcg daily.      Verbal Order/Direction given by Provider: Continue Warfarin 7mg Mondays and Friday and 6.5mg all other days.  Next INR 7/7/21.      Provider giving order: Nancy Fair MD    Verbal order given to: Gabriela Ramirez RN

## 2021-06-26 NOTE — PROGRESS NOTES
Progress Notes by Panchito Carrillo MD at 10/26/2018  9:30 AM     Author: Panchito Carrillo MD Service: -- Author Type: Physician    Filed: 10/26/2018  9:29 AM Encounter Date: 10/26/2018 Status: Signed    : Panchito Carrillo MD (Physician)                  [email or web address]  [phone]         Thank you Dr. Pat for asking the [facility] team to participate in the care of your patient, Riley Rodriguez.     Impression and Plan     1. Atrial fibrillation. This is chronic/persistent. Ventricular response is well-controlled based on device interrogation today. [place] [id number] Score is 5 yielding an annual embolic risk of 7.2-10.0%. Patient is maintained on warfarin therapy for CVA prophylaxis.     2.  Mitral stenosis.  This was felt mild in degree an echocardiogram 1 December 2017.     3. Hypertension. Blood pressure is fairly reasonable to office today at 135/62 mmHg.     Plan on follow-up in one year. Patient will be followed intermittently through [facility] as well per protocol.           History of Present Illness    Once again I would like to thank you again for asking me to participate in the care of your patient, Riley Rodriguez.  As you know, but to reiterate for my own records, Riley Rodriguez is a 89 y.o. male with chronic/persistent atrial fibrillation. He also has a history of permanent pacemaker placement.     In follow-up today, patient is without cardiovascular complaint.  He specifically denies chest pain, worsening shortness of breath, palpitations (despite persistent atrial fibrillation), or lightheadedness.    Further review of systems is otherwise negative/noncontributory (medical record and 13 point review of systems reviewed as well and pertinent positives noted).         Cardiac Diagnostics      Echocardiogram 1 December 2017:  1. Normal left ventricular size and systolic performance with ejection fraction of 60%.  2. Mild mitral  stenosis.  3. Trace aortic insufficiency.  4. Normal right ventricular size and systolic performance.  5. Severe left atrial enlargement.  Mild right atrial enlargement.  6. Right ventricular systolic pressure relative to right atrial pressure is mildly increased.  Pulmonary artery pressure estimate 35-40 mmHg plus right atrial pressure.    When compared to prior echocardiogram 10 September 2014, no significant change.      Echocardiogram 10 September 2014:  1. Normal left ventricular size and systolic performance with ejection fraction of 60%.  2. Mild concentric increased LV wall thickness.  3. Mild mitral stenosis.  4. Severe left atrial enlargement. Moderate right atrial enlargement.    Echocardiogram 7 June 2011:  1. Normal LV size and function withejection fraction of 75%.   2. No significant valvular heart disease identified.    Device interrogation 23 August 2018 ([facility] [address] [place] XL SR device implanted 20 April 2009):  1. The rhythm appears to be atrial fibrillation with ventricular pacing.  2. Normal magnet and pacemaker function. Patient takes warfarin.    Device interrogation 23 August 2018 ([facility] [address] [place] XL SR device implanted 20 April 2009):  3. The rhythm appears to be atrial fibrillation with ventricular pacing.  4. Normal magnet and pacemaker function. Patient takes warfarin.    Device 15 November 2017 ([facility] device implanted 20 April 2009):  1. The rhythm appears to be atrial fibrillation with ventricular pacing and sensing.  2. Normal magnet and pacemaker function.    Device 20 July 2017 ([facility] device implanted 20 April 2009):  1. The rhythm appears to be atrial fibrillation with ventricular pacing and sensing.  2. Normal magnet and pacemaker function.    Device 14 October 2016 ([facility] device implanted 20 April 2009):  1. Atrial fibrillation with ventricular pacing and sensing.  2. Ventricular response well controlled.  3. Normal magnet  "and pacemaker function.              Physical Examination       /62 (Patient Site: Right Arm, Patient Position: Sitting, Cuff Size: Adult Large)  Pulse 62  Resp 18  Ht 5' 9\" (1.753 m)  Wt 204 lb (92.5 kg)  BMI 30.13 kg/m2        Wt Readings from Last 3 Encounters:   10/26/18 204 lb (92.5 kg)   10/23/18 204 lb (92.5 kg)   04/17/18 210 lb (95.3 kg)     The patient is alert and oriented times three. Sclerae are anicteric. Mucosal membranes are moist. Jugular venous pressure is normal. No significant adenopathy/thyromegally appreciated. Lungs are clear with good expansion. On cardiovascular exam, the patient has a regular S1 and S2. Abdomen is soft and non-tender. Extremities reveal no clubbing, cyanosis, or edema.           Family History/Social History/Risk Factors   Patient does not smoke.  Family history of hypertension.         Medications  Allergies   Current Outpatient Prescriptions   Medication Sig Dispense Refill   ? AVODART 0.5 mg capsule TAKE ONE CAPSULE BY MOUTH EVERY DAY 90 capsule 2   ? BILBERRY ORAL CAPS     ? colchicine (COLCRYS) 0.6 mg tablet Take 2 tablets by mouth at onset; may repeat every hour.  Max of 8 tablets per day.     ? dorzolamide-timolol (COSOPT) 2-0.5 % ophthalmic solution Administer 1 drop to both eyes 2 (two) times a day.     ? latanoprost (XALATAN) 0.005 % ophthalmic solution Administer 1 drop to both eyes bedtime.     ? lisinopril (PRINIVIL,ZESTRIL) 10 MG tablet Take 1 tablet (10 mg total) by mouth daily. 90 tablet 3   ? simvastatin (ZOCOR) 5 MG tablet TAKE ONE TABLET BY MOUTH AT BEDTIME 90 tablet 2   ? tamsulosin (FLOMAX) 0.4 mg Cp24 TAKE ONE CAPSULE BY MOUTH EVERY DAY 90 capsule 2   ? VIT C/MARIE AC/LUT/COPPER/ZNOX (PRESERVISION LUTEIN ORAL) Take 2 capsules by mouth daily.     ? warfarin (COUMADIN) 5 MG tablet TAKE TWO TABLETS BY MOUTH EVERY FRIDAY, AND TAKE 1.5 TABLETS ALL OTHER DAYS OF THE WEEK OR AS DIRECTED 60 tablet 2   ? warfarin (COUMADIN) 5 MG tablet Take 1.5-2 " tablets (7.5-10 mg total) by mouth daily. Adjust dose per INR result as directed 150 tablet 1     No current facility-administered medications for this visit.       No Known Allergies       Lab Results   Lab Results   Component Value Date     10/23/2018    K 4.3 10/23/2018     10/23/2018    CO2 25 10/23/2018    BUN 11 10/23/2018    CREATININE 0.72 10/23/2018    CALCIUM 8.6 10/23/2018     Lab Results   Component Value Date    WBC 6.2 04/20/2017    WBC 5.8 10/10/2014    HGB 14.4 04/20/2017    HCT 43.1 04/20/2017    MCV 90 04/20/2017     04/20/2017     Lab Results   Component Value Date    CHOL 115 10/18/2017    TRIG 119 10/18/2017    HDL 50 10/18/2017    LDLCALC 41 10/18/2017     Lab Results   Component Value Date    INR 2.10 (H) 10/18/2018    INR 2.60 (!) 01/20/2017     No results found for: BNP  Lab Results   Component Value Date    CKMB 2 06/06/2011    TROPONINI <0.01 06/06/2011     Lab Results   Component Value Date    TSH 3.66 10/27/2015

## 2021-06-28 NOTE — PROGRESS NOTES
Progress Notes by Panchito Carrillo MD at 11/22/2019  8:30 AM     Author: Panchito Carrillo MD Service: -- Author Type: Physician    Filed: 11/22/2019  9:28 AM Encounter Date: 11/22/2019 Status: Signed    : Panchito Carrillo MD (Physician)                                       Thank you Dr. Marroquin for asking the [facility] team to participate in the care of your patient, Riley Rodriguez.     Impression and Plan     1. Atrial fibrillation. This is chronic/persistent. Ventricular response is well-controlled based on device interrogation today. [facility] [id number] Score is at least 5 yielding an annual embolic risk of 7.2-10.0%. Patient is maintained on warfarin therapy for CVA prophylaxis.     2.  Mitral stenosis.  This was felt mild in degree an echocardiogram 1 December 2017.     3. Hypertension. Blood pressure is fairly reasonable to office today at 136/66 mmHg.     Plan on follow-up in one year. Patient will be followed intermittently through Device Clinic as well per protocol.               History of Present Illness    Once again I would like to thank you again for asking me to participate in the care of your patient, Riley Rodriguez.  As you know, but to reiterate for my own records, Riley Rodriguez is a 89 y.o. male with chronic/persistent atrial fibrillation. He also has a history of permanent pacemaker placement.     In follow-up today, patient is without cardiovascular complaint.    He is joined today by 2 of his daughters.  Although doing well from a cardiovascular standpoint, since my last visit with Riley he did suffer a fall.  He also had suffered a cerebrovascular accident and also developed Bell's palsy.    Further review of systems is otherwise negative/noncontributory (medical record and 13 point review of systems reviewed as well and pertinent positives noted).         Cardiac Diagnostics      Echocardiogram 1 December 2017:  1. Normal left ventricular size  and systolic performance with ejection fraction of 60%.  2. Mild mitral stenosis.  3. Trace aortic insufficiency.  4. Normal right ventricular size and systolic performance.  5. Severe left atrial enlargement.  Mild right atrial enlargement.  6. Right ventricular systolic pressure relative to right atrial pressure is mildly increased.  Pulmonary artery pressure estimate 35-40 mmHg plus right atrial pressure.    When compared to prior echocardiogram 10 September 2014, no significant change.    Echocardiogram 10 September 2014:  1. Normal left ventricular size and systolic performance with ejection fraction of 60%.  2. Mild concentric increased LV wall thickness.  3. Mild mitral stenosis.  4. Severe left atrial enlargement. Moderate right atrial enlargement.    Echocardiogram 7 June 2011:  1. Normal LV size and function withejection fraction of 75%.   2. No significant valvular heart disease identified.    Device interrogation 15 August 2019 ([employer] [address]26 [address] XL SR device implanted 20 April 2009):  1. The rhythm appears to be atrial fibrillation with ventricular pacing.  2. Normal magnet and pacemaker function. Patient takes warfarin.    Device interrogation 23 August 2018 ([employer] [address] XL SR device implanted 20 April 2009):  1. The rhythm appears to be atrial fibrillation with ventricular pacing.  2. Normal magnet and pacemaker function. Patient takes warfarin.    Device interrogation 23 August 2018 ([employer] [address] XL SR device implanted 20 April 2009):  1. The rhythm appears to be atrial fibrillation with ventricular pacing.  2. Normal magnet and pacemaker function. Patient takes warfarin.    Device 15 November 2017 ([employer] device implanted 20 April 2009):  1. The rhythm appears to be atrial fibrillation with ventricular pacing and sensing.  2. Normal magnet and pacemaker function.    Device 20 July 2017 ([employer] device implanted 20 April 2009):  1. The  "rhythm appears to be atrial fibrillation with ventricular pacing and sensing.  2. Normal magnet and pacemaker function.    Device 14 October 2016 ([employer] device implanted 20 April 2009):  1. Atrial fibrillation with ventricular pacing and sensing.  2. Ventricular response well controlled.  3. Normal magnet and pacemaker function.           Physical Examination       /66 (Patient Site: Left Arm, Patient Position: Sitting, Cuff Size: Adult Regular)   Pulse 64   Resp 16   Ht 5' 10\" (1.778 m)   Wt 190 lb (86.2 kg)   BMI 27.26 kg/m          Wt Readings from Last 3 Encounters:   11/22/19 190 lb (86.2 kg)   10/10/19 192 lb (87.1 kg)   09/11/19 199 lb 6.4 oz (90.4 kg)       The patient is alert and oriented times three. Sclerae are anicteric. Mucosal membranes are moist. Jugular venous pressure is normal. No significant adenopathy/thyromegally appreciated. Lungs are somewhat diminished, but clear. On cardiovascular exam, the patient has an irregular S1 and S2. Abdomen is soft and non-tender. Extremities reveal no clubbing, cyanosis.         Family History/Social History/Risk Factors   Patient does not smoke.  Family history reviewed, and family history includes COPD in his father.          Medications  Allergies   Current Outpatient Medications   Medication Sig Dispense Refill   ? acetaminophen (TYLENOL) 325 MG tablet Take 2 tablets (650 mg total) by mouth every 6 (six) hours as needed for pain. 30 tablet 0   ? bisacodyl (DULCOLAX) 10 mg suppository Insert 10 mg into the rectum daily as needed.     ? cycloSPORINE (RESTASIS) 0.05 % ophthalmic emulsion Administer 1 drop to both eyes 2 (two) times a day.     ? dorzolamide-timolol (COSOPT) 22.3-6.8 mg/mL ophthalmic solution 1 drop to both eyes two times a day for macular degeneration and glaucoma 10 mL 12   ? erythromycin base (ERYTHROMYCIN OPHT) Apply 5 mg to eye. Instill 1 ribbon in both eyes at HS     ? latanoprost (XALATAN) 0.005 % ophthalmic solution 1 " drop both eyes at bedtime for macular degeneration/glaucoma 2.5 mL 12   ? levothyroxine (SYNTHROID, LEVOTHROID) 25 MCG tablet Take 1 tablet (25 mcg total) by mouth Daily at 6:00 am. Hypothyroid 30 tablet 0   ? moxifloxacin (VIGAMOX) 0.5 % ophthalmic solution Administer 1 drop to the right eye 4 (four) times a day.     ? petrolat,wht/min oil/sod chl (ARTIFICIAL TEARS OINTMENT OPHT) Apply 2 drops to eye every 2 (two) hours as needed. Right eye q 2 hours PRN     ? polyethylene glycol (MIRALAX) 17 gram packet Take 17 g by mouth daily as needed.     ? propylene glycol (SYSTANE COMPLETE OPHT) Apply 1 drop to eye 2 (two) times a day. Both eyes     ? simvastatin (ZOCOR) 5 MG tablet TAKE ONE TABLET BY MOUTH AT BEDTIME 90 tablet 2   ? traMADol (ULTRAM) 50 mg tablet Take 25 mg by mouth every 4 (four) hours as needed for pain.     ? warfarin sodium (WARFARIN ORAL) Take 4.5-7 mg by mouth. 11/13/19 INR 2.24 Take 4.5mg daily Next INR 11/27  10/25/19 INR 2.32 cont 4.5mg daily. Next INR 11/6.  10/21/19 INR 2.14 4.5mg daily.  10/14/19 INR 3.44 Cont 4.5mg daily. NExt INR 10/21.  10/11/19 INR 3.21 Give 4.5mg daily. Next INR 10/14.  10/7/19 INR 2.81 Cont 7mg M-W-F, 4.5mg AOD. Next INR 10/11  On Bactrim DS.  9/30/19 INR 2.42  9/23/19 INR 2.80           ? polymyxin B-trimethoprim (FOR POLYTRIM) 10,000 unit- 1 mg/mL Drop ophthalmic drops 1 drop 4 (four) times a day. Right eye       No current facility-administered medications for this visit.       No Known Allergies       Lab Results   Lab Results   Component Value Date     (L) 10/11/2019    K 4.4 10/11/2019     10/11/2019    CO2 24 10/11/2019    BUN 10 10/11/2019    CREATININE 0.82 10/11/2019    CALCIUM 8.6 10/11/2019     Lab Results   Component Value Date    WBC 12.6 (H) 10/08/2019    WBC 5.8 10/10/2014    HGB 12.7 (L) 10/08/2019    HCT 39.0 (L) 10/08/2019    MCV 86 10/08/2019     10/08/2019     Lab Results   Component Value Date    CHOL 95 12/24/2018    TRIG 100  12/24/2018    HDL 37 (L) 12/24/2018    LDLCALC 38 12/24/2018     Lab Results   Component Value Date    INR 2.24 (H) 11/13/2019    INR 2.60 (!) 01/20/2017     Lab Results   Component Value Date    BNP 81 12/20/2018     Lab Results   Component Value Date    CKMB 2 06/06/2011    TROPONINI 0.01 12/20/2018    TROPONINI <0.01 06/06/2011     Lab Results   Component Value Date    TSH 1.91 02/01/2019

## 2021-06-29 NOTE — PROGRESS NOTES
"Progress Notes by Merline Swanson RN at 10/13/2020  8:20 AM     Author: Merline Swanson RN Service: -- Author Type: Registered Nurse    Filed: 10/14/2020  2:59 PM Encounter Date: 10/13/2020 Status: Signed    : Merline Swanson RN (Registered Nurse)       DEVICE CLINIC RN POST-OP NOTE    Reason for visit: In-clinic wound and device check; s/p single chamber pacemaker generator change-out.     Device System: [employer]72 [employer]urity MRI.  Pre-existing RV Lead: [employer] 1688TC Tendril SDX, DOI 04/20/2009 by Dr. Zeeshan Driver.  Procedure date: 10/02/2020  Implant Diagnosis: Pacemaker Battery Depletion, Chronic  Atrial fibrillation with slow ventricular response  Implanting Physician: Dr. Alejandro Rob      Assessment  Subjective: \"I'm not having any real pain, it's just still a little sensitive.\"  \"I'm feeling good.\"  Vitals:   Vitals:    10/13/20 0808   BP: 122/54   Pulse: (!) 59   Resp: 16   Temp: 98.4  F (36.9  C)   SpO2: 98%     Heart Sounds: normal  Lung Sounds: clear to auscultation bilaterally  Incision/device pocket: Clean, dry and intact with resolving ecchymosis and edema.  There are no signs of infection present.  The Steri-strips were removed previous to today's appointment.      Device Findings  Interrogation of device reveals normal sensing and capture thresholds; RV lead trends remain stable.  See the Paceart Report for a full summary of the device information.    Other: Mr. Rodriguez being seen in-clinic for noise artifact on stored VEGMs which trigger RV Lead Noise Reversion Pacing.  Multiple isometric, pocket maneuvers, reaching, stretching, lifting, pushing and pulling exercises performed during today's visit.  There were two occasions when RV lead noise sensed during today's one and one-half hours of testing.  The noise was low voltage with only one sensed noise signal.  Unable to reproduce sustained noise, or anything akin to it today; RV Noise Reversion Pacing did not " trigger during today's testing.  Routed to Dr. Rob for review.      Patient Education  Riley Rodriguez was accompanied today by his daughter, Homa.     [facility]'s Partnership Agreement for Device Patients and Post-operative Checklist were presented and reviewed with Mr. Rodriguez at today's appointment.    Signs and symptoms of infection, poor wound healing, and normal device function were reviewed. (Please also reference the aforementioned additional testing performed today.)  Range of motion and weight restrictions are waived for Mr. Rodriguez as his pacemaker lead is chronically implanted. He was issued a new [employer] remote monitor and instructed on its set-up and use for remote monitoring by the [employer] representative prior to hospital discharge. These instructions were reviewed at todays visit.  Copies of today's instructions and follow-up information were sent with Mr. Rodriguez back to the care center, [facility], where he resides and with his daughter, Homa.  Additional instructions provided to the [facility] Nursing and Medical staff to contact the Device Clinic immediately if Mr. Rodriguez c/o feeling faint, lightheaded or any dizziness that is out of the ordinary.  Mr. Rodriguez is not pacemaker dependent.      Plan  - Three month remote device check on 01/13/2021    Merline Swanson, RN, MA  Device Nurse  [facility]    Addendum:    AS 1   Riley Rodriguez  Male, 89 y.o., 4/15/1926  MRN:   [id number]  Phone:   [phone] (M)  HCA:   Not Active  PCP:   Polly Marroquin CNP  Primary Cvg:   [employer]vantage Plan  Next Appt  With Cardiology (Remote Device Check From Home)  01/13/2021 at 12:00 AM  Message  Received: Yesterday  Message Contents   Alejandro Rob MD Wiatros, Merline CHAHAL RN             No further evaluation recommended at this time.  Call me if he has Sx.  dd      Noted. RENE

## 2021-07-04 NOTE — LETTER
Letter by Polly Marroquin CNP at      Author: Polly Marroquin CNP Service: -- Author Type: --    Filed:  Encounter Date: 6/4/2021 Status: (Other)         Patient: Riley Rodriguez   MR Number: [id number]   YOB: 1926   Date of Visit: 6/4/2021     [facility]    Facility:   [facility] [[id number]]   Code Status: DNR      CHIEF COMPLAINT/REASON FOR VISIT:  Chief Complaint   Patient presents with   ? FVP Care Coordination - Regulatory       HISTORY:      HPI: Riley is a 89 y.o. male  now residing in the LTC at [facility].  He is  with history of A. fib on warfarin develop acute CVA from holding warfarin for right gluteal hematoma.  Currently back on Coumadin, Hypertension , urinary retention has a suprapubic catheter and with a pacemaker       Today he being seen  for routine regulatory visit. He was seen in his room.   He Recently had 2 moles biopsied on his back.  Dressings were intact.  Per the patient he tells me they were benign.  He had no concerns.  He denied CP or shortness of breath,  He is able to move all extremities.  He is eating well and reports no difficulties with swallowing.    Staff reported he is moving his bowels and had no issues.   His suprapubic is intact and draining clear yellow urine.  TSH rechecked on 6/7/2021 is 3.14.   LS clear and no shortness of breath.  He was with no lower extremity edema and his weights have been stable over the last month.    Past Medical History:   Diagnosis Date   ? Atrial fibrillation (H)     On coumadin   ? Bell's palsy     right sided facial droop   ? CVA (cerebral vascular accident) (H)    ? Hypertension    ? Pacemaker    ? Urinary retention              Family History   Problem Relation Age of Onset   ? COPD Father      Social History     Socioeconomic History   ? Marital status:      Spouse name: Not on file   ? Number of children: Not on file   ? Years of education: Not on file   ?  Highest education level: Not on file   Occupational History   ? Not on file   Social Needs   ? Financial resource strain: Not on file   ? Food insecurity     Worry: Not on file     Inability: Not on file   ? Transportation needs     Medical: Not on file     Non-medical: Not on file   Tobacco Use   ? Smoking status: Former Smoker   ? Smokeless tobacco: Never Used   Substance and Sexual Activity   ? Alcohol use: No   ? Drug use: No   ? Sexual activity: Not on file   Lifestyle   ? Physical activity     Days per week: Not on file     Minutes per session: Not on file   ? Stress: Not on file   Relationships   ? Social connections     Talks on phone: Not on file     Gets together: Not on file     Attends Religion service: Not on file     Active member of club or organization: Not on file     Attends meetings of clubs or organizations: Not on file     Relationship status: Not on file   ? Intimate partner violence     Fear of current or ex partner: Not on file     Emotionally abused: Not on file     Physically abused: Not on file     Forced sexual activity: Not on file   Other Topics Concern   ? Not on file   Social History Narrative    03/07/15 - The patient lives alone in his own home.         Review of Systems  Eyes:  He is followed  closely by opthalmology.  He has had multiple eye  procedures   Constitutional: Negative for activity change and fatigue. Negative for appetite change, chills and fever.   HENT: Negative for congestion and sore throat.    Eyes: positive  for visual disturbance. right sided facial paralysis   Respiratory: Negative for cough, shortness of breath and wheezing.    Cardiovascular: Negative for chest pain and leg swelling.        Pacemaker   Gastrointestinal: Negative for abdominal distention, abdominal pain, constipation, diarrhea and nausea.   Genitourinary: Negative for dysuria.   Musculoskeletal: Negative for arthralgias and myalgias.   Skin: Negative for color change, rash and wound.    Neurological: Negative for dizziness, weakness and numbness.   Psychiatric/Behavioral: Negative for agitation, behavioral problems and sleep disturbance.   Vitals:    06/04/21 0824   BP: 122/70   Pulse: 60   Resp: 18   Temp: 98.5  F (36.9  C)   SpO2: 99%   Weight: 183 lb 6.4 oz (83.2 kg)       Constitutional: He appears well-developed and well-nourished.   Pleasant gentleman   HENT:   Head: Normocephalic and atraumatic.   Eyes: Conjunctivae are normal. Pupils are equal, round, and reactive to light. poor vision with the right worse than the left right sided facial paralysis   Neck: Normal range of motion. Neck supple.   Cardiovascular: Normal rate, regular rhythm and normal heart sounds.   No murmur heard.  Pulmonary/Chest: Effort normal and breath sounds normal. He has no wheezes. He has no rales.   Abdominal: Soft. Bowel sounds are normal. He exhibits no distension. There is no tenderness.   Genitourinary: suprapubic catheter  Musculoskeletal: Normal range of motion. He exhibits no edema.   Neurological: He is alert.   Skin: Skin is warm and dry.   Psychiatric: He has a normal mood and affect. His behavior is normal.     LABS:   No results found for this or any previous visit (from the past 240 hour(s)).     Current Outpatient Medications   Medication Sig   ? acetaminophen (TYLENOL) 325 MG tablet Take 2 tablets (650 mg total) by mouth every 6 (six) hours as needed for pain.   ? atorvastatin (LIPITOR) 40 MG tablet Take 1 tablet (40 mg total) by mouth at bedtime. For hx of cva   ? bisacodyL (DULCOLAX) 10 mg suppository Insert 10 mg into the rectum daily as needed.   ? docusate sodium (COLACE) 100 MG capsule Take 1 capsule (100 mg total) by mouth 2 (two) times a day. For constipation (Patient taking differently: Take 100 mg by mouth daily. And daily PRN.)   ? dorzolamide-timoloL (COSOPT) 22.3-6.8 mg/mL ophthalmic solution Administer 1 drop into the left eye 2 (two) times a day. glaucoma   ? erythromycin base  (ERYTHROMYCIN OPHT) Apply 5 mg to eye. Instill 1 ribbon in left eye at HS and right eye four times a day   ? ferrous sulfate 325 (65 FE) MG tablet Take 1 tablet by mouth daily.   ? guaiFENesin (ROBITUSSIN) 100 mg/5 mL syrup Take 200 mg by mouth every 4 (four) hours as needed for cough.   ? latanoprost (XALATAN) 0.005 % ophthalmic solution Administer 1 drop into the left eye at bedtime. glaucoma   ? levothyroxine (SYNTHROID, LEVOTHROID) 25 MCG tablet Take 1 tablet (25 mcg total) by mouth Daily at 6:00 am. Hypothyroid   ? menthol-zinc oxide (CALMOSEPTINE) 0.44-20.6 % Oint ointment Apply 1 application topically as needed.   ? omeprazole (PRILOSEC) 20 MG capsule Take 1 capsule (20 mg total) by mouth 2 (two) times a day before meals. Needs two times a day for 2 months, then daily for ulcer   ? propylene glycol (SYSTANE COMPLETE) 0.6 % Drop Apply 1 drop to eye 2 (two) times a day. Both eyes for dry eyes   ? warfarin sodium (WARFARIN ORAL) Take by mouth. 4/15/21 INR 2.81 Cont 7mg M & TH, 6.5mg AOD. Next INR 5/5  4/5/21 INR 2.39 Cont 7mg M & Th, 6.5mg AOD. Next INR 4/14 4/1/21 INR 2.44 Cont 7mg M & Th, 6.5mg AOD. Next INR 4/5.  3/11/21 INR 2.53 Cont 7 mg M, Th and 6.5 mg AOD.  Next INR 4/8/21.  2/11/21 INR 2.87 Cont 7mg M, Th and 6.5mg AOD. Next INR 3/11     Case Management:  I have reviewed the facility/SNF care plan/MDS which was done on 3/29/21 TSH  (3.14 0n 6/7/21)  including the falls risk, nutrition and pain screening. I also reviewed the current immunizations, and preventive care.. Future cancer screening is not clinically indicated secondary to age/goals of care.   Patient's desire to return to the community is not assessible due to cognitive impairment.    Information reviewed:  Medications, vital signs, orders, and nursing notes.    ASSESSMENT:      ICD-10-CM    1. Chronic atrial fibrillation (H)  I48.20    2. Hypothyroidism, unspecified type  E03.9    3. Seborrheic Keratosis  L82.1        PLAN:      Right eye  trauma - on eyedrops  he no longer has pain and being followed closely by opthalmology.    HX Right sided [place] Palsy . Pt currently on eye lubricating drops multiple times a day.       Suprapubic catheter- cleanse daily, monitor for infection around  the site.  Cares per protocol     Atrial fibrillation on coumadin and lisinopril, currently being bridged with lovenox.      Anticoagulation management- monitor and adjust per INRS      Poor vision- is followed closely by ophthalmology.  on multiple eye gtts.      Hypothyroidism- on Synthroid, TSH 6/7/2021 was 3.14    Multiple moles on back being followed by dermatology        Electronically signed by: Polly Marroquin, CNP

## 2021-07-06 ENCOUNTER — RECORDS - HEALTHEAST (OUTPATIENT)
Dept: LAB | Facility: CLINIC | Age: 86
End: 2021-07-06

## 2021-07-06 VITALS
WEIGHT: 183.4 LBS | TEMPERATURE: 98.5 F | DIASTOLIC BLOOD PRESSURE: 70 MMHG | HEART RATE: 60 BPM | SYSTOLIC BLOOD PRESSURE: 122 MMHG | OXYGEN SATURATION: 99 % | RESPIRATION RATE: 18 BRPM | BODY MASS INDEX: 26.32 KG/M2

## 2021-07-07 LAB — INR PPP: 2.08 (ref 0.9–1.1)

## 2021-08-03 ENCOUNTER — LAB REQUISITION (OUTPATIENT)
Dept: LAB | Facility: CLINIC | Age: 86
End: 2021-08-03
Payer: MEDICAID

## 2021-08-03 ENCOUNTER — ANCILLARY PROCEDURE (OUTPATIENT)
Dept: CARDIOLOGY | Facility: CLINIC | Age: 86
End: 2021-08-03
Attending: INTERNAL MEDICINE
Payer: COMMERCIAL

## 2021-08-03 DIAGNOSIS — I48.91 UNSPECIFIED ATRIAL FIBRILLATION (H): ICD-10-CM

## 2021-08-03 DIAGNOSIS — Z95.0 CARDIAC PACEMAKER IN SITU: ICD-10-CM

## 2021-08-03 LAB
MDC_IDC_LEAD_IMPLANT_DT: NORMAL
MDC_IDC_LEAD_LOCATION: NORMAL
MDC_IDC_LEAD_LOCATION_DETAIL_1: NORMAL
MDC_IDC_LEAD_MFG: NORMAL
MDC_IDC_LEAD_MODEL: NORMAL
MDC_IDC_LEAD_POLARITY_TYPE: NORMAL
MDC_IDC_LEAD_SERIAL: NORMAL
MDC_IDC_MSMT_BATTERY_DTM: NORMAL
MDC_IDC_MSMT_BATTERY_REMAINING_LONGEVITY: 129 MO
MDC_IDC_MSMT_BATTERY_REMAINING_PERCENTAGE: 95.5 %
MDC_IDC_MSMT_BATTERY_RRT_TRIGGER: NORMAL
MDC_IDC_MSMT_BATTERY_STATUS: NORMAL
MDC_IDC_MSMT_BATTERY_VOLTAGE: 3.02 V
MDC_IDC_MSMT_LEADCHNL_RV_IMPEDANCE_VALUE: 330 OHM
MDC_IDC_MSMT_LEADCHNL_RV_LEAD_CHANNEL_STATUS: NORMAL
MDC_IDC_MSMT_LEADCHNL_RV_PACING_THRESHOLD_AMPLITUDE: 0.62 V
MDC_IDC_MSMT_LEADCHNL_RV_PACING_THRESHOLD_PULSEWIDTH: 0.5 MS
MDC_IDC_MSMT_LEADCHNL_RV_SENSING_INTR_AMPL: 12 MV
MDC_IDC_PG_IMPLANT_DTM: NORMAL
MDC_IDC_PG_MFG: NORMAL
MDC_IDC_PG_MODEL: NORMAL
MDC_IDC_PG_SERIAL: NORMAL
MDC_IDC_PG_TYPE: NORMAL
MDC_IDC_SESS_CLINIC_NAME: NORMAL
MDC_IDC_SESS_DTM: NORMAL
MDC_IDC_SESS_REPROGRAMMED: NO
MDC_IDC_SESS_TYPE: NORMAL
MDC_IDC_SET_BRADY_LOWRATE: 60 {BEATS}/MIN
MDC_IDC_SET_BRADY_MAX_SENSOR_RATE: 100 {BEATS}/MIN
MDC_IDC_SET_BRADY_MODE: NORMAL
MDC_IDC_SET_LEADCHNL_RV_PACING_AMPLITUDE: 0.88
MDC_IDC_SET_LEADCHNL_RV_PACING_ANODE_ELECTRODE_1: NORMAL
MDC_IDC_SET_LEADCHNL_RV_PACING_ANODE_LOCATION_1: NORMAL
MDC_IDC_SET_LEADCHNL_RV_PACING_CAPTURE_MODE: NORMAL
MDC_IDC_SET_LEADCHNL_RV_PACING_CATHODE_ELECTRODE_1: NORMAL
MDC_IDC_SET_LEADCHNL_RV_PACING_CATHODE_LOCATION_1: NORMAL
MDC_IDC_SET_LEADCHNL_RV_PACING_POLARITY: NORMAL
MDC_IDC_SET_LEADCHNL_RV_PACING_PULSEWIDTH: 0.5 MS
MDC_IDC_SET_LEADCHNL_RV_SENSING_ADAPTATION_MODE: NORMAL
MDC_IDC_SET_LEADCHNL_RV_SENSING_ANODE_ELECTRODE_1: NORMAL
MDC_IDC_SET_LEADCHNL_RV_SENSING_ANODE_LOCATION_1: NORMAL
MDC_IDC_SET_LEADCHNL_RV_SENSING_CATHODE_ELECTRODE_1: NORMAL
MDC_IDC_SET_LEADCHNL_RV_SENSING_CATHODE_LOCATION_1: NORMAL
MDC_IDC_SET_LEADCHNL_RV_SENSING_POLARITY: NORMAL
MDC_IDC_SET_LEADCHNL_RV_SENSING_SENSITIVITY: 2.5 MV
MDC_IDC_STAT_AT_DTM_END: NORMAL
MDC_IDC_STAT_AT_DTM_START: NORMAL
MDC_IDC_STAT_BRADY_DTM_END: NORMAL
MDC_IDC_STAT_BRADY_DTM_START: NORMAL
MDC_IDC_STAT_BRADY_RV_PERCENT_PACED: 89 %
MDC_IDC_STAT_CRT_DTM_END: NORMAL
MDC_IDC_STAT_CRT_DTM_START: NORMAL
MDC_IDC_STAT_HEART_RATE_DTM_END: NORMAL
MDC_IDC_STAT_HEART_RATE_DTM_START: NORMAL
MDC_IDC_STAT_HEART_RATE_VENTRICULAR_MAX: 260 {BEATS}/MIN
MDC_IDC_STAT_HEART_RATE_VENTRICULAR_MEAN: 66 {BEATS}/MIN
MDC_IDC_STAT_HEART_RATE_VENTRICULAR_MIN: 50 {BEATS}/MIN

## 2021-08-03 PROCEDURE — 93294 REM INTERROG EVL PM/LDLS PM: CPT | Performed by: INTERNAL MEDICINE

## 2021-08-03 PROCEDURE — 93296 REM INTERROG EVL PM/IDS: CPT | Performed by: INTERNAL MEDICINE

## 2021-08-04 ENCOUNTER — TELEPHONE (OUTPATIENT)
Dept: GERIATRICS | Facility: CLINIC | Age: 86
End: 2021-08-04

## 2021-08-04 LAB — INR PPP: 2.22 (ref 0.85–1.15)

## 2021-08-04 PROCEDURE — 85610 PROTHROMBIN TIME: CPT | Performed by: FAMILY MEDICINE

## 2021-08-04 PROCEDURE — 36415 COLL VENOUS BLD VENIPUNCTURE: CPT | Performed by: FAMILY MEDICINE

## 2021-08-04 PROCEDURE — P9604 ONE-WAY ALLOW PRORATED TRIP: HCPCS | Performed by: FAMILY MEDICINE

## 2021-08-04 NOTE — TELEPHONE ENCOUNTER
[facility] INR Nurse Triage    Provider: YECENIA Olivera  Facility: [facility]  Facility Type:  [facility]    Caller: Zakiya  Call Back Number: [phone]  Reason for call: INR  Diagnosis/Goal: A. Fib    Todays INR: 2.22  Last INR   7/7 2.08 7mg M,Th 6.5mg AOD    Heparin/Lovenox:  No  Currently on ABX?: No  Other interacting medication:  None  Missed or refused doses: No    Verbal Order/Direction given by Provider: Coumadin 7mg M,Th and 6.5mg AOD. Recheck INR on 9/1/21    Provider Giving Order:  YECENIA Olivera    Verbal Order given to: Priscila Pat RN

## 2021-08-30 ENCOUNTER — TELEPHONE (OUTPATIENT)
Dept: GERIATRICS | Facility: CLINIC | Age: 86
End: 2021-08-30

## 2021-08-30 NOTE — TELEPHONE ENCOUNTER
[facility] Nurse Triage Telephone Note    Provider: YECENIA Olivera  Facility: [facility] Type:  [facility]    Caller: Gabriela  Call Back Number: [phone]    Allergies:  No Known Allergies     Reason for call: Nurse called to report that patient has reddish/brown drainage that is odorous at his subrapubic site.  Patient denies pain to the area.  Nurse is wondering if any ointment can be applied to this area.      Verbal Order/Direction given by Provider: Bacitracin BID until resolved.     Provider giving Order:  YECENIA Olivera    Verbal Order given to: Leatha Jose RN

## 2021-08-31 ENCOUNTER — NURSING HOME VISIT (OUTPATIENT)
Dept: GERIATRICS | Facility: CLINIC | Age: 86
End: 2021-08-31
Payer: COMMERCIAL

## 2021-08-31 ENCOUNTER — LAB REQUISITION (OUTPATIENT)
Dept: LAB | Facility: CLINIC | Age: 86
End: 2021-08-31
Payer: COMMERCIAL

## 2021-08-31 VITALS
HEIGHT: 70 IN | BODY MASS INDEX: 27.32 KG/M2 | WEIGHT: 190.8 LBS | OXYGEN SATURATION: 100 % | HEART RATE: 75 BPM | DIASTOLIC BLOOD PRESSURE: 80 MMHG | TEMPERATURE: 98.7 F | RESPIRATION RATE: 18 BRPM | SYSTOLIC BLOOD PRESSURE: 144 MMHG

## 2021-08-31 DIAGNOSIS — I48.91 UNSPECIFIED ATRIAL FIBRILLATION (H): ICD-10-CM

## 2021-08-31 DIAGNOSIS — I63.532 CEREBRAL INFARCTION DUE TO UNSPECIFIED OCCLUSION OR STENOSIS OF LEFT POSTERIOR CEREBRAL ARTERY (H): ICD-10-CM

## 2021-08-31 DIAGNOSIS — E03.9 HYPOTHYROIDISM, UNSPECIFIED TYPE: ICD-10-CM

## 2021-08-31 DIAGNOSIS — I10 ESSENTIAL HYPERTENSION: ICD-10-CM

## 2021-08-31 DIAGNOSIS — Z86.73 HISTORY OF STROKE: ICD-10-CM

## 2021-08-31 DIAGNOSIS — I48.20 CHRONIC ATRIAL FIBRILLATION (H): Primary | ICD-10-CM

## 2021-08-31 PROCEDURE — 99309 SBSQ NF CARE MODERATE MDM 30: CPT | Performed by: FAMILY MEDICINE

## 2021-08-31 ASSESSMENT — MIFFLIN-ST. JEOR: SCORE: 1506.71

## 2021-08-31 NOTE — LETTER
8/31/2021        RE: Riley oRdriguez  [address]  [address]  [facility] [address]          M [facility] GERIATRIC SERVICES    [place] Medical Record Number:  [id number]  Place of Service where encounter took place: [facility][note 3] () [[id number]]   CODE STATUS:   DNR / DNI    Chief Complaint:  Chief Complaint   Patient presents with     care home Regulatory     LTC 8/31/2021. Afib.        HPI:   Riley is a 89 y.o. male seen for routine physician follow up in LTC at [facility]. He has hx of Afib on warfarin, prior stroke, BPH, HTN, [place] palsy, hypothyroidism, SP catheter. He was last hospitalized 8/13/2020-8/19/2020. He had labs drawn in NH as he was not doing well with general fatigue, decreased appetite. No respiratory sx. He started feeling a little better on his own but then labs came back with hgb down to 6.2. He was worked up in the hospital found to have duodenal ulcer.       Today:  No interim concerns since my last visit. No medication changes. 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 He is on warfarin for afib, rates and Bp controlled. He continues on iron and PPI due to duodenal ulcer with GIB Aug 2020. No reports of abdominal pain. No complaints at all today. He is pleasant and cooperative with staff. He has not had any falls. Has chronic SP catheter, no issues. He has baseline vision impairment, on multiple eye drops.       Past Medical History:  Past Medical History:   Diagnosis Date     Atrial fibrillation (H)     On coumadin     Bell's palsy     right sided facial droop     CVA (cerebral vascular accident) (H)      Hypertension      Pacemaker      Urinary retention        Medications:  Current Outpatient Medications   Medication Instructions     acetaminophen (TYLENOL) 650 mg, Oral, EVERY 6 HOURS PRN      "acetaminophen (TYLENOL) 1,000 mg, Oral, EVERY 6 HOURS PRN     atorvastatin (LIPITOR) 40 mg, Oral, AT BEDTIME     bacitracin 500 UNIT/GM OINT Topical, 2 TIMES DAILY, Apply to SP site topically every day and evening shift for irritation If not better notify provider.      bisacodyl (DULCOLAX) 10 mg, DAILY PRN     docusate sodium (COLACE) 100 mg, Oral, 2 TIMES DAILY     Docusate Sodium (DOCU LIQUID PO) 5 drops, Both Ears, DAILY PRN, prior to irrigation for cerumen removal      dorzolamide-timoloL (COSOPT) 22.3-6.8 mg/mL ophthalmic solution 1 drop, Left Eye, 2 TIMES DAILY     erythromycin base (ERYTHROMYCIN OPHT) 5 mg, Both Eyes, AT BEDTIME, Instill 1 ribbon in Left Eye and 1 ribbon in Right Eye at bedtime. Ensure ointment is given after eye drops to ensure proper absorption.     ferrous sulfate 325 (65 FE) MG tablet 1 tablet, DAILY     guaiFENesin (ROBITUSSIN) 100 mg/5 mL syrup 10 mLs, Oral, EVERY 4 HOURS PRN     latanoprost (XALATAN) 0.005 % ophthalmic solution 1 drop, Left Eye, AT BEDTIME     levothyroxine (SYNTHROID/LEVOTHROID) 25 mcg, Oral, DAILY     MEDICATION CANNOT BE REORDERED - PLEASE MANUALLY REORDER AND DISCONTINUE THE OLD ORDER 1 drop, Ophthalmic, 2 TIMES DAILY     menthol-zinc oxide (CALMOSEPTINE) 0.44-20.6 % Oint ointment Topical, PRN, Buttocks     omeprazole (PRILOSEC) 20 mg, Oral, 2 TIMES DAILY BEFORE MEALS     omeprazole 20 mg, Oral, DAILY     Propylene Glycol (SYSTANE COMPLETE OP) 1 drop, Both Eyes, 2 TIMES DAILY     Warfarin Sodium (COUMADIN PO) 7 mg, Oral, TWICE WEEKLY, Every Mon, Thu for INR 2.30 for 27 Days Next INR 9/29/21      warfarin sodium (WARFARIN ORAL) 6.5 mg, Oral, FIVE TIMES WEEKLY, Every Tue, Wed, Fri, Sat, Sun for INR 2.30 for 28 Days Next INR 9/29/21       Physical Exam:  General: Patient is alert, elderly male, no distress.    Vitals: BP (!) 144/80   Pulse 75   Temp 98.7  F (37.1  C)   Resp 18   Ht 1.778 m (5' 10\")   Wt 86.5 kg (190 lb 12.8 oz)   SpO2 100%   BMI 27.38 kg/m  "   HEENT: Head is NCAT. Nares negative. Oropharynx well hydrated. Right facial droop, baseline.  Neck: No JVD.  Lungs: Non labored respirations.   : SP cath with leg bag.  Extremities: Trace LE edema is noted.  Musculoskeletal: Age related degen changes.   Skin: No open areas.   Psych: Mood appears good.      Labs:  Lab Results   Component Value Date    WBC 5.9 10/01/2020    HGB 12.9 (L) 01/14/2021    HCT 35.9 (L) 10/01/2020    MCV 85 10/01/2020     10/01/2020     Results for orders placed or performed in visit on 10/01/20   Basic Metabolic Panel   Result Value Ref Range    Sodium 138 136 - 145 mmol/L    Potassium 4.1 3.5 - 5.0 mmol/L    Chloride 102 98 - 107 mmol/L    CO2 28 22 - 31 mmol/L    Anion Gap, Calculation 8 5 - 18 mmol/L    Glucose 99 70 - 125 mg/dL    Calcium 9.1 8.5 - 10.5 mg/dL    BUN 12 8 - 28 mg/dL    Creatinine 0.79 0.70 - 1.30 mg/dL    GFR MDRD Af Amer >60 >60 mL/min/1.73m2    GFR MDRD Non Af Amer >60 >60 mL/min/1.73m2     Lab Results   Component Value Date    TSH 3.10 04/22/2020         Assessment/Plan:  1. Afib. Chronically anticoagulated with warfarin. Monitor and adjust per INR. He sees cardiology.  2. HTN. Not on BP meds, had been on lisinopril in the past. BPs satisfactory.  3. Hx of stroke. Continue statin, also on warfarin as noted above.  4. Hypothyroidism. On replacement. Last TSH within goal range.  5. Duodenal ulcer. Aug 2020 hospitalization. Seen by GI, had EGD on 8/15/2020. No active bleeding so no specific procedural intervention needed. He Is on PPI.  6. Anemia. He received transfusion in the hospital. Work up revealed duodenal ulcer, no active bleeding. Continue iron.  7. SP catheter.   8. Moles, skin cancer, left arm and upper back, removed per dermatology March/April 2021.  9. [place] Palsy, right.   10. Vision impairment. On multiple drops, follows with ophthalmology.        Electronically signed by: Nancy Fair MD               Sincerely,        Nancy Fair  MD

## 2021-09-01 ENCOUNTER — TELEPHONE (OUTPATIENT)
Dept: GERIATRICS | Facility: CLINIC | Age: 86
End: 2021-09-01

## 2021-09-01 LAB — INR PPP: 2.3 (ref 0.85–1.15)

## 2021-09-01 PROCEDURE — P9604 ONE-WAY ALLOW PRORATED TRIP: HCPCS | Performed by: NURSE PRACTITIONER

## 2021-09-01 PROCEDURE — 85610 PROTHROMBIN TIME: CPT | Performed by: NURSE PRACTITIONER

## 2021-09-01 PROCEDURE — 36415 COLL VENOUS BLD VENIPUNCTURE: CPT | Performed by: NURSE PRACTITIONER

## 2021-09-01 NOTE — TELEPHONE ENCOUNTER
[facility] INR Nurse Triage    Provider: YECENIA Olivera  Facility: [facility] Type:  [facility]    Caller: Zakiya  Call Back Number: [phone]  Reason for call: INR  Diagnosis/Goal: A. Fib    Todays INR: 2.30  Last INR 2.22 (8/14), 7 mg po Mon, Thurs, and 6.5 mg AOD.    Heparin/Lovenox:  No  Currently on ABX?: No  Other interacting medication:  None  Missed or refused doses: No    Verbal Order/Direction given by Provider: Continue same Coumadin dose and recheck INR in 1 month (9/29).    Provider Giving Order:  YECENIA Olivera    Verbal Order given to: Priscila Jose RN

## 2021-09-03 ENCOUNTER — TELEPHONE (OUTPATIENT)
Dept: GERIATRICS | Facility: CLINIC | Age: 86
End: 2021-09-03

## 2021-09-03 RX ORDER — ACETAMINOPHEN 500 MG
1000 TABLET ORAL EVERY 6 HOURS PRN
Status: ON HOLD | COMMUNITY
End: 2023-01-01

## 2021-09-03 RX ORDER — GINSENG 100 MG
CAPSULE ORAL 2 TIMES DAILY
COMMUNITY
Start: 2021-08-30 | End: 2022-02-02

## 2021-09-03 RX ORDER — NICOTINE POLACRILEX 4 MG/1
20 GUM, CHEWING ORAL DAILY
COMMUNITY
End: 2022-02-02

## 2021-09-03 NOTE — TELEPHONE ENCOUNTER
[facility] Nurse Triage Telephone Note    Provider: YECENIA Olivera  Facility: [facility] Type:  LT    Caller: Gabriela   Call Back Number: [phone]    Allergies:  No Known Allergies     Reason for call: Pt has an order for bacitracin to the S/P site d/t redness and fowl smell, the area is clean and intact with no oder at the site, nursing to request to discontinue the tx.     Verbal Order/Direction given by Provider: OK to discontinue bacitracin to s/p site.     Provider giving Order:  YECENIA Olivera    Verbal Order given to: Gabriela Rosario RN

## 2021-09-11 ENCOUNTER — HEALTH MAINTENANCE LETTER (OUTPATIENT)
Age: 86
End: 2021-09-11

## 2021-09-19 NOTE — PROGRESS NOTES
[facility] SERVICES    [place] Medical Record Number:  [id number]  Place of Service where encounter took place: [facility] () [[id number]]   CODE STATUS:   DNR / DNI    Chief Complaint:  Chief Complaint   Patient presents with     residential Regulatory     LTC 8/31/2021. Afib.        HPI:   Riley is a 89 y.o. male seen for routine physician follow up in LT at [facility]. He has hx of Afib on warfarin, prior stroke, BPH, HTN, [place] palsy, hypothyroidism, SP catheter. He was last hospitalized 8/13/2020-8/19/2020. He had labs drawn in NH as he was not doing well with general fatigue, decreased appetite. No respiratory sx. He started feeling a little better on his own but then labs came back with hgb down to 6.2. He was worked up in the hospital found to have duodenal ulcer.       Today:  No interim concerns since my last visit. No medication changes. 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 He is on warfarin for afib, rates and Bp controlled. He continues on iron and PPI due to duodenal ulcer with GIB Aug 2020. No reports of abdominal pain. No complaints at all today. He is pleasant and cooperative with staff. He has not had any falls. Has chronic SP catheter, no issues. He has baseline vision impairment, on multiple eye drops.       Past Medical History:  Past Medical History:   Diagnosis Date     Atrial fibrillation (H)     On coumadin     Bell's palsy     right sided facial droop     CVA (cerebral vascular accident) (H)      Hypertension      Pacemaker      Urinary retention        Medications:  Current Outpatient Medications   Medication Instructions     acetaminophen (TYLENOL) 650 mg, Oral, EVERY 6 HOURS PRN     acetaminophen (TYLENOL) 1,000 mg, Oral, EVERY 6 HOURS PRN     atorvastatin (LIPITOR) 40 mg, Oral, AT  "BEDTIME     bacitracin 500 UNIT/GM OINT Topical, 2 TIMES DAILY, Apply to SP site topically every day and evening shift for irritation If not better notify provider.      bisacodyl (DULCOLAX) 10 mg, DAILY PRN     docusate sodium (COLACE) 100 mg, Oral, 2 TIMES DAILY     Docusate Sodium (DOCU LIQUID PO) 5 drops, Both Ears, DAILY PRN, prior to irrigation for cerumen removal      dorzolamide-timoloL (COSOPT) 22.3-6.8 mg/mL ophthalmic solution 1 drop, Left Eye, 2 TIMES DAILY     erythromycin base (ERYTHROMYCIN OPHT) 5 mg, Both Eyes, AT BEDTIME, Instill 1 ribbon in Left Eye and 1 ribbon in Right Eye at bedtime. Ensure ointment is given after eye drops to ensure proper absorption.     ferrous sulfate 325 (65 FE) MG tablet 1 tablet, DAILY     guaiFENesin (ROBITUSSIN) 100 mg/5 mL syrup 10 mLs, Oral, EVERY 4 HOURS PRN     latanoprost (XALATAN) 0.005 % ophthalmic solution 1 drop, Left Eye, AT BEDTIME     levothyroxine (SYNTHROID/LEVOTHROID) 25 mcg, Oral, DAILY     MEDICATION CANNOT BE REORDERED - PLEASE MANUALLY REORDER AND DISCONTINUE THE OLD ORDER 1 drop, Ophthalmic, 2 TIMES DAILY     menthol-zinc oxide (CALMOSEPTINE) 0.44-20.6 % Oint ointment Topical, PRN, Buttocks     omeprazole (PRILOSEC) 20 mg, Oral, 2 TIMES DAILY BEFORE MEALS     omeprazole 20 mg, Oral, DAILY     Propylene Glycol (SYSTANE COMPLETE OP) 1 drop, Both Eyes, 2 TIMES DAILY     Warfarin Sodium (COUMADIN PO) 7 mg, Oral, TWICE WEEKLY, Every Mon, Thu for INR 2.30 for 27 Days Next INR 9/29/21      warfarin sodium (WARFARIN ORAL) 6.5 mg, Oral, FIVE TIMES WEEKLY, Every Tue, Wed, Fri, Sat, Sun for INR 2.30 for 28 Days Next INR 9/29/21       Physical Exam:  General: Patient is alert, elderly male, no distress.    Vitals: BP (!) 144/80   Pulse 75   Temp 98.7  F (37.1  C)   Resp 18   Ht 1.778 m (5' 10\")   Wt 86.5 kg (190 lb 12.8 oz)   SpO2 100%   BMI 27.38 kg/m    HEENT: Head is NCAT. Nares negative. Oropharynx well hydrated. Right facial droop, baseline.  Neck: " No JVD.  Lungs: Non labored respirations.   : SP cath with leg bag.  Extremities: Trace LE edema is noted.  Musculoskeletal: Age related degen changes.   Skin: No open areas.   Psych: Mood appears good.      Labs:  Lab Results   Component Value Date    WBC 5.9 10/01/2020    HGB 12.9 (L) 01/14/2021    HCT 35.9 (L) 10/01/2020    MCV 85 10/01/2020     10/01/2020     Results for orders placed or performed in visit on 10/01/20   Basic Metabolic Panel   Result Value Ref Range    Sodium 138 136 - 145 mmol/L    Potassium 4.1 3.5 - 5.0 mmol/L    Chloride 102 98 - 107 mmol/L    CO2 28 22 - 31 mmol/L    Anion Gap, Calculation 8 5 - 18 mmol/L    Glucose 99 70 - 125 mg/dL    Calcium 9.1 8.5 - 10.5 mg/dL    BUN 12 8 - 28 mg/dL    Creatinine 0.79 0.70 - 1.30 mg/dL    GFR MDRD Af Amer >60 >60 mL/min/1.73m2    GFR MDRD Non Af Amer >60 >60 mL/min/1.73m2     Lab Results   Component Value Date    TSH 3.10 04/22/2020         Assessment/Plan:  1. Afib. Chronically anticoagulated with warfarin. Monitor and adjust per INR. He sees cardiology.  2. HTN. Not on BP meds, had been on lisinopril in the past. BPs satisfactory.  3. Hx of stroke. Continue statin, also on warfarin as noted above.  4. Hypothyroidism. On replacement. Last TSH within goal range.  5. Duodenal ulcer. Aug 2020 hospitalization. Seen by GI, had EGD on 8/15/2020. No active bleeding so no specific procedural intervention needed. He Is on PPI.  6. Anemia. He received transfusion in the hospital. Work up revealed duodenal ulcer, no active bleeding. Continue iron.  7. SP catheter.   8. Moles, skin cancer, left arm and upper back, removed per dermatology March/April 2021.  9. [place] Palsy, right.   10. Vision impairment. On multiple drops, follows with ophthalmology.        Electronically signed by: Nancy Fair MD

## 2021-09-27 ENCOUNTER — ANCILLARY PROCEDURE (OUTPATIENT)
Dept: CARDIOLOGY | Facility: CLINIC | Age: 86
End: 2021-09-27
Attending: INTERNAL MEDICINE
Payer: COMMERCIAL

## 2021-09-27 VITALS
WEIGHT: 191.2 LBS | BODY MASS INDEX: 27.43 KG/M2 | SYSTOLIC BLOOD PRESSURE: 140 MMHG | RESPIRATION RATE: 20 BRPM | DIASTOLIC BLOOD PRESSURE: 60 MMHG | HEART RATE: 60 BPM

## 2021-09-27 DIAGNOSIS — I05.0 MITRAL VALVE STENOSIS, UNSPECIFIED ETIOLOGY: Primary | ICD-10-CM

## 2021-09-27 DIAGNOSIS — I10 HYPERTENSION, UNSPECIFIED TYPE: ICD-10-CM

## 2021-09-27 DIAGNOSIS — Z95.0 PACEMAKER: ICD-10-CM

## 2021-09-27 DIAGNOSIS — I48.21 PERMANENT ATRIAL FIBRILLATION (H): ICD-10-CM

## 2021-09-27 PROCEDURE — 99214 OFFICE O/P EST MOD 30 MIN: CPT | Performed by: INTERNAL MEDICINE

## 2021-09-27 PROCEDURE — 93279 PRGRMG DEV EVAL PM/LDLS PM: CPT | Performed by: INTERNAL MEDICINE

## 2021-09-27 NOTE — LETTER
9/27/2021    Polly Marroquin, CNP  [address] [place] [address]    RE: Riley Rodriguez       Dear Colleague,    I had the pleasure of seeing Riley Rodriguez in the [facility].           [facility]   [address], [place] [address]   [email or web address]   OFFICE: [phone]          Thank you Polly Riley for asking the [facility] team to participate in the care of your patient, Riley Rodriguez.     Impression and Plan       1. Atrial fibrillation (permanent) ventricular response is well-controlled based on recent device interrogations. Riley's [id number] Score is at least 5 yielding an annual embolic risk of 7.2-10.0%. Patient is maintained on warfarin therapy for CVA prophylaxis.     2.  Mitral stenosis.  This was felt mild in degree an echocardiogram 1 December 2017.  Given his advanced age, likely would not pursue intervention, however, feel repeat echocardiogram would provide some prognostic value.  In addition, systolic murmur heard at right sternal border suggesting some degree of aortic stenosis as well and can evaluate that with repeat imaging.    Echocardiogram     3. Hypertension. Blood pressure is fairly slightly elevated in the office today.  Plan to simply continue current management for now.     Plan on follow-up in one year. Patient will be followed intermittently through Device [facility] as well per protocol.    30 minutes spent reviewing prior records (including documentation, laboratory studies, cardiac testing/imaging), interview with patient along with physical exam, planning, and subsequent documentation/crafting of note.           History of Present Illness    Once again I would like to thank you again for asking me to participate in the care of your patient, Riley Rodriguez.  As you know, but to reiterate for my own records, Riley Rodriguez is a 89 year old male  with permanent atrial fibrillation. He also has a history of permanent pacemaker placement.     In follow-up today, patient is without cardiovascular complaint.  He also has a history of having suffered a cerebrovascular accident and also developed Bell's palsy.  On interview, Riley continues to do well from a cardiac standpoint.  He despite his advanced age, rides a stationary bicycle 20 minutes daily.  Denies chest pain.  Breathing is comfortable.  No subjective palpitations despite permanent atrial fibrillation.  No lightheadedness.    Further review of systems is otherwise negative/noncontributory (medical record and 13 point review of systems reviewed as well and pertinent positives noted).         Cardiac Diagnostics         Echocardiogram 1 December 2017:  1. Normal left ventricular size and systolic performance with ejection fraction of 60%.  2. Mild mitral stenosis.  3. Trace aortic insufficiency.  4. Normal right ventricular size and systolic performance.  5. Severe left atrial enlargement.  Mild right atrial enlargement.  6. Right ventricular systolic pressure relative to right atrial pressure is mildly increased.  Pulmonary artery pressure estimate 35-40 mmHg plus right atrial pressure.    When compared to prior echocardiogram 10 September 2014, no significant change.    Echocardiogram 10 September 2014:  1. Normal left ventricular size and systolic performance with ejection fraction of 60%.  2. Mild concentric increased LV wall thickness.  3. Mild mitral stenosis.  4. Severe left atrial enlargement. Moderate right atrial enlargement.    Echocardiogram 7 June 2011:  1. Normal LV size and function withejection fraction of 75%.   2. No significant valvular heart disease identified.    Device interrogation 27 August 2021 ([employer] [facility] 1272 Assurity MRI device implanted 2 October 2020):  1. Presenting rhythm: ventricular pacing, rate 60 bpm.  2. battery/lead status: stable  3. Arrhythmias: since last  interrogation, no ventricular high rate episodes detected.   4. Anticoagulant: warfarin  5. Comments: normal magnet and pacemaker function.     Device interrogation 3 August 2021 ([employer] [facility] 1272 Assurity MRI device implanted 2 October 2020):  6. Type: routine remote pacemaker transmission.  7. Presenting rhythm: ventricular pacing, rate 60 bpm.  8. battery/lead status: stable  9. Arrhythmias: since 23 April 2021, no ventricular high rate episodes detected. No new noise reversions.  10. Anticoagulant: warfarin  11. Comments: normal magnet and pacemaker function.     Device interrogation 15 August 2019 ([employer] [facility] 5626 [address] XL SR device implanted 20 April 2009):  1. The rhythm appears to be atrial fibrillation with ventricular pacing.  2. Normal magnet and pacemaker function. Patient takes warfarin.    Device interrogation 23 August 2018 ([facility] [facility] [address] XL SR device implanted 20 April 2009):  1. The rhythm appears to be atrial fibrillation with ventricular pacing.  2. Normal magnet and pacemaker function. Patient takes warfarin.    Device interrogation 23 August 2018 ([employer] [facility] 5626 [address] XL SR device implanted 20 April 2009):  1. The rhythm appears to be atrial fibrillation with ventricular pacing.  2. Normal magnet and pacemaker function. Patient takes warfarin.            Physical Examination       BP (!) 140/60 (BP Location: Right arm, Patient Position: Sitting, Cuff Size: Adult Regular)   Pulse 60   Resp 20   Wt 86.7 kg (191 lb 3.2 oz)   BMI 27.43 kg/m          Wt Readings from Last 3 Encounters:   09/27/21 86.7 kg (191 lb 3.2 oz)   08/31/21 86.5 kg (190 lb 12.8 oz)   06/04/21 83.2 kg (183 lb 6.4 oz)       The patient is alert and oriented times three. Sclerae are anicteric. Mucosal membranes are moist. Jugular venous pressure is normal. No significant adenopathy/thyromegally appreciated. Lungs are diminished, but clear. On cardiovascular exam, the patient has a regular S1  and S2 (paced).  1-2/6 systolic murmur at right sternal border.  Abdomen is soft and non-tender. Extremities reveal no clubbing, cyanosis, with minimal edema.       Medications  Allergies   Current Outpatient Medications   Medication Sig Dispense Refill     acetaminophen (TYLENOL) 500 MG tablet Take 1,000 mg by mouth every 6 hours as needed for fever or pain       atorvastatin (LIPITOR) 40 MG tablet [ATORVASTATIN (LIPITOR) 40 MG TABLET] Take 1 tablet (40 mg total) by mouth at bedtime. For hx of cva 30 tablet 0     bisacodyL (DULCOLAX) 10 mg suppository [BISACODYL (DULCOLAX) 10 MG SUPPOSITORY] Insert 10 mg into the rectum daily as needed.       docusate sodium (COLACE) 100 MG capsule [DOCUSATE SODIUM (COLACE) 100 MG CAPSULE] Take 1 capsule (100 mg total) by mouth 2 (two) times a day. For constipation (Patient taking differently: Take 100 mg by mouth 2 times daily as needed for constipation ) 30 capsule 0     Docusate Sodium (DOCU LIQUID PO) Place 5 drops into both ears daily as needed prior to irrigation for cerumen removal       dorzolamide-timoloL (COSOPT) 22.3-6.8 mg/mL ophthalmic solution [DORZOLAMIDE-TIMOLOL (COSOPT) 22.3-6.8 MG/ML OPHTHALMIC SOLUTION] Administer 1 drop into the left eye 2 (two) times a day. glaucoma 10 mL 12     erythromycin base (ERYTHROMYCIN OPHT) Place 5 mg into both eyes At Bedtime Instill 1 ribbon in Left Eye and 1 ribbon in Right Eye at bedtime. Ensure ointment is given after eye drops to ensure proper absorption.       ferrous sulfate 325 (65 FE) MG tablet [FERROUS SULFATE 325 (65 FE) MG TABLET] Take 1 tablet by mouth daily.       latanoprost (XALATAN) 0.005 % ophthalmic solution [LATANOPROST (XALATAN) 0.005 % OPHTHALMIC SOLUTION] Administer 1 drop into the left eye at bedtime. glaucoma 2.5 mL 12     levothyroxine (SYNTHROID, LEVOTHROID) 25 MCG tablet [LEVOTHYROXINE (SYNTHROID, LEVOTHROID) 25 MCG TABLET] Take 1 tablet (25 mcg total) by mouth Daily at 6:00 am. Hypothyroid 30 tablet 0      MEDICATION CANNOT BE REORDERED - PLEASE MANUALLY REORDER AND DISCONTINUE THE OLD ORDER [PROPYLENE GLYCOL (SYSTANE COMPLETE) 0.6 % DROP] Apply 1 drop to eye 2 (two) times a day. Both eyes for dry eyes 1.5 mL 0     menthol-zinc oxide (CALMOSEPTINE) 0.44-20.6 % Oint ointment Apply topically as needed for skin protection (Redness) Buttocks       omeprazole (PRILOSEC) 20 MG capsule [OMEPRAZOLE (PRILOSEC) 20 MG CAPSULE] Take 1 capsule (20 mg total) by mouth 2 (two) times a day before meals. Needs two times a day for 2 months, then daily for ulcer 60 capsule 0     Propylene Glycol (SYSTANE COMPLETE OP) Place 1 drop into both eyes 2 times daily       Warfarin Sodium (COUMADIN PO) Take 7 mg by mouth twice a week Every Mon, Thu for INR 2.30 for 27 Days Next INR 9/29/21       warfarin sodium (WARFARIN ORAL) Take 6.5 mg by mouth five times a week Every Tue, Wed, Fri, Sat, Sun for INR 2.30 for 28 Days Next INR 9/29/21       acetaminophen (TYLENOL) 325 MG tablet [ACETAMINOPHEN (TYLENOL) 325 MG TABLET] Take 2 tablets (650 mg total) by mouth every 6 (six) hours as needed for pain. (Patient not taking: Reported on 9/3/2021) 30 tablet 0     bacitracin 500 UNIT/GM OINT Apply topically 2 times daily Apply to SP site topically every day and evening shift for irritation If not better notify provider.       guaiFENesin (ROBITUSSIN) 100 mg/5 mL syrup Take 10 mLs by mouth every 4 hours as needed for cough  (Patient not taking: Reported on 9/27/2021)       omeprazole 20 MG tablet Take 20 mg by mouth daily (Patient not taking: Reported on 9/27/2021)       No Known Allergies       Lab Results    Chemistry/lipid CBC Cardiac Enzymes/BNP/TSH/INR   Recent Labs   Lab Test 12/24/18  0717   CHOL 95   HDL 37*   LDL 38   TRIG 100     Recent Labs   Lab Test 12/24/18  0717 10/18/17  1115 10/19/16  1153   LDL 38 41 53     Recent Labs   Lab Test 10/01/20  0650      POTASSIUM 4.1   CHLORIDE 102   CO2 28   GLC 99   BUN 12   CR 0.79   GFRESTIMATED >60    GENO 9.1     Recent Labs   Lab Test 10/01/20  0650 08/19/20  0712 08/18/20  0717   CR 0.79 0.74 0.70     Recent Labs   Lab Test 10/23/18  1055 04/17/18  1026 10/18/17  1115   A1C 5.9 6.2* 6.1*          Recent Labs   Lab Test 01/14/21  0913 10/05/20  0625 10/01/20  0650   WBC  --   --  5.9   HGB 12.9*   < > 11.1*   HCT  --   --  35.9*   MCV  --   --  85   PLT  --   --  269    < > = values in this interval not displayed.     Recent Labs   Lab Test 01/14/21  0913 10/05/20  0625 10/01/20  0650   HGB 12.9* 10.1* 11.1*    Recent Labs   Lab Test 08/14/20  0724 08/14/20  0114 08/13/20  1936   TROPONINI 0.08 0.07 0.06     Recent Labs   Lab Test 12/20/18  0221   BNP 81     Recent Labs   Lab Test 06/07/21  0636   TSH 3.14     Recent Labs   Lab Test 09/01/21  0610 08/04/21  0848 07/07/21  0505   INR 2.30* 2.22* 2.08*        Medical History  Surgical History Family History Social History   Past Medical History:   Diagnosis Date     Atrial fibrillation (H)     On coumadin     Bell's palsy     right sided facial droop     CVA (cerebral vascular accident) (H)      Hypertension      Pacemaker      Urinary retention      Past Surgical History:   Procedure Laterality Date     HC TRANSURETHRAL ELEC-SURG PROSTATECTOM      Description: Transurethral Resection Of Prostate (TURP);  Recorded: 03/24/2008;     IR AORTIC ARCH 4 VESSEL ANGIOGRAM  1/1/2004     IR BLADDER SUPRAPUBIC CATHETER INSERTION  1/18/2019     IR MISCELLANEOUS PROCEDURE  1/1/2004     IR MISCELLANEOUS PROCEDURE  1/1/2004     IR MISCELLANEOUS PROCEDURE  1/1/2004     IR SUPRAPUBIC CATHETER PLACMENT  1/18/2019     IR THORACIC AORTOGRAM  1/1/2004     IR VISCERAL ANGIOGRAM  1/1/2004     IR VISCERAL ANGIOGRAM  1/1/2004     IR VISCERAL ANGIOGRAM  1/1/2004     IR VISCERAL ANGIOGRAM  1/1/2004     IR VISCERAL ANGIOGRAM  1/1/2004     IR VISCERAL ANGIOGRAM  1/1/2004     IR VISCERAL ANGIOGRAM  1/1/2004     VT ESOPHAGOGASTRODUODENOSCOPY TRANSORAL DIAGNOSTIC N/A 8/15/2020    Procedure:  ESOPHAGOGASTRODUODENOSCOPY (EGD) with biopsy;  Surgeon: Paxton Villalpando MD;  Location: [facility];  Service: Gastroenterology     SD PARTIAL EXCISION THYROID,UNILAT      Description: Thyroid Surgery Sub-Total Thyroidectomy;  Recorded: 03/24/2008;     REMV PILONIDAL LESION SIMPLE      Description: Pilonidal Cyst Resection;  Recorded: 03/24/2008;     TOTAL HIP ARTHROPLASTY Right      Family History   Problem Relation Age of Onset     Chronic Obstructive Pulmonary Disease Father         Social History     Socioeconomic History     Marital status:      Spouse name: Not on file     Number of children: Not on file     Years of education: Not on file     Highest education level: Not on file   Occupational History     Not on file   Tobacco Use     Smoking status: Former Smoker     Smokeless tobacco: Never Used   Substance and Sexual Activity     Alcohol use: No     Drug use: No     Sexual activity: Not on file   Other Topics Concern     Not on file   Social History Narrative    03/07/15 - The patient lives alone in his own home.     Social Determinants of Health     Financial Resource Strain:      Difficulty of Paying Living Expenses:    Food Insecurity:      Worried About Running Out of Food in the Last Year:      Ran Out of Food in the Last Year:    Transportation Needs:      Lack of Transportation (Medical):      Lack of Transportation (Non-Medical):    Physical Activity:      Days of Exercise per Week:      Minutes of Exercise per Session:    Stress:      Feeling of Stress :    Social Connections:      Frequency of Communication with Friends and Family:      Frequency of Social Gatherings with Friends and Family:      Attends Confucianist Services:      Active Member of Clubs or Organizations:      Attends Club or Organization Meetings:      Marital Status:    Intimate Partner Violence:      Fear of Current or Ex-Partner:      Emotionally Abused:      Physically Abused:      Sexually Abused:                        Thank you for allowing me to participate in the care of your patient.      Sincerely,     Panchito Carrillo MD     [facility]  cc:   Alejandro Rob MD  [facility]  [address] [address]

## 2021-09-27 NOTE — PROGRESS NOTES
In clinic device check with Dr Carrillo.  Please see link for full device report.  Patient was informed of results and next follow up during today's visit.

## 2021-09-27 NOTE — PROGRESS NOTES
[facility] HEART CARE [address], [place] [address]   [email or web address]   OFFICE: [phone]          Thank you Polly Riley for asking the [facility] Heart Care team to participate in the care of your patient, Riley Rodriguez.     Impression and Plan       1. Atrial fibrillation (permanent) ventricular response is well-controlled based on recent device interrogations. Riley's [id number] Score is at least 5 yielding an annual embolic risk of 7.2-10.0%. Patient is maintained on warfarin therapy for CVA prophylaxis.     2.  Mitral stenosis.  This was felt mild in degree an echocardiogram 1 December 2017.  Given his advanced age, likely would not pursue intervention, however, feel repeat echocardiogram would provide some prognostic value.  In addition, systolic murmur heard at right sternal border suggesting some degree of aortic stenosis as well and can evaluate that with repeat imaging.    Echocardiogram     3. Hypertension. Blood pressure is fairly slightly elevated in the office today.  Plan to simply continue current management for now.     Plan on follow-up in one year. Patient will be followed intermittently through [facility] as well per protocol.    30 minutes spent reviewing prior records (including documentation, laboratory studies, cardiac testing/imaging), interview with patient along with physical exam, planning, and subsequent documentation/crafting of note.           History of Present Illness    Once again I would like to thank you again for asking me to participate in the care of your patient, Riley Rodriguez.  As you know, but to reiterate for my own records, Riley Rodriguez is a 89 year old male with permanent atrial fibrillation. He also has a history of permanent pacemaker placement.     In follow-up today, patient is without cardiovascular complaint.  He also has a history of having suffered a cerebrovascular accident and also  developed Bell's palsy.  On interview, Riley continues to do well from a cardiac standpoint.  He despite his advanced age, rides a stationary bicycle 20 minutes daily.  Denies chest pain.  Breathing is comfortable.  No subjective palpitations despite permanent atrial fibrillation.  No lightheadedness.    Further review of systems is otherwise negative/noncontributory (medical record and 13 point review of systems reviewed as well and pertinent positives noted).         Cardiac Diagnostics         Echocardiogram 1 December 2017:  1. Normal left ventricular size and systolic performance with ejection fraction of 60%.  2. Mild mitral stenosis.  3. Trace aortic insufficiency.  4. Normal right ventricular size and systolic performance.  5. Severe left atrial enlargement.  Mild right atrial enlargement.  6. Right ventricular systolic pressure relative to right atrial pressure is mildly increased.  Pulmonary artery pressure estimate 35-40 mmHg plus right atrial pressure.    When compared to prior echocardiogram 10 September 2014, no significant change.    Echocardiogram 10 September 2014:  1. Normal left ventricular size and systolic performance with ejection fraction of 60%.  2. Mild concentric increased LV wall thickness.  3. Mild mitral stenosis.  4. Severe left atrial enlargement. Moderate right atrial enlargement.    Echocardiogram 7 June 2011:  1. Normal LV size and function withejection fraction of 75%.   2. No significant valvular heart disease identified.    Device interrogation 27 August 2021 ([facility] [facility] 1272 Assurity MRI device implanted 2 October 2020):  1. Presenting rhythm: ventricular pacing, rate 60 bpm.  2. battery/lead status: stable  3. Arrhythmias: since last interrogation, no ventricular high rate episodes detected.   4. Anticoagulant: warfarin  5. Comments: normal magnet and pacemaker function.     Device interrogation 3 August 2021 ([employer] [facility] 1272 Assurity MRI device implanted 2 October  2020):  6. Type: routine remote pacemaker transmission.  7. Presenting rhythm: ventricular pacing, rate 60 bpm.  8. battery/lead status: stable  9. Arrhythmias: since 23 April 2021, no ventricular high rate episodes detected. No new noise reversions.  10. Anticoagulant: warfarin  11. Comments: normal magnet and pacemaker function.     Device interrogation 15 August 2019 ([facility] [facility] 5626 [address]den XL SR device implanted 20 April 2009):  1. The rhythm appears to be atrial fibrillation with ventricular pacing.  2. Normal magnet and pacemaker function. Patient takes warfarin.    Device interrogation 23 August 2018 ([facility] 5626 [address]den XL SR device implanted 20 April 2009):  1. The rhythm appears to be atrial fibrillation with ventricular pacing.  2. Normal magnet and pacemaker function. Patient takes warfarin.    Device interrogation 23 August 2018 ([facility] [facility] 5626 [address]den XL SR device implanted 20 April 2009):  1. The rhythm appears to be atrial fibrillation with ventricular pacing.  2. Normal magnet and pacemaker function. Patient takes warfarin.            Physical Examination       BP (!) 140/60 (BP Location: Right arm, Patient Position: Sitting, Cuff Size: Adult Regular)   Pulse 60   Resp 20   Wt 86.7 kg (191 lb 3.2 oz)   BMI 27.43 kg/m          Wt Readings from Last 3 Encounters:   09/27/21 86.7 kg (191 lb 3.2 oz)   08/31/21 86.5 kg (190 lb 12.8 oz)   06/04/21 83.2 kg (183 lb 6.4 oz)       The patient is alert and oriented times three. Sclerae are anicteric. Mucosal membranes are moist. Jugular venous pressure is normal. No significant adenopathy/thyromegally appreciated. Lungs are diminished, but clear. On cardiovascular exam, the patient has a regular S1 and S2 (paced).  1-2/6 systolic murmur at right sternal border.  Abdomen is soft and non-tender. Extremities reveal no clubbing, cyanosis, with minimal edema.       Medications  Allergies   Current Outpatient Medications   Medication Sig  Dispense Refill     acetaminophen (TYLENOL) 500 MG tablet Take 1,000 mg by mouth every 6 hours as needed for fever or pain       atorvastatin (LIPITOR) 40 MG tablet [ATORVASTATIN (LIPITOR) 40 MG TABLET] Take 1 tablet (40 mg total) by mouth at bedtime. For hx of cva 30 tablet 0     bisacodyL (DULCOLAX) 10 mg suppository [BISACODYL (DULCOLAX) 10 MG SUPPOSITORY] Insert 10 mg into the rectum daily as needed.       docusate sodium (COLACE) 100 MG capsule [DOCUSATE SODIUM (COLACE) 100 MG CAPSULE] Take 1 capsule (100 mg total) by mouth 2 (two) times a day. For constipation (Patient taking differently: Take 100 mg by mouth 2 times daily as needed for constipation ) 30 capsule 0     Docusate Sodium (DOCU LIQUID PO) Place 5 drops into both ears daily as needed prior to irrigation for cerumen removal       dorzolamide-timoloL (COSOPT) 22.3-6.8 mg/mL ophthalmic solution [DORZOLAMIDE-TIMOLOL (COSOPT) 22.3-6.8 MG/ML OPHTHALMIC SOLUTION] Administer 1 drop into the left eye 2 (two) times a day. glaucoma 10 mL 12     erythromycin base (ERYTHROMYCIN OPHT) Place 5 mg into both eyes At Bedtime Instill 1 ribbon in Left Eye and 1 ribbon in Right Eye at bedtime. Ensure ointment is given after eye drops to ensure proper absorption.       ferrous sulfate 325 (65 FE) MG tablet [FERROUS SULFATE 325 (65 FE) MG TABLET] Take 1 tablet by mouth daily.       latanoprost (XALATAN) 0.005 % ophthalmic solution [LATANOPROST (XALATAN) 0.005 % OPHTHALMIC SOLUTION] Administer 1 drop into the left eye at bedtime. glaucoma 2.5 mL 12     levothyroxine (SYNTHROID, LEVOTHROID) 25 MCG tablet [LEVOTHYROXINE (SYNTHROID, LEVOTHROID) 25 MCG TABLET] Take 1 tablet (25 mcg total) by mouth Daily at 6:00 am. Hypothyroid 30 tablet 0     MEDICATION CANNOT BE REORDERED - PLEASE MANUALLY REORDER AND DISCONTINUE THE OLD ORDER [PROPYLENE GLYCOL (SYSTANE COMPLETE) 0.6 % DROP] Apply 1 drop to eye 2 (two) times a day. Both eyes for dry eyes 1.5 mL 0     menthol-zinc oxide  (CALMOSEPTINE) 0.44-20.6 % Oint ointment Apply topically as needed for skin protection (Redness) Buttocks       omeprazole (PRILOSEC) 20 MG capsule [OMEPRAZOLE (PRILOSEC) 20 MG CAPSULE] Take 1 capsule (20 mg total) by mouth 2 (two) times a day before meals. Needs two times a day for 2 months, then daily for ulcer 60 capsule 0     Propylene Glycol (SYSTANE COMPLETE OP) Place 1 drop into both eyes 2 times daily       Warfarin Sodium (COUMADIN PO) Take 7 mg by mouth twice a week Every Mon, Thu for INR 2.30 for 27 Days Next INR 9/29/21       warfarin sodium (WARFARIN ORAL) Take 6.5 mg by mouth five times a week Every Tue, Wed, Fri, Sat, Sun for INR 2.30 for 28 Days Next INR 9/29/21       acetaminophen (TYLENOL) 325 MG tablet [ACETAMINOPHEN (TYLENOL) 325 MG TABLET] Take 2 tablets (650 mg total) by mouth every 6 (six) hours as needed for pain. (Patient not taking: Reported on 9/3/2021) 30 tablet 0     bacitracin 500 UNIT/GM OINT Apply topically 2 times daily Apply to SP site topically every day and evening shift for irritation If not better notify provider.       guaiFENesin (ROBITUSSIN) 100 mg/5 mL syrup Take 10 mLs by mouth every 4 hours as needed for cough  (Patient not taking: Reported on 9/27/2021)       omeprazole 20 MG tablet Take 20 mg by mouth daily (Patient not taking: Reported on 9/27/2021)       No Known Allergies       Lab Results    Chemistry/lipid CBC Cardiac Enzymes/BNP/TSH/INR   Recent Labs   Lab Test 12/24/18  0717   CHOL 95   HDL 37*   LDL 38   TRIG 100     Recent Labs   Lab Test 12/24/18  0717 10/18/17  1115 10/19/16  1153   LDL 38 41 53     Recent Labs   Lab Test 10/01/20  0650      POTASSIUM 4.1   CHLORIDE 102   CO2 28   GLC 99   BUN 12   CR 0.79   GFRESTIMATED >60   GENO 9.1     Recent Labs   Lab Test 10/01/20  0650 08/19/20  0712 08/18/20  0717   CR 0.79 0.74 0.70     Recent Labs   Lab Test 10/23/18  1055 04/17/18  1026 10/18/17  1115   A1C 5.9 6.2* 6.1*          Recent Labs   Lab Test  01/14/21  0913 10/05/20  0625 10/01/20  0650   WBC  --   --  5.9   HGB 12.9*   < > 11.1*   HCT  --   --  35.9*   MCV  --   --  85   PLT  --   --  269    < > = values in this interval not displayed.     Recent Labs   Lab Test 01/14/21  0913 10/05/20  0625 10/01/20  0650   HGB 12.9* 10.1* 11.1*    Recent Labs   Lab Test 08/14/20  0724 08/14/20  0114 08/13/20  1936   TROPONINI 0.08 0.07 0.06     Recent Labs   Lab Test 12/20/18  0221   BNP 81     Recent Labs   Lab Test 06/07/21  0636   TSH 3.14     Recent Labs   Lab Test 09/01/21  0610 08/04/21  0848 07/07/21  0505   INR 2.30* 2.22* 2.08*        Medical History  Surgical History Family History Social History   Past Medical History:   Diagnosis Date     Atrial fibrillation (H)     On coumadin     Bell's palsy     right sided facial droop     CVA (cerebral vascular accident) (H)      Hypertension      Pacemaker      Urinary retention      Past Surgical History:   Procedure Laterality Date     HC TRANSURETHRAL ELEC-SURG PROSTATECTOM      Description: Transurethral Resection Of Prostate (TURP);  Recorded: 03/24/2008;     IR AORTIC ARCH 4 VESSEL ANGIOGRAM  1/1/2004     IR BLADDER SUPRAPUBIC CATHETER INSERTION  1/18/2019     IR MISCELLANEOUS PROCEDURE  1/1/2004     IR MISCELLANEOUS PROCEDURE  1/1/2004     IR MISCELLANEOUS PROCEDURE  1/1/2004     IR SUPRAPUBIC CATHETER PLACMENT  1/18/2019     IR THORACIC AORTOGRAM  1/1/2004     IR VISCERAL ANGIOGRAM  1/1/2004     IR VISCERAL ANGIOGRAM  1/1/2004     IR VISCERAL ANGIOGRAM  1/1/2004     IR VISCERAL ANGIOGRAM  1/1/2004     IR VISCERAL ANGIOGRAM  1/1/2004     IR VISCERAL ANGIOGRAM  1/1/2004     IR VISCERAL ANGIOGRAM  1/1/2004     AL ESOPHAGOGASTRODUODENOSCOPY TRANSORAL DIAGNOSTIC N/A 8/15/2020    Procedure: ESOPHAGOGASTRODUODENOSCOPY (EGD) with biopsy;  Surgeon: Paxton Villalpando MD;  Location: [facility];  Service: Gastroenterology     AL PARTIAL EXCISION THYROID,UNILAT      Description: Thyroid Surgery Sub-Total  Thyroidectomy;  Recorded: 03/24/2008;     REMV PILONIDAL LESION SIMPLE      Description: Pilonidal Cyst Resection;  Recorded: 03/24/2008;     TOTAL HIP ARTHROPLASTY Right      Family History   Problem Relation Age of Onset     Chronic Obstructive Pulmonary Disease Father         Social History     Socioeconomic History     Marital status:      Spouse name: Not on file     Number of children: Not on file     Years of education: Not on file     Highest education level: Not on file   Occupational History     Not on file   Tobacco Use     Smoking status: Former Smoker     Smokeless tobacco: Never Used   Substance and Sexual Activity     Alcohol use: No     Drug use: No     Sexual activity: Not on file   Other Topics Concern     Not on file   Social History Narrative    03/07/15 - The patient lives alone in his own home.     Social Determinants of Health     Financial Resource Strain:      Difficulty of Paying Living Expenses:    Food Insecurity:      Worried About Running Out of Food in the Last Year:      Ran Out of Food in the Last Year:    Transportation Needs:      Lack of Transportation (Medical):      Lack of Transportation (Non-Medical):    Physical Activity:      Days of Exercise per Week:      Minutes of Exercise per Session:    Stress:      Feeling of Stress :    Social Connections:      Frequency of Communication with Friends and Family:      Frequency of Social Gatherings with Friends and Family:      Attends Taoist Services:      Active Member of Clubs or Organizations:      Attends Club or Organization Meetings:      Marital Status:    Intimate Partner Violence:      Fear of Current or Ex-Partner:      Emotionally Abused:      Physically Abused:      Sexually Abused:

## 2021-09-28 ENCOUNTER — LAB REQUISITION (OUTPATIENT)
Dept: LAB | Facility: CLINIC | Age: 86
End: 2021-09-28
Payer: COMMERCIAL

## 2021-09-28 DIAGNOSIS — I48.91 UNSPECIFIED ATRIAL FIBRILLATION (H): ICD-10-CM

## 2021-09-29 LAB
INR PPP: 2.41 (ref 0.85–1.15)
MDC_IDC_LEAD_IMPLANT_DT: NORMAL
MDC_IDC_LEAD_LOCATION: NORMAL
MDC_IDC_LEAD_LOCATION_DETAIL_1: NORMAL
MDC_IDC_LEAD_MFG: NORMAL
MDC_IDC_LEAD_MODEL: NORMAL
MDC_IDC_LEAD_POLARITY_TYPE: NORMAL
MDC_IDC_LEAD_SERIAL: NORMAL
MDC_IDC_MSMT_BATTERY_DTM: NORMAL
MDC_IDC_MSMT_BATTERY_REMAINING_LONGEVITY: 132 MO
MDC_IDC_MSMT_BATTERY_STATUS: NORMAL
MDC_IDC_MSMT_BATTERY_VOLTAGE: 3.02 V
MDC_IDC_MSMT_LEADCHNL_RV_IMPEDANCE_VALUE: 350 OHM
MDC_IDC_MSMT_LEADCHNL_RV_PACING_THRESHOLD_AMPLITUDE: 0.75 V
MDC_IDC_MSMT_LEADCHNL_RV_PACING_THRESHOLD_AMPLITUDE: 0.75 V
MDC_IDC_MSMT_LEADCHNL_RV_PACING_THRESHOLD_PULSEWIDTH: 0.5 MS
MDC_IDC_MSMT_LEADCHNL_RV_PACING_THRESHOLD_PULSEWIDTH: 0.5 MS
MDC_IDC_MSMT_LEADCHNL_RV_SENSING_INTR_AMPL: 12 MV
MDC_IDC_PG_IMPLANT_DTM: NORMAL
MDC_IDC_PG_MFG: NORMAL
MDC_IDC_PG_MODEL: NORMAL
MDC_IDC_PG_SERIAL: NORMAL
MDC_IDC_PG_TYPE: NORMAL
MDC_IDC_SESS_CLINIC_NAME: NORMAL
MDC_IDC_SESS_DTM: NORMAL
MDC_IDC_SESS_TYPE: NORMAL
MDC_IDC_SET_BRADY_HYSTRATE: NORMAL
MDC_IDC_SET_BRADY_LOWRATE: 60 {BEATS}/MIN
MDC_IDC_SET_BRADY_MAX_SENSOR_RATE: 100 {BEATS}/MIN
MDC_IDC_SET_BRADY_MODE: NORMAL
MDC_IDC_SET_BRADY_NIGHT_RATE: NORMAL
MDC_IDC_SET_LEADCHNL_RV_PACING_AMPLITUDE: NORMAL
MDC_IDC_SET_LEADCHNL_RV_PACING_CAPTURE_MODE: NORMAL
MDC_IDC_SET_LEADCHNL_RV_PACING_POLARITY: NORMAL
MDC_IDC_SET_LEADCHNL_RV_PACING_PULSEWIDTH: 0.5 MS
MDC_IDC_SET_LEADCHNL_RV_SENSING_POLARITY: NORMAL
MDC_IDC_SET_LEADCHNL_RV_SENSING_SENSITIVITY: 2.5 MV
MDC_IDC_STAT_BRADY_DTM_END: NORMAL
MDC_IDC_STAT_BRADY_DTM_START: NORMAL
MDC_IDC_STAT_BRADY_RV_PERCENT_PACED: 90 %

## 2021-09-29 PROCEDURE — 85610 PROTHROMBIN TIME: CPT | Mod: ORL | Performed by: FAMILY MEDICINE

## 2021-09-29 PROCEDURE — 36415 COLL VENOUS BLD VENIPUNCTURE: CPT | Mod: ORL | Performed by: FAMILY MEDICINE

## 2021-09-29 PROCEDURE — P9604 ONE-WAY ALLOW PRORATED TRIP: HCPCS | Mod: ORL | Performed by: FAMILY MEDICINE

## 2021-10-05 ENCOUNTER — LAB REQUISITION (OUTPATIENT)
Dept: LAB | Facility: CLINIC | Age: 86
End: 2021-10-05
Payer: COMMERCIAL

## 2021-10-05 DIAGNOSIS — I48.91 UNSPECIFIED ATRIAL FIBRILLATION (H): ICD-10-CM

## 2021-10-06 LAB — INR PPP: 2.62 (ref 0.85–1.15)

## 2021-10-06 PROCEDURE — 85610 PROTHROMBIN TIME: CPT | Mod: ORL | Performed by: FAMILY MEDICINE

## 2021-10-06 PROCEDURE — 36415 COLL VENOUS BLD VENIPUNCTURE: CPT | Mod: ORL | Performed by: FAMILY MEDICINE

## 2021-10-06 PROCEDURE — P9604 ONE-WAY ALLOW PRORATED TRIP: HCPCS | Mod: ORL | Performed by: FAMILY MEDICINE

## 2021-10-13 ENCOUNTER — NURSING HOME VISIT (OUTPATIENT)
Dept: GERIATRICS | Facility: CLINIC | Age: 86
End: 2021-10-13
Payer: COMMERCIAL

## 2021-10-13 VITALS
TEMPERATURE: 98.2 F | HEIGHT: 70 IN | WEIGHT: 190 LBS | BODY MASS INDEX: 27.2 KG/M2 | SYSTOLIC BLOOD PRESSURE: 128 MMHG | OXYGEN SATURATION: 99 % | RESPIRATION RATE: 18 BRPM | HEART RATE: 62 BPM | DIASTOLIC BLOOD PRESSURE: 62 MMHG

## 2021-10-13 DIAGNOSIS — D64.9 ANEMIA, UNSPECIFIED TYPE: ICD-10-CM

## 2021-10-13 DIAGNOSIS — I48.20 CHRONIC ATRIAL FIBRILLATION (H): Primary | ICD-10-CM

## 2021-10-13 DIAGNOSIS — E03.9 HYPOTHYROIDISM, UNSPECIFIED TYPE: ICD-10-CM

## 2021-10-13 PROCEDURE — 99309 SBSQ NF CARE MODERATE MDM 30: CPT | Performed by: NURSE PRACTITIONER

## 2021-10-13 ASSESSMENT — MIFFLIN-ST. JEOR: SCORE: 1503.08

## 2021-10-13 NOTE — PROGRESS NOTES
[facility]    Facility:   [facility] [[id number]]   Code Status: DNR      CHIEF COMPLAINT/REASON FOR VISIT:  Chief Complaint   Patient presents with     FVP Care Coordination - Annual        HISTORY:      HPI: Riley is a 89 y.o. male  now residing in the LTC at [facility].  He is  with history of A. fib on warfarin develop acute CVA from holding warfarin for right gluteal hematoma.  Currently back on Coumadin, Hypertension , urinary retention has a suprapubic catheter and with a pacemaker.       Today he being seen  for an annual  visit. He was seen in his room.  He had no concerns.  He denied CP or shortness of breath, resting constipation or diarrhea he is able to move all extremities.  He is eating well and reports no difficulties with swallowing.  His suprapubic is intact and draining clear yellow urine.  TSH rechecked on 6/7/2021 is 3.14.  Last hemoglobin 1/14/2021 was 12.9.    He was with no lower extremity edema and his weights have been stable over the last month.    Past Medical History:   Diagnosis Date     Atrial fibrillation (H)     On coumadin     Bell's palsy     right sided facial droop     CVA (cerebral vascular accident) (H)      Hypertension      Pacemaker      Urinary retention              Family History   Problem Relation Age of Onset     COPD Father      Social History     Socioeconomic History     Marital status:      Spouse name: Not on file     Number of children: Not on file     Years of education: Not on file     Highest education level: Not on file   Occupational History     Not on file   Social Needs     Financial resource strain: Not on file     Food insecurity     Worry: Not on file     Inability: Not on file     Transportation needs     Medical: Not on file     Non-medical: Not on file   Tobacco Use     Smoking status: Former Smoker     Smokeless tobacco: Never Used   Substance and Sexual Activity     Alcohol use:  No     Drug use: No     Sexual activity: Not on file   Lifestyle     Physical activity     Days per week: Not on file     Minutes per session: Not on file     Stress: Not on file   Relationships     Social connections     Talks on phone: Not on file     Gets together: Not on file     Attends Mu-ism service: Not on file     Active member of club or organization: Not on file     Attends meetings of clubs or organizations: Not on file     Relationship status: Not on file     Intimate partner violence     Fear of current or ex partner: Not on file     Emotionally abused: Not on file     Physically abused: Not on file     Forced sexual activity: Not on file   Other Topics Concern     Not on file   Social History Narrative    03/07/15 - The patient lives alone in his own home.         Review of Systems  Eyes:  He is followed  closely by opthalmology.  He has had multiple eye  procedures   Constitutional: Negative for activity change and fatigue. Negative for appetite change, chills and fever.   HENT: Negative for congestion and sore throat.    Eyes: positive  for visual disturbance. right sided facial paralysis   Respiratory: Negative for cough, shortness of breath and wheezing.    Cardiovascular: Negative for chest pain and leg swelling.        Pacemaker   Gastrointestinal: Negative for abdominal distention, abdominal pain, constipation, diarrhea and nausea.   Genitourinary: Negative for dysuria.   Musculoskeletal: Negative for arthralgias and myalgias.   Skin: Negative for color change, rash and wound.   Neurological: Negative for dizziness, weakness and numbness.   Psychiatric/Behavioral: Negative for agitation, behavioral problems and sleep disturbance.   Vitals:    06/04/21 0824   BP: 122/70   Pulse: 60   Resp: 18   Temp: 98.5  F (36.9  C)   SpO2: 99%   Weight: 183 lb 6.4 oz (83.2 kg)       Constitutional: He appears well-developed and well-nourished.   Pleasant gentleman   HENT:   Head: Normocephalic and  atraumatic.   Eyes: Conjunctivae are normal. Pupils are equal, round, and reactive to light. poor vision with the right worse than the left right sided facial paralysis   Neck: Normal range of motion. Neck supple.   Cardiovascular: Normal rate, regular rhythm and normal heart sounds.   No murmur heard.  Pulmonary/Chest: Effort normal and breath sounds normal. He has no wheezes. He has no rales.   Abdominal: Soft. Bowel sounds are normal. He exhibits no distension. There is no tenderness.   Genitourinary: suprapubic catheter  Musculoskeletal: Normal range of motion. He exhibits no edema.   Neurological: He is alert.   Skin: Skin is warm and dry.   Psychiatric: He has a normal mood and affect. His behavior is normal.     LABS:   No results found for this or any previous visit (from the past 240 hour(s)).     Current Outpatient Medications   Medication Sig     acetaminophen (TYLENOL) 325 MG tablet Take 2 tablets (650 mg total) by mouth every 6 (six) hours as needed for pain.     atorvastatin (LIPITOR) 40 MG tablet Take 1 tablet (40 mg total) by mouth at bedtime. For hx of cva     bisacodyL (DULCOLAX) 10 mg suppository Insert 10 mg into the rectum daily as needed.     docusate sodium (COLACE) 100 MG capsule Take 1 capsule (100 mg total) by mouth 2 (two) times a day. For constipation (Patient taking differently: Take 100 mg by mouth daily. And daily PRN.)     dorzolamide-timoloL (COSOPT) 22.3-6.8 mg/mL ophthalmic solution Administer 1 drop into the left eye 2 (two) times a day. glaucoma     erythromycin base (ERYTHROMYCIN OPHT) Apply 5 mg to eye. Instill 1 ribbon in left eye at HS and right eye four times a day     ferrous sulfate 325 (65 FE) MG tablet Take 1 tablet by mouth daily.     guaiFENesin (ROBITUSSIN) 100 mg/5 mL syrup Take 200 mg by mouth every 4 (four) hours as needed for cough.     latanoprost (XALATAN) 0.005 % ophthalmic solution Administer 1 drop into the left eye at bedtime. glaucoma     levothyroxine  (SYNTHROID, LEVOTHROID) 25 MCG tablet Take 1 tablet (25 mcg total) by mouth Daily at 6:00 am. Hypothyroid     menthol-zinc oxide (CALMOSEPTINE) 0.44-20.6 % Oint ointment Apply 1 application topically as needed.     omeprazole (PRILOSEC) 20 MG capsule Take 1 capsule (20 mg total) by mouth 2 (two) times a day before meals. Needs two times a day for 2 months, then daily for ulcer     propylene glycol (SYSTANE COMPLETE) 0.6 % Drop Apply 1 drop to eye 2 (two) times a day. Both eyes for dry eyes     warfarin sodium (WARFARIN ORAL) Take by mouth. 4/15/21 INR 2.81 Cont 7mg M & TH, 6.5mg AOD. Next INR 5/5 4/5/21 INR 2.39 Cont 7mg M & Th, 6.5mg AOD. Next INR 4/14  4/1/21 INR 2.44 Cont 7mg M & Th, 6.5mg AOD. Next INR 4/5.  3/11/21 INR 2.53 Cont 7 mg M, Th and 6.5 mg AOD.  Next INR 4/8/21.  2/11/21 INR 2.87 Cont 7mg M, Th and 6.5mg AOD. Next INR 3/11     Case Management:  I have reviewed the facility/SNF care plan/MDS which was done on 9/28/21 including the falls risk, nutrition and pain screening. I also reviewed the current immunizations, and preventive care.. Future cancer screening is not clinically indicated secondary to age/goals of care.   Patient's desire to return to the community is not assessible due to cognitive impairment.    Information reviewed:  Medications, vital signs, orders, and nursing notes.    ASSESSMENT:      ICD-10-CM    1. Chronic atrial fibrillation (H)  I48.20    2. Hypothyroidism, unspecified type  E03.9    3. Seborrheic Keratosis  L82.1        PLAN:      HX Right sided Merrill Palsy . Pt currently on eye lubricating drops multiple times a day.       Suprapubic catheter- cleanse daily, monitor for infection around  the site.  Cares per protocol     Atrial fibrillation on coumadin and lisinopril,        Anticoagulation management-  Continue Coumadin monitor and adjust per INRS      Poor vision- is followed closely by ophthalmology.  on multiple eye gtts.      Hypothyroidism- on Synthroid, TSH 6/7/2021  was 3.14    Multiple moles on back removed by dermatology- healed     Anemia- HGB 1/14/21 was 12.9    Electronically signed by: Polly Marroquin CNP

## 2021-10-13 NOTE — LETTER
10/13/2021        RE: Riley Rodriguez  [address]  [address]  [facility] [id number]        [facility]    Facility:   [facility] [[id number]]   Code Status: DNR      CHIEF COMPLAINT/REASON FOR VISIT:  Chief Complaint   Patient presents with     FVP Care Coordination - Annual        HISTORY:      HPI: Riley is a 89 y.o. male  now residing in the LTC at [facility].  He is  with history of A. fib on warfarin develop acute CVA from holding warfarin for right gluteal hematoma.  Currently back on Coumadin, Hypertension , urinary retention has a suprapubic catheter and with a pacemaker.       Today he being seen  for an annual  visit. He was seen in his room.  He had no concerns.  He denied CP or shortness of breath, resting constipation or diarrhea he is able to move all extremities.  He is eating well and reports no difficulties with swallowing.  His suprapubic is intact and draining clear yellow urine.  TSH rechecked on 6/7/2021 is 3.14.  Last hemoglobin 1/14/2021 was 12.9.    He was with no lower extremity edema and his weights have been stable over the last month.    Past Medical History:   Diagnosis Date     Atrial fibrillation (H)     On coumadin     Bell's palsy     right sided facial droop     CVA (cerebral vascular accident) (H)      Hypertension      Pacemaker      Urinary retention              Family History   Problem Relation Age of Onset     COPD Father      Social History     Socioeconomic History     Marital status:      Spouse name: Not on file     Number of children: Not on file     Years of education: Not on file     Highest education level: Not on file   Occupational History     Not on file   Social Needs     Financial resource strain: Not on file     Food insecurity     Worry: Not on file     Inability: Not on file     Transportation needs     Medical: Not on file     Non-medical: Not on file   Tobacco Use     Smoking  status: Former Smoker     Smokeless tobacco: Never Used   Substance and Sexual Activity     Alcohol use: No     Drug use: No     Sexual activity: Not on file   Lifestyle     Physical activity     Days per week: Not on file     Minutes per session: Not on file     Stress: Not on file   Relationships     Social connections     Talks on phone: Not on file     Gets together: Not on file     Attends [employer] service: Not on file     Active member of club or organization: Not on file     Attends meetings of clubs or organizations: Not on file     Relationship status: Not on file     Intimate partner violence     Fear of current or ex partner: Not on file     Emotionally abused: Not on file     Physically abused: Not on file     Forced sexual activity: Not on file   Other Topics Concern     Not on file   Social History Narrative    03/07/15 - The patient lives alone in his own home.         Review of Systems  Eyes:  He is followed  closely by opthalmology.  He has had multiple eye  procedures   Constitutional: Negative for activity change and fatigue. Negative for appetite change, chills and fever.   HENT: Negative for congestion and sore throat.    Eyes: positive  for visual disturbance. right sided facial paralysis   Respiratory: Negative for cough, shortness of breath and wheezing.    Cardiovascular: Negative for chest pain and leg swelling.        Pacemaker   Gastrointestinal: Negative for abdominal distention, abdominal pain, constipation, diarrhea and nausea.   Genitourinary: Negative for dysuria.   Musculoskeletal: Negative for arthralgias and myalgias.   Skin: Negative for color change, rash and wound.   Neurological: Negative for dizziness, weakness and numbness.   Psychiatric/Behavioral: Negative for agitation, behavioral problems and sleep disturbance.   Vitals:    06/04/21 0824   BP: 122/70   Pulse: 60   Resp: 18   Temp: 98.5  F (36.9  C)   SpO2: 99%   Weight: 183 lb 6.4 oz (83.2 kg)       Constitutional: He  appears well-developed and well-nourished.   Pleasant gentleman   HENT:   Head: Normocephalic and atraumatic.   Eyes: Conjunctivae are normal. Pupils are equal, round, and reactive to light. poor vision with the right worse than the left right sided facial paralysis   Neck: Normal range of motion. Neck supple.   Cardiovascular: Normal rate, regular rhythm and normal heart sounds.   No murmur heard.  Pulmonary/Chest: Effort normal and breath sounds normal. He has no wheezes. He has no rales.   Abdominal: Soft. Bowel sounds are normal. He exhibits no distension. There is no tenderness.   Genitourinary: suprapubic catheter  Musculoskeletal: Normal range of motion. He exhibits no edema.   Neurological: He is alert.   Skin: Skin is warm and dry.   Psychiatric: He has a normal mood and affect. His behavior is normal.     LABS:   No results found for this or any previous visit (from the past 240 hour(s)).     Current Outpatient Medications   Medication Sig     acetaminophen (TYLENOL) 325 MG tablet Take 2 tablets (650 mg total) by mouth every 6 (six) hours as needed for pain.     atorvastatin (LIPITOR) 40 MG tablet Take 1 tablet (40 mg total) by mouth at bedtime. For hx of cva     bisacodyL (DULCOLAX) 10 mg suppository Insert 10 mg into the rectum daily as needed.     docusate sodium (COLACE) 100 MG capsule Take 1 capsule (100 mg total) by mouth 2 (two) times a day. For constipation (Patient taking differently: Take 100 mg by mouth daily. And daily PRN.)     dorzolamide-timoloL (COSOPT) 22.3-6.8 mg/mL ophthalmic solution Administer 1 drop into the left eye 2 (two) times a day. glaucoma     erythromycin base (ERYTHROMYCIN OPHT) Apply 5 mg to eye. Instill 1 ribbon in left eye at HS and right eye four times a day     ferrous sulfate 325 (65 FE) MG tablet Take 1 tablet by mouth daily.     guaiFENesin (ROBITUSSIN) 100 mg/5 mL syrup Take 200 mg by mouth every 4 (four) hours as needed for cough.     latanoprost (XALATAN) 0.005 %  ophthalmic solution Administer 1 drop into the left eye at bedtime. glaucoma     levothyroxine (SYNTHROID, LEVOTHROID) 25 MCG tablet Take 1 tablet (25 mcg total) by mouth Daily at 6:00 am. Hypothyroid     menthol-zinc oxide (CALMOSEPTINE) 0.44-20.6 % Oint ointment Apply 1 application topically as needed.     omeprazole (PRILOSEC) 20 MG capsule Take 1 capsule (20 mg total) by mouth 2 (two) times a day before meals. Needs two times a day for 2 months, then daily for ulcer     propylene glycol (SYSTANE COMPLETE) 0.6 % Drop Apply 1 drop to eye 2 (two) times a day. Both eyes for dry eyes     warfarin sodium (WARFARIN ORAL) Take by mouth. 4/15/21 INR 2.81 Cont 7mg M & TH, 6.5mg AOD. Next INR 5/5  4/5/21 INR 2.39 Cont 7mg M & Th, 6.5mg AOD. Next INR 4/14  4/1/21 INR 2.44 Cont 7mg M & Th, 6.5mg AOD. Next INR 4/5.  3/11/21 INR 2.53 Cont 7 mg M, Th and 6.5 mg AOD.  Next INR 4/8/21.  2/11/21 INR 2.87 Cont 7mg M, Th and 6.5mg AOD. Next INR 3/11     Case Management:  I have reviewed the facility/SNF care plan/MDS which was done on 9/28/21 including the falls risk, nutrition and pain screening. I also reviewed the current immunizations, and preventive care.. Future cancer screening is not clinically indicated secondary to age/goals of care.   Patient's desire to return to the community is not assessible due to cognitive impairment.    Information reviewed:  Medications, vital signs, orders, and nursing notes.    ASSESSMENT:      ICD-10-CM    1. Chronic atrial fibrillation (H)  I48.20    2. Hypothyroidism, unspecified type  E03.9    3. Seborrheic Keratosis  L82.1        PLAN:      HX Right sided [place] Palsy . Pt currently on eye lubricating drops multiple times a day.       Suprapubic catheter- cleanse daily, monitor for infection around  the site.  Cares per protocol     Atrial fibrillation on coumadin and lisinopril,        Anticoagulation management-  Continue Coumadin monitor and adjust per INRS      Poor vision- is followed  closely by ophthalmology.  on multiple eye gtts.      Hypothyroidism- on Synthroid, TSH 6/7/2021 was 3.14    Multiple moles on back removed by dermatology- healed     Anemia- HGB 1/14/21 was 12.9    Electronically signed by: Polly Marroquin CNP          Sincerely,        Polly Marroquin, CNP

## 2021-10-14 ENCOUNTER — HOSPITAL ENCOUNTER (OUTPATIENT)
Dept: CARDIOLOGY | Facility: HOSPITAL | Age: 86
Discharge: HOME | End: 2021-10-14
Attending: INTERNAL MEDICINE | Admitting: INTERNAL MEDICINE
Payer: COMMERCIAL

## 2021-10-14 DIAGNOSIS — I05.0 MITRAL VALVE STENOSIS, UNSPECIFIED ETIOLOGY: ICD-10-CM

## 2021-10-14 LAB — LVEF ECHO: NORMAL

## 2021-10-14 PROCEDURE — 93306 TTE W/DOPPLER COMPLETE: CPT | Mod: 26 | Performed by: INTERNAL MEDICINE

## 2021-10-14 PROCEDURE — 255N000002 HC RX 255 OP 636: Performed by: INTERNAL MEDICINE

## 2021-10-14 RX ADMIN — PERFLUTREN 4 ML: 6.52 INJECTION, SUSPENSION INTRAVENOUS at 08:30

## 2021-11-09 ENCOUNTER — LAB REQUISITION (OUTPATIENT)
Dept: LAB | Facility: CLINIC | Age: 86
End: 2021-11-09
Payer: MEDICAID

## 2021-11-09 DIAGNOSIS — I48.91 UNSPECIFIED ATRIAL FIBRILLATION (H): ICD-10-CM

## 2021-11-10 LAB — INR PPP: 2.27 (ref 0.85–1.15)

## 2021-11-10 PROCEDURE — 36415 COLL VENOUS BLD VENIPUNCTURE: CPT | Mod: ORL | Performed by: FAMILY MEDICINE

## 2021-11-10 PROCEDURE — 85610 PROTHROMBIN TIME: CPT | Mod: ORL | Performed by: FAMILY MEDICINE

## 2021-11-10 PROCEDURE — P9604 ONE-WAY ALLOW PRORATED TRIP: HCPCS | Mod: ORL | Performed by: FAMILY MEDICINE

## 2021-11-19 ENCOUNTER — LAB REQUISITION (OUTPATIENT)
Dept: LAB | Facility: CLINIC | Age: 86
End: 2021-11-19
Payer: COMMERCIAL

## 2021-11-19 DIAGNOSIS — Z11.59 ENCOUNTER FOR SCREENING FOR OTHER VIRAL DISEASES: ICD-10-CM

## 2021-11-19 PROCEDURE — U0003 INFECTIOUS AGENT DETECTION BY NUCLEIC ACID (DNA OR RNA); SEVERE ACUTE RESPIRATORY SYNDROME CORONAVIRUS 2 (SARS-COV-2) (CORONAVIRUS DISEASE [COVID-19]), AMPLIFIED PROBE TECHNIQUE, MAKING USE OF HIGH THROUGHPUT TECHNOLOGIES AS DESCRIBED BY CMS-2020-01-R: HCPCS | Mod: ORL | Performed by: FAMILY MEDICINE

## 2021-11-21 LAB — SARS-COV-2 RNA RESP QL NAA+PROBE: NEGATIVE

## 2021-11-23 PROCEDURE — U0005 INFEC AGEN DETEC AMPLI PROBE: HCPCS | Mod: ORL | Performed by: FAMILY MEDICINE

## 2021-11-24 ENCOUNTER — LAB REQUISITION (OUTPATIENT)
Dept: LAB | Facility: CLINIC | Age: 86
End: 2021-11-24
Payer: COMMERCIAL

## 2021-11-24 DIAGNOSIS — Z11.59 ENCOUNTER FOR SCREENING FOR OTHER VIRAL DISEASES: ICD-10-CM

## 2021-11-25 LAB — SARS-COV-2 RNA RESP QL NAA+PROBE: NEGATIVE

## 2021-11-26 ENCOUNTER — LAB REQUISITION (OUTPATIENT)
Dept: LAB | Facility: CLINIC | Age: 86
End: 2021-11-26
Payer: COMMERCIAL

## 2021-11-26 DIAGNOSIS — U07.1 COVID-19: ICD-10-CM

## 2021-11-26 PROCEDURE — U0005 INFEC AGEN DETEC AMPLI PROBE: HCPCS | Mod: ORL | Performed by: FAMILY MEDICINE

## 2021-11-28 LAB — SARS-COV-2 RNA RESP QL NAA+PROBE: NEGATIVE

## 2021-11-30 ENCOUNTER — LAB REQUISITION (OUTPATIENT)
Dept: LAB | Facility: CLINIC | Age: 86
End: 2021-11-30
Payer: COMMERCIAL

## 2021-11-30 DIAGNOSIS — U07.1 COVID-19: ICD-10-CM

## 2021-11-30 PROCEDURE — U0003 INFECTIOUS AGENT DETECTION BY NUCLEIC ACID (DNA OR RNA); SEVERE ACUTE RESPIRATORY SYNDROME CORONAVIRUS 2 (SARS-COV-2) (CORONAVIRUS DISEASE [COVID-19]), AMPLIFIED PROBE TECHNIQUE, MAKING USE OF HIGH THROUGHPUT TECHNOLOGIES AS DESCRIBED BY CMS-2020-01-R: HCPCS | Mod: ORL | Performed by: FAMILY MEDICINE

## 2021-12-01 LAB — SARS-COV-2 RNA RESP QL NAA+PROBE: NEGATIVE

## 2021-12-03 PROCEDURE — U0005 INFEC AGEN DETEC AMPLI PROBE: HCPCS | Mod: ORL | Performed by: FAMILY MEDICINE

## 2021-12-04 ENCOUNTER — LAB REQUISITION (OUTPATIENT)
Dept: LAB | Facility: CLINIC | Age: 86
End: 2021-12-04
Payer: COMMERCIAL

## 2021-12-04 DIAGNOSIS — Z11.59 ENCOUNTER FOR SCREENING FOR OTHER VIRAL DISEASES: ICD-10-CM

## 2021-12-05 LAB — SARS-COV-2 RNA RESP QL NAA+PROBE: NEGATIVE

## 2021-12-07 ENCOUNTER — LAB REQUISITION (OUTPATIENT)
Dept: LAB | Facility: CLINIC | Age: 86
End: 2021-12-07
Payer: MEDICAID

## 2021-12-07 ENCOUNTER — LAB REQUISITION (OUTPATIENT)
Dept: LAB | Facility: CLINIC | Age: 86
End: 2021-12-07
Payer: COMMERCIAL

## 2021-12-07 DIAGNOSIS — Z11.59 ENCOUNTER FOR SCREENING FOR OTHER VIRAL DISEASES: ICD-10-CM

## 2021-12-07 DIAGNOSIS — I48.91 UNSPECIFIED ATRIAL FIBRILLATION (H): ICD-10-CM

## 2021-12-07 PROCEDURE — U0003 INFECTIOUS AGENT DETECTION BY NUCLEIC ACID (DNA OR RNA); SEVERE ACUTE RESPIRATORY SYNDROME CORONAVIRUS 2 (SARS-COV-2) (CORONAVIRUS DISEASE [COVID-19]), AMPLIFIED PROBE TECHNIQUE, MAKING USE OF HIGH THROUGHPUT TECHNOLOGIES AS DESCRIBED BY CMS-2020-01-R: HCPCS | Mod: ORL | Performed by: FAMILY MEDICINE

## 2021-12-08 ENCOUNTER — TELEPHONE (OUTPATIENT)
Dept: GERIATRICS | Facility: CLINIC | Age: 86
End: 2021-12-08

## 2021-12-08 LAB
INR PPP: 2.46 (ref 0.85–1.15)
SARS-COV-2 RNA RESP QL NAA+PROBE: NEGATIVE

## 2021-12-08 PROCEDURE — P9604 ONE-WAY ALLOW PRORATED TRIP: HCPCS | Mod: ORL | Performed by: FAMILY MEDICINE

## 2021-12-08 PROCEDURE — 85610 PROTHROMBIN TIME: CPT | Mod: ORL | Performed by: FAMILY MEDICINE

## 2021-12-08 PROCEDURE — 36415 COLL VENOUS BLD VENIPUNCTURE: CPT | Mod: ORL | Performed by: FAMILY MEDICINE

## 2021-12-08 NOTE — TELEPHONE ENCOUNTER
[facility] INR Nurse Triage    Provider: YECENIA Olivera  Facility: [facility] Type:  [facility]    Caller: Zakiya  Call Back Number: [phone]  Reason for call: INR  Diagnosis/Goal: A. Fib    Todays INR: 2.46  Last INR 2.27 (11/10), 7 mg po on M,TH and 6.5 mg AOD.    Heparin/Lovenox:  No  Currently on ABX?: No  Other interacting medication:  None  Missed or refused doses: No    Verbal Order/Direction given by Provider: Continue same dose of Coumadin and recheck INR in a month (1/5/22).    Provider Giving Order:  YECENIA Olivera    Verbal Order given to: Zakiya Jose RN

## 2021-12-09 PROCEDURE — U0003 INFECTIOUS AGENT DETECTION BY NUCLEIC ACID (DNA OR RNA); SEVERE ACUTE RESPIRATORY SYNDROME CORONAVIRUS 2 (SARS-COV-2) (CORONAVIRUS DISEASE [COVID-19]), AMPLIFIED PROBE TECHNIQUE, MAKING USE OF HIGH THROUGHPUT TECHNOLOGIES AS DESCRIBED BY CMS-2020-01-R: HCPCS | Mod: ORL | Performed by: FAMILY MEDICINE

## 2021-12-10 ENCOUNTER — LAB REQUISITION (OUTPATIENT)
Dept: LAB | Facility: CLINIC | Age: 86
End: 2021-12-10
Payer: COMMERCIAL

## 2021-12-10 DIAGNOSIS — Z11.59 ENCOUNTER FOR SCREENING FOR OTHER VIRAL DISEASES: ICD-10-CM

## 2021-12-11 LAB — SARS-COV-2 RNA RESP QL NAA+PROBE: NEGATIVE

## 2021-12-14 ENCOUNTER — LAB REQUISITION (OUTPATIENT)
Dept: LAB | Facility: CLINIC | Age: 86
End: 2021-12-14
Payer: COMMERCIAL

## 2021-12-14 DIAGNOSIS — Z11.59 ENCOUNTER FOR SCREENING FOR OTHER VIRAL DISEASES: ICD-10-CM

## 2021-12-14 PROCEDURE — U0005 INFEC AGEN DETEC AMPLI PROBE: HCPCS | Mod: ORL | Performed by: FAMILY MEDICINE

## 2021-12-15 ENCOUNTER — TRANSFERRED RECORDS (OUTPATIENT)
Dept: HEALTH INFORMATION MANAGEMENT | Facility: CLINIC | Age: 86
End: 2021-12-15
Payer: MEDICAID

## 2021-12-15 LAB — SARS-COV-2 RNA RESP QL NAA+PROBE: NEGATIVE

## 2021-12-16 ENCOUNTER — LAB REQUISITION (OUTPATIENT)
Dept: LAB | Facility: CLINIC | Age: 86
End: 2021-12-16
Payer: COMMERCIAL

## 2021-12-16 DIAGNOSIS — Z11.59 ENCOUNTER FOR SCREENING FOR OTHER VIRAL DISEASES: ICD-10-CM

## 2021-12-17 ENCOUNTER — LAB REQUISITION (OUTPATIENT)
Dept: LAB | Facility: CLINIC | Age: 86
End: 2021-12-17
Payer: COMMERCIAL

## 2021-12-17 DIAGNOSIS — Z11.59 ENCOUNTER FOR SCREENING FOR OTHER VIRAL DISEASES: ICD-10-CM

## 2021-12-17 PROCEDURE — U0003 INFECTIOUS AGENT DETECTION BY NUCLEIC ACID (DNA OR RNA); SEVERE ACUTE RESPIRATORY SYNDROME CORONAVIRUS 2 (SARS-COV-2) (CORONAVIRUS DISEASE [COVID-19]), AMPLIFIED PROBE TECHNIQUE, MAKING USE OF HIGH THROUGHPUT TECHNOLOGIES AS DESCRIBED BY CMS-2020-01-R: HCPCS | Mod: ORL | Performed by: FAMILY MEDICINE

## 2021-12-18 LAB — SARS-COV-2 RNA RESP QL NAA+PROBE: NEGATIVE

## 2021-12-23 ENCOUNTER — NURSING HOME VISIT (OUTPATIENT)
Dept: GERIATRICS | Facility: CLINIC | Age: 86
End: 2021-12-23
Payer: COMMERCIAL

## 2021-12-23 VITALS
DIASTOLIC BLOOD PRESSURE: 71 MMHG | RESPIRATION RATE: 18 BRPM | WEIGHT: 193.2 LBS | HEART RATE: 60 BPM | BODY MASS INDEX: 27.66 KG/M2 | HEIGHT: 70 IN | TEMPERATURE: 98.5 F | OXYGEN SATURATION: 99 % | SYSTOLIC BLOOD PRESSURE: 138 MMHG

## 2021-12-23 DIAGNOSIS — E03.9 HYPOTHYROIDISM, UNSPECIFIED TYPE: ICD-10-CM

## 2021-12-23 DIAGNOSIS — I48.0 PAROXYSMAL ATRIAL FIBRILLATION (H): Primary | ICD-10-CM

## 2021-12-23 DIAGNOSIS — Z86.73 HISTORY OF STROKE: ICD-10-CM

## 2021-12-23 DIAGNOSIS — I10 ESSENTIAL HYPERTENSION: ICD-10-CM

## 2021-12-23 PROCEDURE — 99309 SBSQ NF CARE MODERATE MDM 30: CPT | Performed by: FAMILY MEDICINE

## 2021-12-23 ASSESSMENT — MIFFLIN-ST. JEOR: SCORE: 1517.6

## 2021-12-23 NOTE — LETTER
12/23/2021        RE: Riley Rodriguez  [address]  [address]  [facility] [id number]          [facility]    [place] Medical Record Number:  [id number]  Place of Service where encounter took place: [facility] () [[id number]]   CODE STATUS:   DNR / DNI    Chief Complaint:  Chief Complaint   Patient presents with     snf Regulatory     LTC 12/23/2021. Afib on warfarin. General debilitation. Hypothyroidism. Hx of stroke. Chronic SP catheter.        HPI:   Riley is a 89 y.o. male seen for routine physician follow up in LTC at [facility]. He has hx of Afib on warfarin, prior stroke, BPH, HTN, [place] palsy, hypothyroidism, SP catheter. He was last hospitalized 8/13/2020-8/19/2020. He had labs drawn in NH as he was not doing well with general fatigue, decreased appetite. No respiratory sx. He started feeling a little better on his own but then labs came back with hgb down to 6.2. He was worked up in the hospital found to have duodenal ulcer.       Today:  Riley has been stable. He has not been ill recently with cough cold or congestion. He continues on warfarin for hx of afib, rates controlled. No sign bleeding. He is on omeprazole 20 due to hx of ulcer. No complaints of abdominal pain today. He has suprapubic catheter, changed routinely on 12/21/2021 but the following day had some discomfort and so it was changed again and now seems okay. He has not had any medication changes. No complaints at all today. He is pleasant and cooperative with staff. He has not had any falls. He is [place], baseline, has baseline vision impairment, on multiple eye drops. He is able to ambulate with walker. Appetite is good, weights stable.       Past Medical History:  Past Medical History:   Diagnosis Date     Atrial fibrillation (H)     On coumadin     Bell's palsy     right sided facial droop     CVA (cerebral vascular accident) (H)      Hypertension      Pacemaker       Urinary retention        Medications:  Current Outpatient Medications   Medication Sig Dispense Refill     acetaminophen (TYLENOL) 325 MG tablet [ACETAMINOPHEN (TYLENOL) 325 MG TABLET] Take 2 tablets (650 mg total) by mouth every 6 (six) hours as needed for pain. (Patient not taking: Reported on 9/3/2021) 30 tablet 0     acetaminophen (TYLENOL) 500 MG tablet Take 1,000 mg by mouth every 6 hours as needed for fever or pain       atorvastatin (LIPITOR) 40 MG tablet [ATORVASTATIN (LIPITOR) 40 MG TABLET] Take 1 tablet (40 mg total) by mouth at bedtime. For hx of cva 30 tablet 0     bacitracin 500 UNIT/GM OINT Apply topically 2 times daily Apply to SP site topically every day and evening shift for irritation If not better notify provider.       bisacodyL (DULCOLAX) 10 mg suppository [BISACODYL (DULCOLAX) 10 MG SUPPOSITORY] Insert 10 mg into the rectum daily as needed.       docusate sodium (COLACE) 100 MG capsule [DOCUSATE SODIUM (COLACE) 100 MG CAPSULE] Take 1 capsule (100 mg total) by mouth 2 (two) times a day. For constipation (Patient taking differently: Take 100 mg by mouth 2 times daily as needed for constipation ) 30 capsule 0     Docusate Sodium (DOCU LIQUID PO) Place 5 drops into both ears daily as needed prior to irrigation for cerumen removal       dorzolamide-timoloL (COSOPT) 22.3-6.8 mg/mL ophthalmic solution [DORZOLAMIDE-TIMOLOL (COSOPT) 22.3-6.8 MG/ML OPHTHALMIC SOLUTION] Administer 1 drop into the left eye 2 (two) times a day. glaucoma 10 mL 12     erythromycin base (ERYTHROMYCIN OPHT) Place 5 mg into both eyes At Bedtime Instill 1 ribbon in Left Eye and 1 ribbon in Right Eye at bedtime. Ensure ointment is given after eye drops to ensure proper absorption.       ferrous sulfate 325 (65 FE) MG tablet [FERROUS SULFATE 325 (65 FE) MG TABLET] Take 1 tablet by mouth daily.       guaiFENesin (ROBITUSSIN) 100 mg/5 mL syrup Take 10 mLs by mouth every 4 hours as needed for cough  (Patient not taking: Reported on  "9/27/2021)       latanoprost (XALATAN) 0.005 % ophthalmic solution [LATANOPROST (XALATAN) 0.005 % OPHTHALMIC SOLUTION] Administer 1 drop into the left eye at bedtime. glaucoma 2.5 mL 12     levothyroxine (SYNTHROID, LEVOTHROID) 25 MCG tablet [LEVOTHYROXINE (SYNTHROID, LEVOTHROID) 25 MCG TABLET] Take 1 tablet (25 mcg total) by mouth Daily at 6:00 am. Hypothyroid 30 tablet 0     MEDICATION CANNOT BE REORDERED - PLEASE MANUALLY REORDER AND DISCONTINUE THE OLD ORDER [PROPYLENE GLYCOL (SYSTANE COMPLETE) 0.6 % DROP] Apply 1 drop to eye 2 (two) times a day. Both eyes for dry eyes 1.5 mL 0     menthol-zinc oxide (CALMOSEPTINE) 0.44-20.6 % Oint ointment Apply topically as needed for skin protection (Redness) Buttocks       omeprazole (PRILOSEC) 20 MG capsule [OMEPRAZOLE (PRILOSEC) 20 MG CAPSULE] Take 1 capsule (20 mg total) by mouth 2 (two) times a day before meals. Needs two times a day for 2 months, then daily for ulcer 60 capsule 0     omeprazole 20 MG tablet Take 20 mg by mouth daily (Patient not taking: Reported on 9/27/2021)       Propylene Glycol (SYSTANE COMPLETE OP) Place 1 drop into both eyes 2 times daily          Physical Exam:  General: Patient is alert, elderly male, no distress.    Vitals: /71   Pulse 60   Temp 98.5  F (36.9  C)   Resp 18   Ht 1.778 m (5' 10\")   Wt 87.6 kg (193 lb 3.2 oz)   SpO2 99%   BMI 27.72 kg/m    HEENT: Head is NCAT. Nares negative. Oropharynx well hydrated. Right facial droop, baseline.  Neck: No JVD.  Lungs: Non labored respirations.   : SP cath with leg bag.  Extremities: Trace LE edema is noted.  Musculoskeletal: Age related degen changes.   Skin: No open areas.   Psych: Mood appears good.      Labs:  Lab Results   Component Value Date    WBC 5.9 10/01/2020    HGB 12.9 (L) 01/14/2021    HCT 35.9 (L) 10/01/2020    MCV 85 10/01/2020     10/01/2020     Results for orders placed or performed in visit on 10/01/20   Basic Metabolic Panel   Result Value Ref Range    " Sodium 138 136 - 145 mmol/L    Potassium 4.1 3.5 - 5.0 mmol/L    Chloride 102 98 - 107 mmol/L    CO2 28 22 - 31 mmol/L    Anion Gap, Calculation 8 5 - 18 mmol/L    Glucose 99 70 - 125 mg/dL    Calcium 9.1 8.5 - 10.5 mg/dL    BUN 12 8 - 28 mg/dL    Creatinine 0.79 0.70 - 1.30 mg/dL    GFR MDRD Af Amer >60 >60 mL/min/1.73m2    GFR MDRD Non Af Amer >60 >60 mL/min/1.73m2     Lab Results   Component Value Date    TSH 3.10 04/22/2020       Component      Latest Ref Rng & Units 4/22/2020 6/7/2021   TSH      0.30 - 5.00 uIU/mL 3.10 3.14     Component      Latest Ref Rng & Units 9/8/2020 10/1/2020 10/5/2020 1/14/2021                Hemoglobin      14.0 - 18.0 g/dL 7.9 (L) 11.1 (L) 10.1 (L) 12.9 (L)         Assessment/Plan:  1. Afib. Chronically anticoagulated with warfarin. Monitor and adjust per INR. Rate is controlled.   2. HTN. Not on BP meds, had been on lisinopril in the past. BPs satisfactory, systolics 118-138.  3. Hx of stroke. Continue statin, also on warfarin as noted above.  4. Hypothyroidism. On replacement. Last TSH within goal range.  5. Duodenal ulcer. Aug 2020 hospitalization. Seen by GI, had EGD on 8/15/2020. No active bleeding so no specific procedural intervention needed. He Is on PPI.  6. Anemia. He received transfusion in the hospital. Work up revealed duodenal ulcer, no active bleeding. Continue iron.  7. SP catheter. Long term.   8. Moles, skin cancer, left arm and upper back, removed per dermatology March/April 2021.  9. [place] Palsy, right.         Electronically signed by: Nancy Fair MD               Sincerely,        Nancy Fair MD

## 2021-12-28 PROCEDURE — U0005 INFEC AGEN DETEC AMPLI PROBE: HCPCS | Mod: ORL | Performed by: FAMILY MEDICINE

## 2021-12-29 ENCOUNTER — LAB REQUISITION (OUTPATIENT)
Dept: LAB | Facility: CLINIC | Age: 86
End: 2021-12-29
Payer: MEDICAID

## 2021-12-29 DIAGNOSIS — Z11.59 ENCOUNTER FOR SCREENING FOR OTHER VIRAL DISEASES: ICD-10-CM

## 2021-12-30 LAB — SARS-COV-2 RNA RESP QL NAA+PROBE: NEGATIVE

## 2021-12-30 PROCEDURE — U0005 INFEC AGEN DETEC AMPLI PROBE: HCPCS | Mod: ORL | Performed by: FAMILY MEDICINE

## 2021-12-31 ENCOUNTER — LAB REQUISITION (OUTPATIENT)
Dept: LAB | Facility: CLINIC | Age: 86
End: 2021-12-31
Payer: COMMERCIAL

## 2021-12-31 ENCOUNTER — ANCILLARY PROCEDURE (OUTPATIENT)
Dept: CARDIOLOGY | Facility: CLINIC | Age: 86
End: 2021-12-31
Attending: INTERNAL MEDICINE
Payer: COMMERCIAL

## 2021-12-31 DIAGNOSIS — Z11.59 ENCOUNTER FOR SCREENING FOR OTHER VIRAL DISEASES: ICD-10-CM

## 2021-12-31 DIAGNOSIS — I49.5 SICK SINUS SYNDROME (H): ICD-10-CM

## 2021-12-31 DIAGNOSIS — Z95.0 PACEMAKER: ICD-10-CM

## 2021-12-31 LAB
MDC_IDC_LEAD_IMPLANT_DT: NORMAL
MDC_IDC_LEAD_LOCATION: NORMAL
MDC_IDC_LEAD_LOCATION_DETAIL_1: NORMAL
MDC_IDC_LEAD_MFG: NORMAL
MDC_IDC_LEAD_MODEL: NORMAL
MDC_IDC_LEAD_POLARITY_TYPE: NORMAL
MDC_IDC_LEAD_SERIAL: NORMAL
MDC_IDC_MSMT_BATTERY_DTM: NORMAL
MDC_IDC_MSMT_BATTERY_REMAINING_LONGEVITY: 139 MO
MDC_IDC_MSMT_BATTERY_REMAINING_PERCENTAGE: 95.5 %
MDC_IDC_MSMT_BATTERY_RRT_TRIGGER: NORMAL
MDC_IDC_MSMT_BATTERY_STATUS: NORMAL
MDC_IDC_MSMT_BATTERY_VOLTAGE: 3.02 V
MDC_IDC_MSMT_LEADCHNL_RV_IMPEDANCE_VALUE: 350 OHM
MDC_IDC_MSMT_LEADCHNL_RV_LEAD_CHANNEL_STATUS: NORMAL
MDC_IDC_MSMT_LEADCHNL_RV_PACING_THRESHOLD_AMPLITUDE: 0.62 V
MDC_IDC_MSMT_LEADCHNL_RV_PACING_THRESHOLD_PULSEWIDTH: 0.5 MS
MDC_IDC_MSMT_LEADCHNL_RV_SENSING_INTR_AMPL: 12 MV
MDC_IDC_PG_IMPLANT_DTM: NORMAL
MDC_IDC_PG_MFG: NORMAL
MDC_IDC_PG_MODEL: NORMAL
MDC_IDC_PG_SERIAL: NORMAL
MDC_IDC_PG_TYPE: NORMAL
MDC_IDC_SESS_CLINIC_NAME: NORMAL
MDC_IDC_SESS_DTM: NORMAL
MDC_IDC_SESS_REPROGRAMMED: NO
MDC_IDC_SESS_TYPE: NORMAL
MDC_IDC_SET_BRADY_LOWRATE: 60 {BEATS}/MIN
MDC_IDC_SET_BRADY_MAX_SENSOR_RATE: 100 {BEATS}/MIN
MDC_IDC_SET_BRADY_MODE: NORMAL
MDC_IDC_SET_LEADCHNL_RV_PACING_AMPLITUDE: 0.88
MDC_IDC_SET_LEADCHNL_RV_PACING_ANODE_ELECTRODE_1: NORMAL
MDC_IDC_SET_LEADCHNL_RV_PACING_ANODE_LOCATION_1: NORMAL
MDC_IDC_SET_LEADCHNL_RV_PACING_CAPTURE_MODE: NORMAL
MDC_IDC_SET_LEADCHNL_RV_PACING_CATHODE_ELECTRODE_1: NORMAL
MDC_IDC_SET_LEADCHNL_RV_PACING_CATHODE_LOCATION_1: NORMAL
MDC_IDC_SET_LEADCHNL_RV_PACING_POLARITY: NORMAL
MDC_IDC_SET_LEADCHNL_RV_PACING_PULSEWIDTH: 0.5 MS
MDC_IDC_SET_LEADCHNL_RV_SENSING_ADAPTATION_MODE: NORMAL
MDC_IDC_SET_LEADCHNL_RV_SENSING_ANODE_ELECTRODE_1: NORMAL
MDC_IDC_SET_LEADCHNL_RV_SENSING_ANODE_LOCATION_1: NORMAL
MDC_IDC_SET_LEADCHNL_RV_SENSING_CATHODE_ELECTRODE_1: NORMAL
MDC_IDC_SET_LEADCHNL_RV_SENSING_CATHODE_LOCATION_1: NORMAL
MDC_IDC_SET_LEADCHNL_RV_SENSING_POLARITY: NORMAL
MDC_IDC_SET_LEADCHNL_RV_SENSING_SENSITIVITY: 2.5 MV
MDC_IDC_STAT_AT_DTM_END: NORMAL
MDC_IDC_STAT_AT_DTM_START: NORMAL
MDC_IDC_STAT_BRADY_DTM_END: NORMAL
MDC_IDC_STAT_BRADY_DTM_START: NORMAL
MDC_IDC_STAT_BRADY_RV_PERCENT_PACED: 97 %
MDC_IDC_STAT_CRT_DTM_END: NORMAL
MDC_IDC_STAT_CRT_DTM_START: NORMAL
MDC_IDC_STAT_HEART_RATE_DTM_END: NORMAL
MDC_IDC_STAT_HEART_RATE_DTM_START: NORMAL
MDC_IDC_STAT_HEART_RATE_VENTRICULAR_MAX: 200 {BEATS}/MIN
MDC_IDC_STAT_HEART_RATE_VENTRICULAR_MEAN: 66 {BEATS}/MIN
MDC_IDC_STAT_HEART_RATE_VENTRICULAR_MIN: 50 {BEATS}/MIN

## 2021-12-31 PROCEDURE — 93294 REM INTERROG EVL PM/LDLS PM: CPT | Performed by: INTERNAL MEDICINE

## 2021-12-31 PROCEDURE — 93296 REM INTERROG EVL PM/IDS: CPT | Performed by: INTERNAL MEDICINE

## 2022-01-01 LAB — SARS-COV-2 RNA RESP QL NAA+PROBE: NEGATIVE

## 2022-01-03 ENCOUNTER — LAB REQUISITION (OUTPATIENT)
Dept: LAB | Facility: CLINIC | Age: 87
End: 2022-01-03
Payer: COMMERCIAL

## 2022-01-03 DIAGNOSIS — Z11.59 ENCOUNTER FOR SCREENING FOR OTHER VIRAL DISEASES: ICD-10-CM

## 2022-01-03 DIAGNOSIS — I48.91 UNSPECIFIED ATRIAL FIBRILLATION (H): ICD-10-CM

## 2022-01-04 PROCEDURE — U0005 INFEC AGEN DETEC AMPLI PROBE: HCPCS | Mod: ORL | Performed by: FAMILY MEDICINE

## 2022-01-05 ENCOUNTER — TELEPHONE (OUTPATIENT)
Dept: GERIATRICS | Facility: CLINIC | Age: 87
End: 2022-01-05

## 2022-01-05 LAB
INR PPP: 2.81 (ref 0.85–1.15)
SARS-COV-2 RNA RESP QL NAA+PROBE: NORMAL

## 2022-01-05 PROCEDURE — 85610 PROTHROMBIN TIME: CPT | Mod: ORL | Performed by: FAMILY MEDICINE

## 2022-01-05 PROCEDURE — 36415 COLL VENOUS BLD VENIPUNCTURE: CPT | Mod: ORL | Performed by: FAMILY MEDICINE

## 2022-01-05 PROCEDURE — P9604 ONE-WAY ALLOW PRORATED TRIP: HCPCS | Mod: ORL | Performed by: FAMILY MEDICINE

## 2022-01-05 NOTE — CONFIDENTIAL NOTE
[facility] Triage Nurse INR     Provider: YECENIA Olivera  Facility: [facility] Type:  LTC    Caller: Zakiya  Call Back Number: [phone]  Reason for call: INR  Diagnosis/Goal: A. Fib    Todays INR: 2.81  Last INR 2.46 (12/8), 7 mg po on Mon,TH and 6.5 mg AOD.    Heparin/Lovenox:  No  Currently on ABX?: No  Other interacting medication:  None  Missed or refused doses: No    Verbal Order/Direction given by Provider: Continue same Coumadin dose and recheck INR in a month (2/2/22).    Provider Giving Order:  YECENIA Olivera    Verbal Order given to: Chance Jose RN

## 2022-01-06 ENCOUNTER — LAB REQUISITION (OUTPATIENT)
Dept: LAB | Facility: CLINIC | Age: 87
End: 2022-01-06
Payer: COMMERCIAL

## 2022-01-06 DIAGNOSIS — Z11.59 ENCOUNTER FOR SCREENING FOR OTHER VIRAL DISEASES: ICD-10-CM

## 2022-01-06 LAB — SARS-COV-2 RNA RESP QL NAA+PROBE: NOT DETECTED

## 2022-01-07 PROCEDURE — U0005 INFEC AGEN DETEC AMPLI PROBE: HCPCS | Mod: ORL | Performed by: FAMILY MEDICINE

## 2022-01-08 NOTE — PROGRESS NOTES
[facility] SERVICES    [facility] Medical Record Number:  [id number]  Place of Service where encounter took place: [facility] () [[id number]]   CODE STATUS:   DNR / DNI    Chief Complaint:  Chief Complaint   Patient presents with     [facility] Regulatory     LTC 12/23/2021. Afib on warfarin. General debilitation. Hypothyroidism. Hx of stroke. Chronic SP catheter.        HPI:   Riley is a 89 y.o. male seen for routine physician follow up in LTC at [facility]. He has hx of Afib on warfarin, prior stroke, BPH, HTN, [place] palsy, hypothyroidism, SP catheter. He was last hospitalized 8/13/2020-8/19/2020. He had labs drawn in NH as he was not doing well with general fatigue, decreased appetite. No respiratory sx. He started feeling a little better on his own but then labs came back with hgb down to 6.2. He was worked up in the hospital found to have duodenal ulcer.       Today:  Riley has been stable. He has not been ill recently with cough cold or congestion. He continues on warfarin for hx of afib, rates controlled. No sign bleeding. He is on omeprazole 20 due to hx of ulcer. No complaints of abdominal pain today. He has suprapubic catheter, changed routinely on 12/21/2021 but the following day had some discomfort and so it was changed again and now seems okay. He has not had any medication changes. No complaints at all today. He is pleasant and cooperative with staff. He has not had any falls. He is [place], baseline, has baseline vision impairment, on multiple eye drops. He is able to ambulate with walker. Appetite is good, weights stable.       Past Medical History:  Past Medical History:   Diagnosis Date     Atrial fibrillation (H)     On coumadin     Bell's palsy     right sided facial droop     CVA (cerebral vascular accident) (H)      Hypertension      Pacemaker      Urinary retention        Medications:  Current Outpatient Medications   Medication Sig Dispense Refill      acetaminophen (TYLENOL) 325 MG tablet [ACETAMINOPHEN (TYLENOL) 325 MG TABLET] Take 2 tablets (650 mg total) by mouth every 6 (six) hours as needed for pain. (Patient not taking: Reported on 9/3/2021) 30 tablet 0     acetaminophen (TYLENOL) 500 MG tablet Take 1,000 mg by mouth every 6 hours as needed for fever or pain       atorvastatin (LIPITOR) 40 MG tablet [ATORVASTATIN (LIPITOR) 40 MG TABLET] Take 1 tablet (40 mg total) by mouth at bedtime. For hx of cva 30 tablet 0     bacitracin 500 UNIT/GM OINT Apply topically 2 times daily Apply to SP site topically every day and evening shift for irritation If not better notify provider.       bisacodyL (DULCOLAX) 10 mg suppository [BISACODYL (DULCOLAX) 10 MG SUPPOSITORY] Insert 10 mg into the rectum daily as needed.       docusate sodium (COLACE) 100 MG capsule [DOCUSATE SODIUM (COLACE) 100 MG CAPSULE] Take 1 capsule (100 mg total) by mouth 2 (two) times a day. For constipation (Patient taking differently: Take 100 mg by mouth 2 times daily as needed for constipation ) 30 capsule 0     Docusate Sodium (DOCU LIQUID PO) Place 5 drops into both ears daily as needed prior to irrigation for cerumen removal       dorzolamide-timoloL (COSOPT) 22.3-6.8 mg/mL ophthalmic solution [DORZOLAMIDE-TIMOLOL (COSOPT) 22.3-6.8 MG/ML OPHTHALMIC SOLUTION] Administer 1 drop into the left eye 2 (two) times a day. glaucoma 10 mL 12     erythromycin base (ERYTHROMYCIN OPHT) Place 5 mg into both eyes At Bedtime Instill 1 ribbon in Left Eye and 1 ribbon in Right Eye at bedtime. Ensure ointment is given after eye drops to ensure proper absorption.       ferrous sulfate 325 (65 FE) MG tablet [FERROUS SULFATE 325 (65 FE) MG TABLET] Take 1 tablet by mouth daily.       guaiFENesin (ROBITUSSIN) 100 mg/5 mL syrup Take 10 mLs by mouth every 4 hours as needed for cough  (Patient not taking: Reported on 9/27/2021)       latanoprost (XALATAN) 0.005 % ophthalmic solution [LATANOPROST (XALATAN) 0.005 % OPHTHALMIC  "SOLUTION] Administer 1 drop into the left eye at bedtime. glaucoma 2.5 mL 12     levothyroxine (SYNTHROID, LEVOTHROID) 25 MCG tablet [LEVOTHYROXINE (SYNTHROID, LEVOTHROID) 25 MCG TABLET] Take 1 tablet (25 mcg total) by mouth Daily at 6:00 am. Hypothyroid 30 tablet 0     MEDICATION CANNOT BE REORDERED - PLEASE MANUALLY REORDER AND DISCONTINUE THE OLD ORDER [PROPYLENE GLYCOL (SYSTANE COMPLETE) 0.6 % DROP] Apply 1 drop to eye 2 (two) times a day. Both eyes for dry eyes 1.5 mL 0     menthol-zinc oxide (CALMOSEPTINE) 0.44-20.6 % Oint ointment Apply topically as needed for skin protection (Redness) Buttocks       omeprazole (PRILOSEC) 20 MG capsule [OMEPRAZOLE (PRILOSEC) 20 MG CAPSULE] Take 1 capsule (20 mg total) by mouth 2 (two) times a day before meals. Needs two times a day for 2 months, then daily for ulcer 60 capsule 0     omeprazole 20 MG tablet Take 20 mg by mouth daily (Patient not taking: Reported on 9/27/2021)       Propylene Glycol (SYSTANE COMPLETE OP) Place 1 drop into both eyes 2 times daily          Physical Exam:  General: Patient is alert, elderly male, no distress.    Vitals: /71   Pulse 60   Temp 98.5  F (36.9  C)   Resp 18   Ht 1.778 m (5' 10\")   Wt 87.6 kg (193 lb 3.2 oz)   SpO2 99%   BMI 27.72 kg/m    HEENT: Head is NCAT. Nares negative. Oropharynx well hydrated. Right facial droop, baseline.  Neck: No JVD.  Lungs: Non labored respirations.   : SP cath with leg bag.  Extremities: Trace LE edema is noted.  Musculoskeletal: Age related degen changes.   Skin: No open areas.   Psych: Mood appears good.      Labs:  Lab Results   Component Value Date    WBC 5.9 10/01/2020    HGB 12.9 (L) 01/14/2021    HCT 35.9 (L) 10/01/2020    MCV 85 10/01/2020     10/01/2020     Results for orders placed or performed in visit on 10/01/20   Basic Metabolic Panel   Result Value Ref Range    Sodium 138 136 - 145 mmol/L    Potassium 4.1 3.5 - 5.0 mmol/L    Chloride 102 98 - 107 mmol/L    CO2 28 22 - 31 " mmol/L    Anion Gap, Calculation 8 5 - 18 mmol/L    Glucose 99 70 - 125 mg/dL    Calcium 9.1 8.5 - 10.5 mg/dL    BUN 12 8 - 28 mg/dL    Creatinine 0.79 0.70 - 1.30 mg/dL    GFR MDRD Af Amer >60 >60 mL/min/1.73m2    GFR MDRD Non Af Amer >60 >60 mL/min/1.73m2     Lab Results   Component Value Date    TSH 3.10 04/22/2020       Component      Latest Ref Rng & Units 4/22/2020 6/7/2021   TSH      0.30 - 5.00 uIU/mL 3.10 3.14     Component      Latest Ref Rng & Units 9/8/2020 10/1/2020 10/5/2020 1/14/2021                Hemoglobin      14.0 - 18.0 g/dL 7.9 (L) 11.1 (L) 10.1 (L) 12.9 (L)         Assessment/Plan:  1. Afib. Chronically anticoagulated with warfarin. Monitor and adjust per INR. Rate is controlled.   2. HTN. Not on BP meds, had been on lisinopril in the past. BPs satisfactory, systolics 118-138.  3. Hx of stroke. Continue statin, also on warfarin as noted above.  4. Hypothyroidism. On replacement. Last TSH within goal range.  5. Duodenal ulcer. Aug 2020 hospitalization. Seen by GI, had EGD on 8/15/2020. No active bleeding so no specific procedural intervention needed. He Is on PPI.  6. Anemia. He received transfusion in the hospital. Work up revealed duodenal ulcer, no active bleeding. Continue iron.  7. SP catheter. Long term.   8. Moles, skin cancer, left arm and upper back, removed per dermatology March/April 2021.  9. [place] Palsy, right.         Electronically signed by: Nancy Fair MD

## 2022-01-09 LAB — SARS-COV-2 RNA RESP QL NAA+PROBE: NEGATIVE

## 2022-01-10 ENCOUNTER — LAB REQUISITION (OUTPATIENT)
Dept: LAB | Facility: CLINIC | Age: 87
End: 2022-01-10
Payer: COMMERCIAL

## 2022-01-10 DIAGNOSIS — Z11.59 ENCOUNTER FOR SCREENING FOR OTHER VIRAL DISEASES: ICD-10-CM

## 2022-01-11 PROCEDURE — U0005 INFEC AGEN DETEC AMPLI PROBE: HCPCS | Mod: ORL | Performed by: FAMILY MEDICINE

## 2022-01-12 LAB — SARS-COV-2 RNA RESP QL NAA+PROBE: NEGATIVE

## 2022-01-13 ENCOUNTER — LAB REQUISITION (OUTPATIENT)
Dept: LAB | Facility: CLINIC | Age: 87
End: 2022-01-13
Payer: COMMERCIAL

## 2022-01-13 DIAGNOSIS — Z11.59 ENCOUNTER FOR SCREENING FOR OTHER VIRAL DISEASES: ICD-10-CM

## 2022-01-14 PROCEDURE — U0005 INFEC AGEN DETEC AMPLI PROBE: HCPCS | Mod: ORL | Performed by: FAMILY MEDICINE

## 2022-01-16 LAB — SARS-COV-2 RNA RESP QL NAA+PROBE: NEGATIVE

## 2022-01-18 ENCOUNTER — LAB REQUISITION (OUTPATIENT)
Dept: LAB | Facility: CLINIC | Age: 87
End: 2022-01-18
Payer: MEDICAID

## 2022-01-18 DIAGNOSIS — Z11.59 ENCOUNTER FOR SCREENING FOR OTHER VIRAL DISEASES: ICD-10-CM

## 2022-01-18 PROCEDURE — U0003 INFECTIOUS AGENT DETECTION BY NUCLEIC ACID (DNA OR RNA); SEVERE ACUTE RESPIRATORY SYNDROME CORONAVIRUS 2 (SARS-COV-2) (CORONAVIRUS DISEASE [COVID-19]), AMPLIFIED PROBE TECHNIQUE, MAKING USE OF HIGH THROUGHPUT TECHNOLOGIES AS DESCRIBED BY CMS-2020-01-R: HCPCS | Mod: ORL | Performed by: FAMILY MEDICINE

## 2022-01-19 ENCOUNTER — LAB REQUISITION (OUTPATIENT)
Dept: LAB | Facility: CLINIC | Age: 87
End: 2022-01-19
Payer: COMMERCIAL

## 2022-01-19 DIAGNOSIS — Z11.59 ENCOUNTER FOR SCREENING FOR OTHER VIRAL DISEASES: ICD-10-CM

## 2022-01-20 ENCOUNTER — LAB REQUISITION (OUTPATIENT)
Dept: LAB | Facility: CLINIC | Age: 87
End: 2022-01-20
Payer: COMMERCIAL

## 2022-01-20 DIAGNOSIS — Z11.59 ENCOUNTER FOR SCREENING FOR OTHER VIRAL DISEASES: ICD-10-CM

## 2022-01-20 LAB — SARS-COV-2 RNA RESP QL NAA+PROBE: NOT DETECTED

## 2022-01-21 ENCOUNTER — LAB REQUISITION (OUTPATIENT)
Dept: LAB | Facility: CLINIC | Age: 87
End: 2022-01-21
Payer: COMMERCIAL

## 2022-01-21 DIAGNOSIS — R31.9 HEMATURIA, UNSPECIFIED: ICD-10-CM

## 2022-01-21 LAB
ALBUMIN UR-MCNC: 50 MG/DL
AMORPH CRY #/AREA URNS HPF: ABNORMAL /HPF
APPEARANCE UR: ABNORMAL
BACTERIA #/AREA URNS HPF: ABNORMAL /HPF
BILIRUB UR QL STRIP: NEGATIVE
COLOR UR AUTO: ABNORMAL
GLUCOSE UR STRIP-MCNC: NEGATIVE MG/DL
HGB BLD-MCNC: 12.2 G/DL (ref 13.3–17.7)
HGB UR QL STRIP: ABNORMAL
KETONES UR STRIP-MCNC: NEGATIVE MG/DL
LEUKOCYTE ESTERASE UR QL STRIP: ABNORMAL
NITRATE UR QL: NEGATIVE
PH UR STRIP: 5.5 [PH] (ref 5–7)
RBC URINE: >182 /HPF
SP GR UR STRIP: 1.01 (ref 1–1.03)
SQUAMOUS EPITHELIAL: 4 /HPF
UROBILINOGEN UR STRIP-MCNC: <2 MG/DL
WBC CLUMPS #/AREA URNS HPF: PRESENT /HPF
WBC URINE: 10 /HPF

## 2022-01-21 PROCEDURE — 87086 URINE CULTURE/COLONY COUNT: CPT | Mod: ORL | Performed by: NURSE PRACTITIONER

## 2022-01-21 PROCEDURE — U0005 INFEC AGEN DETEC AMPLI PROBE: HCPCS | Mod: ORL | Performed by: FAMILY MEDICINE

## 2022-01-21 PROCEDURE — 81001 URINALYSIS AUTO W/SCOPE: CPT | Mod: ORL | Performed by: NURSE PRACTITIONER

## 2022-01-21 PROCEDURE — 85018 HEMOGLOBIN: CPT | Mod: ORL | Performed by: NURSE PRACTITIONER

## 2022-01-21 PROCEDURE — 36416 COLLJ CAPILLARY BLOOD SPEC: CPT | Mod: ORL | Performed by: NURSE PRACTITIONER

## 2022-01-22 LAB — BACTERIA UR CULT: NORMAL

## 2022-01-23 LAB — SARS-COV-2 RNA RESP QL NAA+PROBE: NEGATIVE

## 2022-01-24 ENCOUNTER — LAB REQUISITION (OUTPATIENT)
Dept: LAB | Facility: CLINIC | Age: 87
End: 2022-01-24
Payer: COMMERCIAL

## 2022-01-24 DIAGNOSIS — Z11.59 ENCOUNTER FOR SCREENING FOR OTHER VIRAL DISEASES: ICD-10-CM

## 2022-01-25 PROCEDURE — U0003 INFECTIOUS AGENT DETECTION BY NUCLEIC ACID (DNA OR RNA); SEVERE ACUTE RESPIRATORY SYNDROME CORONAVIRUS 2 (SARS-COV-2) (CORONAVIRUS DISEASE [COVID-19]), AMPLIFIED PROBE TECHNIQUE, MAKING USE OF HIGH THROUGHPUT TECHNOLOGIES AS DESCRIBED BY CMS-2020-01-R: HCPCS | Mod: ORL | Performed by: FAMILY MEDICINE

## 2022-01-26 ENCOUNTER — LAB REQUISITION (OUTPATIENT)
Dept: LAB | Facility: CLINIC | Age: 87
End: 2022-01-26
Payer: COMMERCIAL

## 2022-01-26 DIAGNOSIS — Z11.59 ENCOUNTER FOR SCREENING FOR OTHER VIRAL DISEASES: ICD-10-CM

## 2022-01-27 ENCOUNTER — LAB REQUISITION (OUTPATIENT)
Dept: LAB | Facility: CLINIC | Age: 87
End: 2022-01-27
Payer: COMMERCIAL

## 2022-01-27 DIAGNOSIS — Z11.59 ENCOUNTER FOR SCREENING FOR OTHER VIRAL DISEASES: ICD-10-CM

## 2022-01-27 LAB — SARS-COV-2 RNA RESP QL NAA+PROBE: NOT DETECTED

## 2022-01-28 PROCEDURE — U0005 INFEC AGEN DETEC AMPLI PROBE: HCPCS | Mod: ORL | Performed by: FAMILY MEDICINE

## 2022-01-29 LAB — SARS-COV-2 RNA RESP QL NAA+PROBE: NEGATIVE

## 2022-02-01 ENCOUNTER — LAB REQUISITION (OUTPATIENT)
Dept: LAB | Facility: CLINIC | Age: 87
End: 2022-02-01
Payer: COMMERCIAL

## 2022-02-01 DIAGNOSIS — Z11.59 ENCOUNTER FOR SCREENING FOR OTHER VIRAL DISEASES: ICD-10-CM

## 2022-02-01 DIAGNOSIS — I48.91 UNSPECIFIED ATRIAL FIBRILLATION (H): ICD-10-CM

## 2022-02-01 PROCEDURE — U0005 INFEC AGEN DETEC AMPLI PROBE: HCPCS | Mod: ORL | Performed by: FAMILY MEDICINE

## 2022-02-02 ENCOUNTER — NURSING HOME VISIT (OUTPATIENT)
Dept: GERIATRICS | Facility: CLINIC | Age: 87
End: 2022-02-02
Payer: COMMERCIAL

## 2022-02-02 VITALS
DIASTOLIC BLOOD PRESSURE: 64 MMHG | OXYGEN SATURATION: 99 % | RESPIRATION RATE: 18 BRPM | BODY MASS INDEX: 28.04 KG/M2 | SYSTOLIC BLOOD PRESSURE: 132 MMHG | TEMPERATURE: 98.3 F | WEIGHT: 195.4 LBS | HEART RATE: 60 BPM

## 2022-02-02 DIAGNOSIS — Z71.89 ENCOUNTER FOR ANTICOAGULATION DISCUSSION AND COUNSELING: ICD-10-CM

## 2022-02-02 DIAGNOSIS — D64.9 ANEMIA, UNSPECIFIED TYPE: ICD-10-CM

## 2022-02-02 DIAGNOSIS — I48.0 PAROXYSMAL ATRIAL FIBRILLATION (H): Primary | ICD-10-CM

## 2022-02-02 LAB
INR PPP: 2.4 (ref 0.85–1.15)
SARS-COV-2 RNA RESP QL NAA+PROBE: NOT DETECTED

## 2022-02-02 PROCEDURE — 85610 PROTHROMBIN TIME: CPT | Mod: ORL | Performed by: NURSE PRACTITIONER

## 2022-02-02 PROCEDURE — P9604 ONE-WAY ALLOW PRORATED TRIP: HCPCS | Mod: ORL | Performed by: NURSE PRACTITIONER

## 2022-02-02 PROCEDURE — 36415 COLL VENOUS BLD VENIPUNCTURE: CPT | Mod: ORL | Performed by: NURSE PRACTITIONER

## 2022-02-02 PROCEDURE — 99309 SBSQ NF CARE MODERATE MDM 30: CPT | Performed by: NURSE PRACTITIONER

## 2022-02-02 RX ORDER — WARFARIN SODIUM 4 MG/1
6.5-7 TABLET ORAL DAILY
COMMUNITY
End: 2022-05-02 | Stop reason: DRUGHIGH

## 2022-02-02 NOTE — LETTER
2/2/2022        RE: Riley Rodriguez  [address]  [address]  [facility] [address]        [facility]    Facility:   [facility] [place][note 2] [[id number]]   Code Status: DNR      CHIEF COMPLAINT/REASON FOR VISIT:  Chief Complaint   Patient presents with     FVP Care Coordination - Annual        HISTORY:      HPI: Riley is a 89 y.o. male  now residing in the LTC at [facility].  He is  with history of A. fib on warfarin develop acute CVA from holding warfarin for right gluteal hematoma.  Currently back on Coumadin, Hypertension , urinary retention has a suprapubic catheter and with a pacemaker.       Today he being seen  for a routine regulatory visit  . He had no concerns.  He denied CP or shortness of breath, denied constipation or diarrhea he is able to move all extremities.  He is eating well and reports no difficulties with swallowing.  His suprapubic is intact and draining clear yellow urine.  TSH WNL  He was with no lower extremity edema and his weights have been stable over the last month. His INR was reviewed and dosed today    Past Medical History:   Diagnosis Date     Atrial fibrillation (H)     On coumadin     Bell's palsy     right sided facial droop     CVA (cerebral vascular accident) (H)      Hypertension      Pacemaker      Urinary retention              Family History   Problem Relation Age of Onset     COPD Father      Social History     Socioeconomic History     Marital status:      Spouse name: Not on file     Number of children: Not on file     Years of education: Not on file     Highest education level: Not on file   Occupational History     Not on file   Social Needs     Financial resource strain: Not on file     Food insecurity     Worry: Not on file     Inability: Not on file     Transportation needs     Medical: Not on file     Non-medical: Not on file   Tobacco Use     Smoking status: Former Smoker     Smokeless tobacco:  Never Used   Substance and Sexual Activity     Alcohol use: No     Drug use: No     Sexual activity: Not on file   Lifestyle     Physical activity     Days per week: Not on file     Minutes per session: Not on file     Stress: Not on file   Relationships     Social connections     Talks on phone: Not on file     Gets together: Not on file     Attends Congregational service: Not on file     Active member of club or organization: Not on file     Attends meetings of clubs or organizations: Not on file     Relationship status: Not on file     Intimate partner violence     Fear of current or ex partner: Not on file     Emotionally abused: Not on file     Physically abused: Not on file     Forced sexual activity: Not on file   Other Topics Concern     Not on file   Social History Narrative    03/07/15 - The patient lives alone in his own home.         Review of Systems  Eyes:  He is followed  closely by opthalmology.  He has had multiple eye  procedures   Constitutional: Negative for activity change and fatigue. Negative for appetite change, chills and fever.   HENT: Negative for congestion and sore throat.    Eyes: positive  for visual disturbance. right sided facial paralysis   Respiratory: Negative for cough, shortness of breath and wheezing.    Cardiovascular: Negative for chest pain and leg swelling.        Pacemaker   Gastrointestinal: Negative for abdominal distention, abdominal pain, constipation, diarrhea and nausea.   Genitourinary: Negative for dysuria.   Musculoskeletal: Negative for arthralgias and myalgias.   Skin: Negative for color change, rash and wound.   Neurological: Negative for dizziness, weakness and numbness.   Psychiatric/Behavioral: Negative for agitation, behavioral problems and sleep disturbance.   Vitals:    06/04/21 0824   BP: 122/70   Pulse: 60   Resp: 18   Temp: 98.5  F (36.9  C)   SpO2: 99%   Weight: 183 lb 6.4 oz (83.2 kg)       Constitutional: He appears well-developed and well-nourished.    Pleasant gentleman   HENT:   Head: Normocephalic and atraumatic.   Eyes: Conjunctivae are normal. Pupils are equal, round, and reactive to light. poor vision with the right worse than the left right sided facial paralysis   Neck: Normal range of motion. Neck supple.   Cardiovascular: Normal rate, regular rhythm and normal heart sounds.   No murmur heard.  Pulmonary/Chest: Effort normal and breath sounds normal. He has no wheezes. He has no rales.   Abdominal: Soft. Bowel sounds are normal. He exhibits no distension. There is no tenderness.   Genitourinary: suprapubic catheter  Musculoskeletal: Normal range of motion. He exhibits no edema.   Neurological: He is alert.   Skin: Skin is warm and dry.   Psychiatric: He has a normal mood and affect. His behavior is normal.     LABS:   No results found for this or any previous visit (from the past 240 hour(s)).     Current Outpatient Medications   Medication Sig     acetaminophen (TYLENOL) 325 MG tablet Take 2 tablets (650 mg total) by mouth every 6 (six) hours as needed for pain.     atorvastatin (LIPITOR) 40 MG tablet Take 1 tablet (40 mg total) by mouth at bedtime. For hx of cva     bisacodyL (DULCOLAX) 10 mg suppository Insert 10 mg into the rectum daily as needed.     docusate sodium (COLACE) 100 MG capsule Take 1 capsule (100 mg total) by mouth 2 (two) times a day. For constipation (Patient taking differently: Take 100 mg by mouth daily. And daily PRN.)     dorzolamide-timoloL (COSOPT) 22.3-6.8 mg/mL ophthalmic solution Administer 1 drop into the left eye 2 (two) times a day. glaucoma     erythromycin base (ERYTHROMYCIN OPHT) Apply 5 mg to eye. Instill 1 ribbon in left eye at HS and right eye four times a day     ferrous sulfate 325 (65 FE) MG tablet Take 1 tablet by mouth daily.     guaiFENesin (ROBITUSSIN) 100 mg/5 mL syrup Take 200 mg by mouth every 4 (four) hours as needed for cough.     latanoprost (XALATAN) 0.005 % ophthalmic solution Administer 1 drop into  the left eye at bedtime. glaucoma     levothyroxine (SYNTHROID, LEVOTHROID) 25 MCG tablet Take 1 tablet (25 mcg total) by mouth Daily at 6:00 am. Hypothyroid     menthol-zinc oxide (CALMOSEPTINE) 0.44-20.6 % Oint ointment Apply 1 application topically as needed.     omeprazole (PRILOSEC) 20 MG capsule Take 1 capsule (20 mg total) by mouth 2 (two) times a day before meals. Needs two times a day for 2 months, then daily for ulcer     propylene glycol (SYSTANE COMPLETE) 0.6 % Drop Apply 1 drop to eye 2 (two) times a day. Both eyes for dry eyes     warfarin sodium (WARFARIN ORAL) Take by mouth. 4/15/21 INR 2.81 Cont 7mg M & TH, 6.5mg AOD. Next INR 5/5  4/5/21 INR 2.39 Cont 7mg M & Th, 6.5mg AOD. Next INR 4/14  4/1/21 INR 2.44 Cont 7mg M & Th, 6.5mg AOD. Next INR 4/5.  3/11/21 INR 2.53 Cont 7 mg M, Th and 6.5 mg AOD.  Next INR 4/8/21.  2/11/21 INR 2.87 Cont 7mg M, Th and 6.5mg AOD. Next INR 3/11     Case Management:  I have reviewed the facility/SNF care plan/MDS which was done on 12/21/21 including the falls risk, nutrition and pain screening. I also reviewed the current immunizations, and preventive care.. Future cancer screening is not clinically indicated secondary to age/goals of care.   Patient's desire to return to the community is not assessible due to cognitive impairment.    Information reviewed:  Medications, vital signs, orders, and nursing notes.    ASSESSMENT:      ICD-10-CM    1. Chronic atrial fibrillation (H)  I48.20    2. Hypothyroidism, unspecified type  E03.9    3. Seborrheic Keratosis  L82.1        PLAN:      HX Right sided [place] Palsy . Pt currently on eye lubricating drops multiple times a day.       Suprapubic catheter- cleanse daily, monitor for infection around  the site.  Cares per protocol     Atrial fibrillation on coumadin and lisinopril,        Anticoagulation management-  Continue Coumadin monitor and adjust per INRS      Poor vision- is followed closely by ophthalmology.  on multiple eye  gtts.      Hypothyroidism- on Synthroid, TSH 6/7/2021 was 3.14    Multiple moles on back removed by dermatology- healed     Anemia- HGB 1/21/22 was 12.2    Electronically signed by: Polly Marroquin CNP          Sincerely,        Polly Marroquin, CNP

## 2022-02-02 NOTE — PROGRESS NOTES
[facility]    Facility:   [facility] [[id number]]   Code Status: DNR      CHIEF COMPLAINT/REASON FOR VISIT:  Chief Complaint   Patient presents with     FVP Care Coordination - Annual        HISTORY:      HPI: Riley is a 89 y.o. male  now residing in the LTC at [facility].  He is  with history of A. fib on warfarin develop acute CVA from holding warfarin for right gluteal hematoma.  Currently back on Coumadin, Hypertension , urinary retention has a suprapubic catheter and with a pacemaker.       Today he being seen  for a routine regulatory visit  . He had no concerns.  He denied CP or shortness of breath, denied constipation or diarrhea he is able to move all extremities.  He is eating well and reports no difficulties with swallowing.  His suprapubic is intact and draining clear yellow urine.  TSH WNL  He was with no lower extremity edema and his weights have been stable over the last month. His INR was reviewed and dosed today    Past Medical History:   Diagnosis Date     Atrial fibrillation (H)     On coumadin     Bell's palsy     right sided facial droop     CVA (cerebral vascular accident) (H)      Hypertension      Pacemaker      Urinary retention              Family History   Problem Relation Age of Onset     COPD Father      Social History     Socioeconomic History     Marital status:      Spouse name: Not on file     Number of children: Not on file     Years of education: Not on file     Highest education level: Not on file   Occupational History     Not on file   Social Needs     Financial resource strain: Not on file     Food insecurity     Worry: Not on file     Inability: Not on file     Transportation needs     Medical: Not on file     Non-medical: Not on file   Tobacco Use     Smoking status: Former Smoker     Smokeless tobacco: Never Used   Substance and Sexual Activity     Alcohol use: No     Drug use: No     Sexual activity: Not  on file   Lifestyle     Physical activity     Days per week: Not on file     Minutes per session: Not on file     Stress: Not on file   Relationships     Social connections     Talks on phone: Not on file     Gets together: Not on file     Attends [employer] service: Not on file     Active member of club or organization: Not on file     Attends meetings of clubs or organizations: Not on file     Relationship status: Not on file     Intimate partner violence     Fear of current or ex partner: Not on file     Emotionally abused: Not on file     Physically abused: Not on file     Forced sexual activity: Not on file   Other Topics Concern     Not on file   Social History Narrative    03/07/15 - The patient lives alone in his own home.         Review of Systems  Eyes:  He is followed  closely by opthalmology.  He has had multiple eye  procedures   Constitutional: Negative for activity change and fatigue. Negative for appetite change, chills and fever.   HENT: Negative for congestion and sore throat.    Eyes: positive  for visual disturbance. right sided facial paralysis   Respiratory: Negative for cough, shortness of breath and wheezing.    Cardiovascular: Negative for chest pain and leg swelling.        Pacemaker   Gastrointestinal: Negative for abdominal distention, abdominal pain, constipation, diarrhea and nausea.   Genitourinary: Negative for dysuria.   Musculoskeletal: Negative for arthralgias and myalgias.   Skin: Negative for color change, rash and wound.   Neurological: Negative for dizziness, weakness and numbness.   Psychiatric/Behavioral: Negative for agitation, behavioral problems and sleep disturbance.   Vitals:    06/04/21 0824   BP: 122/70   Pulse: 60   Resp: 18   Temp: 98.5  F (36.9  C)   SpO2: 99%   Weight: 183 lb 6.4 oz (83.2 kg)       Constitutional: He appears well-developed and well-nourished.   Pleasant gentleman   HENT:   Head: Normocephalic and atraumatic.   Eyes: Conjunctivae are normal. Pupils  are equal, round, and reactive to light. poor vision with the right worse than the left right sided facial paralysis   Neck: Normal range of motion. Neck supple.   Cardiovascular: Normal rate, regular rhythm and normal heart sounds.   No murmur heard.  Pulmonary/Chest: Effort normal and breath sounds normal. He has no wheezes. He has no rales.   Abdominal: Soft. Bowel sounds are normal. He exhibits no distension. There is no tenderness.   Genitourinary: suprapubic catheter  Musculoskeletal: Normal range of motion. He exhibits no edema.   Neurological: He is alert.   Skin: Skin is warm and dry.   Psychiatric: He has a normal mood and affect. His behavior is normal.     LABS:   No results found for this or any previous visit (from the past 240 hour(s)).     Current Outpatient Medications   Medication Sig     acetaminophen (TYLENOL) 325 MG tablet Take 2 tablets (650 mg total) by mouth every 6 (six) hours as needed for pain.     atorvastatin (LIPITOR) 40 MG tablet Take 1 tablet (40 mg total) by mouth at bedtime. For hx of cva     bisacodyL (DULCOLAX) 10 mg suppository Insert 10 mg into the rectum daily as needed.     docusate sodium (COLACE) 100 MG capsule Take 1 capsule (100 mg total) by mouth 2 (two) times a day. For constipation (Patient taking differently: Take 100 mg by mouth daily. And daily PRN.)     dorzolamide-timoloL (COSOPT) 22.3-6.8 mg/mL ophthalmic solution Administer 1 drop into the left eye 2 (two) times a day. glaucoma     erythromycin base (ERYTHROMYCIN OPHT) Apply 5 mg to eye. Instill 1 ribbon in left eye at HS and right eye four times a day     ferrous sulfate 325 (65 FE) MG tablet Take 1 tablet by mouth daily.     guaiFENesin (ROBITUSSIN) 100 mg/5 mL syrup Take 200 mg by mouth every 4 (four) hours as needed for cough.     latanoprost (XALATAN) 0.005 % ophthalmic solution Administer 1 drop into the left eye at bedtime. glaucoma     levothyroxine (SYNTHROID, LEVOTHROID) 25 MCG tablet Take 1 tablet (25  mcg total) by mouth Daily at 6:00 am. Hypothyroid     menthol-zinc oxide (CALMOSEPTINE) 0.44-20.6 % Oint ointment Apply 1 application topically as needed.     omeprazole (PRILOSEC) 20 MG capsule Take 1 capsule (20 mg total) by mouth 2 (two) times a day before meals. Needs two times a day for 2 months, then daily for ulcer     propylene glycol (SYSTANE COMPLETE) 0.6 % Drop Apply 1 drop to eye 2 (two) times a day. Both eyes for dry eyes     warfarin sodium (WARFARIN ORAL) Take by mouth. 4/15/21 INR 2.81 Cont 7mg M & TH, 6.5mg AOD. Next INR 5/5 4/5/21 INR 2.39 Cont 7mg M & Th, 6.5mg AOD. Next INR 4/14 4/1/21 INR 2.44 Cont 7mg M & Th, 6.5mg AOD. Next INR 4/5.  3/11/21 INR 2.53 Cont 7 mg M, Th and 6.5 mg AOD.  Next INR 4/8/21.  2/11/21 INR 2.87 Cont 7mg M, Th and 6.5mg AOD. Next INR 3/11     Case Management:  I have reviewed the facility/SNF care plan/MDS which was done on 12/21/21 including the falls risk, nutrition and pain screening. I also reviewed the current immunizations, and preventive care.. Future cancer screening is not clinically indicated secondary to age/goals of care.   Patient's desire to return to the community is not assessible due to cognitive impairment.    Information reviewed:  Medications, vital signs, orders, and nursing notes.    ASSESSMENT:      ICD-10-CM    1. Chronic atrial fibrillation (H)  I48.20    2. Hypothyroidism, unspecified type  E03.9    3. Seborrheic Keratosis  L82.1        PLAN:      HX Right sided [place] Palsy . Pt currently on eye lubricating drops multiple times a day.       Suprapubic catheter- cleanse daily, monitor for infection around  the site.  Cares per protocol     Atrial fibrillation on coumadin and lisinopril,        Anticoagulation management-  Continue Coumadin monitor and adjust per INRS      Poor vision- is followed closely by ophthalmology.  on multiple eye gtts.      Hypothyroidism- on Synthroid, TSH 6/7/2021 was 3.14    Multiple moles on back removed by  dermatology- healed     Anemia- HGB 1/21/22 was 12.2    Electronically signed by: Polly Marroquin CNP

## 2022-02-03 ENCOUNTER — LAB REQUISITION (OUTPATIENT)
Dept: LAB | Facility: CLINIC | Age: 87
End: 2022-02-03
Payer: COMMERCIAL

## 2022-02-03 DIAGNOSIS — Z11.59 ENCOUNTER FOR SCREENING FOR OTHER VIRAL DISEASES: ICD-10-CM

## 2022-02-04 PROCEDURE — U0003 INFECTIOUS AGENT DETECTION BY NUCLEIC ACID (DNA OR RNA); SEVERE ACUTE RESPIRATORY SYNDROME CORONAVIRUS 2 (SARS-COV-2) (CORONAVIRUS DISEASE [COVID-19]), AMPLIFIED PROBE TECHNIQUE, MAKING USE OF HIGH THROUGHPUT TECHNOLOGIES AS DESCRIBED BY CMS-2020-01-R: HCPCS | Mod: ORL | Performed by: FAMILY MEDICINE

## 2022-02-05 LAB — SARS-COV-2 RNA RESP QL NAA+PROBE: NEGATIVE

## 2022-02-07 ENCOUNTER — LAB REQUISITION (OUTPATIENT)
Dept: LAB | Facility: CLINIC | Age: 87
End: 2022-02-07
Payer: COMMERCIAL

## 2022-02-07 DIAGNOSIS — Z11.59 ENCOUNTER FOR SCREENING FOR OTHER VIRAL DISEASES: ICD-10-CM

## 2022-02-08 PROCEDURE — U0003 INFECTIOUS AGENT DETECTION BY NUCLEIC ACID (DNA OR RNA); SEVERE ACUTE RESPIRATORY SYNDROME CORONAVIRUS 2 (SARS-COV-2) (CORONAVIRUS DISEASE [COVID-19]), AMPLIFIED PROBE TECHNIQUE, MAKING USE OF HIGH THROUGHPUT TECHNOLOGIES AS DESCRIBED BY CMS-2020-01-R: HCPCS | Mod: ORL | Performed by: FAMILY MEDICINE

## 2022-02-09 LAB — SARS-COV-2 RNA RESP QL NAA+PROBE: NOT DETECTED

## 2022-02-10 ENCOUNTER — LAB REQUISITION (OUTPATIENT)
Dept: LAB | Facility: CLINIC | Age: 87
End: 2022-02-10
Payer: COMMERCIAL

## 2022-02-10 DIAGNOSIS — Z11.59 ENCOUNTER FOR SCREENING FOR OTHER VIRAL DISEASES: ICD-10-CM

## 2022-02-11 PROCEDURE — U0005 INFEC AGEN DETEC AMPLI PROBE: HCPCS | Mod: ORL | Performed by: FAMILY MEDICINE

## 2022-02-12 LAB — SARS-COV-2 RNA RESP QL NAA+PROBE: NEGATIVE

## 2022-02-14 ENCOUNTER — LAB REQUISITION (OUTPATIENT)
Dept: LAB | Facility: CLINIC | Age: 87
End: 2022-02-14
Payer: COMMERCIAL

## 2022-02-14 DIAGNOSIS — Z11.59 ENCOUNTER FOR SCREENING FOR OTHER VIRAL DISEASES: ICD-10-CM

## 2022-02-15 PROCEDURE — U0003 INFECTIOUS AGENT DETECTION BY NUCLEIC ACID (DNA OR RNA); SEVERE ACUTE RESPIRATORY SYNDROME CORONAVIRUS 2 (SARS-COV-2) (CORONAVIRUS DISEASE [COVID-19]), AMPLIFIED PROBE TECHNIQUE, MAKING USE OF HIGH THROUGHPUT TECHNOLOGIES AS DESCRIBED BY CMS-2020-01-R: HCPCS | Mod: ORL | Performed by: FAMILY MEDICINE

## 2022-02-16 LAB — SARS-COV-2 RNA RESP QL NAA+PROBE: NEGATIVE

## 2022-03-01 ENCOUNTER — LAB REQUISITION (OUTPATIENT)
Dept: LAB | Facility: CLINIC | Age: 87
End: 2022-03-01
Payer: COMMERCIAL

## 2022-03-01 DIAGNOSIS — I48.91 UNSPECIFIED ATRIAL FIBRILLATION (H): ICD-10-CM

## 2022-03-02 LAB — INR PPP: 2.17 (ref 0.85–1.15)

## 2022-03-02 PROCEDURE — P9604 ONE-WAY ALLOW PRORATED TRIP: HCPCS | Mod: ORL | Performed by: NURSE PRACTITIONER

## 2022-03-02 PROCEDURE — 85610 PROTHROMBIN TIME: CPT | Mod: ORL | Performed by: NURSE PRACTITIONER

## 2022-03-02 PROCEDURE — 36415 COLL VENOUS BLD VENIPUNCTURE: CPT | Mod: ORL | Performed by: NURSE PRACTITIONER

## 2022-03-08 ENCOUNTER — LAB REQUISITION (OUTPATIENT)
Dept: LAB | Facility: CLINIC | Age: 87
End: 2022-03-08
Payer: COMMERCIAL

## 2022-03-08 ENCOUNTER — NURSING HOME VISIT (OUTPATIENT)
Dept: GERIATRICS | Facility: CLINIC | Age: 87
End: 2022-03-08
Payer: COMMERCIAL

## 2022-03-08 VITALS
WEIGHT: 193 LBS | OXYGEN SATURATION: 99 % | HEART RATE: 60 BPM | DIASTOLIC BLOOD PRESSURE: 72 MMHG | HEIGHT: 70 IN | TEMPERATURE: 98.5 F | RESPIRATION RATE: 18 BRPM | SYSTOLIC BLOOD PRESSURE: 130 MMHG | BODY MASS INDEX: 27.63 KG/M2

## 2022-03-08 DIAGNOSIS — T30.0 BURN: Primary | ICD-10-CM

## 2022-03-08 DIAGNOSIS — R50.9 FEVER, UNSPECIFIED: ICD-10-CM

## 2022-03-08 PROCEDURE — 99309 SBSQ NF CARE MODERATE MDM 30: CPT | Performed by: NURSE PRACTITIONER

## 2022-03-08 NOTE — LETTER
3/8/2022        RE: Riley Rodriguez  [address]  [address]  [facility] [address]        [facility]    Facility:   [facility] [[id number]]   Code Status: DNR      CHIEF COMPLAINT/REASON FOR VISIT:  Chief Complaint   Patient presents with     Burn        HISTORY:      HPI: Riley is a 89 y.o. male  now residing in the LTC at [facility].  He is  with history of A. fib on warfarin develop acute CVA from holding warfarin for right gluteal hematoma.  Currently back on Coumadin, Hypertension , urinary retention has a suprapubic catheter and with a pacemaker.       Today he being seen  for reports of a burn right inner thigh..  It appears patient spilled hot coffee on his leg few days prior.  He was noted to have a large burn right inner thigh that was superficial.  There was no redness or dryness associated and a  white blood cell done on 3/9/2022 was 7.3.  bacitracin twice daily until healed.  He  had no other  concerns.  He denied CP or shortness of breath, denied constipation or diarrhea he is able to move all extremities.  He is eating well and reports no difficulties with swallowing.  His suprapubic is intact and draining clear yellow urine.     Past Medical History:   Diagnosis Date     Atrial fibrillation (H)     On coumadin     Bell's palsy     right sided facial droop     CVA (cerebral vascular accident) (H)      Hypertension      Pacemaker      Urinary retention              Family History   Problem Relation Age of Onset     COPD Father      Social History     Socioeconomic History     Marital status:      Spouse name: Not on file     Number of children: Not on file     Years of education: Not on file     Highest education level: Not on file   Occupational History     Not on file   Social Needs     Financial resource strain: Not on file     Food insecurity     Worry: Not on file     Inability: Not on file     Transportation needs      Medical: Not on file     Non-medical: Not on file   Tobacco Use     Smoking status: Former Smoker     Smokeless tobacco: Never Used   Substance and Sexual Activity     Alcohol use: No     Drug use: No     Sexual activity: Not on file   Lifestyle     Physical activity     Days per week: Not on file     Minutes per session: Not on file     Stress: Not on file   Relationships     Social connections     Talks on phone: Not on file     Gets together: Not on file     Attends [employer] service: Not on file     Active member of club or organization: Not on file     Attends meetings of clubs or organizations: Not on file     Relationship status: Not on file     Intimate partner violence     Fear of current or ex partner: Not on file     Emotionally abused: Not on file     Physically abused: Not on file     Forced sexual activity: Not on file   Other Topics Concern     Not on file   Social History Narrative    03/07/15 - The patient lives alone in his own home.         Review of Systems  Eyes:  He is followed  closely by opthalmology.  He has had multiple eye  procedures   Constitutional: Negative for activity change and fatigue. Negative for appetite change, chills and fever.   HENT: Negative for congestion and sore throat.    Eyes: positive  for visual disturbance. right sided facial paralysis   Respiratory: Negative for cough, shortness of breath and wheezing.    Cardiovascular: Negative for chest pain and leg swelling.        Pacemaker   Gastrointestinal: Negative for abdominal distention, abdominal pain, constipation, diarrhea and nausea.   Genitourinary: Negative for dysuria.   Musculoskeletal: Negative for arthralgias and myalgias.   Skin: Negative for color change, rash and wound.   Neurological: Negative for dizziness, weakness and numbness.   Psychiatric/Behavioral: Negative for agitation, behavioral problems and sleep disturbance.   Vitals:    06/04/21 0824   BP: 122/70   Pulse: 60   Resp: 18   Temp: 98.5  F  (36.9  C)   SpO2: 99%   Weight: 183 lb 6.4 oz (83.2 kg)       Constitutional: He appears well-developed and well-nourished.   Pleasant gentleman   HENT:   Head: Normocephalic and atraumatic.   Eyes: Conjunctivae are normal. Pupils are equal, round, and reactive to light. poor vision with the right worse than the left right sided facial paralysis   Neck: Normal range of motion. Neck supple.   Cardiovascular: Normal rate, regular rhythm and normal heart sounds.   No murmur heard.  Pulmonary/Chest: Effort normal and breath sounds normal. He has no wheezes. He has no rales.   Abdominal: Soft. Bowel sounds are normal. He exhibits no distension. There is no tenderness.   Genitourinary: suprapubic catheter  Musculoskeletal: Normal range of motion. He exhibits no edema.   Neurological: He is alert.   Skin: Skin is warm and dry.  Burn right inner thigh  Psychiatric: He has a normal mood and affect. His behavior is normal.     LABS:   Recent Results (from the past 240 hour(s))   INR    Collection Time: 03/02/22  5:53 AM   Result Value Ref Range    INR 2.17 (H) 0.85 - 1.15   CBC with platelets    Collection Time: 03/09/22  4:58 AM   Result Value Ref Range    WBC Count 7.3 4.0 - 11.0 10e3/uL    RBC Count 3.94 (L) 4.40 - 5.90 10e6/uL    Hemoglobin 11.8 (L) 13.3 - 17.7 g/dL    Hematocrit 35.6 (L) 40.0 - 53.0 %    MCV 90 78 - 100 fL    MCH 29.9 26.5 - 33.0 pg    MCHC 33.1 31.5 - 36.5 g/dL    RDW 14.0 10.0 - 15.0 %    Platelet Count 177 150 - 450 10e3/uL       Current Outpatient Medications   Medication Sig     acetaminophen (TYLENOL) 325 MG tablet Take 2 tablets (650 mg total) by mouth every 6 (six) hours as needed for pain.     atorvastatin (LIPITOR) 40 MG tablet Take 1 tablet (40 mg total) by mouth at bedtime. For hx of cva     bisacodyL (DULCOLAX) 10 mg suppository Insert 10 mg into the rectum daily as needed.     docusate sodium (COLACE) 100 MG capsule Take 1 capsule (100 mg total) by mouth 2 (two) times a day. For  constipation (Patient taking differently: Take 100 mg by mouth daily. And daily PRN.)     dorzolamide-timoloL (COSOPT) 22.3-6.8 mg/mL ophthalmic solution Administer 1 drop into the left eye 2 (two) times a day. glaucoma     erythromycin base (ERYTHROMYCIN OPHT) Apply 5 mg to eye. Instill 1 ribbon in left eye at HS and right eye four times a day     ferrous sulfate 325 (65 FE) MG tablet Take 1 tablet by mouth daily.     guaiFENesin (ROBITUSSIN) 100 mg/5 mL syrup Take 200 mg by mouth every 4 (four) hours as needed for cough.     latanoprost (XALATAN) 0.005 % ophthalmic solution Administer 1 drop into the left eye at bedtime. glaucoma     levothyroxine (SYNTHROID, LEVOTHROID) 25 MCG tablet Take 1 tablet (25 mcg total) by mouth Daily at 6:00 am. Hypothyroid     menthol-zinc oxide (CALMOSEPTINE) 0.44-20.6 % Oint ointment Apply 1 application topically as needed.     omeprazole (PRILOSEC) 20 MG capsule Take 1 capsule (20 mg total) by mouth 2 (two) times a day before meals. Needs two times a day for 2 months, then daily for ulcer     propylene glycol (SYSTANE COMPLETE) 0.6 % Drop Apply 1 drop to eye 2 (two) times a day. Both eyes for dry eyes     warfarin sodium (WARFARIN ORAL) Take by mouth. 4/15/21 INR 2.81 Cont 7mg M & TH, 6.5mg AOD. Next INR 5/5  4/5/21 INR 2.39 Cont 7mg M & Th, 6.5mg AOD. Next INR 4/14  4/1/21 INR 2.44 Cont 7mg M & Th, 6.5mg AOD. Next INR 4/5.  3/11/21 INR 2.53 Cont 7 mg M, Th and 6.5 mg AOD.  Next INR 4/8/21.  2/11/21 INR 2.87 Cont 7mg M, Th and 6.5mg AOD. Next INR 3/11       ASSESSMENT:      ICD-10-CM    1. Chronic atrial fibrillation (H)  I48.20    2. Right inner thigh burn    PLAN:      Right inner thigh burn- Bacitracin topically BID until healed .     HX Right sided [place] Palsy . Pt currently on eye lubricating drops multiple times a day.       Suprapubic catheter- cleanse daily, monitor for infection around  the site.  Cares per protocol     Atrial fibrillation on coumadin and lisinopril,         Anticoagulation management-  Continue Coumadin monitor and adjust per INRS      Poor vision- is followed closely by ophthalmology.  on multiple eye gtts.      Hypothyroidism- on Synthroid, TSH 6/7/2021 was 3.14    Multiple moles on back removed by dermatology- healed     Anemia- HGB 3/9/21 was 11.8    Electronically signed by: Polly Marroquin CNP          Sincerely,        Polly Marroquin CNP

## 2022-03-09 ENCOUNTER — TELEPHONE (OUTPATIENT)
Dept: GERIATRICS | Facility: CLINIC | Age: 87
End: 2022-03-09

## 2022-03-09 LAB
ERYTHROCYTE [DISTWIDTH] IN BLOOD BY AUTOMATED COUNT: 14 % (ref 10–15)
HCT VFR BLD AUTO: 35.6 % (ref 40–53)
HGB BLD-MCNC: 11.8 G/DL (ref 13.3–17.7)
MCH RBC QN AUTO: 29.9 PG (ref 26.5–33)
MCHC RBC AUTO-ENTMCNC: 33.1 G/DL (ref 31.5–36.5)
MCV RBC AUTO: 90 FL (ref 78–100)
PLATELET # BLD AUTO: 177 10E3/UL (ref 150–450)
RBC # BLD AUTO: 3.94 10E6/UL (ref 4.4–5.9)
WBC # BLD AUTO: 7.3 10E3/UL (ref 4–11)

## 2022-03-09 PROCEDURE — 85027 COMPLETE CBC AUTOMATED: CPT | Mod: ORL | Performed by: NURSE PRACTITIONER

## 2022-03-09 PROCEDURE — 36415 COLL VENOUS BLD VENIPUNCTURE: CPT | Mod: ORL | Performed by: NURSE PRACTITIONER

## 2022-03-09 PROCEDURE — P9604 ONE-WAY ALLOW PRORATED TRIP: HCPCS | Mod: ORL | Performed by: NURSE PRACTITIONER

## 2022-03-09 NOTE — TELEPHONE ENCOUNTER
[facility] Triage Nurse Telephone Encounter    Provider: YECENIA Olivera  Facility: [facility] Type:  LTC    Caller: Zakiya  Call Back Number: [phone]    Allergies:  No Known Allergies     Reason for call: CBC- done today d/t a coffee burn on his leg.       Verbal Order/Direction given by Provider: TRACY    Provider giving Order:  YECENIA Olivera    Verbal Order given to: Zakiya Rosario RN

## 2022-03-11 NOTE — PROGRESS NOTES
[facility]    Facility:   [facility] [[id number]]   Code Status: DNR      CHIEF COMPLAINT/REASON FOR VISIT:  Chief Complaint   Patient presents with     Burn        HISTORY:      HPI: Riley is a 89 y.o. male  now residing in the LTC at [facility].  He is  with history of A. fib on warfarin develop acute CVA from holding warfarin for right gluteal hematoma.  Currently back on Coumadin, Hypertension , urinary retention has a suprapubic catheter and with a pacemaker.       Today he being seen  for reports of a burn right inner thigh..  It appears patient spilled hot coffee on his leg few days prior.  He was noted to have a large burn right inner thigh that was superficial.  There was no redness or dryness associated and a  white blood cell done on 3/9/2022 was 7.3.  bacitracin twice daily until healed.  He  had no other  concerns.  He denied CP or shortness of breath, denied constipation or diarrhea he is able to move all extremities.  He is eating well and reports no difficulties with swallowing.  His suprapubic is intact and draining clear yellow urine.     Past Medical History:   Diagnosis Date     Atrial fibrillation (H)     On coumadin     Bell's palsy     right sided facial droop     CVA (cerebral vascular accident) (H)      Hypertension      Pacemaker      Urinary retention              Family History   Problem Relation Age of Onset     COPD Father      Social History     Socioeconomic History     Marital status:      Spouse name: Not on file     Number of children: Not on file     Years of education: Not on file     Highest education level: Not on file   Occupational History     Not on file   Social Needs     Financial resource strain: Not on file     Food insecurity     Worry: Not on file     Inability: Not on file     Transportation needs     Medical: Not on file     Non-medical: Not on file   Tobacco Use     Smoking status: Former Smoker      Smokeless tobacco: Never Used   Substance and Sexual Activity     Alcohol use: No     Drug use: No     Sexual activity: Not on file   Lifestyle     Physical activity     Days per week: Not on file     Minutes per session: Not on file     Stress: Not on file   Relationships     Social connections     Talks on phone: Not on file     Gets together: Not on file     Attends Mu-ism service: Not on file     Active member of club or organization: Not on file     Attends meetings of clubs or organizations: Not on file     Relationship status: Not on file     Intimate partner violence     Fear of current or ex partner: Not on file     Emotionally abused: Not on file     Physically abused: Not on file     Forced sexual activity: Not on file   Other Topics Concern     Not on file   Social History Narrative    03/07/15 - The patient lives alone in his own home.         Review of Systems  Eyes:  He is followed  closely by opthalmology.  He has had multiple eye  procedures   Constitutional: Negative for activity change and fatigue. Negative for appetite change, chills and fever.   HENT: Negative for congestion and sore throat.    Eyes: positive  for visual disturbance. right sided facial paralysis   Respiratory: Negative for cough, shortness of breath and wheezing.    Cardiovascular: Negative for chest pain and leg swelling.        Pacemaker   Gastrointestinal: Negative for abdominal distention, abdominal pain, constipation, diarrhea and nausea.   Genitourinary: Negative for dysuria.   Musculoskeletal: Negative for arthralgias and myalgias.   Skin: Negative for color change, rash and wound.   Neurological: Negative for dizziness, weakness and numbness.   Psychiatric/Behavioral: Negative for agitation, behavioral problems and sleep disturbance.   Vitals:    06/04/21 0824   BP: 122/70   Pulse: 60   Resp: 18   Temp: 98.5  F (36.9  C)   SpO2: 99%   Weight: 183 lb 6.4 oz (83.2 kg)       Constitutional: He appears well-developed  and well-nourished.   Mello gentleman   HENT:   Head: Normocephalic and atraumatic.   Eyes: Conjunctivae are normal. Pupils are equal, round, and reactive to light. poor vision with the right worse than the left right sided facial paralysis   Neck: Normal range of motion. Neck supple.   Cardiovascular: Normal rate, regular rhythm and normal heart sounds.   No murmur heard.  Pulmonary/Chest: Effort normal and breath sounds normal. He has no wheezes. He has no rales.   Abdominal: Soft. Bowel sounds are normal. He exhibits no distension. There is no tenderness.   Genitourinary: suprapubic catheter  Musculoskeletal: Normal range of motion. He exhibits no edema.   Neurological: He is alert.   Skin: Skin is warm and dry.  Burn right inner thigh  Psychiatric: He has a normal mood and affect. His behavior is normal.     LABS:   Recent Results (from the past 240 hour(s))   INR    Collection Time: 03/02/22  5:53 AM   Result Value Ref Range    INR 2.17 (H) 0.85 - 1.15   CBC with platelets    Collection Time: 03/09/22  4:58 AM   Result Value Ref Range    WBC Count 7.3 4.0 - 11.0 10e3/uL    RBC Count 3.94 (L) 4.40 - 5.90 10e6/uL    Hemoglobin 11.8 (L) 13.3 - 17.7 g/dL    Hematocrit 35.6 (L) 40.0 - 53.0 %    MCV 90 78 - 100 fL    MCH 29.9 26.5 - 33.0 pg    MCHC 33.1 31.5 - 36.5 g/dL    RDW 14.0 10.0 - 15.0 %    Platelet Count 177 150 - 450 10e3/uL       Current Outpatient Medications   Medication Sig     acetaminophen (TYLENOL) 325 MG tablet Take 2 tablets (650 mg total) by mouth every 6 (six) hours as needed for pain.     atorvastatin (LIPITOR) 40 MG tablet Take 1 tablet (40 mg total) by mouth at bedtime. For hx of cva     bisacodyL (DULCOLAX) 10 mg suppository Insert 10 mg into the rectum daily as needed.     docusate sodium (COLACE) 100 MG capsule Take 1 capsule (100 mg total) by mouth 2 (two) times a day. For constipation (Patient taking differently: Take 100 mg by mouth daily. And daily PRN.)     dorzolamide-timoloL  (COSOPT) 22.3-6.8 mg/mL ophthalmic solution Administer 1 drop into the left eye 2 (two) times a day. glaucoma     erythromycin base (ERYTHROMYCIN OPHT) Apply 5 mg to eye. Instill 1 ribbon in left eye at HS and right eye four times a day     ferrous sulfate 325 (65 FE) MG tablet Take 1 tablet by mouth daily.     guaiFENesin (ROBITUSSIN) 100 mg/5 mL syrup Take 200 mg by mouth every 4 (four) hours as needed for cough.     latanoprost (XALATAN) 0.005 % ophthalmic solution Administer 1 drop into the left eye at bedtime. glaucoma     levothyroxine (SYNTHROID, LEVOTHROID) 25 MCG tablet Take 1 tablet (25 mcg total) by mouth Daily at 6:00 am. Hypothyroid     menthol-zinc oxide (CALMOSEPTINE) 0.44-20.6 % Oint ointment Apply 1 application topically as needed.     omeprazole (PRILOSEC) 20 MG capsule Take 1 capsule (20 mg total) by mouth 2 (two) times a day before meals. Needs two times a day for 2 months, then daily for ulcer     propylene glycol (SYSTANE COMPLETE) 0.6 % Drop Apply 1 drop to eye 2 (two) times a day. Both eyes for dry eyes     warfarin sodium (WARFARIN ORAL) Take by mouth. 4/15/21 INR 2.81 Cont 7mg M & TH, 6.5mg AOD. Next INR 5/5  4/5/21 INR 2.39 Cont 7mg M & Th, 6.5mg AOD. Next INR 4/14  4/1/21 INR 2.44 Cont 7mg M & Th, 6.5mg AOD. Next INR 4/5.  3/11/21 INR 2.53 Cont 7 mg M, Th and 6.5 mg AOD.  Next INR 4/8/21.  2/11/21 INR 2.87 Cont 7mg M, Th and 6.5mg AOD. Next INR 3/11       ASSESSMENT:      ICD-10-CM    1. Chronic atrial fibrillation (H)  I48.20    2. Right inner thigh burn    PLAN:      Right inner thigh burn- Bacitracin topically BID until healed .     HX Right sided [place] Palsy . Pt currently on eye lubricating drops multiple times a day.       Suprapubic catheter- cleanse daily, monitor for infection around  the site.  Cares per protocol     Atrial fibrillation on coumadin and lisinopril,        Anticoagulation management-  Continue Coumadin monitor and adjust per INRS      Poor vision- is followed  closely by ophthalmology.  on multiple eye gtts.      Hypothyroidism- on Synthroid, TSH 6/7/2021 was 3.14    Multiple moles on back removed by dermatology- healed     Anemia- HGB 3/9/21 was 11.8    Electronically signed by: Polly Marroquin CNP

## 2022-03-16 ENCOUNTER — NURSING HOME VISIT (OUTPATIENT)
Dept: GERIATRICS | Facility: CLINIC | Age: 87
End: 2022-03-16
Payer: COMMERCIAL

## 2022-03-16 VITALS
OXYGEN SATURATION: 99 % | DIASTOLIC BLOOD PRESSURE: 70 MMHG | HEART RATE: 60 BPM | BODY MASS INDEX: 27.92 KG/M2 | HEIGHT: 70 IN | SYSTOLIC BLOOD PRESSURE: 132 MMHG | TEMPERATURE: 98.3 F | WEIGHT: 195 LBS | RESPIRATION RATE: 18 BRPM

## 2022-03-16 DIAGNOSIS — T30.0 BURN: Primary | ICD-10-CM

## 2022-03-16 PROCEDURE — 99309 SBSQ NF CARE MODERATE MDM 30: CPT | Performed by: NURSE PRACTITIONER

## 2022-03-16 NOTE — LETTER
3/16/2022        RE: Riley Rodriguez  [address]  [address]  [facility] [address]        [facility]    Facility:   [facility] [[id number]]   Code Status: DNR      CHIEF COMPLAINT/REASON FOR VISIT:  Chief Complaint   Patient presents with     Burn        HISTORY:      HPI: Riley is a 89 y.o. male  now residing in the LTC at [facility].  He is  with history of A. fib on warfarin develop acute CVA from holding warfarin for right gluteal hematoma.  Currently back on Coumadin, Hypertension , urinary retention has a suprapubic catheter and with a pacemaker.       Today he being seen  for follow-up of a burn right inner thigh.. Today's dressing was taken down assessed.  Per nursing who was also at the bedside it has significantly improved.  He did have approximately 25% slough.  There was no redness or drainage noted.  Recent dressing changes were changed to Silvadene covered by Adaptic, Kerlix.  He  had no other  concerns.  He denied CP or shortness of breath, denied constipation or diarrhea he is able to move all extremities.  No changes in appetite.  His suprapubic is intact and draining clear yellow urine.     Past Medical History:   Diagnosis Date     Atrial fibrillation (H)     On coumadin     Bell's palsy     right sided facial droop     CVA (cerebral vascular accident) (H)      Hypertension      Pacemaker      Urinary retention              Family History   Problem Relation Age of Onset     COPD Father      Social History     Socioeconomic History     Marital status:      Spouse name: Not on file     Number of children: Not on file     Years of education: Not on file     Highest education level: Not on file   Occupational History     Not on file   Social Needs     Financial resource strain: Not on file     Food insecurity     Worry: Not on file     Inability: Not on file     Transportation needs     Medical: Not on file      Non-medical: Not on file   Tobacco Use     Smoking status: Former Smoker     Smokeless tobacco: Never Used   Substance and Sexual Activity     Alcohol use: No     Drug use: No     Sexual activity: Not on file   Lifestyle     Physical activity     Days per week: Not on file     Minutes per session: Not on file     Stress: Not on file   Relationships     Social connections     Talks on phone: Not on file     Gets together: Not on file     Attends Christianity service: Not on file     Active member of club or organization: Not on file     Attends meetings of clubs or organizations: Not on file     Relationship status: Not on file     Intimate partner violence     Fear of current or ex partner: Not on file     Emotionally abused: Not on file     Physically abused: Not on file     Forced sexual activity: Not on file   Other Topics Concern     Not on file   Social History Narrative    03/07/15 - The patient lives alone in his own home.         Review of Systems  Eyes:  He is followed  closely by opthalmology.  He has had multiple eye  procedures   Constitutional: Negative for activity change and fatigue. Negative for appetite change, chills and fever.   HENT: Negative for congestion and sore throat.    Eyes: positive  for visual disturbance. right sided facial paralysis   Respiratory: Negative for cough, shortness of breath and wheezing.    Cardiovascular: Negative for chest pain and leg swelling.        Pacemaker   Gastrointestinal: Negative for abdominal distention, abdominal pain, constipation, diarrhea and nausea.   Genitourinary: Negative for dysuria.   Musculoskeletal: Negative for arthralgias and myalgias.   Skin: Negative for color change, rash and wound.   Neurological: Negative for dizziness, weakness and numbness.   Psychiatric/Behavioral: Negative for agitation, behavioral problems and sleep disturbance.   Vitals:    06/04/21 0824   BP: 122/70   Pulse: 60   Resp: 18   Temp: 98.5  F (36.9  C)   SpO2: 99%   Weight:  183 lb 6.4 oz (83.2 kg)       Constitutional: He appears well-developed and well-nourished.   Pleasant gentleman   HENT:   Head: Normocephalic and atraumatic.   Eyes: Conjunctivae are normal. Pupils are equal, round, and reactive to light. poor vision with the right worse than the left right sided facial paralysis   Neck: Normal range of motion. Neck supple.   Cardiovascular: Normal rate, regular rhythm and normal heart sounds.   No murmur heard.  Pulmonary/Chest: Effort normal and breath sounds normal. He has no wheezes. He has no rales.   Abdominal: Soft. Bowel sounds are normal. He exhibits no distension. There is no tenderness.   Genitourinary: suprapubic catheter  Musculoskeletal: Normal range of motion. He exhibits no edema.   Neurological: He is alert.   Skin: Skin is warm and dry.  Burn right inner thigh healing  Psychiatric: He has a normal mood and affect. His behavior is normal.     LABS:   Recent Results (from the past 240 hour(s))   CBC with platelets    Collection Time: 03/09/22  4:58 AM   Result Value Ref Range    WBC Count 7.3 4.0 - 11.0 10e3/uL    RBC Count 3.94 (L) 4.40 - 5.90 10e6/uL    Hemoglobin 11.8 (L) 13.3 - 17.7 g/dL    Hematocrit 35.6 (L) 40.0 - 53.0 %    MCV 90 78 - 100 fL    MCH 29.9 26.5 - 33.0 pg    MCHC 33.1 31.5 - 36.5 g/dL    RDW 14.0 10.0 - 15.0 %    Platelet Count 177 150 - 450 10e3/uL       Current Outpatient Medications   Medication Sig     acetaminophen (TYLENOL) 325 MG tablet Take 2 tablets (650 mg total) by mouth every 6 (six) hours as needed for pain.     atorvastatin (LIPITOR) 40 MG tablet Take 1 tablet (40 mg total) by mouth at bedtime. For hx of cva     bisacodyL (DULCOLAX) 10 mg suppository Insert 10 mg into the rectum daily as needed.     docusate sodium (COLACE) 100 MG capsule Take 1 capsule (100 mg total) by mouth 2 (two) times a day. For constipation (Patient taking differently: Take 100 mg by mouth daily. And daily PRN.)     dorzolamide-timoloL (COSOPT) 22.3-6.8  mg/mL ophthalmic solution Administer 1 drop into the left eye 2 (two) times a day. glaucoma     erythromycin base (ERYTHROMYCIN OPHT) Apply 5 mg to eye. Instill 1 ribbon in left eye at HS and right eye four times a day     ferrous sulfate 325 (65 FE) MG tablet Take 1 tablet by mouth daily.     guaiFENesin (ROBITUSSIN) 100 mg/5 mL syrup Take 200 mg by mouth every 4 (four) hours as needed for cough.     latanoprost (XALATAN) 0.005 % ophthalmic solution Administer 1 drop into the left eye at bedtime. glaucoma     levothyroxine (SYNTHROID, LEVOTHROID) 25 MCG tablet Take 1 tablet (25 mcg total) by mouth Daily at 6:00 am. Hypothyroid     menthol-zinc oxide (CALMOSEPTINE) 0.44-20.6 % Oint ointment Apply 1 application topically as needed.     omeprazole (PRILOSEC) 20 MG capsule Take 1 capsule (20 mg total) by mouth 2 (two) times a day before meals. Needs two times a day for 2 months, then daily for ulcer     propylene glycol (SYSTANE COMPLETE) 0.6 % Drop Apply 1 drop to eye 2 (two) times a day. Both eyes for dry eyes     warfarin sodium (WARFARIN ORAL) Take by mouth. 4/15/21 INR 2.81 Cont 7mg M & TH, 6.5mg AOD. Next INR 5/5  4/5/21 INR 2.39 Cont 7mg M & Th, 6.5mg AOD. Next INR 4/14  4/1/21 INR 2.44 Cont 7mg M & Th, 6.5mg AOD. Next INR 4/5.  3/11/21 INR 2.53 Cont 7 mg M, Th and 6.5 mg AOD.  Next INR 4/8/21.  2/11/21 INR 2.87 Cont 7mg M, Th and 6.5mg AOD. Next INR 3/11       ASSESSMENT:      ICD-10-CM    1. Chronic atrial fibrillation (H)  I48.20    2. Right inner thigh burn    PLAN:      Right inner thigh burn-continue new dressing change orders as scheduled    HX Right sided [place] Palsy . Pt currently on eye lubricating drops multiple times a day.       Suprapubic catheter- cleanse daily, monitor for infection around  the site.  Cares per protocol     Atrial fibrillation on coumadin and lisinopril,     Anticoagulation management-  Continue Coumadin monitor and adjust per INRS      Poor vision- is followed closely by  ophthalmology.  on multiple eye gtts.      Hypothyroidism- on Synthroid, TSH 6/7/2021 was 3.14      Anemia- HGB 3/9/21 was 11.8    Electronically signed by: Polly Marroquin CNP          Sincerely,        Polly Marroquin CNP

## 2022-03-16 NOTE — PROGRESS NOTES
[facility]    Facility:   [facility] [[id number]]   Code Status: DNR      CHIEF COMPLAINT/REASON FOR VISIT:  Chief Complaint   Patient presents with     Burn        HISTORY:      HPI: Riley is a 89 y.o. male  now residing in the LTC at [facility].  He is  with history of A. fib on warfarin develop acute CVA from holding warfarin for right gluteal hematoma.  Currently back on Coumadin, Hypertension , urinary retention has a suprapubic catheter and with a pacemaker.       Today he being seen  for follow-up of a burn right inner thigh.. Today's dressing was taken down assessed.  Per nursing who was also at the bedside it has significantly improved.  He did have approximately 25% slough.  There was no redness or drainage noted.  Recent dressing changes were changed to Silvadene covered by Adaptic, Kerlix.  He  had no other  concerns.  He denied CP or shortness of breath, denied constipation or diarrhea he is able to move all extremities.  No changes in appetite.  His suprapubic is intact and draining clear yellow urine.     Past Medical History:   Diagnosis Date     Atrial fibrillation (H)     On coumadin     Bell's palsy     right sided facial droop     CVA (cerebral vascular accident) (H)      Hypertension      Pacemaker      Urinary retention              Family History   Problem Relation Age of Onset     COPD Father      Social History     Socioeconomic History     Marital status:      Spouse name: Not on file     Number of children: Not on file     Years of education: Not on file     Highest education level: Not on file   Occupational History     Not on file   Social Needs     Financial resource strain: Not on file     Food insecurity     Worry: Not on file     Inability: Not on file     Transportation needs     Medical: Not on file     Non-medical: Not on file   Tobacco Use     Smoking status: Former Smoker     Smokeless tobacco: Never Used    Substance and Sexual Activity     Alcohol use: No     Drug use: No     Sexual activity: Not on file   Lifestyle     Physical activity     Days per week: Not on file     Minutes per session: Not on file     Stress: Not on file   Relationships     Social connections     Talks on phone: Not on file     Gets together: Not on file     Attends Evangelical service: Not on file     Active member of club or organization: Not on file     Attends meetings of clubs or organizations: Not on file     Relationship status: Not on file     Intimate partner violence     Fear of current or ex partner: Not on file     Emotionally abused: Not on file     Physically abused: Not on file     Forced sexual activity: Not on file   Other Topics Concern     Not on file   Social History Narrative    03/07/15 - The patient lives alone in his own home.         Review of Systems  Eyes:  He is followed  closely by opthalmology.  He has had multiple eye  procedures   Constitutional: Negative for activity change and fatigue. Negative for appetite change, chills and fever.   HENT: Negative for congestion and sore throat.    Eyes: positive  for visual disturbance. right sided facial paralysis   Respiratory: Negative for cough, shortness of breath and wheezing.    Cardiovascular: Negative for chest pain and leg swelling.        Pacemaker   Gastrointestinal: Negative for abdominal distention, abdominal pain, constipation, diarrhea and nausea.   Genitourinary: Negative for dysuria.   Musculoskeletal: Negative for arthralgias and myalgias.   Skin: Negative for color change, rash and wound.   Neurological: Negative for dizziness, weakness and numbness.   Psychiatric/Behavioral: Negative for agitation, behavioral problems and sleep disturbance.   Vitals:    06/04/21 0824   BP: 122/70   Pulse: 60   Resp: 18   Temp: 98.5  F (36.9  C)   SpO2: 99%   Weight: 183 lb 6.4 oz (83.2 kg)       Constitutional: He appears well-developed and well-nourished.   Pleasant  gentleman   HENT:   Head: Normocephalic and atraumatic.   Eyes: Conjunctivae are normal. Pupils are equal, round, and reactive to light. poor vision with the right worse than the left right sided facial paralysis   Neck: Normal range of motion. Neck supple.   Cardiovascular: Normal rate, regular rhythm and normal heart sounds.   No murmur heard.  Pulmonary/Chest: Effort normal and breath sounds normal. He has no wheezes. He has no rales.   Abdominal: Soft. Bowel sounds are normal. He exhibits no distension. There is no tenderness.   Genitourinary: suprapubic catheter  Musculoskeletal: Normal range of motion. He exhibits no edema.   Neurological: He is alert.   Skin: Skin is warm and dry.  Burn right inner thigh healing  Psychiatric: He has a normal mood and affect. His behavior is normal.     LABS:   Recent Results (from the past 240 hour(s))   CBC with platelets    Collection Time: 03/09/22  4:58 AM   Result Value Ref Range    WBC Count 7.3 4.0 - 11.0 10e3/uL    RBC Count 3.94 (L) 4.40 - 5.90 10e6/uL    Hemoglobin 11.8 (L) 13.3 - 17.7 g/dL    Hematocrit 35.6 (L) 40.0 - 53.0 %    MCV 90 78 - 100 fL    MCH 29.9 26.5 - 33.0 pg    MCHC 33.1 31.5 - 36.5 g/dL    RDW 14.0 10.0 - 15.0 %    Platelet Count 177 150 - 450 10e3/uL       Current Outpatient Medications   Medication Sig     acetaminophen (TYLENOL) 325 MG tablet Take 2 tablets (650 mg total) by mouth every 6 (six) hours as needed for pain.     atorvastatin (LIPITOR) 40 MG tablet Take 1 tablet (40 mg total) by mouth at bedtime. For hx of cva     bisacodyL (DULCOLAX) 10 mg suppository Insert 10 mg into the rectum daily as needed.     docusate sodium (COLACE) 100 MG capsule Take 1 capsule (100 mg total) by mouth 2 (two) times a day. For constipation (Patient taking differently: Take 100 mg by mouth daily. And daily PRN.)     dorzolamide-timoloL (COSOPT) 22.3-6.8 mg/mL ophthalmic solution Administer 1 drop into the left eye 2 (two) times a day. glaucoma      erythromycin base (ERYTHROMYCIN OPHT) Apply 5 mg to eye. Instill 1 ribbon in left eye at HS and right eye four times a day     ferrous sulfate 325 (65 FE) MG tablet Take 1 tablet by mouth daily.     guaiFENesin (ROBITUSSIN) 100 mg/5 mL syrup Take 200 mg by mouth every 4 (four) hours as needed for cough.     latanoprost (XALATAN) 0.005 % ophthalmic solution Administer 1 drop into the left eye at bedtime. glaucoma     levothyroxine (SYNTHROID, LEVOTHROID) 25 MCG tablet Take 1 tablet (25 mcg total) by mouth Daily at 6:00 am. Hypothyroid     menthol-zinc oxide (CALMOSEPTINE) 0.44-20.6 % Oint ointment Apply 1 application topically as needed.     omeprazole (PRILOSEC) 20 MG capsule Take 1 capsule (20 mg total) by mouth 2 (two) times a day before meals. Needs two times a day for 2 months, then daily for ulcer     propylene glycol (SYSTANE COMPLETE) 0.6 % Drop Apply 1 drop to eye 2 (two) times a day. Both eyes for dry eyes     warfarin sodium (WARFARIN ORAL) Take by mouth. 4/15/21 INR 2.81 Cont 7mg M & TH, 6.5mg AOD. Next INR 5/5  4/5/21 INR 2.39 Cont 7mg M & Th, 6.5mg AOD. Next INR 4/14  4/1/21 INR 2.44 Cont 7mg M & Th, 6.5mg AOD. Next INR 4/5.  3/11/21 INR 2.53 Cont 7 mg M, Th and 6.5 mg AOD.  Next INR 4/8/21.  2/11/21 INR 2.87 Cont 7mg M, Th and 6.5mg AOD. Next INR 3/11       ASSESSMENT:      ICD-10-CM    1. Chronic atrial fibrillation (H)  I48.20    2. Right inner thigh burn    PLAN:      Right inner thigh burn-continue new dressing change orders as scheduled    HX Right sided [place] Palsy . Pt currently on eye lubricating drops multiple times a day.       Suprapubic catheter- cleanse daily, monitor for infection around  the site.  Cares per protocol     Atrial fibrillation on coumadin and lisinopril,     Anticoagulation management-  Continue Coumadin monitor and adjust per INRS      Poor vision- is followed closely by ophthalmology.  on multiple eye gtts.      Hypothyroidism- on Synthroid, TSH 6/7/2021 was  3.14      Anemia- HGB 3/9/21 was 11.8    Electronically signed by: Polly Marroquin CNP

## 2022-03-23 ENCOUNTER — NURSING HOME VISIT (OUTPATIENT)
Dept: GERIATRICS | Facility: CLINIC | Age: 87
End: 2022-03-23
Payer: COMMERCIAL

## 2022-03-23 VITALS
WEIGHT: 197 LBS | DIASTOLIC BLOOD PRESSURE: 67 MMHG | BODY MASS INDEX: 28.2 KG/M2 | OXYGEN SATURATION: 98 % | TEMPERATURE: 98.5 F | SYSTOLIC BLOOD PRESSURE: 120 MMHG | HEIGHT: 70 IN | RESPIRATION RATE: 18 BRPM | HEART RATE: 62 BPM

## 2022-03-23 DIAGNOSIS — T30.0 BURN: Primary | ICD-10-CM

## 2022-03-23 PROCEDURE — 99309 SBSQ NF CARE MODERATE MDM 30: CPT | Performed by: NURSE PRACTITIONER

## 2022-03-23 NOTE — LETTER
3/23/2022        RE: Riley Rodriguez  [address]  [address]  [facility] [address]        [facility]    Facility:   [facility] [[id number]]   Code Status: DNR      CHIEF COMPLAINT/REASON FOR VISIT:  Chief Complaint   Patient presents with     Burn        HISTORY:      HPI: Riley is a 89 y.o. male  now residing in the LTC at [facility].  He is  with history of A. fib on warfarin develop acute CVA from holding warfarin for right gluteal hematoma.  Currently back on Coumadin, Hypertension , urinary retention has a suprapubic catheter and with a pacemaker.       Today he being seen  for follow-up of a burn right inner thigh.. His right inner thigh burn seems to be healing well.  Silvadene was discontinued and a Vaseline gauze dressing to be applied daily.  He will also be followed by the facility wound nurse.  He had no redness or drainage related to the burn.  He  had no other  concerns.  He denied CP or shortness of breath, denied constipation or diarrhea he is able to move all extremities.  No changes in appetite.  His suprapubic is intact and draining clear yellow urine.     Past Medical History:   Diagnosis Date     Atrial fibrillation (H)     On coumadin     Bell's palsy     right sided facial droop     CVA (cerebral vascular accident) (H)      Hypertension      Pacemaker      Urinary retention              Family History   Problem Relation Age of Onset     COPD Father      Social History     Socioeconomic History     Marital status:      Spouse name: Not on file     Number of children: Not on file     Years of education: Not on file     Highest education level: Not on file   Occupational History     Not on file   Social Needs     Financial resource strain: Not on file     Food insecurity     Worry: Not on file     Inability: Not on file     Transportation needs     Medical: Not on file     Non-medical: Not on file   Tobacco Use      Smoking status: Former Smoker     Smokeless tobacco: Never Used   Substance and Sexual Activity     Alcohol use: No     Drug use: No     Sexual activity: Not on file   Lifestyle     Physical activity     Days per week: Not on file     Minutes per session: Not on file     Stress: Not on file   Relationships     Social connections     Talks on phone: Not on file     Gets together: Not on file     Attends Jewish service: Not on file     Active member of club or organization: Not on file     Attends meetings of clubs or organizations: Not on file     Relationship status: Not on file     Intimate partner violence     Fear of current or ex partner: Not on file     Emotionally abused: Not on file     Physically abused: Not on file     Forced sexual activity: Not on file   Other Topics Concern     Not on file   Social History Narrative    03/07/15 - The patient lives alone in his own home.         Review of Systems  Eyes:  He is followed  closely by opthalmology.  He has had multiple eye  procedures   Constitutional: Negative for activity change and fatigue. Negative for appetite change, chills and fever.   HENT: Negative for congestion and sore throat.    Eyes: positive  for visual disturbance. right sided facial paralysis   Respiratory: Negative for cough, shortness of breath and wheezing.    Cardiovascular: Negative for chest pain and leg swelling.        Pacemaker   Gastrointestinal: Negative for abdominal distention, abdominal pain, constipation, diarrhea and nausea.   Genitourinary: Negative for dysuria.   Musculoskeletal: Negative for arthralgias and myalgias.   Skin: Negative for color change, rash and wound.   Neurological: Negative for dizziness, weakness and numbness.   Psychiatric/Behavioral: Negative for agitation, behavioral problems and sleep disturbance.   Vitals:    06/04/21 0824   BP: 122/70   Pulse: 60   Resp: 18   Temp: 98.5  F (36.9  C)   SpO2: 99%   Weight: 183 lb 6.4 oz (83.2 kg)        Constitutional: He appears well-developed and well-nourished.   Pleasant gentleman   HENT:   Head: Normocephalic and atraumatic.   Eyes: Conjunctivae are normal. Pupils are equal, round, and reactive to light. poor vision with the right worse than the left right sided facial paralysis   Neck: Normal range of motion. Neck supple.   Cardiovascular: Normal rate, regular rhythm and normal heart sounds.   No murmur heard.  Pulmonary/Chest: Effort normal and breath sounds normal. He has no wheezes. He has no rales.   Abdominal: Soft. Bowel sounds are normal. He exhibits no distension. There is no tenderness.   Genitourinary: suprapubic catheter  Musculoskeletal: Normal range of motion. He exhibits no edema.   Neurological: He is alert.   Skin: Skin is warm and dry.  Burn right inner thigh healing  Psychiatric: He has a normal mood and affect. His behavior is normal.     LABS:   No results found for this or any previous visit (from the past 240 hour(s)).    Current Outpatient Medications   Medication Sig     acetaminophen (TYLENOL) 325 MG tablet Take 2 tablets (650 mg total) by mouth every 6 (six) hours as needed for pain.     atorvastatin (LIPITOR) 40 MG tablet Take 1 tablet (40 mg total) by mouth at bedtime. For hx of cva     bisacodyL (DULCOLAX) 10 mg suppository Insert 10 mg into the rectum daily as needed.     docusate sodium (COLACE) 100 MG capsule Take 1 capsule (100 mg total) by mouth 2 (two) times a day. For constipation (Patient taking differently: Take 100 mg by mouth daily. And daily PRN.)     dorzolamide-timoloL (COSOPT) 22.3-6.8 mg/mL ophthalmic solution Administer 1 drop into the left eye 2 (two) times a day. glaucoma     erythromycin base (ERYTHROMYCIN OPHT) Apply 5 mg to eye. Instill 1 ribbon in left eye at HS and right eye four times a day     ferrous sulfate 325 (65 FE) MG tablet Take 1 tablet by mouth daily.     guaiFENesin (ROBITUSSIN) 100 mg/5 mL syrup Take 200 mg by mouth every 4 (four) hours  as needed for cough.     latanoprost (XALATAN) 0.005 % ophthalmic solution Administer 1 drop into the left eye at bedtime. glaucoma     levothyroxine (SYNTHROID, LEVOTHROID) 25 MCG tablet Take 1 tablet (25 mcg total) by mouth Daily at 6:00 am. Hypothyroid     menthol-zinc oxide (CALMOSEPTINE) 0.44-20.6 % Oint ointment Apply 1 application topically as needed.     omeprazole (PRILOSEC) 20 MG capsule Take 1 capsule (20 mg total) by mouth 2 (two) times a day before meals. Needs two times a day for 2 months, then daily for ulcer     propylene glycol (SYSTANE COMPLETE) 0.6 % Drop Apply 1 drop to eye 2 (two) times a day. Both eyes for dry eyes     warfarin sodium (WARFARIN ORAL) Take by mouth. 4/15/21 INR 2.81 Cont 7mg M & TH, 6.5mg AOD. Next INR 5/5  4/5/21 INR 2.39 Cont 7mg M & Th, 6.5mg AOD. Next INR 4/14  4/1/21 INR 2.44 Cont 7mg M & Th, 6.5mg AOD. Next INR 4/5.  3/11/21 INR 2.53 Cont 7 mg M, Th and 6.5 mg AOD.  Next INR 4/8/21.  2/11/21 INR 2.87 Cont 7mg M, Th and 6.5mg AOD. Next INR 3/11       ASSESSMENT:      ICD-10-CM    1. Chronic atrial fibrillation (H)  I48.20    2. Right inner thigh burn    PLAN:      Right inner thigh burn-Silvadene discontinued Vaseline dressing daily     HX Right sided [place] Palsy . Pt currently on eye lubricating drops multiple times a day.       Suprapubic catheter- cleanse daily, monitor for infection around  the site.  Cares per protocol     Atrial fibrillation on coumadin and lisinopril,     Anticoagulation management-  Continue Coumadin monitor and adjust per INRS      Poor vision- is followed closely by ophthalmology.  on multiple eye gtts.      Hypothyroidism- on Synthroid, TSH 6/7/2021 was 3.14      Anemia- HGB 3/9/21 was 11.8    Electronically signed by: Polly Marroquin CNP          Sincerely,        Polly Marroquin CNP

## 2022-03-24 NOTE — PROGRESS NOTES
[facility]    Facility:   [facility] [[id number]]   Code Status: DNR      CHIEF COMPLAINT/REASON FOR VISIT:  Chief Complaint   Patient presents with     Burn        HISTORY:      HPI: Riley is a 89 y.o. male  now residing in the LTC at [facility].  He is  with history of A. fib on warfarin develop acute CVA from holding warfarin for right gluteal hematoma.  Currently back on Coumadin, Hypertension , urinary retention has a suprapubic catheter and with a pacemaker.       Today he being seen  for follow-up of a burn right inner thigh.. His right inner thigh burn seems to be healing well.  Silvadene was discontinued and a Vaseline gauze dressing to be applied daily.  He will also be followed by the facility wound nurse.  He had no redness or drainage related to the burn.  He  had no other  concerns.  He denied CP or shortness of breath, denied constipation or diarrhea he is able to move all extremities.  No changes in appetite.  His suprapubic is intact and draining clear yellow urine.     Past Medical History:   Diagnosis Date     Atrial fibrillation (H)     On coumadin     Bell's palsy     right sided facial droop     CVA (cerebral vascular accident) (H)      Hypertension      Pacemaker      Urinary retention              Family History   Problem Relation Age of Onset     COPD Father      Social History     Socioeconomic History     Marital status:      Spouse name: Not on file     Number of children: Not on file     Years of education: Not on file     Highest education level: Not on file   Occupational History     Not on file   Social Needs     Financial resource strain: Not on file     Food insecurity     Worry: Not on file     Inability: Not on file     Transportation needs     Medical: Not on file     Non-medical: Not on file   Tobacco Use     Smoking status: Former Smoker     Smokeless tobacco: Never Used   Substance and Sexual Activity      Alcohol use: No     Drug use: No     Sexual activity: Not on file   Lifestyle     Physical activity     Days per week: Not on file     Minutes per session: Not on file     Stress: Not on file   Relationships     Social connections     Talks on phone: Not on file     Gets together: Not on file     Attends Jain service: Not on file     Active member of club or organization: Not on file     Attends meetings of clubs or organizations: Not on file     Relationship status: Not on file     Intimate partner violence     Fear of current or ex partner: Not on file     Emotionally abused: Not on file     Physically abused: Not on file     Forced sexual activity: Not on file   Other Topics Concern     Not on file   Social History Narrative    03/07/15 - The patient lives alone in his own home.         Review of Systems  Eyes:  He is followed  closely by opthalmology.  He has had multiple eye  procedures   Constitutional: Negative for activity change and fatigue. Negative for appetite change, chills and fever.   HENT: Negative for congestion and sore throat.    Eyes: positive  for visual disturbance. right sided facial paralysis   Respiratory: Negative for cough, shortness of breath and wheezing.    Cardiovascular: Negative for chest pain and leg swelling.        Pacemaker   Gastrointestinal: Negative for abdominal distention, abdominal pain, constipation, diarrhea and nausea.   Genitourinary: Negative for dysuria.   Musculoskeletal: Negative for arthralgias and myalgias.   Skin: Negative for color change, rash and wound.   Neurological: Negative for dizziness, weakness and numbness.   Psychiatric/Behavioral: Negative for agitation, behavioral problems and sleep disturbance.   Vitals:    06/04/21 0824   BP: 122/70   Pulse: 60   Resp: 18   Temp: 98.5  F (36.9  C)   SpO2: 99%   Weight: 183 lb 6.4 oz (83.2 kg)       Constitutional: He appears well-developed and well-nourished.   Pleasant gentleman   HENT:   Head: Normocephalic  and atraumatic.   Eyes: Conjunctivae are normal. Pupils are equal, round, and reactive to light. poor vision with the right worse than the left right sided facial paralysis   Neck: Normal range of motion. Neck supple.   Cardiovascular: Normal rate, regular rhythm and normal heart sounds.   No murmur heard.  Pulmonary/Chest: Effort normal and breath sounds normal. He has no wheezes. He has no rales.   Abdominal: Soft. Bowel sounds are normal. He exhibits no distension. There is no tenderness.   Genitourinary: suprapubic catheter  Musculoskeletal: Normal range of motion. He exhibits no edema.   Neurological: He is alert.   Skin: Skin is warm and dry.  Burn right inner thigh healing  Psychiatric: He has a normal mood and affect. His behavior is normal.     LABS:   No results found for this or any previous visit (from the past 240 hour(s)).    Current Outpatient Medications   Medication Sig     acetaminophen (TYLENOL) 325 MG tablet Take 2 tablets (650 mg total) by mouth every 6 (six) hours as needed for pain.     atorvastatin (LIPITOR) 40 MG tablet Take 1 tablet (40 mg total) by mouth at bedtime. For hx of cva     bisacodyL (DULCOLAX) 10 mg suppository Insert 10 mg into the rectum daily as needed.     docusate sodium (COLACE) 100 MG capsule Take 1 capsule (100 mg total) by mouth 2 (two) times a day. For constipation (Patient taking differently: Take 100 mg by mouth daily. And daily PRN.)     dorzolamide-timoloL (COSOPT) 22.3-6.8 mg/mL ophthalmic solution Administer 1 drop into the left eye 2 (two) times a day. glaucoma     erythromycin base (ERYTHROMYCIN OPHT) Apply 5 mg to eye. Instill 1 ribbon in left eye at HS and right eye four times a day     ferrous sulfate 325 (65 FE) MG tablet Take 1 tablet by mouth daily.     guaiFENesin (ROBITUSSIN) 100 mg/5 mL syrup Take 200 mg by mouth every 4 (four) hours as needed for cough.     latanoprost (XALATAN) 0.005 % ophthalmic solution Administer 1 drop into the left eye at  bedtime. glaucoma     levothyroxine (SYNTHROID, LEVOTHROID) 25 MCG tablet Take 1 tablet (25 mcg total) by mouth Daily at 6:00 am. Hypothyroid     menthol-zinc oxide (CALMOSEPTINE) 0.44-20.6 % Oint ointment Apply 1 application topically as needed.     omeprazole (PRILOSEC) 20 MG capsule Take 1 capsule (20 mg total) by mouth 2 (two) times a day before meals. Needs two times a day for 2 months, then daily for ulcer     propylene glycol (SYSTANE COMPLETE) 0.6 % Drop Apply 1 drop to eye 2 (two) times a day. Both eyes for dry eyes     warfarin sodium (WARFARIN ORAL) Take by mouth. 4/15/21 INR 2.81 Cont 7mg M & TH, 6.5mg AOD. Next INR 5/5 4/5/21 INR 2.39 Cont 7mg M & Th, 6.5mg AOD. Next INR 4/14 4/1/21 INR 2.44 Cont 7mg M & Th, 6.5mg AOD. Next INR 4/5.  3/11/21 INR 2.53 Cont 7 mg M, Th and 6.5 mg AOD.  Next INR 4/8/21.  2/11/21 INR 2.87 Cont 7mg M, Th and 6.5mg AOD. Next INR 3/11       ASSESSMENT:      ICD-10-CM    1. Chronic atrial fibrillation (H)  I48.20    2. Right inner thigh burn    PLAN:      Right inner thigh burn-Silvadene discontinued Vaseline dressing daily     HX Right sided [place] Palsy . Pt currently on eye lubricating drops multiple times a day.       Suprapubic catheter- cleanse daily, monitor for infection around  the site.  Cares per protocol     Atrial fibrillation on coumadin and lisinopril,     Anticoagulation management-  Continue Coumadin monitor and adjust per INRS      Poor vision- is followed closely by ophthalmology.  on multiple eye gtts.      Hypothyroidism- on Synthroid, TSH 6/7/2021 was 3.14      Anemia- HGB 3/9/21 was 11.8    Electronically signed by: Polly Marroquin CNP

## 2022-03-31 ENCOUNTER — LAB REQUISITION (OUTPATIENT)
Dept: LAB | Facility: CLINIC | Age: 87
End: 2022-03-31
Payer: COMMERCIAL

## 2022-03-31 DIAGNOSIS — I48.91 UNSPECIFIED ATRIAL FIBRILLATION (H): ICD-10-CM

## 2022-04-01 ENCOUNTER — TELEPHONE (OUTPATIENT)
Dept: GERIATRICS | Facility: CLINIC | Age: 87
End: 2022-04-01

## 2022-04-01 LAB — INR PPP: 2.55 (ref 0.85–1.15)

## 2022-04-01 PROCEDURE — 36415 COLL VENOUS BLD VENIPUNCTURE: CPT | Mod: ORL | Performed by: NURSE PRACTITIONER

## 2022-04-01 PROCEDURE — P9604 ONE-WAY ALLOW PRORATED TRIP: HCPCS | Mod: ORL | Performed by: NURSE PRACTITIONER

## 2022-04-01 PROCEDURE — 85610 PROTHROMBIN TIME: CPT | Mod: ORL | Performed by: NURSE PRACTITIONER

## 2022-04-01 NOTE — TELEPHONE ENCOUNTER
[facility] Geriatrics Triage Nurse INR     Provider: YECENIA Olivera  Facility: [facility] Type:  LTC    Caller: Gabriela  Call Back Number: [phone]  Reason for call: INR  Diagnosis/Goal: A. Fib    Todays INR: 2.55  Last INR 2.17 (3/1), 7 mg po M,TH and 6.5 mg AOD.    Heparin/Lovenox:  No  Currently on ABX?: No  Other interacting medication:  None  Missed or refused doses: No    Verbal Order/Direction given by Provider: Continue with same Coumadin dose of 7 mg po M,TH and 6.5 mg AOD.  Recheck INR in a month (5/2).    Provider Giving Order:  YECENIA Olivera    Verbal Order given to: Gabriela Jose RN

## 2022-04-12 ENCOUNTER — ANCILLARY PROCEDURE (OUTPATIENT)
Dept: CARDIOLOGY | Facility: CLINIC | Age: 87
End: 2022-04-12
Attending: INTERNAL MEDICINE
Payer: COMMERCIAL

## 2022-04-12 DIAGNOSIS — I49.5 SICK SINUS SYNDROME (H): ICD-10-CM

## 2022-04-12 DIAGNOSIS — Z95.0 PACEMAKER: ICD-10-CM

## 2022-04-12 LAB
MDC_IDC_LEAD_IMPLANT_DT: NORMAL
MDC_IDC_LEAD_LOCATION: NORMAL
MDC_IDC_LEAD_LOCATION_DETAIL_1: NORMAL
MDC_IDC_LEAD_MFG: NORMAL
MDC_IDC_LEAD_MODEL: NORMAL
MDC_IDC_LEAD_POLARITY_TYPE: NORMAL
MDC_IDC_LEAD_SERIAL: NORMAL
MDC_IDC_MSMT_BATTERY_DTM: NORMAL
MDC_IDC_MSMT_BATTERY_REMAINING_LONGEVITY: 137 MO
MDC_IDC_MSMT_BATTERY_REMAINING_PERCENTAGE: 95.5 %
MDC_IDC_MSMT_BATTERY_RRT_TRIGGER: NORMAL
MDC_IDC_MSMT_BATTERY_STATUS: NORMAL
MDC_IDC_MSMT_BATTERY_VOLTAGE: 3.01 V
MDC_IDC_MSMT_LEADCHNL_RV_IMPEDANCE_VALUE: 340 OHM
MDC_IDC_MSMT_LEADCHNL_RV_LEAD_CHANNEL_STATUS: NORMAL
MDC_IDC_MSMT_LEADCHNL_RV_PACING_THRESHOLD_AMPLITUDE: 0.75 V
MDC_IDC_MSMT_LEADCHNL_RV_PACING_THRESHOLD_PULSEWIDTH: 0.5 MS
MDC_IDC_MSMT_LEADCHNL_RV_SENSING_INTR_AMPL: 12 MV
MDC_IDC_PG_IMPLANT_DTM: NORMAL
MDC_IDC_PG_MFG: NORMAL
MDC_IDC_PG_MODEL: NORMAL
MDC_IDC_PG_SERIAL: NORMAL
MDC_IDC_PG_TYPE: NORMAL
MDC_IDC_SESS_CLINIC_NAME: NORMAL
MDC_IDC_SESS_DTM: NORMAL
MDC_IDC_SESS_REPROGRAMMED: NO
MDC_IDC_SESS_TYPE: NORMAL
MDC_IDC_SET_BRADY_LOWRATE: 60 {BEATS}/MIN
MDC_IDC_SET_BRADY_MAX_SENSOR_RATE: 100 {BEATS}/MIN
MDC_IDC_SET_BRADY_MODE: NORMAL
MDC_IDC_SET_LEADCHNL_RV_PACING_AMPLITUDE: 1 V
MDC_IDC_SET_LEADCHNL_RV_PACING_ANODE_ELECTRODE_1: NORMAL
MDC_IDC_SET_LEADCHNL_RV_PACING_ANODE_LOCATION_1: NORMAL
MDC_IDC_SET_LEADCHNL_RV_PACING_CAPTURE_MODE: NORMAL
MDC_IDC_SET_LEADCHNL_RV_PACING_CATHODE_ELECTRODE_1: NORMAL
MDC_IDC_SET_LEADCHNL_RV_PACING_CATHODE_LOCATION_1: NORMAL
MDC_IDC_SET_LEADCHNL_RV_PACING_POLARITY: NORMAL
MDC_IDC_SET_LEADCHNL_RV_PACING_PULSEWIDTH: 0.5 MS
MDC_IDC_SET_LEADCHNL_RV_SENSING_ADAPTATION_MODE: NORMAL
MDC_IDC_SET_LEADCHNL_RV_SENSING_ANODE_ELECTRODE_1: NORMAL
MDC_IDC_SET_LEADCHNL_RV_SENSING_ANODE_LOCATION_1: NORMAL
MDC_IDC_SET_LEADCHNL_RV_SENSING_CATHODE_ELECTRODE_1: NORMAL
MDC_IDC_SET_LEADCHNL_RV_SENSING_CATHODE_LOCATION_1: NORMAL
MDC_IDC_SET_LEADCHNL_RV_SENSING_POLARITY: NORMAL
MDC_IDC_SET_LEADCHNL_RV_SENSING_SENSITIVITY: 2.5 MV
MDC_IDC_STAT_AT_DTM_END: NORMAL
MDC_IDC_STAT_AT_DTM_START: NORMAL
MDC_IDC_STAT_BRADY_DTM_END: NORMAL
MDC_IDC_STAT_BRADY_DTM_START: NORMAL
MDC_IDC_STAT_BRADY_RV_PERCENT_PACED: 97 %
MDC_IDC_STAT_CRT_DTM_END: NORMAL
MDC_IDC_STAT_CRT_DTM_START: NORMAL
MDC_IDC_STAT_HEART_RATE_DTM_END: NORMAL
MDC_IDC_STAT_HEART_RATE_DTM_START: NORMAL
MDC_IDC_STAT_HEART_RATE_VENTRICULAR_MAX: 230 {BEATS}/MIN
MDC_IDC_STAT_HEART_RATE_VENTRICULAR_MEAN: 66 {BEATS}/MIN
MDC_IDC_STAT_HEART_RATE_VENTRICULAR_MIN: 50 {BEATS}/MIN

## 2022-04-12 PROCEDURE — 93294 REM INTERROG EVL PM/LDLS PM: CPT | Performed by: INTERNAL MEDICINE

## 2022-04-12 PROCEDURE — 93296 REM INTERROG EVL PM/IDS: CPT | Performed by: INTERNAL MEDICINE

## 2022-04-28 ENCOUNTER — NURSING HOME VISIT (OUTPATIENT)
Dept: GERIATRICS | Facility: CLINIC | Age: 87
End: 2022-04-28
Payer: COMMERCIAL

## 2022-04-28 DIAGNOSIS — I48.0 PAROXYSMAL ATRIAL FIBRILLATION (H): ICD-10-CM

## 2022-04-28 DIAGNOSIS — R54 AGE-RELATED PHYSICAL DEBILITY: Primary | ICD-10-CM

## 2022-04-28 DIAGNOSIS — Z86.73 HISTORY OF STROKE: ICD-10-CM

## 2022-04-28 DIAGNOSIS — I10 ESSENTIAL HYPERTENSION: ICD-10-CM

## 2022-04-28 PROBLEM — H54.1141: Status: ACTIVE | Noted: 2022-04-28

## 2022-04-28 PROBLEM — M10.9 GOUT: Status: ACTIVE | Noted: 2022-04-28

## 2022-04-28 PROCEDURE — 99309 SBSQ NF CARE MODERATE MDM 30: CPT | Performed by: FAMILY MEDICINE

## 2022-04-28 NOTE — LETTER
4/28/2022        RE: Riley Rodriguez  [address]  [facility] [address]          [facility]    [place] Medical Record Number:  [id number]  Place of Service where encounter took place: [facility] () [[id number]]   CODE STATUS:   DNR / DNI    Chief Complaint:  Chief Complaint   Patient presents with     half-way Regulatory     LTC 4/28/2022. Afib on warfarin. General debilitation. Hypothyroidism. Hx of stroke. Chronic SP catheter.        HPI:   Riley is a 89 y.o. male seen for routine physician follow up in LTC at [facility]. He has hx of Afib on warfarin, prior stroke, BPH, HTN, [place] palsy, hypothyroidism, SP catheter. He was last hospitalized 8/13/2020-8/19/2020. He had labs drawn in NH as he was not doing well with general fatigue, decreased appetite. No respiratory sx. He started feeling a little better on his own but then labs came back with hgb down to 6.2. He was worked up in the hospital found to have duodenal ulcer.       Today:  No interim concerns since my last visit. He has been stable. No new concerns per nursing. No complaints of uncontrolled pain. He has not been ill recently. No open areas of skin. Weight has been stable. No falls. Has chronic SP catheter, no issues. He is [place], baseline, has baseline vision impairment, on multiple eye drops. He is able to ambulate with walker.       Past Medical History:  Past Medical History:   Diagnosis Date     Atrial fibrillation (H)     On coumadin     Bell's palsy     right sided facial droop     CVA (cerebral vascular accident) (H)      Hypertension      Pacemaker      Urinary retention        Medications:  Current Outpatient Medications   Medication Sig Dispense Refill     acetaminophen (TYLENOL) 500 MG tablet Take 1,000 mg by mouth every 6 hours as needed for fever or pain       atorvastatin (LIPITOR) 40 MG tablet [ATORVASTATIN (LIPITOR) 40 MG TABLET] Take 1 tablet (40 mg total) by  mouth at bedtime. For hx of cva 30 tablet 0     bisacodyL (DULCOLAX) 10 mg suppository [BISACODYL (DULCOLAX) 10 MG SUPPOSITORY] Insert 10 mg into the rectum daily as needed.       docusate sodium (COLACE) 100 MG capsule [DOCUSATE SODIUM (COLACE) 100 MG CAPSULE] Take 1 capsule (100 mg total) by mouth 2 (two) times a day. For constipation 30 capsule 0     Docusate Sodium (DOCU LIQUID PO) Place 5 drops into both ears daily as needed prior to irrigation for cerumen removal       dorzolamide-timoloL (COSOPT) 22.3-6.8 mg/mL ophthalmic solution [DORZOLAMIDE-TIMOLOL (COSOPT) 22.3-6.8 MG/ML OPHTHALMIC SOLUTION] Administer 1 drop into the left eye 2 (two) times a day. glaucoma 10 mL 12     erythromycin base (ERYTHROMYCIN OPHT) Place 5 mg into both eyes At Bedtime Instill 1 ribbon in Left Eye and 1 ribbon in Right Eye at bedtime. Ensure ointment is given after eye drops to ensure proper absorption.       ferrous sulfate 325 (65 FE) MG tablet [FERROUS SULFATE 325 (65 FE) MG TABLET] Take 1 tablet by mouth daily.       guaiFENesin (ROBITUSSIN) 100 mg/5 mL syrup Take 10 mLs by mouth every 4 hours as needed for cough        latanoprost (XALATAN) 0.005 % ophthalmic solution [LATANOPROST (XALATAN) 0.005 % OPHTHALMIC SOLUTION] Administer 1 drop into the left eye at bedtime. glaucoma 2.5 mL 12     levothyroxine (SYNTHROID, LEVOTHROID) 25 MCG tablet [LEVOTHYROXINE (SYNTHROID, LEVOTHROID) 25 MCG TABLET] Take 1 tablet (25 mcg total) by mouth Daily at 6:00 am. Hypothyroid 30 tablet 0     MEDICATION CANNOT BE REORDERED - PLEASE MANUALLY REORDER AND DISCONTINUE THE OLD ORDER [PROPYLENE GLYCOL (SYSTANE COMPLETE) 0.6 % DROP] Apply 1 drop to eye 2 (two) times a day. Both eyes for dry eyes 1.5 mL 0     menthol-zinc oxide (CALMOSEPTINE) 0.44-20.6 % Oint ointment Apply topically as needed for skin protection (Redness) Buttocks       omeprazole (PRILOSEC) 20 MG capsule [OMEPRAZOLE (PRILOSEC) 20 MG CAPSULE] Take 1 capsule (20 mg total) by mouth 2  "(two) times a day before meals. Needs two times a day for 2 months, then daily for ulcer 60 capsule 0     Propylene Glycol (SYSTANE COMPLETE OP) Place 1 drop into both eyes 2 times daily       WARFARIN SODIUM PO 5/2/22 INR 2.40  Cont 7mg Mon and Thurs and 6.5mg AOD.  Next INR 6/1/22.          Physical Exam:  General: Patient is alert, elderly male, no distress.    Vitals: /72   Pulse 60   Temp 98  F (36.7  C)   Resp 18   Ht 1.778 m (5' 10\")   Wt 74.8 kg (165 lb)   SpO2 100%   BMI 23.68 kg/m    HEENT: Head is NCAT. Nares negative. Oropharynx well hydrated. Right facial droop, baseline.  Neck: No JVD.  Lungs: Non labored respirations.   : SP cath with leg bag.  Extremities: Trace LE edema is noted.  Musculoskeletal: Age related degen changes.   Skin: No open areas.   Psych: Mood appears good.      Labs:  Lab Results   Component Value Date    WBC 5.9 10/01/2020    HGB 12.9 (L) 01/14/2021    HCT 35.9 (L) 10/01/2020    MCV 85 10/01/2020     10/01/2020     Results for orders placed or performed in visit on 10/01/20   Basic Metabolic Panel   Result Value Ref Range    Sodium 138 136 - 145 mmol/L    Potassium 4.1 3.5 - 5.0 mmol/L    Chloride 102 98 - 107 mmol/L    CO2 28 22 - 31 mmol/L    Anion Gap, Calculation 8 5 - 18 mmol/L    Glucose 99 70 - 125 mg/dL    Calcium 9.1 8.5 - 10.5 mg/dL    BUN 12 8 - 28 mg/dL    Creatinine 0.79 0.70 - 1.30 mg/dL    GFR MDRD Af Amer >60 >60 mL/min/1.73m2    GFR MDRD Non Af Amer >60 >60 mL/min/1.73m2     Lab Results   Component Value Date    TSH 3.10 04/22/2020       Component      Latest Ref Rng & Units 4/22/2020 6/7/2021   TSH      0.30 - 5.00 uIU/mL 3.10 3.14     Component      Latest Ref Rng & Units 9/8/2020 10/1/2020 10/5/2020 1/14/2021                Hemoglobin      14.0 - 18.0 g/dL 7.9 (L) 11.1 (L) 10.1 (L) 12.9 (L)         Assessment/Plan:  1. General debility. Hx of stroke, multiple medical conditions, advanced age. Stable in LTC requires much assist from staff. "   2. Afib. Chronically anticoagulated with warfarin. Monitor and adjust per INR. Rate is controlled.   3. HTN. Not on BP meds, had been on lisinopril in the past. BPs satisfactory.  4. Hx of stroke. Continue statin, also on warfarin.  5. Hypothyroidism. On replacement.  6. SP catheter. Long term.         Electronically signed by: Nancy Fair MD               Sincerely,        Nancy Fair MD

## 2022-04-29 VITALS
SYSTOLIC BLOOD PRESSURE: 122 MMHG | BODY MASS INDEX: 23.62 KG/M2 | HEIGHT: 70 IN | TEMPERATURE: 98 F | DIASTOLIC BLOOD PRESSURE: 72 MMHG | OXYGEN SATURATION: 100 % | RESPIRATION RATE: 18 BRPM | WEIGHT: 165 LBS | HEART RATE: 60 BPM

## 2022-05-02 ENCOUNTER — LAB REQUISITION (OUTPATIENT)
Dept: LAB | Facility: CLINIC | Age: 87
End: 2022-05-02
Payer: COMMERCIAL

## 2022-05-02 ENCOUNTER — TELEPHONE (OUTPATIENT)
Dept: GERIATRICS | Facility: CLINIC | Age: 87
End: 2022-05-02

## 2022-05-02 DIAGNOSIS — I48.91 UNSPECIFIED ATRIAL FIBRILLATION (H): ICD-10-CM

## 2022-05-02 LAB — INR PPP: 2.4 (ref 0.85–1.15)

## 2022-05-02 PROCEDURE — 36415 COLL VENOUS BLD VENIPUNCTURE: CPT | Mod: ORL | Performed by: NURSE PRACTITIONER

## 2022-05-02 PROCEDURE — 85610 PROTHROMBIN TIME: CPT | Mod: ORL | Performed by: NURSE PRACTITIONER

## 2022-05-02 PROCEDURE — P9604 ONE-WAY ALLOW PRORATED TRIP: HCPCS | Mod: ORL | Performed by: NURSE PRACTITIONER

## 2022-05-15 NOTE — PROGRESS NOTES
[facility] SERVICES    [place] Medical Record Number:  [id number]  Place of Service where encounter took place: [facility] () [[id number]]   CODE STATUS:   DNR / DNI    Chief Complaint:  Chief Complaint   Patient presents with     prison Regulatory     LTC 4/28/2022. Afib on warfarin. General debilitation. Hypothyroidism. Hx of stroke. Chronic SP catheter.        HPI:   Riley is a 89 y.o. male seen for routine physician follow up in LTC at [facility]. He has hx of Afib on warfarin, prior stroke, BPH, HTN, [place] palsy, hypothyroidism, SP catheter. He was last hospitalized 8/13/2020-8/19/2020. He had labs drawn in NH as he was not doing well with general fatigue, decreased appetite. No respiratory sx. He started feeling a little better on his own but then labs came back with hgb down to 6.2. He was worked up in the hospital found to have duodenal ulcer.       Today:  No interim concerns since my last visit. He has been stable. No new concerns per nursing. No complaints of uncontrolled pain. He has not been ill recently. No open areas of skin. Weight has been stable. No falls. Has chronic SP catheter, no issues. He is [place], baseline, has baseline vision impairment, on multiple eye drops. He is able to ambulate with walker.       Past Medical History:  Past Medical History:   Diagnosis Date     Atrial fibrillation (H)     On coumadin     Bell's palsy     right sided facial droop     CVA (cerebral vascular accident) (H)      Hypertension      Pacemaker      Urinary retention        Medications:  Current Outpatient Medications   Medication Sig Dispense Refill     acetaminophen (TYLENOL) 500 MG tablet Take 1,000 mg by mouth every 6 hours as needed for fever or pain       atorvastatin (LIPITOR) 40 MG tablet [ATORVASTATIN (LIPITOR) 40 MG TABLET] Take 1 tablet (40 mg total) by mouth at bedtime. For hx of cva 30 tablet 0     bisacodyL (DULCOLAX) 10 mg suppository [BISACODYL  (DULCOLAX) 10 MG SUPPOSITORY] Insert 10 mg into the rectum daily as needed.       docusate sodium (COLACE) 100 MG capsule [DOCUSATE SODIUM (COLACE) 100 MG CAPSULE] Take 1 capsule (100 mg total) by mouth 2 (two) times a day. For constipation 30 capsule 0     Docusate Sodium (DOCU LIQUID PO) Place 5 drops into both ears daily as needed prior to irrigation for cerumen removal       dorzolamide-timoloL (COSOPT) 22.3-6.8 mg/mL ophthalmic solution [DORZOLAMIDE-TIMOLOL (COSOPT) 22.3-6.8 MG/ML OPHTHALMIC SOLUTION] Administer 1 drop into the left eye 2 (two) times a day. glaucoma 10 mL 12     erythromycin base (ERYTHROMYCIN OPHT) Place 5 mg into both eyes At Bedtime Instill 1 ribbon in Left Eye and 1 ribbon in Right Eye at bedtime. Ensure ointment is given after eye drops to ensure proper absorption.       ferrous sulfate 325 (65 FE) MG tablet [FERROUS SULFATE 325 (65 FE) MG TABLET] Take 1 tablet by mouth daily.       guaiFENesin (ROBITUSSIN) 100 mg/5 mL syrup Take 10 mLs by mouth every 4 hours as needed for cough        latanoprost (XALATAN) 0.005 % ophthalmic solution [LATANOPROST (XALATAN) 0.005 % OPHTHALMIC SOLUTION] Administer 1 drop into the left eye at bedtime. glaucoma 2.5 mL 12     levothyroxine (SYNTHROID, LEVOTHROID) 25 MCG tablet [LEVOTHYROXINE (SYNTHROID, LEVOTHROID) 25 MCG TABLET] Take 1 tablet (25 mcg total) by mouth Daily at 6:00 am. Hypothyroid 30 tablet 0     MEDICATION CANNOT BE REORDERED - PLEASE MANUALLY REORDER AND DISCONTINUE THE OLD ORDER [PROPYLENE GLYCOL (SYSTANE COMPLETE) 0.6 % DROP] Apply 1 drop to eye 2 (two) times a day. Both eyes for dry eyes 1.5 mL 0     menthol-zinc oxide (CALMOSEPTINE) 0.44-20.6 % Oint ointment Apply topically as needed for skin protection (Redness) Buttocks       omeprazole (PRILOSEC) 20 MG capsule [OMEPRAZOLE (PRILOSEC) 20 MG CAPSULE] Take 1 capsule (20 mg total) by mouth 2 (two) times a day before meals. Needs two times a day for 2 months, then daily for ulcer 60 capsule  "0     Propylene Glycol (SYSTANE COMPLETE OP) Place 1 drop into both eyes 2 times daily       WARFARIN SODIUM PO 5/2/22 INR 2.40  Cont 7mg Mon and Thurs and 6.5mg AOD.  Next INR 6/1/22.          Physical Exam:  General: Patient is alert, elderly male, no distress.    Vitals: /72   Pulse 60   Temp 98  F (36.7  C)   Resp 18   Ht 1.778 m (5' 10\")   Wt 74.8 kg (165 lb)   SpO2 100%   BMI 23.68 kg/m    HEENT: Head is NCAT. Nares negative. Oropharynx well hydrated. Right facial droop, baseline.  Neck: No JVD.  Lungs: Non labored respirations.   : SP cath with leg bag.  Extremities: Trace LE edema is noted.  Musculoskeletal: Age related degen changes.   Skin: No open areas.   Psych: Mood appears good.      Labs:  Lab Results   Component Value Date    WBC 5.9 10/01/2020    HGB 12.9 (L) 01/14/2021    HCT 35.9 (L) 10/01/2020    MCV 85 10/01/2020     10/01/2020     Results for orders placed or performed in visit on 10/01/20   Basic Metabolic Panel   Result Value Ref Range    Sodium 138 136 - 145 mmol/L    Potassium 4.1 3.5 - 5.0 mmol/L    Chloride 102 98 - 107 mmol/L    CO2 28 22 - 31 mmol/L    Anion Gap, Calculation 8 5 - 18 mmol/L    Glucose 99 70 - 125 mg/dL    Calcium 9.1 8.5 - 10.5 mg/dL    BUN 12 8 - 28 mg/dL    Creatinine 0.79 0.70 - 1.30 mg/dL    GFR MDRD Af Amer >60 >60 mL/min/1.73m2    GFR MDRD Non Af Amer >60 >60 mL/min/1.73m2     Lab Results   Component Value Date    TSH 3.10 04/22/2020       Component      Latest Ref Rng & Units 4/22/2020 6/7/2021   TSH      0.30 - 5.00 uIU/mL 3.10 3.14     Component      Latest Ref Rng & Units 9/8/2020 10/1/2020 10/5/2020 1/14/2021                Hemoglobin      14.0 - 18.0 g/dL 7.9 (L) 11.1 (L) 10.1 (L) 12.9 (L)         Assessment/Plan:  1. General debility. Hx of stroke, multiple medical conditions, advanced age. Stable in LTC requires much assist from staff.   2. Afib. Chronically anticoagulated with warfarin. Monitor and adjust per INR. Rate is controlled. "   3. HTN. Not on BP meds, had been on lisinopril in the past. BPs satisfactory.  4. Hx of stroke. Continue statin, also on warfarin.  5. Hypothyroidism. On replacement.  6. SP catheter. Long term.         Electronically signed by: Nancy Fair MD

## 2022-05-26 ENCOUNTER — TELEPHONE (OUTPATIENT)
Dept: GERIATRICS | Facility: CLINIC | Age: 87
End: 2022-05-26
Payer: COMMERCIAL

## 2022-05-26 NOTE — TELEPHONE ENCOUNTER
[place]IATRIC [facility] NON-EMERGENT CALL ENCOUNTER    Riley Rodriguez is a 89 year old  (4/15/1926), call received today regarding: fall    Facility nurse reported that pt had an unwitnessed fall in his apartment. Pt stated he fell while getting up from his wheelchair. No head-strike. Vitals: /135, R 24, P 125, T 98.5. Small abrasion to his left elbow, covered with bandage.   Writer asked that vitals be taken again in 30 minutes with call back to triage. SO given for OT eval for wheelchair positioning.      Electronically signed by:   Ruth Smyth

## 2022-05-31 ENCOUNTER — LAB REQUISITION (OUTPATIENT)
Dept: LAB | Facility: CLINIC | Age: 87
End: 2022-05-31
Payer: COMMERCIAL

## 2022-05-31 DIAGNOSIS — I48.91 UNSPECIFIED ATRIAL FIBRILLATION (H): ICD-10-CM

## 2022-06-01 ENCOUNTER — LAB REQUISITION (OUTPATIENT)
Dept: LAB | Facility: CLINIC | Age: 87
End: 2022-06-01
Payer: COMMERCIAL

## 2022-06-01 ENCOUNTER — NURSING HOME VISIT (OUTPATIENT)
Dept: GERIATRICS | Facility: CLINIC | Age: 87
End: 2022-06-01
Payer: COMMERCIAL

## 2022-06-01 VITALS
HEIGHT: 70 IN | RESPIRATION RATE: 20 BRPM | BODY MASS INDEX: 28.63 KG/M2 | OXYGEN SATURATION: 97 % | TEMPERATURE: 98.3 F | WEIGHT: 200 LBS | SYSTOLIC BLOOD PRESSURE: 121 MMHG | HEART RATE: 65 BPM | DIASTOLIC BLOOD PRESSURE: 64 MMHG

## 2022-06-01 DIAGNOSIS — E03.9 HYPOTHYROIDISM, UNSPECIFIED: ICD-10-CM

## 2022-06-01 DIAGNOSIS — Z79.01 ENCOUNTER FOR CURRENT LONG-TERM USE OF ANTICOAGULANTS: ICD-10-CM

## 2022-06-01 DIAGNOSIS — R54 AGE-RELATED PHYSICAL DEBILITY: Primary | ICD-10-CM

## 2022-06-01 LAB — INR PPP: 2.63 (ref 0.85–1.15)

## 2022-06-01 PROCEDURE — P9604 ONE-WAY ALLOW PRORATED TRIP: HCPCS | Mod: ORL | Performed by: NURSE PRACTITIONER

## 2022-06-01 PROCEDURE — 99310 SBSQ NF CARE HIGH MDM 45: CPT | Performed by: NURSE PRACTITIONER

## 2022-06-01 PROCEDURE — 85610 PROTHROMBIN TIME: CPT | Mod: ORL | Performed by: NURSE PRACTITIONER

## 2022-06-01 PROCEDURE — 36415 COLL VENOUS BLD VENIPUNCTURE: CPT | Mod: ORL | Performed by: NURSE PRACTITIONER

## 2022-06-01 RX ORDER — NYSTATIN 100000 [USP'U]/G
POWDER TOPICAL 2 TIMES DAILY
COMMUNITY
End: 2023-01-01

## 2022-06-01 NOTE — PROGRESS NOTES
[facility]    Facility:   [facility] [[id number]]   Code Status: DNR      CHIEF COMPLAINT/REASON FOR VISIT:  Chief Complaint   Patient presents with     FVP Care Coordination - Annual        HISTORY:      HPI: Riley is a 89 y.o. male  now residing in the LTC at [facility].  He is  with history of A. fib on warfarin develop acute CVA from holding warfarin for right gluteal hematoma.  Currently back on Coumadin, Hypertension , urinary retention has a suprapubic catheter and with a pacemaker.       Today he being seen  for an annual  visit  . He had no concerns.  He denied CP or shortness of breath, denied constipation or diarrhea he is able to move all extremities.  He is eating well and reports no difficulties with swallowing.  His suprapubic is intact and draining clear yellow urine.  TSH WNL and due this month, lab ordered,.   He was with no lower extremity edema and his weights have been stable over the last month. His INR was reviewed and dosed today.  Per staff he did slide out of his wheelchair last week and he is currently being evaluated by OT for positioning.  He did sustain an abrasion left elbow and the site is healing well.     Past Medical History:   Diagnosis Date     Atrial fibrillation (H)     On coumadin     Bell's palsy     right sided facial droop     CVA (cerebral vascular accident) (H)      Hypertension      Pacemaker      Urinary retention              Family History   Problem Relation Age of Onset     COPD Father      Social History     Socioeconomic History     Marital status:      Spouse name: Not on file     Number of children: Not on file     Years of education: Not on file     Highest education level: Not on file   Occupational History     Not on file   Social Needs     Financial resource strain: Not on file     Food insecurity     Worry: Not on file     Inability: Not on file     Transportation needs     Medical: Not on  file     Non-medical: Not on file   Tobacco Use     Smoking status: Former Smoker     Smokeless tobacco: Never Used   Substance and Sexual Activity     Alcohol use: No     Drug use: No     Sexual activity: Not on file   Lifestyle     Physical activity     Days per week: Not on file     Minutes per session: Not on file     Stress: Not on file   Relationships     Social connections     Talks on phone: Not on file     Gets together: Not on file     Attends Jainism service: Not on file     Active member of club or organization: Not on file     Attends meetings of clubs or organizations: Not on file     Relationship status: Not on file     Intimate partner violence     Fear of current or ex partner: Not on file     Emotionally abused: Not on file     Physically abused: Not on file     Forced sexual activity: Not on file   Other Topics Concern     Not on file   Social History Narrative    03/07/15 - The patient lives alone in his own home.         Review of Systems  Eyes:  He is followed  closely by opthalmology.  He has had multiple eye  procedures   Constitutional: Negative for activity change and fatigue. Negative for appetite change, chills and fever.   HENT: Negative for congestion and sore throat.    Eyes: positive  for visual disturbance. right sided facial paralysis   Respiratory: Negative for cough, shortness of breath and wheezing.    Cardiovascular: Negative for chest pain and leg swelling.        Pacemaker   Gastrointestinal: Negative for abdominal distention, abdominal pain, constipation, diarrhea and nausea.   Genitourinary: Negative for dysuria.   Musculoskeletal: Negative for arthralgias and myalgias.   Skin: Negative for color change, rash and wound.   Neurological: Negative for dizziness, weakness and numbness.   Psychiatric/Behavioral: Negative for agitation, behavioral problems and sleep disturbance.   Vitals:    06/04/21 0824   BP: 122/70   Pulse: 60   Resp: 18   Temp: 98.5  F (36.9  C)   SpO2: 99%    Weight: 183 lb 6.4 oz (83.2 kg)       Constitutional: He appears well-developed and well-nourished.   Pleasant gentleman   HENT:   Head: Normocephalic and atraumatic.   Eyes: Conjunctivae are normal. Pupils are equal, round, and reactive to light. poor vision with the right worse than the left right sided facial paralysis   Neck: Normal range of motion. Neck supple.   Cardiovascular: Normal rate, regular rhythm and normal heart sounds.   No murmur heard.  Pulmonary/Chest: Effort normal and breath sounds normal. He has no wheezes. He has no rales.   Abdominal: Soft. Bowel sounds are normal. He exhibits no distension. There is no tenderness.   Genitourinary: suprapubic catheter  Musculoskeletal: Normal range of motion. He exhibits no edema.   Neurological: He is alert.   Skin: Skin is warm and dry.   Psychiatric: He has a normal mood and affect. His behavior is normal.     LABS:   No results found for this or any previous visit (from the past 240 hour(s)).     Current Outpatient Medications   Medication Sig     acetaminophen (TYLENOL) 325 MG tablet Take 2 tablets (650 mg total) by mouth every 6 (six) hours as needed for pain.     atorvastatin (LIPITOR) 40 MG tablet Take 1 tablet (40 mg total) by mouth at bedtime. For hx of cva     bisacodyL (DULCOLAX) 10 mg suppository Insert 10 mg into the rectum daily as needed.     docusate sodium (COLACE) 100 MG capsule Take 1 capsule (100 mg total) by mouth 2 (two) times a day. For constipation (Patient taking differently: Take 100 mg by mouth daily. And daily PRN.)     dorzolamide-timoloL (COSOPT) 22.3-6.8 mg/mL ophthalmic solution Administer 1 drop into the left eye 2 (two) times a day. glaucoma     erythromycin base (ERYTHROMYCIN OPHT) Apply 5 mg to eye. Instill 1 ribbon in left eye at HS and right eye four times a day     ferrous sulfate 325 (65 FE) MG tablet Take 1 tablet by mouth daily.     guaiFENesin (ROBITUSSIN) 100 mg/5 mL syrup Take 200 mg by mouth every 4 (four)  hours as needed for cough.     latanoprost (XALATAN) 0.005 % ophthalmic solution Administer 1 drop into the left eye at bedtime. glaucoma     levothyroxine (SYNTHROID, LEVOTHROID) 25 MCG tablet Take 1 tablet (25 mcg total) by mouth Daily at 6:00 am. Hypothyroid     menthol-zinc oxide (CALMOSEPTINE) 0.44-20.6 % Oint ointment Apply 1 application topically as needed.     omeprazole (PRILOSEC) 20 MG capsule Take 1 capsule (20 mg total) by mouth 2 (two) times a day before meals. Needs two times a day for 2 months, then daily for ulcer     propylene glycol (SYSTANE COMPLETE) 0.6 % Drop Apply 1 drop to eye 2 (two) times a day. Both eyes for dry eyes     warfarin sodium (WARFARIN ORAL) Take by mouth. 4/15/21 INR 2.81 Cont 7mg M & TH, 6.5mg AOD. Next INR 5/5  4/5/21 INR 2.39 Cont 7mg M & Th, 6.5mg AOD. Next INR 4/14  4/1/21 INR 2.44 Cont 7mg M & Th, 6.5mg AOD. Next INR 4/5.  3/11/21 INR 2.53 Cont 7 mg M, Th and 6.5 mg AOD.  Next INR 4/8/21.  2/11/21 INR 2.87 Cont 7mg M, Th and 6.5mg AOD. Next INR 3/11     Case Management:  I have reviewed the facility/SNF care plan/MDS which was done on 3/23/22 including the falls risk, nutrition and pain screening. I also reviewed the current immunizations, and preventive care.. Future cancer screening is not clinically indicated secondary to age/goals of care.   Patient's desire to return to the community is not assessible due to cognitive impairment.    Information reviewed:  Medications, vital signs, orders, and nursing notes.    ASSESSMENT:      ICD-10-CM    1. Chronic atrial fibrillation (H)  I48.20    2. Hypothyroidism, unspecified type  E03.9    3. Seborrheic Keratosis  L82.1        PLAN:      HX Right sided [place] Palsy . Pt currently on eye lubricating drops multiple times a day.       Suprapubic catheter- cleanse daily, monitor for infection around  the site.  Cares per protocol     Atrial fibrillation on coumadin and lisinopril,        Anticoagulation management-  Continue  Coumadin monitor and adjust per INRS      Poor vision- is followed closely by ophthalmology.  on multiple eye gtts.      Hypothyroidism- on Synthroid, TSH 6/7/2021 was 3.14, lab pending    Anemia- HGB 3/9/22 was 11.8    Electronically signed by: Polly Marroquin CNP

## 2022-06-01 NOTE — LETTER
6/1/2022        RE: Riley Rodriguez  [address]  [facility] [address]        [facility]    Facility:   [facility] [[id number]]   Code Status: DNR      CHIEF COMPLAINT/REASON FOR VISIT:  Chief Complaint   Patient presents with     FVP Care Coordination - Annual        HISTORY:      HPI: Riley is a 89 y.o. male  now residing in the LTC at [facility].  He is  with history of A. fib on warfarin develop acute CVA from holding warfarin for right gluteal hematoma.  Currently back on Coumadin, Hypertension , urinary retention has a suprapubic catheter and with a pacemaker.       Today he being seen  for an annual  visit  . He had no concerns.  He denied CP or shortness of breath, denied constipation or diarrhea he is able to move all extremities.  He is eating well and reports no difficulties with swallowing.  His suprapubic is intact and draining clear yellow urine.  TSH WNL and due this month, lab ordered,.   He was with no lower extremity edema and his weights have been stable over the last month. His INR was reviewed and dosed today.  Per staff he did slide out of his wheelchair last week and he is currently being evaluated by OT for positioning.  He did sustain an abrasion left elbow and the site is healing well.     Past Medical History:   Diagnosis Date     Atrial fibrillation (H)     On coumadin     Bell's palsy     right sided facial droop     CVA (cerebral vascular accident) (H)      Hypertension      Pacemaker      Urinary retention              Family History   Problem Relation Age of Onset     COPD Father      Social History     Socioeconomic History     Marital status:      Spouse name: Not on file     Number of children: Not on file     Years of education: Not on file     Highest education level: Not on file   Occupational History     Not on file   Social Needs     Financial resource strain: Not on file     Food  insecurity     Worry: Not on file     Inability: Not on file     Transportation needs     Medical: Not on file     Non-medical: Not on file   Tobacco Use     Smoking status: Former Smoker     Smokeless tobacco: Never Used   Substance and Sexual Activity     Alcohol use: No     Drug use: No     Sexual activity: Not on file   Lifestyle     Physical activity     Days per week: Not on file     Minutes per session: Not on file     Stress: Not on file   Relationships     Social connections     Talks on phone: Not on file     Gets together: Not on file     Attends Anabaptist service: Not on file     Active member of club or organization: Not on file     Attends meetings of clubs or organizations: Not on file     Relationship status: Not on file     Intimate partner violence     Fear of current or ex partner: Not on file     Emotionally abused: Not on file     Physically abused: Not on file     Forced sexual activity: Not on file   Other Topics Concern     Not on file   Social History Narrative    03/07/15 - The patient lives alone in his own home.         Review of Systems  Eyes:  He is followed  closely by opthalmology.  He has had multiple eye  procedures   Constitutional: Negative for activity change and fatigue. Negative for appetite change, chills and fever.   HENT: Negative for congestion and sore throat.    Eyes: positive  for visual disturbance. right sided facial paralysis   Respiratory: Negative for cough, shortness of breath and wheezing.    Cardiovascular: Negative for chest pain and leg swelling.        Pacemaker   Gastrointestinal: Negative for abdominal distention, abdominal pain, constipation, diarrhea and nausea.   Genitourinary: Negative for dysuria.   Musculoskeletal: Negative for arthralgias and myalgias.   Skin: Negative for color change, rash and wound.   Neurological: Negative for dizziness, weakness and numbness.   Psychiatric/Behavioral: Negative for agitation, behavioral problems and sleep  disturbance.   Vitals:    06/04/21 0824   BP: 122/70   Pulse: 60   Resp: 18   Temp: 98.5  F (36.9  C)   SpO2: 99%   Weight: 183 lb 6.4 oz (83.2 kg)       Constitutional: He appears well-developed and well-nourished.   Pleasant gentleman   HENT:   Head: Normocephalic and atraumatic.   Eyes: Conjunctivae are normal. Pupils are equal, round, and reactive to light. poor vision with the right worse than the left right sided facial paralysis   Neck: Normal range of motion. Neck supple.   Cardiovascular: Normal rate, regular rhythm and normal heart sounds.   No murmur heard.  Pulmonary/Chest: Effort normal and breath sounds normal. He has no wheezes. He has no rales.   Abdominal: Soft. Bowel sounds are normal. He exhibits no distension. There is no tenderness.   Genitourinary: suprapubic catheter  Musculoskeletal: Normal range of motion. He exhibits no edema.   Neurological: He is alert.   Skin: Skin is warm and dry.   Psychiatric: He has a normal mood and affect. His behavior is normal.     LABS:   No results found for this or any previous visit (from the past 240 hour(s)).     Current Outpatient Medications   Medication Sig     acetaminophen (TYLENOL) 325 MG tablet Take 2 tablets (650 mg total) by mouth every 6 (six) hours as needed for pain.     atorvastatin (LIPITOR) 40 MG tablet Take 1 tablet (40 mg total) by mouth at bedtime. For hx of cva     bisacodyL (DULCOLAX) 10 mg suppository Insert 10 mg into the rectum daily as needed.     docusate sodium (COLACE) 100 MG capsule Take 1 capsule (100 mg total) by mouth 2 (two) times a day. For constipation (Patient taking differently: Take 100 mg by mouth daily. And daily PRN.)     dorzolamide-timoloL (COSOPT) 22.3-6.8 mg/mL ophthalmic solution Administer 1 drop into the left eye 2 (two) times a day. glaucoma     erythromycin base (ERYTHROMYCIN OPHT) Apply 5 mg to eye. Instill 1 ribbon in left eye at HS and right eye four times a day     ferrous sulfate 325 (65 FE) MG tablet  Take 1 tablet by mouth daily.     guaiFENesin (ROBITUSSIN) 100 mg/5 mL syrup Take 200 mg by mouth every 4 (four) hours as needed for cough.     latanoprost (XALATAN) 0.005 % ophthalmic solution Administer 1 drop into the left eye at bedtime. glaucoma     levothyroxine (SYNTHROID, LEVOTHROID) 25 MCG tablet Take 1 tablet (25 mcg total) by mouth Daily at 6:00 am. Hypothyroid     menthol-zinc oxide (CALMOSEPTINE) 0.44-20.6 % Oint ointment Apply 1 application topically as needed.     omeprazole (PRILOSEC) 20 MG capsule Take 1 capsule (20 mg total) by mouth 2 (two) times a day before meals. Needs two times a day for 2 months, then daily for ulcer     propylene glycol (SYSTANE COMPLETE) 0.6 % Drop Apply 1 drop to eye 2 (two) times a day. Both eyes for dry eyes     warfarin sodium (WARFARIN ORAL) Take by mouth. 4/15/21 INR 2.81 Cont 7mg M & TH, 6.5mg AOD. Next INR 5/5  4/5/21 INR 2.39 Cont 7mg M & Th, 6.5mg AOD. Next INR 4/14  4/1/21 INR 2.44 Cont 7mg M & Th, 6.5mg AOD. Next INR 4/5.  3/11/21 INR 2.53 Cont 7 mg M, Th and 6.5 mg AOD.  Next INR 4/8/21.  2/11/21 INR 2.87 Cont 7mg M, Th and 6.5mg AOD. Next INR 3/11     Case Management:  I have reviewed the facility/SNF care plan/MDS which was done on 3/23/22 including the falls risk, nutrition and pain screening. I also reviewed the current immunizations, and preventive care.. Future cancer screening is not clinically indicated secondary to age/goals of care.   Patient's desire to return to the community is not assessible due to cognitive impairment.    Information reviewed:  Medications, vital signs, orders, and nursing notes.    ASSESSMENT:      ICD-10-CM    1. Chronic atrial fibrillation (H)  I48.20    2. Hypothyroidism, unspecified type  E03.9    3. Seborrheic Keratosis  L82.1        PLAN:      HX Right sided [place] Palsy . Pt currently on eye lubricating drops multiple times a day.       Suprapubic catheter- cleanse daily, monitor for infection around  the site.  Cares per  protocol     Atrial fibrillation on coumadin and lisinopril,        Anticoagulation management-  Continue Coumadin monitor and adjust per INRS      Poor vision- is followed closely by ophthalmology.  on multiple eye gtts.      Hypothyroidism- on Synthroid, TSH 6/7/2021 was 3.14, lab pending    Anemia- HGB 3/9/22 was 11.8    Electronically signed by: Polly Marroquin CNP          Sincerely,        Polly Marroquin CNP

## 2022-06-02 ENCOUNTER — TELEPHONE (OUTPATIENT)
Dept: GERIATRICS | Facility: CLINIC | Age: 87
End: 2022-06-02

## 2022-06-02 LAB — TSH SERPL DL<=0.005 MIU/L-ACNC: 4.1 UIU/ML (ref 0.3–5)

## 2022-06-02 PROCEDURE — 36415 COLL VENOUS BLD VENIPUNCTURE: CPT | Mod: ORL | Performed by: NURSE PRACTITIONER

## 2022-06-02 PROCEDURE — 84443 ASSAY THYROID STIM HORMONE: CPT | Mod: ORL | Performed by: NURSE PRACTITIONER

## 2022-06-02 PROCEDURE — P9604 ONE-WAY ALLOW PRORATED TRIP: HCPCS | Mod: ORL | Performed by: NURSE PRACTITIONER

## 2022-06-02 NOTE — TELEPHONE ENCOUNTER
[facility] Geriatrics Lab Note     Provider: YECENIA Olivera  Facility: [facility] Facility Type:  LTC    No Known Allergies    Labs Reviewed by provider: TSH     Verbal Order/Direction given by Provider: No new orders.      Provider giving Order:  YECENIA Olivera    Verbal Order given to: Pat([phone])    Nba Ramirez RN

## 2022-06-18 ENCOUNTER — HEALTH MAINTENANCE LETTER (OUTPATIENT)
Age: 87
End: 2022-06-18

## 2022-06-29 ENCOUNTER — TRANSFERRED RECORDS (OUTPATIENT)
Dept: HEALTH INFORMATION MANAGEMENT | Facility: CLINIC | Age: 87
End: 2022-06-29

## 2022-06-30 ENCOUNTER — LAB REQUISITION (OUTPATIENT)
Dept: LAB | Facility: CLINIC | Age: 87
End: 2022-06-30
Payer: COMMERCIAL

## 2022-06-30 DIAGNOSIS — I48.91 UNSPECIFIED ATRIAL FIBRILLATION (H): ICD-10-CM

## 2022-07-01 ENCOUNTER — TELEPHONE (OUTPATIENT)
Dept: GERIATRICS | Facility: CLINIC | Age: 87
End: 2022-07-01

## 2022-07-01 LAB — INR PPP: 2.49 (ref 0.85–1.15)

## 2022-07-01 PROCEDURE — 85610 PROTHROMBIN TIME: CPT | Mod: ORL | Performed by: NURSE PRACTITIONER

## 2022-07-01 PROCEDURE — 36415 COLL VENOUS BLD VENIPUNCTURE: CPT | Mod: ORL | Performed by: NURSE PRACTITIONER

## 2022-07-01 PROCEDURE — P9604 ONE-WAY ALLOW PRORATED TRIP: HCPCS | Mod: ORL | Performed by: NURSE PRACTITIONER

## 2022-07-01 NOTE — TELEPHONE ENCOUNTER
[facility] Geriatrics Triage Nurse INR     Provider: YECENIA Olivera  Facility: [facility] Type:  LTC    Caller: Priscila  Call Back Number: [phone]  Reason for call: INR  Diagnosis/Goal: A. Fib    Todays INR: 2.49  Last INR 2.63 (6/1), 7 mg po M,TH and 6.5 mg po AOD.    Heparin/Lovenox:  No  Currently on ABX?: No  Other interacting medication:  None  Missed or refused doses: No    Verbal Order/Direction given by Provider: Continue same Coumadin dose of 7 mg M,TH and 6.5 mg po AOD.  Recheck INR on 8/3.    Provider Giving Order:  YECENIA Olivera    Verbal Order given to: Priscila Jose RN

## 2022-07-09 NOTE — TELEPHONE ENCOUNTER
[facility] Nurse Triage Anticoagulation Note      Provider: YECENIA Olivera  Facility: [facility] Type: TCU    Caller: Leatha  Call Back Number:  [phone]    Reason for call: INR    Today s INR: 2.41  Previous INR: 1/8/19 INR in hospital 3.24 Hold  1/7/19 INR 3.37 Held    Diagnosis/Goal: AFIB  Heparin/Lovenox: No   Currently on ABX: Yes  Cipro thru 1/18. (In hosp 1/5-1/8.)  Other interacting Medications: None  Missed or refused doses: No    Verbal Order/Direction given by Provider: 5mg today & Tomorrow, next INR 1/11.    Provider giving order: YECENIA Olivera    Verbal order given to: Leatha Pham RN   
None

## 2022-07-27 ENCOUNTER — LAB REQUISITION (OUTPATIENT)
Dept: LAB | Facility: CLINIC | Age: 87
End: 2022-07-27
Payer: COMMERCIAL

## 2022-07-27 DIAGNOSIS — Z51.81 ENCOUNTER FOR THERAPEUTIC DRUG LEVEL MONITORING: ICD-10-CM

## 2022-07-28 ENCOUNTER — ANCILLARY PROCEDURE (OUTPATIENT)
Dept: CARDIOLOGY | Facility: CLINIC | Age: 87
End: 2022-07-28
Attending: INTERNAL MEDICINE
Payer: COMMERCIAL

## 2022-07-28 DIAGNOSIS — Z95.0 PACEMAKER: ICD-10-CM

## 2022-07-28 DIAGNOSIS — I49.5 SICK SINUS SYNDROME (H): ICD-10-CM

## 2022-07-28 LAB
MDC_IDC_LEAD_IMPLANT_DT: NORMAL
MDC_IDC_LEAD_LOCATION: NORMAL
MDC_IDC_LEAD_LOCATION_DETAIL_1: NORMAL
MDC_IDC_LEAD_MFG: NORMAL
MDC_IDC_LEAD_MODEL: NORMAL
MDC_IDC_LEAD_POLARITY_TYPE: NORMAL
MDC_IDC_LEAD_SERIAL: NORMAL
MDC_IDC_MSMT_BATTERY_DTM: NORMAL
MDC_IDC_MSMT_BATTERY_REMAINING_LONGEVITY: 112 MO
MDC_IDC_MSMT_BATTERY_REMAINING_PERCENTAGE: 86 %
MDC_IDC_MSMT_BATTERY_RRT_TRIGGER: NORMAL
MDC_IDC_MSMT_BATTERY_STATUS: NORMAL
MDC_IDC_MSMT_BATTERY_VOLTAGE: 3.02 V
MDC_IDC_MSMT_LEADCHNL_RV_IMPEDANCE_VALUE: 350 OHM
MDC_IDC_MSMT_LEADCHNL_RV_LEAD_CHANNEL_STATUS: NORMAL
MDC_IDC_MSMT_LEADCHNL_RV_PACING_THRESHOLD_AMPLITUDE: 0.75 V
MDC_IDC_MSMT_LEADCHNL_RV_PACING_THRESHOLD_PULSEWIDTH: 0.5 MS
MDC_IDC_MSMT_LEADCHNL_RV_SENSING_INTR_AMPL: 12 MV
MDC_IDC_PG_IMPLANT_DTM: NORMAL
MDC_IDC_PG_MFG: NORMAL
MDC_IDC_PG_MODEL: NORMAL
MDC_IDC_PG_SERIAL: NORMAL
MDC_IDC_PG_TYPE: NORMAL
MDC_IDC_SESS_CLINIC_NAME: NORMAL
MDC_IDC_SESS_DTM: NORMAL
MDC_IDC_SESS_REPROGRAMMED: NO
MDC_IDC_SESS_TYPE: NORMAL
MDC_IDC_SET_BRADY_LOWRATE: 60 {BEATS}/MIN
MDC_IDC_SET_BRADY_MAX_SENSOR_RATE: 100 {BEATS}/MIN
MDC_IDC_SET_BRADY_MODE: NORMAL
MDC_IDC_SET_LEADCHNL_RV_PACING_AMPLITUDE: 1 V
MDC_IDC_SET_LEADCHNL_RV_PACING_ANODE_ELECTRODE_1: NORMAL
MDC_IDC_SET_LEADCHNL_RV_PACING_ANODE_LOCATION_1: NORMAL
MDC_IDC_SET_LEADCHNL_RV_PACING_CAPTURE_MODE: NORMAL
MDC_IDC_SET_LEADCHNL_RV_PACING_CATHODE_ELECTRODE_1: NORMAL
MDC_IDC_SET_LEADCHNL_RV_PACING_CATHODE_LOCATION_1: NORMAL
MDC_IDC_SET_LEADCHNL_RV_PACING_POLARITY: NORMAL
MDC_IDC_SET_LEADCHNL_RV_PACING_PULSEWIDTH: 0.5 MS
MDC_IDC_SET_LEADCHNL_RV_SENSING_ADAPTATION_MODE: NORMAL
MDC_IDC_SET_LEADCHNL_RV_SENSING_ANODE_ELECTRODE_1: NORMAL
MDC_IDC_SET_LEADCHNL_RV_SENSING_ANODE_LOCATION_1: NORMAL
MDC_IDC_SET_LEADCHNL_RV_SENSING_CATHODE_ELECTRODE_1: NORMAL
MDC_IDC_SET_LEADCHNL_RV_SENSING_CATHODE_LOCATION_1: NORMAL
MDC_IDC_SET_LEADCHNL_RV_SENSING_POLARITY: NORMAL
MDC_IDC_SET_LEADCHNL_RV_SENSING_SENSITIVITY: 2.5 MV
MDC_IDC_STAT_AT_DTM_END: NORMAL
MDC_IDC_STAT_AT_DTM_START: NORMAL
MDC_IDC_STAT_BRADY_DTM_END: NORMAL
MDC_IDC_STAT_BRADY_DTM_START: NORMAL
MDC_IDC_STAT_BRADY_RV_PERCENT_PACED: 97 %
MDC_IDC_STAT_CRT_DTM_END: NORMAL
MDC_IDC_STAT_CRT_DTM_START: NORMAL
MDC_IDC_STAT_HEART_RATE_DTM_END: NORMAL
MDC_IDC_STAT_HEART_RATE_DTM_START: NORMAL
MDC_IDC_STAT_HEART_RATE_VENTRICULAR_MAX: 230 {BEATS}/MIN
MDC_IDC_STAT_HEART_RATE_VENTRICULAR_MEAN: 66 {BEATS}/MIN
MDC_IDC_STAT_HEART_RATE_VENTRICULAR_MIN: 50 {BEATS}/MIN

## 2022-07-28 PROCEDURE — 93296 REM INTERROG EVL PM/IDS: CPT | Performed by: INTERNAL MEDICINE

## 2022-07-28 PROCEDURE — 93294 REM INTERROG EVL PM/LDLS PM: CPT | Performed by: INTERNAL MEDICINE

## 2022-07-29 LAB
FERRITIN SERPL-MCNC: 59 NG/ML (ref 31–409)
HGB BLD-MCNC: 12.2 G/DL (ref 13.3–17.7)

## 2022-07-29 PROCEDURE — 36415 COLL VENOUS BLD VENIPUNCTURE: CPT | Mod: ORL | Performed by: NURSE PRACTITIONER

## 2022-07-29 PROCEDURE — P9604 ONE-WAY ALLOW PRORATED TRIP: HCPCS | Mod: ORL | Performed by: NURSE PRACTITIONER

## 2022-07-29 PROCEDURE — 82728 ASSAY OF FERRITIN: CPT | Mod: ORL | Performed by: NURSE PRACTITIONER

## 2022-07-29 PROCEDURE — 85018 HEMOGLOBIN: CPT | Mod: ORL | Performed by: NURSE PRACTITIONER

## 2022-08-01 ENCOUNTER — LAB REQUISITION (OUTPATIENT)
Dept: LAB | Facility: CLINIC | Age: 87
End: 2022-08-01
Payer: COMMERCIAL

## 2022-08-01 DIAGNOSIS — I48.91 UNSPECIFIED ATRIAL FIBRILLATION (H): ICD-10-CM

## 2022-08-03 ENCOUNTER — TELEPHONE (OUTPATIENT)
Dept: GERIATRICS | Facility: CLINIC | Age: 87
End: 2022-08-03

## 2022-08-03 LAB — INR PPP: 2.49 (ref 0.85–1.15)

## 2022-08-03 PROCEDURE — 85610 PROTHROMBIN TIME: CPT | Mod: ORL | Performed by: NURSE PRACTITIONER

## 2022-08-03 PROCEDURE — P9604 ONE-WAY ALLOW PRORATED TRIP: HCPCS | Mod: ORL | Performed by: NURSE PRACTITIONER

## 2022-08-03 PROCEDURE — 36415 COLL VENOUS BLD VENIPUNCTURE: CPT | Mod: ORL | Performed by: NURSE PRACTITIONER

## 2022-08-03 NOTE — TELEPHONE ENCOUNTER
[facility] Geriatrics Triage Nurse INR     Provider: YECENIA Olivera  Facility: [facility] Type:  LT    Caller: Zakiya  Call Back Number: [phone]  Reason for call: INR  Diagnosis/Goal: A. Fib    Todays INR: 2.49  Last INR 2.49 on 7/1/22. Current dose: 7 mg M/TH and 6.5 mg AOD.    Heparin/Lovenox:  No  Currently on ABX?: No  Other interacting medication:  None  Missed or refused doses: No    Verbal Order/Direction given by Provider: Coumadin 7 mg PO M/TH and 6.5 mg AOD. Recheck INR on 8/31/22.    Provider Giving Order:  YECENIA Olivera    Verbal Order given to: Zakiya Smyth RN

## 2022-08-28 ENCOUNTER — LAB REQUISITION (OUTPATIENT)
Dept: LAB | Facility: CLINIC | Age: 87
End: 2022-08-28
Payer: COMMERCIAL

## 2022-08-28 DIAGNOSIS — I48.91 UNSPECIFIED ATRIAL FIBRILLATION (H): ICD-10-CM

## 2022-08-30 ENCOUNTER — NURSING HOME VISIT (OUTPATIENT)
Dept: GERIATRICS | Facility: CLINIC | Age: 87
End: 2022-08-30
Payer: COMMERCIAL

## 2022-08-30 DIAGNOSIS — Z86.73 HISTORY OF STROKE: ICD-10-CM

## 2022-08-30 DIAGNOSIS — I10 ESSENTIAL HYPERTENSION: ICD-10-CM

## 2022-08-30 DIAGNOSIS — I48.0 PAROXYSMAL ATRIAL FIBRILLATION (H): ICD-10-CM

## 2022-08-30 DIAGNOSIS — R54 AGE-RELATED PHYSICAL DEBILITY: Primary | ICD-10-CM

## 2022-08-30 PROCEDURE — 99309 SBSQ NF CARE MODERATE MDM 30: CPT | Performed by: FAMILY MEDICINE

## 2022-08-30 NOTE — LETTER
8/30/2022        RE: Riley Rodriguez  [address]  [facility] [address]          M [facility] SERVICES    [place] Medical Record Number:  [id number]  Place of Service where encounter took place: [facility] () [[id number]]   CODE STATUS:   DNR / DNI    Chief Complaint:  Chief Complaint   Patient presents with     [facility] Regulatory     LTC 8/30/2022. Afib on warfarin. General debilitation. Hypothyroidism. Hx of stroke. Chronic SP catheter.       HPI:   Riley is a 89 y.o. male seen for routine physician follow up in LT at [facility]. He has hx of Afib on warfarin, prior stroke, BPH, HTN, [place] palsy, hypothyroidism, SP catheter. He was last hospitalized 8/13/2020-8/19/2020. He had labs drawn in NH as he was not doing well with general fatigue, decreased appetite. No respiratory sx. He started feeling a little better on his own but then labs came back with hgb down to 6.2. He was worked up in the hospital found to have duodenal ulcer.       Today:  Riley has been stable. No new concerns per nursing. His chart, meds, nurses noted were reviewed. He is hard of hearing, has no complaints on visit today. Still enjoys walking, either with family or staff on walking program. No recent falls. He has not been ill recently. No complaints of uncontrolled pain. No open areas of skin. Weight has been stable. Has chronic SP catheter, no issues. Has baseline vision impairment, on multiple eye drops. On warfarin for Afib.       Past Medical History:  Past Medical History:   Diagnosis Date     Atrial fibrillation (H)     On coumadin     Bell's palsy     right sided facial droop     CVA (cerebral vascular accident) (H)      Hypertension      Pacemaker      Urinary retention        Medications:  Current Outpatient Medications   Medication Sig Dispense Refill     acetaminophen (TYLENOL) 500 MG tablet Take 1,000 mg by mouth every 6 hours as needed for fever or pain        atorvastatin (LIPITOR) 40 MG tablet [ATORVASTATIN (LIPITOR) 40 MG TABLET] Take 1 tablet (40 mg total) by mouth at bedtime. For hx of cva 30 tablet 0     bisacodyL (DULCOLAX) 10 mg suppository [BISACODYL (DULCOLAX) 10 MG SUPPOSITORY] Insert 10 mg into the rectum daily as needed.       docusate sodium (COLACE) 100 MG capsule [DOCUSATE SODIUM (COLACE) 100 MG CAPSULE] Take 1 capsule (100 mg total) by mouth 2 (two) times a day. For constipation 30 capsule 0     Docusate Sodium (DOCU LIQUID PO) Place 5 drops into both ears daily as needed prior to irrigation for cerumen removal       dorzolamide-timoloL (COSOPT) 22.3-6.8 mg/mL ophthalmic solution [DORZOLAMIDE-TIMOLOL (COSOPT) 22.3-6.8 MG/ML OPHTHALMIC SOLUTION] Administer 1 drop into the left eye 2 (two) times a day. glaucoma 10 mL 12     erythromycin base (ERYTHROMYCIN OPHT) Place 5 mg into both eyes At Bedtime Instill 1 ribbon in Left Eye and 1 ribbon in Right Eye at bedtime. Ensure ointment is given after eye drops to ensure proper absorption.       ferrous sulfate 325 (65 FE) MG tablet [FERROUS SULFATE 325 (65 FE) MG TABLET] Take 1 tablet by mouth daily.       guaiFENesin (ROBITUSSIN) 100 mg/5 mL syrup Take 10 mLs by mouth every 4 hours as needed for cough        latanoprost (XALATAN) 0.005 % ophthalmic solution [LATANOPROST (XALATAN) 0.005 % OPHTHALMIC SOLUTION] Administer 1 drop into the left eye at bedtime. glaucoma 2.5 mL 12     levothyroxine (SYNTHROID, LEVOTHROID) 25 MCG tablet [LEVOTHYROXINE (SYNTHROID, LEVOTHROID) 25 MCG TABLET] Take 1 tablet (25 mcg total) by mouth Daily at 6:00 am. Hypothyroid 30 tablet 0     MEDICATION CANNOT BE REORDERED - PLEASE MANUALLY REORDER AND DISCONTINUE THE OLD ORDER [PROPYLENE GLYCOL (SYSTANE COMPLETE) 0.6 % DROP] Apply 1 drop to eye 2 (two) times a day. Both eyes for dry eyes 1.5 mL 0     menthol-zinc oxide (CALMOSEPTINE) 0.44-20.6 % Oint ointment Apply topically as needed for skin protection (Redness) Buttocks       nystatin  "(MYCOSTATIN) 741614 UNIT/GM external powder Apply topically 2 times daily       omeprazole (PRILOSEC) 20 MG capsule [OMEPRAZOLE (PRILOSEC) 20 MG CAPSULE] Take 1 capsule (20 mg total) by mouth 2 (two) times a day before meals. Needs two times a day for 2 months, then daily for ulcer 60 capsule 0     Propylene Glycol (SYSTANE COMPLETE OP) Place 1 drop into both eyes 2 times daily       WARFARIN SODIUM PO 5/2/22 INR 2.40  Cont 7mg Mon and Thurs and 6.5mg AOD.  Next INR 6/1/22.          Physical Exam: No changes, vitals are updated.   General: Patient is alert, elderly male, no distress.    Vitals: /76   Pulse 69   Temp 98.3  F (36.8  C)   Resp 18   Ht 1.778 m (5' 10\")   Wt 92 kg (202 lb 12.8 oz)   SpO2 98%   BMI 29.10 kg/m    HEENT: Head is NCAT. Nares negative. Oropharynx well hydrated. Right facial droop, baseline.  Neck: No JVD.  Lungs: Non labored respirations.   : SP cath with leg bag.  Extremities: Trace LE edema is noted.  Musculoskeletal: Age related degen changes.   Skin: No open areas.   Psych: Mood appears good.      Labs:  Lab Results   Component Value Date    WBC 5.9 10/01/2020    HGB 12.9 (L) 01/14/2021    HCT 35.9 (L) 10/01/2020    MCV 85 10/01/2020     10/01/2020     Results for orders placed or performed in visit on 10/01/20   Basic Metabolic Panel   Result Value Ref Range    Sodium 138 136 - 145 mmol/L    Potassium 4.1 3.5 - 5.0 mmol/L    Chloride 102 98 - 107 mmol/L    CO2 28 22 - 31 mmol/L    Anion Gap, Calculation 8 5 - 18 mmol/L    Glucose 99 70 - 125 mg/dL    Calcium 9.1 8.5 - 10.5 mg/dL    BUN 12 8 - 28 mg/dL    Creatinine 0.79 0.70 - 1.30 mg/dL    GFR MDRD Af Amer >60 >60 mL/min/1.73m2    GFR MDRD Non Af Amer >60 >60 mL/min/1.73m2     Lab Results   Component Value Date    TSH 3.10 04/22/2020       Component      Latest Ref Rng & Units 4/22/2020 6/7/2021   TSH      0.30 - 5.00 uIU/mL 3.10 3.14     Component      Latest Ref Rng & Units 9/8/2020 10/1/2020 10/5/2020 1/14/2021 "                Hemoglobin      14.0 - 18.0 g/dL 7.9 (L) 11.1 (L) 10.1 (L) 12.9 (L)         Assessment/Plan:  1. General debility. Hx of stroke, advanced age, multiple medical co morbidities. Stable though requires LTC assist.   2. Afib. Anticoagulated with warfarin. Monitor and adjust per INR. Rate is controlled. No concerns of bleeding.  3. HTN. Not on BP meds, had been on lisinopril in the past. BPs satisfactory.  4. Hx of stroke. Continue statin, also on warfarin.  5. Hypothyroidism. On replacement.  6. SP catheter. Long term. Functioning fine.        Electronically signed by: Nancy Fair MD               Sincerely,        Nancy Fair MD

## 2022-08-31 ENCOUNTER — TELEPHONE (OUTPATIENT)
Dept: GERIATRICS | Facility: CLINIC | Age: 87
End: 2022-08-31

## 2022-08-31 LAB — INR PPP: 2.64 (ref 0.85–1.15)

## 2022-08-31 PROCEDURE — 36415 COLL VENOUS BLD VENIPUNCTURE: CPT | Mod: ORL | Performed by: NURSE PRACTITIONER

## 2022-08-31 PROCEDURE — P9604 ONE-WAY ALLOW PRORATED TRIP: HCPCS | Mod: ORL | Performed by: NURSE PRACTITIONER

## 2022-08-31 PROCEDURE — 85610 PROTHROMBIN TIME: CPT | Mod: ORL | Performed by: NURSE PRACTITIONER

## 2022-08-31 NOTE — TELEPHONE ENCOUNTER
[facility] Geriatrics Triage Nurse INR     Provider: YECENIA Olivera  Facility: [facility] Type:  LTC    Caller: Zakiya  Call Back Number: [phone]  Reason for call: INR  Diagnosis/Goal: A. Fib    Todays INR: 2.64  Last INR   8/3 2.49 7mg M,Th and 6.5mg AOD  7/1 2.49 7mg M,Th and 6.5mg AOD    Heparin/Lovenox:  No  Currently on ABX?: No  Other interacting medication:  None  Missed or refused doses: No    Verbal Order/Direction given by Provider: Coumadin 7mg M,Th and 6.5mg AOD. Recheck INR on 9/28/22.    Provider Giving Order:  YECENIA Olivera    Verbal Order given to: Priscila Pat RN

## 2022-09-02 VITALS
SYSTOLIC BLOOD PRESSURE: 132 MMHG | HEIGHT: 70 IN | RESPIRATION RATE: 18 BRPM | HEART RATE: 69 BPM | WEIGHT: 202.8 LBS | OXYGEN SATURATION: 98 % | DIASTOLIC BLOOD PRESSURE: 76 MMHG | TEMPERATURE: 98.3 F | BODY MASS INDEX: 29.03 KG/M2

## 2022-09-26 ENCOUNTER — LAB REQUISITION (OUTPATIENT)
Dept: LAB | Facility: CLINIC | Age: 87
End: 2022-09-26
Payer: COMMERCIAL

## 2022-09-26 DIAGNOSIS — I48.91 UNSPECIFIED ATRIAL FIBRILLATION (H): ICD-10-CM

## 2022-09-28 ENCOUNTER — TELEPHONE (OUTPATIENT)
Dept: GERIATRICS | Facility: CLINIC | Age: 87
End: 2022-09-28

## 2022-09-28 LAB — INR PPP: 2.53 (ref 0.85–1.15)

## 2022-09-28 PROCEDURE — 85610 PROTHROMBIN TIME: CPT | Mod: ORL | Performed by: NURSE PRACTITIONER

## 2022-09-28 PROCEDURE — 36415 COLL VENOUS BLD VENIPUNCTURE: CPT | Mod: ORL | Performed by: NURSE PRACTITIONER

## 2022-09-28 PROCEDURE — P9604 ONE-WAY ALLOW PRORATED TRIP: HCPCS | Mod: ORL | Performed by: NURSE PRACTITIONER

## 2022-09-28 NOTE — CONFIDENTIAL NOTE
[facility] Geriatrics Triage Nurse INR     Provider: YECENIA Olivera  Facility: [facility] Type:  LTC    Caller: Zakiya  Call Back Number: [phone]  Reason for call: INR  Diagnosis/Goal: A. Fib    Todays INR: 2.53  Last INR 2.64 (8/31), 7 mg po on M,TH and 6.5 mg po AOD.     Heparin/Lovenox:  No  Currently on ABX?: No  Other interacting medication:  None  Missed or refused doses: No    Verbal Order/Direction given by Provider: Continue same Coumadin dose of 7 mg po on M,TH and 6.5 mg po AOD.  Recheck INR on 10/26.    Provider Giving Order:  YECENIA Olivera    Verbal Order given to: Priscila Jose RN

## 2022-10-10 NOTE — PROGRESS NOTES
[facility] SERVICES    [place] Medical Record Number:  [id number]  Place of Service where encounter took place: [facility] () [[id number]]   CODE STATUS:   DNR / DNI    Chief Complaint:  Chief Complaint   Patient presents with     [facility] Regulatory     LTC 8/30/2022. Afib on warfarin. General debilitation. Hypothyroidism. Hx of stroke. Chronic SP catheter.       HPI:   Riley is a 89 y.o. male seen for routine physician follow up in LT at [facility]. He has hx of Afib on warfarin, prior stroke, BPH, HTN, [place] palsy, hypothyroidism, SP catheter. He was last hospitalized 8/13/2020-8/19/2020. He had labs drawn in NH as he was not doing well with general fatigue, decreased appetite. No respiratory sx. He started feeling a little better on his own but then labs came back with hgb down to 6.2. He was worked up in the hospital found to have duodenal ulcer.       Today:  Riley has been stable. No new concerns per nursing. His chart, meds, nurses noted were reviewed. He is hard of hearing, has no complaints on visit today. Still enjoys walking, either with family or staff on walking program. No recent falls. He has not been ill recently. No complaints of uncontrolled pain. No open areas of skin. Weight has been stable. Has chronic SP catheter, no issues. Has baseline vision impairment, on multiple eye drops. On warfarin for Afib.       Past Medical History:  Past Medical History:   Diagnosis Date     Atrial fibrillation (H)     On coumadin     Bell's palsy     right sided facial droop     CVA (cerebral vascular accident) (H)      Hypertension      Pacemaker      Urinary retention        Medications:  Current Outpatient Medications   Medication Sig Dispense Refill     acetaminophen (TYLENOL) 500 MG tablet Take 1,000 mg by mouth every 6 hours as needed for fever or pain       atorvastatin (LIPITOR) 40 MG tablet [ATORVASTATIN (LIPITOR) 40 MG TABLET] Take 1 tablet (40 mg total)  by mouth at bedtime. For hx of cva 30 tablet 0     bisacodyL (DULCOLAX) 10 mg suppository [BISACODYL (DULCOLAX) 10 MG SUPPOSITORY] Insert 10 mg into the rectum daily as needed.       docusate sodium (COLACE) 100 MG capsule [DOCUSATE SODIUM (COLACE) 100 MG CAPSULE] Take 1 capsule (100 mg total) by mouth 2 (two) times a day. For constipation 30 capsule 0     Docusate Sodium (DOCU LIQUID PO) Place 5 drops into both ears daily as needed prior to irrigation for cerumen removal       dorzolamide-timoloL (COSOPT) 22.3-6.8 mg/mL ophthalmic solution [DORZOLAMIDE-TIMOLOL (COSOPT) 22.3-6.8 MG/ML OPHTHALMIC SOLUTION] Administer 1 drop into the left eye 2 (two) times a day. glaucoma 10 mL 12     erythromycin base (ERYTHROMYCIN OPHT) Place 5 mg into both eyes At Bedtime Instill 1 ribbon in Left Eye and 1 ribbon in Right Eye at bedtime. Ensure ointment is given after eye drops to ensure proper absorption.       ferrous sulfate 325 (65 FE) MG tablet [FERROUS SULFATE 325 (65 FE) MG TABLET] Take 1 tablet by mouth daily.       guaiFENesin (ROBITUSSIN) 100 mg/5 mL syrup Take 10 mLs by mouth every 4 hours as needed for cough        latanoprost (XALATAN) 0.005 % ophthalmic solution [LATANOPROST (XALATAN) 0.005 % OPHTHALMIC SOLUTION] Administer 1 drop into the left eye at bedtime. glaucoma 2.5 mL 12     levothyroxine (SYNTHROID, LEVOTHROID) 25 MCG tablet [LEVOTHYROXINE (SYNTHROID, LEVOTHROID) 25 MCG TABLET] Take 1 tablet (25 mcg total) by mouth Daily at 6:00 am. Hypothyroid 30 tablet 0     MEDICATION CANNOT BE REORDERED - PLEASE MANUALLY REORDER AND DISCONTINUE THE OLD ORDER [PROPYLENE GLYCOL (SYSTANE COMPLETE) 0.6 % DROP] Apply 1 drop to eye 2 (two) times a day. Both eyes for dry eyes 1.5 mL 0     menthol-zinc oxide (CALMOSEPTINE) 0.44-20.6 % Oint ointment Apply topically as needed for skin protection (Redness) Buttocks       nystatin (MYCOSTATIN) 516891 UNIT/GM external powder Apply topically 2 times daily       omeprazole (PRILOSEC) 20  "MG capsule [OMEPRAZOLE (PRILOSEC) 20 MG CAPSULE] Take 1 capsule (20 mg total) by mouth 2 (two) times a day before meals. Needs two times a day for 2 months, then daily for ulcer 60 capsule 0     Propylene Glycol (SYSTANE COMPLETE OP) Place 1 drop into both eyes 2 times daily       WARFARIN SODIUM PO 5/2/22 INR 2.40  Cont 7mg Mon and Thurs and 6.5mg AOD.  Next INR 6/1/22.          Physical Exam: No changes, vitals are updated.   General: Patient is alert, elderly male, no distress.    Vitals: /76   Pulse 69   Temp 98.3  F (36.8  C)   Resp 18   Ht 1.778 m (5' 10\")   Wt 92 kg (202 lb 12.8 oz)   SpO2 98%   BMI 29.10 kg/m    HEENT: Head is NCAT. Nares negative. Oropharynx well hydrated. Right facial droop, baseline.  Neck: No JVD.  Lungs: Non labored respirations.   : SP cath with leg bag.  Extremities: Trace LE edema is noted.  Musculoskeletal: Age related degen changes.   Skin: No open areas.   Psych: Mood appears good.      Labs:  Lab Results   Component Value Date    WBC 5.9 10/01/2020    HGB 12.9 (L) 01/14/2021    HCT 35.9 (L) 10/01/2020    MCV 85 10/01/2020     10/01/2020     Results for orders placed or performed in visit on 10/01/20   Basic Metabolic Panel   Result Value Ref Range    Sodium 138 136 - 145 mmol/L    Potassium 4.1 3.5 - 5.0 mmol/L    Chloride 102 98 - 107 mmol/L    CO2 28 22 - 31 mmol/L    Anion Gap, Calculation 8 5 - 18 mmol/L    Glucose 99 70 - 125 mg/dL    Calcium 9.1 8.5 - 10.5 mg/dL    BUN 12 8 - 28 mg/dL    Creatinine 0.79 0.70 - 1.30 mg/dL    GFR MDRD Af Amer >60 >60 mL/min/1.73m2    GFR MDRD Non Af Amer >60 >60 mL/min/1.73m2     Lab Results   Component Value Date    TSH 3.10 04/22/2020       Component      Latest Ref Rng & Units 4/22/2020 6/7/2021   TSH      0.30 - 5.00 uIU/mL 3.10 3.14     Component      Latest Ref Rng & Units 9/8/2020 10/1/2020 10/5/2020 1/14/2021                Hemoglobin      14.0 - 18.0 g/dL 7.9 (L) 11.1 (L) 10.1 (L) 12.9 (L) "         Assessment/Plan:  1. General debility. Hx of stroke, advanced age, multiple medical co morbidities. Stable though requires LTC assist.   2. Afib. Anticoagulated with warfarin. Monitor and adjust per INR. Rate is controlled. No concerns of bleeding.  3. HTN. Not on BP meds, had been on lisinopril in the past. BPs satisfactory.  4. Hx of stroke. Continue statin, also on warfarin.  5. Hypothyroidism. On replacement.  6. SP catheter. Long term. Functioning fine.        Electronically signed by: Nancy Fair MD

## 2022-10-19 ENCOUNTER — NURSING HOME VISIT (OUTPATIENT)
Dept: GERIATRICS | Facility: CLINIC | Age: 87
End: 2022-10-19
Payer: COMMERCIAL

## 2022-10-19 VITALS
OXYGEN SATURATION: 100 % | HEART RATE: 66 BPM | WEIGHT: 204.8 LBS | BODY MASS INDEX: 29.32 KG/M2 | RESPIRATION RATE: 16 BRPM | HEIGHT: 70 IN | DIASTOLIC BLOOD PRESSURE: 84 MMHG | SYSTOLIC BLOOD PRESSURE: 107 MMHG | TEMPERATURE: 98.5 F

## 2022-10-19 DIAGNOSIS — I10 ESSENTIAL HYPERTENSION: ICD-10-CM

## 2022-10-19 DIAGNOSIS — E61.1 IRON DEFICIENCY: ICD-10-CM

## 2022-10-19 DIAGNOSIS — I63.89 CEREBRAL INFARCTION DUE TO OTHER MECHANISM (H): Primary | ICD-10-CM

## 2022-10-19 PROCEDURE — 99318 PR ANNUAL NURSING FAC ASSESSMNT, STABLE: CPT | Performed by: NURSE PRACTITIONER

## 2022-10-19 NOTE — LETTER
10/19/2022        RE: Riley Rodriguez  [address]  [facility] [address]        M [facility] GERIATRICS  Chief Complaint   Patient presents with     Annual Comprehensive Nursing Home     [place] Medical Record Number:  [id number]  Place of Service where encounter took place:  [facility] () [[id number]]    HPI:    Riley Rodriguez  is a 89 year old  (4/15/1926), who is being seen today for an annual comprehensive visit. HPI information obtained from: facility chart records, facility staff, patient report and [facility] chart review.   He has history of A. fib on warfarin, prior stroke, hypertension, hypothyroid, he has hx of Afib on warfarin, BPH with a Pierson catheter in place.  He has been residing in the long-term care for many years, last hospitalized in August 2020.  Has been on iron supplement since that time with recent hemoglobin 12.2.  He is sitting up in his wheelchair today in the dining room, tells me he is feeling well, tells me that he wishes a more male residents for him to talk to.  He looks around and points to all the females.  Weight has been stable for the past 6 months, vital signs within normal limits.  No acute concerns by nursing staff.    ALLERGIES: Patient has no known allergies.  PAST MEDICAL HISTORY:   Past Medical History:   Diagnosis Date     Atrial fibrillation (H)     On coumadin     Bell's palsy     right sided facial droop     CVA (cerebral vascular accident) (H)      Hypertension      Pacemaker      Urinary retention       PAST SURGICAL HISTORY:  has a past surgical history that includes IR Thoracic Aortogram (1/1/2004); IR Visceral Angiogram (1/1/2004); IR Visceral Angiogram (1/1/2004); IR Visceral Angiogram (1/1/2004); IR Miscellaneous Procedure (1/1/2004); IR Visceral Angiogram (1/1/2004); IR Miscellaneous Procedure (1/1/2004); IR Visceral Angiogram (1/1/2004); IR Visceral Angiogram (1/1/2004); IR Miscellaneous Procedure (1/1/2004); IR  Visceral Angiogram (1/1/2004); IR Aortic Arch 4 Vessel Angiogram (1/1/2004); IR Suprapubic Catheter Placment (1/18/2019); remv pilonidal lesion simple; Pr Partial Excision Thyroid,Unilat; TRANSURETHRAL ELEC-SURG PROSTATECTOM; Total Hip Arthroplasty (Right); Ir Bladder Suprapubic Catheter Insertion (1/18/2019); and Pr Esophagogastroduodenoscopy Transoral Diagnostic (N/A, 8/15/2020).  IMMUNIZATIONS:  Immunization History   Administered Date(s) Administered     COVID-19,PF,Moderna 12/30/2020, 01/27/2021, 11/26/2021, 05/09/2022     COVID-19,PF,Pfizer 12+ YRS BIVALENT Booster 09/23/2022     DT (PEDS <7y) 10/11/2004     Flu, Unspecified 10/12/2007, 10/13/2019, 10/24/2020     Influenza (High Dose) 3 valent vaccine 10/01/2010, 09/29/2015, 10/19/2016, 09/12/2017, 10/23/2018, 10/12/2021     Influenza (IIV3) PF 10/11/2004, 11/02/2005, 10/30/2006, 10/12/2007, 11/10/2008, 09/16/2009, 09/28/2011, 09/19/2012, 09/20/2013     Influenza Quad, Recombinant, pf(RIV4) (Flublok) 10/10/2014     Influenza Vaccine IM > 6 months Valent IIV4 (Alfuria,Fluzone) 10/10/2014, 10/10/2014     Influenza Vaccine, 6+MO IM (QUADRIVALENT W/PRESERVATIVES) 11/10/2008, 09/16/2009, 09/28/2011, 09/19/2012, 09/20/2013     Pneumo Conj 13-V (2010&after) 04/10/2015     Pneumococcal 23 valent 11/01/2001     TDAP Vaccine (Boostrix) 10/19/2012     Td (Adult), Adsorbed 10/11/2004     Td,adult,historic,unspecified 10/11/2004     Tdap (Adacel,Boostrix) 10/19/2012     Zoster vaccine, live 10/27/2015     Above immunizations pulled from [facility]. [facility] and facility records also reconciled. Outstanding information sent to  to update [facility].  Future immunizations are not needed at this point as all recommended immunizations are up to date.       Current Outpatient Medications:      acetaminophen (TYLENOL) 500 MG tablet, Take 1,000 mg by mouth every 6 hours as needed for fever or pain, Disp: , Rfl:      atorvastatin (LIPITOR) 40 MG tablet,  [ATORVASTATIN (LIPITOR) 40 MG TABLET] Take 1 tablet (40 mg total) by mouth at bedtime. For hx of cva, Disp: 30 tablet, Rfl: 0     bisacodyL (DULCOLAX) 10 mg suppository, [BISACODYL (DULCOLAX) 10 MG SUPPOSITORY] Insert 10 mg into the rectum daily as needed., Disp: , Rfl:      docusate sodium (COLACE) 100 MG capsule, [DOCUSATE SODIUM (COLACE) 100 MG CAPSULE] Take 1 capsule (100 mg total) by mouth 2 (two) times a day. For constipation, Disp: 30 capsule, Rfl: 0     Docusate Sodium (DOCU LIQUID PO), Place 5 drops into both ears daily as needed prior to irrigation for cerumen removal, Disp: , Rfl:      dorzolamide-timoloL (COSOPT) 22.3-6.8 mg/mL ophthalmic solution, [DORZOLAMIDE-TIMOLOL (COSOPT) 22.3-6.8 MG/ML OPHTHALMIC SOLUTION] Administer 1 drop into the left eye 2 (two) times a day. glaucoma, Disp: 10 mL, Rfl: 12     erythromycin base (ERYTHROMYCIN OPHT), Place 5 mg into both eyes At Bedtime Instill 1 ribbon in Left Eye and 1 ribbon in Right Eye at bedtime. Ensure ointment is given after eye drops to ensure proper absorption., Disp: , Rfl:      ferrous sulfate 325 (65 FE) MG tablet, [FERROUS SULFATE 325 (65 FE) MG TABLET] Take 1 tablet by mouth daily., Disp: , Rfl:      guaiFENesin (ROBITUSSIN) 100 mg/5 mL syrup, Take 10 mLs by mouth every 4 hours as needed for cough , Disp: , Rfl:      latanoprost (XALATAN) 0.005 % ophthalmic solution, [LATANOPROST (XALATAN) 0.005 % OPHTHALMIC SOLUTION] Administer 1 drop into the left eye at bedtime. glaucoma, Disp: 2.5 mL, Rfl: 12     levothyroxine (SYNTHROID, LEVOTHROID) 25 MCG tablet, [LEVOTHYROXINE (SYNTHROID, LEVOTHROID) 25 MCG TABLET] Take 1 tablet (25 mcg total) by mouth Daily at 6:00 am. Hypothyroid, Disp: 30 tablet, Rfl: 0     MEDICATION CANNOT BE REORDERED - PLEASE MANUALLY REORDER AND DISCONTINUE THE OLD ORDER, [PROPYLENE GLYCOL (SYSTANE COMPLETE) 0.6 % DROP] Apply 1 drop to eye 2 (two) times a day. Both eyes for dry eyes, Disp: 1.5 mL, Rfl: 0     menthol-zinc oxide  "(CALMOSEPTINE) 0.44-20.6 % Oint ointment, Apply topically as needed for skin protection (Redness) Buttocks, Disp: , Rfl:      nystatin (MYCOSTATIN) 234826 UNIT/GM external powder, Apply topically 2 times daily, Disp: , Rfl:      omeprazole (PRILOSEC) 20 MG capsule, [OMEPRAZOLE (PRILOSEC) 20 MG CAPSULE] Take 1 capsule (20 mg total) by mouth 2 (two) times a day before meals. Needs two times a day for 2 months, then daily for ulcer, Disp: 60 capsule, Rfl: 0     Propylene Glycol (SYSTANE COMPLETE OP), Place 1 drop into both eyes 2 times daily, Disp: , Rfl:      WARFARIN SODIUM PO, 5/2/22 INR 2.40  Cont 7mg Mon and Thurs and 6.5mg AOD.  Next INR 6/1/22., Disp: , Rfl:        Post Medication Reconciliation Status: Discharge medications reconciled, continue medications without change      Case Management:  I have reviewed the facility/SNF care plan/MDS, including the falls risk, nutrition and pain screening. I also reviewed the current immunizations, and preventive care.. Future cancer screening is not clinically indicated secondary to age/goals of care Patient's desire to return to the community is not present. Current Level of Care is appropriate.    Advance Directive Discussion:    I reviewed the current advanced directives as reflected in EPIC, the POLST and the facility chart, and verified the congruency of orders.  I did not due to cognitive impairment review the advance directives with the resident. Patient's goal is pain control and comfort.    Team Discussion:  I communicated with the appropriate disciplines involved with the Plan of Care:   Nursing    Information reviewed:  Medications, vital signs, orders, and nursing notes.    ROS:  4 point ROS including Respiratory, CV, GI and , other than that noted in the HPI,  is negative    Vitals:  /84   Pulse 66   Temp 98.5  F (36.9  C)   Resp 16   Ht 1.778 m (5' 10\")   Wt 92.9 kg (204 lb 12.8 oz)   SpO2 100%   BMI 29.39 kg/m   Body mass index is 29.39 " kg/m .  Exam:  Physical Exam   General appearance: alert, appears stated age and cooperative.   Head: Normocephalic, without obvious abnormality, atraumatic, Eyes: sclera anicteric.  Lungs: respirations without effort, LSCTA; no wheezing or rales.   Cardiovascular: S1, S2. Regular rate and rhythm.   ABDOMEN: Globular and soft, non tender.    Extremities: extremities normal, atraumatic, trace edema bilaterally.  Skin: Skin color, texture, turgor normal. No rashes or lesions  Neurologic:oriented. No focal deficits.   Psych: interacts well with caregivers, exhibits logical thought processes and connections, pleasant    Lab/Diagnostic data:     Most Recent 3 CBC's:  Recent Labs   Lab Test 07/29/22  0556 03/09/22  0458 01/21/22  1320 10/05/20  0625 10/01/20  0650 08/16/20  1926 08/16/20  0756   WBC  --  7.3  --   --  5.9  --  6.7   HGB 12.2* 11.8* 12.2*   < > 11.1*   < > 7.4*   MCV  --  90  --   --  85  --  91   PLT  --  177  --   --  269  --  210    < > = values in this interval not displayed.     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    (E61.1) Iron deficiency  Comment: Hemoglobin in June was 12.2.  Plan: Continue iron supplementation.  Continue omeprazole.    (I10) Hypertension  Comment: Most recent blood pressure stable.  Plan: No current medications.    Electronically signed by:  Jac Pitt CNP             Sincerely,        Jac Pitt CNP

## 2022-10-19 NOTE — PROGRESS NOTES
[facility] GERIATRICS  Chief Complaint   Patient presents with     Annual Comprehensive Nursing Home     [place] Medical Record Number:  [id number]  Place of Service where encounter took place:  [facility] () [[id number]]    HPI:    Riley Rodriguez  is a 89 year old  (4/15/1926), who is being seen today for an annual comprehensive visit. HPI information obtained from: facility chart records, facility staff, patient report and [facility] chart review.   He has history of A. fib on warfarin, prior stroke, hypertension, hypothyroid, he has hx of Afib on warfarin, BPH with a Pierson catheter in place.  He has been residing in the long-term care for many years, last hospitalized in August 2020.  Has been on iron supplement since that time with recent hemoglobin 12.2.  He is sitting up in his wheelchair today in the dining room, tells me he is feeling well, tells me that he wishes a more male residents for him to talk to.  He looks around and points to all the females.  Weight has been stable for the past 6 months, vital signs within normal limits.  No acute concerns by nursing staff.    ALLERGIES: Patient has no known allergies.  PAST MEDICAL HISTORY:   Past Medical History:   Diagnosis Date     Atrial fibrillation (H)     On coumadin     Bell's palsy     right sided facial droop     CVA (cerebral vascular accident) (H)      Hypertension      Pacemaker      Urinary retention       PAST SURGICAL HISTORY:  has a past surgical history that includes IR Thoracic Aortogram (1/1/2004); IR Visceral Angiogram (1/1/2004); IR Visceral Angiogram (1/1/2004); IR Visceral Angiogram (1/1/2004); IR Miscellaneous Procedure (1/1/2004); IR Visceral Angiogram (1/1/2004); IR Miscellaneous Procedure (1/1/2004); IR Visceral Angiogram (1/1/2004); IR Visceral Angiogram (1/1/2004); IR Miscellaneous Procedure (1/1/2004); IR Visceral Angiogram (1/1/2004); IR Aortic Arch 4 Vessel Angiogram (1/1/2004); IR Suprapubic Catheter  Placment (1/18/2019); remv pilonidal lesion simple; Pr Partial Excision Thyroid,Unilat; TRANSURETHRAL ELEC-SURG PROSTATECTOM; Total Hip Arthroplasty (Right); Ir Bladder Suprapubic Catheter Insertion (1/18/2019); and Pr Esophagogastroduodenoscopy Transoral Diagnostic (N/A, 8/15/2020).  IMMUNIZATIONS:  Immunization History   Administered Date(s) Administered     COVID-19,PF,Moderna 12/30/2020, 01/27/2021, 11/26/2021, 05/09/2022     COVID-19,PF,Pfizer 12+ YRS BIVALENT Booster 09/23/2022     DT (PEDS <7y) 10/11/2004     Flu, Unspecified 10/12/2007, 10/13/2019, 10/24/2020     Influenza (High Dose) 3 valent vaccine 10/01/2010, 09/29/2015, 10/19/2016, 09/12/2017, 10/23/2018, 10/12/2021     Influenza (IIV3) PF 10/11/2004, 11/02/2005, 10/30/2006, 10/12/2007, 11/10/2008, 09/16/2009, 09/28/2011, 09/19/2012, 09/20/2013     Influenza Quad, Recombinant, pf(RIV4) (Flublok) 10/10/2014     Influenza Vaccine IM > 6 months Valent IIV4 (Alfuria,Fluzone) 10/10/2014, 10/10/2014     Influenza Vaccine, 6+MO IM (QUADRIVALENT W/PRESERVATIVES) 11/10/2008, 09/16/2009, 09/28/2011, 09/19/2012, 09/20/2013     Pneumo Conj 13-V (2010&after) 04/10/2015     Pneumococcal 23 valent 11/01/2001     TDAP Vaccine (Boostrix) 10/19/2012     Td (Adult), Adsorbed 10/11/2004     Td,adult,historic,unspecified 10/11/2004     Tdap (Adacel,Boostrix) 10/19/2012     Zoster vaccine, live 10/27/2015     Above immunizations pulled from [facility]. [facility] and facility records also reconciled. Outstanding information sent to  to update [facility].  Future immunizations are not needed at this point as all recommended immunizations are up to date.       Current Outpatient Medications:      acetaminophen (TYLENOL) 500 MG tablet, Take 1,000 mg by mouth every 6 hours as needed for fever or pain, Disp: , Rfl:      atorvastatin (LIPITOR) 40 MG tablet, [ATORVASTATIN (LIPITOR) 40 MG TABLET] Take 1 tablet (40 mg total) by mouth at bedtime. For hx of cva, Disp:  30 tablet, Rfl: 0     bisacodyL (DULCOLAX) 10 mg suppository, [BISACODYL (DULCOLAX) 10 MG SUPPOSITORY] Insert 10 mg into the rectum daily as needed., Disp: , Rfl:      docusate sodium (COLACE) 100 MG capsule, [DOCUSATE SODIUM (COLACE) 100 MG CAPSULE] Take 1 capsule (100 mg total) by mouth 2 (two) times a day. For constipation, Disp: 30 capsule, Rfl: 0     Docusate Sodium (DOCU LIQUID PO), Place 5 drops into both ears daily as needed prior to irrigation for cerumen removal, Disp: , Rfl:      dorzolamide-timoloL (COSOPT) 22.3-6.8 mg/mL ophthalmic solution, [DORZOLAMIDE-TIMOLOL (COSOPT) 22.3-6.8 MG/ML OPHTHALMIC SOLUTION] Administer 1 drop into the left eye 2 (two) times a day. glaucoma, Disp: 10 mL, Rfl: 12     erythromycin base (ERYTHROMYCIN OPHT), Place 5 mg into both eyes At Bedtime Instill 1 ribbon in Left Eye and 1 ribbon in Right Eye at bedtime. Ensure ointment is given after eye drops to ensure proper absorption., Disp: , Rfl:      ferrous sulfate 325 (65 FE) MG tablet, [FERROUS SULFATE 325 (65 FE) MG TABLET] Take 1 tablet by mouth daily., Disp: , Rfl:      guaiFENesin (ROBITUSSIN) 100 mg/5 mL syrup, Take 10 mLs by mouth every 4 hours as needed for cough , Disp: , Rfl:      latanoprost (XALATAN) 0.005 % ophthalmic solution, [LATANOPROST (XALATAN) 0.005 % OPHTHALMIC SOLUTION] Administer 1 drop into the left eye at bedtime. glaucoma, Disp: 2.5 mL, Rfl: 12     levothyroxine (SYNTHROID, LEVOTHROID) 25 MCG tablet, [LEVOTHYROXINE (SYNTHROID, LEVOTHROID) 25 MCG TABLET] Take 1 tablet (25 mcg total) by mouth Daily at 6:00 am. Hypothyroid, Disp: 30 tablet, Rfl: 0     MEDICATION CANNOT BE REORDERED - PLEASE MANUALLY REORDER AND DISCONTINUE THE OLD ORDER, [PROPYLENE GLYCOL (SYSTANE COMPLETE) 0.6 % DROP] Apply 1 drop to eye 2 (two) times a day. Both eyes for dry eyes, Disp: 1.5 mL, Rfl: 0     menthol-zinc oxide (CALMOSEPTINE) 0.44-20.6 % Oint ointment, Apply topically as needed for skin protection (Redness) Buttocks, Disp:  ", Rfl:      nystatin (MYCOSTATIN) 717454 UNIT/GM external powder, Apply topically 2 times daily, Disp: , Rfl:      omeprazole (PRILOSEC) 20 MG capsule, [OMEPRAZOLE (PRILOSEC) 20 MG CAPSULE] Take 1 capsule (20 mg total) by mouth 2 (two) times a day before meals. Needs two times a day for 2 months, then daily for ulcer, Disp: 60 capsule, Rfl: 0     Propylene Glycol (SYSTANE COMPLETE OP), Place 1 drop into both eyes 2 times daily, Disp: , Rfl:      WARFARIN SODIUM PO, 5/2/22 INR 2.40  Cont 7mg Mon and Thurs and 6.5mg AOD.  Next INR 6/1/22., Disp: , Rfl:        Post Medication Reconciliation Status: Discharge medications reconciled, continue medications without change      Case Management:  I have reviewed the facility/SNF care plan/MDS, including the falls risk, nutrition and pain screening. I also reviewed the current immunizations, and preventive care.. Future cancer screening is not clinically indicated secondary to age/goals of care Patient's desire to return to the community is not present. Current Level of Care is appropriate.    Advance Directive Discussion:    I reviewed the current advanced directives as reflected in EPIC, the POLST and the facility chart, and verified the congruency of orders.  I did not due to cognitive impairment review the advance directives with the resident. Patient's goal is pain control and comfort.    Team Discussion:  I communicated with the appropriate disciplines involved with the Plan of Care:   Nursing    Information reviewed:  Medications, vital signs, orders, and nursing notes.    ROS:  4 point ROS including Respiratory, CV, GI and , other than that noted in the HPI,  is negative    Vitals:  /84   Pulse 66   Temp 98.5  F (36.9  C)   Resp 16   Ht 1.778 m (5' 10\")   Wt 92.9 kg (204 lb 12.8 oz)   SpO2 100%   BMI 29.39 kg/m   Body mass index is 29.39 kg/m .  Exam:  Physical Exam   General appearance: alert, appears stated age and cooperative.   Head: Normocephalic, " without obvious abnormality, atraumatic, Eyes: sclera anicteric.  Lungs: respirations without effort, LSCTA; no wheezing or rales.   Cardiovascular: S1, S2. Regular rate and rhythm.   ABDOMEN: Globular and soft, non tender.    Extremities: extremities normal, atraumatic, trace edema bilaterally.  Skin: Skin color, texture, turgor normal. No rashes or lesions  Neurologic:oriented. No focal deficits.   Psych: interacts well with caregivers, exhibits logical thought processes and connections, pleasant    Lab/Diagnostic data:     Most Recent 3 CBC's:  Recent Labs   Lab Test 07/29/22  0556 03/09/22  0458 01/21/22  1320 10/05/20  0625 10/01/20  0650 08/16/20  1926 08/16/20  0756   WBC  --  7.3  --   --  5.9  --  6.7   HGB 12.2* 11.8* 12.2*   < > 11.1*   < > 7.4*   MCV  --  90  --   --  85  --  91   PLT  --  177  --   --  269  --  210    < > = values in this interval not displayed.     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    (E61.1) Iron deficiency  Comment: Hemoglobin in June was 12.2.  Plan: Continue iron supplementation.  Continue omeprazole.    (I10) Hypertension  Comment: Most recent blood pressure stable.  Plan: No current medications.    Electronically signed by:  Jac Pitt, SACHA

## 2022-10-20 ENCOUNTER — LAB REQUISITION (OUTPATIENT)
Dept: LAB | Facility: CLINIC | Age: 87
End: 2022-10-20
Payer: COMMERCIAL

## 2022-10-20 DIAGNOSIS — I48.91 UNSPECIFIED ATRIAL FIBRILLATION (H): ICD-10-CM

## 2022-10-26 ENCOUNTER — OFFICE VISIT (OUTPATIENT)
Dept: CARDIOLOGY | Facility: CLINIC | Age: 87
End: 2022-10-26
Attending: INTERNAL MEDICINE
Payer: COMMERCIAL

## 2022-10-26 ENCOUNTER — TELEPHONE (OUTPATIENT)
Dept: GERIATRICS | Facility: CLINIC | Age: 87
End: 2022-10-26

## 2022-10-26 VITALS
HEART RATE: 60 BPM | HEIGHT: 70 IN | RESPIRATION RATE: 16 BRPM | DIASTOLIC BLOOD PRESSURE: 58 MMHG | SYSTOLIC BLOOD PRESSURE: 132 MMHG | BODY MASS INDEX: 29.32 KG/M2 | WEIGHT: 204.8 LBS

## 2022-10-26 DIAGNOSIS — I49.5 SICK SINUS SYNDROME (H): Primary | ICD-10-CM

## 2022-10-26 DIAGNOSIS — I05.0 MITRAL VALVE STENOSIS, UNSPECIFIED ETIOLOGY: ICD-10-CM

## 2022-10-26 DIAGNOSIS — Z95.0 PACEMAKER: ICD-10-CM

## 2022-10-26 DIAGNOSIS — I48.21 PERMANENT ATRIAL FIBRILLATION (H): ICD-10-CM

## 2022-10-26 DIAGNOSIS — I10 HYPERTENSION, UNSPECIFIED TYPE: Primary | ICD-10-CM

## 2022-10-26 LAB — INR PPP: 2.55 (ref 0.85–1.15)

## 2022-10-26 PROCEDURE — 93279 PRGRMG DEV EVAL PM/LDLS PM: CPT | Performed by: INTERNAL MEDICINE

## 2022-10-26 PROCEDURE — 85610 PROTHROMBIN TIME: CPT | Mod: ORL | Performed by: NURSE PRACTITIONER

## 2022-10-26 PROCEDURE — 36415 COLL VENOUS BLD VENIPUNCTURE: CPT | Mod: ORL | Performed by: NURSE PRACTITIONER

## 2022-10-26 PROCEDURE — P9604 ONE-WAY ALLOW PRORATED TRIP: HCPCS | Mod: ORL | Performed by: NURSE PRACTITIONER

## 2022-10-26 PROCEDURE — 99214 OFFICE O/P EST MOD 30 MIN: CPT | Performed by: INTERNAL MEDICINE

## 2022-10-26 NOTE — TELEPHONE ENCOUNTER
[facility] Geriatrics Triage Nurse INR     Provider: EDWARD Jacobsen  Facility: [facility]  Facility Type:  LT    Caller: Zakiya  Call Back Number: [phone]  Reason for call: INR  Diagnosis/Goal: A. Fib    Todays INR: 2.55  Last INR 9/28 2.53(7mg Mon and Thurs and 6.5mg AOD)    Heparin/Lovenox:  No  Currently on ABX?: No  Other interacting medication:  None  Missed or refused doses: No    Verbal Order/Direction given by Provider: Continue Warfarin 7mg Mondays and Thursdays and 6.5mg all other days.  Next INR 11/22/22.      Provider Giving Order:  EDWARD Jacobsen    Verbal Order given to: Zakiya Ramirez RN

## 2022-10-26 NOTE — LETTER
10/26/2022    Jac Pitt, CNP  [address] [address]    RE: Riley Rodriguez       Dear Colleague,     I had the pleasure of seeing Riley Rodriguez in the [facility].         [facility]   [address], [place] [address]   [email or web address]   OFFICE: [phone]          Thank you Jac Benavides for asking the [facility] team to participate in the care of your patient, Riley Rodriguez.     Impression and Plan     1. Atrial fibrillation (permanent) ventricular response is well-controlled based on recent device interrogations. Riley's [id number] Score is at least 5 yielding an annual embolic risk of 7.2-10.0%. Patient is maintained on warfarin therapy for CVA prophylaxis.     2.  Mitral stenosis.  This was felt mild in degree an echocardiogram 14 October 2021.     3. Hypertension. Blood pressure is fairly slightly elevated in the office today.  Plan to simply continue current management for now.     Plan on follow-up in one year. Patient will be followed intermittently through Device Clinic as well per protocol.    35 minutes spent reviewing prior records (including documentation, laboratory studies, cardiac testing/imaging), interview with patient along with physical exam, planning, and subsequent documentation/crafting of note).           History of Present Illness    Once again I would like to thank you again for asking me to participate in the care of your patient, Riley Rodriguez.  As you know, but to reiterate for my own records, Riley Rodriguez is a 89 year old male with permanent atrial fibrillation. He also has a history of permanent pacemaker placement.     In follow-up today, patient is without cardiovascular complaint.  He also has a history of having suffered a cerebrovascular accident and also developed Bell's palsy.  On interview, Riley continues to do well from a cardiac standpoint.   He despite his advanced age, he exercises on a regular basis.  Denies chest pain.  Breathing is comfortable.  No subjective palpitations despite permanent atrial fibrillation.  No lightheadedness.    Further review of systems is otherwise negative/noncontributory (medical record and 13 point review of systems reviewed as well and pertinent positives noted).         Cardiac Diagnostics      Echocardiogram 14 October 2021:  1. Normal left ventricular size and systolic performance with ejection fraction of 55 to 60%.  2. Mild mitral stenosis.  3. Normal right ventricular size and systolic performance.  4. Severe left atrial enlargement.  Moderate right atrial enlargement.  5. Right ventricular systolic pressure relative to right true pressure is mildly increased.  Pulmonary artery pressure is estimated to be 35-40 mmHg plus right atrial pressure.    Echocardiogram 1 December 2017:  1. Normal left ventricular size and systolic performance with ejection fraction of 60%.  2. Mild mitral stenosis.  3. Trace aortic insufficiency.  4. Normal right ventricular size and systolic performance.  5. Severe left atrial enlargement.  Mild right atrial enlargement.  6. Right ventricular systolic pressure relative to right atrial pressure is mildly increased.  Pulmonary artery pressure estimate 35-40 mmHg plus right atrial pressure.    When compared to prior echocardiogram 10 September 2014, no significant change.    Echocardiogram 10 September 2014:  1. Normal left ventricular size and systolic performance with ejection fraction of 60%.  2. Mild concentric increased LV wall thickness.  3. Mild mitral stenosis.  4. Severe left atrial enlargement. Moderate right atrial enlargement.    Echocardiogram 7 June 2011:  1. Normal LV size and function withejection fraction of 75%.   2. No significant valvular heart disease identified.    Device interrogation 28 July 2022 ([employer] 1272 Assurity MRI device implanted 2 October  2020):  1. Encounter Type: routine remote pacemaker transmission.  2. Device: [employer] P[employer].  3. Pacing % /Programmed:  97% at VVIR 60.  4. Lead(s): stable.  5. Battery longevity: 9yrs, 4mo.  6. Presenting: ventricular pacing 62 bpm.  7. Anticoagulant: warfarin.  8. Ventricular arrhythmia: since 12 April 2022; none detected.  9. Device/Lead alerts: none.  10. Comments: Normal magnet and pacemaker function.     Device interrogation 27 August 2021 ([employer]72 Assurity MRI device implanted 2 October 2020):  1. Presenting rhythm: ventricular pacing, rate 60 bpm.  2. battery/lead status: stable  3. Arrhythmias: since last interrogation, no ventricular high rate episodes detected.   4. Anticoagulant: warfarin  5. Comments: normal magnet and pacemaker function.     Device interrogation 3 August 2021 ([employer]72 Assurity MRI device implanted 2 October 2020):  1. Type: routine remote pacemaker transmission.  2. Presenting rhythm: ventricular pacing, rate 60 bpm.  3. battery/lead status: stable  4. Arrhythmias: since 23 April 2021, no ventricular high rate episodes detected. No new noise reversions.  5. Anticoagulant: warfarin  6. Comments: normal magnet and pacemaker function.     Device interrogation 15 August 2019 ([employer] XL SR device implanted 20 April 2009):  1. The rhythm appears to be atrial fibrillation with ventricular pacing.  2. Normal magnet and pacemaker function. Patient takes warfarin.    Device interrogation 23 August 2018 ([employer] Center XL SR device implanted 20 April 2009):  1. The rhythm appears to be atrial fibrillation with ventricular pacing.  2. Normal magnet and pacemaker function. Patient takes warfarin.    Device interrogation 23 August 2018 ([employer] [address] XL SR device implanted 20 April 2009):  1. The rhythm appears to be atrial fibrillation with ventricular pacing.  2. Normal magnet and pacemaker function. Patient takes  "warfarin.            Physical Examination       /58 (BP Location: Right arm, Patient Position: Sitting, Cuff Size: Adult Regular)   Pulse 60   Resp 16   Ht 1.778 m (5' 10\")   Wt 92.9 kg (204 lb 12.8 oz)   BMI 29.39 kg/m          Wt Readings from Last 3 Encounters:   10/26/22 92.9 kg (204 lb 12.8 oz)   10/19/22 92.9 kg (204 lb 12.8 oz)   09/02/22 92 kg (202 lb 12.8 oz)     The patient is alert and oriented times three. Sclerae are anicteric. Mucosal membranes are moist. Jugular venous pressure is normal. No significant adenopathy/thyromegally appreciated. Lungs are clear with good expansion. On cardiovascular exam, the patient has a regular S1 and S2 (suspected paced). Abdomen is soft and non-tender. Extremities reveal no clubbing, cyanosis, or edema.           Medications  Allergies   Current Outpatient Medications   Medication Sig Dispense Refill     acetaminophen (TYLENOL) 500 MG tablet Take 1,000 mg by mouth every 6 hours as needed for fever or pain       atorvastatin (LIPITOR) 40 MG tablet [ATORVASTATIN (LIPITOR) 40 MG TABLET] Take 1 tablet (40 mg total) by mouth at bedtime. For hx of cva 30 tablet 0     bisacodyL (DULCOLAX) 10 mg suppository [BISACODYL (DULCOLAX) 10 MG SUPPOSITORY] Insert 10 mg into the rectum daily as needed.       docusate sodium (COLACE) 100 MG capsule [DOCUSATE SODIUM (COLACE) 100 MG CAPSULE] Take 1 capsule (100 mg total) by mouth 2 (two) times a day. For constipation 30 capsule 0     Docusate Sodium (DOCU LIQUID PO) Place 5 drops into both ears daily as needed prior to irrigation for cerumen removal       dorzolamide-timoloL (COSOPT) 22.3-6.8 mg/mL ophthalmic solution [DORZOLAMIDE-TIMOLOL (COSOPT) 22.3-6.8 MG/ML OPHTHALMIC SOLUTION] Administer 1 drop into the left eye 2 (two) times a day. glaucoma 10 mL 12     erythromycin base (ERYTHROMYCIN OPHT) Place 5 mg into both eyes At Bedtime Instill 1 ribbon in Left Eye and 1 ribbon in Right Eye at bedtime. Ensure ointment is given " after eye drops to ensure proper absorption.       ferrous sulfate 325 (65 FE) MG tablet [FERROUS SULFATE 325 (65 FE) MG TABLET] Take 1 tablet by mouth daily.       guaiFENesin (ROBITUSSIN) 100 mg/5 mL syrup Take 10 mLs by mouth every 4 hours as needed for cough        latanoprost (XALATAN) 0.005 % ophthalmic solution [LATANOPROST (XALATAN) 0.005 % OPHTHALMIC SOLUTION] Administer 1 drop into the left eye at bedtime. glaucoma 2.5 mL 12     levothyroxine (SYNTHROID, LEVOTHROID) 25 MCG tablet [LEVOTHYROXINE (SYNTHROID, LEVOTHROID) 25 MCG TABLET] Take 1 tablet (25 mcg total) by mouth Daily at 6:00 am. Hypothyroid 30 tablet 0     MEDICATION CANNOT BE REORDERED - PLEASE MANUALLY REORDER AND DISCONTINUE THE OLD ORDER [PROPYLENE GLYCOL (SYSTANE COMPLETE) 0.6 % DROP] Apply 1 drop to eye 2 (two) times a day. Both eyes for dry eyes 1.5 mL 0     menthol-zinc oxide (CALMOSEPTINE) 0.44-20.6 % Oint ointment Apply topically as needed for skin protection (Redness) Buttocks       nystatin (MYCOSTATIN) 568452 UNIT/GM external powder Apply topically 2 times daily       omeprazole (PRILOSEC) 20 MG capsule [OMEPRAZOLE (PRILOSEC) 20 MG CAPSULE] Take 1 capsule (20 mg total) by mouth 2 (two) times a day before meals. Needs two times a day for 2 months, then daily for ulcer 60 capsule 0     Propylene Glycol (SYSTANE COMPLETE OP) Place 1 drop into both eyes 2 times daily       WARFARIN SODIUM PO 5/2/22 INR 2.40  Cont 7mg Mon and Thurs and 6.5mg AOD.  Next INR 6/1/22.       No Known Allergies       Lab Results    Chemistry/lipid CBC Cardiac Enzymes/BNP/TSH/INR   Recent Labs   Lab Test 12/24/18  0717   CHOL 95   HDL 37*   LDL 38   TRIG 100     Recent Labs   Lab Test 12/24/18  0717 10/18/17  1115 10/19/16  1153   LDL 38 41 53     Recent Labs   Lab Test 10/01/20  0650      POTASSIUM 4.1   CHLORIDE 102   CO2 28   GLC 99   BUN 12   CR 0.79   GFRESTIMATED >60   GENO 9.1     Recent Labs   Lab Test 10/01/20  0650 08/19/20  0712 08/18/20  0717    CR 0.79 0.74 0.70     Recent Labs   Lab Test 10/23/18  1055 04/17/18  1026 10/18/17  1115   A1C 5.9 6.2* 6.1*          Recent Labs   Lab Test 07/29/22  0556 03/09/22  0458   WBC  --  7.3   HGB 12.2* 11.8*   HCT  --  35.6*   MCV  --  90   PLT  --  177     Recent Labs   Lab Test 07/29/22  0556 03/09/22  0458 01/21/22  1320   HGB 12.2* 11.8* 12.2*    Recent Labs   Lab Test 08/14/20  0724 08/14/20  0114 08/13/20  1936   TROPONINI 0.08 0.07 0.06     Recent Labs   Lab Test 12/20/18  0221   BNP 81     Recent Labs   Lab Test 06/02/22  0505   TSH 4.10     Recent Labs   Lab Test 09/28/22  0530 08/31/22  0520 08/03/22  0505   INR 2.53* 2.64* 2.49*        Medical History  Surgical History Family History Social History   Past Medical History:   Diagnosis Date     Atrial fibrillation (H)     On coumadin     Bell's palsy     right sided facial droop     CVA (cerebral vascular accident) (H)      Hypertension      Pacemaker      Urinary retention      Past Surgical History:   Procedure Laterality Date     HC TRANSURETHRAL ELEC-SURG PROSTATECTOM      Description: Transurethral Resection Of Prostate (TURP);  Recorded: 03/24/2008;     IR AORTIC ARCH 4 VESSEL ANGIOGRAM  1/1/2004     IR BLADDER SUPRAPUBIC CATHETER INSERTION  1/18/2019     IR MISCELLANEOUS PROCEDURE  1/1/2004     IR MISCELLANEOUS PROCEDURE  1/1/2004     IR MISCELLANEOUS PROCEDURE  1/1/2004     IR SUPRAPUBIC CATHETER PLACMENT  1/18/2019     IR THORACIC AORTOGRAM  1/1/2004     IR VISCERAL ANGIOGRAM  1/1/2004     IR VISCERAL ANGIOGRAM  1/1/2004     IR VISCERAL ANGIOGRAM  1/1/2004     IR VISCERAL ANGIOGRAM  1/1/2004     IR VISCERAL ANGIOGRAM  1/1/2004     IR VISCERAL ANGIOGRAM  1/1/2004     IR VISCERAL ANGIOGRAM  1/1/2004     HI ESOPHAGOGASTRODUODENOSCOPY TRANSORAL DIAGNOSTIC N/A 8/15/2020    Procedure: ESOPHAGOGASTRODUODENOSCOPY (EGD) with biopsy;  Surgeon: Paxton Villalpando MD;  Location: [facility];  Service: Gastroenterology     HI PARTIAL EXCISION THYROID,UNILAT       Description: Thyroid Surgery Sub-Total Thyroidectomy;  Recorded: 03/24/2008;     REMV PILONIDAL LESION SIMPLE      Description: Pilonidal Cyst Resection;  Recorded: 03/24/2008;     TOTAL HIP ARTHROPLASTY Right      Family History   Problem Relation Age of Onset     Chronic Obstructive Pulmonary Disease Father         Social History     Socioeconomic History     Marital status:      Spouse name: Not on file     Number of children: Not on file     Years of education: Not on file     Highest education level: Not on file   Occupational History     Not on file   Tobacco Use     Smoking status: Former     Smokeless tobacco: Never   Substance and Sexual Activity     Alcohol use: No     Drug use: No     Sexual activity: Not on file   Other Topics Concern     Not on file   Social History Narrative    03/07/15 - The patient lives alone in his own home.     Social Determinants of Health     Financial Resource Strain: Not on file   Food Insecurity: Not on file   Transportation Needs: Not on file   Physical Activity: Not on file   Stress: Not on file   Social Connections: Not on file   Intimate Partner Violence: Not on file   Housing Stability: Not on file                  Thank you for allowing me to participate in the care of your patient.      Sincerely,     Panchito Carrillo MD     [facility]  cc:   Zeeshan Driver MD  [address]  [place] [id number]

## 2022-10-30 ENCOUNTER — HEALTH MAINTENANCE LETTER (OUTPATIENT)
Age: 87
End: 2022-10-30

## 2022-11-02 LAB
MDC_IDC_LEAD_IMPLANT_DT: NORMAL
MDC_IDC_LEAD_LOCATION: NORMAL
MDC_IDC_LEAD_LOCATION_DETAIL_1: NORMAL
MDC_IDC_LEAD_MFG: NORMAL
MDC_IDC_LEAD_MODEL: NORMAL
MDC_IDC_LEAD_POLARITY_TYPE: NORMAL
MDC_IDC_LEAD_SERIAL: NORMAL
MDC_IDC_MSMT_BATTERY_DTM: NORMAL
MDC_IDC_MSMT_BATTERY_REMAINING_LONGEVITY: 109 MO
MDC_IDC_MSMT_BATTERY_STATUS: NORMAL
MDC_IDC_MSMT_BATTERY_VOLTAGE: 3.02 V
MDC_IDC_MSMT_LEADCHNL_RV_IMPEDANCE_VALUE: 350 OHM
MDC_IDC_MSMT_LEADCHNL_RV_PACING_THRESHOLD_AMPLITUDE: 0.75 V
MDC_IDC_MSMT_LEADCHNL_RV_PACING_THRESHOLD_PULSEWIDTH: 0.5 MS
MDC_IDC_MSMT_LEADCHNL_RV_SENSING_INTR_AMPL: 12 MV
MDC_IDC_PG_IMPLANT_DTM: NORMAL
MDC_IDC_PG_MFG: NORMAL
MDC_IDC_PG_MODEL: NORMAL
MDC_IDC_PG_SERIAL: NORMAL
MDC_IDC_PG_TYPE: NORMAL
MDC_IDC_SESS_CLINIC_NAME: NORMAL
MDC_IDC_SESS_DTM: NORMAL
MDC_IDC_SESS_TYPE: NORMAL
MDC_IDC_SET_BRADY_HYSTRATE: NORMAL
MDC_IDC_SET_BRADY_LOWRATE: 60 {BEATS}/MIN
MDC_IDC_SET_BRADY_MAX_SENSOR_RATE: 100 {BEATS}/MIN
MDC_IDC_SET_BRADY_MODE: NORMAL
MDC_IDC_SET_BRADY_NIGHT_RATE: NORMAL
MDC_IDC_SET_LEADCHNL_RV_PACING_AMPLITUDE: NORMAL
MDC_IDC_SET_LEADCHNL_RV_PACING_CAPTURE_MODE: NORMAL
MDC_IDC_SET_LEADCHNL_RV_PACING_POLARITY: NORMAL
MDC_IDC_SET_LEADCHNL_RV_PACING_PULSEWIDTH: 0.5 MS
MDC_IDC_SET_LEADCHNL_RV_SENSING_POLARITY: NORMAL
MDC_IDC_SET_LEADCHNL_RV_SENSING_SENSITIVITY: 2.5 MV
MDC_IDC_STAT_AT_MODE_SW_COUNT: 0
MDC_IDC_STAT_BRADY_RV_PERCENT_PACED: 97 %
MDC_IDC_STAT_EPISODE_RECENT_COUNT: 0
MDC_IDC_STAT_EPISODE_TYPE: NORMAL
MDC_IDC_STAT_EPISODE_VENDOR_TYPE: NORMAL

## 2022-11-20 ENCOUNTER — LAB REQUISITION (OUTPATIENT)
Dept: LAB | Facility: CLINIC | Age: 87
End: 2022-11-20
Payer: COMMERCIAL

## 2022-11-20 DIAGNOSIS — I48.91 UNSPECIFIED ATRIAL FIBRILLATION (H): ICD-10-CM

## 2022-11-22 ENCOUNTER — TELEPHONE (OUTPATIENT)
Dept: GERIATRICS | Facility: CLINIC | Age: 87
End: 2022-11-22

## 2022-11-22 LAB — INR PPP: 2.86 (ref 0.85–1.15)

## 2022-11-22 PROCEDURE — P9604 ONE-WAY ALLOW PRORATED TRIP: HCPCS | Mod: ORL | Performed by: FAMILY MEDICINE

## 2022-11-22 PROCEDURE — 85610 PROTHROMBIN TIME: CPT | Mod: ORL | Performed by: FAMILY MEDICINE

## 2022-11-22 PROCEDURE — 36415 COLL VENOUS BLD VENIPUNCTURE: CPT | Mod: ORL | Performed by: FAMILY MEDICINE

## 2022-11-22 NOTE — TELEPHONE ENCOUNTER
[facility] Triage Nurse INR     Provider: EDWARD Jacobsen  Facility: [facility]  Facility Type:  LTC    Caller: Zakiya  Call Back Number: [phone]  Reason for call: INR  Diagnosis/Goal: A. Fib    Todays INR: 2.86  Last INR 10/26 2.55(7mg Mon and Thurs and 6.5mg AOD), 9/28 2.53(7mg Mon and Thurs and 6.5mg AOD)    Heparin/Lovenox:  No  Currently on ABX?: No  Other interacting medication:  None  Missed or refused doses: No    Verbal Order/Direction given by Provider: Continue Warfarin 7mg Mondays and Thursdays and 6.5mg all other days.  Next INR 12/13/22.      Provider Giving Order:  Cassandra Ferrer MD    Verbal Order given to: Zakiya Ramirez RN

## 2022-12-10 ENCOUNTER — LAB REQUISITION (OUTPATIENT)
Dept: LAB | Facility: CLINIC | Age: 87
End: 2022-12-10
Payer: COMMERCIAL

## 2022-12-10 DIAGNOSIS — I48.91 UNSPECIFIED ATRIAL FIBRILLATION (H): ICD-10-CM

## 2022-12-13 ENCOUNTER — TELEPHONE (OUTPATIENT)
Dept: GERIATRICS | Facility: CLINIC | Age: 87
End: 2022-12-13

## 2022-12-13 LAB — INR PPP: 2.8 (ref 0.85–1.15)

## 2022-12-13 PROCEDURE — 85610 PROTHROMBIN TIME: CPT | Mod: ORL | Performed by: INTERNAL MEDICINE

## 2022-12-13 PROCEDURE — 36415 COLL VENOUS BLD VENIPUNCTURE: CPT | Mod: ORL | Performed by: INTERNAL MEDICINE

## 2022-12-13 PROCEDURE — P9603 ONE-WAY ALLOW PRORATED MILES: HCPCS | Mod: ORL | Performed by: INTERNAL MEDICINE

## 2022-12-13 NOTE — TELEPHONE ENCOUNTER
Orders relayed to facility nurseZakiya.    ----- Message from Jac Pitt CNP sent at 12/13/2022  1:08 PM CST -----  Continue same dose; recheck 4 weeks.

## 2022-12-15 ENCOUNTER — NURSING HOME VISIT (OUTPATIENT)
Dept: GERIATRICS | Facility: CLINIC | Age: 87
End: 2022-12-15
Payer: COMMERCIAL

## 2022-12-15 DIAGNOSIS — I10 ESSENTIAL HYPERTENSION: ICD-10-CM

## 2022-12-15 DIAGNOSIS — E03.9 HYPOTHYROIDISM, UNSPECIFIED TYPE: ICD-10-CM

## 2022-12-15 DIAGNOSIS — I48.0 PAROXYSMAL ATRIAL FIBRILLATION (H): ICD-10-CM

## 2022-12-15 DIAGNOSIS — R54 AGE-RELATED PHYSICAL DEBILITY: Primary | ICD-10-CM

## 2022-12-15 DIAGNOSIS — Z86.73 HISTORY OF STROKE: ICD-10-CM

## 2022-12-15 PROCEDURE — 99309 SBSQ NF CARE MODERATE MDM 30: CPT | Performed by: FAMILY MEDICINE

## 2022-12-15 NOTE — LETTER
12/15/2022        RE: Riley Rodriguez  [address]  [facility] [address]          [facility] GERIATRIC SERVICES    [place] Medical Record Number:  [id number]  Place of Service where encounter took place: [facility] () [[id number]]   CODE STATUS:   DNR / DNI    Chief Complaint:  Chief Complaint   Patient presents with     [facility] Regulatory     LT 12/15/2022.       HPI:   Riley is a 89 y.o. male seen for routine physician follow up in LT at [facility]. He has hx of Afib on warfarin, prior stroke, BPH, HTN, [place] palsy, hypothyroidism, SP catheter. He was last hospitalized 8/13/2020-8/19/2020. He had labs drawn in NH as he was not doing well with general fatigue, decreased appetite. No respiratory sx. He started feeling a little better on his own but then labs came back with hgb down to 6.2. He was worked up in the hospital found to have duodenal ulcer.       Today:  He has been pretty stable. No concerns per nursing. He has no complaints on visit today. Denies pain chest pain, shortness of breath. No fever or cough. No open areas of skin. Weight and appetite stable. Able to ambulate on walk program with staff. No recent falls. Has chronic SP catheter, no issues. Has baseline vision impairment, on multiple eye drops. On warfarin for Afib, no bleeding concerns.       Past Medical History:  Past Medical History:   Diagnosis Date     Atrial fibrillation (H)     On coumadin     Bell's palsy     right sided facial droop     CVA (cerebral vascular accident) (H)      Hypertension      Pacemaker      Urinary retention        Medications:  Current Outpatient Medications   Medication Sig Dispense Refill     acetaminophen (TYLENOL) 500 MG tablet Take 1,000 mg by mouth every 6 hours as needed for fever or pain       atorvastatin (LIPITOR) 40 MG tablet [ATORVASTATIN (LIPITOR) 40 MG TABLET] Take 1 tablet (40 mg total) by mouth at bedtime. For hx of cva 30 tablet 0      bisacodyL (DULCOLAX) 10 mg suppository [BISACODYL (DULCOLAX) 10 MG SUPPOSITORY] Insert 10 mg into the rectum daily as needed.       docusate sodium (COLACE) 100 MG capsule [DOCUSATE SODIUM (COLACE) 100 MG CAPSULE] Take 1 capsule (100 mg total) by mouth 2 (two) times a day. For constipation 30 capsule 0     Docusate Sodium (DOCU LIQUID PO) Place 5 drops into both ears daily as needed prior to irrigation for cerumen removal       dorzolamide-timoloL (COSOPT) 22.3-6.8 mg/mL ophthalmic solution [DORZOLAMIDE-TIMOLOL (COSOPT) 22.3-6.8 MG/ML OPHTHALMIC SOLUTION] Administer 1 drop into the left eye 2 (two) times a day. glaucoma 10 mL 12     erythromycin base (ERYTHROMYCIN OPHT) Place 5 mg into both eyes At Bedtime Instill 1 ribbon in Left Eye and 1 ribbon in Right Eye at bedtime. Ensure ointment is given after eye drops to ensure proper absorption.       ferrous sulfate 325 (65 FE) MG tablet [FERROUS SULFATE 325 (65 FE) MG TABLET] Take 1 tablet by mouth daily.       guaiFENesin (ROBITUSSIN) 100 mg/5 mL syrup Take 10 mLs by mouth every 4 hours as needed for cough        latanoprost (XALATAN) 0.005 % ophthalmic solution [LATANOPROST (XALATAN) 0.005 % OPHTHALMIC SOLUTION] Administer 1 drop into the left eye at bedtime. glaucoma 2.5 mL 12     levothyroxine (SYNTHROID, LEVOTHROID) 25 MCG tablet [LEVOTHYROXINE (SYNTHROID, LEVOTHROID) 25 MCG TABLET] Take 1 tablet (25 mcg total) by mouth Daily at 6:00 am. Hypothyroid 30 tablet 0     MEDICATION CANNOT BE REORDERED - PLEASE MANUALLY REORDER AND DISCONTINUE THE OLD ORDER [PROPYLENE GLYCOL (SYSTANE COMPLETE) 0.6 % DROP] Apply 1 drop to eye 2 (two) times a day. Both eyes for dry eyes 1.5 mL 0     menthol-zinc oxide (CALMOSEPTINE) 0.44-20.6 % Oint ointment Apply topically as needed for skin protection (Redness) Buttocks       nystatin (MYCOSTATIN) 493065 UNIT/GM external powder Apply topically 2 times daily       omeprazole (PRILOSEC) 20 MG capsule [OMEPRAZOLE (PRILOSEC) 20 MG CAPSULE]  "Take 1 capsule (20 mg total) by mouth 2 (two) times a day before meals. Needs two times a day for 2 months, then daily for ulcer 60 capsule 0     Propylene Glycol (SYSTANE COMPLETE OP) Place 1 drop into both eyes 2 times daily       WARFARIN SODIUM PO 11/22/22 INR 2.86  Cont 7mg Mon and Thurs and 6.5mg AOD.  Next INR 12/13/22.   10/26/22 INR 2.55  Cont 7mg Mon and Thurs and 6.5mg AOD.  Next INR 11/22/22.            Physical Exam: No changes, vitals are updated.   General: Patient is alert, elderly male, no distress.    Vitals: /67   Pulse 62   Temp 98.4  F (36.9  C)   Resp 18   Ht 1.778 m (5' 10\")   Wt 92.1 kg (203 lb)   SpO2 94%   BMI 29.13 kg/m    HEENT: Head is NCAT. Nares negative. Oropharynx well hydrated. Right facial droop, baseline.  Neck: No JVD.  Lungs: Non labored respirations.   : SP cath with leg bag.  Extremities: Trace LE edema is noted.  Musculoskeletal: Age related degen changes.   Skin: No open areas.   Psych: Mood appears good.      Labs:  Component      Latest Ref Rng & Units 1/14/2021 1/21/2022 3/9/2022 7/29/2022                Hemoglobin      13.3 - 17.7 g/dL 12.9 (L) 12.2 (L) 11.8 (L) 12.2 (L)     Component      Latest Ref Rng & Units 4/22/2020 6/7/2021 6/2/2022   TSH      0.30 - 5.00 uIU/mL 3.10 3.14 4.10     Component      Latest Ref Rng & Units 8/19/2020 10/1/2020              Sodium      136 - 145 mmol/L 138 138   Potassium      3.5 - 5.0 mmol/L 4.3 4.1   Chloride      98 - 107 mmol/L 107 102   Carbon Dioxide      22 - 31 mmol/L 26 28   Anion Gap      5 - 18 mmol/L 5 8   Glucose      70 - 125 mg/dL 101 99   Calcium      8.5 - 10.5 mg/dL 8.0 (L) 9.1   Urea Nitrogen      8 - 28 mg/dL 9 12   Creatinine      0.70 - 1.30 mg/dL 0.74 0.79   GFR Estimate If Black      >60 mL/min/1.73m2 >60 >60   GFR Estimate      >60 mL/min/1.73m2 >60 >60         Assessment/Plan:  1. General debility. Hx of stroke, advanced age, multiple medical co morbidities. No acute issues.   2. Afib. He is on " warfarin for anticoagulation. Rate is controlled.   3. HTN. No BP meds. Bps are satisfactory.   4. Hx of stroke. Continue statin, also on warfarin.  5. Hypothyroidism. He is on replacement levothyroxine, continue.   6. SP catheter. Long term. Functioning fine.        Electronically signed by: Nancy Fair MD               Sincerely,        Nancy Fair MD

## 2022-12-20 VITALS
HEIGHT: 70 IN | OXYGEN SATURATION: 94 % | HEART RATE: 62 BPM | WEIGHT: 203 LBS | SYSTOLIC BLOOD PRESSURE: 133 MMHG | RESPIRATION RATE: 18 BRPM | TEMPERATURE: 98.4 F | DIASTOLIC BLOOD PRESSURE: 67 MMHG | BODY MASS INDEX: 29.06 KG/M2

## 2022-12-25 NOTE — PROGRESS NOTES
[facility] GERIATRIC SERVICES    [place] Medical Record Number:  [id number]  Place of Service where encounter took place: [facility] () [[id number]]   CODE STATUS:   DNR / DNI    Chief Complaint:  Chief Complaint   Patient presents with     [facility] Regulatory     LTC 12/15/2022.       HPI:   Riley is a 89 y.o. male seen for routine physician follow up in LTC at [facility]. He has hx of Afib on warfarin, prior stroke, BPH, HTN, [place] palsy, hypothyroidism, SP catheter. He was last hospitalized 8/13/2020-8/19/2020. He had labs drawn in NH as he was not doing well with general fatigue, decreased appetite. No respiratory sx. He started feeling a little better on his own but then labs came back with hgb down to 6.2. He was worked up in the hospital found to have duodenal ulcer.       Today:  He has been pretty stable. No concerns per nursing. He has no complaints on visit today. Denies pain chest pain, shortness of breath. No fever or cough. No open areas of skin. Weight and appetite stable. Able to ambulate on walk program with staff. No recent falls. Has chronic SP catheter, no issues. Has baseline vision impairment, on multiple eye drops. On warfarin for Afib, no bleeding concerns.       Past Medical History:  Past Medical History:   Diagnosis Date     Atrial fibrillation (H)     On coumadin     Bell's palsy     right sided facial droop     CVA (cerebral vascular accident) (H)      Hypertension      Pacemaker      Urinary retention        Medications:  Current Outpatient Medications   Medication Sig Dispense Refill     acetaminophen (TYLENOL) 500 MG tablet Take 1,000 mg by mouth every 6 hours as needed for fever or pain       atorvastatin (LIPITOR) 40 MG tablet [ATORVASTATIN (LIPITOR) 40 MG TABLET] Take 1 tablet (40 mg total) by mouth at bedtime. For hx of cva 30 tablet 0     bisacodyL (DULCOLAX) 10 mg suppository [BISACODYL (DULCOLAX) 10 MG SUPPOSITORY] Insert 10 mg into the  rectum daily as needed.       docusate sodium (COLACE) 100 MG capsule [DOCUSATE SODIUM (COLACE) 100 MG CAPSULE] Take 1 capsule (100 mg total) by mouth 2 (two) times a day. For constipation 30 capsule 0     Docusate Sodium (DOCU LIQUID PO) Place 5 drops into both ears daily as needed prior to irrigation for cerumen removal       dorzolamide-timoloL (COSOPT) 22.3-6.8 mg/mL ophthalmic solution [DORZOLAMIDE-TIMOLOL (COSOPT) 22.3-6.8 MG/ML OPHTHALMIC SOLUTION] Administer 1 drop into the left eye 2 (two) times a day. glaucoma 10 mL 12     erythromycin base (ERYTHROMYCIN OPHT) Place 5 mg into both eyes At Bedtime Instill 1 ribbon in Left Eye and 1 ribbon in Right Eye at bedtime. Ensure ointment is given after eye drops to ensure proper absorption.       ferrous sulfate 325 (65 FE) MG tablet [FERROUS SULFATE 325 (65 FE) MG TABLET] Take 1 tablet by mouth daily.       guaiFENesin (ROBITUSSIN) 100 mg/5 mL syrup Take 10 mLs by mouth every 4 hours as needed for cough        latanoprost (XALATAN) 0.005 % ophthalmic solution [LATANOPROST (XALATAN) 0.005 % OPHTHALMIC SOLUTION] Administer 1 drop into the left eye at bedtime. glaucoma 2.5 mL 12     levothyroxine (SYNTHROID, LEVOTHROID) 25 MCG tablet [LEVOTHYROXINE (SYNTHROID, LEVOTHROID) 25 MCG TABLET] Take 1 tablet (25 mcg total) by mouth Daily at 6:00 am. Hypothyroid 30 tablet 0     MEDICATION CANNOT BE REORDERED - PLEASE MANUALLY REORDER AND DISCONTINUE THE OLD ORDER [PROPYLENE GLYCOL (SYSTANE COMPLETE) 0.6 % DROP] Apply 1 drop to eye 2 (two) times a day. Both eyes for dry eyes 1.5 mL 0     menthol-zinc oxide (CALMOSEPTINE) 0.44-20.6 % Oint ointment Apply topically as needed for skin protection (Redness) Buttocks       nystatin (MYCOSTATIN) 372169 UNIT/GM external powder Apply topically 2 times daily       omeprazole (PRILOSEC) 20 MG capsule [OMEPRAZOLE (PRILOSEC) 20 MG CAPSULE] Take 1 capsule (20 mg total) by mouth 2 (two) times a day before meals. Needs two times a day for 2  "months, then daily for ulcer 60 capsule 0     Propylene Glycol (SYSTANE COMPLETE OP) Place 1 drop into both eyes 2 times daily       WARFARIN SODIUM PO 11/22/22 INR 2.86  Cont 7mg Mon and Thurs and 6.5mg AOD.  Next INR 12/13/22.   10/26/22 INR 2.55  Cont 7mg Mon and Thurs and 6.5mg AOD.  Next INR 11/22/22.            Physical Exam: No changes, vitals are updated.   General: Patient is alert, elderly male, no distress.    Vitals: /67   Pulse 62   Temp 98.4  F (36.9  C)   Resp 18   Ht 1.778 m (5' 10\")   Wt 92.1 kg (203 lb)   SpO2 94%   BMI 29.13 kg/m    HEENT: Head is NCAT. Nares negative. Oropharynx well hydrated. Right facial droop, baseline.  Neck: No JVD.  Lungs: Non labored respirations.   : SP cath with leg bag.  Extremities: Trace LE edema is noted.  Musculoskeletal: Age related degen changes.   Skin: No open areas.   Psych: Mood appears good.      Labs:  Component      Latest Ref Rng & Units 1/14/2021 1/21/2022 3/9/2022 7/29/2022                Hemoglobin      13.3 - 17.7 g/dL 12.9 (L) 12.2 (L) 11.8 (L) 12.2 (L)     Component      Latest Ref Rng & Units 4/22/2020 6/7/2021 6/2/2022   TSH      0.30 - 5.00 uIU/mL 3.10 3.14 4.10     Component      Latest Ref Rng & Units 8/19/2020 10/1/2020              Sodium      136 - 145 mmol/L 138 138   Potassium      3.5 - 5.0 mmol/L 4.3 4.1   Chloride      98 - 107 mmol/L 107 102   Carbon Dioxide      22 - 31 mmol/L 26 28   Anion Gap      5 - 18 mmol/L 5 8   Glucose      70 - 125 mg/dL 101 99   Calcium      8.5 - 10.5 mg/dL 8.0 (L) 9.1   Urea Nitrogen      8 - 28 mg/dL 9 12   Creatinine      0.70 - 1.30 mg/dL 0.74 0.79   GFR Estimate If Black      >60 mL/min/1.73m2 >60 >60   GFR Estimate      >60 mL/min/1.73m2 >60 >60         Assessment/Plan:  1. General debility. Hx of stroke, advanced age, multiple medical co morbidities. No acute issues.   2. Afib. He is on warfarin for anticoagulation. Rate is controlled.   3. HTN. No BP meds. Bps are satisfactory.   4. Hx " of stroke. Continue statin, also on warfarin.  5. Hypothyroidism. He is on replacement levothyroxine, continue.   6. SP catheter. Long term. Functioning fine.        Electronically signed by: Nancy Fair MD

## 2022-12-28 ENCOUNTER — TRANSFERRED RECORDS (OUTPATIENT)
Dept: HEALTH INFORMATION MANAGEMENT | Facility: CLINIC | Age: 87
End: 2022-12-28

## 2023-01-01 ENCOUNTER — NURSING HOME VISIT (OUTPATIENT)
Dept: GERIATRICS | Facility: CLINIC | Age: 88
End: 2023-01-01
Payer: COMMERCIAL

## 2023-01-01 ENCOUNTER — ANCILLARY PROCEDURE (OUTPATIENT)
Dept: CARDIOLOGY | Facility: CLINIC | Age: 88
End: 2023-01-01
Attending: INTERNAL MEDICINE
Payer: COMMERCIAL

## 2023-01-01 ENCOUNTER — TELEPHONE (OUTPATIENT)
Dept: GERIATRICS | Facility: CLINIC | Age: 88
End: 2023-01-01
Payer: COMMERCIAL

## 2023-01-01 ENCOUNTER — LAB REQUISITION (OUTPATIENT)
Dept: LAB | Facility: CLINIC | Age: 88
End: 2023-01-01
Payer: COMMERCIAL

## 2023-01-01 ENCOUNTER — TELEPHONE (OUTPATIENT)
Dept: GERIATRICS | Facility: CLINIC | Age: 88
End: 2023-01-01

## 2023-01-01 ENCOUNTER — TRANSFERRED RECORDS (OUTPATIENT)
Dept: HEALTH INFORMATION MANAGEMENT | Facility: CLINIC | Age: 88
End: 2023-01-01
Payer: COMMERCIAL

## 2023-01-01 ENCOUNTER — DOCUMENTATION ONLY (OUTPATIENT)
Dept: OTHER | Facility: CLINIC | Age: 88
End: 2023-01-01
Payer: COMMERCIAL

## 2023-01-01 ENCOUNTER — APPOINTMENT (OUTPATIENT)
Dept: PHYSICAL THERAPY | Facility: HOSPITAL | Age: 88
DRG: 698 | End: 2023-01-01
Attending: INTERNAL MEDICINE
Payer: COMMERCIAL

## 2023-01-01 ENCOUNTER — APPOINTMENT (OUTPATIENT)
Dept: OCCUPATIONAL THERAPY | Facility: HOSPITAL | Age: 88
DRG: 698 | End: 2023-01-01
Attending: INTERNAL MEDICINE
Payer: COMMERCIAL

## 2023-01-01 ENCOUNTER — HEALTH MAINTENANCE LETTER (OUTPATIENT)
Age: 88
End: 2023-01-01

## 2023-01-01 ENCOUNTER — PATIENT OUTREACH (OUTPATIENT)
Dept: CARE COORDINATION | Facility: CLINIC | Age: 88
End: 2023-01-01
Payer: COMMERCIAL

## 2023-01-01 ENCOUNTER — APPOINTMENT (OUTPATIENT)
Dept: PHYSICAL THERAPY | Facility: HOSPITAL | Age: 88
DRG: 698 | End: 2023-01-01
Payer: COMMERCIAL

## 2023-01-01 ENCOUNTER — LAB REQUISITION (OUTPATIENT)
Dept: LAB | Facility: CLINIC | Age: 88
End: 2023-01-01

## 2023-01-01 ENCOUNTER — APPOINTMENT (OUTPATIENT)
Dept: CT IMAGING | Facility: HOSPITAL | Age: 88
DRG: 698 | End: 2023-01-01
Attending: INTERNAL MEDICINE
Payer: COMMERCIAL

## 2023-01-01 ENCOUNTER — MEDICAL CORRESPONDENCE (OUTPATIENT)
Dept: HEALTH INFORMATION MANAGEMENT | Facility: CLINIC | Age: 88
End: 2023-01-01

## 2023-01-01 ENCOUNTER — APPOINTMENT (OUTPATIENT)
Dept: RADIOLOGY | Facility: HOSPITAL | Age: 88
DRG: 698 | End: 2023-01-01
Attending: EMERGENCY MEDICINE
Payer: COMMERCIAL

## 2023-01-01 ENCOUNTER — HOSPITAL ENCOUNTER (INPATIENT)
Facility: HOSPITAL | Age: 88
LOS: 7 days | Discharge: NURSING, REHAB OR LONG-TERM CARE | DRG: 698 | End: 2023-06-12
Attending: EMERGENCY MEDICINE | Admitting: INTERNAL MEDICINE
Payer: COMMERCIAL

## 2023-01-01 ENCOUNTER — APPOINTMENT (OUTPATIENT)
Dept: INTERVENTIONAL RADIOLOGY/VASCULAR | Facility: HOSPITAL | Age: 88
DRG: 698 | End: 2023-01-01
Attending: NURSE PRACTITIONER
Payer: COMMERCIAL

## 2023-01-01 ENCOUNTER — TELEPHONE (OUTPATIENT)
Dept: CARDIOLOGY | Facility: CLINIC | Age: 88
End: 2023-01-01
Payer: COMMERCIAL

## 2023-01-01 ENCOUNTER — APPOINTMENT (OUTPATIENT)
Dept: PHYSICAL THERAPY | Facility: HOSPITAL | Age: 88
DRG: 698 | End: 2023-01-01
Attending: HOSPITALIST
Payer: COMMERCIAL

## 2023-01-01 ENCOUNTER — APPOINTMENT (OUTPATIENT)
Dept: CARDIOLOGY | Facility: HOSPITAL | Age: 88
DRG: 698 | End: 2023-01-01
Attending: INTERNAL MEDICINE
Payer: COMMERCIAL

## 2023-01-01 ENCOUNTER — HOSPITAL ENCOUNTER (INPATIENT)
Facility: HOSPITAL | Age: 88
LOS: 3 days | Discharge: NURSING, REHAB OR LONG-TERM CARE | DRG: 698 | End: 2023-10-24
Attending: EMERGENCY MEDICINE | Admitting: INTERNAL MEDICINE
Payer: COMMERCIAL

## 2023-01-01 VITALS
OXYGEN SATURATION: 99 % | TEMPERATURE: 98 F | WEIGHT: 187 LBS | RESPIRATION RATE: 18 BRPM | SYSTOLIC BLOOD PRESSURE: 133 MMHG | HEART RATE: 60 BPM | HEIGHT: 71 IN | DIASTOLIC BLOOD PRESSURE: 67 MMHG | BODY MASS INDEX: 26.18 KG/M2

## 2023-01-01 VITALS — DIASTOLIC BLOOD PRESSURE: 67 MMHG | RESPIRATION RATE: 18 BRPM | SYSTOLIC BLOOD PRESSURE: 131 MMHG | HEART RATE: 60 BPM

## 2023-01-01 VITALS
WEIGHT: 184.2 LBS | TEMPERATURE: 97.4 F | BODY MASS INDEX: 25.79 KG/M2 | DIASTOLIC BLOOD PRESSURE: 63 MMHG | HEIGHT: 71 IN | SYSTOLIC BLOOD PRESSURE: 134 MMHG | HEART RATE: 78 BPM | RESPIRATION RATE: 16 BRPM | OXYGEN SATURATION: 97 %

## 2023-01-01 VITALS
DIASTOLIC BLOOD PRESSURE: 62 MMHG | WEIGHT: 174.82 LBS | TEMPERATURE: 98.1 F | OXYGEN SATURATION: 98 % | BODY MASS INDEX: 24.38 KG/M2 | RESPIRATION RATE: 18 BRPM | SYSTOLIC BLOOD PRESSURE: 131 MMHG | HEART RATE: 61 BPM

## 2023-01-01 VITALS
DIASTOLIC BLOOD PRESSURE: 72 MMHG | RESPIRATION RATE: 18 BRPM | WEIGHT: 180 LBS | SYSTOLIC BLOOD PRESSURE: 130 MMHG | HEIGHT: 71 IN | TEMPERATURE: 98.4 F | OXYGEN SATURATION: 98 % | HEART RATE: 64 BPM | BODY MASS INDEX: 25.2 KG/M2

## 2023-01-01 VITALS
TEMPERATURE: 98.7 F | RESPIRATION RATE: 18 BRPM | HEIGHT: 71 IN | HEART RATE: 62 BPM | OXYGEN SATURATION: 98 % | BODY MASS INDEX: 26.4 KG/M2 | DIASTOLIC BLOOD PRESSURE: 88 MMHG | WEIGHT: 188.6 LBS | SYSTOLIC BLOOD PRESSURE: 132 MMHG

## 2023-01-01 VITALS
BODY MASS INDEX: 26.37 KG/M2 | DIASTOLIC BLOOD PRESSURE: 64 MMHG | OXYGEN SATURATION: 99 % | WEIGHT: 184.2 LBS | RESPIRATION RATE: 18 BRPM | SYSTOLIC BLOOD PRESSURE: 111 MMHG | HEIGHT: 70 IN | HEART RATE: 60 BPM | TEMPERATURE: 98.7 F

## 2023-01-01 VITALS
HEIGHT: 71 IN | OXYGEN SATURATION: 94 % | HEART RATE: 76 BPM | DIASTOLIC BLOOD PRESSURE: 58 MMHG | WEIGHT: 177.2 LBS | TEMPERATURE: 98.5 F | BODY MASS INDEX: 24.81 KG/M2 | RESPIRATION RATE: 20 BRPM | SYSTOLIC BLOOD PRESSURE: 128 MMHG

## 2023-01-01 VITALS
DIASTOLIC BLOOD PRESSURE: 74 MMHG | HEIGHT: 71 IN | OXYGEN SATURATION: 99 % | RESPIRATION RATE: 18 BRPM | BODY MASS INDEX: 26.74 KG/M2 | WEIGHT: 191 LBS | SYSTOLIC BLOOD PRESSURE: 136 MMHG | TEMPERATURE: 98.2 F | HEART RATE: 60 BPM

## 2023-01-01 DIAGNOSIS — I63.532 CEREBRAL INFARCTION DUE TO UNSPECIFIED OCCLUSION OR STENOSIS OF LEFT POSTERIOR CEREBRAL ARTERY (H): ICD-10-CM

## 2023-01-01 DIAGNOSIS — I10 HYPERTENSION, UNSPECIFIED TYPE: ICD-10-CM

## 2023-01-01 DIAGNOSIS — I48.91 UNSPECIFIED ATRIAL FIBRILLATION (H): ICD-10-CM

## 2023-01-01 DIAGNOSIS — I05.0 MITRAL VALVE STENOSIS, UNSPECIFIED ETIOLOGY: ICD-10-CM

## 2023-01-01 DIAGNOSIS — E87.6 HYPOPOTASSEMIA: ICD-10-CM

## 2023-01-01 DIAGNOSIS — I48.0 PAROXYSMAL ATRIAL FIBRILLATION (H): ICD-10-CM

## 2023-01-01 DIAGNOSIS — R53.1 WEAKNESS: ICD-10-CM

## 2023-01-01 DIAGNOSIS — I48.91 ATRIAL FIBRILLATION, UNSPECIFIED TYPE (H): ICD-10-CM

## 2023-01-01 DIAGNOSIS — E03.9 HYPOTHYROIDISM, UNSPECIFIED: ICD-10-CM

## 2023-01-01 DIAGNOSIS — I50.32 CHRONIC DIASTOLIC HEART FAILURE (H): ICD-10-CM

## 2023-01-01 DIAGNOSIS — R52 PAIN: ICD-10-CM

## 2023-01-01 DIAGNOSIS — I35.0 NONRHEUMATIC AORTIC VALVE STENOSIS: Primary | ICD-10-CM

## 2023-01-01 DIAGNOSIS — E87.1 HYPONATREMIA: ICD-10-CM

## 2023-01-01 DIAGNOSIS — R09.02 HYPOXIA: ICD-10-CM

## 2023-01-01 DIAGNOSIS — I50.30 DIASTOLIC HEART FAILURE, UNSPECIFIED HF CHRONICITY (H): ICD-10-CM

## 2023-01-01 DIAGNOSIS — N39.0 URINARY TRACT INFECTION, SITE NOT SPECIFIED: ICD-10-CM

## 2023-01-01 DIAGNOSIS — I50.32 CHRONIC DIASTOLIC HEART FAILURE (H): Primary | ICD-10-CM

## 2023-01-01 DIAGNOSIS — I50.9 HEART FAILURE, UNSPECIFIED (H): ICD-10-CM

## 2023-01-01 DIAGNOSIS — Z95.0 PACEMAKER: ICD-10-CM

## 2023-01-01 DIAGNOSIS — Z86.79 HISTORY OF ATRIAL FIBRILLATION: ICD-10-CM

## 2023-01-01 DIAGNOSIS — E87.6 HYPOKALEMIA: ICD-10-CM

## 2023-01-01 DIAGNOSIS — M62.81 GENERALIZED MUSCLE WEAKNESS: Primary | ICD-10-CM

## 2023-01-01 DIAGNOSIS — R21 RASH: ICD-10-CM

## 2023-01-01 DIAGNOSIS — R50.9 LOW GRADE FEVER: ICD-10-CM

## 2023-01-01 DIAGNOSIS — R05.9 COUGH, UNSPECIFIED TYPE: ICD-10-CM

## 2023-01-01 DIAGNOSIS — E03.9 HYPOTHYROIDISM, UNSPECIFIED TYPE: ICD-10-CM

## 2023-01-01 DIAGNOSIS — E87.0 HYPEROSMOLALITY AND HYPERNATREMIA: ICD-10-CM

## 2023-01-01 DIAGNOSIS — H40.003 GLAUCOMA SUSPECT, BILATERAL: ICD-10-CM

## 2023-01-01 DIAGNOSIS — I63.511 STROKE DUE TO OCCLUSION OF RIGHT MIDDLE CEREBRAL ARTERY (H): Primary | ICD-10-CM

## 2023-01-01 DIAGNOSIS — U07.1 INFECTION DUE TO 2019 NOVEL CORONAVIRUS: ICD-10-CM

## 2023-01-01 DIAGNOSIS — D64.9 ANEMIA, UNSPECIFIED TYPE: ICD-10-CM

## 2023-01-01 DIAGNOSIS — I50.33 ACUTE ON CHRONIC DIASTOLIC CONGESTIVE HEART FAILURE (H): Primary | ICD-10-CM

## 2023-01-01 DIAGNOSIS — R21 RASH AND NONSPECIFIC SKIN ERUPTION: ICD-10-CM

## 2023-01-01 DIAGNOSIS — N39.0 URINARY TRACT INFECTION WITHOUT HEMATURIA, SITE UNSPECIFIED: ICD-10-CM

## 2023-01-01 DIAGNOSIS — E53.8 VITAMIN B12 DEFICIENCY (NON ANEMIC): Primary | ICD-10-CM

## 2023-01-01 DIAGNOSIS — N30.01 ACUTE CYSTITIS WITH HEMATURIA: Primary | ICD-10-CM

## 2023-01-01 DIAGNOSIS — I48.21 PERMANENT ATRIAL FIBRILLATION (H): Primary | ICD-10-CM

## 2023-01-01 DIAGNOSIS — I47.29 NSVT (NONSUSTAINED VENTRICULAR TACHYCARDIA) (H): ICD-10-CM

## 2023-01-01 DIAGNOSIS — Z66 DNR (DO NOT RESUSCITATE): ICD-10-CM

## 2023-01-01 DIAGNOSIS — E83.42 HYPOMAGNESEMIA: ICD-10-CM

## 2023-01-01 DIAGNOSIS — I49.5 SICK SINUS SYNDROME (H): ICD-10-CM

## 2023-01-01 DIAGNOSIS — Z86.73 HISTORY OF STROKE: ICD-10-CM

## 2023-01-01 DIAGNOSIS — I50.33 ACUTE ON CHRONIC DIASTOLIC CONGESTIVE HEART FAILURE (H): ICD-10-CM

## 2023-01-01 DIAGNOSIS — N39.0 URINARY TRACT INFECTION ASSOCIATED WITH CYSTOSTOMY CATHETER, INITIAL ENCOUNTER (H): ICD-10-CM

## 2023-01-01 DIAGNOSIS — K08.409 S/P TOOTH EXTRACTION: ICD-10-CM

## 2023-01-01 DIAGNOSIS — T83.510A URINARY TRACT INFECTION ASSOCIATED WITH CYSTOSTOMY CATHETER, INITIAL ENCOUNTER (H): ICD-10-CM

## 2023-01-01 DIAGNOSIS — R50.9 FEVER, UNSPECIFIED: ICD-10-CM

## 2023-01-01 DIAGNOSIS — I48.21 PERMANENT ATRIAL FIBRILLATION (H): ICD-10-CM

## 2023-01-01 DIAGNOSIS — E03.2 HYPOTHYROIDISM DUE TO NON-MEDICATION EXOGENOUS SUBSTANCES: ICD-10-CM

## 2023-01-01 DIAGNOSIS — K21.00 GASTROESOPHAGEAL REFLUX DISEASE WITH ESOPHAGITIS, UNSPECIFIED WHETHER HEMORRHAGE: ICD-10-CM

## 2023-01-01 DIAGNOSIS — D64.9 ANEMIA, UNSPECIFIED: ICD-10-CM

## 2023-01-01 DIAGNOSIS — K59.00 CONSTIPATION, UNSPECIFIED CONSTIPATION TYPE: Primary | ICD-10-CM

## 2023-01-01 DIAGNOSIS — I48.91 ATRIAL FIBRILLATION, UNSPECIFIED TYPE (H): Primary | ICD-10-CM

## 2023-01-01 LAB
ALBUMIN SERPL BCG-MCNC: 2.8 G/DL (ref 3.5–5.2)
ALBUMIN SERPL BCG-MCNC: 2.8 G/DL (ref 3.5–5.2)
ALBUMIN SERPL BCG-MCNC: 2.9 G/DL (ref 3.5–5.2)
ALBUMIN SERPL BCG-MCNC: 2.9 G/DL (ref 3.5–5.2)
ALBUMIN SERPL BCG-MCNC: 3 G/DL (ref 3.5–5.2)
ALBUMIN SERPL BCG-MCNC: 3.6 G/DL (ref 3.5–5.2)
ALBUMIN UR-MCNC: 10 MG/DL
ALBUMIN UR-MCNC: NEGATIVE MG/DL
ALP SERPL-CCNC: 51 U/L (ref 40–129)
ALP SERPL-CCNC: 52 U/L (ref 40–129)
ALP SERPL-CCNC: 54 U/L (ref 40–129)
ALP SERPL-CCNC: 56 U/L (ref 40–129)
ALP SERPL-CCNC: 56 U/L (ref 40–129)
ALP SERPL-CCNC: 65 U/L (ref 40–129)
ALT SERPL W P-5'-P-CCNC: 10 U/L (ref 0–70)
ALT SERPL W P-5'-P-CCNC: 10 U/L (ref 0–70)
ALT SERPL W P-5'-P-CCNC: 12 U/L (ref 0–70)
ALT SERPL W P-5'-P-CCNC: 12 U/L (ref 0–70)
ALT SERPL W P-5'-P-CCNC: 14 U/L (ref 0–70)
ALT SERPL W P-5'-P-CCNC: 14 U/L (ref 0–70)
AMMONIA PLAS-SCNC: 18 UMOL/L (ref 16–60)
ANION GAP SERPL CALCULATED.3IONS-SCNC: 10 MMOL/L (ref 7–15)
ANION GAP SERPL CALCULATED.3IONS-SCNC: 11 MMOL/L (ref 7–15)
ANION GAP SERPL CALCULATED.3IONS-SCNC: 14 MMOL/L (ref 7–15)
ANION GAP SERPL CALCULATED.3IONS-SCNC: 54 MMOL/L (ref 7–15)
ANION GAP SERPL CALCULATED.3IONS-SCNC: 6 MMOL/L (ref 7–15)
ANION GAP SERPL CALCULATED.3IONS-SCNC: 7 MMOL/L (ref 7–15)
ANION GAP SERPL CALCULATED.3IONS-SCNC: 8 MMOL/L (ref 7–15)
ANION GAP SERPL CALCULATED.3IONS-SCNC: 9 MMOL/L (ref 7–15)
APPEARANCE UR: ABNORMAL
APPEARANCE UR: CLEAR
AST SERPL W P-5'-P-CCNC: 22 U/L (ref 0–45)
AST SERPL W P-5'-P-CCNC: 24 U/L (ref 0–45)
AST SERPL W P-5'-P-CCNC: 26 U/L (ref 0–45)
AST SERPL W P-5'-P-CCNC: 28 U/L (ref 0–45)
AST SERPL W P-5'-P-CCNC: 28 U/L (ref 0–45)
AST SERPL W P-5'-P-CCNC: 29 U/L (ref 0–45)
ATRIAL RATE - MUSE: 62 BPM
BACTERIA #/AREA URNS HPF: ABNORMAL /HPF
BACTERIA #/AREA URNS HPF: ABNORMAL /HPF
BACTERIA BLD CULT: NO GROWTH
BACTERIA UR CULT: ABNORMAL
BACTERIA UR CULT: ABNORMAL
BACTERIA UR CULT: NORMAL
BACTERIA UR CULT: NORMAL
BASE EXCESS BLDV CALC-SCNC: 3.7 MMOL/L
BASO+EOS+MONOS # BLD AUTO: ABNORMAL 10*3/UL
BASO+EOS+MONOS # BLD AUTO: ABNORMAL 10*3/UL
BASO+EOS+MONOS NFR BLD AUTO: ABNORMAL %
BASO+EOS+MONOS NFR BLD AUTO: ABNORMAL %
BASOPHILS # BLD AUTO: 0 10E3/UL (ref 0–0.2)
BASOPHILS # BLD AUTO: 0.1 10E3/UL (ref 0–0.2)
BASOPHILS NFR BLD AUTO: 0 %
BASOPHILS NFR BLD AUTO: 1 %
BASOPHILS NFR BLD AUTO: 1 %
BILIRUB DIRECT SERPL-MCNC: 0.33 MG/DL (ref 0–0.3)
BILIRUB DIRECT SERPL-MCNC: <0.2 MG/DL (ref 0–0.3)
BILIRUB SERPL-MCNC: 0.5 MG/DL
BILIRUB SERPL-MCNC: 0.6 MG/DL
BILIRUB SERPL-MCNC: 0.6 MG/DL
BILIRUB SERPL-MCNC: 0.8 MG/DL
BILIRUB UR QL STRIP: NEGATIVE
BUN SERPL-MCNC: 10.1 MG/DL (ref 8–23)
BUN SERPL-MCNC: 11 MG/DL (ref 8–23)
BUN SERPL-MCNC: 11.7 MG/DL (ref 8–23)
BUN SERPL-MCNC: 12.2 MG/DL (ref 8–23)
BUN SERPL-MCNC: 12.6 MG/DL (ref 8–23)
BUN SERPL-MCNC: 12.9 MG/DL (ref 8–23)
BUN SERPL-MCNC: 12.9 MG/DL (ref 8–23)
BUN SERPL-MCNC: 13 MG/DL (ref 8–23)
BUN SERPL-MCNC: 13.2 MG/DL (ref 8–23)
BUN SERPL-MCNC: 13.6 MG/DL (ref 8–23)
BUN SERPL-MCNC: 13.7 MG/DL (ref 8–23)
BUN SERPL-MCNC: 14.6 MG/DL (ref 8–23)
BUN SERPL-MCNC: 15.2 MG/DL (ref 8–23)
BUN SERPL-MCNC: 15.2 MG/DL (ref 8–23)
BUN SERPL-MCNC: 15.5 MG/DL (ref 8–23)
BUN SERPL-MCNC: 15.5 MG/DL (ref 8–23)
BUN SERPL-MCNC: 8.2 MG/DL (ref 8–23)
BUN SERPL-MCNC: 8.5 MG/DL (ref 8–23)
BUN SERPL-MCNC: 9.1 MG/DL (ref 8–23)
BUN SERPL-MCNC: 9.5 MG/DL (ref 8–23)
CALCIUM SERPL-MCNC: 6.9 MG/DL (ref 8.2–9.6)
CALCIUM SERPL-MCNC: 7.7 MG/DL (ref 8.2–9.6)
CALCIUM SERPL-MCNC: 7.8 MG/DL (ref 8.2–9.6)
CALCIUM SERPL-MCNC: 7.9 MG/DL (ref 8.2–9.6)
CALCIUM SERPL-MCNC: 8 MG/DL (ref 8.2–9.6)
CALCIUM SERPL-MCNC: 8 MG/DL (ref 8.2–9.6)
CALCIUM SERPL-MCNC: 8.1 MG/DL (ref 8.2–9.6)
CALCIUM SERPL-MCNC: 8.2 MG/DL (ref 8.2–9.6)
CALCIUM SERPL-MCNC: 8.3 MG/DL (ref 8.2–9.6)
CALCIUM SERPL-MCNC: 8.4 MG/DL (ref 8.2–9.6)
CALCIUM SERPL-MCNC: 8.5 MG/DL (ref 8.2–9.6)
CALCIUM SERPL-MCNC: 8.8 MG/DL (ref 8.2–9.6)
CHLORIDE SERPL-SCNC: 100 MMOL/L (ref 98–107)
CHLORIDE SERPL-SCNC: 82 MMOL/L (ref 98–107)
CHLORIDE SERPL-SCNC: 92 MMOL/L (ref 98–107)
CHLORIDE SERPL-SCNC: 93 MMOL/L (ref 98–107)
CHLORIDE SERPL-SCNC: 95 MMOL/L (ref 98–107)
CHLORIDE SERPL-SCNC: 95 MMOL/L (ref 98–107)
CHLORIDE SERPL-SCNC: 96 MMOL/L (ref 98–107)
CHLORIDE SERPL-SCNC: 97 MMOL/L (ref 98–107)
CHLORIDE SERPL-SCNC: 98 MMOL/L (ref 98–107)
CHLORIDE SERPL-SCNC: 99 MMOL/L (ref 98–107)
COLOR UR AUTO: ABNORMAL
COLOR UR AUTO: ABNORMAL
COLOR UR AUTO: YELLOW
COLOR UR AUTO: YELLOW
CORTIS SERPL-MCNC: 14.6 UG/DL
CREAT SERPL-MCNC: 0.63 MG/DL (ref 0.67–1.17)
CREAT SERPL-MCNC: 0.7 MG/DL (ref 0.67–1.17)
CREAT SERPL-MCNC: 0.71 MG/DL (ref 0.67–1.17)
CREAT SERPL-MCNC: 0.73 MG/DL (ref 0.67–1.17)
CREAT SERPL-MCNC: 0.74 MG/DL (ref 0.67–1.17)
CREAT SERPL-MCNC: 0.74 MG/DL (ref 0.67–1.17)
CREAT SERPL-MCNC: 0.75 MG/DL (ref 0.67–1.17)
CREAT SERPL-MCNC: 0.77 MG/DL (ref 0.67–1.17)
CREAT SERPL-MCNC: 0.77 MG/DL (ref 0.67–1.17)
CREAT SERPL-MCNC: 0.8 MG/DL (ref 0.67–1.17)
CREAT SERPL-MCNC: 0.8 MG/DL (ref 0.67–1.17)
CREAT SERPL-MCNC: 0.81 MG/DL (ref 0.67–1.17)
CREAT SERPL-MCNC: 0.83 MG/DL (ref 0.67–1.17)
CREAT SERPL-MCNC: 0.86 MG/DL (ref 0.67–1.17)
CREAT SERPL-MCNC: 0.87 MG/DL (ref 0.67–1.17)
CREAT SERPL-MCNC: 0.92 MG/DL (ref 0.67–1.17)
CRP SERPL-MCNC: 3.8 MG/L
CRP SERPL-MCNC: 4.9 MG/L
D DIMER PPP FEU-MCNC: 2.29 UG/ML FEU (ref 0–0.5)
DEPRECATED HCO3 PLAS-SCNC: 15 MMOL/L (ref 22–29)
DEPRECATED HCO3 PLAS-SCNC: 22 MMOL/L (ref 22–29)
DEPRECATED HCO3 PLAS-SCNC: 23 MMOL/L (ref 22–29)
DEPRECATED HCO3 PLAS-SCNC: 24 MMOL/L (ref 22–29)
DEPRECATED HCO3 PLAS-SCNC: 25 MMOL/L (ref 22–29)
DEPRECATED HCO3 PLAS-SCNC: 26 MMOL/L (ref 22–29)
DEPRECATED HCO3 PLAS-SCNC: 27 MMOL/L (ref 22–29)
DEPRECATED HCO3 PLAS-SCNC: 28 MMOL/L (ref 22–29)
DEPRECATED HCO3 PLAS-SCNC: 28 MMOL/L (ref 22–29)
DIASTOLIC BLOOD PRESSURE - MUSE: 56 MMHG
EGFRCR SERPLBLD CKD-EPI 2021: 76 ML/MIN/1.73M2
EGFRCR SERPLBLD CKD-EPI 2021: 78 ML/MIN/1.73M2
EGFRCR SERPLBLD CKD-EPI 2021: 80 ML/MIN/1.73M2
EGFRCR SERPLBLD CKD-EPI 2021: 80 ML/MIN/1.73M2
EGFRCR SERPLBLD CKD-EPI 2021: 82 ML/MIN/1.73M2
EGFRCR SERPLBLD CKD-EPI 2021: 82 ML/MIN/1.73M2
EGFRCR SERPLBLD CKD-EPI 2021: 83 ML/MIN/1.73M2
EGFRCR SERPLBLD CKD-EPI 2021: 83 ML/MIN/1.73M2
EGFRCR SERPLBLD CKD-EPI 2021: 84 ML/MIN/1.73M2
EGFRCR SERPLBLD CKD-EPI 2021: 87 ML/MIN/1.73M2
EOSINOPHIL # BLD AUTO: 0 10E3/UL (ref 0–0.7)
EOSINOPHIL # BLD AUTO: 0.1 10E3/UL (ref 0–0.7)
EOSINOPHIL # BLD AUTO: 0.2 10E3/UL (ref 0–0.7)
EOSINOPHIL NFR BLD AUTO: 0 %
EOSINOPHIL NFR BLD AUTO: 1 %
EOSINOPHIL NFR BLD AUTO: 2 %
EOSINOPHIL NFR BLD AUTO: 3 %
EOSINOPHIL NFR BLD AUTO: 4 %
ERYTHROCYTE [DISTWIDTH] IN BLOOD BY AUTOMATED COUNT: 13.7 % (ref 10–15)
ERYTHROCYTE [DISTWIDTH] IN BLOOD BY AUTOMATED COUNT: 13.7 % (ref 10–15)
ERYTHROCYTE [DISTWIDTH] IN BLOOD BY AUTOMATED COUNT: 13.9 % (ref 10–15)
ERYTHROCYTE [DISTWIDTH] IN BLOOD BY AUTOMATED COUNT: 13.9 % (ref 10–15)
ERYTHROCYTE [DISTWIDTH] IN BLOOD BY AUTOMATED COUNT: 14 % (ref 10–15)
ERYTHROCYTE [DISTWIDTH] IN BLOOD BY AUTOMATED COUNT: 14 % (ref 10–15)
ERYTHROCYTE [DISTWIDTH] IN BLOOD BY AUTOMATED COUNT: 14.1 % (ref 10–15)
ERYTHROCYTE [DISTWIDTH] IN BLOOD BY AUTOMATED COUNT: 14.1 % (ref 10–15)
ERYTHROCYTE [DISTWIDTH] IN BLOOD BY AUTOMATED COUNT: 14.2 % (ref 10–15)
ERYTHROCYTE [DISTWIDTH] IN BLOOD BY AUTOMATED COUNT: 14.2 % (ref 10–15)
ERYTHROCYTE [DISTWIDTH] IN BLOOD BY AUTOMATED COUNT: 14.4 % (ref 10–15)
ERYTHROCYTE [DISTWIDTH] IN BLOOD BY AUTOMATED COUNT: 14.5 % (ref 10–15)
ERYTHROCYTE [DISTWIDTH] IN BLOOD BY AUTOMATED COUNT: 14.7 % (ref 10–15)
ERYTHROCYTE [DISTWIDTH] IN BLOOD BY AUTOMATED COUNT: 14.9 % (ref 10–15)
FERRITIN SERPL-MCNC: 39 NG/ML (ref 31–409)
FLUAV RNA SPEC QL NAA+PROBE: NEGATIVE
FLUAV RNA SPEC QL NAA+PROBE: NEGATIVE
FLUBV RNA RESP QL NAA+PROBE: NEGATIVE
FLUBV RNA RESP QL NAA+PROBE: NEGATIVE
FOLATE SERPL-MCNC: 13.3 NG/ML (ref 4.6–34.8)
GFR SERPL CREATININE-BSD FRML MDRD: 79 ML/MIN/1.73M2
GFR SERPL CREATININE-BSD FRML MDRD: 80 ML/MIN/1.73M2
GFR SERPL CREATININE-BSD FRML MDRD: 81 ML/MIN/1.73M2
GFR SERPL CREATININE-BSD FRML MDRD: 81 ML/MIN/1.73M2
GFR SERPL CREATININE-BSD FRML MDRD: 82 ML/MIN/1.73M2
GLUCOSE SERPL-MCNC: 104 MG/DL (ref 70–99)
GLUCOSE SERPL-MCNC: 107 MG/DL (ref 70–99)
GLUCOSE SERPL-MCNC: 109 MG/DL (ref 70–99)
GLUCOSE SERPL-MCNC: 110 MG/DL (ref 70–99)
GLUCOSE SERPL-MCNC: 114 MG/DL (ref 70–99)
GLUCOSE SERPL-MCNC: 114 MG/DL (ref 70–99)
GLUCOSE SERPL-MCNC: 116 MG/DL (ref 70–99)
GLUCOSE SERPL-MCNC: 117 MG/DL (ref 70–99)
GLUCOSE SERPL-MCNC: 121 MG/DL (ref 70–99)
GLUCOSE SERPL-MCNC: 121 MG/DL (ref 70–99)
GLUCOSE SERPL-MCNC: 122 MG/DL (ref 70–99)
GLUCOSE SERPL-MCNC: 122 MG/DL (ref 70–99)
GLUCOSE SERPL-MCNC: 123 MG/DL (ref 70–99)
GLUCOSE SERPL-MCNC: 124 MG/DL (ref 70–99)
GLUCOSE SERPL-MCNC: 125 MG/DL (ref 70–99)
GLUCOSE SERPL-MCNC: 125 MG/DL (ref 70–99)
GLUCOSE SERPL-MCNC: 148 MG/DL (ref 70–99)
GLUCOSE SERPL-MCNC: 91 MG/DL (ref 70–99)
GLUCOSE SERPL-MCNC: 93 MG/DL (ref 70–99)
GLUCOSE SERPL-MCNC: 99 MG/DL (ref 70–99)
GLUCOSE UR STRIP-MCNC: NEGATIVE MG/DL
HCO3 BLDV-SCNC: 28 MMOL/L (ref 24–30)
HCT VFR BLD AUTO: 30.6 % (ref 40–53)
HCT VFR BLD AUTO: 30.8 % (ref 40–53)
HCT VFR BLD AUTO: 31 % (ref 40–53)
HCT VFR BLD AUTO: 31 % (ref 40–53)
HCT VFR BLD AUTO: 31.5 % (ref 40–53)
HCT VFR BLD AUTO: 32 % (ref 40–53)
HCT VFR BLD AUTO: 32.7 % (ref 40–53)
HCT VFR BLD AUTO: 32.9 % (ref 40–53)
HCT VFR BLD AUTO: 33 % (ref 40–53)
HCT VFR BLD AUTO: 33.8 % (ref 40–53)
HCT VFR BLD AUTO: 34.6 % (ref 40–53)
HCT VFR BLD AUTO: 34.8 % (ref 40–53)
HCT VFR BLD AUTO: 35.1 % (ref 40–53)
HCT VFR BLD AUTO: 36.1 % (ref 40–53)
HGB BLD-MCNC: 10.1 G/DL (ref 13.3–17.7)
HGB BLD-MCNC: 10.3 G/DL (ref 13.3–17.7)
HGB BLD-MCNC: 10.3 G/DL (ref 13.3–17.7)
HGB BLD-MCNC: 10.4 G/DL (ref 13.3–17.7)
HGB BLD-MCNC: 10.5 G/DL (ref 13.3–17.7)
HGB BLD-MCNC: 10.8 G/DL (ref 13.3–17.7)
HGB BLD-MCNC: 10.9 G/DL (ref 13.3–17.7)
HGB BLD-MCNC: 11.2 G/DL (ref 13.3–17.7)
HGB BLD-MCNC: 11.3 G/DL (ref 13.3–17.7)
HGB BLD-MCNC: 11.4 G/DL (ref 13.3–17.7)
HGB BLD-MCNC: 11.4 G/DL (ref 13.3–17.7)
HGB BLD-MCNC: 12 G/DL (ref 13.3–17.7)
HGB BLD-MCNC: 9.8 G/DL (ref 13.3–17.7)
HGB BLD-MCNC: 9.8 G/DL (ref 13.3–17.7)
HGB UR QL STRIP: ABNORMAL
HOLD SPECIMEN: NORMAL
HYALINE CASTS: 1 /LPF
IMM GRANULOCYTES # BLD: 0 10E3/UL
IMM GRANULOCYTES # BLD: 0.1 10E3/UL
IMM GRANULOCYTES # BLD: 0.1 10E3/UL
IMM GRANULOCYTES NFR BLD: 0 %
IMM GRANULOCYTES NFR BLD: 1 %
INR PPP: 1.31 (ref 0.85–1.15)
INR PPP: 1.39 (ref 0.85–1.15)
INR PPP: 1.43 (ref 0.85–1.15)
INR PPP: 1.46 (ref 0.85–1.15)
INR PPP: 1.57 (ref 0.85–1.15)
INR PPP: 1.58 (ref 0.85–1.15)
INR PPP: 1.71 (ref 0.85–1.15)
INR PPP: 1.72 (ref 0.85–1.15)
INR PPP: 1.75 (ref 0.85–1.15)
INR PPP: 1.79 (ref 0.85–1.15)
INR PPP: 1.84 (ref 0.85–1.15)
INR PPP: 1.92 (ref 0.85–1.15)
INR PPP: 1.93 (ref 0.85–1.15)
INR PPP: 2.12 (ref 0.85–1.15)
INR PPP: 2.13 (ref 0.85–1.15)
INR PPP: 2.14 (ref 0.85–1.15)
INR PPP: 2.18 (ref 0.85–1.15)
INR PPP: 2.22 (ref 0.85–1.15)
INR PPP: 2.33 (ref 0.85–1.15)
INR PPP: 2.5 (ref 0.85–1.15)
INR PPP: 2.55 (ref 0.85–1.15)
INR PPP: 2.56 (ref 0.85–1.15)
INR PPP: 2.65 (ref 0.85–1.15)
INR PPP: 2.66 (ref 0.85–1.15)
INR PPP: 2.68 (ref 0.85–1.15)
INR PPP: 2.71 (ref 0.85–1.15)
INR PPP: 2.76 (ref 0.85–1.15)
INR PPP: 2.86 (ref 0.85–1.15)
INR PPP: 2.95 (ref 0.85–1.15)
INR PPP: 2.96 (ref 0.85–1.15)
INR PPP: 3.13 (ref 0.85–1.15)
INR PPP: 3.32 (ref 0.85–1.15)
INR PPP: 3.33 (ref 0.85–1.15)
INR PPP: 3.33 (ref 0.85–1.15)
INR PPP: 3.34 (ref 0.85–1.15)
INR PPP: 3.4 (ref 0.85–1.15)
INR PPP: 3.47 (ref 0.85–1.15)
INR PPP: >10 (ref 0.85–1.15)
INTERPRETATION ECG - MUSE: NORMAL
KETONES UR STRIP-MCNC: NEGATIVE MG/DL
LACTATE SERPL-SCNC: 1 MMOL/L (ref 0.7–2)
LACTATE SERPL-SCNC: 1.2 MMOL/L (ref 0.7–2)
LACTATE SERPL-SCNC: 1.4 MMOL/L (ref 0.7–2)
LEUKOCYTE ESTERASE UR QL STRIP: ABNORMAL
LVEF ECHO: NORMAL
LYMPHOCYTES # BLD AUTO: 0.5 10E3/UL (ref 0.8–5.3)
LYMPHOCYTES # BLD AUTO: 0.7 10E3/UL (ref 0.8–5.3)
LYMPHOCYTES # BLD AUTO: 0.8 10E3/UL (ref 0.8–5.3)
LYMPHOCYTES # BLD AUTO: 0.8 10E3/UL (ref 0.8–5.3)
LYMPHOCYTES # BLD AUTO: 0.9 10E3/UL (ref 0.8–5.3)
LYMPHOCYTES # BLD AUTO: 0.9 10E3/UL (ref 0.8–5.3)
LYMPHOCYTES # BLD AUTO: 1 10E3/UL (ref 0.8–5.3)
LYMPHOCYTES # BLD AUTO: 1 10E3/UL (ref 0.8–5.3)
LYMPHOCYTES # BLD AUTO: 1.2 10E3/UL (ref 0.8–5.3)
LYMPHOCYTES NFR BLD AUTO: 11 %
LYMPHOCYTES NFR BLD AUTO: 12 %
LYMPHOCYTES NFR BLD AUTO: 12 %
LYMPHOCYTES NFR BLD AUTO: 14 %
LYMPHOCYTES NFR BLD AUTO: 15 %
LYMPHOCYTES NFR BLD AUTO: 16 %
LYMPHOCYTES NFR BLD AUTO: 17 %
LYMPHOCYTES NFR BLD AUTO: 5 %
LYMPHOCYTES NFR BLD AUTO: 8 %
MAGNESIUM SERPL-MCNC: 1.8 MG/DL (ref 1.7–2.3)
MAGNESIUM SERPL-MCNC: 1.8 MG/DL (ref 1.7–2.3)
MAGNESIUM SERPL-MCNC: 1.9 MG/DL (ref 1.7–2.3)
MAGNESIUM SERPL-MCNC: 2 MG/DL (ref 1.7–2.3)
MAGNESIUM SERPL-MCNC: 2.1 MG/DL (ref 1.7–2.3)
MAGNESIUM SERPL-MCNC: 2.2 MG/DL (ref 1.7–2.3)
MCH RBC QN AUTO: 27 PG (ref 26.5–33)
MCH RBC QN AUTO: 27.1 PG (ref 26.5–33)
MCH RBC QN AUTO: 27.1 PG (ref 26.5–33)
MCH RBC QN AUTO: 27.2 PG (ref 26.5–33)
MCH RBC QN AUTO: 27.5 PG (ref 26.5–33)
MCH RBC QN AUTO: 28.9 PG (ref 26.5–33)
MCH RBC QN AUTO: 28.9 PG (ref 26.5–33)
MCH RBC QN AUTO: 29 PG (ref 26.5–33)
MCH RBC QN AUTO: 29.2 PG (ref 26.5–33)
MCH RBC QN AUTO: 29.5 PG (ref 26.5–33)
MCH RBC QN AUTO: 29.7 PG (ref 26.5–33)
MCH RBC QN AUTO: 30 PG (ref 26.5–33)
MCHC RBC AUTO-ENTMCNC: 31.5 G/DL (ref 31.5–36.5)
MCHC RBC AUTO-ENTMCNC: 31.8 G/DL (ref 31.5–36.5)
MCHC RBC AUTO-ENTMCNC: 32 G/DL (ref 31.5–36.5)
MCHC RBC AUTO-ENTMCNC: 32.2 G/DL (ref 31.5–36.5)
MCHC RBC AUTO-ENTMCNC: 32.5 G/DL (ref 31.5–36.5)
MCHC RBC AUTO-ENTMCNC: 32.6 G/DL (ref 31.5–36.5)
MCHC RBC AUTO-ENTMCNC: 32.7 G/DL (ref 31.5–36.5)
MCHC RBC AUTO-ENTMCNC: 32.8 G/DL (ref 31.5–36.5)
MCHC RBC AUTO-ENTMCNC: 33 G/DL (ref 31.5–36.5)
MCHC RBC AUTO-ENTMCNC: 33.1 G/DL (ref 31.5–36.5)
MCHC RBC AUTO-ENTMCNC: 33.1 G/DL (ref 31.5–36.5)
MCHC RBC AUTO-ENTMCNC: 33.2 G/DL (ref 31.5–36.5)
MCHC RBC AUTO-ENTMCNC: 33.2 G/DL (ref 31.5–36.5)
MCHC RBC AUTO-ENTMCNC: 33.3 G/DL (ref 31.5–36.5)
MCV RBC AUTO: 83 FL (ref 78–100)
MCV RBC AUTO: 83 FL (ref 78–100)
MCV RBC AUTO: 84 FL (ref 78–100)
MCV RBC AUTO: 85 FL (ref 78–100)
MCV RBC AUTO: 86 FL (ref 78–100)
MCV RBC AUTO: 88 FL (ref 78–100)
MCV RBC AUTO: 89 FL (ref 78–100)
MCV RBC AUTO: 90 FL (ref 78–100)
MCV RBC AUTO: 90 FL (ref 78–100)
MDC_IDC_EPISODE_DTM: NORMAL
MDC_IDC_EPISODE_ID: NORMAL
MDC_IDC_EPISODE_TYPE: NORMAL
MDC_IDC_LEAD_CONNECTION_STATUS: NORMAL
MDC_IDC_LEAD_IMPLANT_DT: NORMAL
MDC_IDC_LEAD_LOCATION: NORMAL
MDC_IDC_LEAD_LOCATION_DETAIL_1: NORMAL
MDC_IDC_LEAD_MFG: NORMAL
MDC_IDC_LEAD_MODEL: NORMAL
MDC_IDC_LEAD_POLARITY_TYPE: NORMAL
MDC_IDC_LEAD_SERIAL: NORMAL
MDC_IDC_MSMT_BATTERY_DTM: NORMAL
MDC_IDC_MSMT_BATTERY_REMAINING_LONGEVITY: 103 MO
MDC_IDC_MSMT_BATTERY_REMAINING_LONGEVITY: 103 MO
MDC_IDC_MSMT_BATTERY_REMAINING_LONGEVITY: 96 MO
MDC_IDC_MSMT_BATTERY_REMAINING_PERCENTAGE: 77 %
MDC_IDC_MSMT_BATTERY_REMAINING_PERCENTAGE: 77 %
MDC_IDC_MSMT_BATTERY_RRT_TRIGGER: NORMAL
MDC_IDC_MSMT_BATTERY_RRT_TRIGGER: NORMAL
MDC_IDC_MSMT_BATTERY_STATUS: NORMAL
MDC_IDC_MSMT_BATTERY_VOLTAGE: 3.01 V
MDC_IDC_MSMT_LEADCHNL_RV_IMPEDANCE_VALUE: 330 OHM
MDC_IDC_MSMT_LEADCHNL_RV_IMPEDANCE_VALUE: 340 OHM
MDC_IDC_MSMT_LEADCHNL_RV_IMPEDANCE_VALUE: 340 OHM
MDC_IDC_MSMT_LEADCHNL_RV_LEAD_CHANNEL_STATUS: NORMAL
MDC_IDC_MSMT_LEADCHNL_RV_LEAD_CHANNEL_STATUS: NORMAL
MDC_IDC_MSMT_LEADCHNL_RV_PACING_THRESHOLD_AMPLITUDE: 0.62 V
MDC_IDC_MSMT_LEADCHNL_RV_PACING_THRESHOLD_AMPLITUDE: 0.62 V
MDC_IDC_MSMT_LEADCHNL_RV_PACING_THRESHOLD_AMPLITUDE: 0.75 V
MDC_IDC_MSMT_LEADCHNL_RV_PACING_THRESHOLD_PULSEWIDTH: 0.5 MS
MDC_IDC_MSMT_LEADCHNL_RV_SENSING_INTR_AMPL: 12 MV
MDC_IDC_PG_IMPLANT_DTM: NORMAL
MDC_IDC_PG_MFG: NORMAL
MDC_IDC_PG_MODEL: NORMAL
MDC_IDC_PG_SERIAL: NORMAL
MDC_IDC_PG_TYPE: NORMAL
MDC_IDC_SESS_CLINIC_NAME: NORMAL
MDC_IDC_SESS_DTM: NORMAL
MDC_IDC_SESS_REPROGRAMMED: NO
MDC_IDC_SESS_REPROGRAMMED: NO
MDC_IDC_SESS_TYPE: NORMAL
MDC_IDC_SET_BRADY_HYSTRATE: NORMAL
MDC_IDC_SET_BRADY_LOWRATE: 60 {BEATS}/MIN
MDC_IDC_SET_BRADY_MAX_SENSOR_RATE: 100 {BEATS}/MIN
MDC_IDC_SET_BRADY_MODE: NORMAL
MDC_IDC_SET_BRADY_NIGHT_RATE: NORMAL
MDC_IDC_SET_LEADCHNL_RV_PACING_AMPLITUDE: 0.88
MDC_IDC_SET_LEADCHNL_RV_PACING_AMPLITUDE: 0.88
MDC_IDC_SET_LEADCHNL_RV_PACING_AMPLITUDE: NORMAL
MDC_IDC_SET_LEADCHNL_RV_PACING_ANODE_ELECTRODE_1: NORMAL
MDC_IDC_SET_LEADCHNL_RV_PACING_ANODE_ELECTRODE_1: NORMAL
MDC_IDC_SET_LEADCHNL_RV_PACING_ANODE_LOCATION_1: NORMAL
MDC_IDC_SET_LEADCHNL_RV_PACING_ANODE_LOCATION_1: NORMAL
MDC_IDC_SET_LEADCHNL_RV_PACING_CAPTURE_MODE: NORMAL
MDC_IDC_SET_LEADCHNL_RV_PACING_CATHODE_ELECTRODE_1: NORMAL
MDC_IDC_SET_LEADCHNL_RV_PACING_CATHODE_ELECTRODE_1: NORMAL
MDC_IDC_SET_LEADCHNL_RV_PACING_CATHODE_LOCATION_1: NORMAL
MDC_IDC_SET_LEADCHNL_RV_PACING_CATHODE_LOCATION_1: NORMAL
MDC_IDC_SET_LEADCHNL_RV_PACING_POLARITY: NORMAL
MDC_IDC_SET_LEADCHNL_RV_PACING_PULSEWIDTH: 0.5 MS
MDC_IDC_SET_LEADCHNL_RV_SENSING_ADAPTATION_MODE: NORMAL
MDC_IDC_SET_LEADCHNL_RV_SENSING_ADAPTATION_MODE: NORMAL
MDC_IDC_SET_LEADCHNL_RV_SENSING_ANODE_ELECTRODE_1: NORMAL
MDC_IDC_SET_LEADCHNL_RV_SENSING_ANODE_ELECTRODE_1: NORMAL
MDC_IDC_SET_LEADCHNL_RV_SENSING_ANODE_LOCATION_1: NORMAL
MDC_IDC_SET_LEADCHNL_RV_SENSING_ANODE_LOCATION_1: NORMAL
MDC_IDC_SET_LEADCHNL_RV_SENSING_CATHODE_ELECTRODE_1: NORMAL
MDC_IDC_SET_LEADCHNL_RV_SENSING_CATHODE_ELECTRODE_1: NORMAL
MDC_IDC_SET_LEADCHNL_RV_SENSING_CATHODE_LOCATION_1: NORMAL
MDC_IDC_SET_LEADCHNL_RV_SENSING_CATHODE_LOCATION_1: NORMAL
MDC_IDC_SET_LEADCHNL_RV_SENSING_POLARITY: NORMAL
MDC_IDC_SET_LEADCHNL_RV_SENSING_SENSITIVITY: 2.5 MV
MDC_IDC_STAT_AT_DTM_END: NORMAL
MDC_IDC_STAT_AT_DTM_END: NORMAL
MDC_IDC_STAT_AT_DTM_START: NORMAL
MDC_IDC_STAT_AT_DTM_START: NORMAL
MDC_IDC_STAT_BRADY_DTM_END: NORMAL
MDC_IDC_STAT_BRADY_DTM_END: NORMAL
MDC_IDC_STAT_BRADY_DTM_START: NORMAL
MDC_IDC_STAT_BRADY_DTM_START: NORMAL
MDC_IDC_STAT_BRADY_RV_PERCENT_PACED: 83 %
MDC_IDC_STAT_BRADY_RV_PERCENT_PACED: 87 %
MDC_IDC_STAT_BRADY_RV_PERCENT_PACED: 87 %
MDC_IDC_STAT_CRT_DTM_END: NORMAL
MDC_IDC_STAT_CRT_DTM_END: NORMAL
MDC_IDC_STAT_CRT_DTM_START: NORMAL
MDC_IDC_STAT_CRT_DTM_START: NORMAL
MDC_IDC_STAT_EPISODE_RECENT_COUNT: 1
MDC_IDC_STAT_EPISODE_TYPE: NORMAL
MDC_IDC_STAT_EPISODE_VENDOR_TYPE: NORMAL
MDC_IDC_STAT_HEART_RATE_DTM_END: NORMAL
MDC_IDC_STAT_HEART_RATE_DTM_END: NORMAL
MDC_IDC_STAT_HEART_RATE_DTM_START: NORMAL
MDC_IDC_STAT_HEART_RATE_DTM_START: NORMAL
MDC_IDC_STAT_HEART_RATE_VENTRICULAR_MAX: 220 {BEATS}/MIN
MDC_IDC_STAT_HEART_RATE_VENTRICULAR_MAX: 220 {BEATS}/MIN
MDC_IDC_STAT_HEART_RATE_VENTRICULAR_MEAN: 67 {BEATS}/MIN
MDC_IDC_STAT_HEART_RATE_VENTRICULAR_MEAN: 67 {BEATS}/MIN
MDC_IDC_STAT_HEART_RATE_VENTRICULAR_MIN: 50 {BEATS}/MIN
MDC_IDC_STAT_HEART_RATE_VENTRICULAR_MIN: 50 {BEATS}/MIN
MONOCYTES # BLD AUTO: 0.8 10E3/UL (ref 0–1.3)
MONOCYTES # BLD AUTO: 0.9 10E3/UL (ref 0–1.3)
MONOCYTES # BLD AUTO: 1 10E3/UL (ref 0–1.3)
MONOCYTES # BLD AUTO: 1 10E3/UL (ref 0–1.3)
MONOCYTES # BLD AUTO: 1.1 10E3/UL (ref 0–1.3)
MONOCYTES # BLD AUTO: 1.2 10E3/UL (ref 0–1.3)
MONOCYTES # BLD AUTO: 1.2 10E3/UL (ref 0–1.3)
MONOCYTES NFR BLD AUTO: 11 %
MONOCYTES NFR BLD AUTO: 13 %
MONOCYTES NFR BLD AUTO: 14 %
MONOCYTES NFR BLD AUTO: 15 %
MONOCYTES NFR BLD AUTO: 16 %
MUCOUS THREADS #/AREA URNS LPF: PRESENT /LPF
NEUTROPHILS # BLD AUTO: 4 10E3/UL (ref 1.6–8.3)
NEUTROPHILS # BLD AUTO: 4.3 10E3/UL (ref 1.6–8.3)
NEUTROPHILS # BLD AUTO: 4.4 10E3/UL (ref 1.6–8.3)
NEUTROPHILS # BLD AUTO: 4.4 10E3/UL (ref 1.6–8.3)
NEUTROPHILS # BLD AUTO: 5 10E3/UL (ref 1.6–8.3)
NEUTROPHILS # BLD AUTO: 5.4 10E3/UL (ref 1.6–8.3)
NEUTROPHILS # BLD AUTO: 5.6 10E3/UL (ref 1.6–8.3)
NEUTROPHILS # BLD AUTO: 6.9 10E3/UL (ref 1.6–8.3)
NEUTROPHILS # BLD AUTO: 9.4 10E3/UL (ref 1.6–8.3)
NEUTROPHILS NFR BLD AUTO: 63 %
NEUTROPHILS NFR BLD AUTO: 64 %
NEUTROPHILS NFR BLD AUTO: 66 %
NEUTROPHILS NFR BLD AUTO: 70 %
NEUTROPHILS NFR BLD AUTO: 71 %
NEUTROPHILS NFR BLD AUTO: 72 %
NEUTROPHILS NFR BLD AUTO: 73 %
NEUTROPHILS NFR BLD AUTO: 76 %
NEUTROPHILS NFR BLD AUTO: 84 %
NITRATE UR QL: NEGATIVE
NITRATE UR QL: POSITIVE
NRBC # BLD AUTO: 0 10E3/UL
NRBC BLD AUTO-RTO: 0 /100
NT-PROBNP SERPL-MCNC: 1645 PG/ML (ref 0–1800)
OSMOLALITY UR: 334 MMOL/KG (ref 100–1200)
OXYHGB MFR BLDV: 35.2 % (ref 70–75)
P AXIS - MUSE: NORMAL DEGREES
PCO2 BLDV: 45 MM HG (ref 35–50)
PH BLDV: 7.41 [PH] (ref 7.35–7.45)
PH UR STRIP: 5 [PH] (ref 5–7)
PH UR STRIP: 5.5 [PH] (ref 5–7)
PH UR STRIP: 5.5 [PH] (ref 5–7)
PH UR STRIP: 6 [PH] (ref 5–7)
PHOSPHATE SERPL-MCNC: 2.3 MG/DL (ref 2.5–4.5)
PHOSPHATE SERPL-MCNC: 2.5 MG/DL (ref 2.5–4.5)
PHOSPHATE SERPL-MCNC: 2.6 MG/DL (ref 2.5–4.5)
PHOSPHATE SERPL-MCNC: 2.7 MG/DL (ref 2.5–4.5)
PHOSPHATE SERPL-MCNC: 2.8 MG/DL (ref 2.5–4.5)
PLATELET # BLD AUTO: 174 10E3/UL (ref 150–450)
PLATELET # BLD AUTO: 176 10E3/UL (ref 150–450)
PLATELET # BLD AUTO: 177 10E3/UL (ref 150–450)
PLATELET # BLD AUTO: 180 10E3/UL (ref 150–450)
PLATELET # BLD AUTO: 192 10E3/UL (ref 150–450)
PLATELET # BLD AUTO: 200 10E3/UL (ref 150–450)
PLATELET # BLD AUTO: 201 10E3/UL (ref 150–450)
PLATELET # BLD AUTO: 210 10E3/UL (ref 150–450)
PLATELET # BLD AUTO: 218 10E3/UL (ref 150–450)
PLATELET # BLD AUTO: 226 10E3/UL (ref 150–450)
PLATELET # BLD AUTO: 232 10E3/UL (ref 150–450)
PLATELET # BLD AUTO: 241 10E3/UL (ref 150–450)
PLATELET # BLD AUTO: 248 10E3/UL (ref 150–450)
PLATELET # BLD AUTO: 252 10E3/UL (ref 150–450)
PO2 BLDV: 23 MM HG (ref 25–47)
POTASSIUM SERPL-SCNC: 3.2 MMOL/L (ref 3.4–5.3)
POTASSIUM SERPL-SCNC: 3.3 MMOL/L (ref 3.4–5.3)
POTASSIUM SERPL-SCNC: 3.4 MMOL/L (ref 3.4–5.3)
POTASSIUM SERPL-SCNC: 3.4 MMOL/L (ref 3.4–5.3)
POTASSIUM SERPL-SCNC: 3.5 MMOL/L (ref 3.4–5.3)
POTASSIUM SERPL-SCNC: 3.5 MMOL/L (ref 3.4–5.3)
POTASSIUM SERPL-SCNC: 3.6 MMOL/L (ref 3.4–5.3)
POTASSIUM SERPL-SCNC: 3.6 MMOL/L (ref 3.4–5.3)
POTASSIUM SERPL-SCNC: 3.7 MMOL/L (ref 3.4–5.3)
POTASSIUM SERPL-SCNC: 3.8 MMOL/L (ref 3.4–5.3)
POTASSIUM SERPL-SCNC: 3.9 MMOL/L (ref 3.4–5.3)
POTASSIUM SERPL-SCNC: 3.9 MMOL/L (ref 3.4–5.3)
POTASSIUM SERPL-SCNC: 4 MMOL/L (ref 3.4–5.3)
POTASSIUM SERPL-SCNC: 4.1 MMOL/L (ref 3.4–5.3)
POTASSIUM SERPL-SCNC: 4.4 MMOL/L (ref 3.4–5.3)
PR INTERVAL - MUSE: NORMAL MS
PROCALCITONIN SERPL IA-MCNC: 0.03 NG/ML
PROCALCITONIN SERPL IA-MCNC: 0.04 NG/ML
PROCALCITONIN SERPL IA-MCNC: 3.27 NG/ML
PROT SERPL-MCNC: 5.4 G/DL (ref 6.4–8.3)
PROT SERPL-MCNC: 5.4 G/DL (ref 6.4–8.3)
PROT SERPL-MCNC: 5.5 G/DL (ref 6.4–8.3)
PROT SERPL-MCNC: 5.6 G/DL (ref 6.4–8.3)
PROT SERPL-MCNC: 5.6 G/DL (ref 6.4–8.3)
PROT SERPL-MCNC: 6.3 G/DL (ref 6.4–8.3)
QRS DURATION - MUSE: 178 MS
QT - MUSE: 510 MS
QTC - MUSE: 521 MS
R AXIS - MUSE: -29 DEGREES
RBC # BLD AUTO: 3.6 10E6/UL (ref 4.4–5.9)
RBC # BLD AUTO: 3.61 10E6/UL (ref 4.4–5.9)
RBC # BLD AUTO: 3.66 10E6/UL (ref 4.4–5.9)
RBC # BLD AUTO: 3.67 10E6/UL (ref 4.4–5.9)
RBC # BLD AUTO: 3.67 10E6/UL (ref 4.4–5.9)
RBC # BLD AUTO: 3.75 10E6/UL (ref 4.4–5.9)
RBC # BLD AUTO: 3.82 10E6/UL (ref 4.4–5.9)
RBC # BLD AUTO: 3.82 10E6/UL (ref 4.4–5.9)
RBC # BLD AUTO: 3.83 10E6/UL (ref 4.4–5.9)
RBC # BLD AUTO: 3.84 10E6/UL (ref 4.4–5.9)
RBC # BLD AUTO: 3.91 10E6/UL (ref 4.4–5.9)
RBC # BLD AUTO: 3.93 10E6/UL (ref 4.4–5.9)
RBC # BLD AUTO: 3.95 10E6/UL (ref 4.4–5.9)
RBC # BLD AUTO: 4 10E6/UL (ref 4.4–5.9)
RBC URINE: 2 /HPF
RBC URINE: 3 /HPF
RSV RNA SPEC NAA+PROBE: NEGATIVE
RSV RNA SPEC NAA+PROBE: NEGATIVE
SAO2 % BLDV: 35.8 % (ref 70–75)
SARS-COV-2 RNA RESP QL NAA+PROBE: NEGATIVE
SARS-COV-2 RNA RESP QL NAA+PROBE: NEGATIVE
SARS-COV-2 RNA RESP QL NAA+PROBE: POSITIVE
SODIUM SERPL-SCNC: 126 MMOL/L (ref 135–145)
SODIUM SERPL-SCNC: 127 MMOL/L (ref 135–145)
SODIUM SERPL-SCNC: 128 MMOL/L (ref 135–145)
SODIUM SERPL-SCNC: 129 MMOL/L (ref 135–145)
SODIUM SERPL-SCNC: 131 MMOL/L (ref 135–145)
SODIUM SERPL-SCNC: 131 MMOL/L (ref 135–145)
SODIUM SERPL-SCNC: 131 MMOL/L (ref 136–145)
SODIUM SERPL-SCNC: 132 MMOL/L (ref 135–145)
SODIUM SERPL-SCNC: 132 MMOL/L (ref 135–145)
SODIUM SERPL-SCNC: 132 MMOL/L (ref 136–145)
SODIUM SERPL-SCNC: 133 MMOL/L (ref 136–145)
SODIUM SERPL-SCNC: 134 MMOL/L (ref 136–145)
SODIUM SERPL-SCNC: 134 MMOL/L (ref 136–145)
SODIUM SERPL-SCNC: 136 MMOL/L (ref 135–145)
SODIUM SERPL-SCNC: 137 MMOL/L (ref 135–145)
SODIUM SERPL-SCNC: 137 MMOL/L (ref 136–145)
SODIUM SERPL-SCNC: 139 MMOL/L (ref 135–145)
SODIUM SERPL-SCNC: 151 MMOL/L (ref 135–145)
SODIUM UR-SCNC: 45 MMOL/L
SP GR UR STRIP: 1.01 (ref 1–1.03)
SP GR UR STRIP: 1.01 (ref 1–1.03)
SP GR UR STRIP: 1.02 (ref 1–1.03)
SP GR UR STRIP: 1.02 (ref 1–1.03)
SQUAMOUS EPITHELIAL: <1 /HPF
SQUAMOUS EPITHELIAL: <1 /HPF
SYSTOLIC BLOOD PRESSURE - MUSE: 120 MMHG
T AXIS - MUSE: 136 DEGREES
TROPONIN T SERPL HS-MCNC: 42 NG/L
TSH SERPL DL<=0.005 MIU/L-ACNC: 3.7 UIU/ML (ref 0.3–4.2)
TSH SERPL DL<=0.005 MIU/L-ACNC: 4.67 UIU/ML (ref 0.3–4.2)
UROBILINOGEN UR STRIP-MCNC: <2 MG/DL
UROBILINOGEN UR STRIP-MCNC: NORMAL MG/DL
VENTRICULAR RATE- MUSE: 63 BPM
VIT B12 SERPL-MCNC: 275 PG/ML (ref 232–1245)
WBC # BLD AUTO: 11.2 10E3/UL (ref 4–11)
WBC # BLD AUTO: 5.3 10E3/UL (ref 4–11)
WBC # BLD AUTO: 6.2 10E3/UL (ref 4–11)
WBC # BLD AUTO: 6.2 10E3/UL (ref 4–11)
WBC # BLD AUTO: 6.6 10E3/UL (ref 4–11)
WBC # BLD AUTO: 6.9 10E3/UL (ref 4–11)
WBC # BLD AUTO: 6.9 10E3/UL (ref 4–11)
WBC # BLD AUTO: 7.1 10E3/UL (ref 4–11)
WBC # BLD AUTO: 7.2 10E3/UL (ref 4–11)
WBC # BLD AUTO: 7.4 10E3/UL (ref 4–11)
WBC # BLD AUTO: 7.6 10E3/UL (ref 4–11)
WBC # BLD AUTO: 7.7 10E3/UL (ref 4–11)
WBC # BLD AUTO: 8 10E3/UL (ref 4–11)
WBC # BLD AUTO: 9 10E3/UL (ref 4–11)
WBC URINE: 12 /HPF
WBC URINE: 2 /HPF
WBC URINE: 28 /HPF
WBC URINE: 3 /HPF

## 2023-01-01 PROCEDURE — 250N000013 HC RX MED GY IP 250 OP 250 PS 637: Performed by: INTERNAL MEDICINE

## 2023-01-01 PROCEDURE — 82310 ASSAY OF CALCIUM: CPT

## 2023-01-01 PROCEDURE — 87040 BLOOD CULTURE FOR BACTERIA: CPT | Performed by: EMERGENCY MEDICINE

## 2023-01-01 PROCEDURE — 250N000011 HC RX IP 250 OP 636: Mod: JZ | Performed by: INTERNAL MEDICINE

## 2023-01-01 PROCEDURE — 85025 COMPLETE CBC W/AUTO DIFF WBC: CPT | Performed by: STUDENT IN AN ORGANIZED HEALTH CARE EDUCATION/TRAINING PROGRAM

## 2023-01-01 PROCEDURE — P9603 ONE-WAY ALLOW PRORATED MILES: HCPCS

## 2023-01-01 PROCEDURE — 84145 PROCALCITONIN (PCT): CPT | Performed by: HOSPITALIST

## 2023-01-01 PROCEDURE — 93005 ELECTROCARDIOGRAM TRACING: CPT | Performed by: EMERGENCY MEDICINE

## 2023-01-01 PROCEDURE — 85610 PROTHROMBIN TIME: CPT | Performed by: INTERNAL MEDICINE

## 2023-01-01 PROCEDURE — P9604 ONE-WAY ALLOW PRORATED TRIP: HCPCS | Mod: ORL | Performed by: NURSE PRACTITIONER

## 2023-01-01 PROCEDURE — 85610 PROTHROMBIN TIME: CPT | Mod: ORL | Performed by: FAMILY MEDICINE

## 2023-01-01 PROCEDURE — G0378 HOSPITAL OBSERVATION PER HR: HCPCS

## 2023-01-01 PROCEDURE — 80048 BASIC METABOLIC PNL TOTAL CA: CPT | Performed by: HOSPITALIST

## 2023-01-01 PROCEDURE — 85610 PROTHROMBIN TIME: CPT | Performed by: NURSE PRACTITIONER

## 2023-01-01 PROCEDURE — 36415 COLL VENOUS BLD VENIPUNCTURE: CPT

## 2023-01-01 PROCEDURE — 99309 SBSQ NF CARE MODERATE MDM 30: CPT | Performed by: NURSE PRACTITIONER

## 2023-01-01 PROCEDURE — 36415 COLL VENOUS BLD VENIPUNCTURE: CPT | Performed by: EMERGENCY MEDICINE

## 2023-01-01 PROCEDURE — 80048 BASIC METABOLIC PNL TOTAL CA: CPT | Performed by: FAMILY MEDICINE

## 2023-01-01 PROCEDURE — 36415 COLL VENOUS BLD VENIPUNCTURE: CPT | Mod: ORL | Performed by: NURSE PRACTITIONER

## 2023-01-01 PROCEDURE — P9604 ONE-WAY ALLOW PRORATED TRIP: HCPCS | Performed by: FAMILY MEDICINE

## 2023-01-01 PROCEDURE — P9603 ONE-WAY ALLOW PRORATED MILES: HCPCS | Performed by: NURSE PRACTITIONER

## 2023-01-01 PROCEDURE — 80053 COMPREHEN METABOLIC PANEL: CPT | Performed by: INTERNAL MEDICINE

## 2023-01-01 PROCEDURE — P9603 ONE-WAY ALLOW PRORATED MILES: HCPCS | Performed by: FAMILY MEDICINE

## 2023-01-01 PROCEDURE — 120N000001 HC R&B MED SURG/OB

## 2023-01-01 PROCEDURE — 250N000011 HC RX IP 250 OP 636: Performed by: INTERNAL MEDICINE

## 2023-01-01 PROCEDURE — 83735 ASSAY OF MAGNESIUM: CPT | Performed by: INTERNAL MEDICINE

## 2023-01-01 PROCEDURE — 81001 URINALYSIS AUTO W/SCOPE: CPT | Performed by: EMERGENCY MEDICINE

## 2023-01-01 PROCEDURE — 250N000013 HC RX MED GY IP 250 OP 250 PS 637: Performed by: HOSPITALIST

## 2023-01-01 PROCEDURE — 36415 COLL VENOUS BLD VENIPUNCTURE: CPT | Performed by: INTERNAL MEDICINE

## 2023-01-01 PROCEDURE — 85610 PROTHROMBIN TIME: CPT | Mod: ORL | Performed by: NURSE PRACTITIONER

## 2023-01-01 PROCEDURE — 84443 ASSAY THYROID STIM HORMONE: CPT | Performed by: INTERNAL MEDICINE

## 2023-01-01 PROCEDURE — 250N000009 HC RX 250: Performed by: INTERNAL MEDICINE

## 2023-01-01 PROCEDURE — 85027 COMPLETE CBC AUTOMATED: CPT | Performed by: FAMILY MEDICINE

## 2023-01-01 PROCEDURE — 36415 COLL VENOUS BLD VENIPUNCTURE: CPT | Mod: ORL | Performed by: FAMILY MEDICINE

## 2023-01-01 PROCEDURE — 93279 PRGRMG DEV EVAL PM/LDLS PM: CPT | Performed by: INTERNAL MEDICINE

## 2023-01-01 PROCEDURE — 85027 COMPLETE CBC AUTOMATED: CPT | Performed by: INTERNAL MEDICINE

## 2023-01-01 PROCEDURE — 99285 EMERGENCY DEPT VISIT HI MDM: CPT | Mod: 25

## 2023-01-01 PROCEDURE — 84145 PROCALCITONIN (PCT): CPT | Performed by: EMERGENCY MEDICINE

## 2023-01-01 PROCEDURE — 97162 PT EVAL MOD COMPLEX 30 MIN: CPT | Mod: GP | Performed by: PHYSICAL THERAPIST

## 2023-01-01 PROCEDURE — 84100 ASSAY OF PHOSPHORUS: CPT | Performed by: INTERNAL MEDICINE

## 2023-01-01 PROCEDURE — 82746 ASSAY OF FOLIC ACID SERUM: CPT | Performed by: INTERNAL MEDICINE

## 2023-01-01 PROCEDURE — 84295 ASSAY OF SERUM SODIUM: CPT | Performed by: NURSE PRACTITIONER

## 2023-01-01 PROCEDURE — 93294 REM INTERROG EVL PM/LDLS PM: CPT | Performed by: INTERNAL MEDICINE

## 2023-01-01 PROCEDURE — 36415 COLL VENOUS BLD VENIPUNCTURE: CPT | Performed by: NURSE PRACTITIONER

## 2023-01-01 PROCEDURE — 250N000013 HC RX MED GY IP 250 OP 250 PS 637: Performed by: STUDENT IN AN ORGANIZED HEALTH CARE EDUCATION/TRAINING PROGRAM

## 2023-01-01 PROCEDURE — 80053 COMPREHEN METABOLIC PANEL: CPT | Performed by: EMERGENCY MEDICINE

## 2023-01-01 PROCEDURE — 82310 ASSAY OF CALCIUM: CPT | Performed by: NURSE PRACTITIONER

## 2023-01-01 PROCEDURE — P9604 ONE-WAY ALLOW PRORATED TRIP: HCPCS | Performed by: NURSE PRACTITIONER

## 2023-01-01 PROCEDURE — 84132 ASSAY OF SERUM POTASSIUM: CPT | Performed by: INTERNAL MEDICINE

## 2023-01-01 PROCEDURE — 97530 THERAPEUTIC ACTIVITIES: CPT | Mod: GP | Performed by: PHYSICAL THERAPIST

## 2023-01-01 PROCEDURE — 82140 ASSAY OF AMMONIA: CPT | Performed by: INTERNAL MEDICINE

## 2023-01-01 PROCEDURE — P9604 ONE-WAY ALLOW PRORATED TRIP: HCPCS | Mod: ORL | Performed by: FAMILY MEDICINE

## 2023-01-01 PROCEDURE — P9603 ONE-WAY ALLOW PRORATED MILES: HCPCS | Mod: ORL | Performed by: FAMILY MEDICINE

## 2023-01-01 PROCEDURE — 99232 SBSQ HOSP IP/OBS MODERATE 35: CPT | Performed by: INTERNAL MEDICINE

## 2023-01-01 PROCEDURE — 85025 COMPLETE CBC W/AUTO DIFF WBC: CPT | Performed by: HOSPITALIST

## 2023-01-01 PROCEDURE — 83735 ASSAY OF MAGNESIUM: CPT | Performed by: HOSPITALIST

## 2023-01-01 PROCEDURE — 99233 SBSQ HOSP IP/OBS HIGH 50: CPT | Performed by: INTERNAL MEDICINE

## 2023-01-01 PROCEDURE — 99239 HOSP IP/OBS DSCHRG MGMT >30: CPT | Performed by: INTERNAL MEDICINE

## 2023-01-01 PROCEDURE — 97110 THERAPEUTIC EXERCISES: CPT | Mod: GP | Performed by: PHYSICAL THERAPIST

## 2023-01-01 PROCEDURE — 81001 URINALYSIS AUTO W/SCOPE: CPT | Performed by: INTERNAL MEDICINE

## 2023-01-01 PROCEDURE — 85610 PROTHROMBIN TIME: CPT | Mod: ORL

## 2023-01-01 PROCEDURE — 84300 ASSAY OF URINE SODIUM: CPT | Performed by: INTERNAL MEDICINE

## 2023-01-01 PROCEDURE — 86140 C-REACTIVE PROTEIN: CPT | Performed by: INTERNAL MEDICINE

## 2023-01-01 PROCEDURE — 84484 ASSAY OF TROPONIN QUANT: CPT | Performed by: EMERGENCY MEDICINE

## 2023-01-01 PROCEDURE — 93296 REM INTERROG EVL PM/IDS: CPT | Performed by: INTERNAL MEDICINE

## 2023-01-01 PROCEDURE — 82533 TOTAL CORTISOL: CPT | Performed by: INTERNAL MEDICINE

## 2023-01-01 PROCEDURE — 36415 COLL VENOUS BLD VENIPUNCTURE: CPT | Performed by: HOSPITALIST

## 2023-01-01 PROCEDURE — 258N000003 HC RX IP 258 OP 636: Performed by: INTERNAL MEDICINE

## 2023-01-01 PROCEDURE — C1729 CATH, DRAINAGE: HCPCS

## 2023-01-01 PROCEDURE — 85025 COMPLETE CBC W/AUTO DIFF WBC: CPT | Mod: ORL | Performed by: FAMILY MEDICINE

## 2023-01-01 PROCEDURE — 99309 SBSQ NF CARE MODERATE MDM 30: CPT | Performed by: FAMILY MEDICINE

## 2023-01-01 PROCEDURE — 87635 SARS-COV-2 COVID-19 AMP PRB: CPT | Performed by: INTERNAL MEDICINE

## 2023-01-01 PROCEDURE — 82310 ASSAY OF CALCIUM: CPT | Performed by: INTERNAL MEDICINE

## 2023-01-01 PROCEDURE — 96361 HYDRATE IV INFUSION ADD-ON: CPT

## 2023-01-01 PROCEDURE — 87637 SARSCOV2&INF A&B&RSV AMP PRB: CPT | Performed by: EMERGENCY MEDICINE

## 2023-01-01 PROCEDURE — 82248 BILIRUBIN DIRECT: CPT | Performed by: INTERNAL MEDICINE

## 2023-01-01 PROCEDURE — 80048 BASIC METABOLIC PNL TOTAL CA: CPT | Mod: ORL | Performed by: FAMILY MEDICINE

## 2023-01-01 PROCEDURE — 85610 PROTHROMBIN TIME: CPT

## 2023-01-01 PROCEDURE — 82947 ASSAY GLUCOSE BLOOD QUANT: CPT | Performed by: INTERNAL MEDICINE

## 2023-01-01 PROCEDURE — 87086 URINE CULTURE/COLONY COUNT: CPT | Performed by: EMERGENCY MEDICINE

## 2023-01-01 PROCEDURE — 93306 TTE W/DOPPLER COMPLETE: CPT | Mod: 26 | Performed by: INTERNAL MEDICINE

## 2023-01-01 PROCEDURE — 83605 ASSAY OF LACTIC ACID: CPT | Performed by: EMERGENCY MEDICINE

## 2023-01-01 PROCEDURE — 82728 ASSAY OF FERRITIN: CPT | Mod: ORL | Performed by: NURSE PRACTITIONER

## 2023-01-01 PROCEDURE — 84295 ASSAY OF SERUM SODIUM: CPT | Performed by: INTERNAL MEDICINE

## 2023-01-01 PROCEDURE — 85610 PROTHROMBIN TIME: CPT | Performed by: FAMILY MEDICINE

## 2023-01-01 PROCEDURE — 83880 ASSAY OF NATRIURETIC PEPTIDE: CPT | Performed by: INTERNAL MEDICINE

## 2023-01-01 PROCEDURE — 99232 SBSQ HOSP IP/OBS MODERATE 35: CPT | Performed by: HOSPITALIST

## 2023-01-01 PROCEDURE — 96367 TX/PROPH/DG ADDL SEQ IV INF: CPT

## 2023-01-01 PROCEDURE — 51705 CHANGE OF BLADDER TUBE: CPT

## 2023-01-01 PROCEDURE — P9603 ONE-WAY ALLOW PRORATED MILES: HCPCS | Mod: ORL | Performed by: NURSE PRACTITIONER

## 2023-01-01 PROCEDURE — 36415 COLL VENOUS BLD VENIPUNCTURE: CPT | Performed by: FAMILY MEDICINE

## 2023-01-01 PROCEDURE — 81001 URINALYSIS AUTO W/SCOPE: CPT | Mod: ORL | Performed by: NURSE PRACTITIONER

## 2023-01-01 PROCEDURE — 96375 TX/PRO/DX INJ NEW DRUG ADDON: CPT

## 2023-01-01 PROCEDURE — 87086 URINE CULTURE/COLONY COUNT: CPT | Mod: ORL | Performed by: NURSE PRACTITIONER

## 2023-01-01 PROCEDURE — 80053 COMPREHEN METABOLIC PANEL: CPT | Mod: ORL | Performed by: NURSE PRACTITIONER

## 2023-01-01 PROCEDURE — 96376 TX/PRO/DX INJ SAME DRUG ADON: CPT

## 2023-01-01 PROCEDURE — 96372 THER/PROPH/DIAG INJ SC/IM: CPT | Performed by: INTERNAL MEDICINE

## 2023-01-01 PROCEDURE — P9604 ONE-WAY ALLOW PRORATED TRIP: HCPCS | Mod: ORL

## 2023-01-01 PROCEDURE — 36415 COLL VENOUS BLD VENIPUNCTURE: CPT | Mod: ORL

## 2023-01-01 PROCEDURE — 85610 PROTHROMBIN TIME: CPT | Performed by: EMERGENCY MEDICINE

## 2023-01-01 PROCEDURE — 82805 BLOOD GASES W/O2 SATURATION: CPT | Performed by: INTERNAL MEDICINE

## 2023-01-01 PROCEDURE — 97530 THERAPEUTIC ACTIVITIES: CPT | Mod: GO

## 2023-01-01 PROCEDURE — 999N000111 HC STATISTIC OT IP EVAL DEFER

## 2023-01-01 PROCEDURE — 83735 ASSAY OF MAGNESIUM: CPT | Performed by: EMERGENCY MEDICINE

## 2023-01-01 PROCEDURE — 85379 FIBRIN DEGRADATION QUANT: CPT | Performed by: INTERNAL MEDICINE

## 2023-01-01 PROCEDURE — 70450 CT HEAD/BRAIN W/O DYE: CPT

## 2023-01-01 PROCEDURE — 99214 OFFICE O/P EST MOD 30 MIN: CPT | Performed by: INTERNAL MEDICINE

## 2023-01-01 PROCEDURE — 97110 THERAPEUTIC EXERCISES: CPT | Mod: GP

## 2023-01-01 PROCEDURE — C1769 GUIDE WIRE: HCPCS

## 2023-01-01 PROCEDURE — 83735 ASSAY OF MAGNESIUM: CPT | Performed by: FAMILY MEDICINE

## 2023-01-01 PROCEDURE — P9604 ONE-WAY ALLOW PRORATED TRIP: HCPCS

## 2023-01-01 PROCEDURE — 84132 ASSAY OF SERUM POTASSIUM: CPT | Performed by: HOSPITALIST

## 2023-01-01 PROCEDURE — 84443 ASSAY THYROID STIM HORMONE: CPT | Mod: ORL | Performed by: NURSE PRACTITIONER

## 2023-01-01 PROCEDURE — 80048 BASIC METABOLIC PNL TOTAL CA: CPT | Performed by: INTERNAL MEDICINE

## 2023-01-01 PROCEDURE — 99222 1ST HOSP IP/OBS MODERATE 55: CPT | Performed by: INTERNAL MEDICINE

## 2023-01-01 PROCEDURE — 99223 1ST HOSP IP/OBS HIGH 75: CPT | Mod: AI | Performed by: INTERNAL MEDICINE

## 2023-01-01 PROCEDURE — 71045 X-RAY EXAM CHEST 1 VIEW: CPT

## 2023-01-01 PROCEDURE — 83605 ASSAY OF LACTIC ACID: CPT | Performed by: HOSPITALIST

## 2023-01-01 PROCEDURE — 250N000011 HC RX IP 250 OP 636: Performed by: EMERGENCY MEDICINE

## 2023-01-01 PROCEDURE — 99238 HOSP IP/OBS DSCHRG MGMT 30/<: CPT | Performed by: INTERNAL MEDICINE

## 2023-01-01 PROCEDURE — 80048 BASIC METABOLIC PNL TOTAL CA: CPT | Performed by: EMERGENCY MEDICINE

## 2023-01-01 PROCEDURE — 82607 VITAMIN B-12: CPT | Performed by: INTERNAL MEDICINE

## 2023-01-01 PROCEDURE — 71046 X-RAY EXAM CHEST 2 VIEWS: CPT

## 2023-01-01 PROCEDURE — 83935 ASSAY OF URINE OSMOLALITY: CPT | Performed by: INTERNAL MEDICINE

## 2023-01-01 PROCEDURE — 85025 COMPLETE CBC W/AUTO DIFF WBC: CPT | Performed by: INTERNAL MEDICINE

## 2023-01-01 PROCEDURE — 97530 THERAPEUTIC ACTIVITIES: CPT | Mod: GP

## 2023-01-01 PROCEDURE — 255N000002 HC RX 255 OP 636: Performed by: INTERNAL MEDICINE

## 2023-01-01 PROCEDURE — 97165 OT EVAL LOW COMPLEX 30 MIN: CPT | Mod: GO

## 2023-01-01 PROCEDURE — 96365 THER/PROPH/DIAG IV INF INIT: CPT

## 2023-01-01 PROCEDURE — 84145 PROCALCITONIN (PCT): CPT | Performed by: INTERNAL MEDICINE

## 2023-01-01 PROCEDURE — 250N000011 HC RX IP 250 OP 636: Mod: JZ | Performed by: EMERGENCY MEDICINE

## 2023-01-01 PROCEDURE — 85004 AUTOMATED DIFF WBC COUNT: CPT | Performed by: HOSPITALIST

## 2023-01-01 PROCEDURE — 97161 PT EVAL LOW COMPLEX 20 MIN: CPT | Mod: GP

## 2023-01-01 PROCEDURE — 85025 COMPLETE CBC W/AUTO DIFF WBC: CPT | Performed by: EMERGENCY MEDICINE

## 2023-01-01 PROCEDURE — 250N000013 HC RX MED GY IP 250 OP 250 PS 637: Performed by: EMERGENCY MEDICINE

## 2023-01-01 RX ORDER — ATORVASTATIN CALCIUM 40 MG/1
40 TABLET, FILM COATED ORAL AT BEDTIME
Qty: 30 TABLET | Refills: 0 | Status: ON HOLD | OUTPATIENT
Start: 2023-01-01 | End: 2023-01-01

## 2023-01-01 RX ORDER — LEVOTHYROXINE SODIUM 25 UG/1
25 TABLET ORAL DAILY
Status: DISCONTINUED | OUTPATIENT
Start: 2023-01-01 | End: 2023-01-01 | Stop reason: HOSPADM

## 2023-01-01 RX ORDER — MICONAZOLE NITRATE 20 MG/G
CREAM TOPICAL 2 TIMES DAILY
Status: DISCONTINUED | OUTPATIENT
Start: 2023-01-01 | End: 2023-01-01 | Stop reason: HOSPADM

## 2023-01-01 RX ORDER — POTASSIUM CHLORIDE 1500 MG/1
40 TABLET, EXTENDED RELEASE ORAL ONCE
Status: COMPLETED | OUTPATIENT
Start: 2023-01-01 | End: 2023-01-01

## 2023-01-01 RX ORDER — PROCHLORPERAZINE 25 MG
12.5 SUPPOSITORY, RECTAL RECTAL EVERY 12 HOURS PRN
Status: DISCONTINUED | OUTPATIENT
Start: 2023-01-01 | End: 2023-01-01 | Stop reason: HOSPADM

## 2023-01-01 RX ORDER — LATANOPROST 50 UG/ML
1 SOLUTION/ DROPS OPHTHALMIC AT BEDTIME
Qty: 2.5 ML | Refills: 12 | Status: SHIPPED | OUTPATIENT
Start: 2023-01-01

## 2023-01-01 RX ORDER — BISACODYL 5 MG
5 TABLET, DELAYED RELEASE (ENTERIC COATED) ORAL DAILY PRN
Status: DISCONTINUED | OUTPATIENT
Start: 2023-01-01 | End: 2023-01-01 | Stop reason: HOSPADM

## 2023-01-01 RX ORDER — WARFARIN SODIUM 5 MG/1
5 TABLET ORAL
Status: COMPLETED | OUTPATIENT
Start: 2023-01-01 | End: 2023-01-01

## 2023-01-01 RX ORDER — AMOXICILLIN 250 MG
1 CAPSULE ORAL 2 TIMES DAILY
Status: DISCONTINUED | OUTPATIENT
Start: 2023-01-01 | End: 2023-01-01 | Stop reason: HOSPADM

## 2023-01-01 RX ORDER — POTASSIUM CHLORIDE 750 MG/1
10 TABLET, EXTENDED RELEASE ORAL DAILY
Qty: 3 TABLET | Refills: 0 | Status: SHIPPED | OUTPATIENT
Start: 2023-01-01 | End: 2023-01-01

## 2023-01-01 RX ORDER — LEVOTHYROXINE SODIUM 25 UG/1
25 TABLET ORAL DAILY
Qty: 30 TABLET | Refills: 0 | Status: SHIPPED | OUTPATIENT
Start: 2023-01-01

## 2023-01-01 RX ORDER — ACETAMINOPHEN 650 MG/1
650 SUPPOSITORY RECTAL ONCE
Status: COMPLETED | OUTPATIENT
Start: 2023-01-01 | End: 2023-01-01

## 2023-01-01 RX ORDER — MICONAZOLE NITRATE 20 MG/G
CREAM TOPICAL 2 TIMES DAILY
Qty: 35 G | Refills: 0 | Status: SHIPPED | OUTPATIENT
Start: 2023-01-01 | End: 2023-01-01

## 2023-01-01 RX ORDER — SODIUM CHLORIDE 9 MG/ML
INJECTION, SOLUTION INTRAVENOUS CONTINUOUS
Status: DISCONTINUED | OUTPATIENT
Start: 2023-01-01 | End: 2023-01-01

## 2023-01-01 RX ORDER — DOCUSATE SODIUM 100 MG/1
100 CAPSULE, LIQUID FILLED ORAL 2 TIMES DAILY PRN
Qty: 30 CAPSULE | Refills: 0 | Status: SHIPPED | OUTPATIENT
Start: 2023-01-01

## 2023-01-01 RX ORDER — ACETAMINOPHEN 500 MG
1000 TABLET ORAL EVERY 6 HOURS PRN
Status: DISCONTINUED | OUTPATIENT
Start: 2023-01-01 | End: 2023-01-01

## 2023-01-01 RX ORDER — LANOLIN ALCOHOL/MO/W.PET/CERES
1000 CREAM (GRAM) TOPICAL DAILY
COMMUNITY

## 2023-01-01 RX ORDER — WARFARIN SODIUM 3 MG/1
6 TABLET ORAL
Status: COMPLETED | OUTPATIENT
Start: 2023-01-01 | End: 2023-01-01

## 2023-01-01 RX ORDER — DOCUSATE SODIUM 100 MG/1
100 CAPSULE, LIQUID FILLED ORAL 2 TIMES DAILY PRN
Status: DISCONTINUED | OUTPATIENT
Start: 2023-01-01 | End: 2023-01-01 | Stop reason: HOSPADM

## 2023-01-01 RX ORDER — POTASSIUM CHLORIDE 1500 MG/1
20 TABLET, EXTENDED RELEASE ORAL ONCE
Status: COMPLETED | OUTPATIENT
Start: 2023-01-01 | End: 2023-01-01

## 2023-01-01 RX ORDER — LIDOCAINE 40 MG/G
CREAM TOPICAL
Status: DISCONTINUED | OUTPATIENT
Start: 2023-01-01 | End: 2023-01-01 | Stop reason: HOSPADM

## 2023-01-01 RX ORDER — ONDANSETRON 4 MG/1
4 TABLET, ORALLY DISINTEGRATING ORAL EVERY 6 HOURS PRN
Status: DISCONTINUED | OUTPATIENT
Start: 2023-01-01 | End: 2023-01-01 | Stop reason: HOSPADM

## 2023-01-01 RX ORDER — MAGNESIUM OXIDE 400 MG/1
400 TABLET ORAL EVERY 4 HOURS
Status: COMPLETED | OUTPATIENT
Start: 2023-01-01 | End: 2023-01-01

## 2023-01-01 RX ORDER — ACETAMINOPHEN 325 MG/1
650 TABLET ORAL EVERY 4 HOURS PRN
Status: DISCONTINUED | OUTPATIENT
Start: 2023-01-01 | End: 2023-01-01 | Stop reason: HOSPADM

## 2023-01-01 RX ORDER — ONDANSETRON 2 MG/ML
4 INJECTION INTRAMUSCULAR; INTRAVENOUS EVERY 6 HOURS PRN
Status: DISCONTINUED | OUTPATIENT
Start: 2023-01-01 | End: 2023-01-01 | Stop reason: HOSPADM

## 2023-01-01 RX ORDER — FUROSEMIDE 10 MG/ML
40 INJECTION INTRAMUSCULAR; INTRAVENOUS EVERY 12 HOURS
Status: DISCONTINUED | OUTPATIENT
Start: 2023-01-01 | End: 2023-01-01

## 2023-01-01 RX ORDER — FUROSEMIDE 40 MG
40 TABLET ORAL DAILY
Qty: 30 TABLET | Refills: 0 | Status: ON HOLD | OUTPATIENT
Start: 2023-01-01 | End: 2023-01-01

## 2023-01-01 RX ORDER — BISACODYL 10 MG
10 SUPPOSITORY, RECTAL RECTAL DAILY PRN
Status: DISCONTINUED | OUTPATIENT
Start: 2023-01-01 | End: 2023-01-01

## 2023-01-01 RX ORDER — CEFTRIAXONE 1 G/1
1 INJECTION, POWDER, FOR SOLUTION INTRAMUSCULAR; INTRAVENOUS ONCE
Status: COMPLETED | OUTPATIENT
Start: 2023-01-01 | End: 2023-01-01

## 2023-01-01 RX ORDER — ACETAMINOPHEN 325 MG/1
650 TABLET ORAL EVERY 6 HOURS PRN
Status: DISCONTINUED | OUTPATIENT
Start: 2023-01-01 | End: 2023-01-01 | Stop reason: HOSPADM

## 2023-01-01 RX ORDER — HYDRALAZINE HYDROCHLORIDE 20 MG/ML
10 INJECTION INTRAMUSCULAR; INTRAVENOUS EVERY 6 HOURS PRN
Status: DISCONTINUED | OUTPATIENT
Start: 2023-01-01 | End: 2023-01-01 | Stop reason: HOSPADM

## 2023-01-01 RX ORDER — GUAIFENESIN 600 MG/1
600 TABLET, EXTENDED RELEASE ORAL 2 TIMES DAILY
Status: DISCONTINUED | OUTPATIENT
Start: 2023-01-01 | End: 2023-01-01 | Stop reason: HOSPADM

## 2023-01-01 RX ORDER — PANTOPRAZOLE SODIUM 40 MG/1
40 TABLET, DELAYED RELEASE ORAL
Status: DISCONTINUED | OUTPATIENT
Start: 2023-01-01 | End: 2023-01-01 | Stop reason: HOSPADM

## 2023-01-01 RX ORDER — ENOXAPARIN SODIUM 150 MG/ML
1.5 INJECTION SUBCUTANEOUS ONCE
Qty: 1 ML | Refills: 0 | Status: COMPLETED | OUTPATIENT
Start: 2023-01-01 | End: 2023-01-01

## 2023-01-01 RX ORDER — SODIUM CHLORIDE 1 G/1
1 TABLET ORAL 2 TIMES DAILY WITH MEALS
Status: DISCONTINUED | OUTPATIENT
Start: 2023-01-01 | End: 2023-01-01 | Stop reason: HOSPADM

## 2023-01-01 RX ORDER — FUROSEMIDE 40 MG
40 TABLET ORAL DAILY PRN
Start: 2023-01-01 | End: 2023-01-01

## 2023-01-01 RX ORDER — WARFARIN SODIUM 5 MG/1
5 TABLET ORAL
Status: DISCONTINUED | OUTPATIENT
Start: 2023-01-01 | End: 2023-01-01 | Stop reason: HOSPADM

## 2023-01-01 RX ORDER — LEVOTHYROXINE SODIUM 25 UG/1
25 TABLET ORAL
Status: DISCONTINUED | OUTPATIENT
Start: 2023-01-01 | End: 2023-01-01 | Stop reason: HOSPADM

## 2023-01-01 RX ORDER — BISACODYL 10 MG
10 SUPPOSITORY, RECTAL RECTAL DAILY PRN
Status: DISCONTINUED | OUTPATIENT
Start: 2023-01-01 | End: 2023-01-01 | Stop reason: HOSPADM

## 2023-01-01 RX ORDER — ERYTHROMYCIN 5 MG/G
OINTMENT OPHTHALMIC AT BEDTIME
Status: DISCONTINUED | OUTPATIENT
Start: 2023-01-01 | End: 2023-01-01 | Stop reason: HOSPADM

## 2023-01-01 RX ORDER — ATORVASTATIN CALCIUM 40 MG/1
40 TABLET, FILM COATED ORAL AT BEDTIME
Status: DISCONTINUED | OUTPATIENT
Start: 2023-01-01 | End: 2023-01-01 | Stop reason: HOSPADM

## 2023-01-01 RX ORDER — CEFEPIME HYDROCHLORIDE 2 G/1
2 INJECTION, POWDER, FOR SOLUTION INTRAVENOUS EVERY 8 HOURS
Status: DISCONTINUED | OUTPATIENT
Start: 2023-01-01 | End: 2023-01-01 | Stop reason: HOSPADM

## 2023-01-01 RX ORDER — FUROSEMIDE 20 MG
40 TABLET ORAL DAILY
Status: DISCONTINUED | OUTPATIENT
Start: 2023-01-01 | End: 2023-01-01 | Stop reason: HOSPADM

## 2023-01-01 RX ORDER — ACETAMINOPHEN 325 MG/1
650 TABLET ORAL EVERY 6 HOURS PRN
Qty: 20 TABLET | Refills: 0 | Status: SHIPPED | OUTPATIENT
Start: 2023-01-01

## 2023-01-01 RX ORDER — PIPERACILLIN SODIUM, TAZOBACTAM SODIUM 3; .375 G/15ML; G/15ML
3.38 INJECTION, POWDER, LYOPHILIZED, FOR SOLUTION INTRAVENOUS ONCE
Qty: 15 ML | Refills: 0 | Status: COMPLETED | OUTPATIENT
Start: 2023-01-01 | End: 2023-01-01

## 2023-01-01 RX ORDER — CEFTRIAXONE 1 G/1
1 INJECTION, POWDER, FOR SOLUTION INTRAMUSCULAR; INTRAVENOUS EVERY 24 HOURS
Status: DISCONTINUED | OUTPATIENT
Start: 2023-01-01 | End: 2023-01-01

## 2023-01-01 RX ORDER — AMOXICILLIN 250 MG
2 CAPSULE ORAL 2 TIMES DAILY
Status: DISCONTINUED | OUTPATIENT
Start: 2023-01-01 | End: 2023-01-01 | Stop reason: HOSPADM

## 2023-01-01 RX ORDER — AMOXICILLIN 500 MG/1
500 CAPSULE ORAL 3 TIMES DAILY
Status: ON HOLD | COMMUNITY
Start: 2023-01-01 | End: 2023-01-01

## 2023-01-01 RX ORDER — FERROUS SULFATE 325(65) MG
325 TABLET ORAL DAILY
Status: DISCONTINUED | OUTPATIENT
Start: 2023-01-01 | End: 2023-01-01 | Stop reason: HOSPADM

## 2023-01-01 RX ORDER — FUROSEMIDE 10 MG/ML
40 INJECTION INTRAMUSCULAR; INTRAVENOUS DAILY
Status: DISCONTINUED | OUTPATIENT
Start: 2023-01-01 | End: 2023-01-01

## 2023-01-01 RX ORDER — FUROSEMIDE 10 MG/ML
40 INJECTION INTRAMUSCULAR; INTRAVENOUS ONCE
Status: COMPLETED | OUTPATIENT
Start: 2023-01-01 | End: 2023-01-01

## 2023-01-01 RX ORDER — ACETAMINOPHEN 325 MG/1
650 TABLET ORAL EVERY 6 HOURS PRN
Status: DISCONTINUED | OUTPATIENT
Start: 2023-01-01 | End: 2023-01-01

## 2023-01-01 RX ORDER — POTASSIUM CHLORIDE 20MEQ/15ML
40 LIQUID (ML) ORAL ONCE
Qty: 30 ML | Refills: 0 | Status: COMPLETED | OUTPATIENT
Start: 2023-01-01 | End: 2023-01-01

## 2023-01-01 RX ORDER — LANOLIN ALCOHOL/MO/W.PET/CERES
3 CREAM (GRAM) TOPICAL
Status: DISCONTINUED | OUTPATIENT
Start: 2023-01-01 | End: 2023-01-01 | Stop reason: HOSPADM

## 2023-01-01 RX ORDER — LATANOPROST 50 UG/ML
1 SOLUTION/ DROPS OPHTHALMIC AT BEDTIME
Status: DISCONTINUED | OUTPATIENT
Start: 2023-01-01 | End: 2023-01-01 | Stop reason: HOSPADM

## 2023-01-01 RX ORDER — SODIUM CHLORIDE 1 G/1
1 TABLET ORAL 2 TIMES DAILY WITH MEALS
Qty: 60 TABLET | Refills: 0 | Status: SHIPPED | OUTPATIENT
Start: 2023-01-01 | End: 2023-01-01

## 2023-01-01 RX ORDER — LEVOFLOXACIN 250 MG/1
250 TABLET, FILM COATED ORAL
Status: DISCONTINUED | OUTPATIENT
Start: 2023-01-01 | End: 2023-01-01

## 2023-01-01 RX ORDER — LEVOFLOXACIN 250 MG/1
250 TABLET, FILM COATED ORAL DAILY
Status: DISCONTINUED | OUTPATIENT
Start: 2023-01-01 | End: 2023-01-01

## 2023-01-01 RX ORDER — PIPERACILLIN SODIUM, TAZOBACTAM SODIUM 3; .375 G/15ML; G/15ML
3.38 INJECTION, POWDER, LYOPHILIZED, FOR SOLUTION INTRAVENOUS EVERY 8 HOURS
Status: DISCONTINUED | OUTPATIENT
Start: 2023-01-01 | End: 2023-01-01 | Stop reason: ALTCHOICE

## 2023-01-01 RX ORDER — FUROSEMIDE 20 MG
20 TABLET ORAL DAILY
COMMUNITY

## 2023-01-01 RX ORDER — AMOXICILLIN 250 MG
1 CAPSULE ORAL 2 TIMES DAILY PRN
Status: DISCONTINUED | OUTPATIENT
Start: 2023-01-01 | End: 2023-01-01 | Stop reason: HOSPADM

## 2023-01-01 RX ORDER — MAGNESIUM OXIDE 400 MG/1
400 TABLET ORAL EVERY 4 HOURS
Qty: 2 TABLET | Refills: 0 | Status: SHIPPED | OUTPATIENT
Start: 2023-01-01 | End: 2023-01-01

## 2023-01-01 RX ORDER — DORZOLAMIDE HYDROCHLORIDE AND TIMOLOL MALEATE 20; 5 MG/ML; MG/ML
1 SOLUTION/ DROPS OPHTHALMIC 2 TIMES DAILY
Qty: 10 ML | Refills: 12 | Status: SHIPPED | OUTPATIENT
Start: 2023-01-01

## 2023-01-01 RX ORDER — DOCUSATE SODIUM 100 MG/1
100 CAPSULE, LIQUID FILLED ORAL 2 TIMES DAILY PRN
Status: ON HOLD | COMMUNITY
End: 2023-01-01

## 2023-01-01 RX ORDER — POTASSIUM CHLORIDE 1500 MG/1
40 TABLET, EXTENDED RELEASE ORAL ONCE
Status: CANCELLED | OUTPATIENT
Start: 2023-01-01 | End: 2023-01-01

## 2023-01-01 RX ORDER — PROCHLORPERAZINE MALEATE 5 MG
5 TABLET ORAL EVERY 6 HOURS PRN
Status: DISCONTINUED | OUTPATIENT
Start: 2023-01-01 | End: 2023-01-01 | Stop reason: HOSPADM

## 2023-01-01 RX ORDER — ACETAMINOPHEN 650 MG/1
650 SUPPOSITORY RECTAL EVERY 6 HOURS PRN
Status: DISCONTINUED | OUTPATIENT
Start: 2023-01-01 | End: 2023-01-01 | Stop reason: HOSPADM

## 2023-01-01 RX ORDER — DORZOLAMIDE HYDROCHLORIDE AND TIMOLOL MALEATE 20; 5 MG/ML; MG/ML
1 SOLUTION/ DROPS OPHTHALMIC 2 TIMES DAILY
Status: DISCONTINUED | OUTPATIENT
Start: 2023-01-01 | End: 2023-01-01 | Stop reason: HOSPADM

## 2023-01-01 RX ORDER — BISACODYL 10 MG
10 SUPPOSITORY, RECTAL RECTAL DAILY PRN
Qty: 10 SUPPOSITORY | Refills: 0 | Status: SHIPPED | OUTPATIENT
Start: 2023-01-01

## 2023-01-01 RX ORDER — WARFARIN SODIUM 1 MG/1
6.5-7 TABLET ORAL SEE ADMIN INSTRUCTIONS
Qty: 300 TABLET | Refills: 0 | Status: SHIPPED | OUTPATIENT
Start: 2023-01-01 | End: 2023-01-01 | Stop reason: DRUGHIGH

## 2023-01-01 RX ORDER — WARFARIN SODIUM 2 MG/1
4 TABLET ORAL
Status: COMPLETED | OUTPATIENT
Start: 2023-01-01 | End: 2023-01-01

## 2023-01-01 RX ORDER — SODIUM CHLORIDE 1 G/1
2 TABLET ORAL ONCE
Qty: 2 TABLET | Refills: 0 | Status: COMPLETED | OUTPATIENT
Start: 2023-01-01 | End: 2023-01-01

## 2023-01-01 RX ORDER — ATORVASTATIN CALCIUM 40 MG/1
40 TABLET, FILM COATED ORAL AT BEDTIME
Start: 2023-01-01

## 2023-01-01 RX ORDER — ENOXAPARIN SODIUM 100 MG/ML
1 INJECTION SUBCUTANEOUS ONCE
Qty: 0.8 ML | Refills: 0 | Status: COMPLETED | OUTPATIENT
Start: 2023-01-01 | End: 2023-01-01

## 2023-01-01 RX ADMIN — SENNOSIDES AND DOCUSATE SODIUM 1 TABLET: 50; 8.6 TABLET ORAL at 20:07

## 2023-01-01 RX ADMIN — ATORVASTATIN CALCIUM 40 MG: 40 TABLET, FILM COATED ORAL at 21:02

## 2023-01-01 RX ADMIN — POTASSIUM & SODIUM PHOSPHATES POWDER PACK 280-160-250 MG 1 PACKET: 280-160-250 PACK at 16:43

## 2023-01-01 RX ADMIN — DORZOLAMIDE HYDROCHLORIDE AND TIMOLOL MALEATE 1 DROP: 20; 5 SOLUTION/ DROPS OPHTHALMIC at 10:58

## 2023-01-01 RX ADMIN — LEVOTHYROXINE SODIUM 25 MCG: 0.03 TABLET ORAL at 06:01

## 2023-01-01 RX ADMIN — POLYPROPYLENE GLYCOL 400, PROPYLENE GLYCOL: .4; .3 LIQUID OPHTHALMIC at 08:32

## 2023-01-01 RX ADMIN — FERROUS SULFATE TAB 325 MG (65 MG ELEMENTAL FE) 325 MG: 325 (65 FE) TAB at 08:46

## 2023-01-01 RX ADMIN — CEFEPIME HYDROCHLORIDE 2 G: 2 INJECTION, POWDER, FOR SOLUTION INTRAVENOUS at 08:56

## 2023-01-01 RX ADMIN — LEVOTHYROXINE SODIUM 25 MCG: 0.03 TABLET ORAL at 06:45

## 2023-01-01 RX ADMIN — LATANOPROST 1 DROP: 50 SOLUTION OPHTHALMIC at 21:13

## 2023-01-01 RX ADMIN — PROCHLORPERAZINE EDISYLATE 5 MG: 5 INJECTION INTRAMUSCULAR; INTRAVENOUS at 08:45

## 2023-01-01 RX ADMIN — PANTOPRAZOLE SODIUM 40 MG: 40 TABLET, DELAYED RELEASE ORAL at 06:49

## 2023-01-01 RX ADMIN — WARFARIN SODIUM 6.5 MG: 3 TABLET ORAL at 19:09

## 2023-01-01 RX ADMIN — LEVOTHYROXINE SODIUM 25 MCG: 0.03 TABLET ORAL at 06:25

## 2023-01-01 RX ADMIN — PANTOPRAZOLE SODIUM 40 MG: 40 TABLET, DELAYED RELEASE ORAL at 05:58

## 2023-01-01 RX ADMIN — WARFARIN SODIUM 7.5 MG: 5 TABLET ORAL at 19:36

## 2023-01-01 RX ADMIN — LATANOPROST 1 DROP: 50 SOLUTION OPHTHALMIC at 21:00

## 2023-01-01 RX ADMIN — WARFARIN SODIUM 6 MG: 3 TABLET ORAL at 17:36

## 2023-01-01 RX ADMIN — LATANOPROST 1 DROP: 50 SOLUTION/ DROPS OPHTHALMIC at 21:07

## 2023-01-01 RX ADMIN — ERYTHROMYCIN 1 G: 5 OINTMENT OPHTHALMIC at 21:06

## 2023-01-01 RX ADMIN — POLYPROPYLENE GLYCOL 400, PROPYLENE GLYCOL 1 DROP: .4; .3 LIQUID OPHTHALMIC at 21:20

## 2023-01-01 RX ADMIN — SENNOSIDES AND DOCUSATE SODIUM 2 TABLET: 50; 8.6 TABLET ORAL at 09:00

## 2023-01-01 RX ADMIN — POLYPROPYLENE GLYCOL 400, PROPYLENE GLYCOL 1 DROP: .4; .3 LIQUID OPHTHALMIC at 19:13

## 2023-01-01 RX ADMIN — DORZOLAMIDE HYDROCHLORIDE AND TIMOLOL MALEATE 1 DROP: 22.3; 6.8 SOLUTION/ DROPS OPHTHALMIC at 19:58

## 2023-01-01 RX ADMIN — ACETAMINOPHEN 650 MG: 650 SUPPOSITORY RECTAL at 09:10

## 2023-01-01 RX ADMIN — FUROSEMIDE 40 MG: 10 INJECTION, SOLUTION INTRAMUSCULAR; INTRAVENOUS at 00:23

## 2023-01-01 RX ADMIN — GUAIFENESIN 600 MG: 600 TABLET ORAL at 08:38

## 2023-01-01 RX ADMIN — POLYPROPYLENE GLYCOL 400, PROPYLENE GLYCOL 1 DROP: .4; .3 LIQUID OPHTHALMIC at 09:20

## 2023-01-01 RX ADMIN — SODIUM CHLORIDE: 9 INJECTION, SOLUTION INTRAVENOUS at 18:20

## 2023-01-01 RX ADMIN — PANTOPRAZOLE SODIUM 40 MG: 40 TABLET, DELAYED RELEASE ORAL at 06:45

## 2023-01-01 RX ADMIN — POLYPROPYLENE GLYCOL 400, PROPYLENE GLYCOL: .4; .3 LIQUID OPHTHALMIC at 08:52

## 2023-01-01 RX ADMIN — POLYPROPYLENE GLYCOL 400, PROPYLENE GLYCOL: .4; .3 LIQUID OPHTHALMIC at 09:42

## 2023-01-01 RX ADMIN — ANORECTAL OINTMENT: 15.7; .44; 24; 20.6 OINTMENT TOPICAL at 09:46

## 2023-01-01 RX ADMIN — CEFTRIAXONE SODIUM 1 G: 1 INJECTION, POWDER, FOR SOLUTION INTRAMUSCULAR; INTRAVENOUS at 09:09

## 2023-01-01 RX ADMIN — DORZOLAMIDE HYDROCHLORIDE AND TIMOLOL MALEATE 1 DROP: 22.3; 6.8 SOLUTION/ DROPS OPHTHALMIC at 08:02

## 2023-01-01 RX ADMIN — SODIUM CHLORIDE: 9 INJECTION, SOLUTION INTRAVENOUS at 06:13

## 2023-01-01 RX ADMIN — SODIUM CHLORIDE TAB 1 GM 1 G: 1 TAB at 17:59

## 2023-01-01 RX ADMIN — SENNOSIDES AND DOCUSATE SODIUM 1 TABLET: 50; 8.6 TABLET ORAL at 20:45

## 2023-01-01 RX ADMIN — PIPERACILLIN AND TAZOBACTAM 3.38 G: 3; .375 INJECTION, POWDER, LYOPHILIZED, FOR SOLUTION INTRAVENOUS at 02:12

## 2023-01-01 RX ADMIN — SODIUM CHLORIDE TAB 1 GM 1 G: 1 TAB at 09:37

## 2023-01-01 RX ADMIN — FUROSEMIDE 40 MG: 10 INJECTION, SOLUTION INTRAMUSCULAR; INTRAVENOUS at 08:28

## 2023-01-01 RX ADMIN — DORZOLAMIDE HYDROCHLORIDE AND TIMOLOL MALEATE 1 DROP: 20; 5 SOLUTION/ DROPS OPHTHALMIC at 20:07

## 2023-01-01 RX ADMIN — PANTOPRAZOLE SODIUM 40 MG: 40 TABLET, DELAYED RELEASE ORAL at 08:38

## 2023-01-01 RX ADMIN — FERROUS SULFATE TAB 325 MG (65 MG ELEMENTAL FE) 325 MG: 325 (65 FE) TAB at 08:38

## 2023-01-01 RX ADMIN — CEFEPIME HYDROCHLORIDE 2 G: 2 INJECTION, POWDER, FOR SOLUTION INTRAVENOUS at 00:37

## 2023-01-01 RX ADMIN — LATANOPROST 1 DROP: 50 SOLUTION OPHTHALMIC at 21:15

## 2023-01-01 RX ADMIN — LEVOTHYROXINE SODIUM 25 MCG: 0.03 TABLET ORAL at 06:42

## 2023-01-01 RX ADMIN — POLYPROPYLENE GLYCOL 400, PROPYLENE GLYCOL 1 DROP: .4; .3 LIQUID OPHTHALMIC at 09:15

## 2023-01-01 RX ADMIN — POTASSIUM CHLORIDE 40 MEQ: 1500 TABLET, EXTENDED RELEASE ORAL at 08:57

## 2023-01-01 RX ADMIN — LATANOPROST 1 DROP: 50 SOLUTION/ DROPS OPHTHALMIC at 22:45

## 2023-01-01 RX ADMIN — DORZOLAMIDE HYDROCHLORIDE AND TIMOLOL MALEATE 1 DROP: 20; 5 SOLUTION/ DROPS OPHTHALMIC at 20:54

## 2023-01-01 RX ADMIN — POTASSIUM & SODIUM PHOSPHATES POWDER PACK 280-160-250 MG 1 PACKET: 280-160-250 PACK at 14:31

## 2023-01-01 RX ADMIN — POLYPROPYLENE GLYCOL 400, PROPYLENE GLYCOL 1 DROP: .4; .3 LIQUID OPHTHALMIC at 09:01

## 2023-01-01 RX ADMIN — SENNOSIDES AND DOCUSATE SODIUM 1 TABLET: 50; 8.6 TABLET ORAL at 08:45

## 2023-01-01 RX ADMIN — SENNOSIDES AND DOCUSATE SODIUM 2 TABLET: 50; 8.6 TABLET ORAL at 09:35

## 2023-01-01 RX ADMIN — DORZOLAMIDE HYDROCHLORIDE AND TIMOLOL MALEATE 1 DROP: 20; 5 SOLUTION/ DROPS OPHTHALMIC at 09:46

## 2023-01-01 RX ADMIN — DORZOLAMIDE HYDROCHLORIDE AND TIMOLOL MALEATE 1 DROP: 20; 5 SOLUTION/ DROPS OPHTHALMIC at 22:44

## 2023-01-01 RX ADMIN — CEFEPIME HYDROCHLORIDE 2 G: 2 INJECTION, POWDER, FOR SOLUTION INTRAVENOUS at 09:44

## 2023-01-01 RX ADMIN — FERROUS SULFATE TAB 325 MG (65 MG ELEMENTAL FE) 325 MG: 325 (65 FE) TAB at 09:35

## 2023-01-01 RX ADMIN — PANTOPRAZOLE SODIUM 40 MG: 40 TABLET, DELAYED RELEASE ORAL at 06:41

## 2023-01-01 RX ADMIN — SENNOSIDES AND DOCUSATE SODIUM 2 TABLET: 50; 8.6 TABLET ORAL at 21:30

## 2023-01-01 RX ADMIN — FUROSEMIDE 40 MG: 20 TABLET ORAL at 09:41

## 2023-01-01 RX ADMIN — POLYPROPYLENE GLYCOL 400, PROPYLENE GLYCOL: .4; .3 LIQUID OPHTHALMIC at 20:08

## 2023-01-01 RX ADMIN — WARFARIN SODIUM 5 MG: 5 TABLET ORAL at 17:39

## 2023-01-01 RX ADMIN — PIPERACILLIN AND TAZOBACTAM 3.38 G: 3; .375 INJECTION, POWDER, LYOPHILIZED, FOR SOLUTION INTRAVENOUS at 18:21

## 2023-01-01 RX ADMIN — GUAIFENESIN 600 MG: 600 TABLET ORAL at 09:45

## 2023-01-01 RX ADMIN — DORZOLAMIDE HYDROCHLORIDE AND TIMOLOL MALEATE 1 DROP: 20; 5 SOLUTION/ DROPS OPHTHALMIC at 09:34

## 2023-01-01 RX ADMIN — POTASSIUM CHLORIDE 20 MEQ: 1500 TABLET, EXTENDED RELEASE ORAL at 09:37

## 2023-01-01 RX ADMIN — Medication 400 MG: at 13:48

## 2023-01-01 RX ADMIN — ERYTHROMYCIN 1 G: 5 OINTMENT OPHTHALMIC at 21:09

## 2023-01-01 RX ADMIN — CEFEPIME HYDROCHLORIDE 2 G: 2 INJECTION, POWDER, FOR SOLUTION INTRAVENOUS at 09:40

## 2023-01-01 RX ADMIN — CEFEPIME HYDROCHLORIDE 2 G: 2 INJECTION, POWDER, FOR SOLUTION INTRAVENOUS at 16:51

## 2023-01-01 RX ADMIN — PANTOPRAZOLE SODIUM 40 MG: 40 TABLET, DELAYED RELEASE ORAL at 06:59

## 2023-01-01 RX ADMIN — LEVOFLOXACIN 250 MG: 250 TABLET, FILM COATED ORAL at 17:39

## 2023-01-01 RX ADMIN — CEFEPIME HYDROCHLORIDE 2 G: 2 INJECTION, POWDER, FOR SOLUTION INTRAVENOUS at 00:32

## 2023-01-01 RX ADMIN — GUAIFENESIN 600 MG: 600 TABLET ORAL at 09:35

## 2023-01-01 RX ADMIN — ATORVASTATIN CALCIUM 40 MG: 40 TABLET, FILM COATED ORAL at 21:00

## 2023-01-01 RX ADMIN — FUROSEMIDE 40 MG: 10 INJECTION, SOLUTION INTRAMUSCULAR; INTRAVENOUS at 21:02

## 2023-01-01 RX ADMIN — POLYPROPYLENE GLYCOL 400, PROPYLENE GLYCOL: .4; .3 LIQUID OPHTHALMIC at 21:08

## 2023-01-01 RX ADMIN — POTASSIUM CHLORIDE 40 MEQ: 1500 TABLET, EXTENDED RELEASE ORAL at 09:40

## 2023-01-01 RX ADMIN — ERYTHROMYCIN 1 G: 5 OINTMENT OPHTHALMIC at 21:21

## 2023-01-01 RX ADMIN — POLYPROPYLENE GLYCOL 400, PROPYLENE GLYCOL: .4; .3 LIQUID OPHTHALMIC at 22:45

## 2023-01-01 RX ADMIN — Medication 400 MG: at 08:57

## 2023-01-01 RX ADMIN — Medication 400 MG: at 13:07

## 2023-01-01 RX ADMIN — FUROSEMIDE 40 MG: 20 TABLET ORAL at 08:45

## 2023-01-01 RX ADMIN — SODIUM CHLORIDE: 9 INJECTION, SOLUTION INTRAVENOUS at 12:10

## 2023-01-01 RX ADMIN — ATORVASTATIN CALCIUM 40 MG: 40 TABLET, FILM COATED ORAL at 21:10

## 2023-01-01 RX ADMIN — FUROSEMIDE 40 MG: 10 INJECTION, SOLUTION INTRAMUSCULAR; INTRAVENOUS at 09:32

## 2023-01-01 RX ADMIN — DORZOLAMIDE HYDROCHLORIDE AND TIMOLOL MALEATE 1 DROP: 22.3; 6.8 SOLUTION/ DROPS OPHTHALMIC at 09:18

## 2023-01-01 RX ADMIN — ENOXAPARIN SODIUM 80 MG: 80 INJECTION SUBCUTANEOUS at 14:46

## 2023-01-01 RX ADMIN — POLYPROPYLENE GLYCOL 400, PROPYLENE GLYCOL: .4; .3 LIQUID OPHTHALMIC at 09:46

## 2023-01-01 RX ADMIN — POLYPROPYLENE GLYCOL 400, PROPYLENE GLYCOL 1 DROP: .4; .3 LIQUID OPHTHALMIC at 09:46

## 2023-01-01 RX ADMIN — LATANOPROST 1 DROP: 50 SOLUTION/ DROPS OPHTHALMIC at 21:36

## 2023-01-01 RX ADMIN — FERROUS SULFATE TAB 325 MG (65 MG ELEMENTAL FE) 325 MG: 325 (65 FE) TAB at 08:41

## 2023-01-01 RX ADMIN — WARFARIN SODIUM 5 MG: 5 TABLET ORAL at 21:11

## 2023-01-01 RX ADMIN — LEVOFLOXACIN 250 MG: 250 TABLET, FILM COATED ORAL at 16:16

## 2023-01-01 RX ADMIN — GUAIFENESIN 600 MG: 600 TABLET ORAL at 20:07

## 2023-01-01 RX ADMIN — PANTOPRAZOLE SODIUM 40 MG: 40 TABLET, DELAYED RELEASE ORAL at 18:20

## 2023-01-01 RX ADMIN — POTASSIUM CHLORIDE 40 MEQ: 1.5 SOLUTION ORAL at 12:30

## 2023-01-01 RX ADMIN — DORZOLAMIDE HYDROCHLORIDE AND TIMOLOL MALEATE 1 DROP: 22.3; 6.8 SOLUTION/ DROPS OPHTHALMIC at 09:45

## 2023-01-01 RX ADMIN — Medication 400 MG: at 08:38

## 2023-01-01 RX ADMIN — LEVOTHYROXINE SODIUM 25 MCG: 0.03 TABLET ORAL at 06:41

## 2023-01-01 RX ADMIN — ERYTHROMYCIN 1 G: 5 OINTMENT OPHTHALMIC at 20:11

## 2023-01-01 RX ADMIN — ATORVASTATIN CALCIUM 40 MG: 40 TABLET, FILM COATED ORAL at 21:30

## 2023-01-01 RX ADMIN — Medication 400 MG: at 11:37

## 2023-01-01 RX ADMIN — DORZOLAMIDE HYDROCHLORIDE AND TIMOLOL MALEATE 1 DROP: 20; 5 SOLUTION/ DROPS OPHTHALMIC at 21:33

## 2023-01-01 RX ADMIN — DORZOLAMIDE HYDROCHLORIDE AND TIMOLOL MALEATE 1 DROP: 22.3; 6.8 SOLUTION/ DROPS OPHTHALMIC at 09:20

## 2023-01-01 RX ADMIN — FUROSEMIDE 40 MG: 20 TABLET ORAL at 09:45

## 2023-01-01 RX ADMIN — MICONAZOLE NITRATE: 20 CREAM TOPICAL at 20:56

## 2023-01-01 RX ADMIN — GUAIFENESIN 600 MG: 600 TABLET ORAL at 21:30

## 2023-01-01 RX ADMIN — CEFEPIME HYDROCHLORIDE 2 G: 2 INJECTION, POWDER, FOR SOLUTION INTRAVENOUS at 16:43

## 2023-01-01 RX ADMIN — ATORVASTATIN CALCIUM 40 MG: 40 TABLET, FILM COATED ORAL at 21:21

## 2023-01-01 RX ADMIN — ATORVASTATIN CALCIUM 40 MG: 40 TABLET, FILM COATED ORAL at 21:24

## 2023-01-01 RX ADMIN — POLYPROPYLENE GLYCOL 400, PROPYLENE GLYCOL 1 DROP: .4; .3 LIQUID OPHTHALMIC at 19:52

## 2023-01-01 RX ADMIN — SODIUM CHLORIDE TAB 1 GM 2 G: 1 TAB at 08:02

## 2023-01-01 RX ADMIN — Medication 1000 MCG: at 09:28

## 2023-01-01 RX ADMIN — LATANOPROST 1 DROP: 50 SOLUTION OPHTHALMIC at 21:24

## 2023-01-01 RX ADMIN — FUROSEMIDE 40 MG: 20 TABLET ORAL at 09:31

## 2023-01-01 RX ADMIN — FUROSEMIDE 40 MG: 20 TABLET ORAL at 08:42

## 2023-01-01 RX ADMIN — ACETAMINOPHEN 650 MG: 325 TABLET ORAL at 11:18

## 2023-01-01 RX ADMIN — SENNOSIDES AND DOCUSATE SODIUM 2 TABLET: 50; 8.6 TABLET ORAL at 08:42

## 2023-01-01 RX ADMIN — DORZOLAMIDE HYDROCHLORIDE AND TIMOLOL MALEATE 1 DROP: 20; 5 SOLUTION/ DROPS OPHTHALMIC at 09:43

## 2023-01-01 RX ADMIN — PANTOPRAZOLE SODIUM 40 MG: 40 TABLET, DELAYED RELEASE ORAL at 08:42

## 2023-01-01 RX ADMIN — CEFTRIAXONE SODIUM 1 G: 1 INJECTION, POWDER, FOR SOLUTION INTRAMUSCULAR; INTRAVENOUS at 21:24

## 2023-01-01 RX ADMIN — POLYPROPYLENE GLYCOL 400, PROPYLENE GLYCOL 1 DROP: .4; .3 LIQUID OPHTHALMIC at 08:02

## 2023-01-01 RX ADMIN — GUAIFENESIN 600 MG: 600 TABLET ORAL at 20:54

## 2023-01-01 RX ADMIN — FUROSEMIDE 40 MG: 10 INJECTION, SOLUTION INTRAMUSCULAR; INTRAVENOUS at 08:45

## 2023-01-01 RX ADMIN — LEVOFLOXACIN 250 MG: 250 TABLET, FILM COATED ORAL at 13:39

## 2023-01-01 RX ADMIN — LEVOTHYROXINE SODIUM 25 MCG: 0.03 TABLET ORAL at 05:58

## 2023-01-01 RX ADMIN — Medication 400 MG: at 09:41

## 2023-01-01 RX ADMIN — GUAIFENESIN 600 MG: 600 TABLET ORAL at 08:42

## 2023-01-01 RX ADMIN — WARFARIN SODIUM 5 MG: 5 TABLET ORAL at 17:25

## 2023-01-01 RX ADMIN — PANTOPRAZOLE SODIUM 40 MG: 40 TABLET, DELAYED RELEASE ORAL at 06:14

## 2023-01-01 RX ADMIN — DORZOLAMIDE HYDROCHLORIDE AND TIMOLOL MALEATE 1 DROP: 20; 5 SOLUTION/ DROPS OPHTHALMIC at 08:33

## 2023-01-01 RX ADMIN — POLYPROPYLENE GLYCOL 400, PROPYLENE GLYCOL: .4; .3 LIQUID OPHTHALMIC at 20:55

## 2023-01-01 RX ADMIN — PANTOPRAZOLE SODIUM 40 MG: 40 TABLET, DELAYED RELEASE ORAL at 08:51

## 2023-01-01 RX ADMIN — GUAIFENESIN 600 MG: 600 TABLET ORAL at 21:02

## 2023-01-01 RX ADMIN — CEFEPIME HYDROCHLORIDE 2 G: 2 INJECTION, POWDER, FOR SOLUTION INTRAVENOUS at 00:58

## 2023-01-01 RX ADMIN — SODIUM CHLORIDE TAB 1 GM 1 G: 1 TAB at 17:36

## 2023-01-01 RX ADMIN — SODIUM CHLORIDE TAB 1 GM 1 G: 1 TAB at 09:28

## 2023-01-01 RX ADMIN — FUROSEMIDE 40 MG: 10 INJECTION, SOLUTION INTRAMUSCULAR; INTRAVENOUS at 20:06

## 2023-01-01 RX ADMIN — CEFEPIME HYDROCHLORIDE 2 G: 2 INJECTION, POWDER, FOR SOLUTION INTRAVENOUS at 16:27

## 2023-01-01 RX ADMIN — CEFEPIME HYDROCHLORIDE 2 G: 2 INJECTION, POWDER, FOR SOLUTION INTRAVENOUS at 08:42

## 2023-01-01 RX ADMIN — ERYTHROMYCIN 1 G: 5 OINTMENT OPHTHALMIC at 21:24

## 2023-01-01 RX ADMIN — ANORECTAL OINTMENT: 15.7; .44; 24; 20.6 OINTMENT TOPICAL at 10:57

## 2023-01-01 RX ADMIN — Medication 400 MG: at 13:43

## 2023-01-01 RX ADMIN — LATANOPROST 1 DROP: 50 SOLUTION OPHTHALMIC at 20:05

## 2023-01-01 RX ADMIN — ENOXAPARIN SODIUM 135 MG: 150 INJECTION SUBCUTANEOUS at 13:37

## 2023-01-01 RX ADMIN — POLYPROPYLENE GLYCOL 400, PROPYLENE GLYCOL 1 DROP: .4; .3 LIQUID OPHTHALMIC at 20:51

## 2023-01-01 RX ADMIN — DORZOLAMIDE HYDROCHLORIDE AND TIMOLOL MALEATE 1 DROP: 20; 5 SOLUTION/ DROPS OPHTHALMIC at 08:52

## 2023-01-01 RX ADMIN — POLYPROPYLENE GLYCOL 400, PROPYLENE GLYCOL: .4; .3 LIQUID OPHTHALMIC at 09:36

## 2023-01-01 RX ADMIN — GUAIFENESIN 600 MG: 600 TABLET ORAL at 08:57

## 2023-01-01 RX ADMIN — SODIUM CHLORIDE: 9 INJECTION, SOLUTION INTRAVENOUS at 11:00

## 2023-01-01 RX ADMIN — DORZOLAMIDE HYDROCHLORIDE AND TIMOLOL MALEATE 1 DROP: 22.3; 6.8 SOLUTION/ DROPS OPHTHALMIC at 19:27

## 2023-01-01 RX ADMIN — DORZOLAMIDE HYDROCHLORIDE AND TIMOLOL MALEATE 1 DROP: 20; 5 SOLUTION/ DROPS OPHTHALMIC at 21:06

## 2023-01-01 RX ADMIN — ATORVASTATIN CALCIUM 40 MG: 40 TABLET, FILM COATED ORAL at 21:35

## 2023-01-01 RX ADMIN — Medication 400 MG: at 09:35

## 2023-01-01 RX ADMIN — SODIUM CHLORIDE: 9 INJECTION, SOLUTION INTRAVENOUS at 01:59

## 2023-01-01 RX ADMIN — BISACODYL 10 MG: 10 SUPPOSITORY RECTAL at 11:02

## 2023-01-01 RX ADMIN — GUAIFENESIN 600 MG: 600 TABLET ORAL at 09:41

## 2023-01-01 RX ADMIN — DORZOLAMIDE HYDROCHLORIDE AND TIMOLOL MALEATE 1 DROP: 20; 5 SOLUTION/ DROPS OPHTHALMIC at 20:55

## 2023-01-01 RX ADMIN — GUAIFENESIN 600 MG: 600 TABLET ORAL at 21:11

## 2023-01-01 RX ADMIN — WARFARIN SODIUM 6 MG: 3 TABLET ORAL at 17:34

## 2023-01-01 RX ADMIN — POTASSIUM & SODIUM PHOSPHATES POWDER PACK 280-160-250 MG 1 PACKET: 280-160-250 PACK at 10:46

## 2023-01-01 RX ADMIN — GUAIFENESIN 600 MG: 600 TABLET ORAL at 20:45

## 2023-01-01 RX ADMIN — LATANOPROST 1 DROP: 50 SOLUTION/ DROPS OPHTHALMIC at 21:24

## 2023-01-01 RX ADMIN — WARFARIN SODIUM 4 MG: 2 TABLET ORAL at 18:30

## 2023-01-01 RX ADMIN — PERFLUTREN 3 ML: 6.52 INJECTION, SUSPENSION INTRAVENOUS at 09:30

## 2023-01-01 RX ADMIN — PIPERACILLIN AND TAZOBACTAM 3.38 G: 3; .375 INJECTION, POWDER, LYOPHILIZED, FOR SOLUTION INTRAVENOUS at 09:21

## 2023-01-01 RX ADMIN — MICONAZOLE NITRATE: 20 CREAM TOPICAL at 09:46

## 2023-01-01 RX ADMIN — LEVOTHYROXINE SODIUM 25 MCG: 0.03 TABLET ORAL at 06:59

## 2023-01-01 RX ADMIN — WARFARIN SODIUM 6.5 MG: 3 TABLET ORAL at 18:34

## 2023-01-01 RX ADMIN — CEFTRIAXONE SODIUM 1 G: 1 INJECTION, POWDER, FOR SOLUTION INTRAMUSCULAR; INTRAVENOUS at 23:13

## 2023-01-01 RX ADMIN — LEVOTHYROXINE SODIUM 25 MCG: 0.03 TABLET ORAL at 08:45

## 2023-01-01 RX ADMIN — DORZOLAMIDE HYDROCHLORIDE AND TIMOLOL MALEATE 1 DROP: 22.3; 6.8 SOLUTION/ DROPS OPHTHALMIC at 09:36

## 2023-01-01 RX ADMIN — POLYPROPYLENE GLYCOL 400, PROPYLENE GLYCOL 1 DROP: .4; .3 LIQUID OPHTHALMIC at 21:08

## 2023-01-01 RX ADMIN — WARFARIN SODIUM 5 MG: 5 TABLET ORAL at 17:59

## 2023-01-01 RX ADMIN — LEVOTHYROXINE SODIUM 25 MCG: 0.03 TABLET ORAL at 06:03

## 2023-01-01 RX ADMIN — LATANOPROST 1 DROP: 50 SOLUTION/ DROPS OPHTHALMIC at 20:55

## 2023-01-01 RX ADMIN — LEVOTHYROXINE SODIUM 25 MCG: 0.03 TABLET ORAL at 06:14

## 2023-01-01 RX ADMIN — POTASSIUM CHLORIDE 40 MEQ: 1500 TABLET, EXTENDED RELEASE ORAL at 10:46

## 2023-01-01 RX ADMIN — MICONAZOLE NITRATE: 20 CREAM TOPICAL at 22:53

## 2023-01-01 RX ADMIN — POLYPROPYLENE GLYCOL 400, PROPYLENE GLYCOL 1 DROP: .4; .3 LIQUID OPHTHALMIC at 19:45

## 2023-01-01 RX ADMIN — POTASSIUM CHLORIDE 40 MEQ: 1500 TABLET, EXTENDED RELEASE ORAL at 08:38

## 2023-01-01 RX ADMIN — PANTOPRAZOLE SODIUM 40 MG: 40 TABLET, DELAYED RELEASE ORAL at 06:25

## 2023-01-01 RX ADMIN — FERROUS SULFATE TAB 325 MG (65 MG ELEMENTAL FE) 325 MG: 325 (65 FE) TAB at 09:41

## 2023-01-01 RX ADMIN — FUROSEMIDE 40 MG: 10 INJECTION, SOLUTION INTRAVENOUS at 08:57

## 2023-01-01 RX ADMIN — CEFEPIME HYDROCHLORIDE 2 G: 2 INJECTION, POWDER, FOR SOLUTION INTRAVENOUS at 09:33

## 2023-01-01 RX ADMIN — LATANOPROST 1 DROP: 50 SOLUTION/ DROPS OPHTHALMIC at 21:44

## 2023-01-01 RX ADMIN — WARFARIN SODIUM 6.5 MG: 3 TABLET ORAL at 17:17

## 2023-01-01 RX ADMIN — CEFEPIME HYDROCHLORIDE 2 G: 2 INJECTION, POWDER, FOR SOLUTION INTRAVENOUS at 00:15

## 2023-01-01 RX ADMIN — PIPERACILLIN AND TAZOBACTAM 3.38 G: 3; .375 INJECTION, POWDER, LYOPHILIZED, FOR SOLUTION INTRAVENOUS at 11:38

## 2023-01-01 RX ADMIN — CEFTRIAXONE SODIUM 1 G: 1 INJECTION, POWDER, FOR SOLUTION INTRAMUSCULAR; INTRAVENOUS at 22:17

## 2023-01-01 RX ADMIN — DORZOLAMIDE HYDROCHLORIDE AND TIMOLOL MALEATE 1 DROP: 22.3; 6.8 SOLUTION/ DROPS OPHTHALMIC at 21:21

## 2023-01-01 RX ADMIN — DORZOLAMIDE HYDROCHLORIDE AND TIMOLOL MALEATE 1 DROP: 22.3; 6.8 SOLUTION/ DROPS OPHTHALMIC at 19:38

## 2023-01-01 RX ADMIN — DORZOLAMIDE HYDROCHLORIDE AND TIMOLOL MALEATE 1 DROP: 20; 5 SOLUTION/ DROPS OPHTHALMIC at 09:01

## 2023-01-01 RX ADMIN — PANTOPRAZOLE SODIUM 40 MG: 40 TABLET, DELAYED RELEASE ORAL at 06:03

## 2023-01-01 RX ADMIN — LEVOTHYROXINE SODIUM 25 MCG: 0.03 TABLET ORAL at 07:14

## 2023-01-01 RX ADMIN — DORZOLAMIDE HYDROCHLORIDE AND TIMOLOL MALEATE 1 DROP: 22.3; 6.8 SOLUTION/ DROPS OPHTHALMIC at 21:08

## 2023-01-01 RX ADMIN — POLYPROPYLENE GLYCOL 400, PROPYLENE GLYCOL: .4; .3 LIQUID OPHTHALMIC at 21:37

## 2023-01-01 RX ADMIN — FERROUS SULFATE TAB 325 MG (65 MG ELEMENTAL FE) 325 MG: 325 (65 FE) TAB at 08:57

## 2023-01-01 RX ADMIN — LEVOTHYROXINE SODIUM 25 MCG: 0.03 TABLET ORAL at 06:49

## 2023-01-01 RX ADMIN — POLYPROPYLENE GLYCOL 400, PROPYLENE GLYCOL 1 DROP: .4; .3 LIQUID OPHTHALMIC at 09:27

## 2023-01-01 RX ADMIN — PANTOPRAZOLE SODIUM 40 MG: 40 TABLET, DELAYED RELEASE ORAL at 09:41

## 2023-01-01 RX ADMIN — MICONAZOLE NITRATE: 20 CREAM TOPICAL at 10:46

## 2023-01-01 RX ADMIN — ANORECTAL OINTMENT: 15.7; .44; 24; 20.6 OINTMENT TOPICAL at 18:00

## 2023-01-01 RX ADMIN — SENNOSIDES AND DOCUSATE SODIUM 1 TABLET: 50; 8.6 TABLET ORAL at 08:38

## 2023-01-01 RX ADMIN — POLYPROPYLENE GLYCOL 400, PROPYLENE GLYCOL 1 DROP: .4; .3 LIQUID OPHTHALMIC at 08:46

## 2023-01-01 RX ADMIN — ATORVASTATIN CALCIUM 40 MG: 40 TABLET, FILM COATED ORAL at 20:54

## 2023-01-01 RX ADMIN — ANORECTAL OINTMENT: 15.7; .44; 24; 20.6 OINTMENT TOPICAL at 09:49

## 2023-01-01 RX ADMIN — FERROUS SULFATE TAB 325 MG (65 MG ELEMENTAL FE) 325 MG: 325 (65 FE) TAB at 09:45

## 2023-01-01 RX ADMIN — CEFEPIME HYDROCHLORIDE 2 G: 2 INJECTION, POWDER, FOR SOLUTION INTRAVENOUS at 16:06

## 2023-01-01 ASSESSMENT — ACTIVITIES OF DAILY LIVING (ADL)
ADLS_ACUITY_SCORE: 33
ADLS_ACUITY_SCORE: 61
ADLS_ACUITY_SCORE: 57
ADLS_ACUITY_SCORE: 57
ADLS_ACUITY_SCORE: 49
ADLS_ACUITY_SCORE: 56
TRANSFERRING: 1-->ASSISTANCE (EQUIPMENT/PERSON) NEEDED
TRANSFERRING: 1-->ASSISTANCE (EQUIPMENT/PERSON) NEEDED (NOT DEVELOPMENTALLY APPROPRIATE)
ADLS_ACUITY_SCORE: 35
ADLS_ACUITY_SCORE: 57
ADLS_ACUITY_SCORE: 61
ADLS_ACUITY_SCORE: 59
EATING: 1-->ASSISTANCE (EQUIPMENT/PERSON) NEEDED
ADLS_ACUITY_SCORE: 35
ADLS_ACUITY_SCORE: 63
ADLS_ACUITY_SCORE: 39
ADLS_ACUITY_SCORE: 59
ADLS_ACUITY_SCORE: 61
WERE_AUXILIARY_AIDS_OFFERED?: YES
ADLS_ACUITY_SCORE: 35
ADLS_ACUITY_SCORE: 49
TOILETING_ASSISTANCE: TOILETING DIFFICULTY, DEPENDENT
WALKING_OR_CLIMBING_STAIRS_DIFFICULTY: YES
ADLS_ACUITY_SCORE: 61
DRESSING/BATHING: BATHING DIFFICULTY, DEPENDENT
ADLS_ACUITY_SCORE: 57
DESCRIBE_HEARING_LOSS: BILATERAL HEARING LOSS
ADLS_ACUITY_SCORE: 57
ADLS_ACUITY_SCORE: 61
ADLS_ACUITY_SCORE: 65
ADLS_ACUITY_SCORE: 45
SWALLOWING: 2-->DIFFICULTY SWALLOWING FOODS
WALKING_OR_CLIMBING_STAIRS_DIFFICULTY: YES
ADLS_ACUITY_SCORE: 39
ADLS_ACUITY_SCORE: 65
ADLS_ACUITY_SCORE: 57
ADLS_ACUITY_SCORE: 43
ADLS_ACUITY_SCORE: 61
ADLS_ACUITY_SCORE: 65
DEPENDENT_IADLS:: CLEANING;COOKING;LAUNDRY;SHOPPING;MEAL PREPARATION;MEDICATION MANAGEMENT;TRANSPORTATION
CHANGE_IN_FUNCTIONAL_STATUS_SINCE_ONSET_OF_CURRENT_ILLNESS/INJURY: YES
ADLS_ACUITY_SCORE: 57
ADLS_ACUITY_SCORE: 61
ADLS_ACUITY_SCORE: 57
TOILETING_ASSISTANCE: TOILETING DIFFICULTY, DEPENDENT
ADLS_ACUITY_SCORE: 59
ADLS_ACUITY_SCORE: 57
ADLS_ACUITY_SCORE: 65
CONCENTRATING,_REMEMBERING_OR_MAKING_DECISIONS_DIFFICULTY: YES
ADLS_ACUITY_SCORE: 59
ADLS_ACUITY_SCORE: 55
ADLS_ACUITY_SCORE: 57
ADLS_ACUITY_SCORE: 61
DOING_ERRANDS_INDEPENDENTLY_DIFFICULTY: YES
ADLS_ACUITY_SCORE: 65
DOING_ERRANDS_INDEPENDENTLY_DIFFICULTY: YES
ADLS_ACUITY_SCORE: 59
ADLS_ACUITY_SCORE: 61
DRESSING/BATHING: DRESSING DIFFICULTY, DEPENDENT;BATHING DIFFICULTY, DEPENDENT
WALKING_OR_CLIMBING_STAIRS: AMBULATION DIFFICULTY, DEPENDENT;STAIR CLIMBING DIFFICULTY, DEPENDENT;TRANSFERRING DIFFICULTY, DEPENDENT
VISION_MANAGEMENT: LEGALLY BLIND
ADLS_ACUITY_SCORE: 61
ADLS_ACUITY_SCORE: 57
ADLS_ACUITY_SCORE: 56
ADLS_ACUITY_SCORE: 43
ADLS_ACUITY_SCORE: 59
ADLS_ACUITY_SCORE: 59
ADLS_ACUITY_SCORE: 45
ADLS_ACUITY_SCORE: 59
ADLS_ACUITY_SCORE: 59
ADLS_ACUITY_SCORE: 61
ADLS_ACUITY_SCORE: 43
ADLS_ACUITY_SCORE: 57
TOILETING: 2-->COMPLETELY DEPENDENT
ADLS_ACUITY_SCORE: 61
ADLS_ACUITY_SCORE: 64
ADLS_ACUITY_SCORE: 37
ADLS_ACUITY_SCORE: 63
ADLS_ACUITY_SCORE: 43
ADLS_ACUITY_SCORE: 61
ADLS_ACUITY_SCORE: 43
ADLS_ACUITY_SCORE: 43
DRESSING/BATHING_DIFFICULTY: YES
ADLS_ACUITY_SCORE: 61
WALKING_OR_CLIMBING_STAIRS: AMBULATION DIFFICULTY, DEPENDENT
ADLS_ACUITY_SCORE: 45
ADLS_ACUITY_SCORE: 35
ADLS_ACUITY_SCORE: 39
ADLS_ACUITY_SCORE: 65
ADLS_ACUITY_SCORE: 59
DRESS: 1-->ASSISTANCE (EQUIPMENT/PERSON) NEEDED (NOT DEVELOPMENTALLY APPROPRIATE)
ADLS_ACUITY_SCORE: 56
FALL_HISTORY_WITHIN_LAST_SIX_MONTHS: NO
ADLS_ACUITY_SCORE: 43
BATHING: 1-->ASSISTANCE NEEDED
ADLS_ACUITY_SCORE: 61
ADLS_ACUITY_SCORE: 61
ADLS_ACUITY_SCORE: 43
ADLS_ACUITY_SCORE: 59
ADLS_ACUITY_SCORE: 59
DIFFICULTY_EATING/SWALLOWING: YES
ADLS_ACUITY_SCORE: 57
WEAR_GLASSES_OR_BLIND: YES
TOILETING_ISSUES: YES
ADLS_ACUITY_SCORE: 45
DIFFICULTY_COMMUNICATING: NO
ADLS_ACUITY_SCORE: 57
CHANGE_IN_FUNCTIONAL_STATUS_SINCE_ONSET_OF_CURRENT_ILLNESS/INJURY: YES
ADLS_ACUITY_SCORE: 33
ADLS_ACUITY_SCORE: 61
ADLS_ACUITY_SCORE: 43
ADLS_ACUITY_SCORE: 43
ADLS_ACUITY_SCORE: 35
ADLS_ACUITY_SCORE: 35
ADLS_ACUITY_SCORE: 61
EQUIPMENT_CURRENTLY_USED_AT_HOME: WALKER, STANDARD
ADLS_ACUITY_SCORE: 59
ADLS_ACUITY_SCORE: 57
PATIENT'S_PREFERRED_MEANS_OF_COMMUNICATION: ENGLISH SPEAKER WITH HEARING LOSS, NO SPEECH PROBLEMS.
ADLS_ACUITY_SCORE: 59
THE_FOLLOWING_AIDS_WERE_PROVIDED;: PATIENT DECLINED OFFER OF AUXILIARY AIDS
ADLS_ACUITY_SCORE: 57
ADLS_ACUITY_SCORE: 61
ADLS_ACUITY_SCORE: 47
EQUIPMENT_CURRENTLY_USED_AT_HOME: WHEELCHAIR, MANUAL;WALKER, ROLLING
ADLS_ACUITY_SCORE: 57
ADLS_ACUITY_SCORE: 57
FALL_HISTORY_WITHIN_LAST_SIX_MONTHS: NO
ADLS_ACUITY_SCORE: 57
ADLS_ACUITY_SCORE: 61
ADLS_ACUITY_SCORE: 61
TOILETING: 1-->ASSISTANCE (EQUIPMENT/PERSON) NEEDED (NOT DEVELOPMENTALLY APPROPRIATE)
ADLS_ACUITY_SCORE: 57
ADLS_ACUITY_SCORE: 43
SWALLOWING: 2-->DIFFICULTY SWALLOWING FOODS
ADLS_ACUITY_SCORE: 57
ADLS_ACUITY_SCORE: 61
WEAR_GLASSES_OR_BLIND: YES
ADLS_ACUITY_SCORE: 61
ADLS_ACUITY_SCORE: 59
VISION_MANAGEMENT: BLIND
ADLS_ACUITY_SCORE: 39
ADLS_ACUITY_SCORE: 59
DIFFICULTY_EATING/SWALLOWING: NO
ADLS_ACUITY_SCORE: 59
TOILETING_ISSUES: YES
ADLS_ACUITY_SCORE: 61
ADLS_ACUITY_SCORE: 61
ADLS_ACUITY_SCORE: 57
ADLS_ACUITY_SCORE: 57
ADLS_ACUITY_SCORE: 43
ADLS_ACUITY_SCORE: 43
ADLS_ACUITY_SCORE: 56
ADLS_ACUITY_SCORE: 59
DRESS: 1-->ASSISTANCE (EQUIPMENT/PERSON) NEEDED
ADLS_ACUITY_SCORE: 57
ADLS_ACUITY_SCORE: 59
ADLS_ACUITY_SCORE: 59
ADLS_ACUITY_SCORE: 45
ADLS_ACUITY_SCORE: 57
ADLS_ACUITY_SCORE: 57
CONCENTRATING,_REMEMBERING_OR_MAKING_DECISIONS_DIFFICULTY: YES
ADLS_ACUITY_SCORE: 61
ADLS_ACUITY_SCORE: 59
ADLS_ACUITY_SCORE: 56
TOILETING: 1-->ASSISTANCE (EQUIPMENT/PERSON) NEEDED (NOT DEVELOPMENTALLY APPROPRIATE)
ADLS_ACUITY_SCORE: 39
EATING: 1-->ASSISTANCE (EQUIPMENT/PERSON) NEEDED (NOT DEVELOPMENTALLY APPROPRIATE)
ADLS_ACUITY_SCORE: 61
ADLS_ACUITY_SCORE: 39
ADLS_ACUITY_SCORE: 45
DRESSING/BATHING_DIFFICULTY: YES
ADLS_ACUITY_SCORE: 61
ADLS_ACUITY_SCORE: 45
ADLS_ACUITY_SCORE: 43
ADLS_ACUITY_SCORE: 43
ADLS_ACUITY_SCORE: 47
ADLS_ACUITY_SCORE: 35
ADLS_ACUITY_SCORE: 59
ADLS_ACUITY_SCORE: 65
ADLS_ACUITY_SCORE: 57
HEARING_DIFFICULTY_OR_DEAF: YES
ADLS_ACUITY_SCORE: 57
TOILETING: 1-->ASSISTANCE (EQUIPMENT/PERSON) NEEDED
ADLS_ACUITY_SCORE: 56
ADLS_ACUITY_SCORE: 61
ADLS_ACUITY_SCORE: 64
ADLS_ACUITY_SCORE: 45
ADLS_ACUITY_SCORE: 57
EATING/SWALLOWING: SWALLOWING LIQUIDS;SWALLOWING SOLID FOOD

## 2023-01-07 ENCOUNTER — LAB REQUISITION (OUTPATIENT)
Dept: LAB | Facility: CLINIC | Age: 88
End: 2023-01-07
Payer: COMMERCIAL

## 2023-01-07 DIAGNOSIS — I48.91 UNSPECIFIED ATRIAL FIBRILLATION (H): ICD-10-CM

## 2023-01-10 ENCOUNTER — TELEPHONE (OUTPATIENT)
Dept: GERIATRICS | Facility: CLINIC | Age: 88
End: 2023-01-10

## 2023-01-10 LAB — INR PPP: 2.77 (ref 0.85–1.15)

## 2023-01-10 PROCEDURE — P9603 ONE-WAY ALLOW PRORATED MILES: HCPCS | Mod: ORL | Performed by: NURSE PRACTITIONER

## 2023-01-10 PROCEDURE — 36415 COLL VENOUS BLD VENIPUNCTURE: CPT | Mod: ORL | Performed by: NURSE PRACTITIONER

## 2023-01-10 PROCEDURE — 85610 PROTHROMBIN TIME: CPT | Mod: ORL | Performed by: NURSE PRACTITIONER

## 2023-01-10 NOTE — TELEPHONE ENCOUNTER
[facility] Triage Nurse INR     Provider: EDWARD Jacobsen  Facility: [facility]  Facility Type:  LTC    Caller: Zakiya    Reason for call: INR  Diagnosis/Goal: A. Fib    Todays INR: 2.77   Last INR 2.8 on 12/13 - 7 mg M/TH and 6.5 mg AOD    Heparin/Lovenox:  No  Currently on ABX?: No  Other interacting medication:  None  Missed or refused doses: No    Verbal Order/Direction given by Provider: Coumadin 7 mg PO M/TH and Coumadin 6.5 mg PO AOD. Recheck INR on 2/7/23.    Provider Giving Order:  EDWARD Jacobsen    Verbal Order given to: Zakiya Smyth RN

## 2023-01-26 ENCOUNTER — ANCILLARY PROCEDURE (OUTPATIENT)
Dept: CARDIOLOGY | Facility: CLINIC | Age: 88
End: 2023-01-26
Attending: INTERNAL MEDICINE
Payer: COMMERCIAL

## 2023-01-26 DIAGNOSIS — Z95.0 PACEMAKER: ICD-10-CM

## 2023-01-26 DIAGNOSIS — I49.5 SICK SINUS SYNDROME (H): ICD-10-CM

## 2023-01-26 LAB
MDC_IDC_LEAD_IMPLANT_DT: NORMAL
MDC_IDC_LEAD_LOCATION: NORMAL
MDC_IDC_LEAD_LOCATION_DETAIL_1: NORMAL
MDC_IDC_LEAD_MFG: NORMAL
MDC_IDC_LEAD_MODEL: NORMAL
MDC_IDC_LEAD_POLARITY_TYPE: NORMAL
MDC_IDC_LEAD_SERIAL: NORMAL
MDC_IDC_MSMT_BATTERY_DTM: NORMAL
MDC_IDC_MSMT_BATTERY_REMAINING_LONGEVITY: 110 MO
MDC_IDC_MSMT_BATTERY_REMAINING_PERCENTAGE: 82 %
MDC_IDC_MSMT_BATTERY_RRT_TRIGGER: NORMAL
MDC_IDC_MSMT_BATTERY_STATUS: NORMAL
MDC_IDC_MSMT_BATTERY_VOLTAGE: 3.02 V
MDC_IDC_MSMT_LEADCHNL_RV_IMPEDANCE_VALUE: 340 OHM
MDC_IDC_MSMT_LEADCHNL_RV_LEAD_CHANNEL_STATUS: NORMAL
MDC_IDC_MSMT_LEADCHNL_RV_PACING_THRESHOLD_AMPLITUDE: 0.62 V
MDC_IDC_MSMT_LEADCHNL_RV_PACING_THRESHOLD_PULSEWIDTH: 0.5 MS
MDC_IDC_MSMT_LEADCHNL_RV_SENSING_INTR_AMPL: 12 MV
MDC_IDC_PG_IMPLANT_DTM: NORMAL
MDC_IDC_PG_MFG: NORMAL
MDC_IDC_PG_MODEL: NORMAL
MDC_IDC_PG_SERIAL: NORMAL
MDC_IDC_PG_TYPE: NORMAL
MDC_IDC_SESS_CLINIC_NAME: NORMAL
MDC_IDC_SESS_DTM: NORMAL
MDC_IDC_SESS_REPROGRAMMED: NO
MDC_IDC_SESS_TYPE: NORMAL
MDC_IDC_SET_BRADY_LOWRATE: 60 {BEATS}/MIN
MDC_IDC_SET_BRADY_MAX_SENSOR_RATE: 100 {BEATS}/MIN
MDC_IDC_SET_BRADY_MODE: NORMAL
MDC_IDC_SET_LEADCHNL_RV_PACING_AMPLITUDE: 0.88
MDC_IDC_SET_LEADCHNL_RV_PACING_ANODE_ELECTRODE_1: NORMAL
MDC_IDC_SET_LEADCHNL_RV_PACING_ANODE_LOCATION_1: NORMAL
MDC_IDC_SET_LEADCHNL_RV_PACING_CAPTURE_MODE: NORMAL
MDC_IDC_SET_LEADCHNL_RV_PACING_CATHODE_ELECTRODE_1: NORMAL
MDC_IDC_SET_LEADCHNL_RV_PACING_CATHODE_LOCATION_1: NORMAL
MDC_IDC_SET_LEADCHNL_RV_PACING_POLARITY: NORMAL
MDC_IDC_SET_LEADCHNL_RV_PACING_PULSEWIDTH: 0.5 MS
MDC_IDC_SET_LEADCHNL_RV_SENSING_ADAPTATION_MODE: NORMAL
MDC_IDC_SET_LEADCHNL_RV_SENSING_ANODE_ELECTRODE_1: NORMAL
MDC_IDC_SET_LEADCHNL_RV_SENSING_ANODE_LOCATION_1: NORMAL
MDC_IDC_SET_LEADCHNL_RV_SENSING_CATHODE_ELECTRODE_1: NORMAL
MDC_IDC_SET_LEADCHNL_RV_SENSING_CATHODE_LOCATION_1: NORMAL
MDC_IDC_SET_LEADCHNL_RV_SENSING_POLARITY: NORMAL
MDC_IDC_SET_LEADCHNL_RV_SENSING_SENSITIVITY: 2.5 MV
MDC_IDC_STAT_AT_DTM_END: NORMAL
MDC_IDC_STAT_AT_DTM_START: NORMAL
MDC_IDC_STAT_BRADY_DTM_END: NORMAL
MDC_IDC_STAT_BRADY_DTM_START: NORMAL
MDC_IDC_STAT_BRADY_RV_PERCENT_PACED: 93 %
MDC_IDC_STAT_CRT_DTM_END: NORMAL
MDC_IDC_STAT_CRT_DTM_START: NORMAL
MDC_IDC_STAT_HEART_RATE_DTM_END: NORMAL
MDC_IDC_STAT_HEART_RATE_DTM_START: NORMAL
MDC_IDC_STAT_HEART_RATE_VENTRICULAR_MAX: 190 {BEATS}/MIN
MDC_IDC_STAT_HEART_RATE_VENTRICULAR_MEAN: 66 {BEATS}/MIN
MDC_IDC_STAT_HEART_RATE_VENTRICULAR_MIN: 50 {BEATS}/MIN

## 2023-01-26 PROCEDURE — 93294 REM INTERROG EVL PM/LDLS PM: CPT | Performed by: INTERNAL MEDICINE

## 2023-01-26 PROCEDURE — 93296 REM INTERROG EVL PM/IDS: CPT | Performed by: INTERNAL MEDICINE

## 2023-02-04 ENCOUNTER — LAB REQUISITION (OUTPATIENT)
Dept: LAB | Facility: CLINIC | Age: 88
End: 2023-02-04
Payer: COMMERCIAL

## 2023-02-04 DIAGNOSIS — I48.91 UNSPECIFIED ATRIAL FIBRILLATION (H): ICD-10-CM

## 2023-02-07 ENCOUNTER — TELEPHONE (OUTPATIENT)
Dept: GERIATRICS | Facility: CLINIC | Age: 88
End: 2023-02-07

## 2023-02-07 LAB — INR PPP: 2.93 (ref 0.85–1.15)

## 2023-02-07 PROCEDURE — 36415 COLL VENOUS BLD VENIPUNCTURE: CPT | Mod: ORL | Performed by: NURSE PRACTITIONER

## 2023-02-07 PROCEDURE — 85610 PROTHROMBIN TIME: CPT | Mod: ORL | Performed by: NURSE PRACTITIONER

## 2023-02-07 PROCEDURE — P9604 ONE-WAY ALLOW PRORATED TRIP: HCPCS | Mod: ORL | Performed by: NURSE PRACTITIONER

## 2023-02-07 NOTE — TELEPHONE ENCOUNTER
[facility] Geriatrics Triage Nurse INR     Provider: EDWARD Jacobsen  Facility: [facility]  Facility Type:  LT    Caller: Zakiya  Call Back Number: [phone]  Reason for call: INR  Diagnosis/Goal: A. Fib    Todays INR: 2.93  Last INR 2.77 on 1/10 - 7 mg M/TH and 6.5 mg AOD    Heparin/Lovenox:  No  Currently on ABX?: No  Other interacting medication:  None  Missed or refused doses: No    Verbal Order/Direction given by Provider: Coumadin 7 mg PO M/TH and 6.5 mg PO AOD. Recheck INR on 3/7/23.    Provider Giving Order:  EDWARD Jacobsen    Verbal Order given to: Zakiya Smyth RN

## 2023-02-15 ENCOUNTER — NURSING HOME VISIT (OUTPATIENT)
Dept: GERIATRICS | Facility: CLINIC | Age: 88
End: 2023-02-15
Payer: COMMERCIAL

## 2023-02-15 DIAGNOSIS — M62.81 GENERALIZED MUSCLE WEAKNESS: ICD-10-CM

## 2023-02-15 DIAGNOSIS — E61.1 IRON DEFICIENCY: ICD-10-CM

## 2023-02-15 DIAGNOSIS — E03.9 HYPOTHYROIDISM, UNSPECIFIED TYPE: ICD-10-CM

## 2023-02-15 DIAGNOSIS — I63.532 ACUTE ISCHEMIC LEFT PCA STROKE (H): Primary | ICD-10-CM

## 2023-02-15 PROCEDURE — 99309 SBSQ NF CARE MODERATE MDM 30: CPT | Performed by: NURSE PRACTITIONER

## 2023-02-15 NOTE — LETTER
2/15/2023        RE: Riley Rodriguez  [address]  [facility] [address]        [facility] GERIATRICS  No chief complaint on file.    [place] Medical Record Number:  [id number]  Place of Service where encounter took place:  No question data found.    HPI:    Riley Rodriguez  is a 89 year old  (4/15/1926), who is being seen today for  Regulatory visit. HPI information obtained from: facility chart records, facility staff, patient report and [facility] chart review.     Background: He has history of A. fib on warfarin, prior stroke, hypertension, hypothyroid, he has hx of Afib on warfarin, BPH with a Pi[place] catheter in place.  He has been residing in the long-term care for many years, last hospitalized in August 2020.  Has been on iron supplement since that time with recent hemoglobin 12.2.      Today:   He is seen for federally mandated regulatory visit. Reviewed VS and weights. Weight stable at ~200lbs for >6 months. Labs reviewed- TSH and hgb stable in June. Will recheck in May/Ирина. Continues on iron supplement and synthroid. He is pleasant and appropriate. Tells me about the photo of himself in his room of when he is 14 years old. Family is very involved, recently filled out disability paperwork so that they can assist with transporting him to appointments. Nursing has no concerns.     ALLERGIES: Patient has no known allergies.  PAST MEDICAL HISTORY:   Past Medical History:   Diagnosis Date     Atrial fibrillation (H)     On coumadin     Bell's palsy     right sided facial droop     CVA (cerebral vascular accident) (H)      Hypertension      Pacemaker      Urinary retention       PAST SURGICAL HISTORY:  has a past surgical history that includes IR Thoracic Aortogram (1/1/2004); IR Visceral Angiogram (1/1/2004); IR Visceral Angiogram (1/1/2004); IR Visceral Angiogram (1/1/2004); IR Miscellaneous Procedure (1/1/2004); IR Visceral Angiogram (1/1/2004); IR Miscellaneous Procedure  (1/1/2004); IR Visceral Angiogram (1/1/2004); IR Visceral Angiogram (1/1/2004); IR Miscellaneous Procedure (1/1/2004); IR Visceral Angiogram (1/1/2004); IR Aortic Arch 4 Vessel Angiogram (1/1/2004); IR Suprapubic Catheter Placment (1/18/2019); remv pilonidal lesion simple; Pr Partial Excision Thyroid,Unilat; TRANSURETHRAL ELEC-SURG PROSTATECTOM; Total Hip Arthroplasty (Right); Ir Bladder Suprapubic Catheter Insertion (1/18/2019); and Pr Esophagogastroduodenoscopy Transoral Diagnostic (N/A, 8/15/2020).  IMMUNIZATIONS:  Immunization History   Administered Date(s) Administered     COVID-19 Vaccine 18+ (Moderna) 12/30/2020, 01/27/2021, 11/26/2021, 05/09/2022     COVID-19 Vaccine Bivalent Booster 12+ (Pfizer) 09/23/2022     DT (PEDS <7y) 10/11/2004     Flu, Unspecified 10/12/2007, 10/13/2019, 10/24/2020     Influenza (High Dose) 3 valent vaccine 10/01/2010, 09/29/2015, 10/19/2016, 09/12/2017, 10/23/2018, 10/12/2021     Influenza (IIV3) PF 10/11/2004, 11/02/2005, 10/30/2006, 10/12/2007, 11/10/2008, 09/16/2009, 09/28/2011, 09/19/2012, 09/20/2013     Influenza Vaccine 50-64 or 18-64 w/egg allergy (Flublok) 10/10/2014     Influenza Vaccine >6 months (Alfuria,Fluzone) 10/10/2014, 10/10/2014     Influenza Vaccine, 6+MO IM (QUADRIVALENT W/PRESERVATIVES) 11/10/2008, 09/16/2009, 09/28/2011, 09/19/2012, 09/20/2013     Pneumo Conj 13-V (2010&after) 04/10/2015     Pneumococcal 23 valent 11/01/2001     TDAP Vaccine (Boostrix) 10/19/2012     Td (Adult), Adsorbed 10/11/2004     Td,adult,historic,unspecified 10/11/2004     Tdap (Adacel,Boostrix) 10/19/2012     Zoster vaccine, live 10/27/2015     Above immunizations pulled from [facility]. [facility] and facility records also reconciled. Outstanding information sent to  to update [facility].  Future immunizations are not needed at this point as all recommended immunizations are up to date.       Current Outpatient Medications:      acetaminophen (TYLENOL) 500 MG tablet,  Take 1,000 mg by mouth every 6 hours as needed for fever or pain, Disp: , Rfl:      atorvastatin (LIPITOR) 40 MG tablet, [ATORVASTATIN (LIPITOR) 40 MG TABLET] Take 1 tablet (40 mg total) by mouth at bedtime. For hx of cva, Disp: 30 tablet, Rfl: 0     bisacodyL (DULCOLAX) 10 mg suppository, [BISACODYL (DULCOLAX) 10 MG SUPPOSITORY] Insert 10 mg into the rectum daily as needed., Disp: , Rfl:      docusate sodium (COLACE) 100 MG capsule, [DOCUSATE SODIUM (COLACE) 100 MG CAPSULE] Take 1 capsule (100 mg total) by mouth 2 (two) times a day. For constipation, Disp: 30 capsule, Rfl: 0     Docusate Sodium (DOCU LIQUID PO), Place 5 drops into both ears daily as needed prior to irrigation for cerumen removal, Disp: , Rfl:      dorzolamide-timoloL (COSOPT) 22.3-6.8 mg/mL ophthalmic solution, [DORZOLAMIDE-TIMOLOL (COSOPT) 22.3-6.8 MG/ML OPHTHALMIC SOLUTION] Administer 1 drop into the left eye 2 (two) times a day. glaucoma, Disp: 10 mL, Rfl: 12     erythromycin base (ERYTHROMYCIN OPHT), Place 5 mg into both eyes At Bedtime Instill 1 ribbon in Left Eye and 1 ribbon in Right Eye at bedtime. Ensure ointment is given after eye drops to ensure proper absorption., Disp: , Rfl:      ferrous sulfate 325 (65 FE) MG tablet, [FERROUS SULFATE 325 (65 FE) MG TABLET] Take 1 tablet by mouth daily., Disp: , Rfl:      guaiFENesin (ROBITUSSIN) 100 mg/5 mL syrup, Take 10 mLs by mouth every 4 hours as needed for cough , Disp: , Rfl:      latanoprost (XALATAN) 0.005 % ophthalmic solution, [LATANOPROST (XALATAN) 0.005 % OPHTHALMIC SOLUTION] Administer 1 drop into the left eye at bedtime. glaucoma, Disp: 2.5 mL, Rfl: 12     levothyroxine (SYNTHROID, LEVOTHROID) 25 MCG tablet, [LEVOTHYROXINE (SYNTHROID, LEVOTHROID) 25 MCG TABLET] Take 1 tablet (25 mcg total) by mouth Daily at 6:00 am. Hypothyroid, Disp: 30 tablet, Rfl: 0     MEDICATION CANNOT BE REORDERED - PLEASE MANUALLY REORDER AND DISCONTINUE THE OLD ORDER, [PROPYLENE GLYCOL (SYSTANE COMPLETE) 0.6 %  DROP] Apply 1 drop to eye 2 (two) times a day. Both eyes for dry eyes, Disp: 1.5 mL, Rfl: 0     menthol-zinc oxide (CALMOSEPTINE) 0.44-20.6 % Oint ointment, Apply topically as needed for skin protection (Redness) Buttocks, Disp: , Rfl:      nystatin (MYCOSTATIN) 381419 UNIT/GM external powder, Apply topically 2 times daily, Disp: , Rfl:      omeprazole (PRILOSEC) 20 MG capsule, [OMEPRAZOLE (PRILOSEC) 20 MG CAPSULE] Take 1 capsule (20 mg total) by mouth 2 (two) times a day before meals. Needs two times a day for 2 months, then daily for ulcer, Disp: 60 capsule, Rfl: 0     Propylene Glycol (SYSTANE COMPLETE OP), Place 1 drop into both eyes 2 times daily, Disp: , Rfl:      WARFARIN SODIUM PO, 11/22/22 INR 2.86  Cont 7mg Mon and Thurs and 6.5mg AOD.  Next INR 12/13/22.   10/26/22 INR 2.55  Cont 7mg Mon and Thurs and 6.5mg AOD.  Next INR 11/22/22.  , Disp: , Rfl:        Post Medication Reconciliation Status:        Case Management:  I have reviewed the facility/SNF care plan/MDS, including the falls risk, nutrition and pain screening. I also reviewed the current immunizations, and preventive care.. Future cancer screening is not clinically indicated secondary to age/goals of care Patient's desire to return to the community is not present. Current Level of Care is appropriate.    Advance Directive Discussion:    I reviewed the current advanced directives as reflected in EPIC, the POLST and the facility chart, and verified the congruency of orders.  I did not due to cognitive impairment review the advance directives with the resident. Patient's goal is pain control and comfort.    Team Discussion:  I communicated with the appropriate disciplines involved with the Plan of Care:   Nursing    Information reviewed:  Medications, vital signs, orders, and nursing notes.    ROS:  4 point ROS including Respiratory, CV, GI and , other than that noted in the HPI,  is negative    Vitals:  There were no vitals taken for this visit.  There is no height or weight on file to calculate BMI.  Exam:  Physical Exam   General appearance: alert, appears stated age and cooperative.   Head: Normocephalic, without obvious abnormality, atraumatic, Eyes: sclera anicteric.  Lungs: respirations without effort, LSCTA; no wheezing or rales.   Cardiovascular: regular rate.    Extremities: extremities normal, atraumatic, trace edema bilaterally.  Skin: Skin color, texture, turgor normal. No rashes or lesions  Neurologic: oriented. No focal deficits.   Psych: interacts well with caregivers, exhibits logical thought processes and connections, pleasant    Lab/Diagnostic data:     Most Recent 3 CBC's:  Recent Labs   Lab Test 07/29/22  0556 03/09/22  0458 01/21/22  1320 10/05/20  0625 10/01/20  0650 08/16/20  1926 08/16/20  0756   WBC  --  7.3  --   --  5.9  --  6.7   HGB 12.2* 11.8* 12.2*   < > 11.1*   < > 7.4*   MCV  --  90  --   --  85  --  91   PLT  --  177  --   --  269  --  210    < > = values in this interval not displayed.     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    (E61.1) Iron deficiency  Comment: Hemoglobin in June was 12.2.  Plan: Continue iron supplementation.  Continue omeprazole.  -recheck cbc in May.     (I10) Hypertension  Comment: Most recent blood pressure stable.  Plan: No current medications.    (M62.81) Generalized muscle weakness  Comment: stable.   Plan: Continue with nursing cares and assistance.     (E03.9) Hypothyroidism, unspecified type  Comment: TSH in June 4.1  Plan: Recheck TSH in May.          Electronically signed by:  Jac Pitt, SACHA           Sincerely,        Jac Pitt, CNP

## 2023-02-16 NOTE — PROGRESS NOTES
[facility] GERIATRICS  Chief Complaint   Patient presents with     regulatory visit      [place] Medical Record Number:  [id number]  Place of Service where encounter took place:  [facility] () [[id number]]    HPI:    Riley Rodriguez  is a 89 year old  (4/15/1926), who is being seen today for  Regulatory visit. HPI information obtained from: facility chart records, facility staff, patient report and [facility] chart review.     Background: He has history of A. fib on warfarin, prior stroke, hypertension, hypothyroid, he has hx of Afib on warfarin, BPH with a Pierson catheter in place.  He has been residing in the long-term care for many years, last hospitalized in August 2020.  Has been on iron supplement since that time with recent hemoglobin 12.2.      Today:   He is seen for federally mandated regulatory visit. Reviewed VS and weights. Weight stable at ~200lbs for >6 months. Labs reviewed- TSH and hgb stable in June. Will recheck in May/Ирина. Continues on iron supplement and synthroid. He is pleasant and appropriate. Tells me about the photo of himself in his room of when he is 14 years old. Family is very involved, recently filled out disability paperwork so that they can assist with transporting him to appointments. Nursing has no concerns.     ALLERGIES: Patient has no known allergies.  PAST MEDICAL HISTORY:   Past Medical History:   Diagnosis Date     Atrial fibrillation (H)     On coumadin     Bell's palsy     right sided facial droop     CVA (cerebral vascular accident) (H)      Hypertension      Pacemaker      Urinary retention       PAST SURGICAL HISTORY:  has a past surgical history that includes IR Thoracic Aortogram (1/1/2004); IR Visceral Angiogram (1/1/2004); IR Visceral Angiogram (1/1/2004); IR Visceral Angiogram (1/1/2004); IR Miscellaneous Procedure (1/1/2004); IR Visceral Angiogram (1/1/2004); IR Miscellaneous Procedure (1/1/2004); IR Visceral Angiogram (1/1/2004); IR  Visceral Angiogram (1/1/2004); IR Miscellaneous Procedure (1/1/2004); IR Visceral Angiogram (1/1/2004); IR Aortic Arch 4 Vessel Angiogram (1/1/2004); IR Suprapubic Catheter Placment (1/18/2019); remv pilonidal lesion simple; Pr Partial Excision Thyroid,Unilat; TRANSURETHRAL ELEC-SURG PROSTATECTOM; Total Hip Arthroplasty (Right); Ir Bladder Suprapubic Catheter Insertion (1/18/2019); and Pr Esophagogastroduodenoscopy Transoral Diagnostic (N/A, 8/15/2020).  IMMUNIZATIONS:  Immunization History   Administered Date(s) Administered     COVID-19 Vaccine 18+ (Moderna) 12/30/2020, 01/27/2021, 11/26/2021, 05/09/2022     COVID-19 Vaccine Bivalent Booster 12+ (Pfizer) 09/23/2022     DT (PEDS <7y) 10/11/2004     Flu, Unspecified 10/12/2007, 10/13/2019, 10/24/2020     Influenza (High Dose) 3 valent vaccine 10/01/2010, 09/29/2015, 10/19/2016, 09/12/2017, 10/23/2018, 10/12/2021     Influenza (IIV3) PF 10/11/2004, 11/02/2005, 10/30/2006, 10/12/2007, 11/10/2008, 09/16/2009, 09/28/2011, 09/19/2012, 09/20/2013     Influenza Vaccine 50-64 or 18-64 w/egg allergy (Flublok) 10/10/2014     Influenza Vaccine >6 months (Alfuria,Fluzone) 10/10/2014, 10/10/2014     Influenza Vaccine, 6+MO IM (QUADRIVALENT W/PRESERVATIVES) 11/10/2008, 09/16/2009, 09/28/2011, 09/19/2012, 09/20/2013     Pneumo Conj 13-V (2010&after) 04/10/2015     Pneumococcal 23 valent 11/01/2001     TDAP Vaccine (Boostrix) 10/19/2012     Td (Adult), Adsorbed 10/11/2004     Td,adult,historic,unspecified 10/11/2004     Tdap (Adacel,Boostrix) 10/19/2012     Zoster vaccine, live 10/27/2015     Above immunizations pulled from [facility] [place]. [facility] and facility records also reconciled. Outstanding information sent to  to update [facility] [place].  Future immunizations are not needed at this point as all recommended immunizations are up to date.       Current Outpatient Medications:      acetaminophen (TYLENOL) 500 MG tablet, Take 1,000 mg by mouth every 6 hours as needed for  fever or pain, Disp: , Rfl:      atorvastatin (LIPITOR) 40 MG tablet, [ATORVASTATIN (LIPITOR) 40 MG TABLET] Take 1 tablet (40 mg total) by mouth at bedtime. For hx of cva, Disp: 30 tablet, Rfl: 0     bisacodyL (DULCOLAX) 10 mg suppository, [BISACODYL (DULCOLAX) 10 MG SUPPOSITORY] Insert 10 mg into the rectum daily as needed., Disp: , Rfl:      docusate sodium (COLACE) 100 MG capsule, [DOCUSATE SODIUM (COLACE) 100 MG CAPSULE] Take 1 capsule (100 mg total) by mouth 2 (two) times a day. For constipation, Disp: 30 capsule, Rfl: 0     Docusate Sodium (DOCU LIQUID PO), Place 5 drops into both ears daily as needed prior to irrigation for cerumen removal, Disp: , Rfl:      dorzolamide-timoloL (COSOPT) 22.3-6.8 mg/mL ophthalmic solution, [DORZOLAMIDE-TIMOLOL (COSOPT) 22.3-6.8 MG/ML OPHTHALMIC SOLUTION] Administer 1 drop into the left eye 2 (two) times a day. glaucoma, Disp: 10 mL, Rfl: 12     erythromycin base (ERYTHROMYCIN OPHT), Place 5 mg into both eyes At Bedtime Instill 1 ribbon in Left Eye and 1 ribbon in Right Eye at bedtime. Ensure ointment is given after eye drops to ensure proper absorption., Disp: , Rfl:      ferrous sulfate 325 (65 FE) MG tablet, [FERROUS SULFATE 325 (65 FE) MG TABLET] Take 1 tablet by mouth daily., Disp: , Rfl:      guaiFENesin (ROBITUSSIN) 100 mg/5 mL syrup, Take 10 mLs by mouth every 4 hours as needed for cough , Disp: , Rfl:      latanoprost (XALATAN) 0.005 % ophthalmic solution, [LATANOPROST (XALATAN) 0.005 % OPHTHALMIC SOLUTION] Administer 1 drop into the left eye at bedtime. glaucoma, Disp: 2.5 mL, Rfl: 12     levothyroxine (SYNTHROID, LEVOTHROID) 25 MCG tablet, [LEVOTHYROXINE (SYNTHROID, LEVOTHROID) 25 MCG TABLET] Take 1 tablet (25 mcg total) by mouth Daily at 6:00 am. Hypothyroid, Disp: 30 tablet, Rfl: 0     MEDICATION CANNOT BE REORDERED - PLEASE MANUALLY REORDER AND DISCONTINUE THE OLD ORDER, [PROPYLENE GLYCOL (SYSTANE COMPLETE) 0.6 % DROP] Apply 1 drop to eye 2 (two) times a day. Both  eyes for dry eyes, Disp: 1.5 mL, Rfl: 0     menthol-zinc oxide (CALMOSEPTINE) 0.44-20.6 % Oint ointment, Apply topically as needed for skin protection (Redness) Buttocks, Disp: , Rfl:      nystatin (MYCOSTATIN) 484102 UNIT/GM external powder, Apply topically 2 times daily, Disp: , Rfl:      omeprazole (PRILOSEC) 20 MG capsule, [OMEPRAZOLE (PRILOSEC) 20 MG CAPSULE] Take 1 capsule (20 mg total) by mouth 2 (two) times a day before meals. Needs two times a day for 2 months, then daily for ulcer, Disp: 60 capsule, Rfl: 0     Propylene Glycol (SYSTANE COMPLETE OP), Place 1 drop into both eyes 2 times daily, Disp: , Rfl:      WARFARIN SODIUM PO, 11/22/22 INR 2.86  Cont 7mg Mon and Thurs and 6.5mg AOD.  Next INR 12/13/22.   10/26/22 INR 2.55  Cont 7mg Mon and Thurs and 6.5mg AOD.  Next INR 11/22/22.  , Disp: , Rfl:        Post Medication Reconciliation Status:        Case Management:  I have reviewed the facility/SNF care plan/MDS, including the falls risk, nutrition and pain screening. I also reviewed the current immunizations, and preventive care.. Future cancer screening is not clinically indicated secondary to age/goals of care Patient's desire to return to the community is not present. Current Level of Care is appropriate.    Advance Directive Discussion:    I reviewed the current advanced directives as reflected in EPIC, the POLST and the facility chart, and verified the congruency of orders.  I did not due to cognitive impairment review the advance directives with the resident. Patient's goal is pain control and comfort.    Team Discussion:  I communicated with the appropriate disciplines involved with the Plan of Care:   Nursing    Information reviewed:  Medications, vital signs, orders, and nursing notes.    ROS:  4 point ROS including Respiratory, CV, GI and , other than that noted in the HPI,  is negative    Vitals:  There were no vitals taken for this visit. There is no height or weight on file to calculate  BMI.  Exam:  Physical Exam   General appearance: alert, appears stated age and cooperative.   Head: Normocephalic, without obvious abnormality, atraumatic, Eyes: sclera anicteric.  Lungs: respirations without effort, LSCTA; no wheezing or rales.   Cardiovascular: regular rate.    Extremities: extremities normal, atraumatic, trace edema bilaterally.  Skin: Skin color, texture, turgor normal. No rashes or lesions  Neurologic: oriented. No focal deficits.   Psych: interacts well with caregivers, exhibits logical thought processes and connections, pleasant    Lab/Diagnostic data:     Most Recent 3 CBC's:  Recent Labs   Lab Test 07/29/22  0556 03/09/22  0458 01/21/22  1320 10/05/20  0625 10/01/20  0650 08/16/20  1926 08/16/20  0756   WBC  --  7.3  --   --  5.9  --  6.7   HGB 12.2* 11.8* 12.2*   < > 11.1*   < > 7.4*   MCV  --  90  --   --  85  --  91   PLT  --  177  --   --  269  --  210    < > = values in this interval not displayed.     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    (E61.1) Iron deficiency  Comment: Hemoglobin in June was 12.2.  Plan: Continue iron supplementation.  Continue omeprazole.  -recheck cbc in May.     (I10) Hypertension  Comment: Most recent blood pressure stable.  Plan: No current medications.    (M62.81) Generalized muscle weakness  Comment: stable.   Plan: Continue with nursing cares and assistance.     (E03.9) Hypothyroidism, unspecified type  Comment: TSH in June 4.1  Plan: Recheck TSH in May.         Electronically signed by:  Jac Pitt, CNP

## 2023-03-05 ENCOUNTER — LAB REQUISITION (OUTPATIENT)
Dept: LAB | Facility: CLINIC | Age: 88
End: 2023-03-05
Payer: COMMERCIAL

## 2023-03-05 DIAGNOSIS — I48.91 UNSPECIFIED ATRIAL FIBRILLATION (H): ICD-10-CM

## 2023-03-07 ENCOUNTER — TELEPHONE (OUTPATIENT)
Dept: GERIATRICS | Facility: CLINIC | Age: 88
End: 2023-03-07

## 2023-03-07 LAB — INR PPP: 2.77 (ref 0.85–1.15)

## 2023-03-07 PROCEDURE — 85610 PROTHROMBIN TIME: CPT | Mod: ORL | Performed by: NURSE PRACTITIONER

## 2023-03-07 PROCEDURE — 36415 COLL VENOUS BLD VENIPUNCTURE: CPT | Mod: ORL

## 2023-03-07 PROCEDURE — 36415 COLL VENOUS BLD VENIPUNCTURE: CPT | Mod: ORL | Performed by: NURSE PRACTITIONER

## 2023-03-07 PROCEDURE — P9604 ONE-WAY ALLOW PRORATED TRIP: HCPCS | Mod: ORL | Performed by: NURSE PRACTITIONER

## 2023-03-07 PROCEDURE — 85610 PROTHROMBIN TIME: CPT | Mod: ORL

## 2023-03-07 PROCEDURE — P9604 ONE-WAY ALLOW PRORATED TRIP: HCPCS | Mod: ORL

## 2023-03-07 NOTE — TELEPHONE ENCOUNTER
[facility] Geriatrics Triage Nurse INR     Provider: EDWARD Jacobsen  Facility: [facility]  Facility Type:  LTC    Caller: Zakiya  Call Back Number: [phone]  Reason for call: INR  Diagnosis/Goal: A. Fib    Todays INR: 2.77  Last INR   2.93 on 2/7 - 7 mg M/TH and 6.5 mg AOD    Heparin/Lovenox:  No  Currently on ABX?: No  Other interacting medication:  None  Missed or refused doses: No    Verbal Order/Direction given by Provider: Coumadin 7 mg PO M/TH and 6.5 mg PO AOD. Recheck INR on 4/4/23.    Provider Giving Order:  EDWARD Jacobsen    Verbal Order given to: Zakiya Smyth RN

## 2023-03-31 ENCOUNTER — LAB REQUISITION (OUTPATIENT)
Dept: LAB | Facility: CLINIC | Age: 88
End: 2023-03-31
Payer: COMMERCIAL

## 2023-03-31 DIAGNOSIS — I48.91 UNSPECIFIED ATRIAL FIBRILLATION (H): ICD-10-CM

## 2023-04-04 ENCOUNTER — TELEPHONE (OUTPATIENT)
Dept: GERIATRICS | Facility: CLINIC | Age: 88
End: 2023-04-04

## 2023-04-04 LAB — INR PPP: 2.58 (ref 0.85–1.15)

## 2023-04-04 PROCEDURE — 36415 COLL VENOUS BLD VENIPUNCTURE: CPT | Mod: ORL | Performed by: NURSE PRACTITIONER

## 2023-04-04 PROCEDURE — 85610 PROTHROMBIN TIME: CPT | Mod: ORL | Performed by: NURSE PRACTITIONER

## 2023-04-04 PROCEDURE — P9604 ONE-WAY ALLOW PRORATED TRIP: HCPCS | Mod: ORL | Performed by: NURSE PRACTITIONER

## 2023-04-04 NOTE — TELEPHONE ENCOUNTER
[facility] Geriatrics Triage Nurse INR     Provider: EDWARD Jacobsen  Facility: [facility]  Facility Type:  LTC    Caller: Zakiya    Reason for call: INR  Diagnosis/Goal: A. Fib    Todays INR: 2.58  Last INR   2.77 on 3/7 - 7 mg M/TH and 6.5 mg AOD    Heparin/Lovenox:  No  Currently on ABX?: No  Other interacting medication:  None  Missed or refused doses: No    Verbal Order/Direction given by Provider: Coumadin 7 mg PO M/TH and 6.5 mg AOD. Recheck INR on 5/2/23.    Provider Giving Order:  EDWARD Jacobsen    Verbal Order given to: Zakiya Smyth RN

## 2023-04-18 ENCOUNTER — NURSING HOME VISIT (OUTPATIENT)
Dept: GERIATRICS | Facility: CLINIC | Age: 88
End: 2023-04-18
Payer: COMMERCIAL

## 2023-04-18 DIAGNOSIS — I10 ESSENTIAL HYPERTENSION: ICD-10-CM

## 2023-04-18 DIAGNOSIS — E03.9 HYPOTHYROIDISM, UNSPECIFIED TYPE: ICD-10-CM

## 2023-04-18 DIAGNOSIS — Z86.73 HISTORY OF STROKE: ICD-10-CM

## 2023-04-18 DIAGNOSIS — I48.0 PAROXYSMAL ATRIAL FIBRILLATION (H): Primary | ICD-10-CM

## 2023-04-18 PROCEDURE — 99309 SBSQ NF CARE MODERATE MDM 30: CPT | Performed by: FAMILY MEDICINE

## 2023-04-18 NOTE — LETTER
4/18/2023        RE: Riley Rodriguez  [address]  [place] [address]        No notes on file      Sincerely,        Nancy Fair MD

## 2023-04-19 VITALS
SYSTOLIC BLOOD PRESSURE: 133 MMHG | RESPIRATION RATE: 18 BRPM | HEART RATE: 68 BPM | BODY MASS INDEX: 29.49 KG/M2 | WEIGHT: 206 LBS | OXYGEN SATURATION: 96 % | DIASTOLIC BLOOD PRESSURE: 69 MMHG | HEIGHT: 70 IN | TEMPERATURE: 98.5 F

## 2023-04-24 NOTE — PROGRESS NOTES
[facility] GERIATRIC SERVICES    [place] Medical Record Number:  [id number]  Place of Service where encounter took place: [facility] () [[id number]]   CODE STATUS:   DNR / DNI    Chief Complaint:  Chief Complaint   Patient presents with     [facility] Regulatory     LTC 4/18/2023. Afib on warfarin. General debilitation. Hypothyroidism. Hx of stroke. Chronic SP catheter.       HPI:   Riley is a 89 y.o. male seen for routine physician follow up in LT at [facility]. He has hx of Afib on warfarin, prior stroke, BPH, HTN, [place] palsy, hypothyroidism, SP catheter. He was last hospitalized 8/13/2020-8/19/2020. He had labs drawn in NH as he was not doing well with general fatigue, decreased appetite. No respiratory sx. He started feeling a little better on his own but then labs came back with hgb down to 6.2. He was worked up in the hospital found to have duodenal ulcer.       Today:  Riley has been stable. No recently illness, no medication changes. Had recent care conference in facility with family attendance, no concerns or issues brought forth. He has SP catheter, no issues. Medically stable, continues on warfarin for Afib. No complaints on visit today. Denies chest pain, shortness of breath. No fever or cough. Weight and appetite stable. Able to ambulate on walk program with staff. No recent falls. Has baseline vision impairment, on multiple eye drops. Hearing impaired as well.       Past Medical History:  Past Medical History:   Diagnosis Date     Atrial fibrillation (H)     On coumadin     Bell's palsy     right sided facial droop     CVA (cerebral vascular accident) (H)      Hypertension      Pacemaker      Urinary retention        Medications:  Current Outpatient Medications   Medication Sig Dispense Refill     acetaminophen (TYLENOL) 500 MG tablet Take 1,000 mg by mouth every 6 hours as needed for fever or pain       atorvastatin (LIPITOR) 40 MG tablet [ATORVASTATIN (LIPITOR)  40 MG TABLET] Take 1 tablet (40 mg total) by mouth at bedtime. For hx of cva 30 tablet 0     bisacodyL (DULCOLAX) 10 mg suppository [BISACODYL (DULCOLAX) 10 MG SUPPOSITORY] Insert 10 mg into the rectum daily as needed.       docusate sodium (COLACE) 100 MG capsule [DOCUSATE SODIUM (COLACE) 100 MG CAPSULE] Take 1 capsule (100 mg total) by mouth 2 (two) times a day. For constipation 30 capsule 0     Docusate Sodium (DOCU LIQUID PO) Place 5 drops into both ears daily as needed prior to irrigation for cerumen removal       dorzolamide-timoloL (COSOPT) 22.3-6.8 mg/mL ophthalmic solution [DORZOLAMIDE-TIMOLOL (COSOPT) 22.3-6.8 MG/ML OPHTHALMIC SOLUTION] Administer 1 drop into the left eye 2 (two) times a day. glaucoma 10 mL 12     erythromycin base (ERYTHROMYCIN OPHT) Place 5 mg into both eyes At Bedtime Instill 1 ribbon in Left Eye and 1 ribbon in Right Eye at bedtime. Ensure ointment is given after eye drops to ensure proper absorption.       ferrous sulfate 325 (65 FE) MG tablet [FERROUS SULFATE 325 (65 FE) MG TABLET] Take 1 tablet by mouth daily.       guaiFENesin (ROBITUSSIN) 100 mg/5 mL syrup Take 10 mLs by mouth every 4 hours as needed for cough        latanoprost (XALATAN) 0.005 % ophthalmic solution [LATANOPROST (XALATAN) 0.005 % OPHTHALMIC SOLUTION] Administer 1 drop into the left eye at bedtime. glaucoma 2.5 mL 12     levothyroxine (SYNTHROID, LEVOTHROID) 25 MCG tablet [LEVOTHYROXINE (SYNTHROID, LEVOTHROID) 25 MCG TABLET] Take 1 tablet (25 mcg total) by mouth Daily at 6:00 am. Hypothyroid 30 tablet 0     MEDICATION CANNOT BE REORDERED - PLEASE MANUALLY REORDER AND DISCONTINUE THE OLD ORDER [PROPYLENE GLYCOL (SYSTANE COMPLETE) 0.6 % DROP] Apply 1 drop to eye 2 (two) times a day. Both eyes for dry eyes 1.5 mL 0     menthol-zinc oxide (CALMOSEPTINE) 0.44-20.6 % Oint ointment Apply topically as needed for skin protection (Redness) Buttocks       nystatin (MYCOSTATIN) 958621 UNIT/GM external powder Apply topically 2  "times daily       omeprazole (PRILOSEC) 20 MG capsule [OMEPRAZOLE (PRILOSEC) 20 MG CAPSULE] Take 1 capsule (20 mg total) by mouth 2 (two) times a day before meals. Needs two times a day for 2 months, then daily for ulcer 60 capsule 0     Propylene Glycol (SYSTANE COMPLETE OP) Place 1 drop into both eyes 2 times daily       WARFARIN SODIUM PO 11/22/22 INR 2.86  Cont 7mg Mon and Thurs and 6.5mg AOD.  Next INR 12/13/22.   10/26/22 INR 2.55  Cont 7mg Mon and Thurs and 6.5mg AOD.  Next INR 11/22/22.            Physical Exam:   General: Patient is alert, elderly male, no distress.    Vitals: /69   Pulse 68   Temp 98.5  F (36.9  C)   Resp 18   Ht 1.778 m (5' 10\")   Wt 93.4 kg (206 lb)   SpO2 96%   BMI 29.56 kg/m    HEENT: Head is NCAT. Nares negative. Oropharynx moist. Right facial droop, baseline.  Neck: No JVD.  Lungs: Non labored respirations.   : SP catheter.   Extremities: Trace LE swelling at baseline.  Musculoskeletal: Age related degen changes.   Skin: Warm and dry.   Psych: Mood is stable.       Labs:  Component      Latest Ref Rng & Units 1/14/2021 1/21/2022 3/9/2022 7/29/2022                Hemoglobin      13.3 - 17.7 g/dL 12.9 (L) 12.2 (L) 11.8 (L) 12.2 (L)     Component      Latest Ref Rng & Units 4/22/2020 6/7/2021 6/2/2022   TSH      0.30 - 5.00 uIU/mL 3.10 3.14 4.10     Component      Latest Ref Rng & Units 8/19/2020 10/1/2020              Sodium      136 - 145 mmol/L 138 138   Potassium      3.5 - 5.0 mmol/L 4.3 4.1   Chloride      98 - 107 mmol/L 107 102   Carbon Dioxide      22 - 31 mmol/L 26 28   Anion Gap      5 - 18 mmol/L 5 8   Glucose      70 - 125 mg/dL 101 99   Calcium      8.5 - 10.5 mg/dL 8.0 (L) 9.1   Urea Nitrogen      8 - 28 mg/dL 9 12   Creatinine      0.70 - 1.30 mg/dL 0.74 0.79   GFR Estimate If Black      >60 mL/min/1.73m2 >60 >60   GFR Estimate      >60 mL/min/1.73m2 >60 >60         Assessment/Plan:  1. Afib. Anticoagulated with warfarin, continue. Adequate rate control. "   2. HTN. No BP meds. Bps overall are acceptable.   3. Hx of stroke. He is on atorvastatin.   4. Hypothyroidism. He is on replacement levothyroxine, continue.   5. SP catheter. Long term. Functioning fine.    Overall he appears stable, no new orders today.       Electronically signed by: Nancy Fair MD

## 2023-04-30 ENCOUNTER — LAB REQUISITION (OUTPATIENT)
Dept: LAB | Facility: CLINIC | Age: 88
End: 2023-04-30
Payer: COMMERCIAL

## 2023-04-30 DIAGNOSIS — I48.91 UNSPECIFIED ATRIAL FIBRILLATION (H): ICD-10-CM

## 2023-05-02 ENCOUNTER — TELEPHONE (OUTPATIENT)
Dept: GERIATRICS | Facility: CLINIC | Age: 88
End: 2023-05-02

## 2023-05-02 LAB — INR PPP: 2.95 (ref 0.85–1.15)

## 2023-05-02 PROCEDURE — 36415 COLL VENOUS BLD VENIPUNCTURE: CPT | Mod: ORL | Performed by: FAMILY MEDICINE

## 2023-05-02 PROCEDURE — 85610 PROTHROMBIN TIME: CPT | Mod: ORL | Performed by: FAMILY MEDICINE

## 2023-05-02 PROCEDURE — P9604 ONE-WAY ALLOW PRORATED TRIP: HCPCS | Mod: ORL | Performed by: FAMILY MEDICINE

## 2023-05-02 NOTE — TELEPHONE ENCOUNTER
[facility] Geriatrics Triage Nurse INR     Provider: EDWARD Jacobsen  Facility: [facility]  Facility Type:  LTC    Caller: Zakiya   Call Back Number: [phone]  Reason for call: INR  Diagnosis/Goal: A. Fib    Todays INR: 2.95  Last INR 4/4 2.58 7mg M, Th and 6.5mg AOD.     Heparin/Lovenox:  No  Currently on ABX?: No  Other interacting medication:  None  Missed or refused doses: No    Verbal Order/Direction given by Provider:   Continue same dose; recheck 4 weeks. 5/30      Provider Giving Order:  EDWARD Jacobsen    Verbal Order given to: Zakiya Rosario RN

## 2023-05-05 ENCOUNTER — ANCILLARY PROCEDURE (OUTPATIENT)
Dept: CARDIOLOGY | Facility: CLINIC | Age: 88
End: 2023-05-05
Attending: INTERNAL MEDICINE
Payer: COMMERCIAL

## 2023-05-05 DIAGNOSIS — I49.5 SICK SINUS SYNDROME (H): ICD-10-CM

## 2023-05-05 DIAGNOSIS — Z95.0 PACEMAKER: ICD-10-CM

## 2023-05-05 LAB
MDC_IDC_LEAD_IMPLANT_DT: NORMAL
MDC_IDC_LEAD_LOCATION: NORMAL
MDC_IDC_LEAD_LOCATION_DETAIL_1: NORMAL
MDC_IDC_LEAD_MFG: NORMAL
MDC_IDC_LEAD_MODEL: NORMAL
MDC_IDC_LEAD_POLARITY_TYPE: NORMAL
MDC_IDC_LEAD_SERIAL: NORMAL
MDC_IDC_MSMT_BATTERY_DTM: NORMAL
MDC_IDC_MSMT_BATTERY_REMAINING_LONGEVITY: 107 MO
MDC_IDC_MSMT_BATTERY_REMAINING_PERCENTAGE: 79 %
MDC_IDC_MSMT_BATTERY_RRT_TRIGGER: NORMAL
MDC_IDC_MSMT_BATTERY_STATUS: NORMAL
MDC_IDC_MSMT_BATTERY_VOLTAGE: 3.02 V
MDC_IDC_MSMT_LEADCHNL_RV_IMPEDANCE_VALUE: 350 OHM
MDC_IDC_MSMT_LEADCHNL_RV_LEAD_CHANNEL_STATUS: NORMAL
MDC_IDC_MSMT_LEADCHNL_RV_PACING_THRESHOLD_AMPLITUDE: 0.62 V
MDC_IDC_MSMT_LEADCHNL_RV_PACING_THRESHOLD_PULSEWIDTH: 0.5 MS
MDC_IDC_MSMT_LEADCHNL_RV_SENSING_INTR_AMPL: 12 MV
MDC_IDC_PG_IMPLANT_DTM: NORMAL
MDC_IDC_PG_MFG: NORMAL
MDC_IDC_PG_MODEL: NORMAL
MDC_IDC_PG_SERIAL: NORMAL
MDC_IDC_PG_TYPE: NORMAL
MDC_IDC_SESS_CLINIC_NAME: NORMAL
MDC_IDC_SESS_DTM: NORMAL
MDC_IDC_SESS_REPROGRAMMED: NO
MDC_IDC_SESS_TYPE: NORMAL
MDC_IDC_SET_BRADY_LOWRATE: 60 {BEATS}/MIN
MDC_IDC_SET_BRADY_MAX_SENSOR_RATE: 100 {BEATS}/MIN
MDC_IDC_SET_BRADY_MODE: NORMAL
MDC_IDC_SET_LEADCHNL_RV_PACING_AMPLITUDE: 0.88
MDC_IDC_SET_LEADCHNL_RV_PACING_ANODE_ELECTRODE_1: NORMAL
MDC_IDC_SET_LEADCHNL_RV_PACING_ANODE_LOCATION_1: NORMAL
MDC_IDC_SET_LEADCHNL_RV_PACING_CAPTURE_MODE: NORMAL
MDC_IDC_SET_LEADCHNL_RV_PACING_CATHODE_ELECTRODE_1: NORMAL
MDC_IDC_SET_LEADCHNL_RV_PACING_CATHODE_LOCATION_1: NORMAL
MDC_IDC_SET_LEADCHNL_RV_PACING_POLARITY: NORMAL
MDC_IDC_SET_LEADCHNL_RV_PACING_PULSEWIDTH: 0.5 MS
MDC_IDC_SET_LEADCHNL_RV_SENSING_ADAPTATION_MODE: NORMAL
MDC_IDC_SET_LEADCHNL_RV_SENSING_ANODE_ELECTRODE_1: NORMAL
MDC_IDC_SET_LEADCHNL_RV_SENSING_ANODE_LOCATION_1: NORMAL
MDC_IDC_SET_LEADCHNL_RV_SENSING_CATHODE_ELECTRODE_1: NORMAL
MDC_IDC_SET_LEADCHNL_RV_SENSING_CATHODE_LOCATION_1: NORMAL
MDC_IDC_SET_LEADCHNL_RV_SENSING_POLARITY: NORMAL
MDC_IDC_SET_LEADCHNL_RV_SENSING_SENSITIVITY: 2.5 MV
MDC_IDC_STAT_AT_DTM_END: NORMAL
MDC_IDC_STAT_AT_DTM_START: NORMAL
MDC_IDC_STAT_BRADY_DTM_END: NORMAL
MDC_IDC_STAT_BRADY_DTM_START: NORMAL
MDC_IDC_STAT_BRADY_RV_PERCENT_PACED: 90 %
MDC_IDC_STAT_CRT_DTM_END: NORMAL
MDC_IDC_STAT_CRT_DTM_START: NORMAL
MDC_IDC_STAT_HEART_RATE_DTM_END: NORMAL
MDC_IDC_STAT_HEART_RATE_DTM_START: NORMAL
MDC_IDC_STAT_HEART_RATE_VENTRICULAR_MAX: 200 {BEATS}/MIN
MDC_IDC_STAT_HEART_RATE_VENTRICULAR_MEAN: 66 {BEATS}/MIN
MDC_IDC_STAT_HEART_RATE_VENTRICULAR_MIN: 50 {BEATS}/MIN

## 2023-05-05 PROCEDURE — 93294 REM INTERROG EVL PM/LDLS PM: CPT | Performed by: INTERNAL MEDICINE

## 2023-05-05 PROCEDURE — 93296 REM INTERROG EVL PM/IDS: CPT | Performed by: INTERNAL MEDICINE

## 2023-05-27 ENCOUNTER — LAB REQUISITION (OUTPATIENT)
Dept: LAB | Facility: CLINIC | Age: 88
End: 2023-05-27
Payer: COMMERCIAL

## 2023-05-27 DIAGNOSIS — I48.91 UNSPECIFIED ATRIAL FIBRILLATION (H): ICD-10-CM

## 2023-05-30 ENCOUNTER — TELEPHONE (OUTPATIENT)
Dept: GERIATRICS | Facility: CLINIC | Age: 88
End: 2023-05-30

## 2023-05-30 LAB — INR PPP: 2.74 (ref 0.85–1.15)

## 2023-05-30 PROCEDURE — 36415 COLL VENOUS BLD VENIPUNCTURE: CPT | Mod: ORL | Performed by: NURSE PRACTITIONER

## 2023-05-30 PROCEDURE — 85610 PROTHROMBIN TIME: CPT | Mod: ORL | Performed by: NURSE PRACTITIONER

## 2023-05-30 PROCEDURE — P9603 ONE-WAY ALLOW PRORATED MILES: HCPCS | Mod: ORL | Performed by: NURSE PRACTITIONER

## 2023-05-30 NOTE — TELEPHONE ENCOUNTER
[facility] Geriatrics Triage Nurse INR     Provider: EDWARD Jacobsen  Facility: [facility]  Facility Type:  LTC    Caller: Zakiya  Call Back Number:   Reason for call: INR  Diagnosis/Goal: A. Fib    Todays INR: 2.74  Last INR 2.95 (5/2), 7 mg po M,TH and 6.5 mg po AOD.     Heparin/Lovenox:  No  Currently on ABX?: No  Other interacting medication:  None  Missed or refused doses: No    Verbal Order/Direction given by Provider: Continue same Coumadin dose of 7 mg po M,TH and 6.5 mg po AOD.  Recheck INR on 6/27.     Provider Giving Order:  Constance Barnes DNP, APRN    Verbal Order given to: Zakiya Jose RN

## 2023-06-05 PROBLEM — R09.02 HYPOXIA: Status: ACTIVE | Noted: 2023-01-01

## 2023-06-05 PROBLEM — N39.0 URINARY TRACT INFECTION WITHOUT HEMATURIA, SITE UNSPECIFIED: Status: ACTIVE | Noted: 2023-01-01

## 2023-06-05 NOTE — TELEPHONE ENCOUNTER
After dinner noted to have cough, hypoxia with O2 sats mid 70s. Started on O2. Up to 4lpm. O2 sats 89%. Some ongoing sob.  Will send to ED for eval.

## 2023-06-06 NOTE — PHARMACY-ADMISSION MEDICATION HISTORY
Pharmacist Admission Medication History    Admission medication history is complete. The information provided in this note is only as accurate as the sources available at the time of the update.    Medication reconciliation/reorder completed by provider prior to medication history? No    Information Source(s): Facility ([facility]/) medication list/MAR via N/A    Pertinent Information:     Changes made to PTA medication list:    Added: None    Deleted: nystatin    Changed: omeprazole, docusate    Medication Affordability:       Allergies reviewed with patient and updates made in EHR: no    Medication History Completed By: Judy Robertson RPH 6/6/2023 7:44 AM    PTA Med List   Medication Sig Last Dose     acetaminophen (TYLENOL) 500 MG tablet Take 1,000 mg by mouth every 6 hours as needed for fever or pain Unknown at prn     atorvastatin (LIPITOR) 40 MG tablet [ATORVASTATIN (LIPITOR) 40 MG TABLET] Take 1 tablet (40 mg total) by mouth at bedtime. For hx of cva 6/4/2023 at pm     bisacodyL (DULCOLAX) 10 mg suppository [BISACODYL (DULCOLAX) 10 MG SUPPOSITORY] Insert 10 mg into the rectum daily as needed. Unknown at prn     docusate sodium (COLACE) 100 MG capsule Take 100 mg by mouth 2 times daily as needed for constipation Unknown at prn     Docusate Sodium (DOCU LIQUID PO) Place 5 drops into both ears daily as needed prior to irrigation for cerumen removal      dorzolamide-timoloL (COSOPT) 22.3-6.8 mg/mL ophthalmic solution [DORZOLAMIDE-TIMOLOL (COSOPT) 22.3-6.8 MG/ML OPHTHALMIC SOLUTION] Administer 1 drop into the left eye 2 (two) times a day. glaucoma 6/5/2023 at am     erythromycin base (ERYTHROMYCIN OPHT) Place 5 mg into both eyes At Bedtime Instill 1 ribbon in Left Eye and 1 ribbon in Right Eye at bedtime. Ensure ointment is given after eye drops to ensure proper absorption. 6/4/2023 at pm     ferrous sulfate 325 (65 FE) MG tablet [FERROUS SULFATE 325 (65 FE) MG TABLET] Take 1 tablet by mouth daily. 6/5/2023 at am      guaiFENesin (ROBITUSSIN) 100 mg/5 mL syrup Take 10 mLs by mouth every 4 hours as needed for cough  Unknown at prn     latanoprost (XALATAN) 0.005 % ophthalmic solution [LATANOPROST (XALATAN) 0.005 % OPHTHALMIC SOLUTION] Administer 1 drop into the left eye at bedtime. glaucoma 6/4/2023 at pm     levothyroxine (SYNTHROID, LEVOTHROID) 25 MCG tablet [LEVOTHYROXINE (SYNTHROID, LEVOTHROID) 25 MCG TABLET] Take 1 tablet (25 mcg total) by mouth Daily at 6:00 am. Hypothyroid 6/5/2023 at am     menthol-zinc oxide (CALMOSEPTINE) 0.44-20.6 % Oint ointment Apply topically as needed for skin protection (Redness) Buttocks Unknown at prn     omeprazole (PRILOSEC) 20 MG DR capsule Take 20 mg by mouth daily 6/5/2023 at am     Propylene Glycol (SYSTANE COMPLETE OP) Place 1 drop into both eyes 2 times daily At 0600 and 1630 6/5/2023 at 1630     WARFARIN SODIUM PO Take 6.5-7 mg by mouth See Admin Instructions Take 6.5 mg on Tues/Wed/Fri/Sat/Sun and 7 mg on Mon/Thurs 6/5/2023

## 2023-06-06 NOTE — PROGRESS NOTES
06/06/23 1115   Appointment Info   Signing Clinician's Name / Credentials (OT) Pascale Dejan, OTR/L   Living Environment   People in Home facility resident   Home Accessibility no concerns   Living Environment Comments Pt lives in long-term care.   Self-Care   Equipment Currently Used at Home walker, rolling;wheelchair, manual   Activity/Exercise/Self-Care Comment Per pt's daughter, patient requires assistance with all ADLs. Pt walks with Ax1 using walker, can walk over 300' per pt's daughter. Pt independent with W/C mobility.   General Information   Onset of Illness/Injury or Date of Surgery 06/05/23   Referring Physician Fabian Khanna MD   Patient/Family Therapy Goal Statement (OT) none stated   Additional Occupational Profile Info/Pertinent History of Current Problem Per chart review, pt is a 89 year old male with history of atrial fibrillation on Coumadin, mitral stenosis, previous stroke, Bell's palsy with right-sided facial droop, chronic urinary retention with suprapubic catheter, permanent pacemaker, chronic gait instability who lives in a nursing home. He was noticed at the nursing home to have low oxygen saturation. He was also confused. He required 10 L of oxygen via oxy mask when EMS arrived. Chest x-ray shows pulmonary edema.   Existing Precautions/Restrictions fall   Limitations/Impairments hearing;visual   Cognitive Status Examination   Cognitive Status Comments Patient starting to become agitated at times, frustrated with oxygen line, redirected with assistance of patient's daughter. Difficulty following commands.   Pain Assessment   Patient Currently in Pain No   Range of Motion Comprehensive   General Range of Motion bilateral upper extremity ROM WFL   Strength Comprehensive (MMT)   Comment, General Manual Muscle Testing (MMT) Assessment Generalized BUE weakness noted.   Bed Mobility   Bed Mobility supine-sit;sit-supine   Supine-Sit [place] (Bed Mobility) moderate assist (50% patient  effort);2 person assist;verbal cues   Sit-Supine [place] (Bed Mobility) maximum assist (25% patient effort);2 person assist;verbal cues   Comment (Bed Mobility) limited by ED bed height   Transfers   Transfers sit-stand transfer   Sit-Stand Transfer   Sit-Stand [place] (Transfers) minimum assist (75% patient effort);2 person assist;verbal cues   Assistive Device (Sit-Stand Transfers) walker, front-wheeled   Balance   Balance Comments Patient required CGA for unsupported sitting balance. Pt required Min Ax2 and VC for standing balance, partially d/t difficulty with redirection d/t cognition and hearing.   Clinical Impression   Criteria for Skilled Therapeutic Interventions Met (OT) Yes, treatment indicated   OT Diagnosis Impaired endurance for functional mobility.   Influenced by the following impairments hypoxia   OT Problem List-Impairments impacting ADL problems related to;activity tolerance impaired;balance;cognition;hearing;mobility   Identified Performance Deficits bed mobility, functional mobility, transfers   Planned Therapy Interventions (OT) bed mobility training;transfer training;home program guidelines;progressive activity/exercise;risk factor education   Clinical Decision Making Complexity (OT) low complexity   Risk & Benefits of therapy have been explained evaluation/treatment results reviewed;care plan/treatment goals reviewed;risks/benefits reviewed;current/potential barriers reviewed;participants voiced agreement with care plan;participants included;patient;daughter   Clinical Impression Comments Patient's endurance is below baseline, currently tolerating taking a steps/marching in place with Ax2.   OT Total Evaluation Time   OT Eval, Low Complexity Minutes (15678) 10   OT Goals   Therapy Frequency (OT) 4 times/wk   OT Predicted Duration/Target Date for Goal Attainment 06/13/23   OT Goals Cardiac Phase 1;Transfers;Bed Mobility   OT: Bed Mobility Minimal assist;supine to/from sitting   OT:  Transfer Minimal assist;with assistive device   OT: Understanding of cardiac education to maximize quality of life, condition management, and health outcomes Caregiver;Verbalize   OT: Perform aerobic activity with stable cardiovascular response continuous;10 minutes;ambulation   OT: Functional/aerobic ambulation tolerance with stable cardiovascular response in order to return to home and community environment Minimal assist;Rolling walker;Greater than 300 feet   Therapeutic Activities   Therapeutic Activity Minutes (44241) 10   Symptoms noted during/after treatment fatigue   Treatment Detail/Skilled Intervention Patient tolerated sitting EOB for 5 min with CGA, O2 sat WNL throughout while on 3LPM via NC. Pt pulled out NC during eval, required Max cueing to replace d/t agitation. Pt motivated to engage in activity, completed 2x STS from EOB maintaining stand for 2-3 min each with standing marches, Min Ax2 for balance. Pt stepped up onto scale with Max cueing d/t cognition and [place] with Min Ax2 for balance. Pt's O2 sat WNL throughout activity.   OT Discharge Planning   OT Plan progress functional mobility in hallway, determine if needs are gait vs endurance - may need to defer to PT pending progress   OT Discharge Recommendation (DC Rec) Long term care facility  (return to LTC)   OT Rationale for DC Rec Patient requiring Ax1-2 for mobility and ADLs, patient currently receives this level of assistance at long term care facility.   OT Brief overview of current status Mod Ax2 bed mobility (ED cart), Min Ax1-2 tranfsers/mobility   Total Session Time   Timed Code Treatment Minutes 10   Total Session Time (sum of timed and untimed services) 20

## 2023-06-06 NOTE — PLAN OF CARE
Problem: Heart Failure  Goal: Fluid and Electrolyte Balance  Outcome: Progressing  Goal: Effective Oxygenation and Ventilation  Outcome: Progressing     Problem: Fall Injury Risk  Goal: Absence of Fall and Fall-Related Injury  Outcome: Adequate for Care Transition     Problem: Heart Failure  Goal: Optimal Cardiac Output  Outcome: Adequate for Care Transition   Goal Outcome Evaluation:       Nursing staff explained recent lab and imaging results with patient's daughters who were in room, at bedside. Patient laying in bed with lethargy. Hand off report given to P2 nurse Trini and patient transported to [facility].

## 2023-06-06 NOTE — PLAN OF CARE
Problem: Plan of Care - These are the overarching goals to be used throughout the patient stay.    Goal: Optimal Comfort and Wellbeing  Outcome: Progressing     Problem: Heart Failure  Goal: Optimal Cardiac Output  Outcome: Progressing     Problem: Heart Failure  Goal: Fluid and Electrolyte Balance  Outcome: Progressing     Problem: Heart Failure  Goal: Effective Oxygenation and Ventilation  Outcome: Progressing  Intervention: Promote Airway Secretion Clearance  Recent Flowsheet Documentation  Taken 6/6/2023 0100 by Pascale Rivas, RN  Cough And Deep Breathing: (sleepy) unable to perform  Activity Management: activity adjusted per tolerance  Intervention: Optimize Oxygenation and Ventilation  Recent Flowsheet Documentation  Taken 6/6/2023 0100 by Pascale Rivas, RN  Head of Bed (HOB) Positioning: HOB at 30 degrees   Goal Outcome Evaluation:    Patient noted lethargic on arrival to room 35, daughters accompanied the patient for support. Changed to night bag to contain urine. Needed assist x 2 with cares. Noted saturations at 93-97 % on oxygen 3 LPM. Slept most of the night and awaken early this morning wondering where he was, re-directed did okay.                                                                                                                                                                                                                                                                                                                                                                                                                                                                                                                                                                                                                                                                                                                                                                                                                                                                                                                                                                         Will continue to monitor the patient at this time.

## 2023-06-06 NOTE — ED TRIAGE NOTES
"Patient arrives via [facility] from nursing home. After dinner, staff noticed that patient had a \"rattly chest and was coughing.\" Staff checked pulse ox and it was 75-80% on RA. They administered 4L via NC and his SpO2 increased to 90%. EMS then administered 10L via NRB mask and sats have been 99%. Staff thinks that he may have aspirated.   Patient has a hx of stroke, bells palsy, dementia, anemia, and suprapubic cath. Baseline is mild droop on right side of face.     Oxygen administration decreased to 4L via NRB in room and patient's O2 sats are maintaining at 97%     Triage Assessment       Row Name 06/05/23 1913       Triage Assessment (Adult)    Airway WDL WDL       Respiratory WDL    Respiratory WDL X;cough    Cough Frequency infrequent    Cough Type congested;productive       Skin Circulation/Temperature WDL    Skin Circulation/Temperature WDL WDL       Cardiac WDL    Cardiac WDL WDL       Peripheral/Neurovascular WDL    Peripheral Neurovascular WDL WDL                  "

## 2023-06-06 NOTE — H&P
[facility]    History and Physical - Hospitalist Service       Date of Admission:  6/5/2023    Assessment & Plan      Riley Rodriguez is a 89 year old male admitted on 6/5/2023.  Presented with confusion and hypoxia.  Chest x-ray shows pulmonary edema.  UA suspicious for UTI.  Suspect CHF and sepsis secondary due to catheter associated UTI.  Discussed goals of care with the patient's family.  They do not want any aggressive measures such as vasopressors and transferred to ICU.  Would focus on comfort care if patient does not respond to IV antibiotics and IV Lasix.  Will order echocardiogram and EKG.    Acute hypoxic respiratory failure acute pulmonary edema likely due to acute CHF  --Continue supplemental oxygen  --Chest x-ray reviewed  --Order echocardiogram  --Order IV Lasix 40 mg twice daily  --Order low-salt diet  --Ordered 1500 mL fluid restriction  --Does not want aggressive measures if patient develops hypotension.  Family is open to comfort care if patient does not respond or develops complications    Sepsis secondary to catheter associated UTI  --Patient has indwelling suprapubic catheter  --Follow-up urine culture  --Continue empirical Rocephin  -- Lactic acid is normal    Chronic atrial fibrillation with mitral stenosis  -- On chronic Coumadin  -- Order INR  -- Pharmacy to dose Coumadin    Previous CVA and Bell's palsy  -- Patient has chronic right-sided facial droop and has had surgery to short of the right eyelid  -- He is also hearing impaired  -- His risk of delirium in the hospital  -- Ordered as needed Seroquel    Hyponatremia likely due to hypervolemia  -- Anticipate improvement with IV Lasix    Goals of care  -- Patient has poor quality of life and requires help with activities of daily living he spends most of his time sitting in the chair.  Patient's daughters have help a lot and he and does not want any aggressive measures.  They understand that patient may not respond  to current treatment and does not want aggressive measures such as vasopressors and transferred to ICU.         Diet: 2 Gram Sodium Diet  Fluid restriction 1500 ML FLUID2 g sodium diet with fluid restriction  DVT Prophylaxis: Warfarin  Pierson Catheter: Not present  Lines: None     Cardiac Monitoring: ACTIVE order. Indication: Acute decompensated heart failure (48 hours)  Code Status: No CPR- Do NOT IntubateDNR/DNI    Clinically Significant Risk Factors Present on Admission               # Drug Induced Coagulation Defect: home medication list includes an anticoagulant medication    # Hypertension: Noted on problem list               Disposition Plan      Expected Discharge Date: 06/07/2023                  Fabian Khanna MD  Hospitalist Service  [facility]  Securely message with whoactually (more info)  Text page via [facility] Paging/Directory     ______________________________________________________________________    Chief Complaint   Altered mental status, hypoxia at the nursing home Monday    History is obtained from the patient's daughter daughters.  Patient is confused, hearing impaired and blind.    History of Present Illness   Riley Rodriguez is a 89 year old male with history of atrial fibrillation on Coumadin, mitral stenosis, previous stroke, Bell's palsy with right-sided facial droop, chronic urinary retention with suprapubic catheter, permanent pacemaker, chronic gait instability who lives in a nursing home.  Patient is wheelchair-bound.  He needs help to do activities of daily living.  Today he was noticed at the nursing home to have low oxygen saturation.  He was also confused.  He required 10 L of oxygen via oxy mask when EMS arrived.  He spiked a temperature of 102 in the ER.  He was given IV Rocephin.  Chest x-ray shows pulmonary edema.  Patient is currently requiring 1 to 2 L of oxygen via nasal cannula.  According to daughter he is confused.  Review of systems cannot be obtained  completely but does endorse chest pain which has now subsequently resolved.  Discussed CODE STATUS and goals of care with the patient's daughter who have the H POA.  They do not want aggressive measures such as transfer to ICU for vasopressors.  Understands the risk of hypotension with IV Lasix.      Past Medical History    Past Medical History:   Diagnosis Date     Atrial fibrillation (H)     On coumadin     Bell's palsy     right sided facial droop     CVA (cerebral vascular accident) (H)      Hypertension      Pacemaker      Urinary retention        Past Surgical History   Past Surgical History:   Procedure Laterality Date     HC TRANSURETHRAL ELEC-SURG PROSTATECTOM      Description: Transurethral Resection Of Prostate (TURP);  Recorded: 03/24/2008;     IR AORTIC ARCH 4 VESSEL ANGIOGRAM  1/1/2004     IR BLADDER SUPRAPUBIC CATHETER INSERTION  1/18/2019     IR MISCELLANEOUS PROCEDURE  1/1/2004     IR MISCELLANEOUS PROCEDURE  1/1/2004     IR MISCELLANEOUS PROCEDURE  1/1/2004     IR SUPRAPUBIC CATHETER PLACMENT  1/18/2019     IR THORACIC AORTOGRAM  1/1/2004     IR VISCERAL ANGIOGRAM  1/1/2004     IR VISCERAL ANGIOGRAM  1/1/2004     IR VISCERAL ANGIOGRAM  1/1/2004     IR VISCERAL ANGIOGRAM  1/1/2004     IR VISCERAL ANGIOGRAM  1/1/2004     IR VISCERAL ANGIOGRAM  1/1/2004     IR VISCERAL ANGIOGRAM  1/1/2004     HI ESOPHAGOGASTRODUODENOSCOPY TRANSORAL DIAGNOSTIC N/A 8/15/2020    Procedure: ESOPHAGOGASTRODUODENOSCOPY (EGD) with biopsy;  Surgeon: Paxton Villalpando MD;  Location: [facility];  Service: Gastroenterology     HI PARTIAL EXCISION THYROID,UNILAT      Description: Thyroid Surgery Sub-Total Thyroidectomy;  Recorded: 03/24/2008;     REMV PILONIDAL LESION SIMPLE      Description: Pilonidal Cyst Resection;  Recorded: 03/24/2008;     TOTAL HIP ARTHROPLASTY Right        Prior to Admission Medications   Prior to Admission Medications   Prescriptions Last Dose Informant Patient Reported? Taking?   Docusate Sodium  (DOCU LIQUID PO)   Yes No   Sig: Place 5 drops into both ears daily as needed prior to irrigation for cerumen removal   MEDICATION CANNOT BE REORDERED - PLEASE MANUALLY REORDER AND DISCONTINUE THE OLD ORDER   No No   Sig: [PROPYLENE GLYCOL (SYSTANE COMPLETE) 0.6 % DROP] Apply 1 drop to eye 2 (two) times a day. Both eyes for dry eyes   Propylene Glycol (SYSTANE COMPLETE OP)   Yes No   Sig: Place 1 drop into both eyes 2 times daily   WARFARIN SODIUM PO   Yes No   Si22 INR 2.86  Cont 7mg Mon and Thurs and 6.5mg AOD.  Next INR 22.   10/26/22 INR 2.55  Cont 7mg Mon and Thurs and 6.5mg AOD.  Next INR 22.     acetaminophen (TYLENOL) 500 MG tablet   Yes No   Sig: Take 1,000 mg by mouth every 6 hours as needed for fever or pain   atorvastatin (LIPITOR) 40 MG tablet   No No   Sig: [ATORVASTATIN (LIPITOR) 40 MG TABLET] Take 1 tablet (40 mg total) by mouth at bedtime. For hx of cva   bisacodyL (DULCOLAX) 10 mg suppository   Yes No   Sig: [BISACODYL (DULCOLAX) 10 MG SUPPOSITORY] Insert 10 mg into the rectum daily as needed.   docusate sodium (COLACE) 100 MG capsule   No No   Sig: [DOCUSATE SODIUM (COLACE) 100 MG CAPSULE] Take 1 capsule (100 mg total) by mouth 2 (two) times a day. For constipation   dorzolamide-timoloL (COSOPT) 22.3-6.8 mg/mL ophthalmic solution   No No   Sig: [DORZOLAMIDE-TIMOLOL (COSOPT) 22.3-6.8 MG/ML OPHTHALMIC SOLUTION] Administer 1 drop into the left eye 2 (two) times a day. glaucoma   erythromycin base (ERYTHROMYCIN OPHT)   Yes No   Sig: Place 5 mg into both eyes At Bedtime Instill 1 ribbon in Left Eye and 1 ribbon in Right Eye at bedtime. Ensure ointment is given after eye drops to ensure proper absorption.   ferrous sulfate 325 (65 FE) MG tablet   Yes No   Sig: [FERROUS SULFATE 325 (65 FE) MG TABLET] Take 1 tablet by mouth daily.   guaiFENesin (ROBITUSSIN) 100 mg/5 mL syrup   Yes No   Sig: Take 10 mLs by mouth every 4 hours as needed for cough    latanoprost (XALATAN) 0.005 %  ophthalmic solution   No No   Sig: [LATANOPROST (XALATAN) 0.005 % OPHTHALMIC SOLUTION] Administer 1 drop into the left eye at bedtime. glaucoma   levothyroxine (SYNTHROID, LEVOTHROID) 25 MCG tablet   No No   Sig: [LEVOTHYROXINE (SYNTHROID, LEVOTHROID) 25 MCG TABLET] Take 1 tablet (25 mcg total) by mouth Daily at 6:00 am. Hypothyroid   menthol-zinc oxide (CALMOSEPTINE) 0.44-20.6 % Oint ointment   Yes No   Sig: Apply topically as needed for skin protection (Redness) Buttocks   nystatin (MYCOSTATIN) 729107 UNIT/GM external powder   Yes No   Sig: Apply topically 2 times daily   omeprazole (PRILOSEC) 20 MG capsule   No No   Sig: [OMEPRAZOLE (PRILOSEC) 20 MG CAPSULE] Take 1 capsule (20 mg total) by mouth 2 (two) times a day before meals. Needs two times a day for 2 months, then daily for ulcer      Facility-Administered Medications: None        Social History   I have reviewed this patient's social history and updated it with pertinent information if needed.  Social History     Tobacco Use     Smoking status: Former     Smokeless tobacco: Never   Substance Use Topics     Alcohol use: No     Drug use: No      Nursing home resident.  Needs help with activities of daily living.  Spends most of the time in chair.  He is hearing impaired and legally blind.  Physical Exam   Vital Signs: Temp: (!) 102.3  F (39.1  C)   BP: 127/59 Pulse: 69   Resp: (!) 34 SpO2: 94 % O2 Device: Nasal cannula Oxygen Delivery: 3 LPM  Weight: 0 lbs 0 oz    Elderly frail gentleman  Legally blind but can read is watch  Right facial droop which is chronic  Patient is hearing impaired and cannot follow commands  Bilateral leg edema  Jugular venous distention with a hepatojugular reflux  Suprapubic catheter in place  Abdomen is soft with hypoactive bowel sounds    Medical Decision Making       75 MINUTES SPENT BY ME on the date of service doing chart review, history, exam, documentation & further activities per the note.  MANAGEMENT DISCUSSED with the  following over the past 24 hours: Patient's daughters   NOTE(S)/MEDICAL RECORDS REVIEWED over the past 24 hours: ER notes      Data     I have personally reviewed the following data over the past 24 hrs:    11.2 (H)  \   12.0 (L)   / 210     132 (L) 99 15.5 /  148 (H)   4.0 24 0.77 \       Procal: N/A CRP: N/A Lactic Acid: 1.4         Imaging results reviewed over the past 24 hrs:   Recent Results (from the past 24 hour(s))   XR Chest 2 Views    Narrative    EXAM: XR CHEST 2 VIEWS  LOCATION: [facility]  DATE/TIME: 6/5/2023 9:50 PM CDT    INDICATION: cough, hypoxia, fever  COMPARISON: 01/05/2019      Impression    IMPRESSION: Pulmonary vascular congestion with increased interstitial opacities consistent with mild pulmonary edema. Probable small left pleural effusion. No pneumothorax.    Unchanged enlarged cardiomediastinal silhouette with single lead pacemaker. Unchanged pleural calcification on the left.

## 2023-06-06 NOTE — PHARMACY-ANTICOAGULATION SERVICE
Clinical Pharmacy - Warfarin Dosing Consult     Pharmacy has been consulted to manage this patient s warfarin therapy.  Indication: Atrial Fibrillation  Therapy Goal: INR 2-3  Provider/Team: Hospitalist  Warfarin Prior to Admission: Yes  Warfarin PTA Regimen: 7 mg Mon & Thursday, 6.5 mg all other days  Significant drug interactions: Home med: acetaminophen can increase bleeding risk; New meds: ceftriaxone, melatonin & quetiapine all can increase bleeding risk  Recent documented change in oral intake/nutrition: Unknown  Dose Comments: Per facility MAR, pt had 6/5/23 dose PTA    INR   Date Value Ref Range Status   06/05/2023 2.65 (H) 0.85 - 1.15 Final   05/30/2023 2.74 (H) 0.85 - 1.15 Final       Recommend no warfarin at this time, as admission INR is therapeutic and pt had dose on 6/5/23.  Pharmacy will monitor Riley Rodriguez daily and order warfarin doses to achieve specified goal.      Please contact pharmacy as soon as possible if the warfarin needs to be held for a procedure or if the warfarin goals change.

## 2023-06-06 NOTE — PROGRESS NOTES
[facility]    Medicine Progress Note - Hospitalist Service    Date of Admission:  6/5/2023    Assessment & Plan     Riley Rodriguez is a 89 year old male admitted on 6/5/2023.  Presented with confusion and hypoxia.  Chest x-ray shows pulmonary edema.  UA suspicious for UTI.  Suspect CHF and sepsis secondary  to catheter associated UTI.  Discussed goals of care with the patient's family.  They do not want any aggressive measures such as vasopressors and transferred to ICU.  Would focus on comfort care if patient does not respond to IV antibiotics and IV Lasix.  Will order echocardiogram and EKG.     Acute hypoxic respiratory failure acute pulmonary edema likely due to acute CHF  --Continue supplemental oxygen and wean as tolerated  --Chest x-ray reviewed  -- Transthoracic echocardiogram shows a left ventricle ejection fraction of 60 to 65% with moderate aortic stenosis mild mild stenosis and mild pulm hypertension  -- Continue IV Lasix 40 mg twice daily  -- Low-sodium diet.  Weigh patient daily and monitor I's and O's.  -- Fluid restriction 1500 mL/day  --We will start guaifenesin given humid cough  -- check procalcitonin  --Does not want aggressive measures if patient develops hypotension or decompensates,  family is open to comfort care if patient does not respond or develops complications     Sepsis secondary to catheter associated UTI  --Patient has indwelling suprapubic catheter  --Follow-up urine culture  --Continue empirical Rocephin  -- Lactic acid is normal     Chronic atrial fibrillation with mitral stenosis  -- On chronic Coumadin  -- Order INR  -- Pharmacy to dose Coumadin     Previous CVA and Bell's palsy  Macular degeneration.  Legally blind  -- Patient has chronic right-sided facial droop and has had surgery to address right eyelid  -- He is also hearing impaired  --  He is high risk of hospital-acquired delirium  --  Continue as needed Seroquel     Hyponatremia likely due to  "hypervolemia  --  Slowly improving with IV diuretics.     Goals of care  -- Patient has poor quality of life and requires help with activities of daily living he spends most of his time sitting in the chair.  Patient's daughters have help a lot and he and does not want any aggressive measures.  They understand that patient may not respond to current treatment and does not want aggressive measures such as vasopressors and transferred to ICU.          Diet: 2 Gram Sodium Diet  Fluid restriction 1500 ML FLUID    DVT Prophylaxis: Warfarin  Pierson Catheter: Not present  Lines: None     Cardiac Monitoring: ACTIVE order. Indication: Acute decompensated heart failure (48 hours)  Code Status: No CPR- Do NOT Intubate      Clinically Significant Risk Factors Present on Admission               # Drug Induced Coagulation Defect: home medication list includes an anticoagulant medication    # Hypertension: Noted on problem list      # Overweight: Estimated body mass index is 28.31 kg/m  as calculated from the following:    Height as of this encounter: 1.803 m (5' 11\").    Weight as of this encounter: 92.1 kg (203 lb).            Disposition Plan      Expected Discharge Date: 06/07/2023                  Antelmo Neri MD  Hospitalist Service  [facility]  Securely message with [employer] (more info)  Text page via [employer] Paging/Directory   ______________________________________________________________________    Interval History       Patient seen and examined with daughter at bedside.  He has improved since admission and appears closer to baseline.  He is legally blind.  We will continue current plan of care antibiotics and monitor his progress.  Family was again clear about not escalating cares and avoid invasive procedures.    Physical Exam   Vital Signs: Temp: 98.4  F (36.9  C) Temp src: Oral BP: 124/66 Pulse: 61   Resp: 18 SpO2: 99 % O2 Device: Nasal cannula Oxygen Delivery: 2 LPM  Weight: 203 lbs 0 " oz    Physical Exam  Constitutional:       Comments: Frail elderly male   HENT:      Head: Normocephalic.      Mouth/Throat:      Mouth: Mucous membranes are moist.   Eyes:      Comments: Legally blind   Cardiovascular:      Rate and Rhythm: Normal rate.      Heart sounds: Murmur heard.   Pulmonary:      Effort: Pulmonary effort is normal.   Abdominal:      Palpations: Abdomen is soft.   Musculoskeletal:      Right lower leg: Edema present.      Left lower leg: Edema present.   Skin:     General: Skin is warm.   Neurological:      Mental Status: Mental status is at baseline.           Medical Decision Making       45 MINUTES SPENT BY ME on the date of service doing chart review, history, exam, documentation & further activities per the note.      Data   ------------------------- PAST 24 HR DATA REVIEWED -----------------------------------------------    I have personally reviewed the following data over the past 24 hrs:    11.2 (H)  \   12.0 (L)   / 210     133 (L) 100 15.5 /  121 (H)   3.5 23 0.86 \       Trop: N/A BNP: 1,645       Procal: N/A CRP: N/A Lactic Acid: 1.2       INR:  2.86 (H) PTT:  N/A   D-dimer:  N/A Fibrinogen:  N/A       Imaging results reviewed over the past 24 hrs:   Recent Results (from the past 24 hour(s))   XR Chest 2 Views    Narrative    EXAM: XR CHEST 2 VIEWS  LOCATION: [facility]  DATE/TIME: 6/5/2023 9:50 PM CDT    INDICATION: cough, hypoxia, fever  COMPARISON: 01/05/2019      Impression    IMPRESSION: Pulmonary vascular congestion with increased interstitial opacities consistent with mild pulmonary edema. Probable small left pleural effusion. No pneumothorax.    Unchanged enlarged cardiomediastinal silhouette with single lead pacemaker. Unchanged pleural calcification on the left.   Echocardiogram Complete   Result Value    LVEF  60-65%    Narrative    [id number]  [id number]  [id number]  [id number]^KRISTAL^JAYNE     [address]  [address]     Name: ALEX MUELLER  MRN: [id number]  : 04/15/1926  Study Date: 2023 09:09 AM  Age: 89 yrs  Gender: Male  Patient Location: [facility]  Reason For Study: Heart Failure  Ordering Physician: JAYNE GIRALDO  Referring Physician: JAYNE GIRALDO  Performed By: MAGDALENE     BSA: 2.1 m2  Height: 70 in  Weight: 206 lb  HR: 61  ______________________________________________________________________________  Procedure  Complete Echo Adult. Definity (NDC #[id number]) given intravenously.  ______________________________________________________________________________  Interpretation Summary     Left ventricular function is normal.The ejection fraction is 60-65%.  Right ventricular systolic pressure is elevated, consistent with mild  pulmonary hypertension.  Mildly decreased right ventricular systolic function  At least moderate Aortic stenosis, appears more significant based on 2D  appearance.  There is mild mitral stenosis.  IVC diameter >2.1 cm collapsing <50% with sniff suggests a high RA pressure  estimated at 15 mmHg or greater.  Moderate to severe valvular aortic stenosis.  ______________________________________________________________________________  Left Ventricle  Left ventricular function is normal.The ejection fraction is 60-65%. There is  mild concentric left ventricular hypertrophy. Diastolic function not assessed  due to significant mitral annular calcification. No regional wall motion  abnormalities noted.     Right Ventricle  The right ventricle is normal size. Mildly decreased right ventricular  systolic function. TAPSE is abnormal, which is consistent with abnormal right  ventricular systolic function. There is a pacemaker lead in the right  ventricle.     Atria  The left atrium is severely dilated. The right atrium is mild to moderately  dilated.     Mitral Valve  There is severe mitral annular calcification. There is mild (1+) mitral  regurgitation. There is mild mitral stenosis.     Tricuspid  Valve  The tricuspid valve is not well visualized, but is grossly normal. There is  mild (1+) tricuspid regurgitation. Right ventricular systolic pressure is  elevated, consistent with mild pulmonary hypertension.     Aortic Valve  Moderate aortic valve calcification is present. The aortic valve is  trileaflet. Moderate to severe valvular aortic stenosis. The mean AoV pressure  gradient is 25.0 mmHg. At least moderate Aortic stenosis, appears more  significant based on 2D appearance.     Vessels  The aorta root is normal. Normal size ascending aorta. IVC diameter >2.1 cm  collapsing <50% with sniff suggests a high RA pressure estimated at 15 mmHg or  greater.     Rhythm  The rhythm was atrial fibrillation.     ______________________________________________________________________________  MMode/2D Measurements & Calculations  IVSd: 1.3 cm  LVIDd: 4.2 cm  LVIDs: 2.6 cm  LVPWd: 1.2 cm  FS: 37.8 %  LV mass(C)d: 191.0 grams  LV mass(C)dI: 90.4 grams/m2  Ao root diam: 3.6 cm  LA dimension: 4.8 cm  asc Aorta Diam: 3.4 cm     LA/Ao: 1.3  LVOT diam: 2.2 cm  LVOT area: 3.8 cm2  LA Volume Indexed (AL/bp): 63.8 ml/m2  RV Base: 4.6 cm  RWT: 0.58  TAPSE: 1.2 cm     Time Measurements  MM HR: 55.0 BPM     Doppler Measurements & Calculations  MV E max michel: 163.0 cm/sec  MV max P.8 mmHg  MV mean P.0 mmHg  MV V2 VTI: 48.9 cm  MVA(VTI): 1.4 cm2  MV dec slope: 421.0 cm/sec2  MV dec time: 0.39 sec  Ao V2 max: 293.5 cm/sec  Ao max P.0 mmHg  Ao V2 mean: 239.0 cm/sec  Ao mean P.0 mmHg  Ao V2 VTI: 72.0 cm  KATHY(I,D): 0.94 cm2  KATHY(V,D): 1.1 cm2  LV V1 max P.7 mmHg  LV V1 max: 82.0 cm/sec  LV V1 VTI: 17.8 cm  SV(LVOT): 67.7 ml  SI(LVOT): 32.0 ml/m2  PA acc time: 0.07 sec  TR max michel: 295.0 cm/sec  TR max P.8 mmHg  AV Michel Ratio (DI): 0.28  KATHY Index (cm2/m2): 0.44  E/E' av.7  Lateral E/e': 15.1  Medial E/e': 20.2  RV S Michel: 9.7 cm/sec      ______________________________________________________________________________  Report approved by: Noemi Collins 06/06/2023 10:34 AM

## 2023-06-06 NOTE — PLAN OF CARE
"  Problem: Plan of Care - These are the overarching goals to be used throughout the patient stay.    Goal: Plan of Care Review  Description: The Plan of Care Review/Shift note should be completed every shift.  The Outcome Evaluation is a brief statement about your assessment that the patient is improving, declining, or no change.  This information will be displayed automatically on your shift note.  Outcome: Progressing  Goal: Patient-Specific Goal (Individualized)  Description: You can add care plan individualizations to a care plan. Examples of Individualization might be:  \"Parent requests to be called daily at 9am for status\", \"I have a hard time hearing out of my right ear\", or \"Do not touch me to wake me up as it startles me\".  Outcome: Progressing  Goal: Absence of Hospital-Acquired Illness or Injury  Outcome: Progressing  Intervention: Identify and Manage Fall Risk  Recent Flowsheet Documentation  Taken 6/6/2023 1300 by Lyle Bridges RN  Safety Promotion/Fall Prevention: activity supervised  Taken 6/6/2023 0843 by Lyle Bridges RN  Safety Promotion/Fall Prevention: activity supervised  Intervention: Prevent Skin Injury  Recent Flowsheet Documentation  Taken 6/6/2023 1300 by Lyle Bridges RN  Body Position:   log-rolled   turned   right   left  Taken 6/6/2023 0843 by Lyle Bridges RN  Body Position:   log-rolled   turned   right   left   Goal Outcome Evaluation:    Pt VSS, lung sounds coarse with occasion cough, SATs 98 to 100% on 3 L NC , decreased to 2 L at 1500. Pt triggered Lactic acid draw, MD notified and result WNL's. PT assisted patient to stand at bedside, Unable to weight patient and stretcher has no scale. Verbal weight from daughter of last weight at the nursing home in chart. No discomfort observed.                  "

## 2023-06-07 NOTE — PLAN OF CARE
Goal Outcome Evaluation:      Plan of Care Reviewed With: patient    Denies pain. Sleeping well overnight. Cares clustered to promote rest.  Dressing changed to suprapubic catheter site. Diuresing after receiving IV Lasix.   Strict I & O/fluid restriction maintained.   Weaned oxygen to 1L nasal cannula.   Ambulated in room last evening with assist of two and gait belt. Patient's daughter reports he is doing much better than when he came in.     Reviewed plan of care.   Sandy Balbuena RN  [phone]

## 2023-06-07 NOTE — CONSULTS
Care Management Initial Consult    General Information  Assessment completed with: Patient, Family,    Type of CM/SW Visit: Initial Assessment    Primary Care Provider verified and updated as needed:     Readmission within the last 30 days:        Reason for Consult: discharge planning  Advance Care Planning:            Communication Assessment  Patient's communication style: spoken language (English or Bilingual)    Hearing Difficulty or Deaf: yes   Wear Glasses or Blind: yes    Cognitive  Cognitive/Neuro/Behavioral: .WDL except  Level of Consciousness: alert     Orientation: disoriented to, time, situation             Living Environment:   People in home:       Current living Arrangements: residential facility  Name of Facility: [facility]   Able to return to prior arrangements: yes       Family/Social Support:  Care provided by:    Provides care for:                  Description of Support System:           Current Resources:   Patient receiving home care services:       Community Resources:    Equipment currently used at home: walker, rolling  Supplies currently used at home:      Employment/Financial:  Employment Status:          Financial Concerns:             Does the patient's insurance plan have a 3 day qualifying hospital stay waiver?  Yes   Will the waiver be used for post-acute placement? No                Values/Beliefs:  Spiritual, Cultural Beliefs, Adventist Practices, Values that affect care:                 Additional Information:  Initial assessment completed with pt and family.  Pt lives at [facility].  Plan is for pt to return there at discharge, family requests transport be arranged and is aware of cost.  Confirmed with admissions at [facility] that pt from facility and bed is being held.    CATHIE Vasquez

## 2023-06-07 NOTE — PLAN OF CARE
Occupational Therapy Discharge Summary    Reason for therapy discharge:    All goals and outcomes met, no further needs identified. Per discussion with PT, patient at baseline with mobility and endurance and has total assistance with ADLs/IADLs.    Progress towards therapy goal(s). See goals on Care Plan in [facility] electronic health record for goal details.  Goals met    Therapy recommendation(s):    No further therapy is recommended.     Pascale Wylie, OTR/EVONNE 6/7/23

## 2023-06-07 NOTE — CONSULTS
"CLINICAL NUTRITION SERVICES - ASSESSMENT NOTE     Nutrition Prescription    RECOMMENDATIONS FOR MDs/PROVIDERS TO ORDER:  None at this time    Malnutrition Status:    Does not meet 2 criteria for malnutrition    Recommendations already ordered by Registered Dietitian (RD):  Continue current diet rx    Future/Additional Recommendations:  Monitor po intake, weight, labs, poc     REASON FOR ASSESSMENT  Riley Rodriguez is a/an 89 year old male assessed by the dietitian for Provider Order - To educate on 2 gram Na diet and admit nutrition risk screen due to weight loss and decreased appetite    NUTRITION HISTORY  Per pt's family member, pt weighed 203 lb 3 weeks ago..  He ate poorly at only one meal-otherwise eating fine.  He lives in a nursing home and all meals are prepared for him there. They will request a low sodium diet for him when he goes back.  Pt legally blind, hearing impaired and came in with confusion and hypoxia ( has dementia)  Noted no aggressive cares desired. Family states he has tried ensure in the past and it upsets his stomach.    CURRENT NUTRITION ORDERS  Diet: 2 gram Na with FR of 1500 ml/hr  Intake/Tolerance: He ate 100% breakfast on 6/7/2023  And 25% breakfast on 6/6/2023.  Family members state he's eating fine now    LABS  Labs reviewed: Na 131, K 3.2, Ca 8.0, Mg 1.8, Glu 125    MEDICATIONS  Medications reviewed: lipitor, rocephin, ferrous sulfate, lasix, levothyroxine, pantoprazole, senna-docusate, warfarin    ANTHROPOMETRICS  Height: 180.3 cm (5' 11\")  Most Recent Weight: 86 kg (189 lb 9.6 oz)    IBW: 78.2 kg ( 172 lb)  BMI: Overweight BMI 25-29.9  Weight History:   Wt Readings from Last 10 Encounters:   06/07/23 86 kg (189 lb 9.6 oz)   04/19/23 93.4 kg (206 lb)   12/20/22 92.1 kg (203 lb)   10/26/22 92.9 kg (204 lb 12.8 oz)   10/19/22 92.9 kg (204 lb 12.8 oz)   09/02/22 92 kg (202 lb 12.8 oz)   06/01/22 90.7 kg (200 lb)   04/29/22 74.8 kg (165 lb)   03/23/22 89.4 kg (197 lb)   03/16/22 88.5 kg " (195 lb)   Weight loss of 17 lb in the past ~2 months ( 8.3%) which is clinically significant and severe    Dosing Weight: 86 kg/ current weight    ASSESSED NUTRITION NEEDS  Estimated Energy Needs: 2150 kcals/day (25 Bobby/Kg)  Justification: Maintenance  Estimated Protein Needs: 86 grams protein/day (1 g/Kg)  Justification: Maintenance  Estimated Fluid Needs: 1500 mL/day    Justification: On a fluid restriction    PHYSICAL FINDINGS  See malnutrition section below.  Skin: WDL per nurse  Emre score=18, nutrition noted as adequate  GI: WDL per nurse  Hypoactive BS  Last BM not documented    MALNUTRITION:  % Weight Loss:  > 7.5% in 3 months (severe malnutrition)  % Intake:  Decreased intake does not meet criteria for malnutrition   Subcutaneous Fat Loss:  None observed  Muscle Loss:  None observed  Fluid Retention:  None noted    Malnutrition Diagnosis: Patient does not meet two of the above criteria necessary for diagnosing malnutrition    NUTRITION DIAGNOSIS  Unintended weight loss related to poor appetite x 1 day as evidenced by weight loss PTA      INTERVENTIONS  Implementation  Continue current diet  No education planned at this time due to pt with dementia and has all meals prepared for him at nursing home    Goals  Patient to consume % of nutritionally adequate meals three times per day, or the equivalent with supplements/snacks.     Monitoring/Evaluation  Progress toward goals will be monitored and evaluated per protocol.

## 2023-06-07 NOTE — PLAN OF CARE
Pain: Denies pain  Neuro: Patient oriented to self and general (hospital) place and situation. Disoriented to time. Patient has limited vision in left eye and is unable to use visual cues to self-reorient. Pleasant and follows directions appropriately. Daughters plan to maintain presence throughout hospitalization. No need for alarms when others present.  Resp: Titrated to room air. Lung sounds clear and diminished   Cardio: On telemetry monitoring. V paced with 100% capture. Murmur detected  GI/: SP catheter intact. Output clear yellow. Good bowel sounds and passing gas. Attempted bowel movement with no results  Skin: Intact. Raised skin tags throughout. Coccyx slightly reddened.  Activity: Patient transitioned to sitting/standing from bed with minimal assist. Patient marches at side of bed and ambulates with walker. Daughters demonstrate competence to assist with activity including ambulation, meals, and daily cares.  Nutrition/IV: Challenging 1500 Fluid Restriction secondary to water volumes needed for medications.     Protocols: Potassium 3.2 secondary to IV lasix, Magnesium 1.8. Patient tolerated pills for oral replacement. Potassium recheck 4.0. Check again in the morning.

## 2023-06-07 NOTE — PROGRESS NOTES
Physical Therapy        06/07/23 1100   Appointment Info   Signing Clinician's Name / Credentials (PT) Laisha Harvey DPT   Living Environment   People in Home facility resident   Current Living Arrangements extended care facility   Home Accessibility no concerns   Living Environment Comments assist of 1 with mobility and cares at LT   Self-Care   Equipment Currently Used at Home walker, rolling   General Information   Onset of Illness/Injury or Date of Surgery 06/05/23   Referring Physician Antelmo Neri MD   Patient/Family Therapy Goals Statement (PT) walk   Pertinent History of Current Problem (include personal factors and/or comorbidities that impact the POC) hypoxia, UTI   Existing Precautions/Restrictions fall   Cognition   Cognitive Status Comments cooperative, conversationally appropriate   Pain Assessment   Patient Currently in Pain No   Integumentary/Edema   Integumentary/Edema no deficits were identifed   Posture    Posture Forward head position   Posture Comments rounded shoulders   Range of Motion (ROM)   Range of Motion ROM is WFL   Strength (Manual Muscle Testing)   Strength (Manual Muscle Testing) No deficits observed during functional mobility   Transfers   Comment, (Transfers) Cherise sit<>Stand with FWW, daughter reports this is baseline   Gait/Stairs (Locomotion)   Comment, (Gait/Stairs) 450' with CGA using FWW. No LOB, pt staying centered within 4WW on turns. Daughter reports that this looks like his baseline   Sensory Examination   Sensory Perception patient reports no sensory changes   Coordination   Coordination no deficits were identified   Muscle Tone   Muscle Tone no deficits were identified   Clinical Impression   Criteria for Skilled Therapeutic Intervention Evaluation only   Clinical Presentation (PT Evaluation Complexity) Stable/Uncomplicated   Clinical Presentation Rationale presents as medically diagnosed   Clinical Decision Making (Complexity) low complexity   Anticipated Equipment  Needs at Discharge (PT) walker, rolling   Risk & Benefits of therapy have been explained evaluation/treatment results reviewed;participants voiced agreement with care plan;participants included;patient;daughter   PT Total Evaluation Time   PT Eval, Low Complexity Minutes ([id number]) 15   PT Discharge Planning   PT Discharge Recommendation (DC Rec) Long term care facility   PT Rationale for DC Rec ready to return to LTC from PT standpoint   PT Brief overview of current status no acute rehab needs. Ambulating with CGA   Total Session Time   Total Session Time (sum of timed and untimed services) 15         Pt was educated on the role of physical therapy in their care, and indicated understanding.      Laisha Harvey, DPT 6/7/2023

## 2023-06-07 NOTE — PROGRESS NOTES
[facility]    Medicine Progress Note - Hospitalist Service    Date of Admission:  6/5/2023    Assessment & Plan     Riley Rodriguez is a 89 year old male admitted on 6/5/2023.  Presented with confusion and hypoxia.  Chest x-ray shows pulmonary edema.  UA suspicious for UTI.  Suspect CHF and sepsis secondary  to catheter associated UTI.  Discussed goals of care with the patient's family.  They do not want any aggressive measures such as vasopressors and transferred to ICU.  Would focus on comfort care if patient does not respond to IV antibiotics and IV Lasix.  Will order echocardiogram and EKG.     Acute hypoxic respiratory failure acute pulmonary edema likely due to acute  Diastolic Heart Failure  --Continue supplemental oxygen and wean as tolerated  --Chest x-ray reviewed  -- Transthoracic echocardiogram shows a left ventricle ejection fraction of 60 to 65% with moderate aortic stenosis mild mild stenosis and mild pulm hypertension  -- Continue IV Lasix 40 mg twice daily reassess in a.m.  -- Low-sodium diet.  Weigh patient daily and monitor I's and O's.  -- Fluid restriction 1500 mL/day  --We will start guaifenesin given humid cough  -- Procalcitonin noted  --Does not want aggressive measures if patient develops hypotension or decompensates,  family is open to comfort care if patient does not respond or develops complications     Sepsis secondary to catheter associated UTI  --Patient has indwelling suprapubic catheter  --Follow-up urine culture  --Continue empiric ceftriaxone  -- Lactic acid is normal     Chronic atrial fibrillation with mitral stenosis  -- On chronic Coumadin  -- Order INR  -- Pharmacy to dose Coumadin     Previous CVA and Bell's palsy  Macular degeneration.  Legally blind  -- Patient has chronic right-sided facial droop and has had surgery to address right eyelid  -- He is also hearing impaired  --  He is high risk of hospital-acquired delirium  --  Continue as needed  "Seroquel     Hyponatremia  -Euvolemic versus hypervolemic  --Continue diuretics as above     Goals of care  -- Patient has poor quality of life and requires help with activities of daily living he spends most of his time sitting in the chair.  Patient's daughters have help a lot and he and does not want any aggressive measures.  They understand that patient may not respond to current treatment and does not want aggressive measures such as vasopressors and transferred to ICU.          Diet: 2 Gram Sodium Diet  Fluid restriction 1500 ML FLUID    DVT Prophylaxis: Warfarin  Pierson Catheter: Not present  Lines: None     Cardiac Monitoring: ACTIVE order. Indication: Acute decompensated heart failure (48 hours)  Code Status: No CPR- Do NOT Intubate      Clinically Significant Risk Factors        # Hypokalemia: Lowest K = 3.2 mmol/L in last 2 days, will replace as needed           # Hypertension: Noted on problem list        # Overweight: Estimated body mass index is 26.23 kg/m  as calculated from the following:    Height as of this encounter: 1.803 m (5' 11\").    Weight as of this encounter: 85.3 kg (188 lb 0.8 oz)., PRESENT ON ADMISSION          Disposition Plan      Expected Discharge Date: 06/08/2023                  Antelmo Neri MD  Hospitalist Service  [facility]  Securely message with [employer] (more info)  Text page via [employer] Paging/Directory   ______________________________________________________________________    Interval History       Patient was seen and examined with daughters at bedside.  No headache, chest pain or fever.  He seems at his baseline and daughter seems happy with his progress.  He is diuresing well.    Likely discharge in a.m.    Physical Exam   Vital Signs: Temp: 98.5  F (36.9  C) Temp src: Oral BP: (!) 151/68 Pulse: 59   Resp: 18 SpO2: 97 % O2 Device: None (Room air) Oxygen Delivery: 1 LPM  Weight: 188 lbs .84 oz    Physical Exam  Constitutional:       Comments: Frail " elderly male   HENT:      Head: Normocephalic.      Mouth/Throat:      Mouth: Mucous membranes are moist.   Eyes:      Comments: Legally blind   Cardiovascular:      Rate and Rhythm: Normal rate.      Heart sounds: Murmur heard.   Pulmonary:      Effort: Pulmonary effort is normal.   Abdominal:      Palpations: Abdomen is soft.   Musculoskeletal:      Right lower leg: Edema present.      Left lower leg: Edema present.   Skin:     General: Skin is warm.   Neurological:      Mental Status: Mental status is at baseline.           Medical Decision Making       45 MINUTES SPENT BY ME on the date of service doing chart review, history, exam, documentation & further activities per the note.      Data   ------------------------- PAST 24 HR DATA REVIEWED -----------------------------------------------    I have personally reviewed the following data over the past 24 hrs:    9.0  \   10.9 (L)   / 176     131 (L) 97 (L) 12.9 /  125 (H)   3.2 (L) 27 0.80 \       Procal: N/A CRP: N/A Lactic Acid: 1.2       INR:  2.95 (H) PTT:  N/A   D-dimer:  N/A Fibrinogen:  N/A       Imaging results reviewed over the past 24 hrs:   Recent Results (from the past 24 hour(s))   Echocardiogram Complete   Result Value    LVEF  60-65%    Narrative    [id number]  [id number]  [id number]  [id number]^KRISTAL^[place] [address]     Name: ALEX MUELLER  MRN: [id number]  : 04/15/1926  Study Date: 2023 09:09 AM  Age: 89 yrs  Gender: Male  Patient Location: [facility]  Reason For Study: Heart Failure  Ordering Physician: JAYNE GIRALDO  Referring Physician: JAYNE GIRALDO  Performed By: MAGDALENE     BSA: 2.1 m2  Height: 70 in  Weight: 206 lb  HR: 61  ______________________________________________________________________________  Procedure  Complete Echo Adult. Definity (NDC #10466-504) given intravenously.  ______________________________________________________________________________  Interpretation Summary     Left  ventricular function is normal.The ejection fraction is 60-65%.  Right ventricular systolic pressure is elevated, consistent with mild  pulmonary hypertension.  Mildly decreased right ventricular systolic function  At least moderate Aortic stenosis, appears more significant based on 2D  appearance.  There is mild mitral stenosis.  IVC diameter >2.1 cm collapsing <50% with sniff suggests a high RA pressure  estimated at 15 mmHg or greater.  Moderate to severe valvular aortic stenosis.  ______________________________________________________________________________  Left Ventricle  Left ventricular function is normal.The ejection fraction is 60-65%. There is  mild concentric left ventricular hypertrophy. Diastolic function not assessed  due to significant mitral annular calcification. No regional wall motion  abnormalities noted.     Right Ventricle  The right ventricle is normal size. Mildly decreased right ventricular  systolic function. TAPSE is abnormal, which is consistent with abnormal right  ventricular systolic function. There is a pacemaker lead in the right  ventricle.     Atria  The left atrium is severely dilated. The right atrium is mild to moderately  dilated.     Mitral Valve  There is severe mitral annular calcification. There is mild (1+) mitral  regurgitation. There is mild mitral stenosis.     Tricuspid Valve  The tricuspid valve is not well visualized, but is grossly normal. There is  mild (1+) tricuspid regurgitation. Right ventricular systolic pressure is  elevated, consistent with mild pulmonary hypertension.     Aortic Valve  Moderate aortic valve calcification is present. The aortic valve is  trileaflet. Moderate to severe valvular aortic stenosis. The mean AoV pressure  gradient is 25.0 mmHg. At least moderate Aortic stenosis, appears more  significant based on 2D appearance.     Vessels  The aorta root is normal. Normal size ascending aorta. IVC diameter >2.1 cm  collapsing <50% with sniff  suggests a high RA pressure estimated at 15 mmHg or  greater.     Rhythm  The rhythm was atrial fibrillation.     ______________________________________________________________________________  MMode/2D Measurements & Calculations  IVSd: 1.3 cm  LVIDd: 4.2 cm  LVIDs: 2.6 cm  LVPWd: 1.2 cm  FS: 37.8 %  LV mass(C)d: 191.0 grams  LV mass(C)dI: 90.4 grams/m2  Ao root diam: 3.6 cm  LA dimension: 4.8 cm  asc Aorta Diam: 3.4 cm     LA/Ao: 1.3  LVOT diam: 2.2 cm  LVOT area: 3.8 cm2  LA Volume Indexed (AL/bp): 63.8 ml/m2  RV Base: 4.6 cm  RWT: 0.58  TAPSE: 1.2 cm     Time Measurements  MM HR: 55.0 BPM     Doppler Measurements & Calculations  MV E max michel: 163.0 cm/sec  MV max P.8 mmHg  MV mean P.0 mmHg  MV V2 VTI: 48.9 cm  MVA(VTI): 1.4 cm2  MV dec slope: 421.0 cm/sec2  MV dec time: 0.39 sec  Ao V2 max: 293.5 cm/sec  Ao max P.0 mmHg  Ao V2 mean: 239.0 cm/sec  Ao mean P.0 mmHg  Ao V2 VTI: 72.0 cm  KATHY(I,D): 0.94 cm2  KATHY(V,D): 1.1 cm2  LV V1 max P.7 mmHg  LV V1 max: 82.0 cm/sec  LV V1 VTI: 17.8 cm  SV(LVOT): 67.7 ml  SI(LVOT): 32.0 ml/m2  PA acc time: 0.07 sec  TR max michel: 295.0 cm/sec  TR max P.8 mmHg  AV Michel Ratio (DI): 0.28  KATHY Index (cm2/m2): 0.44  E/E' av.7  Lateral E/e': 15.1  Medial E/e': 20.2  RV S Michel: 9.7 cm/sec     ______________________________________________________________________________  Report approved by: Noemi Collins 2023 10:34 AM

## 2023-06-08 NOTE — PLAN OF CARE
Problem: Plan of Care - These are the overarching goals to be used throughout the patient stay.    Goal: Plan of Care Review  Description: The Plan of Care Review/Shift note should be completed every shift.  The Outcome Evaluation is a brief statement about your assessment that the patient is improving, declining, or no change.  This information will be displayed automatically on your shift note.  Outcome: Progressing  Pt alert and oriented.  Up in chair for most of shift.  Moved to room 202 due to noise at noc for daughter staying at bedside from door banging shut next to room which kept her awake.  Good urinary output after IV lasix.  Ambulated in hallway w/daughter with gaitbelt and walker.     Problem: Risk for Delirium  Goal: Optimal Coping  Outcome: Progressing     Problem: Heart Failure  Goal: Stable Heart Rate and Rhythm  Outcome: Progressing     Problem: Infection  Goal: Absence of Infection Signs and Symptoms  Outcome: Progressing   Goal Outcome Evaluation:

## 2023-06-08 NOTE — PROGRESS NOTES
[facility]    Medicine Progress Note - Hospitalist Service    Date of Admission:  6/5/2023    Assessment & Plan     Riley Rodriguez is a 89 year old male admitted on 6/5/2023.  Presented with confusion and hypoxia.  Chest x-ray shows pulmonary edema.  UA suspicious for UTI.  Suspect CHF and sepsis secondary  to catheter associated UTI.  Discussed goals of care with the patient's family.  They do not want any aggressive measures such as vasopressors and transferred to ICU.  Would focus on comfort care if patient does not respond to IV antibiotics and IV Lasix.      1.Acute hypoxic respiratory failure acute pulmonary edema likely due to acute  Diastolic Heart Failure  -wean off O2 if possible  -- Transthoracic echocardiogram shows a left ventricle ejection fraction of 60 to 65% with moderate aortic stenosis mild stenosis and mild pulm hypertension  - Continue IV Lasix 40 mg but change to daily only  -- Low-sodium diet.  Weigh patient daily and monitor I's and O's.  -- Fluid restriction 1500 mL/day     2.Sepsis secondary to catheter associated UTI  --Patient has indwelling suprapubic catheter  --Follow-up urine culture  --switch to cefepime, from ceftriaxone  --  Culture 50,000-100,000 CFU/mL Pseudomonas aeruginosa Abnormal        <10,000 CFU/mL Urogenital antonieta            Resulting Agency: [facility]     Susceptibility     Pseudomonas aeruginosa     RACHAEL     Amikacin 8 ug/mL Susceptible     Cefepime 4 ug/mL Susceptible     Ceftazidime 4 ug/mL Susceptible     Ciprofloxacin <=0.25 ug/mL Susceptible     Gentamicin 4 ug/mL Susceptible     Levofloxacin 0.5 ug/mL Susceptible     Meropenem <=0.25 ug/mL Susceptible     Piperacillin/Tazobactam <=4 ug/mL Susceptible     Tobramycin <=1 ug/mL Susceptible                      3.Chronic atrial fibrillation with mitral stenosis  -- On chronic Coumadin  -- daily  INR  -- Pharmacy to dose Coumadin     4.Previous CVA and Bell's palsy  Macular degeneration.  Legally  "blind  -- Patient has chronic right-sided facial droop and has had surgery to address right eyelid  -- He is also hearing impaired  --  He is high risk of hospital-acquired delirium--it helps that family stays with him here  --  Continue as needed Seroquel     5.Hyponatremia  -Euvolemic versus hypervolemic  --Continue diuretics as above, but decrease to daily   -trend NA, today 134     6.Goals of care  -  Patient's daughters have help a lot and he and does not want any aggressive measures.  -no icu or pressors    Dispo here with iv antibiotics likely thru weekend then back to LTC     Updated daughter in room this am on rounds  I did talk to charge RN about moving room and I am grateful for the accommodation for this patient and family        Diet: 2 Gram Sodium Diet  Fluid restriction 1500 ML FLUID    DVT Prophylaxis: Warfarin  Pierson Catheter: Not present  Lines: None     Cardiac Monitoring: ACTIVE order. Indication: Acute decompensated heart failure (48 hours)  Code Status: No CPR- Do NOT Intubate      Clinically Significant Risk Factors        # Hypokalemia: Lowest K = 3.2 mmol/L in last 2 days, will replace as needed           # Hypertension: Noted on problem list        # Overweight: Estimated body mass index is 26.44 kg/m  as calculated from the following:    Height as of this encounter: 1.803 m (5' 11\").    Weight as of this encounter: 86 kg (189 lb 9.6 oz)., PRESENT ON ADMISSION          Disposition Plan      Expected Discharge Date: 06/12/2023                  Daphney Manning MD  Hospitalist Service  [facility]  Securely message with [employer] (more info)  Text page via [facility] Paging/Directory   ______________________________________________________________________    Interval History   He woke for me  No pain  Daughter in room, notes family here stays 24/7 with him  She did ask for a different room with noise from adjoining staff room door frequent use and slamming      Physical Exam "   Vital Signs: Temp: 98.8  F (37.1  C) Temp src: Oral BP: 139/65 Pulse: 61   Resp: 18 SpO2: 95 % O2 Device: None (Room air)    Weight: 189 lbs 9.6 oz    Constitutional: awake, alert, cooperative and no apparent distress  Respiratory: No increased work of breathing, good air exchange, clear to auscultation bilaterally, no crackles or wheezing  Cardiovascular: Normal apical impulse, regular rate and rhythm, normal S1 and S2, no S3 or S4, and no murmur noted  GI: normal bowel sounds, soft, non-distended and non-tender  Skin: ecchymosis scattered  Musculoskeletal: no lower extremity pitting edema present  Neurologic: Mental Status Exam:  Level of Alertness:   awake  Neuropsychiatric: General: normal, calm , poor eye contact, blind    Medical Decision Making       55 MINUTES SPENT BY ME on the date of service doing chart review, history, exam, documentation & further activities per the note.      Data     I have personally reviewed the following data over the past 24 hrs:    7.7  \   10.8 (L)   / 192     134 (L) 96 (L) 13.6 /  124 (H)   3.6 28 0.81 \       INR:  2.96 (H) PTT:  N/A   D-dimer:  N/A Fibrinogen:  N/A       Imaging results reviewed over the past 24 hrs:   No results found for this or any previous visit (from the past 24 hour(s)).

## 2023-06-08 NOTE — PLAN OF CARE
Problem: Plan of Care - These are the overarching goals to be used throughout the patient stay.    Goal: Optimal Comfort and Wellbeing  Outcome: Progressing     Problem: Risk for Delirium  Goal: Optimal Coping  Outcome: Progressing  Goal: Improved Behavioral Control  Intervention: Minimize Safety Risk  Recent Flowsheet Documentation  Taken 6/8/2023 0120 by Eugene Head, RN  Enhanced Safety Measures: family to remain at bedside  Taken 6/7/2023 2006 by Eugene Head, RN  Enhanced Safety Measures: family to remain at bedside  Goal: Improved Sleep  Outcome: Progressing     Goal Outcome Evaluation: Pt denies any pain. Slept through the night. VSS. Afebrile. Ambulated the grimaldo x1. Supracatheter in place and patent. Output of ml. Intake of ml. Daughter bedside.

## 2023-06-08 NOTE — PLAN OF CARE
Problem: Risk for Delirium  Goal: Optimal Coping  Outcome: Progressing  Goal: Improved Behavioral Control  Intervention: Minimize Safety Risk  Recent Flowsheet Documentation  Taken 6/7/2023 2006 by Eugene Head RN  Enhanced Safety Measures: family to remain at bedside     Problem: Infection  Goal: Absence of Infection Signs and Symptoms  Outcome: Progressing     Goal Outcome Evaluation: Pt denies any pain. VSS. Afebrile. Ambulated the grimaldo x1. Supracatheter in place and patent. Output of 1750ml. Intake of 240ml. Total for the day 1380ml. ABX given per protocol. Daughter bedside

## 2023-06-09 NOTE — PLAN OF CARE
"  Problem: Plan of Care - These are the overarching goals to be used throughout the patient stay.    Goal: Absence of Hospital-Acquired Illness or Injury  Intervention: Identify and Manage Fall Risk  Recent Flowsheet Documentation  Safety Promotion/Fall Prevention:    nonskid shoes/slippers when out of bed    patient and family education  Intervention: Prevent Skin Injury  Taken 6/8/2023 1615 by Inés Lui RN  Body Position: position changed independently   Goal Outcome Evaluation:    Patient has denied discomfort all shift.  Sat up in chair until close to bedtime.  Perfers to be in chair as bed \"not very comfortable.\"  Ambulated with Daughters using walker, gait belt and assist of 2.  Remains on RA.  O2 sats - WNL.    Pierson patent and draining.  Daughter at bedside.  Staying overnight.  Patient now resting comfortably.  Continue to monitor.                 "

## 2023-06-09 NOTE — PROGRESS NOTES
[facility]    Medicine Progress Note - Hospitalist Service    Date of Admission:  6/5/2023    Assessment & Plan     Riley Rodriguez is a 89 year old male admitted on 6/5/2023.  Presented with confusion and hypoxia.  Chest x-ray shows pulmonary edema.  UA suspicious for UTI.  Suspect CHF and sepsis secondary  to catheter associated UTI.  Discussed goals of care with the patient's family.  They do not want any aggressive measures such as vasopressors and transferred to ICU.  Would focus on comfort care if patient does not respond to IV antibiotics and IV Lasix.      1.Acute hypoxic respiratory failure acute pulmonary edema likely due to acute  Diastolic Heart Failure  -wean off O2 if possible  -- Transthoracic echocardiogram shows a left ventricle ejection fraction of 60 to 65% with moderate aortic stenosis mild stenosis and mild pulm hypertension  - Continue IV Lasix 40 mg but change to daily only  -- Low-sodium diet.  Weigh patient daily and monitor I's and O's.  -- Fluid restriction 1500 mL/day     2.Sepsis secondary to catheter associated UTI  --Patient has indwelling suprapubic catheter  --Follow-up urine culture  --switch to cefepime, from ceftriaxone  --  Culture 50,000-100,000 CFU/mL Pseudomonas aeruginosa Abnormal         <10,000 CFU/mL Urogenital antonieta              Resulting Agency: [facility]      Susceptibility              Pseudomonas aeruginosa       RACHAEL       Amikacin 8 ug/mL Susceptible       Cefepime 4 ug/mL Susceptible       Ceftazidime 4 ug/mL Susceptible       Ciprofloxacin <=0.25 ug/mL Susceptible       Gentamicin 4 ug/mL Susceptible       Levofloxacin 0.5 ug/mL Susceptible       Meropenem <=0.25 ug/mL Susceptible       Piperacillin/Tazobactam <=4 ug/mL Susceptible       Tobramycin <=1 ug/mL Susceptible                          3.Chronic atrial fibrillation with mitral stenosis  -- On chronic Coumadin  -- daily  INR  -- Pharmacy to dose Coumadin     4.Previous CVA and Bell's  "palsy  Macular degeneration.  Legally blind  -- Patient has chronic right-sided facial droop and has had surgery to address right eyelid  -- He is also hearing impaired  --  He is high risk of hospital-acquired delirium--it helps that family stays with him here  --  Continue as needed Seroquel  -family does get him up for walks and thinks he is moving about 70% of normal amount now      5.Hyponatremia  -Euvolemic versus hypervolemic  --Continue diuretics as above, but decrease to daily   -trend NA, today 133    6.Hypokalemia  -replace     7.Goals of care  -  Patient's daughters have help a lot and he and does not want any aggressive measures.  -no icu or pressors     Dispo here with iv antibiotics likely thru weekend then back to LTC     Updated daughter in room this am on rounds               Diet: 2 Gram Sodium Diet  Fluid restriction 1500 ML FLUID    DVT Prophylaxis: Warfarin  Pierson Catheter: Not present  Lines: None     Cardiac Monitoring: ACTIVE order. Indication: chf  Code Status: No CPR- Do NOT Intubate      Clinically Significant Risk Factors        # Hypokalemia: Lowest K = 3.3 mmol/L in last 2 days, will replace as needed           # Hypertension: Noted on problem list        # Overweight: Estimated body mass index is 26.44 kg/m  as calculated from the following:    Height as of this encounter: 1.803 m (5' 11\").    Weight as of this encounter: 86 kg (189 lb 9.6 oz)., PRESENT ON ADMISSION          Disposition Plan     Expected Discharge Date: 06/12/2023                  Daphney Manning MD  Hospitalist Service  [facility]  Securely message with [employer] (more info)  Text page via [employer] Paging/Directory   ______________________________________________________________________    Interval History   Moving to new room helped  He slept and daughter happier  He denied pain  Ate ok yesterday  Daughter got him up for walks in [place]      Physical Exam   Vital Signs: Temp: 98.8  F (37.1  C) " Temp src: Oral BP: (!) 158/71 Pulse: 71   Resp: 16 SpO2: 93 % O2 Device: None (Room air)    Weight: 189 lbs 9.6 oz    Constitutional: awake, fatigued, alert, cooperative and no apparent distress  Respiratory: No increased work of breathing, good air exchange, clear to auscultation bilaterally, no crackles or wheezing  Cardiovascular: Normal apical impulse, regular rate and rhythm, normal S1 and S2, no S3 or S4, and no murmur noted  GI: normal bowel sounds, soft, non-distended and non-tender  Skin: no bruising or bleeding  Musculoskeletal: no lower extremity pitting edema present  Neurologic: Mental Status Exam:  Level of Alertness:   awake  blind  Neuropsychiatric: Affect: normal and pleasant    Medical Decision Making       35 MINUTES SPENT BY ME on the date of service doing chart review, history, exam, documentation & further activities per the note.      Data     I have personally reviewed the following data over the past 24 hrs:    6.9  \   11.2 (L)   / 226     133 (L) 97 (L) 13.0 /  125 (H)   3.3 (L) 25 0.75 \       INR:  2.56 (H) PTT:  N/A   D-dimer:  N/A Fibrinogen:  N/A       Imaging results reviewed over the past 24 hrs:   No results found for this or any previous visit (from the past 24 hour(s)).

## 2023-06-09 NOTE — PLAN OF CARE
Problem: Plan of Care - These are the overarching goals to be used throughout the patient stay.    Goal: Optimal Comfort and Wellbeing  Outcome: Progressing     Problem: Risk for Delirium  Goal: Optimal Coping  Outcome: Progressing  Goal: Improved Behavioral Control  Outcome: Progressing  Intervention: Minimize Safety Risk  Recent Flowsheet Documentation  Taken 6/9/2023 0216 by Eugene Head RN  Enhanced Safety Measures: family to remain at bedside  Goal: Improved Attention and Thought Clarity  Outcome: Progressing  Goal: Improved Sleep  Outcome: Progressing     Goal Outcome Evaluation: Pt denies any pain. Slept through the night. VSS. Afebrile. Supracatheter in place and patent. Output of 800ml. Intake of 0ml. ABX given per protocol. Daughter bedside.

## 2023-06-10 NOTE — PLAN OF CARE
Goal Outcome Evaluation:  Denies pain.  Ate well for supper meal. On Fluid Restriction.  Reports no BM in a couple of days, had a few hard pepples today daughter said. Gave 2 senna at bedtime. Offered suppository, pt declined but could offer again in am if still no BM.  IV Sl'd. Gets IV Cefipime every 8 hours for UTI treatment.  Has SP catheter. Draining well, urine yellow.   Electrolyte replacements completed on day shift and rechecks ordered for am.  Moving well, walked in hallways with daughters with gaitbelt and walker and sat in chair all shift.

## 2023-06-10 NOTE — PROGRESS NOTES
[facility]    Medicine Progress Note - Hospitalist Service    Date of Admission:  6/5/2023    Assessment & Plan     Riley Rodriguez is a 89 year old male admitted on 6/5/2023.  Presented with confusion and hypoxia.  Chest x-ray shows pulmonary edema.  UA suspicious for UTI.  Suspect CHF and sepsis secondary  to catheter associated UTI.  Discussed goals of care with the patient's family.  They do not want any aggressive measures such as vasopressors and transferred to ICU.  Would focus on comfort care if patient does not respond to IV antibiotics and IV Lasix.      1.Acute hypoxic respiratory failure acute pulmonary edema likely due to acute  Diastolic Heart Failure  -wean off O2 if possible  -- Transthoracic echocardiogram shows a left ventricle ejection fraction of 60 to 65% with moderate aortic stenosis mild stenosis and mild pulm hypertension  -  Lasix 40 mg but change to daily only and make it po  now  -- Low-sodium diet.  Weigh patient daily and monitor I's and O's.  -- Fluid restriction 1500 mL/day--loosen to 1800 ml /Day     2.Sepsis secondary to catheter associated UTI  --Patient has indwelling suprapubic catheter  --Follow-up urine culture  --switch to cefepime, from ceftriaxone  --  Culture 50,000-100,000 CFU/mL Pseudomonas aeruginosa Abnormal         <10,000 CFU/mL Urogenital antonieta              Resulting Agency: [facility]      Susceptibility                   Pseudomonas aeruginosa       RACHAEL       Amikacin 8 ug/mL Susceptible       Cefepime 4 ug/mL Susceptible       Ceftazidime 4 ug/mL Susceptible       Ciprofloxacin <=0.25 ug/mL Susceptible       Gentamicin 4 ug/mL Susceptible       Levofloxacin 0.5 ug/mL Susceptible       Meropenem <=0.25 ug/mL Susceptible       Piperacillin/Tazobactam <=4 ug/mL Susceptible       Tobramycin <=1 ug/mL Susceptible                          3.Chronic atrial fibrillation with mitral stenosis  -- On chronic Coumadin  -- daily  INR  -- Pharmacy to dose  "Coumadin     4.Previous CVA and Bell's palsy  Macular degeneration.  Legally blind  -- Patient has chronic right-sided facial droop and has had surgery to address right eyelid  -- He is also hearing impaired  --  He is high risk of hospital-acquired delirium--it helps that family stays with him here  --  Continue as needed Seroquel  -family does get him up for walks      5.Hyponatremia  -Euvolemic versus hypervolemic  --Continue diuretics as above, but decrease to daily   -trend NA, today 133     6.Hypokalemia  -replaced     7.Goals of care  -  Patient's daughters have help a lot and he and does not want any aggressive measures.  -no icu or pressors     Dispo here with iv antibiotics likely thru weekend then back to LTC     Updated daughter in room this am on rounds-family always present                             Diet: 2 Gram Sodium Diet  Fluid restriction 1800 ML FLUID    DVT Prophylaxis: Warfarin  Pierson Catheter: Not present  Lines: None     Cardiac Monitoring: ACTIVE order. Indication: sepsis  Code Status: No CPR- Do NOT Intubate      Clinically Significant Risk Factors        # Hypokalemia: Lowest K = 3.3 mmol/L in last 2 days, will replace as needed           # Hypertension: Noted on problem list        # Overweight: Estimated body mass index is 26.3 kg/m  as calculated from the following:    Height as of this encounter: 1.803 m (5' 11\").    Weight as of this encounter: 85.5 kg (188 lb 9.6 oz).           Disposition Plan     Expected Discharge Date: 06/12/2023                  Daphney Manning MD  Hospitalist Service  [facility]  Securely message with [employer] (more info)  Text page via [employer] Paging/Directory   ______________________________________________________________________    Interval History   He was sleeping this am  Daughter notes walked well yesterday  Still not eating as much  No pain    Physical Exam   Vital Signs: Temp: 98.7  F (37.1  C) Temp src: Oral BP: (!) 142/66 " Pulse: 61   Resp: 20 SpO2: 96 % O2 Device: None (Room air)    Weight: 188 lbs 9.6 oz    Constitutional: sleeping  Respiratory: No increased work of breathing, good air exchange, clear to auscultation bilaterally, no crackles or wheezing  Cardiovascular: Normal apical impulse, regular rate and rhythm, normal S1 and S2, no S3 or S4, and no murmur noted  GI: normal bowel sounds, soft, non-distended and non-tender  Skin: no bruising or bleeding  Musculoskeletal: no lower extremity pitting edema present  Neurologic: Mental Status Exam:  Level of Alertness:   Sleeping this am      Medical Decision Making       35 MINUTES SPENT BY ME on the date of service doing chart review, history, exam, documentation & further activities per the note.      Data     I have personally reviewed the following data over the past 24 hrs:    6.2  \   11.4 (L)   / 232     133 (L) 98 13.7 /  122 (H)   3.4 26 0.80 \       INR:  2.71 (H) PTT:  N/A   D-dimer:  N/A Fibrinogen:  N/A       Imaging results reviewed over the past 24 hrs:   No results found for this or any previous visit (from the past 24 hour(s)).

## 2023-06-10 NOTE — PLAN OF CARE
Goal Outcome Evaluation:    Problem: Risk for Delirium  Goal: Optimal Coping  Outcome: Progressing  Family present at bedside throughout shift, supportive of patient and helpful with some cares and taking patient on walks in hallways.     Pt hadn't had a BM since prior to admission, given a PRN suppository with results this shift.     K, Mg, and Phos protocols, he is receiving oral replacement of both potassium and phosphorous today.

## 2023-06-11 NOTE — PLAN OF CARE
Problem: Heart Failure  Goal: Stable Heart Rate and Rhythm  Outcome: Progressing     Problem: Heart Failure  Goal: Fluid and Electrolyte Balance  Outcome: Progressing     Problem: Infection  Goal: Absence of Infection Signs and Symptoms  Outcome: Progressing     Problem: Gas Exchange Impaired  Goal: Optimal Gas Exchange  Outcome: Progressing   Goal Outcome Evaluation:    VSS on RA. No signs of pain. Iv antibiotics given per orders. Slept between cares. Pt's daughter at bedside during the night. Suprapubic catheter patent with good urine output.

## 2023-06-11 NOTE — PROGRESS NOTES
[facility]    Medicine Progress Note - Hospitalist Service    Date of Admission:  6/5/2023    Assessment & Plan       Riley Rodriguez is a 89 year old male admitted on 6/5/2023.  Presented with confusion and hypoxia.  Chest x-ray shows pulmonary edema.  UA suspicious for UTI.  Suspect CHF and sepsis secondary  to catheter associated UTI.  Discussed goals of care with the patient's family.  They do not want any aggressive measures such as vasopressors and transferred to ICU.       1.Acute hypoxic respiratory failure acute pulmonary edema likely due to acute  Diastolic Heart Failure  --Now on RA  -- Transthoracic echocardiogram shows a left ventricle ejection fraction of 60 to 65% with moderate aortic stenosis mild stenosis and mild pulm hypertension  -  Lasix 40 mg but changed to daily only and make it po    -- Low-sodium diet.  Weigh patient daily and monitor I's and O's.  -- Fluid restriction --loosen to 2000 ml /Day     2.Sepsis secondary to catheter associated UTI  --Patient has indwelling suprapubic catheter  --Follow-up urine culture  --switched to cefepime, from ceftriaxone  --  Culture 50,000-100,000 CFU/mL Pseudomonas aeruginosa Abnormal         <10,000 CFU/mL Urogenital antonieta              Resulting Agency: [facility]DL      Susceptibility                   Pseudomonas aeruginosa       RACHAEL       Amikacin 8 ug/mL Susceptible       Cefepime 4 ug/mL Susceptible       Ceftazidime 4 ug/mL Susceptible       Ciprofloxacin <=0.25 ug/mL Susceptible       Gentamicin 4 ug/mL Susceptible       Levofloxacin 0.5 ug/mL Susceptible       Meropenem <=0.25 ug/mL Susceptible       Piperacillin/Tazobactam <=4 ug/mL Susceptible       Tobramycin <=1 ug/mL Susceptible                          3.Chronic atrial fibrillation with mitral stenosis  -- On chronic Coumadin  -- daily  INR  -- Pharmacy to dose Coumadin     4.Previous CVA and Bell's palsy  Macular degeneration.  Legally blind  -- Patient has chronic  "right-sided facial droop and has had surgery to address right eyelid  -- He is also hearing impaired  --  He is high risk of hospital-acquired delirium--it helps that family stays with him here  --  Continue as needed Seroquel  -family does get him up for walks here and are hoping get pt./ot at NH too     5.Hyponatremia  -Euvolemic versus hypervolemic  --Continue diuretics as above, but decrease to daily   -trend NA, today 134     6.Hypokalemia  -replaced     7.Goals of care  -  Patient's daughters have help a lot and he and does not want any aggressive measures.  -no icu or pressors     Dispo here with iv antibiotics likely thru weekend then back to LTC     Updated daughters x 2 in room today                 Diet: 2 Gram Sodium Diet  Fluid restriction 2000 ML FLUID    DVT Prophylaxis: Warfarin  Pierson Catheter: Not present  Lines: None     Cardiac Monitoring: ACTIVE order. Indication: sepsis  Code Status: No CPR- Do NOT Intubate      Clinically Significant Risk Factors                  # Hypertension: Noted on problem list        # Overweight: Estimated body mass index is 26.19 kg/m  as calculated from the following:    Height as of this encounter: 1.803 m (5' 11\").    Weight as of this encounter: 85.2 kg (187 lb 12.8 oz).           Disposition Plan     Expected Discharge Date: 06/12/2023                  Daphney Manning MD  Hospitalist Service  [facility]  Securely message with [employer] (more info)  Text page via [employer] Paging/Directory   ______________________________________________________________________    Interval History   He was up in chair eating  No pain  Daughters feel he is doing well and are hopeful about getting back to NH tomorrow  They would  Like pt/ot at NH    Physical Exam   Vital Signs: Temp: 98.3  F (36.8  C) Temp src: Oral BP: 131/58 Pulse: 67   Resp: 20 SpO2: 95 % O2 Device: None (Room air)    Weight: 187 lbs 12.8 oz    Constitutional: awake, alert, cooperative and no " apparent distress  Respiratory: No increased work of breathing, good air exchange, clear to auscultation bilaterally, no crackles or wheezing  Cardiovascular: Normal apical impulse, regular rate and rhythm, normal S1 and S2, no S3 or S4, and no murmur noted  GI: normal bowel sounds, soft, non-distended and non-tender  Skin: many SK's on skin, buddy back  Musculoskeletal: no lower extremity pitting edema present  Neurologic: Mental Status Exam:  Level of Alertness:   awake  Neuropsychiatric: General: normal, calm and blind    Medical Decision Making       35 MINUTES SPENT BY ME on the date of service doing chart review, history, exam, documentation & further activities per the note.      Data     I have personally reviewed the following data over the past 24 hrs:    6.9  \   11.4 (L)   / 241     134 (L) 100 12.9 /  116 (H)   3.9 25 0.77 \       INR:  2.76 (H) PTT:  N/A   D-dimer:  N/A Fibrinogen:  N/A       Imaging results reviewed over the past 24 hrs:   No results found for this or any previous visit (from the past 24 hour(s)).

## 2023-06-11 NOTE — PROGRESS NOTES
Per MD, patient can discharge tomorrow afternoon after IV medications. Family asking for home PT/OT. Referral sent to [facility] for screening.     Per notes, patient is from [facility] ([address] [place] [address]). Wheelchair transport has been set up at approx. 1500 (240 PM - 325 PM) tomorrow Monday 6/12.     AKI spoke with Misa from [facility] - updated her on plan for 3 PM ride tomorrow. She asks for orders in the morning when available. AKI to confirm with facility tomorrow.

## 2023-06-11 NOTE — PLAN OF CARE
Problem: Plan of Care - These are the overarching goals to be used throughout the patient stay.    Goal: Optimal Comfort and Wellbeing  Outcome: Progressing     Problem: Heart Failure  Goal: Optimal Functional Ability  Outcome: Progressing  Intervention: Optimize Functional Ability  Recent Flowsheet Documentation  Taken 6/10/2023 1647 by Mandi Linton, RN  Activity Management: up in chair     Problem: Gas Exchange Impaired  Goal: Optimal Gas Exchange  Outcome: Progressing     Shift summary: Patient VSS on RA, oriented x 2-3, somewhat oriented to place/time. Denies pain. Telemetry reading V paced rhythm w/ BBB. Patient performing PT exercises in chair this afternoon, up for walk with daughters this evening. IV abx per orders. Daughter remains at bedside.     Mandi Linton, RN 6/10/2023   [phone]

## 2023-06-11 NOTE — PLAN OF CARE
Goal Outcome Evaluation:    Problem: Plan of Care - These are the overarching goals to be used throughout the patient stay.    Goal: Readiness for Transition of Care  Outcome: Progressing  Planning to discharge tomorrow back to [facility] per social work notes. See their notes for further details on timing.     Problem: Infection  Goal: Absence of Infection Signs and Symptoms  Outcome: Progressing  IV antibiotics for UTI treatment.     Senna was held this morning due to multiple large results after yesterday's suppository.     Pt has had daughters present throughout the shift, supportive and helpful of patient.

## 2023-06-12 NOTE — PROGRESS NOTES
Care Management Discharge Note    Discharge Date: 06/12/2023       Discharge Disposition: Skilled Nursing Facility    Discharge Services:  Home PT    Discharge DME:  n/a    Discharge Transportation:   [employer]     Private pay costs discussed: transportation costs    Education Provided on the Discharge Plan:  yes  Persons Notified of Discharge Plans: yes  Patient/Family in Agreement with the Plan: yes      Handoff Referral Completed: Yes    Additional Information:  Pt is anticipated to return to LTC bed via [facility] at 1500. All parties aware and agreeable to discharge plan. Home PT OT order added per family request. No PAS needed.  8:07 AM    KENYATTA Segovia  6/12/2023

## 2023-06-12 NOTE — PROGRESS NOTES
[facility]    Medicine Progress Note - Hospitalist Service    Date of Admission:  6/5/2023    Assessment & Plan     Riley Rodriguez is a 89 year old male admitted on 6/5/2023.  Presented with confusion and hypoxia.  Chest x-ray shows pulmonary edema.  UA suspicious for UTI.  Suspect CHF and sepsis secondary  to catheter associated UTI.  Discussed goals of care with the patient's family.  They do not want any aggressive measures such as vasopressors and transferred to ICU.       1.Acute hypoxic respiratory failure acute pulmonary edema likely due to acute  Diastolic Heart Failure  --Now on RA  -- Transthoracic echocardiogram shows a left ventricle ejection fraction of 60 to 65% with moderate aortic stenosis mild stenosis and mild pulm hypertension  -  Lasix 40 mg but changed to daily only and make it po    -- Low-sodium diet.  Weigh patient daily and monitor I's and O's.  -- Fluid restriction --loosen to 2000 ml /Day  --will send back to NH with lasix and fluid restrictions and check labs there too     2.Sepsis secondary to catheter associated UTI  --Patient has indwelling suprapubic catheter  --switched to cefepime, from ceftriaxone  --  Culture 50,000-100,000 CFU/mL Pseudomonas aeruginosa Abnormal         <10,000 CFU/mL Urogenital antonieta              Resulting Agency: [facility]DL      Susceptibility                   Pseudomonas aeruginosa       RACHAEL       Amikacin 8 ug/mL Susceptible       Cefepime 4 ug/mL Susceptible       Ceftazidime 4 ug/mL Susceptible       Ciprofloxacin <=0.25 ug/mL Susceptible       Gentamicin 4 ug/mL Susceptible       Levofloxacin 0.5 ug/mL Susceptible       Meropenem <=0.25 ug/mL Susceptible       Piperacillin/Tazobactam <=4 ug/mL Susceptible       Tobramycin <=1 ug/mL Susceptible                  --interesting that he greatly improved prior to switching the antibiotic        3.Chronic atrial fibrillation with mitral stenosis  -- On chronic  "Coumadin  -- daily  INR  -- Pharmacy to dose Coumadin     4.Previous CVA and Bell's palsy  Macular degeneration.  Legally blind  -- Patient has chronic right-sided facial droop and has had surgery to address right eyelid  -- He is also hearing impaired  --  He is high risk of hospital-acquired delirium--it helps that family stays with him here greatly helped  --  Continue as needed Seroquel  -family does get him up for walks here and are hoping get pt./ot at NH too     5.Hyponatremia  -Euvolemic versus hypervolemic  --Continue diuretics as above, but decrease to daily   -trend NA, today 133     6.Hypokalemia  -replaced again here today and mag good today     7.Goals of care  -  Patient's daughters have help a lot and he and does not want any aggressive measures.  -no icu or pressors     Daughter here and updated            Diet: 2 Gram Sodium Diet  Fluid restriction 2000 ML FLUID    DVT Prophylaxis: Warfarin  Pierson Catheter: Not present  Lines: None     Cardiac Monitoring: ACTIVE order. Indication: Tachyarrhythmias, acute (48 hours)  Code Status: No CPR- Do NOT Intubate      Clinically Significant Risk Factors        # Hypokalemia: Lowest K = 3.3 mmol/L in last 2 days, will replace as needed           # Hypertension: Noted on problem list        # Overweight: Estimated body mass index is 26.19 kg/m  as calculated from the following:    Height as of this encounter: 1.803 m (5' 11\").    Weight as of this encounter: 85.2 kg (187 lb 12.8 oz).           Disposition Plan     Expected Discharge Date: 06/12/2023,  3:00 PM      Discharge Comments: WC transport at 1500 Monday 6/12  from [facility]          Daphney Manning MD  Hospitalist Service  [facility]  Securely message with [employer] (more info)  Text page via [employer] Paging/Directory   ______________________________________________________________________    Interval History   He feels good  No pain  Daughter notes cough much improved  Urine looks " good in cath  Eating well      Physical Exam   Vital Signs: Temp: 97.4  F (36.3  C) Temp src: Oral BP: (!) 158/79 Pulse: 58   Resp: 16 SpO2: 97 % O2 Device: None (Room air)    Weight: 187 lbs 12.8 oz    Constitutional: awake, alert, cooperative and no apparent distress  Respiratory: No increased work of breathing, good air exchange, clear to auscultation bilaterally, no crackles or wheezing  Cardiovascular: Normal apical impulse, regular rate and rhythm, normal S1 and S2, no S3 or S4, and no murmur noted  GI: normal bowel sounds, soft, non-distended and non-tender  Skin: ecchymosis scattered  Musculoskeletal: no lower extremity pitting edema present  Neurologic: Mental Status Exam:  Level of Alertness:   Awake, blind  Neuropsychiatric: Affect: normal and pleasant      Data     I have personally reviewed the following data over the past 24 hrs:    7.1  \   11.3 (L)   / 252     133 (L) 98 12.6 /  121 (H)   3.3 (L) 24 0.75 \       INR:  2.66 (H) PTT:  N/A   D-dimer:  N/A Fibrinogen:  N/A       Imaging results reviewed over the past 24 hrs:   No results found for this or any previous visit (from the past 24 hour(s)).

## 2023-06-12 NOTE — PLAN OF CARE
Patient discharging back to [facility].  Family at bedside and aware of plan.  Belongings sent with patient.  Transport by [employer] transportation via w/c.

## 2023-06-12 NOTE — PLAN OF CARE
Problem: Risk for Delirium  Goal: Improved Sleep  Outcome: Progressing     Problem: Heart Failure  Goal: Optimal Functional Ability  Outcome: Progressing  Intervention: Optimize Functional Ability  Recent Flowsheet Documentation  Taken 6/12/2023 0030 by Pascale Rivas, RN  Activity Management: activity adjusted per tolerance     Problem: Heart Failure  Goal: Effective Oxygenation and Ventilation  Intervention: Optimize Oxygenation and Ventilation  Recent Flowsheet Documentation  Taken 6/12/2023 0030 by Pascale Rivas, RN  Head of Bed (HOB) Positioning: HOB at 20-30 degrees   Goal Outcome Evaluation:    Patient alert, oriented x 3. Denied discomfort. Tolerated cefepime okay. Slept well overnight. SP patent thus far and draining urine okay.

## 2023-06-12 NOTE — PLAN OF CARE
Problem: Risk for Delirium  Goal: Optimal Coping  Outcome: Progressing   Goal Outcome Evaluation:         Patient walking on the unit with daughter, denies pain and no signs or symptoms of delirium noted.Lucía Churchill RN

## 2023-06-12 NOTE — DISCHARGE SUMMARY
[facility] MEDICINE  DISCHARGE SUMMARY     Primary Care Physician: Jac Pitt  Admission Date: 6/5/2023   Discharge Provider: Daphney Manning MD Discharge Date: 6/12/2023   Diet:   Active Diet and Nourishment Order   Procedures     Fluid restriction 2000 ML FLUID     2 Gram Sodium Diet     Diet       Code Status: No CPR- Do NOT Intubate   Activity: DCACTIVITY: Activity as tolerated        Condition at Discharge: Fair     REASON FOR PRESENTATION(See Admission Note for Details)   Sepsis, HF    PRINCIPAL & ACTIVE DISCHARGE DIAGNOSES     Principal Problem:    Hypoxia  Active Problems:    Urinary tract infection without hematuria, site unspecified      PENDING LABS     Unresulted Labs Ordered in the Past 30 Days of this Admission     No orders found from 5/6/2023 to 6/6/2023.            PROCEDURES ( this hospitalization only)          RECOMMENDATIONS TO OUTPATIENT PROVIDER FOR F/U VISIT     Follow-up Appointments     Follow Up and recommended labs and tests      Follow up with retirement physician.  The following labs/tests are   recommended: daily inr and call MD for labs and adjust coumadin, needs   BMP, mag every Tues/Friday and call to MD at [facility].  Follow up with primary care provider in 1 week                 DISPOSITION     Skilled Nursing Facility    SUMMARY OF HOSPITAL COURSE:    Riley Rodriguez is a 89 year old male admitted on 6/5/2023.  Presented with confusion and hypoxia.  Chest x-ray shows pulmonary edema.  UA suspicious for UTI.  Suspect CHF and sepsis secondary  to catheter associated UTI.  Discussed goals of care with the patient's family.  They do not want any aggressive measures such as vasopressors and transferred to ICU.       1.Acute hypoxic respiratory failure acute pulmonary edema likely due to acute  Diastolic Heart Failure  --Now on RA  -- Transthoracic echocardiogram shows a left ventricle ejection fraction of 60 to 65% with moderate aortic  stenosis mild stenosis and mild pulm hypertension  -  Lasix 40 mg but changed to daily only and make it po    -- Low-sodium diet.  Weigh patient daily and monitor I's and O's.  -- Fluid restriction --loosen to 2000 ml /Day  --will send back to NH with lasix and fluid restrictions and check labs there too     2.Sepsis secondary to catheter associated UTI  --Patient has indwelling suprapubic catheter  --switched to cefepime, from ceftriaxone, completed antibiotics here  --  Culture 50,000-100,000 CFU/mL Pseudomonas aeruginosa Abnormal         <10,000 CFU/mL Urogenital antonieta              Resulting Agency: [facility]      Susceptibility                   Pseudomonas aeruginosa       RACHAEL       Amikacin 8 ug/mL Susceptible       Cefepime 4 ug/mL Susceptible       Ceftazidime 4 ug/mL Susceptible       Ciprofloxacin <=0.25 ug/mL Susceptible       Gentamicin 4 ug/mL Susceptible       Levofloxacin 0.5 ug/mL Susceptible       Meropenem <=0.25 ug/mL Susceptible       Piperacillin/Tazobactam <=4 ug/mL Susceptible       Tobramycin <=1 ug/mL Susceptible                  --interesting that he greatly improved prior to switching the antibiotic        3.Chronic atrial fibrillation with mitral stenosis  -- On chronic Coumadin  -- daily  INR  -- Pharmacy to dose Coumadin     4.Previous CVA and Bell's palsy  Macular degeneration.  Legally blind  -- Patient has chronic right-sided facial droop and has had surgery to address right eyelid  -- He is also hearing impaired  --  He is high risk of hospital-acquired delirium--it helps that family stays with him here greatly helped  --  Continue as needed Seroquel  -family does get him up for walks here and are hoping get pt./ot at NH too     5.Hyponatremia  -Euvolemic versus hypervolemic  --Continue diuretics as above, but decrease to daily   -trend NA, today 133     6.Hypokalemia  -replaced again here today and mag good today     7.Goals of care  -  Patient's daughters have help a lot and he and  "does not want any aggressive measures.  -no icu or pressors     Daughter here and updated                 Diet: 2 Gram Sodium Diet  Fluid restriction 2000 ML FLUID    DVT Prophylaxis: Warfarin  Pierson Catheter: Not present  Lines: None     Cardiac Monitoring: ACTIVE order. Indication: Tachyarrhythmias, acute (48 hours)  Code Status: No CPR- Do NOT Intubate          Clinically Significant Risk Factors         # Hypokalemia: Lowest K = 3.3 mmol/L in last 2 days, will replace as needed           # Hypertension: Noted on problem list        # Overweight: Estimated body mass index is 26.19 kg/m  as calculated from the following:    Height as of this encounter: 1.803 m (5' 11\").    Weight as of this encounter: 85.2 kg (187 lb 12.8 oz).                  Disposition Plan     Expected Discharge Date: 06/12/2023,  3:00 PM      Discharge Comments:  transport at 1500 Monday 6/12  from [facility]                        Discharge Medications with Med changes:     Current Discharge Medication List      START taking these medications    Details   furosemide (LASIX) 40 MG tablet Take 1 tablet (40 mg) by mouth daily Please check blood pressure before giving and  hold if sbp<110  Qty: 30 tablet, Refills: 0    Associated Diagnoses: Diastolic heart failure, unspecified HF chronicity (H)      magnesium oxide (MAG-OX) 400 MG tablet Take 1 tablet (400 mg) by mouth every 4 hours for 2 doses  Qty: 2 tablet, Refills: 0    Associated Diagnoses: Hypomagnesemia      miconazole (MICATIN) 2 % external cream Apply topically 2 times daily For between toes  Qty: 35 g, Refills: 0    Associated Diagnoses: Rash and nonspecific skin eruption      potassium chloride ER (KLOR-CON M) 10 MEQ CR tablet Take 1 tablet (10 mEq) by mouth daily for 3 days  Qty: 3 tablet, Refills: 0    Associated Diagnoses: Hypokalemia         CONTINUE these medications which have CHANGED    Details   acetaminophen (TYLENOL) 325 MG tablet Take 2 tablets (650 mg) by mouth every 6 " hours as needed for mild pain or other (and adjunct with moderate or severe pain or per patient request)  Qty: 20 tablet, Refills: 0    Associated Diagnoses: Pain      atorvastatin (LIPITOR) 40 MG tablet Take 1 tablet (40 mg) by mouth At Bedtime  Qty: 30 tablet, Refills: 0    Associated Diagnoses: Paroxysmal atrial fibrillation (H)      bisacodyl (DULCOLAX) 10 MG suppository Place 1 suppository (10 mg) rectally daily as needed for constipation  Qty: 10 suppository, Refills: 0    Associated Diagnoses: Constipation, unspecified constipation type      docusate sodium (COLACE) 100 MG capsule Take 1 capsule (100 mg) by mouth 2 times daily as needed for constipation  Qty: 30 capsule, Refills: 0    Associated Diagnoses: Constipation, unspecified constipation type      dorzolamide-timolol (COSOPT) 2-0.5 % ophthalmic solution Place 1 drop Into the left eye 2 times daily  Qty: 10 mL, Refills: 12    Associated Diagnoses: Glaucoma suspect, bilateral      guaiFENesin (ROBITUSSIN) 100 MG/5ML SYRP Take 10 mLs by mouth every 4 hours as needed for cough  Qty: 473 mL, Refills: 0    Associated Diagnoses: Cough, unspecified type      latanoprost (XALATAN) 0.005 % ophthalmic solution Place 1 drop Into the left eye At Bedtime  Qty: 2.5 mL, Refills: 12    Associated Diagnoses: Glaucoma suspect, bilateral      levothyroxine (SYNTHROID/LEVOTHROID) 25 MCG tablet Take 1 tablet (25 mcg) by mouth daily  Qty: 30 tablet, Refills: 0    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      menthol-zinc oxide (CALMOSEPTINE) 0.44-20.6 % OINT ointment Apply topically daily To buttock  Qty: 71 g, Refills: 0    Associated Diagnoses: Rash and nonspecific skin eruption      omeprazole (PRILOSEC) 20 MG DR capsule Take 1 capsule (20 mg) by mouth daily  Qty: 30 capsule, Refills: 0    Associated Diagnoses: Gastroesophageal reflux disease with esophagitis, unspecified whether hemorrhage      warfarin ANTICOAGULANT (COUMADIN) 1 MG  tablet Take 6.5-7 tablets (6.5-7 mg) by mouth See Admin Instructions Take 6.5 mg daily on Tues/Wed/Fri/Sat/Sun and 7 mg daily on Mon/Thurs, needs daily inr checked and called to MD to dose  Qty: 300 tablet, Refills: 0    Associated Diagnoses: History of atrial fibrillation         CONTINUE these medications which have NOT CHANGED    Details   Docusate Sodium (DOCU LIQUID PO) Place 5 drops into both ears daily as needed prior to irrigation for cerumen removal      erythromycin base (ERYTHROMYCIN OPHT) Place 5 mg into both eyes At Bedtime Instill 1 ribbon in Left Eye and 1 ribbon in Right Eye at bedtime. Ensure ointment is given after eye drops to ensure proper absorption.      ferrous sulfate 325 (65 FE) MG tablet [FERROUS SULFATE 325 (65 FE) MG TABLET] Take 1 tablet by mouth daily.      Propylene Glycol (SYSTANE COMPLETE OP) Place 1 drop into both eyes 2 times daily At 0600 and 1630         STOP taking these medications       menthol-zinc oxide (CALMOSEPTINE) 0.44-20.6 % Oint ointment Comments:   Reason for Stopping:                     Rationale for medication changes:      Lasix 40 mg daily  Potassium 10 meq q day x 3 days  Mag ox          Consults       PHARMACY TO DOSE WARFARIN  CORE CLINIC EVALUATION IP CONSULT  OCCUPATIONAL THERAPY ADULT IP CONSULT  NUTRITION SERVICES ADULT IP CONSULT  CARE MANAGEMENT / SOCIAL WORK IP CONSULT  PHYSICAL THERAPY ADULT IP CONSULT  PHYSICAL THERAPY ADULT IP CONSULT  OCCUPATIONAL THERAPY ADULT IP CONSULT    Immunizations given this encounter     Most Recent Immunizations   Administered Date(s) Administered     COVID-19 Bivalent 12+ (Pfizer) 09/23/2022     COVID-19 Monovalent 18+ (Moderna) 05/09/2022     DT (PEDS <7y) 10/11/2004     Flu, Unspecified 10/24/2020     Influenza (High Dose) 3 valent vaccine 10/12/2021     Influenza (IIV3) PF 09/20/2013     Influenza Vaccine 50-64 or 18-64 w/egg allergy (Flublok) 10/10/2014     Influenza Vaccine >6 months (Alfuria,Fluzone) 10/10/2014,  10/10/2014     Influenza Vaccine, 6+MO IM (QUADRIVALENT W/PRESERVATIVES) 09/20/2013     Pneumo Conj 13-V (2010&after) 04/10/2015     Pneumococcal 23 valent 11/01/2001     TDAP (Adacel,Boostrix) 10/19/2012     TDAP Vaccine (Boostrix) 10/19/2012     Td (Adult), Adsorbed 10/11/2004     Td,adult,historic,unspecified 10/11/2004     Zoster vaccine, live 10/27/2015           Anticoagulation Information      Recent INR results:   Recent Labs   Lab 06/12/23  0604 06/11/23  0611 06/10/23  0633 06/09/23  0647 06/08/23  0618 06/07/23  0641 06/06/23  1159   INR 2.66* 2.76* 2.71* 2.56* 2.96* 2.95* 2.86*     Warfarin doses (if applicable) or name of other anticoagulant: warfarin 6.5 mg      SIGNIFICANT IMAGING FINDINGS     Results for orders placed or performed during the hospital encounter of 06/05/23   XR Chest 2 Views    Impression    IMPRESSION: Pulmonary vascular congestion with increased interstitial opacities consistent with mild pulmonary edema. Probable small left pleural effusion. No pneumothorax.    Unchanged enlarged cardiomediastinal silhouette with single lead pacemaker. Unchanged pleural calcification on the left.   Echocardiogram Complete   Result Value Ref Range    LVEF  60-65%        SIGNIFICANT LABORATORY FINDINGS     Most Recent 3 CBC's:Recent Labs   Lab Test 06/12/23  0604 06/11/23  0611 06/10/23  0633   WBC 7.1 6.9 6.2   HGB 11.3* 11.4* 11.4*   MCV 89 89 89    241 232     Most Recent 3 BMP's:Recent Labs   Lab Test 06/12/23  0604 06/11/23  0611 06/10/23  1445 06/10/23  0633   * 134*  --  133*   POTASSIUM 3.3* 3.9 4.4 3.4   CHLORIDE 98 100  --  98   CO2 24 25  --  26   BUN 12.6 12.9  --  13.7   CR 0.75 0.77  --  0.80   ANIONGAP 11 9  --  9   GENO 8.3 8.3  --  8.3   * 116*  --  122*     Most Recent 2 LFT's:Recent Labs   Lab Test 08/14/20  0724 10/08/19  1915   AST 19 14   ALT 13 <9   ALKPHOS 48 50   BILITOTAL 0.6 0.8     Most Recent 3 INR's:Recent Labs   Lab Test 06/12/23  0604 06/11/23  0611  06/10/23  0633   INR 2.66* 2.76* 2.71*       Echocardiogram Complete    Result Date: 2023  [id number]^KRISTAL^[place] [address]  Name: ALEX MUELLER MRN: [id number] : 04/15/1926 Study Date: 2023 09:09 AM Age: 89 yrs Gender: Male Patient Location: [facility] Reason For Study: Heart Failure Ordering Physician: JAYNE GIRALDO Referring Physician: JAYNE GIRALDO Performed By:   BSA: 2.1 m2 Height: 70 in Weight: 206 lb HR: 61 ______________________________________________________________________________ Procedure Complete Echo Adult. Definity (NDC #55342-144) given intravenously. ______________________________________________________________________________ Interpretation Summary  Left ventricular function is normal.The ejection fraction is 60-65%. Right ventricular systolic pressure is elevated, consistent with mild pulmonary hypertension. Mildly decreased right ventricular systolic function At least moderate Aortic stenosis, appears more significant based on 2D appearance. There is mild mitral stenosis. IVC diameter >2.1 cm collapsing <50% with sniff suggests a high RA pressure estimated at 15 mmHg or greater. Moderate to severe valvular aortic stenosis. ______________________________________________________________________________ Left Ventricle Left ventricular function is normal.The ejection fraction is 60-65%. There is mild concentric left ventricular hypertrophy. Diastolic function not assessed due to significant mitral annular calcification. No regional wall motion abnormalities noted.  Right Ventricle The right ventricle is normal size. Mildly decreased right ventricular systolic function. TAPSE is abnormal, which is consistent with abnormal right ventricular systolic function. There is a pacemaker lead in the right ventricle.  Atria The left atrium is severely dilated. The right atrium is mild to moderately dilated.  Mitral Valve  There is severe mitral annular calcification. There is mild (1+) mitral regurgitation. There is mild mitral stenosis.  Tricuspid Valve The tricuspid valve is not well visualized, but is grossly normal. There is mild (1+) tricuspid regurgitation. Right ventricular systolic pressure is elevated, consistent with mild pulmonary hypertension.  Aortic Valve Moderate aortic valve calcification is present. The aortic valve is trileaflet. Moderate to severe valvular aortic stenosis. The mean AoV pressure gradient is 25.0 mmHg. At least moderate Aortic stenosis, appears more significant based on 2D appearance.  Vessels The aorta root is normal. Normal size ascending aorta. IVC diameter >2.1 cm collapsing <50% with sniff suggests a high RA pressure estimated at 15 mmHg or greater.  Rhythm The rhythm was atrial fibrillation.  ______________________________________________________________________________ MMode/2D Measurements & Calculations IVSd: 1.3 cm LVIDd: 4.2 cm LVIDs: 2.6 cm LVPWd: 1.2 cm FS: 37.8 % LV mass(C)d: 191.0 grams LV mass(C)dI: 90.4 grams/m2 Ao root diam: 3.6 cm LA dimension: 4.8 cm asc Aorta Diam: 3.4 cm  LA/Ao: 1.3 LVOT diam: 2.2 cm LVOT area: 3.8 cm2 LA Volume Indexed (AL/bp): 63.8 ml/m2 RV Base: 4.6 cm RWT: 0.58 TAPSE: 1.2 cm  Time Measurements MM HR: 55.0 BPM  Doppler Measurements & Calculations MV E max michel: 163.0 cm/sec MV max P.8 mmHg MV mean P.0 mmHg MV V2 VTI: 48.9 cm MVA(VTI): 1.4 cm2 MV dec slope: 421.0 cm/sec2 MV dec time: 0.39 sec Ao V2 max: 293.5 cm/sec Ao max P.0 mmHg Ao V2 mean: 239.0 cm/sec Ao mean P.0 mmHg Ao V2 VTI: 72.0 cm KATHY(I,D): 0.94 cm2 KATHY(V,D): 1.1 cm2 LV V1 max P.7 mmHg LV V1 max: 82.0 cm/sec LV V1 VTI: 17.8 cm SV(LVOT): 67.7 ml SI(LVOT): 32.0 ml/m2 PA acc time: 0.07 sec TR max michel: 295.0 cm/sec TR max P.8 mmHg AV Michel Ratio (DI): 0.28 KATHY Index (cm2/m2): 0.44 E/E' av.7 Lateral E/e': 15.1 Medial E/e': 20.2 RV S Michel: 9.7 cm/sec   ______________________________________________________________________________ Report approved by: Noemi Collins 06/06/2023 10:34 AM       XR Chest 2 Views    Result Date: 6/5/2023  EXAM: XR CHEST 2 VIEWS LOCATION: [facility] DATE/TIME: 6/5/2023 9:50 PM CDT INDICATION: cough, hypoxia, fever COMPARISON: 01/05/2019     IMPRESSION: Pulmonary vascular congestion with increased interstitial opacities consistent with mild pulmonary edema. Probable small left pleural effusion. No pneumothorax. Unchanged enlarged cardiomediastinal silhouette with single lead pacemaker. Unchanged pleural calcification on the left.      Discharge Orders        General info for SNF    Length of Stay Estimate: Long Term Care  Condition at Discharge: Stable  Level of care:skilled   Rehabilitation Potential: Fair  Admission H&P remains valid and up-to-date: Yes  Recent Chemotherapy: N/A  Use Nursing Home Standing Orders: Yes     Follow Up and recommended labs and tests    Follow up with [facility] physician.  The following labs/tests are recommended: daily inr and call MD for labs and adjust coumadin, needs BMP, mag every Tues/Friday and call to MD at LTC.  Follow up with primary care provider in 1 week     Reason for your hospital stay    Urosepsis and heart failure     Intake and output    Every shift     Daily weights    Call Provider for weight gain of more than 2 pounds per day or 5 pounds per week.     Pierson catheter    To straight gravity drainage, chronic cath cares     Activity - Up with nursing assistance     No CPR- Do NOT Intubate     Physical Therapy Adult Consult    Evaluate and treat as clinically indicated.    Reason:  weakness     Occupational Therapy Adult Consult    Evaluate and treat as clinically indicated.    Reason:  weakness     Fall precautions     Diet    Follow this diet upon discharge: Orders Placed This Encounter      Fluid restriction 2000 ML FLUID      2 Gram Sodium Diet        Examination   Physical Exam   Temp:  [97.1  F (36.2  C)-98.2  F (36.8  C)] 97.4  F (36.3  C)  Pulse:  [56-80] 58  Resp:  [16-18] 16  BP: (111-158)/(58-83) 158/79  SpO2:  [96 %-97 %] 97 %  Wt Readings from Last 1 Encounters:   06/11/23 85.2 kg (187 lb 12.8 oz)       See today's note      Please see EMR for more detailed significant labs, imaging, consultant notes etc.    I, Daphney Manning MD, personally saw the patient today and spent greater than 30 minutes discharging this patient.    Daphney Manning MD  [facility]    CC:Jac Pitt

## 2023-06-12 NOTE — PLAN OF CARE
Problem: Heart Failure  Goal: Optimal Functional Ability  Outcome: Adequate for Care Transition  Intervention: Optimize Functional Ability  Recent Flowsheet Documentation  Taken 6/12/2023 0900 by Zollinger, Nancy K, RN  Activity Management: activity adjusted per tolerance   Goal Outcome Evaluation:  Denies pain.  Potasium and magnesium replaced per protocol.  IV Cefepime infused.  Ambulating in hallway with daughter with walker and gait belt.  Plan to discharge back to [facility] this afternoon.

## 2023-06-13 NOTE — TELEPHONE ENCOUNTER
[facility] Triage Nurse INR     Provider: EWDARD Jacobsen  Facility: [facility]  Facility Type:  LTC    Caller: Zakiya  Call Back Number: [phone]  Reason for call: INR  Diagnosis/Goal: A. Fib    Todays INR: 2.68  Last INR 6/12 2.66(7mg).  Home dose:  7mg Mon and Thurs and 6.5mg AOD.      Heparin/Lovenox:  No  Currently on ABX?: No  Other interacting medication:  None  Missed or refused doses: No       Nurse is also reporting BMP and Mg results.        Verbal Order/Direction given by Provider: Continue Warfarin 7mg Mondays and Thursdays and 6.5mg all other days.  Next INR 6/19/23.      Provider Giving Order:  EDWARD Jacobsen    Verbal Order given to: Zakiya Ramirez RN

## 2023-06-14 NOTE — PROGRESS NOTES
[facility] GERIATRICS  Chief Complaint   Patient presents with     halfway Regulatory     [place] Medical Record Number:  [id number]  Place of Service where encounter took place:  [facility] () [[id number]]    HPI:    Riley Rodriguez  is a 89 year old  (4/15/1926), who is being seen today for  Regulatory visit. HPI information obtained from: facility chart records, facility staff, patient report and [facility] chart review.     Background: He has history of A. fib on warfarin, prior stroke, hypertension, hypothyroid, he has hx of Afib on warfarin, BPH with a [place] catheter in place.  He has been residing in the long-term care for many years, last hospitalized in June 2023.  Has been on iron supplement since that time with recent hemoglobin 12.2.      Today: She was admitted to [facility] on 6/5 due to hypoxia in the nursing home down to low 80s on RA.  Chest x-ray with pulm edema. Found to have urosepsis and CHF exacerbation. Family declined transfer to ICU and preferred to treat conservatively and keep comfortable. IV lasix transitioned to PO; he weaned of supplemental o2 and now on RA breathing comfortably. Peak weight 208lbs. Today 180. He is also on 2 gram sodium diet and 2000ml FR which we will loosen up as able.   Sepsis thought s/s to catheter associated UTI. Completed antibiotics.  He remains on coumadin; monitoring INR.   He did have mild hyponatremia which improved with diuretics. Hypokalemia and hypomagnesemia also improved.     Seen sitting up in his recliner with two daughters at bedside. They are able to provide adequate history. Riley is alert and oriented to self. He is less conversational than pre hospital stay and appears more tired today. He looks to be comfortable and has no rales or wheezing on exam. LE with no edema. Weights being monitored daily. He is compliant with FR at this time. Labs from 6/13 and hospital stay were reviewed today.   Daughters  are happy with his turn around and stress that they want to focus on comfort for their dad but will transfer to hospital for treatable conditions.         ALLERGIES: Patient has no known allergies.  PAST MEDICAL HISTORY:   Past Medical History:   Diagnosis Date     Atrial fibrillation (H)     On coumadin     Bell's palsy     right sided facial droop     CVA (cerebral vascular accident) (H)      Hypertension      Pacemaker      Urinary retention       PAST SURGICAL HISTORY:  has a past surgical history that includes IR Thoracic Aortogram (1/1/2004); IR Visceral Angiogram (1/1/2004); IR Visceral Angiogram (1/1/2004); IR Visceral Angiogram (1/1/2004); IR Miscellaneous Procedure (1/1/2004); IR Visceral Angiogram (1/1/2004); IR Miscellaneous Procedure (1/1/2004); IR Visceral Angiogram (1/1/2004); IR Visceral Angiogram (1/1/2004); IR Miscellaneous Procedure (1/1/2004); IR Visceral Angiogram (1/1/2004); IR Aortic Arch 4 Vessel Angiogram (1/1/2004); IR Suprapubic Catheter Placment (1/18/2019); remv pilonidal lesion simple; Pr Partial Excision Thyroid,Unilat; TRANSURETHRAL ELEC-SURG PROSTATECTOM; Total Hip Arthroplasty (Right); Ir Bladder Suprapubic Catheter Insertion (1/18/2019); and Pr Esophagogastroduodenoscopy Transoral Diagnostic (N/A, 8/15/2020).  IMMUNIZATIONS:  Immunization History   Administered Date(s) Administered     COVID-19 Bivalent 12+ (Pfizer) 09/23/2022     COVID-19 Monovalent 18+ (Moderna) 12/30/2020, 01/27/2021, 11/26/2021, 05/09/2022     DT (PEDS <7y) 10/11/2004     Flu, Unspecified 10/12/2007, 10/13/2019, 10/24/2020     Influenza (High Dose) 3 valent vaccine 10/01/2010, 09/29/2015, 10/19/2016, 09/12/2017, 10/23/2018, 10/12/2021     Influenza (IIV3) PF 10/11/2004, 11/02/2005, 10/30/2006, 10/12/2007, 11/10/2008, 09/16/2009, 09/28/2011, 09/19/2012, 09/20/2013     Influenza Vaccine 50-64 or 18-64 w/egg allergy (Flublok) 10/10/2014     Influenza Vaccine >6 months (TulioFluzone) 10/10/2014, 10/10/2014      Influenza Vaccine, 6+MO IM (QUADRIVALENT W/PRESERVATIVES) 11/10/2008, 09/16/2009, 09/28/2011, 09/19/2012, 09/20/2013     Pneumo Conj 13-V (2010&after) 04/10/2015     Pneumococcal 23 valent 11/01/2001     TDAP (Adacel,Boostrix) 10/19/2012     TDAP Vaccine (Boostrix) 10/19/2012     Td (Adult), Adsorbed 10/11/2004     Td,adult,historic,unspecified 10/11/2004     Zoster vaccine, live 10/27/2015     Above immunizations pulled from [facility]. [facility] and facility records also reconciled. Outstanding information sent to  to update [facility].  Future immunizations are not needed at this point as all recommended immunizations are up to date.       Current Outpatient Medications:      acetaminophen (TYLENOL) 325 MG tablet, Take 2 tablets (650 mg) by mouth every 6 hours as needed for mild pain or other (and adjunct with moderate or severe pain or per patient request), Disp: 20 tablet, Rfl: 0     atorvastatin (LIPITOR) 40 MG tablet, Take 1 tablet (40 mg) by mouth At Bedtime, Disp: 30 tablet, Rfl: 0     bisacodyl (DULCOLAX) 10 MG suppository, Place 1 suppository (10 mg) rectally daily as needed for constipation, Disp: 10 suppository, Rfl: 0     docusate sodium (COLACE) 100 MG capsule, Take 1 capsule (100 mg) by mouth 2 times daily as needed for constipation, Disp: 30 capsule, Rfl: 0     Docusate Sodium (DOCU LIQUID PO), Place 5 drops into both ears daily as needed prior to irrigation for cerumen removal, Disp: , Rfl:      dorzolamide-timolol (COSOPT) 2-0.5 % ophthalmic solution, Place 1 drop Into the left eye 2 times daily, Disp: 10 mL, Rfl: 12     erythromycin base (ERYTHROMYCIN OPHT), Place 5 mg into both eyes At Bedtime Instill 1 ribbon in Left Eye and 1 ribbon in Right Eye at bedtime. Ensure ointment is given after eye drops to ensure proper absorption., Disp: , Rfl:      ferrous sulfate 325 (65 FE) MG tablet, [FERROUS SULFATE 325 (65 FE) MG TABLET] Take 1 tablet by mouth daily., Disp: , Rfl:       "furosemide (LASIX) 40 MG tablet, Take 1 tablet (40 mg) by mouth daily Please check blood pressure before giving and  hold if sbp<110, Disp: 30 tablet, Rfl: 0     guaiFENesin (ROBITUSSIN) 100 MG/5ML SYRP, Take 10 mLs by mouth every 4 hours as needed for cough, Disp: 473 mL, Rfl: 0     latanoprost (XALATAN) 0.005 % ophthalmic solution, Place 1 drop Into the left eye At Bedtime, Disp: 2.5 mL, Rfl: 12     levothyroxine (SYNTHROID/LEVOTHROID) 25 MCG tablet, Take 1 tablet (25 mcg) by mouth daily, Disp: 30 tablet, Rfl: 0     menthol-zinc oxide (CALMOSEPTINE) 0.44-20.6 % OINT ointment, Apply topically daily To buttock, Disp: 71 g, Rfl: 0     miconazole (MICATIN) 2 % external cream, Apply topically 2 times daily For between toes, Disp: 35 g, Rfl: 0     omeprazole (PRILOSEC) 20 MG DR capsule, Take 1 capsule (20 mg) by mouth daily, Disp: 30 capsule, Rfl: 0     Propylene Glycol (SYSTANE COMPLETE OP), Place 1 drop into both eyes 2 times daily At 0600 and 1630, Disp: , Rfl:      WARFARIN SODIUM PO, 6/13/23 INR 2.68  Cont 7mg Mon and Thurs and 6.5mg AOD.  Next INR 6/19/23., Disp: , Rfl:        Post Medication Reconciliation Status:      ROS:  4 point ROS including Respiratory, CV, GI and , other than that noted in the HPI,  is negative    Vitals:  /58   Pulse 76   Temp 98.5  F (36.9  C)   Resp 20   Ht 1.803 m (5' 11\")   Wt 80.4 kg (177 lb 3.2 oz)   SpO2 94%   BMI 24.71 kg/m   Body mass index is 24.71 kg/m .  Exam:  Physical Exam : stable  General appearance: alert, appears stated age and cooperative.   Head: Normocephalic, without obvious abnormality, atraumatic, Eyes: sclera anicteric.  Lungs: respirations unlabored, LSCTA; no wheezing or rales.   Cardiovascular: regular rate.    Extremities: extremities normal, atraumatic, trace edema bilaterally.  Skin: Skin color, texture, turgor normal. No rashes or lesions  Neurologic: oriented. No focal deficits.   Psych: interacts well with caregivers, exhibits logical " thought processes and connections, pleasant    Lab/Diagnostic data:     Most Recent 3 CBC's:  Recent Labs   Lab Test 06/12/23  0604 06/11/23  0611 06/10/23  0633   WBC 7.1 6.9 6.2   HGB 11.3* 11.4* 11.4*   MCV 89 89 89    241 232     Most Recent 3 BMP's:  Recent Labs   Lab Test 06/19/23  0503 06/13/23  0504 06/12/23  1312 06/12/23  0604    133*  --  133*   POTASSIUM 3.8 3.6 4.1 3.3*   CHLORIDE 100 98  --  98   CO2 28 25  --  24   BUN 13.2 12.2  --  12.6   CR 0.74 0.81  --  0.75   ANIONGAP 9 10  --  11   GENO 8.8 8.5  --  8.3   * 114*  --  121*       ASSESSMENT/PLAN    (I50.33) Acute on chronic diastolic congestive heart failure (H)  (primary encounter diagnosis)  Comment: Weight peak 208 in hospital. Today 180.   Plan:   -Daily weights.  -2 gram NA diet.  -Lasix 40mg daily    (E87.1) Hyponatremia  Comment: FR 2000cc/day.  on 6/13.   Plan:   -Continue FR, will liberate as able.    (I48.91) Atrial fibrillation, unspecified type (H)  Comment: INR 2.6  Plan:   -Continue coumadin dose and recheck per schedule.     (N39.0) Urinary tract infection without hematuria, site unspecified  Comment: has chronic s/p catheter.   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    (E61.1) Iron deficiency  Comment: Hemoglobin in June 11.3; mcv wnl.   Plan: Continue iron supplementation.  Continue omeprazole.  -recheck cbc 6 months.    (I10) Hypertension  Comment: Most recent blood pressure stable.  Plan: No current medications.    (E03.9) Hypothyroidism, unspecified type  Comment: Stable  Plan: Recheck TSH DUE.     TSH   Date Value Ref Range Status   06/02/2022 4.10 0.30 - 5.00 uIU/mL Final       Electronically signed by:  Jac Pitt, CNP

## 2023-06-14 NOTE — LETTER
6/14/2023        RE: Riley Rodriguez  [address]  [address]  [place] [address]        [facility] GERIATRICS  Chief Complaint   Patient presents with     MCFP Regulatory     [place] Medical Record Number:  [id number]  Place of Service where encounter took place:  [facility] () [[id number]]    HPI:    Riley Rodriguez  is a 89 year old  (4/15/1926), who is being seen today for  Regulatory visit. HPI information obtained from: facility chart records, facility staff, patient report and [facility] chart review.     Background: He has history of A. fib on warfarin, prior stroke, hypertension, hypothyroid, he has hx of Afib on warfarin, BPH with a [place] catheter in place.  He has been residing in the long-term care for many years, last hospitalized in June 2023.  Has been on iron supplement since that time with recent hemoglobin 12.2.      Today: She was admitted to [facility] on 6/5 due to hypoxia in the nursing home down to low 80s on RA.  Chest x-ray with pulm edema. Found to have urosepsis and CHF exacerbation. Family declined transfer to ICU and preferred to treat conservatively and keep comfortable. IV lasix transitioned to PO; he weaned of supplemental o2 and now on RA breathing comfortably. Peak weight 208lbs. Today 180. He is also on 2 gram sodium diet and 2000ml FR which we will loosen up as able.   Sepsis thought s/s to catheter associated UTI. Completed antibiotics.  He remains on coumadin; monitoring INR.   He did have mild hyponatremia which improved with diuretics. Hypokalemia and hypomagnesemia also improved.     Seen sitting up in his recliner with two daughters at bedside. They are able to provide adequate history. Riley is alert and oriented to self. He is less conversational than pre hospital stay and appears more tired today. He looks to be comfortable and has no rales or wheezing on exam. LE with no edema. Weights being monitored daily. He is  compliant with FR at this time. Labs from 6/13 and hospital stay were reviewed today.   Daughters are happy with his turn around and stress that they want to focus on comfort for their dad but will transfer to hospital for treatable conditions.         ALLERGIES: Patient has no known allergies.  PAST MEDICAL HISTORY:   Past Medical History:   Diagnosis Date     Atrial fibrillation (H)     On coumadin     Bell's palsy     right sided facial droop     CVA (cerebral vascular accident) (H)      Hypertension      Pacemaker      Urinary retention       PAST SURGICAL HISTORY:  has a past surgical history that includes IR Thoracic Aortogram (1/1/2004); IR Visceral Angiogram (1/1/2004); IR Visceral Angiogram (1/1/2004); IR Visceral Angiogram (1/1/2004); IR Miscellaneous Procedure (1/1/2004); IR Visceral Angiogram (1/1/2004); IR Miscellaneous Procedure (1/1/2004); IR Visceral Angiogram (1/1/2004); IR Visceral Angiogram (1/1/2004); IR Miscellaneous Procedure (1/1/2004); IR Visceral Angiogram (1/1/2004); IR Aortic Arch 4 Vessel Angiogram (1/1/2004); IR Suprapubic Catheter Placment (1/18/2019); remv pilonidal lesion simple; Pr Partial Excision Thyroid,Unilat; TRANSURETHRAL ELEC-SURG PROSTATECTOM; Total Hip Arthroplasty (Right); Ir Bladder Suprapubic Catheter Insertion (1/18/2019); and Pr Esophagogastroduodenoscopy Transoral Diagnostic (N/A, 8/15/2020).  IMMUNIZATIONS:  Immunization History   Administered Date(s) Administered     COVID-19 Bivalent 12+ (Pfizer) 09/23/2022     COVID-19 Monovalent 18+ (Moderna) 12/30/2020, 01/27/2021, 11/26/2021, 05/09/2022     DT (PEDS <7y) 10/11/2004     Flu, Unspecified 10/12/2007, 10/13/2019, 10/24/2020     Influenza (High Dose) 3 valent vaccine 10/01/2010, 09/29/2015, 10/19/2016, 09/12/2017, 10/23/2018, 10/12/2021     Influenza (IIV3) PF 10/11/2004, 11/02/2005, 10/30/2006, 10/12/2007, 11/10/2008, 09/16/2009, 09/28/2011, 09/19/2012, 09/20/2013     Influenza Vaccine 50-64 or 18-64 w/egg allergy  (Flublok) 10/10/2014     Influenza Vaccine >6 months (Alfuria,Fluzone) 10/10/2014, 10/10/2014     Influenza Vaccine, 6+MO IM (QUADRIVALENT W/PRESERVATIVES) 11/10/2008, 09/16/2009, 09/28/2011, 09/19/2012, 09/20/2013     Pneumo Conj 13-V (2010&after) 04/10/2015     Pneumococcal 23 valent 11/01/2001     TDAP (Adacel,Boostrix) 10/19/2012     TDAP Vaccine (Boostrix) 10/19/2012     Td (Adult), Adsorbed 10/11/2004     Td,adult,historic,unspecified 10/11/2004     Zoster vaccine, live 10/27/2015     Above immunizations pulled from [facility]. [facility] and facility records also reconciled. Outstanding information sent to  to update [facility].  Future immunizations are not needed at this point as all recommended immunizations are up to date.       Current Outpatient Medications:      acetaminophen (TYLENOL) 325 MG tablet, Take 2 tablets (650 mg) by mouth every 6 hours as needed for mild pain or other (and adjunct with moderate or severe pain or per patient request), Disp: 20 tablet, Rfl: 0     atorvastatin (LIPITOR) 40 MG tablet, Take 1 tablet (40 mg) by mouth At Bedtime, Disp: 30 tablet, Rfl: 0     bisacodyl (DULCOLAX) 10 MG suppository, Place 1 suppository (10 mg) rectally daily as needed for constipation, Disp: 10 suppository, Rfl: 0     docusate sodium (COLACE) 100 MG capsule, Take 1 capsule (100 mg) by mouth 2 times daily as needed for constipation, Disp: 30 capsule, Rfl: 0     Docusate Sodium (DOCU LIQUID PO), Place 5 drops into both ears daily as needed prior to irrigation for cerumen removal, Disp: , Rfl:      dorzolamide-timolol (COSOPT) 2-0.5 % ophthalmic solution, Place 1 drop Into the left eye 2 times daily, Disp: 10 mL, Rfl: 12     erythromycin base (ERYTHROMYCIN OPHT), Place 5 mg into both eyes At Bedtime Instill 1 ribbon in Left Eye and 1 ribbon in Right Eye at bedtime. Ensure ointment is given after eye drops to ensure proper absorption., Disp: , Rfl:      ferrous sulfate 325 (65 FE) MG  "tablet, [FERROUS SULFATE 325 (65 FE) MG TABLET] Take 1 tablet by mouth daily., Disp: , Rfl:      furosemide (LASIX) 40 MG tablet, Take 1 tablet (40 mg) by mouth daily Please check blood pressure before giving and  hold if sbp<110, Disp: 30 tablet, Rfl: 0     guaiFENesin (ROBITUSSIN) 100 MG/5ML SYRP, Take 10 mLs by mouth every 4 hours as needed for cough, Disp: 473 mL, Rfl: 0     latanoprost (XALATAN) 0.005 % ophthalmic solution, Place 1 drop Into the left eye At Bedtime, Disp: 2.5 mL, Rfl: 12     levothyroxine (SYNTHROID/LEVOTHROID) 25 MCG tablet, Take 1 tablet (25 mcg) by mouth daily, Disp: 30 tablet, Rfl: 0     menthol-zinc oxide (CALMOSEPTINE) 0.44-20.6 % OINT ointment, Apply topically daily To buttock, Disp: 71 g, Rfl: 0     miconazole (MICATIN) 2 % external cream, Apply topically 2 times daily For between toes, Disp: 35 g, Rfl: 0     omeprazole (PRILOSEC) 20 MG DR capsule, Take 1 capsule (20 mg) by mouth daily, Disp: 30 capsule, Rfl: 0     Propylene Glycol (SYSTANE COMPLETE OP), Place 1 drop into both eyes 2 times daily At 0600 and 1630, Disp: , Rfl:      WARFARIN SODIUM PO, 6/13/23 INR 2.68  Cont 7mg Mon and Thurs and 6.5mg AOD.  Next INR 6/19/23., Disp: , Rfl:        Post Medication Reconciliation Status:      ROS:  4 point ROS including Respiratory, CV, GI and , other than that noted in the HPI,  is negative    Vitals:  /58   Pulse 76   Temp 98.5  F (36.9  C)   Resp 20   Ht 1.803 m (5' 11\")   Wt 80.4 kg (177 lb 3.2 oz)   SpO2 94%   BMI 24.71 kg/m   Body mass index is 24.71 kg/m .  Exam:  Physical Exam : stable  General appearance: alert, appears stated age and cooperative.   Head: Normocephalic, without obvious abnormality, atraumatic, Eyes: sclera anicteric.  Lungs: respirations unlabored, LSCTA; no wheezing or rales.   Cardiovascular: regular rate.    Extremities: extremities normal, atraumatic, trace edema bilaterally.  Skin: Skin color, texture, turgor normal. No rashes or " lesions  Neurologic: oriented. No focal deficits.   Psych: interacts well with caregivers, exhibits logical thought processes and connections, pleasant    Lab/Diagnostic data:     Most Recent 3 CBC's:  Recent Labs   Lab Test 06/12/23  0604 06/11/23  0611 06/10/23  0633   WBC 7.1 6.9 6.2   HGB 11.3* 11.4* 11.4*   MCV 89 89 89    241 232     Most Recent 3 BMP's:  Recent Labs   Lab Test 06/19/23  0503 06/13/23  0504 06/12/23  1312 06/12/23  0604    133*  --  133*   POTASSIUM 3.8 3.6 4.1 3.3*   CHLORIDE 100 98  --  98   CO2 28 25  --  24   BUN 13.2 12.2  --  12.6   CR 0.74 0.81  --  0.75   ANIONGAP 9 10  --  11   GENO 8.8 8.5  --  8.3   * 114*  --  121*       ASSESSMENT/PLAN    (I50.33) Acute on chronic diastolic congestive heart failure (H)  (primary encounter diagnosis)  Comment: Weight peak 208 in hospital. Today 180.   Plan:   -Daily weights.  -2 gram NA diet.  -Lasix 40mg daily    (E87.1) Hyponatremia  Comment: FR 2000cc/day.  on 6/13.   Plan:   -Continue FR, will liberate as able.    (I48.91) Atrial fibrillation, unspecified type (H)  Comment: INR 2.6  Plan:   -Continue coumadin dose and recheck per schedule.     (N39.0) Urinary tract infection without hematuria, site unspecified  Comment: has chronic s/p catheter.   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    (E61.1) Iron deficiency  Comment: Hemoglobin in June 11.3; mcv wnl.   Plan: Continue iron supplementation.  Continue omeprazole.  -recheck cbc 6 months.    (I10) Hypertension  Comment: Most recent blood pressure stable.  Plan: No current medications.    (E03.9) Hypothyroidism, unspecified type  Comment: Stable  Plan: Recheck TSH DUE.     TSH   Date Value Ref Range Status   06/02/2022 4.10 0.30 - 5.00 uIU/mL Final        Electronically signed by:  Jac Pitt, SACHA         Sincerely,        Jac Pitt, CNP

## 2023-06-16 NOTE — TELEPHONE ENCOUNTER
[facility] Geriatrics Triage Nurse Telephone Encounter    Provider: EDWARD Jacobsen  [facility]  L[facility]    Caller: Zakiya  Call Back Number: [phone]    Allergies:  No Known Allergies     Reason for call: Pt returned with 2000cc/24hr fluid restriction. Yesterday pt was requesting a cup of coffee but not given due to fluid restriction. Family called nursing to request asking PCP if this restriction can be lifted or if it is necessary. Last sodium was low at 133, next BMP is on 6/19.    Verbal Order/Direction given by Provider: PCP gave orders to nursing    Provider giving Order:  EDWARD Jacobsen RN

## 2023-06-19 NOTE — TELEPHONE ENCOUNTER
[facility] Triage Nurse INR     Provider: EDWARD Jacobsen  Facility: [facility]  Facility Type:  LTC    Caller: May   Call Back Number: [phone]  Reason for call: INR  Diagnosis/Goal: A. Fib    Todays INR: 3.33  Last INR 6/13  2.68  7mg M , Th and 6.5mg AOD.   6/12 2.66(7mg).  Home dose:  7mg Mon and Thurs and 6.5mg AOD.     Heparin/Lovenox:  No  Currently on ABX?: No  Other interacting medication:  None  Missed or refused doses: No       BMP results - reviewed     Verbal Order/Direction given by Provider: Warfarin 6.5mg every day Next INR 6/26    Provider Giving Order:  Nancy Fair MD    Verbal Order given to: Kenyatta Rosario RN

## 2023-06-27 PROBLEM — I50.33 ACUTE ON CHRONIC DIASTOLIC CONGESTIVE HEART FAILURE (H): Status: ACTIVE | Noted: 2023-01-01

## 2023-06-30 NOTE — RESULT ENCOUNTER NOTE
Just need info on previous dose, otherwise I will be in facility later today and check in with them.

## 2023-06-30 NOTE — TELEPHONE ENCOUNTER
[facility] Geriatrics Triage Nurse INR     Provider: EDWARD Jacobsen  Facility: [facility]  Facility Type:  LTC    Caller: Zakiya  Call Back Number: [phone]  Reason for call: INR  Diagnosis/Goal: A. Fib    Todays INR: 3.33  Last INR 6/26  3.32  6mg every day   6/19  3.33  6.56mg every day     Heparin/Lovenox:  No  Currently on ABX?: No  Other interacting medication:  None  Missed or refused doses: No    Verbal Order/Direction given by Provider:   5mg on Friday and Tuesday and 6mg AOD. Recheck Monday 7/10.      Provider Giving Order:  EDWARD Jacobsen    Verbal Order given to: Zakiya Rosario RN

## 2023-07-17 NOTE — TELEPHONE ENCOUNTER
[facility] Geriatrics Triage Nurse INR     Provider: EDWARD Jacobsen  Facility: [facility]  Facility Type:  LT    Caller: Reshma  Call Back Number: [phone]  Reason for call: INR  Diagnosis/Goal: A. Fib    Todays INR: 2.55  Last INR   7/10 INR: 3.34 - 5 mg daily  6/30 INR: 3.33 - 5 mg Fri/Tue and 6 mg AOD    Heparin/Lovenox:  No  Currently on ABX?: No  Other interacting medication:  None  Missed or refused doses: No    Verbal Order/Direction given by Provider: Coumadin 6 mg M/W/F and 5 mg PO AOD. Recheck INR on 7/24/23.    Provider Giving Order:  EDWARD Jacobsen    Verbal Order given to: Reshma Smyth RN

## 2023-07-21 NOTE — PROGRESS NOTES
[facility] GERIATRICS  Chief Complaint   Patient presents with     half-way Acute     [facility] Medical Record Number:  [id number]  Place of Service where encounter took place:  [facility] () [[id number]]    HPI:    Riley Rodriguez  is a 89 year old  (4/15/1926), who is being seen today for  Regulatory visit. HPI information obtained from: facility chart records, facility staff, patient report and [facility] chart review.     Background: He has history of A. fib on warfarin, prior stroke, hypertension, hypothyroid, he has hx of Afib on warfarin, BPH with a Pierson catheter in place.  He has been residing in the long-term care for many years, last hospitalized in June 2023.  Has been on iron supplement since that time with recent hemoglobin 12.2.      June 2023: He was admitted to [facility] on 6/5 due to hypoxia in the nursing home down to low 80s on RA.  Chest x-ray with pulm edema. Found to have urosepsis and CHF exacerbation. Family declined transfer to ICU and preferred to treat conservatively and keep comfortable. IV lasix transitioned to PO; he weaned of supplemental o2 and now on RA breathing comfortably. Peak weight 208lbs. Today 180. He is also on 2 gram sodium diet and 2000ml FR which we will loosen up as able.   Sepsis thought s/s to catheter associated UTI. Completed antibiotics.  He remains on coumadin; monitoring INR.   He did have mild hyponatremia which improved with diuretics. Hypokalemia and hypomagnesemia also improved.     Today: seen due to concerns for weight gain. Admit weight after recent diuresis in hospital was 180. Goal weight <190 based on history and trends. Today 191 with +1 edema bin LE and no SOB or crackles on exam. FR for hyponatremia now at 2250. Denies urinary pain or tenderness. Reports he is feeling well. Will increase lasix x 3 days. INR stable.       ALLERGIES: Patient has no known allergies.  PAST MEDICAL HISTORY:   Past Medical History:    Diagnosis Date     Atrial fibrillation (H)     On coumadin     Bell's palsy     right sided facial droop     CVA (cerebral vascular accident) (H)      Hypertension      Pacemaker      Urinary retention       PAST SURGICAL HISTORY:  has a past surgical history that includes IR Thoracic Aortogram (1/1/2004); IR Visceral Angiogram (1/1/2004); IR Visceral Angiogram (1/1/2004); IR Visceral Angiogram (1/1/2004); IR Miscellaneous Procedure (1/1/2004); IR Visceral Angiogram (1/1/2004); IR Miscellaneous Procedure (1/1/2004); IR Visceral Angiogram (1/1/2004); IR Visceral Angiogram (1/1/2004); IR Miscellaneous Procedure (1/1/2004); IR Visceral Angiogram (1/1/2004); IR Aortic Arch 4 Vessel Angiogram (1/1/2004); IR Suprapubic Catheter Placment (1/18/2019); remv pilonidal lesion simple; Pr Partial Excision Thyroid,Unilat; TRANSURETHRAL ELEC-SURG PROSTATECTOM; Total Hip Arthroplasty (Right); Ir Bladder Suprapubic Catheter Insertion (1/18/2019); and Pr Esophagogastroduodenoscopy Transoral Diagnostic (N/A, 8/15/2020).  IMMUNIZATIONS:  Immunization History   Administered Date(s) Administered     COVID-19 Bivalent 12+ (Pfizer) 09/23/2022     COVID-19 Monovalent 18+ (Moderna) 12/30/2020, 01/27/2021, 11/26/2021, 05/09/2022     DT (PEDS <7y) 10/11/2004     Flu, Unspecified 10/12/2007, 10/13/2019, 10/24/2020     Influenza (High Dose) 3 valent vaccine 10/01/2010, 09/29/2015, 10/19/2016, 09/12/2017, 10/23/2018, 10/12/2021     Influenza (IIV3) PF 10/11/2004, 11/02/2005, 10/30/2006, 10/12/2007, 11/10/2008, 09/16/2009, 09/28/2011, 09/19/2012, 09/20/2013     Influenza Vaccine 50-64 or 18-64 w/egg allergy (Flublok) 10/10/2014     Influenza Vaccine >6 months (Alfuria,Fluzone) 10/10/2014, 10/10/2014     Influenza Vaccine, 6+MO IM (QUADRIVALENT W/PRESERVATIVES) 11/10/2008, 09/16/2009, 09/28/2011, 09/19/2012, 09/20/2013     Pneumo Conj 13-V (2010&after) 04/10/2015     Pneumococcal 23 valent 11/01/2001     TDAP (Adacel,Boostrix) 10/19/2012     TDAP  Vaccine (Boostrix) 10/19/2012     Td (Adult), Adsorbed 10/11/2004     Td,adult,historic,unspecified 10/11/2004     Zoster vaccine, live 10/27/2015     Above immunizations pulled from [facility]. [facility] and facility records also reconciled. Outstanding information sent to  to update [facility].  Future immunizations are not needed at this point as all recommended immunizations are up to date.       Current Outpatient Medications:      acetaminophen (TYLENOL) 325 MG tablet, Take 2 tablets (650 mg) by mouth every 6 hours as needed for mild pain or other (and adjunct with moderate or severe pain or per patient request), Disp: 20 tablet, Rfl: 0     atorvastatin (LIPITOR) 40 MG tablet, Take 1 tablet (40 mg) by mouth At Bedtime, Disp: 30 tablet, Rfl: 0     bisacodyl (DULCOLAX) 10 MG suppository, Place 1 suppository (10 mg) rectally daily as needed for constipation, Disp: 10 suppository, Rfl: 0     docusate sodium (COLACE) 100 MG capsule, Take 1 capsule (100 mg) by mouth 2 times daily as needed for constipation, Disp: 30 capsule, Rfl: 0     Docusate Sodium (DOCU LIQUID PO), Place 5 drops into both ears daily as needed prior to irrigation for cerumen removal, Disp: , Rfl:      dorzolamide-timolol (COSOPT) 2-0.5 % ophthalmic solution, Place 1 drop Into the left eye 2 times daily, Disp: 10 mL, Rfl: 12     erythromycin base (ERYTHROMYCIN OPHT), Place 5 mg into both eyes At Bedtime Instill 1 ribbon in Left Eye and 1 ribbon in Right Eye at bedtime. Ensure ointment is given after eye drops to ensure proper absorption., Disp: , Rfl:      ferrous sulfate 325 (65 FE) MG tablet, [FERROUS SULFATE 325 (65 FE) MG TABLET] Take 1 tablet by mouth daily., Disp: , Rfl:      furosemide (LASIX) 40 MG tablet, Take 1 tablet (40 mg) by mouth daily Please check blood pressure before giving and  hold if sbp<110, Disp: 30 tablet, Rfl: 0     guaiFENesin (ROBITUSSIN) 100 MG/5ML SYRP, Take 10 mLs by mouth every 4 hours as needed for  "cough, Disp: 473 mL, Rfl: 0     latanoprost (XALATAN) 0.005 % ophthalmic solution, Place 1 drop Into the left eye At Bedtime, Disp: 2.5 mL, Rfl: 12     levothyroxine (SYNTHROID/LEVOTHROID) 25 MCG tablet, Take 1 tablet (25 mcg) by mouth daily, Disp: 30 tablet, Rfl: 0     menthol-zinc oxide (CALMOSEPTINE) 0.44-20.6 % OINT ointment, Apply topically daily To buttock, Disp: 71 g, Rfl: 0     miconazole (MICATIN) 2 % external cream, Apply topically 2 times daily For between toes, Disp: 35 g, Rfl: 0     omeprazole (PRILOSEC) 20 MG DR capsule, Take 1 capsule (20 mg) by mouth daily, Disp: 30 capsule, Rfl: 0     Propylene Glycol (SYSTANE COMPLETE OP), Place 1 drop into both eyes 2 times daily At 0600 and 1630, Disp: , Rfl:      WARFARIN SODIUM PO, 6/13/23 INR 2.68  Cont 7mg Mon and Thurs and 6.5mg AOD.  Next INR 6/19/23., Disp: , Rfl:        Post Medication Reconciliation Status:      ROS:  4 point ROS including Respiratory, CV, GI and , other than that noted in the HPI,  is negative    Vitals:  /74   Pulse 60   Temp 98.2  F (36.8  C)   Resp 18   Ht 1.803 m (5' 11\")   Wt 86.6 kg (191 lb)   SpO2 99%   BMI 26.64 kg/m   Body mass index is 26.64 kg/m .  Exam:  Physical Exam : stable  General appearance: alert, appears stated age and cooperative.   Head: Normocephalic, without obvious abnormality, atraumatic, Eyes: sclera anicteric.  Lungs: respirations unlabored, LSCTA; no wheezing or rales.   Cardiovascular: regular rate.    Extremities: extremities normal, atraumatic, trace edema bilaterally.  Skin: Skin color, texture, turgor normal. No rashes or lesions  Neurologic: oriented. No focal deficits.   Psych: interacts well with caregivers, exhibits logical thought processes and connections, pleasant    Lab/Diagnostic data:     Most Recent 3 CBC's:  Recent Labs   Lab Test 06/12/23  0604 06/11/23  0611 06/10/23  0633   WBC 7.1 6.9 6.2   HGB 11.3* 11.4* 11.4*   MCV 89 89 89    241 232     Most Recent 3 " BMP's:  Recent Labs   Lab Test 06/19/23  0503 06/13/23  0504 06/12/23  1312 06/12/23  0604    133*  --  133*   POTASSIUM 3.8 3.6 4.1 3.3*   CHLORIDE 100 98  --  98   CO2 28 25  --  24   BUN 13.2 12.2  --  12.6   CR 0.74 0.81  --  0.75   ANIONGAP 9 10  --  11   GENO 8.8 8.5  --  8.3   * 114*  --  121*       ASSESSMENT/PLAN    (I50.33) Acute on chronic diastolic congestive heart failure (H)  (primary encounter diagnosis)  Comment: Weight peak 208 in hospital. Today 191lbs, up 11lbs from last visit.   Plan:   -Daily weights. 191lb today.   -2 gram NA diet.  -Increase lasix 60mg po daily x 3 days, then 40 daily.     (E87.1) Hyponatremia  Comment: FR 2250cc/day.  on 6/19.   Plan:   -Continue FR, will liberate as able.    (I48.91) Atrial fibrillation, unspecified type (H)  Comment: INR 2.2.   Plan:    -Continue coumadin dose and recheck due Monday.     (N39.0) Urinary tract infection without hematuria, site unspecified  Comment: has chronic s/p catheter.   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    (E61.1) Iron deficiency  Comment: Hemoglobin in June 11.3; mcv wnl.   Plan: Continue iron supplementation.  Continue omeprazole.  -recheck cbc 6 months.    (I10) Hypertension  Comment: Most recent blood pressure stable.  Plan: No current medications.    (E03.9) Hypothyroidism, unspecified type  Comment: Stable  Plan: Recheck TSH DUE.     TSH   Date Value Ref Range Status   06/02/2022 4.10 0.30 - 5.00 uIU/mL Final       Electronically signed by:  Jac Pitt, CNP

## 2023-07-21 NOTE — LETTER
7/21/2023        RE: Riley Rodriguez  [address]  [address]  [place] [address]        [facility] GERIATRICS  Chief Complaint   Patient presents with     custodial Acute     [facility] Medical Record Number:  [id number]  Place of Service where encounter took place:  [facility] () [[id number]]    HPI:    Riley Rodriguez  is a 89 year old  (4/15/1926), who is being seen today for  Regulatory visit. HPI information obtained from: facility chart records, facility staff, patient report and [facility] chart review.     Background: He has history of A. fib on warfarin, prior stroke, hypertension, hypothyroid, he has hx of Afib on warfarin, BPH with a Pierson catheter in place.  He has been residing in the long-term care for many years, last hospitalized in June 2023.  Has been on iron supplement since that time with recent hemoglobin 12.2.      June 2023: He was admitted to [facility] on 6/5 due to hypoxia in the nursing home down to low 80s on RA.  Chest x-ray with pulm edema. Found to have urosepsis and CHF exacerbation. Family declined transfer to ICU and preferred to treat conservatively and keep comfortable. IV lasix transitioned to PO; he weaned of supplemental o2 and now on RA breathing comfortably. Peak weight 208lbs. Today 180. He is also on 2 gram sodium diet and 2000ml FR which we will loosen up as able.   Sepsis thought s/s to catheter associated UTI. Completed antibiotics.  He remains on coumadin; monitoring INR.   He did have mild hyponatremia which improved with diuretics. Hypokalemia and hypomagnesemia also improved.     Today: seen due to concerns for weight gain. Admit weight after recent diuresis in hospital was 180. Goal weight <190 based on history and trends. Today 191 with +1 edema bin LE and no SOB or crackles on exam. FR for hyponatremia now at 2250. Denies urinary pain or tenderness. Reports he is feeling well. Will increase lasix x 3 days. INR  stable.       ALLERGIES: Patient has no known allergies.  PAST MEDICAL HISTORY:   Past Medical History:   Diagnosis Date     Atrial fibrillation (H)     On coumadin     Bell's palsy     right sided facial droop     CVA (cerebral vascular accident) (H)      Hypertension      Pacemaker      Urinary retention       PAST SURGICAL HISTORY:  has a past surgical history that includes IR Thoracic Aortogram (1/1/2004); IR Visceral Angiogram (1/1/2004); IR Visceral Angiogram (1/1/2004); IR Visceral Angiogram (1/1/2004); IR Miscellaneous Procedure (1/1/2004); IR Visceral Angiogram (1/1/2004); IR Miscellaneous Procedure (1/1/2004); IR Visceral Angiogram (1/1/2004); IR Visceral Angiogram (1/1/2004); IR Miscellaneous Procedure (1/1/2004); IR Visceral Angiogram (1/1/2004); IR Aortic Arch 4 Vessel Angiogram (1/1/2004); IR Suprapubic Catheter Placment (1/18/2019); remv pilonidal lesion simple; Pr Partial Excision Thyroid,Unilat; TRANSURETHRAL ELEC-SURG PROSTATECTOM; Total Hip Arthroplasty (Right); Ir Bladder Suprapubic Catheter Insertion (1/18/2019); and Pr Esophagogastroduodenoscopy Transoral Diagnostic (N/A, 8/15/2020).  IMMUNIZATIONS:  Immunization History   Administered Date(s) Administered     COVID-19 Bivalent 12+ (Pfizer) 09/23/2022     COVID-19 Monovalent 18+ (Moderna) 12/30/2020, 01/27/2021, 11/26/2021, 05/09/2022     DT (PEDS <7y) 10/11/2004     Flu, Unspecified 10/12/2007, 10/13/2019, 10/24/2020     Influenza (High Dose) 3 valent vaccine 10/01/2010, 09/29/2015, 10/19/2016, 09/12/2017, 10/23/2018, 10/12/2021     Influenza (IIV3) PF 10/11/2004, 11/02/2005, 10/30/2006, 10/12/2007, 11/10/2008, 09/16/2009, 09/28/2011, 09/19/2012, 09/20/2013     Influenza Vaccine 50-64 or 18-64 w/egg allergy (Flublok) 10/10/2014     Influenza Vaccine >6 months (Alfuria,Fluzone) 10/10/2014, 10/10/2014     Influenza Vaccine, 6+MO IM (QUADRIVALENT W/PRESERVATIVES) 11/10/2008, 09/16/2009, 09/28/2011, 09/19/2012, 09/20/2013     Pneumo Conj 13-V  (2010&after) 04/10/2015     Pneumococcal 23 valent 11/01/2001     TDAP (Adacel,Boostrix) 10/19/2012     TDAP Vaccine (Boostrix) 10/19/2012     Td (Adult), Adsorbed 10/11/2004     Td,adult,historic,unspecified 10/11/2004     Zoster vaccine, live 10/27/2015     Above immunizations pulled from [facility]. [facility] and facility records also reconciled. Outstanding information sent to  to update [facility].  Future immunizations are not needed at this point as all recommended immunizations are up to date.       Current Outpatient Medications:      acetaminophen (TYLENOL) 325 MG tablet, Take 2 tablets (650 mg) by mouth every 6 hours as needed for mild pain or other (and adjunct with moderate or severe pain or per patient request), Disp: 20 tablet, Rfl: 0     atorvastatin (LIPITOR) 40 MG tablet, Take 1 tablet (40 mg) by mouth At Bedtime, Disp: 30 tablet, Rfl: 0     bisacodyl (DULCOLAX) 10 MG suppository, Place 1 suppository (10 mg) rectally daily as needed for constipation, Disp: 10 suppository, Rfl: 0     docusate sodium (COLACE) 100 MG capsule, Take 1 capsule (100 mg) by mouth 2 times daily as needed for constipation, Disp: 30 capsule, Rfl: 0     Docusate Sodium (DOCU LIQUID PO), Place 5 drops into both ears daily as needed prior to irrigation for cerumen removal, Disp: , Rfl:      dorzolamide-timolol (COSOPT) 2-0.5 % ophthalmic solution, Place 1 drop Into the left eye 2 times daily, Disp: 10 mL, Rfl: 12     erythromycin base (ERYTHROMYCIN OPHT), Place 5 mg into both eyes At Bedtime Instill 1 ribbon in Left Eye and 1 ribbon in Right Eye at bedtime. Ensure ointment is given after eye drops to ensure proper absorption., Disp: , Rfl:      ferrous sulfate 325 (65 FE) MG tablet, [FERROUS SULFATE 325 (65 FE) MG TABLET] Take 1 tablet by mouth daily., Disp: , Rfl:      furosemide (LASIX) 40 MG tablet, Take 1 tablet (40 mg) by mouth daily Please check blood pressure before giving and  hold if sbp<110, Disp: 30  "tablet, Rfl: 0     guaiFENesin (ROBITUSSIN) 100 MG/5ML SYRP, Take 10 mLs by mouth every 4 hours as needed for cough, Disp: 473 mL, Rfl: 0     latanoprost (XALATAN) 0.005 % ophthalmic solution, Place 1 drop Into the left eye At Bedtime, Disp: 2.5 mL, Rfl: 12     levothyroxine (SYNTHROID/LEVOTHROID) 25 MCG tablet, Take 1 tablet (25 mcg) by mouth daily, Disp: 30 tablet, Rfl: 0     menthol-zinc oxide (CALMOSEPTINE) 0.44-20.6 % OINT ointment, Apply topically daily To buttock, Disp: 71 g, Rfl: 0     miconazole (MICATIN) 2 % external cream, Apply topically 2 times daily For between toes, Disp: 35 g, Rfl: 0     omeprazole (PRILOSEC) 20 MG DR capsule, Take 1 capsule (20 mg) by mouth daily, Disp: 30 capsule, Rfl: 0     Propylene Glycol (SYSTANE COMPLETE OP), Place 1 drop into both eyes 2 times daily At 0600 and 1630, Disp: , Rfl:      WARFARIN SODIUM PO, 6/13/23 INR 2.68  Cont 7mg Mon and Thurs and 6.5mg AOD.  Next INR 6/19/23., Disp: , Rfl:        Post Medication Reconciliation Status:      ROS:  4 point ROS including Respiratory, CV, GI and , other than that noted in the HPI,  is negative    Vitals:  /74   Pulse 60   Temp 98.2  F (36.8  C)   Resp 18   Ht 1.803 m (5' 11\")   Wt 86.6 kg (191 lb)   SpO2 99%   BMI 26.64 kg/m   Body mass index is 26.64 kg/m .  Exam:  Physical Exam : stable  General appearance: alert, appears stated age and cooperative.   Head: Normocephalic, without obvious abnormality, atraumatic, Eyes: sclera anicteric.  Lungs: respirations unlabored, LSCTA; no wheezing or rales.   Cardiovascular: regular rate.    Extremities: extremities normal, atraumatic, trace edema bilaterally.  Skin: Skin color, texture, turgor normal. No rashes or lesions  Neurologic: oriented. No focal deficits.   Psych: interacts well with caregivers, exhibits logical thought processes and connections, pleasant    Lab/Diagnostic data:     Most Recent 3 CBC's:  Recent Labs   Lab Test 06/12/23  0604 06/11/23  0611 " 06/10/23  0633   WBC 7.1 6.9 6.2   HGB 11.3* 11.4* 11.4*   MCV 89 89 89    241 232     Most Recent 3 BMP's:  Recent Labs   Lab Test 06/19/23  0503 06/13/23  0504 06/12/23  1312 06/12/23  0604    133*  --  133*   POTASSIUM 3.8 3.6 4.1 3.3*   CHLORIDE 100 98  --  98   CO2 28 25  --  24   BUN 13.2 12.2  --  12.6   CR 0.74 0.81  --  0.75   ANIONGAP 9 10  --  11   GENO 8.8 8.5  --  8.3   * 114*  --  121*       ASSESSMENT/PLAN    (I50.33) Acute on chronic diastolic congestive heart failure (H)  (primary encounter diagnosis)  Comment: Weight peak 208 in hospital. Today 191lbs, up 11lbs from last visit.   Plan:   -Daily weights. 191lb today.   -2 gram NA diet.  -Increase lasix 60mg po daily x 3 days, then 40 daily.     (E87.1) Hyponatremia  Comment: FR 2250cc/day.  on 6/19.   Plan:   -Continue FR, will liberate as able.    (I48.91) Atrial fibrillation, unspecified type (H)  Comment: INR 2.2.   Plan:    -Continue coumadin dose and recheck due Monday.     (N39.0) Urinary tract infection without hematuria, site unspecified  Comment: has chronic s/p catheter.   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    (E61.1) Iron deficiency  Comment: Hemoglobin in June 11.3; mcv wnl.   Plan: Continue iron supplementation.  Continue omeprazole.  -recheck cbc 6 months.    (I10) Hypertension  Comment: Most recent blood pressure stable.  Plan: No current medications.    (E03.9) Hypothyroidism, unspecified type  Comment: Stable  Plan: Recheck TSH DUE.     TSH   Date Value Ref Range Status   06/02/2022 4.10 0.30 - 5.00 uIU/mL Final       Electronically signed by:  Jac Pitt, CNP         Sincerely,        Jac Pitt, CNP

## 2023-07-24 NOTE — TELEPHONE ENCOUNTER
[facility] Geriatrics Triage Nurse INR     Provider: EDWARD Jacobsen  Facility: [facility]  Facility Type:  LTC    Caller: Zakiya   Call Back Number:   Reason for call: INR  Diagnosis/Goal: A. Fib    Todays INR: 2.22  Last INR: 2.55 (7/17), 6 mg po M,W,F and 5 mg po AOD.    Heparin/Lovenox:  No  Currently on ABX?: No  Other interacting medication:  None  Missed or refused doses: No    Verbal Order/Direction given by Provider: Continue same Coumadin dose of 6 mg po M,W,F and 5 mg po AOD.  Recheck INR on 8/7.    Provider Giving Order:  EDWARD Jacobsen    Verbal Order given to: Zakiya Jose RN

## 2023-08-07 NOTE — TELEPHONE ENCOUNTER
[facility] Geriatrics Triage Nurse INR     Provider: EDWARD Jacobsen  Facility: [facility]  Facility Type:  LTC    Caller: Zakiya  Call Back Number:   Reason for call: INR  Diagnosis/Goal: A. Fib    Todays INR: 2.33  Last INR 2.22 (7/24), 6 mg po on M,W,F and 5 mg po AOD.     Heparin/Lovenox:  No  Currently on ABX?: No  Other interacting medication:  None  Missed or refused doses: No    Verbal Order/Direction given by Provider: Continue same Coumadin dose of 6 mg po M,W,F and 5 mg po AOD.  Recheck INR on 7/28.    Provider Giving Order:  EDWARD Jacobsen    Verbal Order given to: Zakiya Jose RN

## 2023-08-10 NOTE — LETTER
8/10/2023        RE: Riley Rodriguez  [address]  [address]  [place] [address]        M [facility] GERIATRIC SERVICES    [place] Medical Record Number:  [id number]  Place of Service where encounter took place: [facility] () [[id number]]   CODE STATUS:   DNR / DNI    Chief Complaint:  Chief Complaint   Patient presents with     shelter Regulatory       HPI:   Riley is a 89 y.o. male seen for routine physician follow up in LTC at [facility]. He has hx of Afib on warfarin, prior stroke, BPH, HTN, [place] palsy, hypothyroidism, SP catheter. He was hospitalized 8/13/2020-8/19/2020 with significant anemia, hgb down to 6.2. He was worked up in the hospital found to have duodenal ulcer, treated appropriately. No hospitalizations since.     He was sent in to the hospital from nursing home on 6/5/2023 due to confusion and hypoxia. Hospital info as noted, treated for UTI, resp failure/CHF.      Riley Rodriguez is a 89 year old male admitted on 6/5/2023.  Presented with confusion and hypoxia.  Chest x-ray shows pulmonary edema.  UA suspicious for UTI.  Suspect CHF and sepsis secondary  to catheter associated UTI.  Discussed goals of care with the patient's family.  They do not want any aggressive measures such as vasopressors and transferred to ICU.       1.Acute hypoxic respiratory failure acute pulmonary edema likely due to acute  Diastolic Heart Failure  --Now on RA  -- Transthoracic echocardiogram shows a left ventricle ejection fraction of 60 to 65% with moderate aortic stenosis mild stenosis and mild pulm hypertension  -  Lasix 40 mg but changed to daily only and make it po    -- Low-sodium diet.  Weigh patient daily and monitor I's and O's.  -- Fluid restriction --loosen to 2000 ml /Day  --will send back to NH with lasix and fluid restrictions and check labs there too     2.Sepsis secondary to catheter associated UTI  --Patient has indwelling suprapubic catheter  --switched to  cefepime, from ceftriaxone, completed antibiotics here  --interesting that he greatly improved prior to switching the antibiotic     3.Chronic atrial fibrillation with mitral stenosis  -- On chronic Coumadin  -- daily  INR  -- Pharmacy to dose Coumadin     4.Previous CVA and Bell's palsy  Macular degeneration.  Legally blind  -- Patient has chronic right-sided facial droop and has had surgery to address right eyelid  -- He is also hearing impaired  --  He is high risk of hospital-acquired delirium--it helps that family stays with him here greatly helped  --  Continue as needed Seroquel  -family does get him up for walks here and are hoping get pt./ot at NH too     5.Hyponatremia  -Euvolemic versus hypervolemic  --Continue diuretics as above, but decrease to daily   -trend NA, today 133     6.Hypokalemia  -replaced again here today and mag good today     7.Goals of care  -  Patient's daughters have help a lot and he and does not want any aggressive measures.  -no icu or pressors     Returned to LTC 6/12/2023.      Today:  He has been pretty stable. Did have some adjustments and temporary increase in lasix last month due to weight gain, currently on 40 mg Lasix and stable. No new concerns per nursing. He denies any complaints. No recent illness. SP catheter functioning fine.     The following is an excerpt nursing note from facility EMR 7/30/2023:    Note Text: WEEKLY NOTE: No falls, labs or MD visits this week. Denies pain. Continues on fluid restrictions, intake good at meals. Able to verbalize his needs. SP catheter patent, urine clear. Behaviors continue on a daily basis, will make sarcastic comments when staff tries to redirect him and will ignore redirection. Continues to stomp his feet around others knowing it will irritate them, purposely blocks the hallway so others cannot get by, purposely sits in front of the med cart so nurse cannot get to it (nurse must move the cart down the grimaldo out of his sight to pass  "meds). Acts like he cannot hear when someone is talking to him even though they are speaking loudly, yet if they talk directly in his ear he will yell at them and say \"why are you yelling?\" Continues to touch items at the nurses station, desk must often be cleared. Care plan remains current.      Past Medical History:  Past Medical History:   Diagnosis Date     Atrial fibrillation (H)     On coumadin     Bell's palsy     right sided facial droop     CVA (cerebral vascular accident) (H)      Hypertension      Pacemaker      Urinary retention        Medications:  Current Outpatient Medications   Medication Sig Dispense Refill     acetaminophen (TYLENOL) 325 MG tablet Take 2 tablets (650 mg) by mouth every 6 hours as needed for mild pain or other (and adjunct with moderate or severe pain or per patient request) 20 tablet 0     atorvastatin (LIPITOR) 40 MG tablet Take 1 tablet (40 mg) by mouth At Bedtime 30 tablet 0     bisacodyl (DULCOLAX) 10 MG suppository Place 1 suppository (10 mg) rectally daily as needed for constipation 10 suppository 0     docusate sodium (COLACE) 100 MG capsule Take 1 capsule (100 mg) by mouth 2 times daily as needed for constipation 30 capsule 0     Docusate Sodium (DOCU LIQUID PO) Place 5 drops into both ears daily as needed prior to irrigation for cerumen removal       dorzolamide-timolol (COSOPT) 2-0.5 % ophthalmic solution Place 1 drop Into the left eye 2 times daily 10 mL 12     erythromycin base (ERYTHROMYCIN OPHT) Place 5 mg into both eyes At Bedtime Instill 1 ribbon in Left Eye and 1 ribbon in Right Eye at bedtime. Ensure ointment is given after eye drops to ensure proper absorption.       ferrous sulfate 325 (65 FE) MG tablet [FERROUS SULFATE 325 (65 FE) MG TABLET] Take 1 tablet by mouth daily.       furosemide (LASIX) 40 MG tablet Take 1 tablet (40 mg) by mouth daily Please check blood pressure before giving and  hold if sbp<110 30 tablet 0     guaiFENesin (ROBITUSSIN) 100 MG/5ML " "SYRP Take 10 mLs by mouth every 4 hours as needed for cough 473 mL 0     latanoprost (XALATAN) 0.005 % ophthalmic solution Place 1 drop Into the left eye At Bedtime 2.5 mL 12     levothyroxine (SYNTHROID/LEVOTHROID) 25 MCG tablet Take 1 tablet (25 mcg) by mouth daily 30 tablet 0     menthol-zinc oxide (CALMOSEPTINE) 0.44-20.6 % OINT ointment Apply topically daily To buttock 71 g 0     miconazole (MICATIN) 2 % external cream Apply topically 2 times daily For between toes 35 g 0     omeprazole (PRILOSEC) 20 MG DR capsule Take 1 capsule (20 mg) by mouth daily 30 capsule 0     Propylene Glycol (SYSTANE COMPLETE OP) Place 1 drop into both eyes 2 times daily At 0600 and 1630       WARFARIN SODIUM PO 6/13/23 INR 2.68  Cont 7mg Mon and Thurs and 6.5mg AOD.  Next INR 6/19/23.          Physical Exam:   General: Patient is alert, elderly male, no distress. [place].  Vitals: /72   Pulse 64   Temp 98.4  F (36.9  C)   Resp 18   Ht 1.803 m (5' 11\")   Wt 81.6 kg (180 lb)   SpO2 98%   BMI 25.10 kg/m    HEENT: Head is NCAT. Nares negative. Oropharynx moist. Right facial droop, baseline.  Neck: No JVD.  Lungs: Non labored respirations.   : SP catheter.   Extremities: Mild LE swelling, wears compression.  Musculoskeletal: Age related degen changes.   Skin: Warm and dry.   Psych: Mood is stable.       Labs:  Component      Latest Ref Rng 6/13/2023  5:04 AM 6/19/2023  5:03 AM   Sodium      136 - 145 mmol/L 133 (L)  137    Anion Gap      7 - 15 mmol/L 10  9    Calcium      8.2 - 9.6 mg/dL 8.5  8.8    Creatinine      0.67 - 1.17 mg/dL 0.81  0.74    GFR Estimate      >60 mL/min/1.73m2 80  82    Potassium      3.4 - 5.3 mmol/L 3.6  3.8    Chloride      98 - 107 mmol/L 98  100    Carbon Dioxide (CO2)      22 - 29 mmol/L 25  28    Urea Nitrogen      8.0 - 23.0 mg/dL 12.2  13.2    Glucose      70 - 99 mg/dL 114 (H)  114 (H)       Component      Latest Ref Rng 6/11/2023  6:11 AM 6/12/2023  6:04 AM   WBC      4.0 - 11.0 10e3/uL 6.9  " 7.1    RBC Count      4.40 - 5.90 10e6/uL 3.95 (L)  3.91 (L)    Hemoglobin      13.3 - 17.7 g/dL 11.4 (L)  11.3 (L)    Hematocrit      40.0 - 53.0 % 35.1 (L)  34.6 (L)    MCV      78 - 100 fL 89  89    MCH      26.5 - 33.0 pg 28.9  28.9    MCHC      31.5 - 36.5 g/dL 32.5  32.7    RDW      10.0 - 15.0 % 13.7  13.7    Platelet Count      150 - 450 10e3/uL 241  252           Assessment/Plan:  1. CHF. Stable on lasix. Monitor weights, edema, clinical status.   2. Afib. Anticoagulated with warfarin, continue. Adequate rate control.   3. Hx of stroke. He is on atorvastatin.   4. Hypothyroidism. He is on replacement levothyroxine, continue.   5. SP catheter. Long term. UTI and hospitalization June 2023.       Electronically signed by: Nancy Fair MD       Sincerely,        Nancy Fair MD

## 2023-08-21 NOTE — PROGRESS NOTES
[facility] Medical Record Number:  [id number]  Place of Service where encounter took place: [facility] () [[id number]]   CODE STATUS:   DNR / DNI    Chief Complaint:  Chief Complaint   Patient presents with    halfway Regulatory       HPI:   Riley is a 89 y.o. male seen for routine physician follow up in LTC at [facility]. He has hx of Afib on warfarin, prior stroke, BPH, HTN, [place] palsy, hypothyroidism, SP catheter. He was hospitalized 8/13/2020-8/19/2020 with significant anemia, hgb down to 6.2. He was worked up in the hospital found to have duodenal ulcer, treated appropriately. No hospitalizations since.     He was sent in to the hospital from nursing home on 6/5/2023 due to confusion and hypoxia. Hospital info as noted, treated for UTI, resp failure/CHF.      Riley Rodriguez is a 89 year old male admitted on 6/5/2023.  Presented with confusion and hypoxia.  Chest x-ray shows pulmonary edema.  UA suspicious for UTI.  Suspect CHF and sepsis secondary  to catheter associated UTI.  Discussed goals of care with the patient's family.  They do not want any aggressive measures such as vasopressors and transferred to ICU.       1.Acute hypoxic respiratory failure acute pulmonary edema likely due to acute  Diastolic Heart Failure  --Now on RA  -- Transthoracic echocardiogram shows a left ventricle ejection fraction of 60 to 65% with moderate aortic stenosis mild stenosis and mild pulm hypertension  -  Lasix 40 mg but changed to daily only and make it po    -- Low-sodium diet.  Weigh patient daily and monitor I's and O's.  -- Fluid restriction --loosen to 2000 ml /Day  --will send back to NH with lasix and fluid restrictions and check labs there too     2.Sepsis secondary to catheter associated UTI  --Patient has indwelling suprapubic catheter  --switched to cefepime, from ceftriaxone, completed antibiotics here  --interesting that he greatly improved  prior to switching the antibiotic     3.Chronic atrial fibrillation with mitral stenosis  -- On chronic Coumadin  -- daily  INR  -- Pharmacy to dose Coumadin     4.Previous CVA and Bell's palsy  Macular degeneration.  Legally blind  -- Patient has chronic right-sided facial droop and has had surgery to address right eyelid  -- He is also hearing impaired  --  He is high risk of hospital-acquired delirium--it helps that family stays with him here greatly helped  --  Continue as needed Seroquel  -family does get him up for walks here and are hoping get pt./ot at NH too     5.Hyponatremia  -Euvolemic versus hypervolemic  --Continue diuretics as above, but decrease to daily   -trend NA, today 133     6.Hypokalemia  -replaced again here today and mag good today     7.Goals of care  -  Patient's daughters have help a lot and he and does not want any aggressive measures.  -no icu or pressors     Returned to LTC 6/12/2023.      Today:  He has been pretty stable. Did have some adjustments and temporary increase in lasix last month due to weight gain, currently on 40 mg Lasix and stable. No new concerns per nursing. He denies any complaints. No recent illness. SP catheter functioning fine.     The following is an excerpt nursing note from facility EMR 7/30/2023:    Note Text: WEEKLY NOTE: No falls, labs or MD visits this week. Denies pain. Continues on fluid restrictions, intake good at meals. Able to verbalize his needs. SP catheter patent, urine clear. Behaviors continue on a daily basis, will make sarcastic comments when staff tries to redirect him and will ignore redirection. Continues to stomp his feet around others knowing it will irritate them, purposely blocks the hallway so others cannot get by, purposely sits in front of the med cart so nurse cannot get to it (nurse must move the cart down the grimaldo out of his sight to pass meds). Acts like he cannot hear when someone is talking to him even though they are speaking  "loudly, yet if they talk directly in his ear he will yell at them and say \"why are you yelling?\" Continues to touch items at the nurses station, desk must often be cleared. Care plan remains current.      Past Medical History:  Past Medical History:   Diagnosis Date    Atrial fibrillation (H)     On coumadin    Bell's palsy     right sided facial droop    CVA (cerebral vascular accident) (H)     Hypertension     Pacemaker     Urinary retention        Medications:  Current Outpatient Medications   Medication Sig Dispense Refill    acetaminophen (TYLENOL) 325 MG tablet Take 2 tablets (650 mg) by mouth every 6 hours as needed for mild pain or other (and adjunct with moderate or severe pain or per patient request) 20 tablet 0    atorvastatin (LIPITOR) 40 MG tablet Take 1 tablet (40 mg) by mouth At Bedtime 30 tablet 0    bisacodyl (DULCOLAX) 10 MG suppository Place 1 suppository (10 mg) rectally daily as needed for constipation 10 suppository 0    docusate sodium (COLACE) 100 MG capsule Take 1 capsule (100 mg) by mouth 2 times daily as needed for constipation 30 capsule 0    Docusate Sodium (DOCU LIQUID PO) Place 5 drops into both ears daily as needed prior to irrigation for cerumen removal      dorzolamide-timolol (COSOPT) 2-0.5 % ophthalmic solution Place 1 drop Into the left eye 2 times daily 10 mL 12    erythromycin base (ERYTHROMYCIN OPHT) Place 5 mg into both eyes At Bedtime Instill 1 ribbon in Left Eye and 1 ribbon in Right Eye at bedtime. Ensure ointment is given after eye drops to ensure proper absorption.      ferrous sulfate 325 (65 FE) MG tablet [FERROUS SULFATE 325 (65 FE) MG TABLET] Take 1 tablet by mouth daily.      furosemide (LASIX) 40 MG tablet Take 1 tablet (40 mg) by mouth daily Please check blood pressure before giving and  hold if sbp<110 30 tablet 0    guaiFENesin (ROBITUSSIN) 100 MG/5ML SYRP Take 10 mLs by mouth every 4 hours as needed for cough 473 mL 0    latanoprost (XALATAN) 0.005 % ophthalmic " "solution Place 1 drop Into the left eye At Bedtime 2.5 mL 12    levothyroxine (SYNTHROID/LEVOTHROID) 25 MCG tablet Take 1 tablet (25 mcg) by mouth daily 30 tablet 0    menthol-zinc oxide (CALMOSEPTINE) 0.44-20.6 % OINT ointment Apply topically daily To buttock 71 g 0    miconazole (MICATIN) 2 % external cream Apply topically 2 times daily For between toes 35 g 0    omeprazole (PRILOSEC) 20 MG DR capsule Take 1 capsule (20 mg) by mouth daily 30 capsule 0    Propylene Glycol (SYSTANE COMPLETE OP) Place 1 drop into both eyes 2 times daily At 0600 and 1630      WARFARIN SODIUM PO 6/13/23 INR 2.68  Cont 7mg Mon and Thurs and 6.5mg AOD.  Next INR 6/19/23.          Physical Exam:   General: Patient is alert, elderly male, no distress. [place].  Vitals: /72   Pulse 64   Temp 98.4  F (36.9  C)   Resp 18   Ht 1.803 m (5' 11\")   Wt 81.6 kg (180 lb)   SpO2 98%   BMI 25.10 kg/m    HEENT: Head is NCAT. Nares negative. Oropharynx moist. Right facial droop, baseline.  Neck: No JVD.  Lungs: Non labored respirations.   : SP catheter.   Extremities: Mild LE swelling, wears compression.  Musculoskeletal: Age related degen changes.   Skin: Warm and dry.   Psych: Mood is stable.       Labs:  Component      Latest Ref Rng 6/13/2023  5:04 AM 6/19/2023  5:03 AM   Sodium      136 - 145 mmol/L 133 (L)  137    Anion Gap      7 - 15 mmol/L 10  9    Calcium      8.2 - 9.6 mg/dL 8.5  8.8    Creatinine      0.67 - 1.17 mg/dL 0.81  0.74    GFR Estimate      >60 mL/min/1.73m2 80  82    Potassium      3.4 - 5.3 mmol/L 3.6  3.8    Chloride      98 - 107 mmol/L 98  100    Carbon Dioxide (CO2)      22 - 29 mmol/L 25  28    Urea Nitrogen      8.0 - 23.0 mg/dL 12.2  13.2    Glucose      70 - 99 mg/dL 114 (H)  114 (H)       Component      Latest Ref Rng 6/11/2023  6:11 AM 6/12/2023  6:04 AM   WBC      4.0 - 11.0 10e3/uL 6.9  7.1    RBC Count      4.40 - 5.90 10e6/uL 3.95 (L)  3.91 (L)    Hemoglobin      13.3 - 17.7 g/dL 11.4 (L)  11.3 (L)  "   Hematocrit      40.0 - 53.0 % 35.1 (L)  34.6 (L)    MCV      78 - 100 fL 89  89    MCH      26.5 - 33.0 pg 28.9  28.9    MCHC      31.5 - 36.5 g/dL 32.5  32.7    RDW      10.0 - 15.0 % 13.7  13.7    Platelet Count      150 - 450 10e3/uL 241  252           Assessment/Plan:  1. CHF. Stable on lasix. Monitor weights, edema, clinical status.   2. Afib. Anticoagulated with warfarin, continue. Adequate rate control.   3. Hx of stroke. He is on atorvastatin.   4. Hypothyroidism. He is on replacement levothyroxine, continue.   5. SP catheter. Long term. UTI and hospitalization June 2023.       Electronically signed by: Nancy Fair MD

## 2023-08-28 NOTE — TELEPHONE ENCOUNTER
[facility] Geriatrics Triage Nurse INR     Provider: EDWARD Jacobsen  Facility: [facility]  Facility Type:  LTC    Caller: Zakiya   Call Back Number: [phone]  Reason for call: INR  Diagnosis/Goal: A. Fib    Todays INR: 2.18  Last INR 8/7  2.33   6mg M, W, F and 5mg AOD.   7/24  2.22  same    Heparin/Lovenox:  No  Currently on ABX?: No  Other interacting medication:  None  Missed or refused doses: No    Verbal Order/Direction given by Provider: Cont 6mg M, W, F and 5mg AOD. Next INR 9/25    Provider Giving Order:  Nancy Fair MD    Verbal Order given to: Ginny Rosario RN

## 2023-08-30 NOTE — TELEPHONE ENCOUNTER
Encounter Type: Alert remote pacemaker transmission for VHR. Courtesy check.    Device: [employer].   Pacing % /Programmed:  87% at VVIR 60.  Lead(s): Stable.  Battery longevity: 8yrs, 7mo estimated.  Presenting: Ventricular pacing and sensing 60 bpm.  Atrial high rates: n/a.  Anticoagulant: Coumadin.  Ventricular High rates: since 8/15/23; One ventricular high rate episode on 8/25/23 11:31PM, RV lead only, but EGM suggests NSVT duration 7 seconds 165 bpm.  Comments: Normal device function.   Plan: Routed to device RN for review. JOSE RAFAEL Medel, Device Specialist  ADD: Transmission reviewed. Agree with findings. 21 beats of probable NSVT detected. EF 60-65% by echo (6/6/23).     Results were reviewed with patient's daughter Homa. Homa reports that patient has not had any activity intolerances nor has he been complaining of any unusual symptoms. Homa reports that at the time of the tachyarrhythmia, patient was most likely asleep already. Homa states that she is currently at patient's residence and they're about to go for a walk. Discussed follow-up plans with Homa, Homa reports that according to schedulers a week ago Dr. Carrillo was booked out through November and the December schedule wasn't out yet. Homa states that she plans to call back early September to schedule the appointment tandem with patient's annual in-clinic device check. Routed to Dr. Carrillo for further recommendations.     Radha Larry, RN   Device Nurse

## 2023-08-31 NOTE — TELEPHONE ENCOUNTER
No further recommendations from Dr. Carrillo. Will continue with routine device monitoring.     Radha Larry, RN   Device Nurse

## 2023-09-06 NOTE — LETTER
9/6/2023        RE: Riley Rodriguez  [address]  [address]  [place] [address]        [facility] GERIATRICS  Chief Complaint   Patient presents with     senior care Acute     [place] [facility] Record Number:  [id number]  Place of Service where encounter took place:  [facility][note 3] () [[id number]]    HPI:    Riley Rodriguez  is a 89 year old  (4/15/1926), who is being seen today for  Regulatory visit. HPI information obtained from: facility chart records, facility staff, patient report and [facility] chart review.     Background: He has history of A. fib on warfarin, prior stroke, hypertension, hypothyroid, he has hx of Afib on warfarin, BPH with a Pierson catheter in place.  He has been residing in the long-term care for many years, last hospitalized in June 2023.  Has been on iron supplement since that time with recent hemoglobin 12.2.      June 2023: He was admitted to [facility] on 6/5 due to hypoxia in the nursing home down to low 80s on RA.  Chest x-ray with pulm edema. Found to have urosepsis and CHF exacerbation. Family declined transfer to ICU and preferred to treat conservatively and keep comfortable. IV lasix transitioned to PO; he weaned of supplemental o2 and now on RA breathing comfortably. Peak weight 208lbs. Today 180. He is also on 2 gram sodium diet and 2000ml FR which we will loosen up as able.   Sepsis thought s/s to catheter associated UTI. Completed antibiotics.  He remains on coumadin; monitoring INR.   He did have mild hyponatremia which improved with diuretics. Hypokalemia and hypomagnesemia also improved.     Today: Riley is briefly seen to review weights and to discuss ambulatory program with family. Weight is stable at 187. No evidence of fluid overload. He is on 2250cc fluid restriction and has no complaints about this. Family is concerned that he is not getting walked enough- I did clarify with nurse manager for the LTC unit and she reports he  is getting a walk 2x per day with staff. Family is very involved and is generally happy with his care.       ALLERGIES: Patient has no known allergies.  PAST MEDICAL HISTORY:   Past Medical History:   Diagnosis Date     Atrial fibrillation (H)     On coumadin     Bell's palsy     right sided facial droop     CVA (cerebral vascular accident) (H)      Hypertension      Pacemaker      Urinary retention       PAST SURGICAL HISTORY:  has a past surgical history that includes IR Thoracic Aortogram (1/1/2004); IR Visceral Angiogram (1/1/2004); IR Visceral Angiogram (1/1/2004); IR Visceral Angiogram (1/1/2004); IR Miscellaneous Procedure (1/1/2004); IR Visceral Angiogram (1/1/2004); IR Miscellaneous Procedure (1/1/2004); IR Visceral Angiogram (1/1/2004); IR Visceral Angiogram (1/1/2004); IR Miscellaneous Procedure (1/1/2004); IR Visceral Angiogram (1/1/2004); IR Aortic Arch 4 Vessel Angiogram (1/1/2004); IR Suprapubic Catheter Placment (1/18/2019); remv pilonidal lesion simple; Pr Partial Excision Thyroid,Unilat; TRANSURETHRAL ELEC-SURG PROSTATECTOM; Total Hip Arthroplasty (Right); Ir Bladder Suprapubic Catheter Insertion (1/18/2019); and Pr Esophagogastroduodenoscopy Transoral Diagnostic (N/A, 8/15/2020).  IMMUNIZATIONS:  Immunization History   Administered Date(s) Administered     COVID-19 Bivalent 12+ (Pfizer) 09/23/2022     COVID-19 Monovalent 18+ (Moderna) 12/30/2020, 01/27/2021, 11/26/2021, 05/09/2022     DT (PEDS <7y) 10/11/2004     Flu, Unspecified 10/12/2007, 10/13/2019, 10/24/2020     Influenza (High Dose) 3 valent vaccine 10/01/2010, 09/29/2015, 10/19/2016, 09/12/2017, 10/23/2018, 10/12/2021     Influenza (IIV3) PF 10/11/2004, 11/02/2005, 10/30/2006, 10/12/2007, 11/10/2008, 09/16/2009, 09/28/2011, 09/19/2012, 09/20/2013     Influenza Vaccine 18-64 (Flublok) 10/10/2014     Influenza Vaccine >6 months (Alfuria,Fluzone) 10/10/2014, 10/10/2014     Influenza Vaccine, 6+MO IM (QUADRIVALENT W/PRESERVATIVES) 11/10/2008,  09/16/2009, 09/28/2011, 09/19/2012, 09/20/2013     Pneumo Conj 13-V (2010&after) 04/10/2015     Pneumococcal 23 valent 11/01/2001     TDAP (Adacel,Boostrix) 10/19/2012     TDAP Vaccine (Boostrix) 10/19/2012     Td (Adult), Adsorbed 10/11/2004     Td,adult,historic,unspecified 10/11/2004     Zoster vaccine, live 10/27/2015     Above immunizations pulled from [facility]. [facility] and facility records also reconciled. Outstanding information sent to  to update [facility].  Future immunizations are not needed at this point as all recommended immunizations are up to date.       Current Outpatient Medications:      acetaminophen (TYLENOL) 325 MG tablet, Take 2 tablets (650 mg) by mouth every 6 hours as needed for mild pain or other (and adjunct with moderate or severe pain or per patient request), Disp: 20 tablet, Rfl: 0     atorvastatin (LIPITOR) 40 MG tablet, Take 1 tablet (40 mg) by mouth At Bedtime, Disp: 30 tablet, Rfl: 0     bisacodyl (DULCOLAX) 10 MG suppository, Place 1 suppository (10 mg) rectally daily as needed for constipation, Disp: 10 suppository, Rfl: 0     docusate sodium (COLACE) 100 MG capsule, Take 1 capsule (100 mg) by mouth 2 times daily as needed for constipation, Disp: 30 capsule, Rfl: 0     Docusate Sodium (DOCU LIQUID PO), Place 5 drops into both ears daily as needed prior to irrigation for cerumen removal, Disp: , Rfl:      dorzolamide-timolol (COSOPT) 2-0.5 % ophthalmic solution, Place 1 drop Into the left eye 2 times daily, Disp: 10 mL, Rfl: 12     erythromycin base (ERYTHROMYCIN OPHT), Place 5 mg into both eyes At Bedtime Instill 1 ribbon in Left Eye and 1 ribbon in Right Eye at bedtime. Ensure ointment is given after eye drops to ensure proper absorption., Disp: , Rfl:      ferrous sulfate 325 (65 FE) MG tablet, [FERROUS SULFATE 325 (65 FE) MG TABLET] Take 1 tablet by mouth daily., Disp: , Rfl:      furosemide (LASIX) 40 MG tablet, Take 1 tablet (40 mg) by mouth daily Please  "check blood pressure before giving and  hold if sbp<110, Disp: 30 tablet, Rfl: 0     guaiFENesin (ROBITUSSIN) 100 MG/5ML SYRP, Take 10 mLs by mouth every 4 hours as needed for cough, Disp: 473 mL, Rfl: 0     latanoprost (XALATAN) 0.005 % ophthalmic solution, Place 1 drop Into the left eye At Bedtime, Disp: 2.5 mL, Rfl: 12     levothyroxine (SYNTHROID/LEVOTHROID) 25 MCG tablet, Take 1 tablet (25 mcg) by mouth daily, Disp: 30 tablet, Rfl: 0     menthol-zinc oxide (CALMOSEPTINE) 0.44-20.6 % OINT ointment, Apply topically daily To buttock, Disp: 71 g, Rfl: 0     miconazole (MICATIN) 2 % external cream, Apply topically 2 times daily For between toes, Disp: 35 g, Rfl: 0     omeprazole (PRILOSEC) 20 MG DR capsule, Take 1 capsule (20 mg) by mouth daily, Disp: 30 capsule, Rfl: 0     Propylene Glycol (SYSTANE COMPLETE OP), Place 1 drop into both eyes 2 times daily At 0600 and 1630, Disp: , Rfl:      WARFARIN SODIUM PO, 6/13/23 INR 2.68  Cont 7mg Mon and Thurs and 6.5mg AOD.  Next INR 6/19/23., Disp: , Rfl:        Post Medication Reconciliation Status:      ROS:  4 point ROS including Respiratory, CV, GI and , other than that noted in the HPI,  is negative    Vitals:  /67   Pulse 60   Temp 98  F (36.7  C)   Resp 18   Ht 1.803 m (5' 11\")   Wt 84.8 kg (187 lb)   SpO2 99%   BMI 26.08 kg/m   Body mass index is 26.08 kg/m .  Exam:  Physical Exam : stable  General appearance: alert, appears stated age and cooperative.   Head: Normocephalic, without obvious abnormality, atraumatic, Eyes: sclera anicteric.  Lungs: respirations unlabored, LSCTA; no wheezing or rales.   Cardiovascular: regular rate.    Extremities: extremities normal, atraumatic, trace edema bilaterally.  Skin: Skin color, texture, turgor normal. No rashes or lesions  Neurologic: oriented. No focal deficits.   Psych: interacts well with caregivers, exhibits logical thought processes and connections, pleasant    Lab/Diagnostic data:     Most Recent 3 " CBC's:  Recent Labs   Lab Test 06/12/23  0604 06/11/23  0611 06/10/23  0633   WBC 7.1 6.9 6.2   HGB 11.3* 11.4* 11.4*   MCV 89 89 89    241 232     Most Recent 3 BMP's:  Recent Labs   Lab Test 06/19/23  0503 06/13/23  0504 06/12/23  1312 06/12/23  0604    133*  --  133*   POTASSIUM 3.8 3.6 4.1 3.3*   CHLORIDE 100 98  --  98   CO2 28 25  --  24   BUN 13.2 12.2  --  12.6   CR 0.74 0.81  --  0.75   ANIONGAP 9 10  --  11   GENO 8.8 8.5  --  8.3   * 114*  --  121*       ASSESSMENT/PLAN    (I50.33) Acute on chronic diastolic congestive heart failure (H)  (primary encounter diagnosis)  Comment: Weight peak 208 in hospital. Today 191lbs, up 11lbs from last visit.   Plan:   -Daily weights. 187lb today.   -2 gram NA diet.  -Lasix 40 daily.     (E87.1) Hyponatremia  Comment: FR 2250cc/day.  on 6/19.   Plan:   -Continue FR, will liberate as able.    (I48.91) Atrial fibrillation, unspecified type (H)  Comment: INR 2.2.   Plan:    -Continue coumadin dose and recheck due Monday.     (N39.0) Urinary tract infection without hematuria, site unspecified  Comment: has chronic s/p catheter.   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    (E61.1) Iron deficiency  Comment: Hemoglobin in June 11.3; mcv wnl.   Plan: Continue iron supplementation.  Continue omeprazole.  -recheck cbc 6 months.    (I10) Hypertension  Comment: Most recent blood pressure stable.  Plan: No current medications.    (E03.9) Hypothyroidism, unspecified type  Comment: Stable  Plan:     TSH   Date Value Ref Range Status   08/21/2023 4.67 (H) 0.30 - 4.20 uIU/mL Final   06/02/2022 4.10 0.30 - 5.00 uIU/mL Final       Electronically signed by:  Jac Pitt CNP     Sincerely,        Jac Pitt, CNP

## 2023-09-06 NOTE — PROGRESS NOTES
[facility] GERIATRICS  Chief Complaint   Patient presents with    correction Acute     [place] Medical Record Number:  [id number]  Place of Service where encounter took place:  [facility] () [[id number]]    HPI:    Riley Rodriguez  is a 89 year old  (4/15/1926), who is being seen today for  Regulatory visit. HPI information obtained from: facility chart records, facility staff, patient report and [facility] chart review.     Background: He has history of A. fib on warfarin, prior stroke, hypertension, hypothyroid, he has hx of Afib on warfarin, BPH with a Pierson catheter in place.  He has been residing in the long-term care for many years, last hospitalized in June 2023.  Has been on iron supplement since that time with recent hemoglobin 12.2.      June 2023: He was admitted to [facility] on 6/5 due to hypoxia in the nursing home down to low 80s on RA.  Chest x-ray with pulm edema. Found to have urosepsis and CHF exacerbation. Family declined transfer to ICU and preferred to treat conservatively and keep comfortable. IV lasix transitioned to PO; he weaned of supplemental o2 and now on RA breathing comfortably. Peak weight 208lbs. Today 180. He is also on 2 gram sodium diet and 2000ml FR which we will loosen up as able.   Sepsis thought s/s to catheter associated UTI. Completed antibiotics.  He remains on coumadin; monitoring INR.   He did have mild hyponatremia which improved with diuretics. Hypokalemia and hypomagnesemia also improved.     Today: Riley is briefly seen to review weights and to discuss ambulatory program with family. Weight is stable at 187. No evidence of fluid overload. He is on 2250cc fluid restriction and has no complaints about this. Family is concerned that he is not getting walked enough- I did clarify with nurse manager for the LTC unit and she reports he is getting a walk 2x per day with staff. Family is very involved and is generally happy with his  care.       ALLERGIES: Patient has no known allergies.  PAST MEDICAL HISTORY:   Past Medical History:   Diagnosis Date    Atrial fibrillation (H)     On coumadin    Bell's palsy     right sided facial droop    CVA (cerebral vascular accident) (H)     Hypertension     Pacemaker     Urinary retention       PAST SURGICAL HISTORY:  has a past surgical history that includes IR Thoracic Aortogram (1/1/2004); IR Visceral Angiogram (1/1/2004); IR Visceral Angiogram (1/1/2004); IR Visceral Angiogram (1/1/2004); IR Miscellaneous Procedure (1/1/2004); IR Visceral Angiogram (1/1/2004); IR Miscellaneous Procedure (1/1/2004); IR Visceral Angiogram (1/1/2004); IR Visceral Angiogram (1/1/2004); IR Miscellaneous Procedure (1/1/2004); IR Visceral Angiogram (1/1/2004); IR Aortic Arch 4 Vessel Angiogram (1/1/2004); IR Suprapubic Catheter Placment (1/18/2019); remv pilonidal lesion simple; Pr Partial Excision Thyroid,Unilat; TRANSURETHRAL ELEC-SURG PROSTATECTOM; Total Hip Arthroplasty (Right); Ir Bladder Suprapubic Catheter Insertion (1/18/2019); and Pr Esophagogastroduodenoscopy Transoral Diagnostic (N/A, 8/15/2020).  IMMUNIZATIONS:  Immunization History   Administered Date(s) Administered    COVID-19 Bivalent 12+ (Pfizer) 09/23/2022    COVID-19 Monovalent 18+ (Moderna) 12/30/2020, 01/27/2021, 11/26/2021, 05/09/2022    DT (PEDS <7y) 10/11/2004    Flu, Unspecified 10/12/2007, 10/13/2019, 10/24/2020    Influenza (High Dose) 3 valent vaccine 10/01/2010, 09/29/2015, 10/19/2016, 09/12/2017, 10/23/2018, 10/12/2021    Influenza (IIV3) PF 10/11/2004, 11/02/2005, 10/30/2006, 10/12/2007, 11/10/2008, 09/16/2009, 09/28/2011, 09/19/2012, 09/20/2013    Influenza Vaccine 18-64 (Flublok) 10/10/2014    Influenza Vaccine >6 months (Alfuria,Fluzone) 10/10/2014, 10/10/2014    Influenza Vaccine, 6+MO IM (QUADRIVALENT W/PRESERVATIVES) 11/10/2008, 09/16/2009, 09/28/2011, 09/19/2012, 09/20/2013    Pneumo Conj 13-V (2010&after) 04/10/2015    Pneumococcal 23  valent 11/01/2001    TDAP (Adacel,Boostrix) 10/19/2012    TDAP Vaccine (Boostrix) 10/19/2012    Td (Adult), Adsorbed 10/11/2004    Td,adult,historic,unspecified 10/11/2004    Zoster vaccine, live 10/27/2015     Above immunizations pulled from [facility]. [facility] and facility records also reconciled. Outstanding information sent to  to update [facility].  Future immunizations are not needed at this point as all recommended immunizations are up to date.       Current Outpatient Medications:     acetaminophen (TYLENOL) 325 MG tablet, Take 2 tablets (650 mg) by mouth every 6 hours as needed for mild pain or other (and adjunct with moderate or severe pain or per patient request), Disp: 20 tablet, Rfl: 0    atorvastatin (LIPITOR) 40 MG tablet, Take 1 tablet (40 mg) by mouth At Bedtime, Disp: 30 tablet, Rfl: 0    bisacodyl (DULCOLAX) 10 MG suppository, Place 1 suppository (10 mg) rectally daily as needed for constipation, Disp: 10 suppository, Rfl: 0    docusate sodium (COLACE) 100 MG capsule, Take 1 capsule (100 mg) by mouth 2 times daily as needed for constipation, Disp: 30 capsule, Rfl: 0    Docusate Sodium (DOCU LIQUID PO), Place 5 drops into both ears daily as needed prior to irrigation for cerumen removal, Disp: , Rfl:     dorzolamide-timolol (COSOPT) 2-0.5 % ophthalmic solution, Place 1 drop Into the left eye 2 times daily, Disp: 10 mL, Rfl: 12    erythromycin base (ERYTHROMYCIN OPHT), Place 5 mg into both eyes At Bedtime Instill 1 ribbon in Left Eye and 1 ribbon in Right Eye at bedtime. Ensure ointment is given after eye drops to ensure proper absorption., Disp: , Rfl:     ferrous sulfate 325 (65 FE) MG tablet, [FERROUS SULFATE 325 (65 FE) MG TABLET] Take 1 tablet by mouth daily., Disp: , Rfl:     furosemide (LASIX) 40 MG tablet, Take 1 tablet (40 mg) by mouth daily Please check blood pressure before giving and  hold if sbp<110, Disp: 30 tablet, Rfl: 0    guaiFENesin (ROBITUSSIN) 100 MG/5ML SYRP,  "Take 10 mLs by mouth every 4 hours as needed for cough, Disp: 473 mL, Rfl: 0    latanoprost (XALATAN) 0.005 % ophthalmic solution, Place 1 drop Into the left eye At Bedtime, Disp: 2.5 mL, Rfl: 12    levothyroxine (SYNTHROID/LEVOTHROID) 25 MCG tablet, Take 1 tablet (25 mcg) by mouth daily, Disp: 30 tablet, Rfl: 0    menthol-zinc oxide (CALMOSEPTINE) 0.44-20.6 % OINT ointment, Apply topically daily To buttock, Disp: 71 g, Rfl: 0    miconazole (MICATIN) 2 % external cream, Apply topically 2 times daily For between toes, Disp: 35 g, Rfl: 0    omeprazole (PRILOSEC) 20 MG DR capsule, Take 1 capsule (20 mg) by mouth daily, Disp: 30 capsule, Rfl: 0    Propylene Glycol (SYSTANE COMPLETE OP), Place 1 drop into both eyes 2 times daily At 0600 and 1630, Disp: , Rfl:     WARFARIN SODIUM PO, 6/13/23 INR 2.68  Cont 7mg Mon and Thurs and 6.5mg AOD.  Next INR 6/19/23., Disp: , Rfl:        Post Medication Reconciliation Status:      ROS:  4 point ROS including Respiratory, CV, GI and , other than that noted in the HPI,  is negative    Vitals:  /67   Pulse 60   Temp 98  F (36.7  C)   Resp 18   Ht 1.803 m (5' 11\")   Wt 84.8 kg (187 lb)   SpO2 99%   BMI 26.08 kg/m   Body mass index is 26.08 kg/m .  Exam:  Physical Exam : stable  General appearance: alert, appears stated age and cooperative.   Head: Normocephalic, without obvious abnormality, atraumatic, Eyes: sclera anicteric.  Lungs: respirations unlabored, LSCTA; no wheezing or rales.   Cardiovascular: regular rate.    Extremities: extremities normal, atraumatic, trace edema bilaterally.  Skin: Skin color, texture, turgor normal. No rashes or lesions  Neurologic: oriented. No focal deficits.   Psych: interacts well with caregivers, exhibits logical thought processes and connections, pleasant    Lab/Diagnostic data:     Most Recent 3 CBC's:  Recent Labs   Lab Test 06/12/23  0604 06/11/23  0611 06/10/23  0633   WBC 7.1 6.9 6.2   HGB 11.3* 11.4* 11.4*   MCV 89 89 89   PLT " 252 241 232     Most Recent 3 BMP's:  Recent Labs   Lab Test 06/19/23  0503 06/13/23  0504 06/12/23  1312 06/12/23  0604    133*  --  133*   POTASSIUM 3.8 3.6 4.1 3.3*   CHLORIDE 100 98  --  98   CO2 28 25  --  24   BUN 13.2 12.2  --  12.6   CR 0.74 0.81  --  0.75   ANIONGAP 9 10  --  11   GENO 8.8 8.5  --  8.3   * 114*  --  121*       ASSESSMENT/PLAN    (I50.33) Acute on chronic diastolic congestive heart failure (H)  (primary encounter diagnosis)  Comment: Weight peak 208 in hospital. Today 191lbs, up 11lbs from last visit.   Plan:   -Daily weights. 187lb today.   -2 gram NA diet.  -Lasix 40 daily.     (E87.1) Hyponatremia  Comment: FR 2250cc/day.  on 6/19.   Plan:   -Continue FR, will liberate as able.    (I48.91) Atrial fibrillation, unspecified type (H)  Comment: INR 2.2.   Plan:    -Continue coumadin dose and recheck due Monday.     (N39.0) Urinary tract infection without hematuria, site unspecified  Comment: has chronic s/p catheter.   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    (E61.1) Iron deficiency  Comment: Hemoglobin in June 11.3; mcv wnl.   Plan: Continue iron supplementation.  Continue omeprazole.  -recheck cbc 6 months.    (I10) Hypertension  Comment: Most recent blood pressure stable.  Plan: No current medications.    (E03.9) Hypothyroidism, unspecified type  Comment: Stable  Plan:     TSH   Date Value Ref Range Status   08/21/2023 4.67 (H) 0.30 - 4.20 uIU/mL Final   06/02/2022 4.10 0.30 - 5.00 uIU/mL Final       Electronically signed by:  Jac Pitt, CNP

## 2023-09-25 NOTE — TELEPHONE ENCOUNTER
[facility] Geriatrics Triage Nurse INR     Provider: EDWARD Jacobsen  Facility: [facility]  Facility Type:  LTC    Caller: Raffaele  Reason for call: INR  Diagnosis/Goal: A. Fib    Todays INR: 2.18  Last INR 2.18 on 8/28    Heparin/Lovenox:  No  Currently on ABX?: No  Other interacting medication:  None  Missed or refused doses: No    Verbal Order/Direction given by Provider: Coumadin 6 mg PO M/W/F and 5 mg PO AOD. Check INR on 10/23/23    Provider Giving Order:  EDWARD Jacobsen    Verbal Order given to: Raffaele Smyth RN

## 2023-10-12 NOTE — TELEPHONE ENCOUNTER
Orders relayed to facility nurse, Zakiya.    ----- Message from Jac Pitt CNP sent at 10/12/2023 10:10 AM CDT -----  Most stable INR ever. Continue current dose. Recheck 1 week. (I think this was done pre dental procedure)

## 2023-10-17 NOTE — TELEPHONE ENCOUNTER
Called by nursing staff at [facility] about pt's labs   Some trouble standing from a seated position   No other symptoms, no shortness of breath or edema     Na 129    K 3.3      PLAN: hold furosemide x3 days   KCl 20 mEq tonight and in AM   Recheck labs on Wednesday, follow up with primary team   Nicole Barry MD

## 2023-10-17 NOTE — RESULT ENCOUNTER NOTE
KCL 40meq po x 1 today. Re-weigh. If >190, give extra dose of lasix today. Repeat bmp on Thursday. Confirm 2000cc FR with staff :)

## 2023-10-18 NOTE — LETTER
10/18/2023        RE: Riley Rodriguez  [address]  [address]  [place] [address]        [facility] GERIATRICS  Chief Complaint   Patient presents with     Annual Comprehensive Nursing Home     [place] Medical Record Number:  [id number]  Place of Service where encounter took place:  [facility] () [[id number]]    HPI:    Riley Rodriguez  is a 89 year old  (4/15/1926), who is being seen today for  Regulatory visit. HPI information obtained from: facility chart records, facility staff, patient report and [facility] chart review.     Background: He has history of A. fib on warfarin, prior stroke, hypertension, hypothyroid, he has hx of Afib on warfarin, BPH with a Pierson catheter in place.  He has been residing in the long-term care for many years, last hospitalized in June 2023.  Has been on iron supplement since that time with recent hemoglobin 12.2.      June 2023: He was admitted to [facility] on 6/5 due to hypoxia in the nursing home down to low 80s on RA.  Chest x-ray with pulm edema. Found to have urosepsis and CHF exacerbation. Family declined transfer to ICU and preferred to treat conservatively and keep comfortable. IV lasix transitioned to PO; he weaned off supplemental o2 and now on RA breathing comfortably. Peak weight 208lbs. Today 180. He is also on 2 gram sodium diet and 2000ml FR which we will loosen up as able.   Sepsis thought s/s to catheter associated UTI. Completed antibiotics.  He remains on coumadin; monitoring INR.   He did have mild hyponatremia which improved with diuretics. Hypokalemia and hypomagnesemia also improved.     Today: Riley is seen today per request for new onset weakness and fever to 100.3  Also with hyponatremia, today 128.  Fever improved with Tylenol.  He does have chronic hyponatremia and is on a 20-50 fluid restriction, sodiums have been stable in the 130s prior to this week.  He did have tooth extraction of the left 3 lower  back teeth, the area has some slough and swelling and he does complain of some tenderness however not overtly infected.  He has weakness with inability to stand and transfer which he can at baseline.  His daughter is at the bedside.  Weights today are 186, he does have some lower extremity edema bilaterally.  He is typically on Lasix however that has been held due to hyponatremia with no improvement in sodium.  He is on prophylactic antibiotics and soft food diet for tooth extraction.  He does continue to have some pain in that left lower jaw.  Per his daughter, he has been eating pretty well despite having the tooth extraction.  COVID test done this morning was negative.  Have not done flu test.  Discussed with daughter that if he were to get worse she would like him sent back to [facility].  For now stable, will obtain UA UC and recheck BMP on Friday.      ALLERGIES: Patient has no known allergies.  PAST MEDICAL HISTORY:   Past Medical History:   Diagnosis Date     Atrial fibrillation (H)     On coumadin     Bell's palsy     right sided facial droop     CVA (cerebral vascular accident) (H)      Hypertension      Pacemaker      Urinary retention       PAST SURGICAL HISTORY:  has a past surgical history that includes IR Thoracic Aortogram (1/1/2004); IR Visceral Angiogram (1/1/2004); IR Visceral Angiogram (1/1/2004); IR Visceral Angiogram (1/1/2004); IR Miscellaneous Procedure (1/1/2004); IR Visceral Angiogram (1/1/2004); IR Miscellaneous Procedure (1/1/2004); IR Visceral Angiogram (1/1/2004); IR Visceral Angiogram (1/1/2004); IR Miscellaneous Procedure (1/1/2004); IR Visceral Angiogram (1/1/2004); IR Aortic Arch 4 Vessel Angiogram (1/1/2004); IR Suprapubic Catheter Placment (1/18/2019); remv pilonidal lesion simple; Pr Partial Excision Thyroid,Unilat; TRANSURETHRAL ELEC-SURG PROSTATECTOM; Total Hip Arthroplasty (Right); Ir Bladder Suprapubic Catheter Insertion (1/18/2019); Pr Esophagogastroduodenoscopy Transoral  Diagnostic (N/A, 8/15/2020); and IR Suprapubic Catheter Change (10/19/2023).  IMMUNIZATIONS:  Immunization History   Administered Date(s) Administered     COVID-19 Bivalent 12+ (Pfizer) 09/23/2022     COVID-19 Monovalent 18+ (Moderna) 12/30/2020, 01/27/2021, 11/26/2021, 05/09/2022     DT (PEDS <7y) 10/11/2004     Flu, Unspecified 10/12/2007, 10/13/2019, 10/24/2020     Influenza (High Dose) 3 valent vaccine 10/01/2010, 09/29/2015, 10/19/2016, 09/12/2017, 10/23/2018, 10/12/2021     Influenza (IIV3) PF 10/11/2004, 11/02/2005, 10/30/2006, 10/12/2007, 11/10/2008, 09/16/2009, 09/28/2011, 09/19/2012, 09/20/2013     Influenza Vaccine 18-64 (Flublok) 10/10/2014     Influenza Vaccine >6 months (Alfuria,Fluzone) 10/10/2014, 10/10/2014     Influenza Vaccine, 6+MO IM (QUADRIVALENT W/PRESERVATIVES) 11/10/2008, 09/16/2009, 09/28/2011, 09/19/2012, 09/20/2013     Pneumo Conj 13-V (2010&after) 04/10/2015     Pneumococcal 23 valent 11/01/2001     TDAP (Adacel,Boostrix) 10/19/2012     TDAP Vaccine (Boostrix) 10/19/2012     Td (Adult), Adsorbed 10/11/2004     Td,adult,historic,unspecified 10/11/2004     Zoster vaccine, live 10/27/2015     Above immunizations pulled from [facility] [employer] [facility]. [facility] and facility records also reconciled. Outstanding information sent to  to update [facility].  Future immunizations are not needed at this point as all recommended immunizations are up to date.       Current Outpatient Medications:      acetaminophen (TYLENOL) 325 MG tablet, Take 2 tablets (650 mg) by mouth every 6 hours as needed for mild pain or other (and adjunct with moderate or severe pain or per patient request), Disp: 20 tablet, Rfl: 0     atorvastatin (LIPITOR) 40 MG tablet, Take 1 tablet (40 mg) by mouth at bedtime On hold due to Paxlovid.  Resume Atorvastatin on 10/30/23, Disp: , Rfl:      bisacodyl (DULCOLAX) 10 MG suppository, Place 1 suppository (10 mg) rectally daily as needed for constipation, Disp: 10 suppository,  Rfl: 0     cyanocobalamin (VITAMIN B-12) 1000 MCG tablet, Take 1,000 mcg by mouth daily, Disp: , Rfl:      docusate sodium (COLACE) 100 MG capsule, Take 1 capsule (100 mg) by mouth 2 times daily as needed for constipation, Disp: 30 capsule, Rfl: 0     Docusate Sodium (DOCU LIQUID PO), Place 5 drops into both ears daily as needed prior to irrigation for cerumen removal, Disp: , Rfl:      dorzolamide-timolol (COSOPT) 2-0.5 % ophthalmic solution, Place 1 drop Into the left eye 2 times daily, Disp: 10 mL, Rfl: 12     erythromycin base (ERYTHROMYCIN OPHT), Place 5 mg into both eyes At Bedtime Instill 1 ribbon in Left Eye and 1 ribbon in Right Eye at bedtime. Ensure ointment is given after eye drops to ensure proper absorption., Disp: , Rfl:      furosemide (LASIX) 20 MG tablet, Take 20 mg by mouth daily, Disp: , Rfl:      latanoprost (XALATAN) 0.005 % ophthalmic solution, Place 1 drop Into the left eye At Bedtime, Disp: 2.5 mL, Rfl: 12     levothyroxine (SYNTHROID/LEVOTHROID) 25 MCG tablet, Take 1 tablet (25 mcg) by mouth daily, Disp: 30 tablet, Rfl: 0     miconazole (MICATIN) 2 % external powder, Apply topically 2 times daily Apply to groin/body folds for redness and cutaneous fungal infection prophylaxis., Disp: 90 g, Rfl: 0     omeprazole (PRILOSEC) 20 MG DR capsule, Take 1 capsule (20 mg) by mouth daily, Disp: 30 capsule, Rfl: 0     Propylene Glycol (SYSTANE COMPLETE OP), Place 1 drop into both eyes 2 times daily At 0600 and 1630, Disp: , Rfl:      sodium chloride 1 GM tablet, Take 1 tablet (1 g) by mouth 2 times daily (with meals) for 30 days, Disp: 60 tablet, Rfl: 0     WARFARIN SODIUM PO, Take by mouth See Admin Instructions 5 mg on Tuesday Thursday Saturday and Sunday 6 mg on Monday Wednesday and Friday, Disp: , Rfl:        Post Medication Reconciliation Status:      ROS:  4 point ROS including Respiratory, CV, GI and , other than that noted in the HPI,  is negative    Vitals:  There were no vitals taken for  this visit. There is no height or weight on file to calculate BMI.  Exam:  Physical Exam : stable  General appearance: alert, appears stated age and cooperative.   Head: Normocephalic, without obvious abnormality, atraumatic, Eyes: sclera anicteric.  Lungs: respirations unlabored, LSCTA; no wheezing or rales.   Cardiovascular: regular rate.    Extremities: extremities normal, atraumatic, trace edema bilaterally.  Skin: Skin color, texture, turgor normal. No rashes or lesions  Neurologic: oriented. No focal deficits.   Psych: interacts well with caregivers, exhibits logical thought processes and connections, pleasant    Lab/Diagnostic data:     Most Recent 3 CBC's:  Recent Labs   Lab Test 10/26/23  0556 10/24/23  0637 10/23/23  0640   WBC 5.3 6.2 7.4   HGB 9.8* 10.3* 10.5*   MCV 85 84 83    218 200     Most Recent 3 BMP's:  Recent Labs   Lab Test 10/26/23  0556 10/24/23  0637 10/23/23  1617 10/23/23  0640   * 132* 129* 128*   POTASSIUM 3.8 4.1  --  3.5   CHLORIDE 98 97*  --  95*   CO2 25 27  --  27   BUN 9.5 8.5  --  8.2   CR 0.74 0.71  --  0.70   ANIONGAP 8 8  --  6*   GENO 8.1* 7.8*  --  8.2   GLC 93 104*  --  99       ASSESSMENT/PLAN    (M62.81) Generalized muscle weakness  (primary encounter diagnosis)  (R50.9) Low grade fever  Comment: COVID test negative this morning.  Plan:   -Obtain [facility].    (K08.409) S/P tooth extraction  Plan:   -Update dental surgeon with description of tooth extraction site.  -Continue prophylactic antibiotics.  -Continue soft food diet.    (I50.33) Acute on chronic diastolic congestive heart failure (H)  (primary encounter diagnosis)  Comment: Weight peak 208 in hospital.   Plan:   -Daily weights: 186lb today.   -2 gram NA diet.  -Give Lasix 40 today.     (E87.1) Hyponatremia  Comment: Sodium 128 today.   Plan:   -Decrease FR to 2000cc daily.   -Give lasix today. LE edema, hypervolemia.     (I48.91) Atrial fibrillation, unspecified type (H)  Comment: INR 2.2.   Plan:     -Continue coumadin dose and recheck due Monday.     Chronic conditions:     (I63.532) Acute ischemic left PCA stroke (H)  (primary encounter diagnosis)  (I63.89) Cerebral infarction due to other mechanism (H)  (primary encounter diagnosis)  Plan: Continue atorvastatin.    (E61.1) Iron deficiency  Comment: Hemoglobin in June 11.3; mcv wnl.   Plan: Continue iron supplementation.  Continue omeprazole.  -recheck cbc 6 months.    (I10) Hypertension  Comment: Most recent blood pressure stable.  Plan: No current medications.    (E03.9) Hypothyroidism, unspecified type  Comment: Stable  Plan:     TSH   Date Value Ref Range Status   10/23/2023 3.70 0.30 - 4.20 uIU/mL Final   06/02/2022 4.10 0.30 - 5.00 uIU/mL Final       Electronically signed by:  Jac Pitt CNP   Obtain [facility] today.      Sincerely,        Jac Pitt CNP

## 2023-10-19 PROBLEM — Z66 DNR (DO NOT RESUSCITATE): Status: ACTIVE | Noted: 2023-01-01

## 2023-10-19 NOTE — PROGRESS NOTES
[facility] GERIATRICS  Chief Complaint   Patient presents with    Annual Comprehensive Nursing Home     [place] Medical Record Number:  [id number]  Place of Service where encounter took place:  [facility] () [[id number]]    HPI:    Riley Rodriguez  is a 89 year old  (4/15/1926), who is being seen today for  Regulatory visit. HPI information obtained from: facility chart records, facility staff, patient report and [facility] chart review.     Background: He has history of A. fib on warfarin, prior stroke, hypertension, hypothyroid, he has hx of Afib on warfarin, BPH with a Pierson catheter in place.  He has been residing in the long-term care for many years, last hospitalized in June 2023.  Has been on iron supplement since that time with recent hemoglobin 12.2.      June 2023: He was admitted to [facility] on 6/5 due to hypoxia in the nursing home down to low 80s on RA.  Chest x-ray with pulm edema. Found to have urosepsis and CHF exacerbation. Family declined transfer to ICU and preferred to treat conservatively and keep comfortable. IV lasix transitioned to PO; he weaned off supplemental o2 and now on RA breathing comfortably. Peak weight 208lbs. Today 180. He is also on 2 gram sodium diet and 2000ml FR which we will loosen up as able.   Sepsis thought s/s to catheter associated UTI. Completed antibiotics.  He remains on coumadin; monitoring INR.   He did have mild hyponatremia which improved with diuretics. Hypokalemia and hypomagnesemia also improved.     Today: Rliey is seen today per request for new onset weakness and fever to 100.3  Also with hyponatremia, today 128.  Fever improved with Tylenol.  He does have chronic hyponatremia and is on a 20-50 fluid restriction, sodiums have been stable in the 130s prior to this week.  He did have tooth extraction of the left 3 lower back teeth, the area has some slough and swelling and he does complain of some tenderness however not  overtly infected.  He has weakness with inability to stand and transfer which he can at baseline.  His daughter is at the bedside.  Weights today are 186, he does have some lower extremity edema bilaterally.  He is typically on Lasix however that has been held due to hyponatremia with no improvement in sodium.  He is on prophylactic antibiotics and soft food diet for tooth extraction.  He does continue to have some pain in that left lower jaw.  Per his daughter, he has been eating pretty well despite having the tooth extraction.  COVID test done this morning was negative.  Have not done flu test.  Discussed with daughter that if he were to get worse she would like him sent back to [facility].  For now stable, will obtain UA UC and recheck BMP on Friday.      ALLERGIES: Patient has no known allergies.  PAST MEDICAL HISTORY:   Past Medical History:   Diagnosis Date    Atrial fibrillation (H)     On coumadin    Bell's palsy     right sided facial droop    CVA (cerebral vascular accident) (H)     Hypertension     Pacemaker     Urinary retention       PAST SURGICAL HISTORY:  has a past surgical history that includes IR Thoracic Aortogram (1/1/2004); IR Visceral Angiogram (1/1/2004); IR Visceral Angiogram (1/1/2004); IR Visceral Angiogram (1/1/2004); IR Miscellaneous Procedure (1/1/2004); IR Visceral Angiogram (1/1/2004); IR Miscellaneous Procedure (1/1/2004); IR Visceral Angiogram (1/1/2004); IR Visceral Angiogram (1/1/2004); IR Miscellaneous Procedure (1/1/2004); IR Visceral Angiogram (1/1/2004); IR Aortic Arch 4 Vessel Angiogram (1/1/2004); IR Suprapubic Catheter Placment (1/18/2019); remv pilonidal lesion simple; Pr Partial Excision Thyroid,Unilat; TRANSURETHRAL ELEC-SURG PROSTATECTOM; Total Hip Arthroplasty (Right); Ir Bladder Suprapubic Catheter Insertion (1/18/2019); Pr Esophagogastroduodenoscopy Transoral Diagnostic (N/A, 8/15/2020); and IR Suprapubic Catheter Change (10/19/2023).  IMMUNIZATIONS:  Immunization  History   Administered Date(s) Administered    COVID-19 Bivalent 12+ (Pfizer) 09/23/2022    COVID-19 Monovalent 18+ (Moderna) 12/30/2020, 01/27/2021, 11/26/2021, 05/09/2022    DT (PEDS <7y) 10/11/2004    Flu, Unspecified 10/12/2007, 10/13/2019, 10/24/2020    Influenza (High Dose) 3 valent vaccine 10/01/2010, 09/29/2015, 10/19/2016, 09/12/2017, 10/23/2018, 10/12/2021    Influenza (IIV3) PF 10/11/2004, 11/02/2005, 10/30/2006, 10/12/2007, 11/10/2008, 09/16/2009, 09/28/2011, 09/19/2012, 09/20/2013    Influenza Vaccine 18-64 (Flublok) 10/10/2014    Influenza Vaccine >6 months (Alfuria,Fluzone) 10/10/2014, 10/10/2014    Influenza Vaccine, 6+MO IM (QUADRIVALENT W/PRESERVATIVES) 11/10/2008, 09/16/2009, 09/28/2011, 09/19/2012, 09/20/2013    Pneumo Conj 13-V (2010&after) 04/10/2015    Pneumococcal 23 valent 11/01/2001    TDAP (Adacel,Boostrix) 10/19/2012    TDAP Vaccine (Boostrix) 10/19/2012    Td (Adult), Adsorbed 10/11/2004    Td,adult,historic,unspecified 10/11/2004    Zoster vaccine, live 10/27/2015     Above immunizations pulled from [facility]. [facility] and facility records also reconciled. Outstanding information sent to  to update [facility].  Future immunizations are not needed at this point as all recommended immunizations are up to date.       Current Outpatient Medications:     acetaminophen (TYLENOL) 325 MG tablet, Take 2 tablets (650 mg) by mouth every 6 hours as needed for mild pain or other (and adjunct with moderate or severe pain or per patient request), Disp: 20 tablet, Rfl: 0    atorvastatin (LIPITOR) 40 MG tablet, Take 1 tablet (40 mg) by mouth at bedtime On hold due to Paxlovid.  Resume Atorvastatin on 10/30/23, Disp: , Rfl:     bisacodyl (DULCOLAX) 10 MG suppository, Place 1 suppository (10 mg) rectally daily as needed for constipation, Disp: 10 suppository, Rfl: 0    cyanocobalamin (VITAMIN B-12) 1000 MCG tablet, Take 1,000 mcg by mouth daily, Disp: , Rfl:     docusate sodium (COLACE)  100 MG capsule, Take 1 capsule (100 mg) by mouth 2 times daily as needed for constipation, Disp: 30 capsule, Rfl: 0    Docusate Sodium (DOCU LIQUID PO), Place 5 drops into both ears daily as needed prior to irrigation for cerumen removal, Disp: , Rfl:     dorzolamide-timolol (COSOPT) 2-0.5 % ophthalmic solution, Place 1 drop Into the left eye 2 times daily, Disp: 10 mL, Rfl: 12    erythromycin base (ERYTHROMYCIN OPHT), Place 5 mg into both eyes At Bedtime Instill 1 ribbon in Left Eye and 1 ribbon in Right Eye at bedtime. Ensure ointment is given after eye drops to ensure proper absorption., Disp: , Rfl:     furosemide (LASIX) 20 MG tablet, Take 20 mg by mouth daily, Disp: , Rfl:     latanoprost (XALATAN) 0.005 % ophthalmic solution, Place 1 drop Into the left eye At Bedtime, Disp: 2.5 mL, Rfl: 12    levothyroxine (SYNTHROID/LEVOTHROID) 25 MCG tablet, Take 1 tablet (25 mcg) by mouth daily, Disp: 30 tablet, Rfl: 0    miconazole (MICATIN) 2 % external powder, Apply topically 2 times daily Apply to groin/body folds for redness and cutaneous fungal infection prophylaxis., Disp: 90 g, Rfl: 0    omeprazole (PRILOSEC) 20 MG DR capsule, Take 1 capsule (20 mg) by mouth daily, Disp: 30 capsule, Rfl: 0    Propylene Glycol (SYSTANE COMPLETE OP), Place 1 drop into both eyes 2 times daily At 0600 and 1630, Disp: , Rfl:     sodium chloride 1 GM tablet, Take 1 tablet (1 g) by mouth 2 times daily (with meals) for 30 days, Disp: 60 tablet, Rfl: 0    WARFARIN SODIUM PO, Take by mouth See Admin Instructions 5 mg on Tuesday Thursday Saturday and Sunday 6 mg on Monday Wednesday and Friday, Disp: , Rfl:        Post Medication Reconciliation Status:      ROS:  4 point ROS including Respiratory, CV, GI and , other than that noted in the HPI,  is negative    Vitals:  There were no vitals taken for this visit. There is no height or weight on file to calculate BMI.  Exam:  Physical Exam : stable  General appearance: alert, appears stated age  and cooperative.   Head: Normocephalic, without obvious abnormality, atraumatic, Eyes: sclera anicteric.  Lungs: respirations unlabored, LSCTA; no wheezing or rales.   Cardiovascular: regular rate.    Extremities: extremities normal, atraumatic, trace edema bilaterally.  Skin: Skin color, texture, turgor normal. No rashes or lesions  Neurologic: oriented. No focal deficits.   Psych: interacts well with caregivers, exhibits logical thought processes and connections, pleasant    Lab/Diagnostic data:     Most Recent 3 CBC's:  Recent Labs   Lab Test 10/26/23  0556 10/24/23  0637 10/23/23  0640   WBC 5.3 6.2 7.4   HGB 9.8* 10.3* 10.5*   MCV 85 84 83    218 200     Most Recent 3 BMP's:  Recent Labs   Lab Test 10/26/23  0556 10/24/23  0637 10/23/23  1617 10/23/23  0640   * 132* 129* 128*   POTASSIUM 3.8 4.1  --  3.5   CHLORIDE 98 97*  --  95*   CO2 25 27  --  27   BUN 9.5 8.5  --  8.2   CR 0.74 0.71  --  0.70   ANIONGAP 8 8  --  6*   GENO 8.1* 7.8*  --  8.2   GLC 93 104*  --  99       ASSESSMENT/PLAN    (M62.81) Generalized muscle weakness  (primary encounter diagnosis)  (R50.9) Low grade fever  Comment: COVID test negative this morning.  Plan:   -Obtain [facility]    (K08.409) S/P tooth extraction  Plan:   -Update dental surgeon with description of tooth extraction site.  -Continue prophylactic antibiotics.  -Continue soft food diet.    (I50.33) Acute on chronic diastolic congestive heart failure (H)  (primary encounter diagnosis)  Comment: Weight peak 208 in hospital.   Plan:   -Daily weights: 186lb today.   -2 gram NA diet.  -Give Lasix 40 today.     (E87.1) Hyponatremia  Comment: Sodium 128 today.   Plan:   -Decrease FR to 2000cc daily.   -Give lasix today. LE edema, hypervolemia.     (I48.91) Atrial fibrillation, unspecified type (H)  Comment: INR 2.2.   Plan:    -Continue coumadin dose and recheck due Monday.     Chronic conditions:     (I63.532) Acute ischemic left PCA stroke (H)  (primary encounter  diagnosis)  (I63.89) Cerebral infarction due to other mechanism (H)  (primary encounter diagnosis)  Plan: Continue atorvastatin.    (E61.1) Iron deficiency  Comment: Hemoglobin in June 11.3; mcv wnl.   Plan: Continue iron supplementation.  Continue omeprazole.  -recheck cbc 6 months.    (I10) Hypertension  Comment: Most recent blood pressure stable.  Plan: No current medications.    (E03.9) Hypothyroidism, unspecified type  Comment: Stable  Plan:     TSH   Date Value Ref Range Status   10/23/2023 3.70 0.30 - 4.20 uIU/mL Final   06/02/2022 4.10 0.30 - 5.00 uIU/mL Final       Electronically signed by:  Jac Pitt, CNP   Obtain [facility] today.

## 2023-10-19 NOTE — PHARMACY-ANTICOAGULATION SERVICE
Clinical Pharmacy - Warfarin Dosing Consult     Pharmacy has been consulted to manage this patient s warfarin therapy.  Indication: Atrial Fibrillation  Therapy Goal: INR 2-3  Warfarin Prior to Admission: Yes  Warfarin PTA Regimen: 5 mg TTSS, 6 mg MWF  Significant drug interactions: Zosyn and Ceftriaxone given in ED. Home meds levothyroxine and PPI continued.  Dose Comments: INR is significantly subtherapeutic on admission. Will give booster dose tonight.    INR   Date Value Ref Range Status   10/19/2023 1.31 (H) 0.85 - 1.15 Final   10/19/2023 1.58 (H) 0.85 - 1.15 Final       Recommend warfarin 7.5 mg today.  Pharmacy will monitor Riley Rodriguez daily and order warfarin doses to achieve specified goal.      Please contact pharmacy as soon as possible if the warfarin needs to be held for a procedure or if the warfarin goals change.      Thank you,  Jazz Lucero, [facility], 10/19/2023, 1:54 PM

## 2023-10-19 NOTE — PROGRESS NOTES
Patient Name: Riley Rodriguez  Medical Record Number: [id number]  Today's Date: 10/19/2023    Procedure: Suprapubic cath exchange  Proceduralist: Dr. Roberts    Sedation time: No sedation given    Report given to: Minal BUENROSTRO      Other Notes: Pt returned to ED 28.  VSS throughout procedure.  Family present at bedside and updated.

## 2023-10-19 NOTE — ED NOTES
Bed: JNED-28  Expected date: 10/19/23  Expected time: 8:15 AM  Means of arrival:   Comments:   Temp 101/wisam

## 2023-10-19 NOTE — CONSULTS
IR consult for suprapubic exchange. Patient with chronic indwelling SP catheter, admitted with urosepsis. Will plan for exchange in IR today. Consented.    TERENCE MUJICA CNP on 10/19/2023 at 12:18 PM

## 2023-10-19 NOTE — ED PROVIDER NOTES
EMERGENCY DEPARTMENT ENCOUNTER      NAME: Riley Rodriguez  AGE: 89 year old male  YOB: 1926  MRN: [id number]  EVALUATION DATE & TIME: 10/19/2023  8:29 AM    PCP: Jac Pitt    ED PROVIDER: Casey Mack M.D.      Chief Complaint   Patient presents with    UTI         FINAL IMPRESSION:  UTI  SIRS    ED COURSE & MEDICAL DECISION MAKING:    Pertinent Labs & Imaging studies reviewed. (See chart for details)  89 year old male presents to the Emergency Department for evaluation of increased confusion, fevers.  Patient arrives from assisted care facility.  Per report patient diagnosed with urinary tract infection yesterday with antibiotics ordered.  However patient with increased confusion.  Received direct report from EMS on patient's arrival.  Patient elderly male in moderate distress.  Vital signs significant for temperature of 101.  Patient with chronic opacification of right cornea.  Mucous membranes somewhat tacky.  Lungs clear.  Cardiac exam with systolic ejection murmur.  Abdomen hypoactive soft with suprapubic catheter in place.  Mild lower extremity edema.  Neurologically patient is slow to respond as it does follow simple commands.  Appears to be nonfocal.  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  Review of records indicate patient with Pseudomonas aeruginosa diagnosed on 6/11/2023 which was pansensitive.  Rocephin initiated.  Baseline blood work being obtained including lactic acid.  Patient likely will require hospitalization given his presentation.  8:26 AM I met with the patient for the initial interview and physical examination. Discussed plan for treatment and workup in the ED.    8:30 AM.  Patient's records reviewed patient he is DNR/DNI.  8:45 AM.  Chest x-ray and swab for influenza/COVID/RSV obtained to evaluate for respiratory component.  9:04 AM.  White cell count normal at 7.6.  Lactic acid normal at 1.0.  Hemoglobin  slightly reduced at 10.3  10 AM.  Urine consistent with acute UTI.  COVID swab pending.    10:16 AM.  Chest x-ray reviewed.  No obvious infiltrates.  COVID/RSV/influenza swab negative.  Call placed to admitting physician.    10:24 AM I spoke with the hospitalist, Dr. Griffin.  She is agreeable with plan for admission.  Zosyn added for additional antibiotic coverage    At the conclusion of the encounter I discussed the results of all of the tests and the disposition. The questions were answered and return precautions provided. The patient or family acknowledged understanding and was agreeable with the care plan.     Medical Decision Making    History:  Supplemental history from: Documented in chart, if applicable and EMS  External Record(s) reviewed: Documented in chart, if applicable. and Inpatient Record: Patient was admitted to [facility] from 6/5-6/12. See [facility] for more information.    Work Up:  Chart documentation includes differential considered and any EKGs or imaging independently interpreted by provider, where specified.  In additional to work up documented, I considered the following work up: Documented in chart, if applicable.    External consultation:  Discussion of management with another provider: Documented in chart, if applicable    Complicating factors:  Care impacted by chronic illness: Hypertension  Care affected by social determinants of health: N/A    Disposition considerations: Admit.    MEDICATIONS GIVEN IN THE EMERGENCY:  Medications   cefTRIAXone (ROCEPHIN) 1 g vial to attach to  mL bag for ADULTS or NS 50 mL bag for PEDS (0 g Intravenous Stopped 10/19/23 0959)   acetaminophen (TYLENOL) Suppository 650 mg (650 mg Rectal $Given 10/19/23 0910)       NEW PRESCRIPTIONS STARTED AT TODAY'S ER VISIT  New Prescriptions    No medications on file          =================================================================    [facility]    Patient information was obtained from:  EMS    Use of Intrepreter: N/A       Riley Rodriguez is a 89 year old male with a pertient medical history of atrial fibrillation, hypertension, stroke, urinary retention, hyperlipidemia, CVA, bell's palsy, who presents to the ED for evaluation of a UTI.     Per chart review, patient was admitted to [facility] from 6/5-6/12, for hypoxia. Chest xray showed pulmonary edema. Transthoracic echo showed a left ventricle ejection fraction of 60 to 65% with moderate aortic stenosis mild stenosis and mild pulm hypertension. Lasix 40 milligrams changed to daily. Fluid restriction placed. UA showed UTI, switched to cefepime from ceftriaxone. Continued Seroquel for previous CVA and Bell's palsy. Decreased to daily for continued diuretics.     Per EMS, patient started a UTI yesterday which worsened today. His care facility noticed his temperature was 102 and unsure if he started antibiotics today. Blood pressure was normal. Blood sugars were 140. He has a suprapubic catheter in place. No other medical concerns.    REVIEW OF SYSTEMS   Constitutional:  Denies fever, chills  Respiratory:  Denies productive cough or increased work of breathing  Cardiovascular:  Denies chest pain, palpitations  GI:  Denies abdominal pain, nausea, vomiting, or change in bowel or bladder habits   Musculoskeletal:  Denies any new muscle/joint swelling  Skin:  Denies rash   Neurologic:  Denies focal weakness  All systems negative except as marked.     PAST MEDICAL HISTORY:  Past Medical History:   Diagnosis Date    Atrial fibrillation (H)     On coumadin    Bell's palsy     right sided facial droop    CVA (cerebral vascular accident) (H)     Hypertension     Pacemaker     Urinary retention        PAST SURGICAL HISTORY:  Past Surgical History:   Procedure Laterality Date     TRANSURETHRAL ELEC-SURG PROSTATECTOM      Description: Transurethral Resection Of Prostate (TURP);  Recorded: 03/24/2008;    IR AORTIC ARCH 4 VESSEL  ANGIOGRAM  1/1/2004    IR BLADDER SUPRAPUBIC CATHETER INSERTION  1/18/2019    IR MISCELLANEOUS PROCEDURE  1/1/2004    IR MISCELLANEOUS PROCEDURE  1/1/2004    IR MISCELLANEOUS PROCEDURE  1/1/2004    IR SUPRAPUBIC CATHETER PLACMENT  1/18/2019    IR THORACIC AORTOGRAM  1/1/2004    IR VISCERAL ANGIOGRAM  1/1/2004    IR VISCERAL ANGIOGRAM  1/1/2004    IR VISCERAL ANGIOGRAM  1/1/2004    IR VISCERAL ANGIOGRAM  1/1/2004    IR VISCERAL ANGIOGRAM  1/1/2004    IR VISCERAL ANGIOGRAM  1/1/2004    IR VISCERAL ANGIOGRAM  1/1/2004    VA ESOPHAGOGASTRODUODENOSCOPY TRANSORAL DIAGNOSTIC N/A 8/15/2020    Procedure: ESOPHAGOGASTRODUODENOSCOPY (EGD) with biopsy;  Surgeon: Paxton Villalpando MD;  Location: [facility];  Service: Gastroenterology    VA PARTIAL EXCISION THYROID,UNILAT      Description: Thyroid Surgery Sub-Total Thyroidectomy;  Recorded: 03/24/2008;    REMV PILONIDAL LESION SIMPLE      Description: Pilonidal Cyst Resection;  Recorded: 03/24/2008;    TOTAL HIP ARTHROPLASTY Right          CURRENT MEDICATIONS:    No current facility-administered medications for this encounter.    Current Outpatient Medications:     acetaminophen (TYLENOL) 325 MG tablet, Take 2 tablets (650 mg) by mouth every 6 hours as needed for mild pain or other (and adjunct with moderate or severe pain or per patient request), Disp: 20 tablet, Rfl: 0    atorvastatin (LIPITOR) 40 MG tablet, Take 1 tablet (40 mg) by mouth At Bedtime, Disp: 30 tablet, Rfl: 0    bisacodyl (DULCOLAX) 10 MG suppository, Place 1 suppository (10 mg) rectally daily as needed for constipation, Disp: 10 suppository, Rfl: 0    docusate sodium (COLACE) 100 MG capsule, Take 1 capsule (100 mg) by mouth 2 times daily as needed for constipation, Disp: 30 capsule, Rfl: 0    Docusate Sodium (DOCU LIQUID PO), Place 5 drops into both ears daily as needed prior to irrigation for cerumen removal, Disp: , Rfl:     dorzolamide-timolol (COSOPT) 2-0.5 % ophthalmic solution, Place 1 drop Into the  left eye 2 times daily, Disp: 10 mL, Rfl: 12    erythromycin base (ERYTHROMYCIN OPHT), Place 5 mg into both eyes At Bedtime Instill 1 ribbon in Left Eye and 1 ribbon in Right Eye at bedtime. Ensure ointment is given after eye drops to ensure proper absorption., Disp: , Rfl:     ferrous sulfate 325 (65 FE) MG tablet, [FERROUS SULFATE 325 (65 FE) MG TABLET] Take 1 tablet by mouth daily., Disp: , Rfl:     furosemide (LASIX) 40 MG tablet, Take 1 tablet (40 mg) by mouth daily Please check blood pressure before giving and  hold if sbp<110, Disp: 30 tablet, Rfl: 0    guaiFENesin (ROBITUSSIN) 100 MG/5ML SYRP, Take 10 mLs by mouth every 4 hours as needed for cough, Disp: 473 mL, Rfl: 0    latanoprost (XALATAN) 0.005 % ophthalmic solution, Place 1 drop Into the left eye At Bedtime, Disp: 2.5 mL, Rfl: 12    levothyroxine (SYNTHROID/LEVOTHROID) 25 MCG tablet, Take 1 tablet (25 mcg) by mouth daily, Disp: 30 tablet, Rfl: 0    menthol-zinc oxide (CALMOSEPTINE) 0.44-20.6 % OINT ointment, Apply topically daily To buttock, Disp: 71 g, Rfl: 0    miconazole (MICATIN) 2 % external cream, Apply topically 2 times daily For between toes, Disp: 35 g, Rfl: 0    omeprazole (PRILOSEC) 20 MG DR capsule, Take 1 capsule (20 mg) by mouth daily, Disp: 30 capsule, Rfl: 0    Propylene Glycol (SYSTANE COMPLETE OP), Place 1 drop into both eyes 2 times daily At 0600 and 1630, Disp: , Rfl:     WARFARIN SODIUM PO, 6/13/23 INR 2.68  Cont 7mg Mon and Thurs and 6.5mg AOD.  Next INR 6/19/23., Disp: , Rfl:     ALLERGIES:  No Known Allergies    FAMILY HISTORY:  Family History   Problem Relation Age of Onset    Chronic Obstructive Pulmonary Disease Father        SOCIAL HISTORY:   Social History     Socioeconomic History    Marital status:    Tobacco Use    Smoking status: Former    Smokeless tobacco: Never   Substance and Sexual Activity    Alcohol use: No    Drug use: No   Social History Narrative    03/07/15 - The patient lives alone in his own home.        VITALS:  Patient Vitals for the past 24 hrs:   BP Temp Temp src Pulse Resp SpO2   10/19/23 1016 135/63 -- -- 60 25 95 %   10/19/23 0924 -- 98.9  F (37.2  C) Oral -- -- --   10/19/23 0830 124/58 (!) 101  F (38.3  C) Axillary 65 22 96 %        PHYSICAL EXAM    Constitutional:  Awake, alert, in no apparent distress. Elderly responds to simple commands.  HENT:  Normocephalic, Atraumatic. Bilateral external ears normal. Oropharynx moist. Nose normal. Neck- Normal range of motion with no guarding, No midline cervical tenderness, Supple, No stridor. Dry mucus membranes.  Eyes:  PERRL, EOMI with no signs of entrapment. Right corneal calcification.   Respiratory:  Normal breath sounds, No respiratory distress, No wheezing.    Cardiovascular:  Tachycardic, 3/6 systolic ejection murmur.  GI:  Soft, No tenderness, No distension, No palpable masses. Suprapubic catheter in place.  Musculoskeletal:  Intact distal pulses. Bilateral lower extremity edema. Good range of motion in all major joints. No tenderness to palpation or major deformities noted.  Integument:  Warm, Dry, No erythema, No rash.   Neurologic:  Alert to person, slow to respond, follows simple commands, Normal motor function, Normal sensory function, No focal deficits noted.   LAB:  All pertinent labs reviewed and interpreted.  Results for orders placed or performed during the hospital encounter of 10/19/23   XR Chest Port 1 View    Impression    IMPRESSION:     Left subclavian approach pacemaker lead terminates in the right ventricle. Cardiac silhouette remains mildly enlarged. There are small erosive embolic coils which overlie the mid descending thoracic aorta and posterior to the left heart, unchanged.   Severe arch and descending aorta atheromatous calcification.    Bilateral pleural plaque with calcification with largest focal plaque along the left hemidiaphragm. No pleural effusions. No new airspace opacities or interstitial thickening.   Comprehensive  metabolic panel   Result Value Ref Range    Sodium 126 (L) 135 - 145 mmol/L    Potassium 3.8 3.4 - 5.3 mmol/L    Carbon Dioxide (CO2) 22 22 - 29 mmol/L    Anion Gap 11 7 - 15 mmol/L    Urea Nitrogen 15.2 8.0 - 23.0 mg/dL    Creatinine 0.87 0.67 - 1.17 mg/dL    GFR Estimate 78 >60 mL/min/1.73m2    Calcium 7.7 (L) 8.2 - 9.6 mg/dL    Chloride 93 (L) 98 - 107 mmol/L    Glucose 123 (H) 70 - 99 mg/dL    Alkaline Phosphatase 54 40 - 129 U/L    AST 28 0 - 45 U/L    ALT 10 0 - 70 U/L    Protein Total 5.6 (L) 6.4 - 8.3 g/dL    Albumin 2.8 (L) 3.5 - 5.2 g/dL    Bilirubin Total 0.6 <=1.2 mg/dL   Lactic acid whole blood   Result Value Ref Range    Lactic Acid 1.0 0.7 - 2.0 mmol/L   Result Value Ref Range    Procalcitonin 0.04 <0.05 ng/mL   Result Value Ref Range    Troponin T, High Sensitivity 42 (H) <=22 ng/L   Result Value Ref Range    Magnesium 1.9 1.7 - 2.3 mg/dL   UA with Microscopic reflex to Culture    Specimen: Urine, Pierson Catheter   Result Value Ref Range    Color Urine Yellow Colorless, Straw, Light Yellow, Yellow    Appearance Urine Turbid (A) Clear    Glucose Urine Negative Negative mg/dL    Bilirubin Urine Negative Negative    Ketones Urine Negative Negative mg/dL    Specific Gravity Urine 1.020 1.001 - 1.030    Blood Urine 0.03 mg/dL (A) Negative    pH Urine 5.5 5.0 - 7.0    Protein Albumin Urine 10 (A) Negative mg/dL    Urobilinogen Urine <2.0 <2.0 mg/dL    Nitrite Urine Positive (A) Negative    Leukocyte Esterase Urine 250 Toni/uL (A) Negative    Bacteria Urine Moderate (A) None Seen /HPF    Mucus Urine Present (A) None Seen /LPF    RBC Urine 2 <=2 /HPF    WBC Urine 12 (H) <=5 /HPF    Squamous Epithelials Urine <1 <=1 /HPF   Result Value Ref Range    INR 1.31 (H) 0.85 - 1.15   CBC with platelets and differential   Result Value Ref Range    WBC Count 7.6 4.0 - 11.0 10e3/uL    RBC Count 3.75 (L) 4.40 - 5.90 10e6/uL    Hemoglobin 10.3 (L) 13.3 - 17.7 g/dL    Hematocrit 31.0 (L) 40.0 - 53.0 %    MCV 83 78 - 100 fL     MCH 27.5 26.5 - 33.0 pg    MCHC 33.2 31.5 - 36.5 g/dL    RDW 14.0 10.0 - 15.0 %    Platelet Count 180 150 - 450 10e3/uL    % Neutrophils 73 %    % Lymphocytes 12 %    % Monocytes 14 %    Mids % (Monos, Eos, Basos)      % Eosinophils 1 %    % Basophils 0 %    % Immature Granulocytes 0 %    NRBCs per 100 WBC 0 <1 /100    Absolute Neutrophils 5.4 1.6 - 8.3 10e3/uL    Absolute Lymphocytes 0.9 0.8 - 5.3 10e3/uL    Absolute Monocytes 1.1 0.0 - 1.3 10e3/uL    Mids Abs (Monos, Eos, Basos)      Absolute Eosinophils 0.1 0.0 - 0.7 10e3/uL    Absolute Basophils 0.0 0.0 - 0.2 10e3/uL    Absolute Immature Granulocytes 0.0 <=0.4 10e3/uL    Absolute NRBCs 0.0 10e3/uL   Symptomatic Influenza A/B, RSV, & SARS-CoV2 PCR (COVID-19) Nasopharyngeal    Specimen: Nasopharyngeal; Swab   Result Value Ref Range    Influenza A PCR Negative Negative    Influenza B PCR Negative Negative    RSV PCR Negative Negative    SARS CoV2 PCR Negative Negative       RADIOLOGY:  Reviewed all pertinent imaging. Please see official radiology report.  XR Chest Port 1 View   Final Result   IMPRESSION:       Left subclavian approach pacemaker lead terminates in the right ventricle. Cardiac silhouette remains mildly enlarged. There are small erosive embolic coils which overlie the mid descending thoracic aorta and posterior to the left heart, unchanged.    Severe arch and descending aorta atheromatous calcification.      Bilateral pleural plaque with calcification with largest focal plaque along the left hemidiaphragm. No pleural effusions. No new airspace opacities or interstitial thickening.          EKG:    Paced rhythm at 63.  Left bundle branch block morphology.  No acute ST segment abnormalities.  This compares to demand pacer rhythm with primarily atrial fibrillation noted on August 13, 2020  I have independently reviewed and interpreted the EKG(s) documented above.        I, Thien Vences, am serving as a scribe to document services personally performed  by Casey Mack MD, based on my observation and the provider's statements to me. I, Casey Mack MD attest that Thien Vences is acting in a scribe capacity, has observed my performance of the services and has documented them in accordance with my direction.    Casey Mack M.D.  Emergency Medicine  [facility] EMERGENCY DEPARTMENT     Casey Mack MD  10/19/23 1027

## 2023-10-19 NOTE — CONSULTS
"Care Management Initial Consult    General Information  Assessment completed with: Patient, Children, Sherman and daughters Homa and Elizabeth  Type of CM/SW Visit: Initial Assessment    Primary Care Provider verified and updated as needed: Yes   Readmission within the last 30 days: no previous admission in last 30 days      Reason for Consult: discharge planning  Advance Care Planning: Advance Care Planning Reviewed: no concerns identified, verified with patient          Communication Assessment  Patient's communication style: spoken language (English or Bilingual)                         Living Environment:   People in home: facility resident     Current living Arrangements: extended care facility  Name of Facility: [facility]   Able to return to prior arrangements: yes       Family/Social Support:  Care provided by: other (see comments), self ([facility] staff)  Provides care for: no one, unable/limited ability to care for self  Marital Status:   Facility resident(s)/Staff, Children          Description of Support System: Supportive, Involved    Support Assessment: Adequate family and caregiver support, Adequate social supports, Patient communicates needs well met    Current Resources:   Patient receiving home care services: No     Community Resources: Skilled Nursing Facility ([facility])  Equipment currently used at home: wheelchair, manual, walker, rolling (\"he uses his FWW with assist of 1 staff member, otherwise he uses the wheelchair\")  Supplies currently used at home: Incontinence Supplies, Other (\"suprapubic catheter, glasses, but still considered blind due to his macular degeneration. He is hard of hearing but doesn't use hearing aids\".)    Employment/Financial:  Employment Status: retired, , previous service     Employment/ Comments: \"He doesn't use any  benefits. He always says let those who need it more than me use the benefits\".  Financial " "Concerns:     Referral to Financial Worker: No       Does the patient's insurance plan have a 3 day qualifying hospital stay waiver?  No    Lifestyle & Psychosocial Needs:  Social Determinants of Health     Food Insecurity: Not on file   Depression: Not at risk (10/21/2020)    PHQ-2     PHQ-2 Score: 0   Housing Stability: Not on file   Tobacco Use: Medium Risk (6/5/2023)    Patient History     Smoking Tobacco Use: Former     Smokeless Tobacco Use: Never     Passive Exposure: Not on file   Financial Resource Strain: Not on file   Alcohol Use: Not on file   Transportation Needs: Not on file   Physical Activity: Not on file   Interpersonal Safety: Not on file   Stress: Not on file   Social Connections: Not on file       Functional Status:  Prior to admission patient needed assistance:   Dependent ADLs:: Wheelchair-with assist, Wheelchair-independent, Ambulation-walker, Bathing, Dressing, Transfers, Toileting, Grooming  Dependent IADLs:: Cleaning, Cooking, Laundry, Shopping, Meal Preparation, Medication Management, Transportation       Mental Health Status:          Chemical Dependency Status:                Values/Beliefs:  Spiritual, Cultural Beliefs, Spiritism Practices, Values that affect care:                 Additional Information:  Riley lives at [facility]. He gets assistance with all ADLs and IADLs.     \"He uses his FWW with assist of 1 staff member at his LTC. He likes to walk in the halls when they are able to walk with him or we (Homa or Elizabeth) walk with him when we visit. All other times he uses the wheelchair for mobility\".    He has a suprapubic catheter. He has glasses, but is still considered blind due to his macular degeneration.     Likely back to LTC with no new needs.     [facility] transport at discharge.    CM to follow for medical progression of care, discharge recommendations, and final discharge plan.    Latonia Siddiqi RN    "

## 2023-10-19 NOTE — TELEPHONE ENCOUNTER
[facility] GERIATRIC SERVICES TELEPHONE ENCOUNTER    Chief Complaint   Patient presents with    UTI    Lab Result Notice       Riley Rodriguez is a 89 year old  (4/15/1926), Nurse called today to report: UA, CBC and BMP results    ASSESSMENT/PLAN  (N30.01) Acute cystitis with hematuria  (primary encounter diagnosis)  Comment: Acute - unstable. UA appearing positive - patient with suprapubic; VS; increased weakness; +pseudomonas aeruginosa on 6/5/23 - susceptible to Cipro at that time  Plan:   Start Cipro 250mg PO BID x3d  Await culture    (E87.1) Hyponatremia  Comment: Chronic - unstable. Na+ level down to 128 - restart 2L FR per PCP today  Plan:   Recheck BMP Friday    (D64.9) Anemia, unspecified type  Comment: Acute on chronic - unstable. Noted drop in Hgb with only trace of blood in UA  Plan:   Recheck CBC Friday    Dr. Willow Puentes, TERENCE, DIANA, [facility], [facility]  Office Hours: Tues-Fri 0800-1730  Office: [address] [place] [address]  Fax - [phone]  Office/Triage Phone - [phone]      Email: [email or web address]                 Electronically signed by:   TERENCE Nicolas, DIANA, [facility], [facility]  Office Hours: Tues-Fri 0800-1730  Office: [address] [place] [address]  Fax - [phone]  Office/Triage Phone - [phone]      Email: [email or web address]

## 2023-10-19 NOTE — ED TRIAGE NOTES
Patient presents via EMS. From [facility]. Patient was diagnosed with an UTI yesterday. Became more lethargic and confused this morning. Has a history of urosepsis. Suprapubic catheter in place.     Triage Assessment (Adult)       Row Name 10/19/23 0830          Triage Assessment    Airway WDL WDL        Respiratory WDL    Respiratory WDL WDL        Skin Circulation/Temperature WDL    Skin Circulation/Temperature WDL WDL

## 2023-10-19 NOTE — PHARMACY-ADMISSION MEDICATION HISTORY
Pharmacist Admission Medication History    Admission medication history is complete. The information provided in this note is only as accurate as the sources available at the time of the update.    Information Source(s): Facility ([facility]/) medication list/MAR via N/A    Pertinent Information: Amoxicillin was for tooth infection, started 10/11, stop date 10/21. Patient received one time doses of Potassium 20 mEq one tablet on 10/16 and 10/17 but it is not a daily home medication.    Changes made to PTA medication list:  Added: None  Deleted: None  Changed: None    Medication Affordability:  Not including over the counter (OTC) medications, was there a time in the past 3 months when you did not take your medications as prescribed because of cost?: No    Allergies reviewed with patient and updates made in EHR: yes    Medication History Completed By: FATUMA CLARKE RPH 10/19/2023 2:14 PM    PTA Med List   Medication Sig Last Dose    acetaminophen (TYLENOL) 325 MG tablet Take 2 tablets (650 mg) by mouth every 6 hours as needed for mild pain or other (and adjunct with moderate or severe pain or per patient request) 10/18/2023 at 0740    amoxicillin (AMOXIL) 500 MG capsule Take 500 mg by mouth 3 times daily For tooth infection 10/18/2023 at evening    atorvastatin (LIPITOR) 40 MG tablet Take 1 tablet (40 mg) by mouth At Bedtime 10/18/2023 at hs    bisacodyl (DULCOLAX) 10 MG suppository Place 1 suppository (10 mg) rectally daily as needed for constipation More than a month at prn    docusate sodium (COLACE) 100 MG capsule Take 1 capsule (100 mg) by mouth 2 times daily as needed for constipation More than a month at prn    Docusate Sodium (DOCU LIQUID PO) Place 5 drops into both ears daily as needed prior to irrigation for cerumen removal More than a month at prm    dorzolamide-timolol (COSOPT) 2-0.5 % ophthalmic solution Place 1 drop Into the left eye 2 times daily 10/18/2023 at pm    erythromycin base (ERYTHROMYCIN  OPHT) Place 5 mg into both eyes At Bedtime Instill 1 ribbon in Left Eye and 1 ribbon in Right Eye at bedtime. Ensure ointment is given after eye drops to ensure proper absorption. 10/18/2023 at hs    furosemide (LASIX) 40 MG tablet Take 1 tablet (40 mg) by mouth daily Please check blood pressure before giving and  hold if sbp<110 Past Week at 1 x dose given yesterday 10/18 at 1539 - daily has been held Tuesday and Wednesday    guaiFENesin (ROBITUSSIN) 100 MG/5ML SYRP Take 10 mLs by mouth every 4 hours as needed for cough More than a month at prn    latanoprost (XALATAN) 0.005 % ophthalmic solution Place 1 drop Into the left eye At Bedtime 10/18/2023 at pm    levothyroxine (SYNTHROID/LEVOTHROID) 25 MCG tablet Take 1 tablet (25 mcg) by mouth daily 10/19/2023 at am    omeprazole (PRILOSEC) 20 MG DR capsule Take 1 capsule (20 mg) by mouth daily 10/18/2023 at am    Propylene Glycol (SYSTANE COMPLETE OP) Place 1 drop into both eyes 2 times daily At 0600 and 1630 10/19/2023 at am    WARFARIN SODIUM PO Take by mouth See Admin Instructions 5 mg on Tuesday Thursday Saturday and Sunday  6 mg on Monday Wednesday and Friday 10/18/2023 at pm dose of 6 mg

## 2023-10-19 NOTE — H&P
Admission History and Physical   Riley Rodriguez, 4/15/1926, [id number]  [facility]  No admission diagnoses are documented for this encounter.  PCP:Jac Pitt, [phone]   Admitting provider: Cheryl Griffin MD.    Code status:  No CPR- Do NOT Intubate          Extended Emergency Contact Information  Primary Emergency Contact: Homa Rodriguez  Address: [address]                      [place] [address] [facility]  Mobile Phone: [phone]  Relation: Daughter  Secondary Emergency Contact: Elizabeth Malone   [facility]  Home Phone: [phone]  Mobile Phone: [phone]  Relation: Other       Assessment and Plan  Principal Problem:    Urinary tract infection associated with cystostomy catheter, initial encounter   Active Problems:    Atrial Fibrillation    Hypertension    Hyponatremia    History of CVA (cerebrovascular accident)    Generalized muscle weakness    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    DNR (do not resuscitate)    Riley Rodriguez is a 89 year old male presenting from LTC with confusion and lethargy     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, chronic  - uncertain when PSC was last exchanged, IR consult for exchange order and replaced today since previous h/I pseudomonas   - IV zosyn for now based on previous UTI of pseudomonas   - gentle IVF and monitor for fluid overload, holding Lasix for now   - left message with daughter attempted multiple calls     Hyponatremia   - as above holding IV lasix and gentle IVF   - trend sodium and recheck this evening so not to over correct to fast    Afib   - has PM in place  - continue warfarin pharmacy to dose    H/o CVA unable to determine residual deficits since confusion and weakness  - reassess in am  - PT/OT    HTN  - stable  - on lasix but holding  - frequent vitals     Vision loss in right eye and mild in left  - continue eye drops and ointments     COVID-19 PCR Results          1/21/2022    06:00  1/25/2022    06:35 1/28/2022    07:00 2/1/2022    14:16 2/4/2022    07:00 2/8/2022    07:00 2/11/2022    07:00 2/15/2022    07:00 6/5/2023    22:17   COVID-19 PCR Results   SARS CoV2 PCR Negative   Negative   Negative   Negative  Negative  Negative    COVID-19 Virus PCR - Result  NOT DETECTED   NOT DETECTED   NOT DETECTED             10/19/2023    08:58   COVID-19 PCR Results   SARS CoV2 PCR Negative    COVID-19 Virus PCR - Result      COVID-19 Antibody Results, Testing for Immunity           No data to display              VTE prophylaxis:  Warfarin  DIET: Orders Placed This Encounter      Combination Diet 2 gm NA Diet; Low Saturated Fat Diet    Drains/Lines: SPC  Weight bearing status: WBAT  Disposition/Barriers to discharge: pending Urine culture results   Code Status:No CPR- Do NOT Intubate    HPI: Riley Rodriguez is a 89 year old male with h/o trial fibrillation, hypertension, stroke, urinary retention, hyperlipidemia, CVA, bell's palsy, who presents to the ED for evaluation of a UTI.      Per chart review, patient was admitted to [facility] from 6/5-6/12, for hypoxia. Chest xray showed pulmonary edema. Transthoracic echo showed a left ventricle ejection fraction of 60 to 65% with moderate aortic stenosis mild stenosis and mild pulm hypertension. Lasix 40 milligrams changed to daily. Fluid restriction placed. UA showed UTI, switched to cefepime from ceftriaxone. Continued Seroquel for previous CVA and Bell's palsy. Decreased to daily for continued diuretics.      Per EMS, patient started a UTI yesterday which worsened today. His care facility noticed his temperature was 102 and unsure if he started antibiotics today. Blood pressure was normal. Blood sugars were 140. He has a suprapubic catheter in place. No other medical concerns.    Past Medical History:   Diagnosis Date    Atrial fibrillation (H)     On coumadin    Bell's palsy     right sided facial droop    CVA (cerebral vascular  accident) (H)     Hypertension     Pacemaker     Urinary retention      Past Surgical History:   Procedure Laterality Date    HC TRANSURETHRAL ELEC-SURG PROSTATECTOM      Description: Transurethral Resection Of Prostate (TURP);  Recorded: 03/24/2008;    IR AORTIC ARCH 4 VESSEL ANGIOGRAM  1/1/2004    IR BLADDER SUPRAPUBIC CATHETER INSERTION  1/18/2019    IR MISCELLANEOUS PROCEDURE  1/1/2004    IR MISCELLANEOUS PROCEDURE  1/1/2004    IR MISCELLANEOUS PROCEDURE  1/1/2004    IR SUPRAPUBIC CATHETER CHANGE  10/19/2023    IR SUPRAPUBIC CATHETER PLACMENT  1/18/2019    IR THORACIC AORTOGRAM  1/1/2004    IR VISCERAL ANGIOGRAM  1/1/2004    IR VISCERAL ANGIOGRAM  1/1/2004    IR VISCERAL ANGIOGRAM  1/1/2004    IR VISCERAL ANGIOGRAM  1/1/2004    IR VISCERAL ANGIOGRAM  1/1/2004    IR VISCERAL ANGIOGRAM  1/1/2004    IR VISCERAL ANGIOGRAM  1/1/2004    GA ESOPHAGOGASTRODUODENOSCOPY TRANSORAL DIAGNOSTIC N/A 8/15/2020    Procedure: ESOPHAGOGASTRODUODENOSCOPY (EGD) with biopsy;  Surgeon: Paxton Villalpando MD;  Location: [facility];  Service: Gastroenterology    GA PARTIAL EXCISION THYROID,UNILAT      Description: Thyroid Surgery Sub-Total Thyroidectomy;  Recorded: 03/24/2008;    REMV PILONIDAL LESION SIMPLE      Description: Pilonidal Cyst Resection;  Recorded: 03/24/2008;    TOTAL HIP ARTHROPLASTY Right      Family History   Problem Relation Age of Onset    Chronic Obstructive Pulmonary Disease Father      Social History     Socioeconomic History    Marital status:      Spouse name: Not on file    Number of children: Not on file    Years of education: Not on file    Highest education level: Not on file   Occupational History    Not on file   Tobacco Use    Smoking status: Former    Smokeless tobacco: Never   Substance and Sexual Activity    Alcohol use: No    Drug use: No    Sexual activity: Not on file   Other Topics Concern    Not on file   Social History Narrative    03/07/15 - The patient lives alone in his own home.      Social Determinants of Health     Financial Resource Strain: Not on file   Food Insecurity: Not on file   Transportation Needs: Not on file   Physical Activity: Not on file   Stress: Not on file   Social Connections: Not on file   Interpersonal Safety: Not on file   Housing Stability: Not on file     No Known Allergies    PRIOR TO ADMISSION MEDICATIONS   Prior to Admission medications    Medication Sig Last Dose Taking? Auth Provider Long Term End Date   acetaminophen (TYLENOL) 325 MG tablet Take 2 tablets (650 mg) by mouth every 6 hours as needed for mild pain or other (and adjunct with moderate or severe pain or per patient request)   Daphney Manning MD No    atorvastatin (LIPITOR) 40 MG tablet Take 1 tablet (40 mg) by mouth At Bedtime   Daphney Manning MD Yes    bisacodyl (DULCOLAX) 10 MG suppository Place 1 suppository (10 mg) rectally daily as needed for constipation   Daphney Manning MD     docusate sodium (COLACE) 100 MG capsule Take 1 capsule (100 mg) by mouth 2 times daily as needed for constipation   Daphney Manning MD     Docusate Sodium (DOCU LIQUID PO) Place 5 drops into both ears daily as needed prior to irrigation for cerumen removal   Reported, Patient     dorzolamide-timolol (COSOPT) 2-0.5 % ophthalmic solution Place 1 drop Into the left eye 2 times daily   Daphney Manning MD     erythromycin base (ERYTHROMYCIN OPHT) Place 5 mg into both eyes At Bedtime Instill 1 ribbon in Left Eye and 1 ribbon in Right Eye at bedtime. Ensure ointment is given after eye drops to ensure proper absorption.   Provider, Historical     ferrous sulfate 325 (65 FE) MG tablet [FERROUS SULFATE 325 (65 FE) MG TABLET] Take 1 tablet by mouth daily.   Provider, Historical     furosemide (LASIX) 40 MG tablet Take 1 tablet (40 mg) by mouth daily Please check blood pressure before giving and  hold if sbp<110   Daphney Manning MD Yes    guaiFENesin (ROBITUSSIN) 100 MG/5ML SYRP Take 10 mLs by mouth every 4  hours as needed for cough   Daphney Manning MD     latanoprost (XALATAN) 0.005 % ophthalmic solution Place 1 drop Into the left eye At Bedtime   Daphney Manning MD Yes    levothyroxine (SYNTHROID/LEVOTHROID) 25 MCG tablet Take 1 tablet (25 mcg) by mouth daily   Daphney Manning MD Yes    menthol-zinc oxide (CALMOSEPTINE) 0.44-20.6 % OINT ointment Apply topically daily To buttock   Daphney Manning MD No    miconazole (MICATIN) 2 % external cream Apply topically 2 times daily For between toes   Daphney Manning MD     omeprazole (PRILOSEC) 20 MG DR capsule Take 1 capsule (20 mg) by mouth daily   Daphney Manning MD     Propylene Glycol (SYSTANE COMPLETE OP) Place 1 drop into both eyes 2 times daily At 0600 and 1630   Reported, Patient     WARFARIN SODIUM PO 6/13/23 INR 2.68  Cont 7mg Mon and Thurs and 6.5mg AOD.  Next INR 6/19/23.   Reported, Patient          REVIEW OF SYSTEMS:  12 point reviewed pertinent negatives and positives in HPI all others negative     PHYSICAL EXAM  Temp:  [98.9  F (37.2  C)-101  F (38.3  C)] 98.9  F (37.2  C)  Pulse:  [60-65] 63  Resp:  [22-32] 32  BP: (105-140)/(55-86) 134/86  SpO2:  [95 %-99 %] 98 %  Wt Readings from Last 1 Encounters:   09/06/23 84.8 kg (187 lb)     There is no height or weight on file to calculate BMI.  Physical Exam  Constitutional:       Comments: Slow to respond, nontoxic and NAD   HENT:      Head: Normocephalic and atraumatic.      Mouth/Throat:      Mouth: Mucous membranes are dry.      Pharynx: Oropharynx is clear.   Eyes:      Comments: Left eye abnormality   Neck:      Comments: Difficult to assess JVD or bruit  Cardiovascular:      Rate and Rhythm: Normal rate and regular rhythm.      Pulses: Normal pulses.      Heart sounds: Murmur heard.      Systolic murmur is present with a grade of 3/6.   Pulmonary:      Comments: Unlabored breathing, diminished at bases  Abdominal:      General: Bowel sounds are normal. There is no distension.       Palpations: Abdomen is soft.      Tenderness: There is no abdominal tenderness.   Musculoskeletal:      Cervical back: Normal range of motion and neck supple.      Right lower le+ Pitting Edema present.      Left lower le+ Pitting Edema present.   Skin:     General: Skin is warm and dry.      Capillary Refill: Capillary refill takes less than 2 seconds.   Neurological:      Mental Status: He is alert. He is disoriented.      Comments: Difficult exam, slow to respond, moving all extremities appear to have a left facial droop but can not fully assess.          PERTINENT LABS and RADIOLOGY   Results for orders placed or performed during the hospital encounter of 10/19/23   XR Chest Port 1 View    Impression    IMPRESSION:     Left subclavian approach pacemaker lead terminates in the right ventricle. Cardiac silhouette remains mildly enlarged. There are small erosive embolic coils which overlie the mid descending thoracic aorta and posterior to the left heart, unchanged.   Severe arch and descending aorta atheromatous calcification.    Bilateral pleural plaque with calcification with largest focal plaque along the left hemidiaphragm. No pleural effusions. No new airspace opacities or interstitial thickening.       Imaging results reviewed over the past 24 hrs:   Recent Results (from the past 24 hour(s))   XR Chest Port 1 View    Narrative    EXAM: XR CHEST PORT 1 VIEW  LOCATION: [facility]  DATE: 10/19/2023    INDICATION: Fever  COMPARISON: Frontal and lateral views of the chest 2023      Impression    IMPRESSION:     Left subclavian approach pacemaker lead terminates in the right ventricle. Cardiac silhouette remains mildly enlarged. There are small erosive embolic coils which overlie the mid descending thoracic aorta and posterior to the left heart, unchanged.   Severe arch and descending aorta atheromatous calcification.    Bilateral pleural plaque with calcification with  largest focal plaque along the left hemidiaphragm. No pleural effusions. No new airspace opacities or interstitial thickening.   IR Suprapubic Catheter Change    Narrative    LOCATION: [facility]  DATE: 10/19/2023    PROCEDURE: SUPRAPUBIC CATHETER CHANGE    INTERVENTIONAL RADIOLOGIST: Taj Roberts MD    INDICATION: Malfunctioning suprapubic catheter, plan for catheter exchange.    CONSENT: The risks, benefits and alternatives of the procedure were discussed with the patient or patient's guardian in detail. All questions were answered. Informed consent was given to proceed with the procedure.    MODERATE SEDATION: None.    CONTRAST: 10 mL Omnipaque    FLUOROSCOPIC TIME: 0.6 minutes.  RADIATION DOSE: Air Kerma: 29 mGy.    COMPLICATIONS: No immediate complications.    UNIVERSAL PROTOCOL: The operative site was marked and any prior imaging was reviewed. Required items including blood products, implants, devices and special equipment was made available. Patient identity was confirmed either verbally, with demographic   information, hospital assigned identification or other identification markers. A timeout was performed immediately prior to the procedure.    STERILE BARRIER TECHNIQUE: Maximum sterile barrier technique was used. Cutaneous antisepsis was performed at the operative site with application of 2% chlorhexidine and large sterile drape. Prior to the procedure, the  and assistant performed   hand hygiene and wore hat, mask, sterile gown, and sterile gloves during the entire procedure.    PROCEDURE:    The existing suprapubic catheter was injected and images obtained. The tube was then removed and a new 16 Nigerien suprapubic catheter was placed into the bladder. The retention balloon was inflated with saline and a tiny amount of contrast. A post   placement injection was performed.    FINDINGS:  The initial injection shows the suprapubic catheter positioned within the bladder.  After exchange, the new tube is in appropriate position in the bladder.      Impression    IMPRESSION:    1. Suprapubic catheter change as discussed above.     Recent Labs   Lab 10/19/23  1400 10/19/23  0843 10/19/23  0842 10/19/23  0555 10/18/23  0826 10/16/23  1016   WBC  --  7.6  --   --  6.6  --    HGB  --  10.3*  --   --  10.1*  --    MCV  --  83  --   --  85  --    PLT  --  180  --   --  201  --    INR 1.46*  --  1.31* 1.58*  --   --    NA  --   --  126*  --  128* 129*   POTASSIUM  --   --  3.8  --  3.9 3.3*   CHLORIDE  --   --  93*  --  95* 92*   CO2  --   --  22 --  23 23   BUN  --   --  15.2  --  15.2 14.6   CR  --   --  0.87  --  0.92 0.83   ANIONGAP  --   --  11  --  10 14   GENO  --   --  7.7*  --  7.9* 8.2   GLC  --   --  123*  --  109* 107*   ALBUMIN  --   --  2.8*  --   --  3.6   PROTTOTAL  --   --  5.6*  --   --  6.3*   BILITOTAL  --   --  0.6  --   --  0.5   ALKPHOS  --   --  54  --   --  65   ALT  --   --  10  --   --  10   AST  --   --  28  --   --  26     EKG:pacer spikes and wide QRS,       Cheryl Griffin MD  [facility] Medicine Service  [phone]

## 2023-10-20 NOTE — PROGRESS NOTES
"   10/20/23 0924   Appointment Info   Signing Clinician's Name / Credentials (PT) Gina Bustamante, PT, DPT   Quick Adds   Quick Adds Certification   Living Environment   People in Home facility resident   Current Living Arrangements extended care facility   Home Accessibility no concerns   Self-Care   Equipment Currently Used at Home wheelchair, manual;walker, rolling   Fall history within last six months no   Activity/Exercise/Self-Care Comment Pt has restorative therapy for walking 2x/day. Pt receives assist with all ADLs/IADLs.   General Information   Onset of Illness/Injury or Date of Surgery 10/19/23   Referring Physician Cheryl Griffin MD   Patient/Family Therapy Goals Statement (PT) None stated.   Pertinent History of Current Problem (include personal factors and/or comorbidities that impact the POC) Per H&P: \"89 year old male with h/o trial fibrillation, hypertension, stroke, urinary retention, hyperlipidemia, CVA, bell's palsy, who presents to the ED for evaluation of a UTI. \"   Existing Precautions/Restrictions fall   Range of Motion (ROM)   Range of Motion ROM deficits secondary to weakness   Strength (Manual Muscle Testing)   Strength (Manual Muscle Testing) Deficits observed during functional mobility   Bed Mobility   Bed Mobility supine-sit   Supine-Sit Stokes (Bed Mobility) moderate assist (50% patient effort);verbal cues   Bed Mobility Limitations decreased ability to use arms for pushing/pulling;decreased ability to use legs for bridging/pushing;cognitive deficits   Impairments Contributing to Impaired Bed Mobility impaired balance;decreased strength   Assistive Device (Bed Mobility) bed rails;draw sheet   Transfers   Transfers sit-stand transfer;bed-chair transfer   Transfer Safety Concerns Noted decreased weight-shifting ability   Impairments Contributing to Impaired Transfers impaired balance;decreased strength   Bed-Chair Transfer   Assistive Device (Bed-Chair Transfers) walker, " front-wheeled   Bed-Chair Love (Transfers) minimum assist (75% patient effort);verbal cues   Sit-Stand Transfer   Sit-Stand Love (Transfers) minimum assist (75% patient effort);verbal cues   Assistive Device (Sit-Stand Transfers) walker, front-wheeled   Gait/Stairs (Locomotion)   Love Level (Gait) minimum assist (75% patient effort);verbal cues   Assistive Device (Gait) walker, front-wheeled   Distance in Feet (Gait) 3' bed > chair   Pattern (Gait) step-to   Deviations/Abnormal Patterns (Gait) john decreased;gait speed decreased   Clinical Impression   Criteria for Skilled Therapeutic Intervention Yes, treatment indicated   PT Diagnosis (PT) impaired functional mobility   Influenced by the following impairments decreased strength, impaired balance, decreased cognition, decreased ROM, endurance   Functional limitations due to impairments transfers, ambulation   Clinical Presentation (PT Evaluation Complexity) evolving   Clinical Presentation Rationale Pt presents as medically diagnosed.   Clinical Decision Making (Complexity) moderate complexity   Planned Therapy Interventions (PT) balance training;bed mobility training;gait training;home exercise program;neuromuscular re-education;patient/family education;strengthening;transfer training   Risk & Benefits of therapy have been explained evaluation/treatment results reviewed;participants voiced agreement with care plan;participants included;patient   PT Total Evaluation Time   PT Eval, Moderate Complexity Minutes (46698) 10   Therapy Certification   Start of care date 10/20/23   Certification date from 10/20/23   Certification date to 10/27/23   Medical Diagnosis UTI   Physical Therapy Goals   PT Frequency Daily   PT Predicted Duration/Target Date for Goal Attainment 10/27/23   PT Goals Bed Mobility;Transfers;Gait   PT: Bed Mobility Supervision/stand-by assist;Supine to/from sit   PT: Transfers Minimal assist;Sit to/from stand;Assistive  device   PT: Gait Minimal assist;Assistive device;Rolling walker;100 feet   Interventions   Interventions Quick Adds Therapeutic Activity;Therapeutic Procedure   Therapeutic Procedure/Exercise   Ther. Procedure: strength, endurance, ROM, flexibillity Minutes (49551) 8   Symptoms Noted During/After Treatment fatigue   Treatment Detail/Skilled Intervention Standing ex bilateral LE x 10 reps with bilateral UE support on walker: hip flexion, hip abduction, marches. Demo and cues for technique.   Therapeutic Activity   Therapeutic Activities: dynamic activities to improve functional performance Minutes (44135) 15   Symptoms Noted During/After Treatment Fatigue   Treatment Detail/Skilled Intervention Pt supine in bed at start of session. Verbal cues for hand placement and technique due to visual impairment. Sit <> stand from EOB x 2 reps with FWW, min assist of 1. Requires > 1 attempt to achieve standing position. Daughter declines further distance ambulation as patient does not have shoes present. Pt stands in place with each standing and marches in place. Pt seated in chair at end of session, call light in reach, chair alarm engaged.   PT Discharge Planning   PT Plan transfers, gait with   PT Discharge Recommendation (DC Rec) Long term care facility   PT Rationale for DC Rec Patient appears close to baseline with functional mobility. Receives restorative therapies at LTC for ambulation. Anticipate pt will be safe to return to LTC at discharge with previous level of assist.   PT Brief overview of current status Supine > sit, mod assist of 1. Sit <> stand, pivot transfer with min assist of 1.   Total Session Time   Timed Code Treatment Minutes 23   Total Session Time (sum of timed and untimed services) 33     [facility]  OUTPATIENT PHYSICAL THERAPY EVALUATION  PLAN OF TREATMENT FOR OUTPATIENT REHABILITATION  (COMPLETE FOR INITIAL CLAIMS ONLY)  Patient's Last Name, First Name, M.I.  Date of  Birth:  4/15/1926  Riley Rodriguez                        Provider's Name  [facility] Medical Record No.  [id number]                             Onset Date:  10/19/23   Start of Care Date:  10/20/23   Type:     _X_PT   ___OT   ___SLP Medical Diagnosis:  UTI              PT Diagnosis:  impaired functional mobility Visits from SOC:  1     See note for plan of treatment, functional goals and certification details    I CERTIFY THE NEED FOR THESE SERVICES FURNISHED UNDER        THIS PLAN OF TREATMENT AND WHILE UNDER MY CARE     (Physician co-signature of this document indicates review and certification of the therapy plan).

## 2023-10-20 NOTE — PLAN OF CARE
PRIMARY DIAGNOSIS: UTI  OUTPATIENT/OBSERVATION GOALS TO BE MET BEFORE DISCHARGE:  ADLs back to baseline: No    Activity and level of assistance: Assist x2 with gaitbelt and walker    Pain status: Denies Pain     Return to near baseline physical activity: No     Discharge Planner Nurse   Safe discharge environment identified:  Yes  Barriers to discharge:          Entered by: Sandy Schaffer RN 10/19/2023 11:34 PM     Please review provider order for any additional goals.   Nurse to notify provider when observation goals have been met and patient is ready for discharge.Goal Outcome Evaluation:         Pt alert and oriented, VSS on room air. Denying pain. No fevers. Pt hard of hearing in both ears but prefers his L ear. Pt also legally blind. Stitches on R eye that look like drainage, be careful with wiping per daughter. NS cont running at 75 ml/hr. Daughter Homa at the bedside helping with cares. Call light within reach.

## 2023-10-20 NOTE — PLAN OF CARE
Occupational Therapy: Orders received. Chart reviewed and discussed with care team.? Occupational Therapy not indicated due to pt from LTC and has assist with all ADLs at baseline..? Defer discharge recommendations to PT.? Will complete orders.

## 2023-10-20 NOTE — PROGRESS NOTES
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   - monitor overnight and if improving tomorrow can discharge   - gentle IVF and monitor for fluid overload, holding Lasix for now   - daughter agrees he was more lethargic and his urine was very dark.      Hyponatremia 126>131   - as above holding IV lasix   - continue NS for IVF and decrease to 50ml/hr   - recheck in am      Afib   - has PM in place  - continue warfarin pharmacy to dose     H/o CVA unable to determine residual deficits since confusion and weakness  - PT/OT  - per daughter he does ambulate with assistance      HTN  - stable  - on lasix but holding  - frequent vitals      Vision loss in right eye and mild in left  - continue eye drops and ointments     Clinically Significant Risk Factors      # Overweight: Estimated body mass index is 28.31 kg/m  as calculated from the following:  Height as of this encounter: 1.626 m (5' 4\").  Weight as of this encounter: 74.8 kg (164 lb 14.4 oz)., PRESENT ON ADMISSION    COVID-19 PCR Results          1/21/2022    06:00 1/25/2022    06:35 1/28/2022    07:00 2/1/2022    14:16 2/4/2022    07:00 " 2/8/2022    07:00 2/11/2022    07:00 2/15/2022    07:00 6/5/2023    22:17   COVID-19 PCR Results   SARS CoV2 PCR Negative   Negative   Negative   Negative  Negative  Negative    COVID-19 Virus PCR - Result  NOT DETECTED   NOT DETECTED   NOT DETECTED             10/19/2023    08:58   COVID-19 PCR Results   SARS CoV2 PCR Negative    COVID-19 Virus PCR - Result      COVID-19 Antibody Results, Testing for Immunity           No data to display               Code Status: No CPR- Do NOT Intubate  VTE prophylaxis:  Warfarin  DIET: Orders Placed This Encounter      Combination Diet 2 gm NA Diet; Low Saturated Fat Diet    Drains/Lines: none  Weight bearing status: WBAT     Expected Discharge Date: 10/21/2023    Discharge Delays: Lab Result Pending (enter specific test & time in comments)  Destination: long-term care facility  Discharge Comments: pending Ucx and clearing.      Subjective:  Daughter present and he is better then yesterday but still not back to his baseline     PHYSICAL EXAM  Temp:  [97.5  F (36.4  C)-100  F (37.8  C)] 99.2  F (37.3  C)  Pulse:  [60-66] 64  Resp:  [18-24] 18  BP: (136-165)/(60-72) 156/72  SpO2:  [95 %-97 %] 97 %  Wt Readings from Last 1 Encounters:   10/19/23 87.6 kg (193 lb 2 oz)       Intake/Output Summary (Last 24 hours) at 10/20/2023 0705  Last data filed at 10/19/2023 2100  Gross per 24 hour   Intake 440 ml   Output 1250 ml   Net -810 ml      Body mass index is 26.94 kg/m .    Physical Exam  Constitutional:       Comments: Ill-appearing but looks better then yesterday    HENT:      Head: Normocephalic.      Comments: Right facial bells palsy  Cardiovascular:      Rate and Rhythm: Normal rate and regular rhythm.      Pulses: Normal pulses.      Comments: 3/6 FLORIDALMA  Pulmonary:      Effort: Pulmonary effort is normal.      Breath sounds: Normal breath sounds.   Abdominal:      General: Bowel sounds are normal. There is no distension.      Palpations: Abdomen is soft.      Tenderness: There is no  abdominal tenderness. There is no guarding.   Musculoskeletal:      Comments: Ankle edema   Skin:     General: Skin is warm and dry.      Capillary Refill: Capillary refill takes less than 2 seconds.   Neurological:      Mental Status: He is alert.      Comments: Still slightly disoriented and weak per daughter but not back to his basseline, right facial weakness from balls palsy       PERTINENT LABS/IMAGING:  Results for orders placed or performed during the hospital encounter of 10/19/23   XR Chest Port 1 View    Impression    IMPRESSION:     Left subclavian approach pacemaker lead terminates in the right ventricle. Cardiac silhouette remains mildly enlarged. There are small erosive embolic coils which overlie the mid descending thoracic aorta and posterior to the left heart, unchanged.   Severe arch and descending aorta atheromatous calcification.    Bilateral pleural plaque with calcification with largest focal plaque along the left hemidiaphragm. No pleural effusions. No new airspace opacities or interstitial thickening.   IR Suprapubic Catheter Change    Impression    IMPRESSION:    1. Suprapubic catheter change as discussed above.       Imaging results reviewed over the past 24 hrs:   No results found for this or any previous visit (from the past 24 hour(s)).    Recent Labs   Lab 10/20/23  0745 10/19/23  1400 10/19/23  0843 10/19/23  0842 10/19/23  0555 10/18/23  0826 10/16/23  1016   WBC 7.2  --  7.6  --   --  6.6  --    HGB 10.4*  --  10.3*  --   --  10.1*  --    MCV 86  --  83  --   --  85  --      --  180  --   --  201  --    INR 1.43* 1.46*  --  1.31*   < >  --   --    *  --   --  126*  --  128* 129*   POTASSIUM 4.0  --   --  3.8  --  3.9 3.3*   CHLORIDE 97*  --   --  93*  --  95* 92*   CO2 23  --   --  22  --  23 23   BUN 11.7  --   --  15.2  --  15.2 14.6   CR 0.81  --   --  0.87  --  0.92 0.83   ANIONGAP 11  --   --  11  --  10 14   GENO 7.7*  --   --  7.7*  --  7.9* 8.2   *  --    --  123*  --  109* 107*   ALBUMIN  --   --   --  2.8*  --   --  3.6   PROTTOTAL  --   --   --  5.6*  --   --  6.3*   BILITOTAL  --   --   --  0.6  --   --  0.5   ALKPHOS  --   --   --  54  --   --  65   ALT  --   --   --  10  --   --  10   AST  --   --   --  28  --   --  26    < > = values in this interval not displayed.     Recent Labs   Lab Test 12/24/18  0717   CHOL 95   HDL 37*   LDL 38   TRIG 100     Recent Labs   Lab Test 10/19/23  0842   *   POTASSIUM 3.8   CHLORIDE 93*   CO2 22   *   BUN 15.2   CR 0.87   GFRESTIMATED 78   GENO 7.7*     Recent Labs   Lab Test 10/23/18  1055 04/17/18  1026 10/18/17  1115   A1C 5.9 6.2* 6.1*     Recent Labs   Lab Test 10/19/23  0843 10/18/23  0826 06/12/23  0604   HGB 10.3* 10.1* 11.3*     Recent Labs   Lab Test 08/14/20  0724 08/14/20  0114 08/13/20  1936   TROPONINI 0.08 0.07 0.06     Recent Labs   Lab Test 06/05/23  2053 12/20/18  0221   BNP  --  81   NTBNPI 1,645  --      Recent Labs   Lab Test 08/21/23  0516   TSH 4.67*     Recent Labs   Lab Test 10/19/23  1400 10/19/23  0842 10/19/23  0555   INR 1.46* 1.31* 1.58*       25 MINUTES SPENT BY ME on the date of service doing chart review, history, exam, documentation, discussion with nursing staff and specialist, & further activities per the note.  Cheryl Griffin MD  [facility]  [phone]

## 2023-10-20 NOTE — PROGRESS NOTES
Care Management Follow Up    Length of Stay (days): 0    Expected Discharge Date: 10/21/2023     Concerns to be Addressed: discharge planning     Patient plan of care discussed at interdisciplinary rounds: Yes    Anticipated Discharge Disposition:  [facility]     Anticipated Discharge Services:  NA  Anticipated Discharge DME:  [facility]    Patient/family educated on Medicare website which has current facility and service quality ratings:  NA  Education Provided on the Discharge Plan:  NA  Patient/Family in Agreement with the Plan:  NA    Referrals Placed by CM/SW:  [facility]  Private pay costs discussed: Not applicable    Additional Information:  Discussed with MD able to return to facility tomorrow.     RNCM placed call to facility. Discussed with Misa in admissions, ok for weekend staff to call her tomorrow to arrange time with their team.     CM will continue to follow care progression and aide in discharge planning as needed.     Melissa Louis RN

## 2023-10-20 NOTE — PROGRESS NOTES
"PRIMARY DIAGNOSIS: \"UTI\"  OUTPATIENT/OBSERVATION GOALS TO BE MET BEFORE DISCHARGE:  ADLs back to baseline: No    Activity and level of assistance: Up with maximum assistance. Consider SW and/or PT evaluation.     Pain status: Pain free.    Return to near baseline physical activity: No     Discharge Planner Nurse   Safe discharge environment identified: No  Barriers to discharge: Yes       Entered by: Calli Tirado RN 10/20/2023 5:45 AM     Please review provider order for any additional goals.   Nurse to notify provider when observation goals have been met and patient is ready for discharge.  Patient remained on IV antibiotic therapy for UTI. Afebrile. Patient slept well.   "

## 2023-10-20 NOTE — PROGRESS NOTES
"PRIMARY DIAGNOSIS: \"UTI\"  OUTPATIENT/OBSERVATION GOALS TO BE MET BEFORE DISCHARGE:  ADLs back to baseline: No    Activity and level of assistance: Up with maximum assistance. Consider SW and/or PT evaluation.     Pain status: Pain free.    Return to near baseline physical activity: No     Discharge Planner Nurse   Safe discharge environment identified: No  Barriers to discharge: Yes       Entered by: Calli Tirado RN 10/20/2023 3:43 AM     Please review provider order for any additional goals.   Nurse to notify provider when observation goals have been met and patient is ready for discharge.  "

## 2023-10-21 PROBLEM — U07.1 INFECTION DUE TO 2019 NOVEL CORONAVIRUS: Status: ACTIVE | Noted: 2023-01-01

## 2023-10-21 PROBLEM — R50.9 FEVER: Status: ACTIVE | Noted: 2023-01-01

## 2023-10-21 NOTE — PROGRESS NOTES
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      Hyponatremia 126>131 dropped again  - SL for now to prevent fluid overload   - salt tabs today   - recheck in am      Afib   - has PM in place  - continue warfarin pharmacy to dose  - therapeutic lovenox dose x1      H/o CVA unable to determine residual deficits since confusion and weakness  - PT/OT  - per daughter he does ambulate with assistance      HTN  - stable  - resume lasix so not to get fluid overloaded      Vision loss in right eye and mild in left  - continue eye drops and ointments     Clinically Significant Risk Factors      # Overweight: Estimated body mass index is 28.31 kg/m  as calculated from the following:  Height as of this encounter: 1.626 m (5' 4\").  Weight as of this " encounter: 74.8 kg (164 lb 14.4 oz)., PRESENT ON ADMISSION    COVID-19 PCR Results          1/25/2022    06:35 1/28/2022    07:00 2/1/2022    14:16 2/4/2022    07:00 2/8/2022    07:00 2/11/2022    07:00 2/15/2022    07:00 6/5/2023    22:17 10/19/2023    08:58   COVID-19 PCR Results   SARS CoV2 PCR  Negative   Negative   Negative  Negative  Negative  Negative    COVID-19 Virus PCR - Result NOT DETECTED   NOT DETECTED   NOT DETECTED              10/21/2023    09:56   COVID-19 PCR Results   SARS CoV2 PCR Positive    COVID-19 Virus PCR - Result      COVID-19 Antibody Results, Testing for Immunity           No data to display               Code Status: No CPR- Do NOT Intubate  VTE prophylaxis:  Warfarin, 1.5 mg/kg lovenenox x 1  DIET: Orders Placed This Encounter      Combination Diet 2 gm NA Diet; Low Saturated Fat Diet    Drains/Lines: none  Weight bearing status: WBAT     Expected Discharge Date: 10/23/2023, 12:00 PM    Destination: long-term care facility  Discharge Comments: pending Ucx and clearing.  N/V today-Lasix ordered.      Subjective:  Coughing this morning and mild emesis per nursing review of the vitals shows low grade fever    PHYSICAL EXAM  Temp:  [98.7  F (37.1  C)-99.2  F (37.3  C)] 99  F (37.2  C)  Pulse:  [62-71] 62  Resp:  [18-22] 22  BP: (134-164)/(63-74) 134/63  SpO2:  [94 %-97 %] 95 %  Wt Readings from Last 1 Encounters:   10/21/23 87.3 kg (192 lb 7.4 oz)       Intake/Output Summary (Last 24 hours) at 10/20/2023 0705  Last data filed at 10/19/2023 2100  Gross per 24 hour   Intake 440 ml   Output 1250 ml   Net -810 ml      Body mass index is 26.84 kg/m .    Physical Exam  Constitutional:       Comments: Chronically ill-appearing, despite current symptoms he's much more awake and talkative today    HENT:      Head: Normocephalic.      Comments: Right facial bells palsy  Cardiovascular:      Comments: No stethoscope in room   Pulmonary:      Effort: Pulmonary effort is normal.      Comments:  Unlabored breathing   Abdominal:      General: There is no distension.      Palpations: Abdomen is soft.      Tenderness: There is no abdominal tenderness. There is no guarding.   Musculoskeletal:      Comments: Ankle edema   Skin:     General: Skin is warm and dry.      Capillary Refill: Capillary refill takes less than 2 seconds.   Neurological:      Mental Status: He is alert.      Comments: Talking more, moving all extremities, right bells palsy        PERTINENT LABS/IMAGING:  Results for orders placed or performed during the hospital encounter of 10/19/23   XR Chest Port 1 View    Impression    IMPRESSION:     Left subclavian approach pacemaker lead terminates in the right ventricle. Cardiac silhouette remains mildly enlarged. There are small erosive embolic coils which overlie the mid descending thoracic aorta and posterior to the left heart, unchanged.   Severe arch and descending aorta atheromatous calcification.    Bilateral pleural plaque with calcification with largest focal plaque along the left hemidiaphragm. No pleural effusions. No new airspace opacities or interstitial thickening.   IR Suprapubic Catheter Change    Impression    IMPRESSION:    1. Suprapubic catheter change as discussed above.       Imaging results reviewed over the past 24 hrs:   No results found for this or any previous visit (from the past 24 hour(s)).    Recent Labs   Lab 10/21/23  0753 10/20/23  1902 10/20/23  0745 10/19/23  1400 10/19/23  0843 10/19/23  0842 10/18/23  0826 10/16/23  1016   WBC 8.0  --  7.2  --  7.6  --    < >  --    HGB 9.8*  --  10.4*  --  10.3*  --    < >  --    MCV 85  --  86  --  83  --    < >  --      --  174  --  180  --    < >  --    INR 1.72*  --  1.43* 1.46*  --  1.31*   < >  --    * 128* 131*  --   --  126*   < > 129*   POTASSIUM 3.4  --  4.0  --   --  3.8   < > 3.3*   CHLORIDE 96*  --  97*  --   --  93*   < > 92*   CO2 23  --  23  --   --  22   < > 23   BUN 10.1  --  11.7  --   --  15.2    < > 14.6   CR 0.75  --  0.81  --   --  0.87   < > 0.83   ANIONGAP 8  --  11  --   --  11   < > 14   GENO 7.7*  --  7.7*  --   --  7.7*   < > 8.2   *  --  117*  --   --  123*   < > 107*   ALBUMIN  --   --   --   --   --  2.8*  --  3.6   PROTTOTAL  --   --   --   --   --  5.6*  --  6.3*   BILITOTAL  --   --   --   --   --  0.6  --  0.5   ALKPHOS  --   --   --   --   --  54  --  65   ALT  --   --   --   --   --  10  --  10   AST  --   --   --   --   --  28  --  26    < > = values in this interval not displayed.     Recent Labs   Lab Test 12/24/18  0717   CHOL 95   HDL 37*   LDL 38   TRIG 100     Recent Labs   Lab Test 10/19/23  0842   *   POTASSIUM 3.8   CHLORIDE 93*   CO2 22   *   BUN 15.2   CR 0.87   GFRESTIMATED 78   GENO 7.7*     Recent Labs   Lab Test 10/23/18  1055 04/17/18  1026 10/18/17  1115   A1C 5.9 6.2* 6.1*     Recent Labs   Lab Test 10/19/23  0843 10/18/23  0826 06/12/23  0604   HGB 10.3* 10.1* 11.3*     Recent Labs   Lab Test 08/14/20  0724 08/14/20  0114 08/13/20  1936   TROPONINI 0.08 0.07 0.06     Recent Labs   Lab Test 06/05/23  2053 12/20/18  0221   BNP  --  81   NTBNPI 1,645  --      Recent Labs   Lab Test 08/21/23  0516   TSH 4.67*     Recent Labs   Lab Test 10/19/23  1400 10/19/23  0842 10/19/23  0555   INR 1.46* 1.31* 1.58*       40 MINUTES SPENT BY ME on the date of service doing chart review, history, exam, documentation, discussion with nursing staff and specialist, & further activities per the note.  Cheryl Griffin MD  [facility] Service  [phone]

## 2023-10-21 NOTE — PLAN OF CARE
"Goal Outcome Evaluation:      Pt not able to rate pain. Just verbalizes \"ouch\" for generalized body pain and tender to the touch w/ repositioning. PRN Tylenol given to pt. Pt is not able to communicate if the Tylenol helped w/ pain. Alert to self and place only.  Continuous pulse ox. Monitoring saturations in the mid 90's. A2 log rolling in the bed. Pt incontinent of bowel. Stools are loose and watery brown. Suprapubic catheter patent draining clear yellow urine. [place], legally blind          Problem: Adult Inpatient Plan of Care  Goal: Absence of Hospital-Acquired Illness or Injury  Intervention: Prevent Skin Injury  Recent Flowsheet Documentation  Taken 10/21/2023 1345 by Maddison Robertson, RN  Body Position:   supine, legs elevated   log-rolled     Problem: Adult Inpatient Plan of Care  Goal: Optimal Comfort and Wellbeing  Intervention: Provide Person-Centered Care  Recent Flowsheet Documentation  Taken 10/21/2023 1240 by Maddison Robertson, RN  Trust Relationship/Rapport:   care explained   reassurance provided     Problem: Risk for Delirium  Goal: Improved Behavioral Control  Intervention: Minimize Safety Risk  Recent Flowsheet Documentation  Taken 10/21/2023 1240 by Maddison Robertson, RN  Enhanced Safety Measures: room near unit station  Trust Relationship/Rapport:   care explained   reassurance provided                         "

## 2023-10-21 NOTE — PROGRESS NOTES
Expand All Collapse All    PRIMARY DIAGNOSIS: GENERALIZED WEAKNESS     OUTPATIENT/OBSERVATION GOALS TO BE MET BEFORE DISCHARGE  1. Orthostatic performed: N/A     2. Tolerating PO medications: Yes     3. Return to near baseline physical activity: No     4. Cleared for discharge by consultants (if involved): No          Suprapubic cath draining fine.  Daughter states may go back to long term care today..

## 2023-10-21 NOTE — UTILIZATION REVIEW
Admission Status; Secondary Review Determination       Under the authority of the Utilization Management Committee, the utilization review process indicated a secondary review on the above patient. The review outcome is based on review of the medical records, discussions with staff, and applying clinical experience noted on the date of the review.     (x) Inpatient Status Appropriate - This patient's medical care is consistent with medical management for inpatient care and reasonable inpatient medical practice.     RATIONALE FOR DETERMINATION:  89 y/o male presented with confusion/lethargy.  Diagnosed with UTI with treatment.  In addition had more fevers and new cough develop after arrival and then found to have COVID-19.  Started on paxlovid given symptomatic and high risk.  Ongoing lethargy/fatigue related to infections.  Felt to need at least 2 more nights in the hospital per todays note.    At the time of admission with the information available to the attending physician more than 2 nights Hospital complex care was anticipated, based on patient risk of adverse outcome if treated as outpatient and complex care required. Inpatient admission is appropriate based on the Medicare guidelines.   The information on this document is developed by the utilization review team in order for the business office to ensure compliance. This only denotes the appropriateness of proper admission status and does not reflect the quality of care rendered.   The definitions of Inpatient Status and Observation Status used in making the determination above are those provided in the CMS Coverage Manual, Chapter 1 and Chapter 6, section 70.4.     Sincerely,     Amandeep Echevarria DO, [facility]  Utilization Review  Physician Advisor

## 2023-10-21 NOTE — PROGRESS NOTES
"Care Management Follow Up    Length of Stay (days): 0    Expected Discharge Date: 10/23/2023     Concerns to be Addressed: covid care, nausea -IV Compazine    Patient plan of care discussed at interdisciplinary rounds: Yes    Anticipated Discharge Disposition:  LTC     Anticipated Discharge Services:  LTC    Anticipated Discharge DME:  no new DME    Education Provided on the Discharge Plan:  per care team        Additional Information:  Patient presenting with UTI. Supra-pubic change out 10/19.         Social History:  Riley lives at [facility]. He gets assistance with all ADLs and IADLs.   \"He uses his FWW with assist of 1 staff member at his LTC. He likes to walk in the halls when they are able to walk with him or we (Homa or Elizabeth) walk with him when we visit. All other times he uses the wheelchair for mobility\".  He has a suprapubic catheter. He has glasses, but is still considered blind due to his macular degeneration.    Health transport at discharge.        10/21/23:  Patient to return to [facility]. Updated admission on no discharge and covid positive status.         Sully Sanders RN      "

## 2023-10-21 NOTE — PROGRESS NOTES
PRIMARY DIAGNOSIS: GENERALIZED WEAKNESS    OUTPATIENT/OBSERVATION GOALS TO BE MET BEFORE DISCHARGE  1. Orthostatic performed: N/A    2. Tolerating PO medications: Yes    3. Return to near baseline physical activity: No    4. Cleared for discharge by consultants (if involved): No    Discharge Planner Nurse   Safe discharge environment identified: Yes  Barriers to discharge: Yes       Entered by: Tammy Iraheta RN 10/20/2023 11:17 PM     Please review provider order for any additional goals.   Nurse to notify provider when observation goals have been met and patient is ready for discharge.

## 2023-10-21 NOTE — PLAN OF CARE
Occupational Therapy: Orders received. Chart reviewed and discussed with care team.? Occupational Therapy not indicated due to per chart review, pt from LTC and has assist with all ADLs..? Defer discharge recommendations to PT.? Will complete orders.

## 2023-10-22 NOTE — PROGRESS NOTES
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   - will need to closely watch INR      Hyponatremia 126 on admission   - was on IVF previously now off  - salt tabs 2gm yesterday  - BMP today and recheck in am      Afib   - has PM in place  - continue warfarin pharmacy to dose 1.92 monitor while on levaquin since interferes with warfarin metabolism given a therapeutic lovnoex dose 1mg/kg today instead of 1.5mg/kg since levaquin can interfere and to prevent increased risk of bleeding.      H/o CVA unable to determine residual deficits since confusion and  "weakness  - PT/OT  - per daughter he does ambulate with assistance      HTN  - stable  - resume lasix so not to get fluid overloaded      Vision loss in right eye and mild in left  - continue eye drops and ointments     Clinically Significant Risk Factors      # Overweight: Estimated body mass index is 28.31 kg/m  as calculated from the following:  Height as of this encounter: 1.626 m (5' 4\").  Weight as of this encounter: 74.8 kg (164 lb 14.4 oz)., PRESENT ON ADMISSION    COVID-19 PCR Results          1/25/2022    06:35 1/28/2022    07:00 2/1/2022    14:16 2/4/2022    07:00 2/8/2022    07:00 2/11/2022    07:00 2/15/2022    07:00 6/5/2023    22:17 10/19/2023    08:58   COVID-19 PCR Results   SARS CoV2 PCR  Negative   Negative   Negative  Negative  Negative  Negative    COVID-19 Virus PCR - Result NOT DETECTED   NOT DETECTED   NOT DETECTED              10/21/2023    09:56   COVID-19 PCR Results   SARS CoV2 PCR Positive    COVID-19 Virus PCR - Result      COVID-19 Antibody Results, Testing for Immunity           No data to display               Code Status: No CPR- Do NOT Intubate  VTE prophylaxis:  Warfarin, 1mg/kg today   DIET: Orders Placed This Encounter      Combination Diet 2 gm NA Diet; Low Saturated Fat Diet    Drains/Lines: none  Weight bearing status: WBAT     Expected Discharge Date: 10/23/2023, 12:41 PM    Destination: long-term care facility  Discharge Comments: [place] if LTC can take back with covid and able to isolate.  CM to dicuss with daughter she has multiple questions and for PT/OT      Subjective:  No complaints. Spoke with daughter today and discussed plan for discharge tomorrow to TCU     PHYSICAL EXAM  Temp:  [97.6  F (36.4  C)-98.9  F (37.2  C)] 98  F (36.7  C)  Pulse:  [60-64] 60  Resp:  [20] 20  BP: (140-147)/(63-68) 147/65  SpO2:  [93 %-97 %] 97 %  Wt Readings from Last 1 Encounters:   10/22/23 79.3 kg (174 lb 13.2 oz)       Intake/Output Summary (Last 24 hours) at 10/20/2023 0705  Last " data filed at 10/19/2023 2100  Gross per 24 hour   Intake 440 ml   Output 1250 ml   Net -810 ml      Body mass index is 24.38 kg/m .    Physical Exam  Constitutional:       Comments: wide awake, comfortable, Nontoxic and NAD.   Unlabored breathing   HENT:      Head: Normocephalic.      Comments: Right facial bells palsy  Cardiovascular:      Rate and Rhythm: Normal rate. Rhythm irregular.      Pulses: Normal pulses.   Pulmonary:      Effort: Pulmonary effort is normal.      Comments: Unlabored breathing, distant lung sounds and diminished at bases  Abdominal:      General: There is no distension.      Palpations: Abdomen is soft.      Tenderness: There is no abdominal tenderness. There is no guarding.   Musculoskeletal:      Comments: No further edema   Skin:     General: Skin is warm and dry.      Capillary Refill: Capillary refill takes less than 2 seconds.   Neurological:      Mental Status: He is alert.      Comments: Talking more, moving all extremities, right bells palsy        PERTINENT LABS/IMAGING:  Results for orders placed or performed during the hospital encounter of 10/19/23   XR Chest Port 1 View    Impression    IMPRESSION:     Left subclavian approach pacemaker lead terminates in the right ventricle. Cardiac silhouette remains mildly enlarged. There are small erosive embolic coils which overlie the mid descending thoracic aorta and posterior to the left heart, unchanged.   Severe arch and descending aorta atheromatous calcification.    Bilateral pleural plaque with calcification with largest focal plaque along the left hemidiaphragm. No pleural effusions. No new airspace opacities or interstitial thickening.   IR Suprapubic Catheter Change    Impression    IMPRESSION:    1. Suprapubic catheter change as discussed above.       Imaging results reviewed over the past 24 hrs:   No results found for this or any previous visit (from the past 24 hour(s)).    Recent Labs   Lab 10/22/23  1004 10/21/23  1621  10/21/23  0753 10/20/23  1902 10/20/23  0745 10/19/23  1400 10/19/23  0843 10/19/23  0842   WBC  --   --  8.0  --  7.2  --  7.6  --    HGB  --   --  9.8*  --  10.4*  --  10.3*  --    MCV  --   --  85  --  86  --  83  --    PLT  --   --  177  --  174  --  180  --    INR 1.92*  --  1.72*  --  1.43*   < >  --  1.31*   NA  --   --  127* 128* 131*  --   --  126*   POTASSIUM 3.7 4.1 3.4  --  4.0  --   --  3.8   CHLORIDE  --   --  96*  --  97*  --   --  93*   CO2  --   --  23  --  23  --   --  22   BUN  --   --  10.1  --  11.7  --   --  15.2   CR  --   --  0.75  --  0.81  --   --  0.87   ANIONGAP  --   --  8  --  11  --   --  11   GENO  --   --  7.7*  --  7.7*  --   --  7.7*   GLC  --   --  110*  --  117*  --   --  123*   ALBUMIN 2.9*  --  2.8*  --   --   --   --  2.8*   PROTTOTAL 5.6*  --  5.4*  --   --   --   --  5.6*   BILITOTAL 0.8  --  0.5  --   --   --   --  0.6   ALKPHOS 56  --  51  --   --   --   --  54   ALT 14  --  12  --   --   --   --  10   AST 28  --  29  --   --   --   --  28    < > = values in this interval not displayed.     Recent Labs   Lab Test 12/24/18  0717   CHOL 95   HDL 37*   LDL 38   TRIG 100     Recent Labs   Lab Test 10/19/23  0842   *   POTASSIUM 3.8   CHLORIDE 93*   CO2 22   *   BUN 15.2   CR 0.87   GFRESTIMATED 78   GENO 7.7*     Recent Labs   Lab Test 10/23/18  1055 04/17/18  1026 10/18/17  1115   A1C 5.9 6.2* 6.1*     Recent Labs   Lab Test 10/19/23  0843 10/18/23  0826 06/12/23  0604   HGB 10.3* 10.1* 11.3*     Recent Labs   Lab Test 08/14/20  0724 08/14/20  0114 08/13/20  1936   TROPONINI 0.08 0.07 0.06     Recent Labs   Lab Test 06/05/23  2053 12/20/18  0221   BNP  --  81   NTBNPI 1,645  --      Recent Labs   Lab Test 08/21/23  0516   TSH 4.67*     Recent Labs   Lab Test 10/19/23  1400 10/19/23  0842 10/19/23  0555   INR 1.46* 1.31* 1.58*       40 MINUTES SPENT BY ME on the date of service doing chart review, history, exam, documentation, discussion with nursing staff and  specialist, & further activities per the note.  Cheryl Griffin MD  [facility] Medicine Service  [phone]

## 2023-10-22 NOTE — PLAN OF CARE
Problem: Adult Inpatient Plan of Care  Goal: Absence of Hospital-Acquired Illness or Injury  Intervention: Identify and Manage Fall Risk  Recent Flowsheet Documentation  Taken 10/21/2023 1845 by Tammy Iraheta RN  Safety Promotion/Fall Prevention: activity supervised  Goal: Optimal Comfort and Wellbeing  Outcome: Progressing     Problem: Risk for Delirium  Goal: Optimal Coping  Outcome: Progressing  Goal: Improved Behavioral Control  Intervention: Minimize Safety Risk  Recent Flowsheet Documentation  Taken 10/21/2023 1845 by Tammy Iraheta RN  Enhanced Safety Measures: family to remain at bedside  Goal: Improved Sleep  Outcome: Progressing  Daughter here for part of shift to assist pt with dinner. LS diminished. Denies pain.

## 2023-10-22 NOTE — PLAN OF CARE
Problem: Adult Inpatient Plan of Care  Goal: Readiness for Transition of Care  Intervention: Mutually Develop Transition Plan  Recent Flowsheet Documentation  Taken 10/21/2023 2300 by America Hightower, RN  Equipment Currently Used at Home:   wheelchair, manual   walker, rolling     Problem: Risk for Delirium  Goal: Improved Behavioral Control  Intervention: Minimize Safety Risk  Recent Flowsheet Documentation  Taken 10/21/2023 2300 by America Hightower RN  Communication Enhancement Strategies:   extra time allowed for response   device use encouraged   Goal Outcome Evaluation:  Riley oriented to self and time. He denies pain but said ouch with any repositioning or movement. No tele. Q2 repositioning when tolerating, reported from day shift that he is pivots to commode at times with a strong assist of 2 and gb/walker. Room air, continuous pulse ox. No concerns overnight.

## 2023-10-22 NOTE — PLAN OF CARE
Problem: Adult Inpatient Plan of Care  Goal: Absence of Hospital-Acquired Illness or Injury  Intervention: Identify and Manage Fall Risk  Recent Flowsheet Documentation  Taken 10/22/2023 0900 by Yelena Whitehead RN  Safety Promotion/Fall Prevention:   increase visualization of patient   patient and family education     Problem: Adult Inpatient Plan of Care  Goal: Optimal Comfort and Wellbeing  Intervention: Provide Person-Centered Care  Recent Flowsheet Documentation  Taken 10/22/2023 0900 by Yelena Whitehead RN  Trust Relationship/Rapport:   care explained   reassurance provided     Problem: Risk for Delirium  Goal: Optimal Coping  Outcome: Progressing     Problem: Risk for Delirium  Goal: Improved Attention and Thought Clarity  Outcome: Progressing   Goal Outcome Evaluation: Pt alert and oriented to self, denied pain, but always say ouch when moved. Ate 100% of breakfast, sensitive and sore skin around the enrique area, Sat up in chair during the shift, walked to bathroom with two assist, had two large bowel movement during the shift, Daughter at the bedside.

## 2023-10-23 NOTE — PROGRESS NOTES
SPIRITUAL HEALTH SERVICES Progress Note  [facility], P1    Saw pt Riley Rodriguez per admission screening request. Riley's daughter, Homa, present while Riley was sleeping.     Patient/Family Understanding of Illness and Goals of Care - Homa shared about Riley's hospitalization, UTI and covid infection, and that they are running some more tests and hopefully can discharge tomorrow.     Distress and Loss - Homa stated that it is difficult for Riley to be out of his home environment, so it will be good for him to discharge when he is able to.     Strengths, Coping, and Resources - Family is strong source of support.     Meaning, Beliefs, and Spirituality - Patient comes from Hindu be background. No specific needs at this time.     Plan of Care - No further plans to visit at this time, but  staff available if further needs arise.     Mane Bolden MDiv, [facility]  /Manager Spiritual Health Services  [phone]

## 2023-10-23 NOTE — PLAN OF CARE
Problem: Adult Inpatient Plan of Care  Goal: Absence of Hospital-Acquired Illness or Injury  Intervention: Prevent Skin Injury  Recent Flowsheet Documentation  Taken 10/23/2023 0600 by America Hightower RN  Body Position:   turned   right  Taken 10/23/2023 0400 by America Hightower RN  Body Position:   turned   supine  Taken 10/23/2023 0200 by America Hightower RN  Body Position:   turned   left  Taken 10/23/2023 0057 by America Hightower RN  Body Position:   refuses positioning   weight shifting     Problem: Adult Inpatient Plan of Care  Goal: Optimal Comfort and Wellbeing  Intervention: Provide Person-Centered Care  Recent Flowsheet Documentation  Taken 10/23/2023 0100 by mAerica Hightower RN  Trust Relationship/Rapport:   care explained   questions answered   Goal Outcome Evaluation:  Riley oriented to self only. Asks repeated questions. Room air, continuous pulse ox on. No tele. Q2 reposition. Pain and redness to scrotum. Suprapubic catheter intact and WNL, dressing changed.  Nonblanchable redness to coccyx. Edema, redness, and moisture irritation to scrotum.

## 2023-10-23 NOTE — PROGRESS NOTES
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 Stop levaquin and recheck UA/UC now.     Hyponatremia 126 on admission   - improved initially w/ IVF but stopped  -Sodium 128  -low solute intake and not volume overloaded  -cont salt tab 1g BID  -start NS 50ml/hr. Recheck Na at 2pm.  -check Andria, Uosm, TSH, Cortisol  -orthostatics  -cmp a.m.     Afib   - has PM in place  - continue warfarin pharmacy to dose      H/o CVA unable to determine residual deficits since confusion and weakness  - PT/OT  - per daughter he does ambulate with assistance      HTN  - stable  - hold lasix     Vision loss in right eye and mild in left  - continue eye drops and ointments      Clinically Significant Risk Factors       #  "Overweight: Estimated body mass index is 28.31 kg/m  as calculated from the following:  Height as of this encounter: 1.626 m (5' 4\").  Weight as of this encounter: 74.8 kg (164 lb 14.4 oz)., PRESENT ON ADMISSION    COVID-19 PCR Results            1/25/2022    06:35 1/28/2022    07:00 2/1/2022    14:16 2/4/2022    07:00 2/8/2022    07:00 2/11/2022    07:00 2/15/2022    07:00 6/5/2023    22:17 10/19/2023    08:58   COVID-19 PCR Results   SARS CoV2 PCR   Negative    Negative    Negative  Negative  Negative  Negative    COVID-19 Virus PCR - Result NOT DETECTED    NOT DETECTED    NOT DETECTED                    10/21/2023    09:56   COVID-19 PCR Results   SARS CoV2 PCR Positive    COVID-19 Virus PCR - Result        COVID-19 Antibody Results, Testing for Immunity              No data to display                Code Status: No CPR- Do NOT Intubate  VTE prophylaxis:  Warfarin, 1mg/kg today   DIET: Orders Placed This Encounter      Combination Diet 2 gm NA Diet; Low Saturated Fat Diet     Drains/Lines: none  Weight bearing status: WBAT     Expected Discharge Date: 10/24/2023. Pending sodium, po intake   Destination: long-term care facility  Discharge Comments:     Daughter updated at bedside      S:  afebrile. Events noted    O:  BP (!) 169/77 (BP Location: Right arm)   Pulse 62   Temp 97.9  F (36.6  C) (Oral)   Resp 19   Wt 79.3 kg (174 lb 13.2 oz)   SpO2 98%   BMI 24.38 kg/m    Physical Examination:   General appearance - nad  Eyes - sclera anicteric  Mouth - mucous membranes pasty, pharynx normal without lesions  Lungs - decreased bases, nml rate  Heart - normal rate, regular rhythm, normal S1, S2, no murmurs, rubs, clicks or gallops. No peripheral edema.  Abdomen - soft, nontender, nondistended, BS+  Neurological - alert, oriented, [place]  Skin - no c/c/p    Lab/imaging reviewed  "

## 2023-10-23 NOTE — PLAN OF CARE
Problem: UTI (Urinary Tract Infection)  Goal: Improved Infection Symptoms  Outcome: Progressing   Goal Outcome Evaluation:         Afebrile.  Alert to self only.  Intermittent confusion.  Daughter at bedside to help with communication needs. UA sent to lab.  IV fluids started per order.  Patient on room air with sats in mid 90's.  IS instruction given to patient and family.  Turned and repositioned every 2 hours.  Mepilex applied to sacrum.

## 2023-10-23 NOTE — PROGRESS NOTES
"Care Management Follow Up    Length of Stay (days): 2    Expected Discharge Date: 10/23/2023     Concerns to be Addressed: discharge planning     Patient plan of care discussed at interdisciplinary rounds: Yes    Anticipated Discharge Disposition:       Anticipated Discharge Services:      Education Provided on the Discharge Plan:  Per Care Team   Patient/Family in Agreement with the Plan:      Referrals Placed by CM/SW:    Private pay costs discussed: Not applicable    Additional Information:  Chart reviewed.    Cm updates:  RNCM spoke to provider, pts sodium needs to be monitored so no discharge today, possibly tomorrow.       Writer called and updated Misa at [facility], she is fine with same time for ride set up tomorrow.      Writer spoke to pts daughter Homa she is agreeable to set up stretcher ride for same time tomorrow. She is still agreeable to the private cost of stretcher.       Pt to return to [facility] tomorrow 10/24 if pts is medically ready at that time.  [facility] Stretcher ride scheduled for [id number]. Per Transportation he must go by stretcher due to covid positive. Request to add PT/OT to SNF orders.       PCS completed.     Social Hx:  \"Riley lives at [facility] [place]. He gets assistance with all ADLs and IADLs.   \"He uses his FWW with assist of 1 staff member at his LTC. He likes to walk in the halls when they are able to walk with him or we (Homa or Elizabeth) walk with him when we visit. All other times he uses the wheelchair for mobility\".  He has a suprapubic catheter. He has glasses, but is still considered blind due to his macular degeneration.    Health transport at discharge.\"      Cm will continue to follow plan of care,review recommendations, and assist with any discharge needs anticipated.     Maria Fernanda Cloud RN      "

## 2023-10-24 NOTE — PLAN OF CARE
Physical Therapy Discharge Summary    Reason for therapy discharge:    Discharged to home with home therapy.    Progress towards therapy goal(s). See goals on Care Plan in [facility] electronic health record for goal details.  Goals partially met.  Barriers to achieving goals:   discharge from facility.    Therapy recommendation(s):    Continued therapy is recommended.  Rationale/Recommendations:  PT goals not fully met.

## 2023-10-24 NOTE — PLAN OF CARE
Problem: Comorbidity Management  Goal: Blood Pressure in Desired Range  Intervention: Maintain Blood Pressure Management  Recent Flowsheet Documentation  Taken 10/23/2023 2300 by America Hightower, RN  Medication Review/Management: medications reviewed  Taken 10/23/2023 2100 by America Hightower RN  Medication Review/Management: medications reviewed     Problem: Adult Inpatient Plan of Care  Goal: Absence of Hospital-Acquired Illness or Injury  Intervention: Prevent Infection  Recent Flowsheet Documentation  Taken 10/23/2023 2300 by America Hightower, RN  Infection Prevention: personal protective equipment utilized  Taken 10/23/2023 2100 by America Hightower RN  Infection Prevention: personal protective equipment utilized   Goal Outcome Evaluation:  Riley A&O to self only. Asks repeated questions. Room air, continuous pulse ox on. No tele. Pain to scrotum with movement due to redness and swelling. Q2 reposition. No concerns overnight. Towards AM he seemed a little more oriented, he asked if the Vikings won the night before.

## 2023-10-24 NOTE — PROGRESS NOTES
Care Management Discharge Note    Discharge Date: 10/24/2023       Discharge Disposition: Long Term Care, Other (Comments) (With request for PT/OT added.)      Discharge Transportation: health plan transportation    Education Provided on the Discharge Plan:  Per Care Team   Persons Notified of Discharge Plans: Yes  Patient/Family in Agreement with the Plan:      Handoff Referral Completed: Yes    Additional Information:  Final discharge plan is for pt to go back to [facility] today 10/24. Daughter Homa is here, and she is agreeable to the cost of stretcher transport. [facility] Stretcher transport is set up for 12:40pm-1:26pm today 10/24. Bedside, facility, provider, and pt/daughter notified. Facility is just requesting that PT/OT orders be added so pt can have some therapy while in the LTC.        Maria Fernanda Cloud RN

## 2023-10-24 NOTE — PLAN OF CARE
Covid. Daughter at bedside, assist w communication and cares. A to self. Blind [place]. A2 gb/w. Suprapubic cath wdl cdi patent. VSS on RA, refused cont pulse ox.    AVS reviewed. Pt and family in agreement. Discharged to [facility] via [facility] Stretcher.

## 2023-10-24 NOTE — DISCHARGE SUMMARY
[facility]  Hospitalist Discharge Summary      Date of Admission:  10/19/2023  Date of Discharge:  10/24/2023  Discharging Provider: Nba Red DO, DO  Discharge Service: Hospitalist Service    Discharge Diagnoses       Clinically Significant Risk Factors          Follow-ups Needed After Discharge   Follow-up Appointments     Follow Up and recommended labs and tests      Follow up with custodial physician.  The following labs/tests are   recommended: BMP, INR on 10/26/23.  Facility provider to be notified of   lab results same day they are resulted.  Goal INR range is 2-3.            Unresulted Labs Ordered in the Past 30 Days of this Admission       Date and Time Order Name Status Description    10/19/2023  8:30 AM Blood Culture Peripheral Blood Preliminary     10/19/2023  8:30 AM Blood Culture Peripheral Blood Preliminary         These results will be followed up by     Discharge Disposition   Discharged to home  Condition at discharge: Stable    Hospital Course   Principal Problem:    Urinary tract infection associated with cystostomy catheter, initial encounter   Active Problems:    Atrial Fibrillation    Hypertension    Hyponatremia    History of CVA (cerebrovascular accident)    Generalized muscle weakness    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  -CT head neg for acute process  -at baseline per daughter on d/c     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, chronic urinary retention  - spoke with daughter and his catheter gets exchanged around the 20th and  "exchanged 10/19/2023   - appreciate IR consult for quick turn around on SPC exchange replaced   - catheter exchanged.  Continue monthly exchanges with next exchange on 11/21  -home care RN ordered along with PT/OT on discharge     Hyponatremia 126 on admission and improved to 132 on discharge.  -NaCl tabs 1g BID  -Hold Lasix  -monitor volume status and oral intake    Afib   - has PM in place  - continue warfarin.  INR check 10/26 as ordered on discharge orders  -not on chronic AV [place] blocking drugs     H/o CVA   - PT/OT at LTC  -warfarin, stratin     HTN  - stable  - lasix prn daily if > 2lb weight gain in a 24hr period     Vision loss in right eye and mild in left, chronic  - continue eye drops and ointments     Weakness/Deconditioning  -pt/ot/RN at LT     Clinically Significant Risk Factors       # Overweight: Estimated body mass index is 28.31 kg/m  as calculated from the following:  Height as of this encounter: 1.626 m (5' 4\").  Weight as of this encounter: 74.8 kg (164 lb 14.4 oz)., PRESENT ON ADMISSION    COVID-19 PCR Results            1/25/2022    06:35 1/28/2022    07:00 2/1/2022    14:16 2/4/2022    07:00 2/8/2022    07:00 2/11/2022    07:00 2/15/2022    07:00 6/5/2023    22:17 10/19/2023    08:58   COVID-19 PCR Results   SARS CoV2 PCR   Negative    Negative    Negative  Negative  Negative  Negative    COVID-19 Virus PCR - Result NOT DETECTED    NOT DETECTED    NOT DETECTED                    10/21/2023    09:56   COVID-19 PCR Results   SARS CoV2 PCR Positive    COVID-19 Virus PCR - Result        COVID-19 Antibody Results, Testing for Immunity              No data to display                 Consultations This Hospital Stay   CARE MANAGEMENT / SOCIAL WORK IP CONSULT  INTERVENTIONAL RADIOLOGY ADULT/PEDS IP CONSULT  PHARMACY TO DOSE WARFARIN  PHARMACY IP CONSULT  OCCUPATIONAL THERAPY ADULT IP CONSULT  PHYSICAL THERAPY ADULT IP CONSULT  NUTRITION SERVICES ADULT IP CONSULT  PHYSICAL THERAPY ADULT IP " CONSULT  OCCUPATIONAL THERAPY ADULT IP CONSULT    Code Status   No CPR- Do NOT Intubate    Time Spent on this Encounter          Nba Red , DO  [place] [address] [facility] [address]  Phone: [phone]  Fax: [phone]  ______________________________________________________________________    Physical Exam   Vital Signs: Temp: 97.3  F (36.3  C) Temp src: Axillary BP: 139/84 Pulse: 60   Resp: 18 SpO2: 98 % O2 Device: None (Room air)    Weight: 174 lbs 13.2 oz  General appearance - nad  Eyes - sclera anicteric  Lungs - decreased bases, nml rate  Heart - rrr.  No peripheral edema.  Abdomen - soft, nontender, nondistended, BS+  Neurological - alert, oriented, [place]  Skin - no c/c/p       Primary Care Physician   Jac Pitt    Discharge Orders      General info for SNF    Length of Stay Estimate: Long Term Care  Condition at Discharge: Stable  Level of care:skilled   Rehabilitation Potential: Fair  Admission H&P remains valid and up-to-date: Yes  Recent Chemotherapy: N/A  Use Nursing Home Standing Orders: N/A     Mantoux instructions    Give two-step Mantoux (PPD) Per Facility Policy Yes     Quarantine/Isolation Instruction    Date of symptom onset:     Date of first positive test:    Quarantine/Isolation per facility policy     Intake and output    Every shift     Daily weights    Call Provider for weight gain of more than 2 pounds per day and give 1 dose of Lasix 40mg P.O. x 1 as prescribed.     [place]erson catheter    To straight gravity drainage. Change catheter every 4 weeks and PRN for leaking or decreased uring output with signs of bladder distention. Next routine catheter exchange is on 11/21/23.     Activity - Up with nursing assistance     Weight bearing status    WBAT with assist     Encourage PO fluids     Additional Discharge Instructions    Special Precautions due to COVID.  Precautions to be discontinued in accordance with current CDC COVID  Guidelines.     Follow Up and recommended labs and tests    Follow up with jail physician.  The following labs/tests are recommended: BMP, INR on 10/26/23.  Facility provider to be notified of lab results same day they are resulted.  Goal INR range is 2-3.     Reason for your hospital stay    COVID 19 infection     No CPR- Do NOT Intubate     Nutrition Services Adult IP Consult    Reason:  Monitor nutritional status     Physical Therapy Adult Consult    Evaluate and treat as clinically indicated.    Reason:  weakness     Occupational Therapy Adult Consult    Evaluate and treat as clinically indicated.    Reason:  weakness     Fall precautions     Advance Diet as Tolerated    Follow this diet upon discharge: Orders Placed This Encounter      Combination Diet Mechanical/Dental Soft Diet       Significant Results and Procedures       Discharge Medications      Review of your medicines        START taking        Dose / Directions   cyanocobalamin 1000 MCG sublingual tablet  Commonly known as: VITAMIN B-12  Used for: Vitamin B12 deficiency (non anemic)      Dose: 1,000 mcg  Start taking on: October 25, 2023  Place 1 tablet (1,000 mcg) under the tongue daily for 30 days  Quantity: 30 tablet  Refills: 0     miconazole 2 % external powder  Commonly known as: MICATIN  Used for: Rash      Apply topically 2 times daily Apply to groin/body folds for redness and cutaneous fungal infection prophylaxis.  Quantity: 90 g  Refills: 0     nirmatrelvir and ritonavir 300 mg/100 mg therapy pack  Commonly known as: PAXLOVID      Dose: 3 tablet  Take 3 tablets by mouth 2 times daily for 3 doses . Finish remainder of Paxlovid therapy pack that was started in the hospital.  Follow instructions on that pack.  Refills: 0     sodium chloride 1 GM tablet  Used for: Hyponatremia      Dose: 1 g  Take 1 tablet (1 g) by mouth 2 times daily (with meals) for 30 days  Quantity: 60 tablet  Refills: 0            CONTINUE these medicines which may  have CHANGED, or have new prescriptions. If we are uncertain of the size of tablets/capsules you have at home, strength may be listed as something that might have changed.        Dose / Directions   atorvastatin 40 MG tablet  Commonly known as: LIPITOR  This may have changed:   additional instructions  These instructions start on October 30, 2023. If you are unsure what to do until then, ask your doctor or other care provider.      Dose: 40 mg  Start taking on: October 30, 2023  Take 1 tablet (40 mg) by mouth at bedtime On hold due to Paxlovid.  Resume Atorvastatin on 10/30/23  Refills: 0     furosemide 40 MG tablet  Commonly known as: LASIX  This may have changed:   when to take this  reasons to take this  Used for: Diastolic heart failure, unspecified HF chronicity (H)      Dose: 40 mg  Take 1 tablet (40 mg) by mouth daily as needed (take 1 tablet if weight gain of 2 or more pounds per 24hrs) Please check blood pressure before giving and  hold if sbp<110  Refills: 0            CONTINUE these medicines which have NOT CHANGED        Dose / Directions   acetaminophen 325 MG tablet  Commonly known as: TYLENOL  Used for: Pain      Dose: 650 mg  Take 2 tablets (650 mg) by mouth every 6 hours as needed for mild pain or other (and adjunct with moderate or severe pain or per patient request)  Quantity: 20 tablet  Refills: 0     bisacodyl 10 MG suppository  Commonly known as: DULCOLAX  Used for: Constipation, unspecified constipation type      Dose: 10 mg  Place 1 suppository (10 mg) rectally daily as needed for constipation  Quantity: 10 suppository  Refills: 0     * DOCU LIQUID PO      Dose: 5 drop  Place 5 drops into both ears daily as needed prior to irrigation for cerumen removal  Refills: 0     * docusate sodium 100 MG capsule  Commonly known as: COLACE  Used for: Constipation, unspecified constipation type      Dose: 100 mg  Take 1 capsule (100 mg) by mouth 2 times daily as needed for constipation  Quantity: 30  capsule  Refills: 0     dorzolamide-timolol 22.3-6.8 MG/ML ophthalmic solution  Commonly known as: COSOPT  Used for: Glaucoma suspect, bilateral      Dose: 1 drop  Place 1 drop Into the left eye 2 times daily  Quantity: 10 mL  Refills: 12     ERYTHROMYCIN OP      Dose: 5 mg  Place 5 mg into both eyes At Bedtime Instill 1 ribbon in Left Eye and 1 ribbon in Right Eye at bedtime. Ensure ointment is given after eye drops to ensure proper absorption.  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      Dose: 25 mcg  Take 1 tablet (25 mcg) by mouth daily  Quantity: 30 tablet  Refills: 0     omeprazole 20 MG DR capsule  Commonly known as: PriLOSEC  Used for: Gastroesophageal reflux disease with esophagitis, unspecified whether hemorrhage      Dose: 20 mg  Take 1 capsule (20 mg) by mouth daily  Quantity: 30 capsule  Refills: 0     SYSTANE COMPLETE OP      Dose: 1 drop  Place 1 drop into both eyes 2 times daily At 0600 and 1630  Refills: 0     WARFARIN SODIUM PO      Take by mouth See Admin Instructions 5 mg on Tuesday Thursday Saturday and Sunday  6 mg on Monday Wednesday and Friday  Refills: 0           * This list has 2 medication(s) that are the same as other medications prescribed for you. Read the directions carefully, and ask your doctor or other care provider to review them with you.                STOP taking      amoxicillin 500 MG capsule  Commonly known as: AMOXIL        guaiFENesin 100 MG/5ML Syrp  Commonly known as: ROBITUSSIN                  Where to get your medicines        These medications were sent to [id number] - [place], [place] - [address]  [address],  [facility] [address]      Phone: [phone]   cyanocobalamin 1000 MCG sublingual tablet  miconazole 2 % external powder  sodium chloride 1 GM tablet             Allergies   No Known Allergies

## 2023-10-25 NOTE — PROGRESS NOTES
[facility]    Background: Transitional Care Management program identified per system criteria and reviewed by [facility] team for possible outreach.    Assessment: Upon chart review, [facility] Team member will not proceed with patient outreach related to this episode of Transitional Care Management program due to reason below:    Patient has discharged to a Memory Care, Long-term Care, Assisted Living or Group Home where patient is receiving on-site support with their daily cares, including support with hospital follow up plan.    Skilled Nursing Facility ([facility])     Plan: Transitional Care Management episode addressed appropriately per reason noted above.          IVY Torres  Connected Care Resource [facility]    *Connected Care Resource Team does NOT follow patient ongoing. Referrals are identified based on internal discharge reports and the outreach is to ensure patient has an understanding of their discharge instructions.

## 2023-10-25 NOTE — TELEPHONE ENCOUNTER
[facility] Triage Nurse Telephone Encounter    Provider: Nancy Fair MD  Facility: [facility] Facility Type:  LTC    Caller: Faxed report       Allergies:  No Known Allergies     Reason for call: Pt has an order B12 sublingual tablet that is not covered by insurance.   Also, pt has an order for sodium chloride 1g bid that is not covered by insurance.   Pt has an INR and BMP tomorrow.     Verbal Order/Direction given by Provider:   1.Change the B12 to a 1000 mcg regular tablet by mouth daily.  2.Patient does have the sodium chloride tablets on hand for at least 1 week, nursing will be contacting family about paying for the sodium chloride.  3.  Heme II for tomorrow Dx anemia      Provider giving Order:  Nancy Fair MD    Verbal Order given to: Priscila Rosario, RN

## 2023-10-26 NOTE — TELEPHONE ENCOUNTER
[facility] Triage Nurse INR     Provider: EDWARD Jacobsen  Facility: [facility]  Facility Type:  LTC    Caller: Zakiya  Call Back Number: [phone]  Reason for call: INR  Diagnosis/Goal: A. Fib    Todays INR: 2.50  Last INR 10/24   2.12  6mg M,W, F and 5mg AOD    Heparin/Lovenox:  No  Currently on ABX?: No  Other interacting medication:  None  Missed or refused doses: No    Patient has an order for Lasix 40 mg daily prn to give if weight greater than 2 pounds in 24 hours, nursing is wondering if they should continue.   Patient also came back with a soft diet due to teeth extractions a few weeks ago, nursing is requesting change back to a regular diet.  CBC and BMP     Also FYI patient did have a phone today obtaining a skin tear on his arm and abrasion on his head.  Vital signs stable neuro's intact.     Pt has an order for I & O's and the facility is not able to do this at this time.      Verbal Order/Direction given by Provider:   Warfarin 6 mg Monday, Wednesday, Friday, and 5 AOD others Next INR 11/2  Discontinue as needed Lasix  Lasix 20 mg by mouth daily  Continue with daily weights update for 2 pounds in 24 hours or 5 pounds in 1 week  BMP 1 week. Dx hyponatremia   Discontinue I & O's     Provider Giving Order:  Nancy Fair MD    Verbal Order given to: Priscila Rosario RN

## 2023-11-02 NOTE — RESULT ENCOUNTER NOTE
Discontinue sodium tabs. Ensure adequate fluid intake. Recheck sodium tomorrow to ensure it is improving.

## 2023-11-02 NOTE — TELEPHONE ENCOUNTER
Orders relayed to facility nurse, Maria Fernanda.    ----- Message from Jac Pitt CNP sent at 11/2/2023 11:49 AM CDT -----  Discontinue sodium tabs. Ensure adequate fluid intake. Recheck sodium tomorrow to ensure it is improving.

## 2023-11-02 NOTE — TELEPHONE ENCOUNTER
Order relayed to facility nurse, Maria Fernanda.       ----- Message from Jac Pitt CNP sent at 11/2/2023  1:13 PM CDT -----   Give vitamin K 2.5 now. HOLD coumadin. Recheck tomorrow am.

## 2023-11-03 NOTE — TELEPHONE ENCOUNTER
[facility] Triage Nurse INR     Provider: EDWARD Jacobsen  Facility: [facility]  Facility Type:  LTC    Caller: Maria Fernanda  Call Back Number: [phone]  Reason for call: INR  Diagnosis/Goal: A. Fib    Todays INR: 1.57  Last INR   11/2 >10 - Give Vitamin K 2.5 x1 and Hold Coumadin x1  10/26 2.5 - 6mg MWF and 5mg AOD    Heparin/Lovenox:  No  Currently on ABX?: No  Other interacting medication:  None  Missed or refused doses: No    Verbal Order/Direction given by Provider:   - Coumadin 2.5mg daily  - Recheck INR on 11/6/23    Provider Giving Order:  EDWARD Jacobsen    Verbal Order given to: Maria Fernanda Pat RN

## 2023-11-03 NOTE — TELEPHONE ENCOUNTER
[facility]     ----- Message from Jac Pitt CNP sent at 11/3/2023 10:10 AM CDT -----  Recheck NA on Monday- continue with FR of 2000cc daily. Continue OFF of sodium tabs until we see trends.     Verbal order/direction given to: Maria Fernanda  Provider Giving Order:  EDWARD Jacobsen RN

## 2023-11-06 NOTE — LETTER
11/6/2023        RE: Riley Rodriguez  [address]  [address]  [place] [address]        [facility]  Chief Complaint   Patient presents with     Hospital F/U     [facility] Record Number:  [id number]  Place of Service where encounter took place:  [facility] () [[id number]]    HPI:    Riley Rodriguez  is a 89 year old  (4/15/1926), who is being seen today for  Regulatory visit. HPI information obtained from: facility chart records, facility staff, patient report and [facility] chart review.     Background: He has history of A. fib on warfarin, prior stroke, hypertension, hypothyroid, he has hx of Afib on warfarin, BPH with a Pierson catheter in place.  He has been residing in the long-term care for many years, last hospitalized in June 2023.  Has been on iron supplement since that time with recent hemoglobin 12.2.      June 2023: He was admitted to [facility] on 6/5 due to hypoxia in the nursing home down to low 80s on RA.  Chest x-ray with pulm edema. Found to have urosepsis and CHF exacerbation. Family declined transfer to ICU and preferred to treat conservatively and keep comfortable. IV lasix transitioned to PO; he weaned off supplemental o2 and now on RA breathing comfortably. Peak weight 208lbs. Today 180. He is also on 2 gram sodium diet and 2000ml FR which we will loosen up as able.   Sepsis thought s/s to catheter associated UTI. Completed antibiotics.  He remains on coumadin; monitoring INR.   He did have mild hyponatremia which improved with diuretics. Hypokalemia and hypomagnesemia also improved.     October 2023: She was rehospitalized at [facility] after experiencing weakness and poor appetite, found to have COVID-19 and completed Paxlovid, he was encephalopathic which cleared as infection improved.  Also with UTI secondary to suprapubic cath.  Hyponatremia to 126, he was started on sodium tabs twice daily.  This was possibly related to mild  fluid overload versus poor p.o. intake as he had had a dental procedure the week prior and was experiencing pain from this.  He remains on warfarin for A-fib and history of CVA.      He is seen in the facility today sitting up in his wheelchair.  INR has has been subtherapeutic at 1.37.  +1 edema bilaterally.  Sodium stable at 137.  Remains on fluid restriction.  He is feeling well, better p.o. intake and energy is back to baseline with transfers.  Denying any jaw or oral pain.      ALLERGIES: Patient has no known allergies.  PAST MEDICAL HISTORY:   Past Medical History:   Diagnosis Date     Atrial fibrillation (H)     On coumadin     Bell's palsy     right sided facial droop     CVA (cerebral vascular accident) (H)      Hypertension      Pacemaker      Urinary retention       PAST SURGICAL HISTORY:  has a past surgical history that includes IR Thoracic Aortogram (1/1/2004); IR Visceral Angiogram (1/1/2004); IR Visceral Angiogram (1/1/2004); IR Visceral Angiogram (1/1/2004); IR Miscellaneous Procedure (1/1/2004); IR Visceral Angiogram (1/1/2004); IR Miscellaneous Procedure (1/1/2004); IR Visceral Angiogram (1/1/2004); IR Visceral Angiogram (1/1/2004); IR Miscellaneous Procedure (1/1/2004); IR Visceral Angiogram (1/1/2004); IR Aortic Arch 4 Vessel Angiogram (1/1/2004); IR Suprapubic Catheter Placment (1/18/2019); remv pilonidal lesion simple; Pr Partial Excision Thyroid,Unilat; TRANSURETHRAL ELEC-SURG PROSTATECTOM; Total Hip Arthroplasty (Right); Ir Bladder Suprapubic Catheter Insertion (1/18/2019); Pr Esophagogastroduodenoscopy Transoral Diagnostic (N/A, 8/15/2020); and IR Suprapubic Catheter Change (10/19/2023).  IMMUNIZATIONS:  Immunization History   Administered Date(s) Administered     COVID-19 Bivalent 12+ (Pfizer) 09/23/2022     COVID-19 Monovalent 18+ (Moderna) 12/30/2020, 01/27/2021, 11/26/2021, 05/09/2022     DT (PEDS <7y) 10/11/2004     Flu, Unspecified 10/12/2007, 10/13/2019, 10/24/2020     Influenza  (High Dose) 3 valent vaccine 10/01/2010, 09/29/2015, 10/19/2016, 09/12/2017, 10/23/2018, 10/12/2021     Influenza (IIV3) PF 10/11/2004, 11/02/2005, 10/30/2006, 10/12/2007, 11/10/2008, 09/16/2009, 09/28/2011, 09/19/2012, 09/20/2013     Influenza Vaccine 18-64 (Flublok) 10/10/2014     Influenza Vaccine >6 months (Alfuria,Fluzone) 10/10/2014, 10/10/2014     Influenza Vaccine, 6+MO IM (QUADRIVALENT W/PRESERVATIVES) 11/10/2008, 09/16/2009, 09/28/2011, 09/19/2012, 09/20/2013     Pneumo Conj 13-V (2010&after) 04/10/2015     Pneumococcal 23 valent 11/01/2001     TDAP (Adacel,Boostrix) 10/19/2012     TDAP Vaccine (Boostrix) 10/19/2012     Td (Adult), Adsorbed 10/11/2004     Td,adult,historic,unspecified 10/11/2004     Zoster vaccine, live 10/27/2015     Above immunizations pulled from [facility]. [facility] and facility records also reconciled. Outstanding information sent to  to update [facility].  Future immunizations are not needed at this point as all recommended immunizations are up to date.       Current Outpatient Medications:      acetaminophen (TYLENOL) 325 MG tablet, Take 2 tablets (650 mg) by mouth every 6 hours as needed for mild pain or other (and adjunct with moderate or severe pain or per patient request), Disp: 20 tablet, Rfl: 0     atorvastatin (LIPITOR) 40 MG tablet, Take 1 tablet (40 mg) by mouth at bedtime On hold due to Paxlovid.  Resume Atorvastatin on 10/30/23, Disp: , Rfl:      bisacodyl (DULCOLAX) 10 MG suppository, Place 1 suppository (10 mg) rectally daily as needed for constipation, Disp: 10 suppository, Rfl: 0     cyanocobalamin (VITAMIN B-12) 1000 MCG tablet, Take 1,000 mcg by mouth daily, Disp: , Rfl:      docusate sodium (COLACE) 100 MG capsule, Take 1 capsule (100 mg) by mouth 2 times daily as needed for constipation, Disp: 30 capsule, Rfl: 0     Docusate Sodium (DOCU LIQUID PO), Place 5 drops into both ears daily as needed prior to irrigation for cerumen removal, Disp: , Rfl:  "     dorzolamide-timolol (COSOPT) 2-0.5 % ophthalmic solution, Place 1 drop Into the left eye 2 times daily, Disp: 10 mL, Rfl: 12     erythromycin base (ERYTHROMYCIN OPHT), Place 5 mg into both eyes At Bedtime Instill 1 ribbon in Left Eye and 1 ribbon in Right Eye at bedtime. Ensure ointment is given after eye drops to ensure proper absorption., Disp: , Rfl:      furosemide (LASIX) 20 MG tablet, Take 20 mg by mouth daily, Disp: , Rfl:      latanoprost (XALATAN) 0.005 % ophthalmic solution, Place 1 drop Into the left eye At Bedtime, Disp: 2.5 mL, Rfl: 12     levothyroxine (SYNTHROID/LEVOTHROID) 25 MCG tablet, Take 1 tablet (25 mcg) by mouth daily, Disp: 30 tablet, Rfl: 0     miconazole (MICATIN) 2 % external powder, Apply topically 2 times daily Apply to groin/body folds for redness and cutaneous fungal infection prophylaxis., Disp: 90 g, Rfl: 0     omeprazole (PRILOSEC) 20 MG DR capsule, Take 1 capsule (20 mg) by mouth daily, Disp: 30 capsule, Rfl: 0     Propylene Glycol (SYSTANE COMPLETE OP), Place 1 drop into both eyes 2 times daily At 0600 and 1630, Disp: , Rfl:      sodium chloride 1 GM tablet, Take 1 tablet (1 g) by mouth 2 times daily (with meals) for 30 days, Disp: 60 tablet, Rfl: 0     WARFARIN SODIUM PO, Take by mouth See Admin Instructions 5 mg on Tuesday Thursday Saturday and Sunday 6 mg on Monday Wednesday and Friday, Disp: , Rfl:        Post Medication Reconciliation Status:      ROS:  4 point ROS including Respiratory, CV, GI and , other than that noted in the HPI,  is negative    Vitals:  /88   Pulse 62   Temp 98.7  F (37.1  C)   Resp 18   Ht 1.803 m (5' 11\")   Wt 85.5 kg (188 lb 9.6 oz)   SpO2 98%   BMI 26.30 kg/m   Body mass index is 26.3 kg/m .  Exam:  Physical Exam : stable  General appearance: alert, appears stated age and cooperative.   Head: Normocephalic, without obvious abnormality, atraumatic, Eyes: sclera anicteric.  Lungs: respirations unlabored, LSCTA; no wheezing or rales. "   Cardiovascular: regular rate.    Extremities: extremities normal, atraumatic, +1 edema bilaterally.  Skin: Skin color, texture, turgor normal. No rashes or lesions  Neurologic: oriented. No focal deficits.   Psych: interacts well with caregivers, exhibits logical thought processes and connections, pleasant    Lab/Diagnostic data:     Most Recent 3 CBC's:  Recent Labs   Lab Test 10/26/23  0556 10/24/23  0637 10/23/23  0640   WBC 5.3 6.2 7.4   HGB 9.8* 10.3* 10.5*   MCV 85 84 83    218 200     Most Recent 3 BMP's:  Recent Labs   Lab Test 11/13/23  0542 11/06/23  0536 11/03/23  0515 11/02/23  0620 10/26/23  0556 10/24/23  0637    137 132* 151* 131* 132*   POTASSIUM  --   --   --  3.8 3.8 4.1   CHLORIDE  --   --   --  82* 98 97*   CO2  --   --   --  15* 25 27   BUN  --   --   --  11.0 9.5 8.5   CR  --   --   --  0.63* 0.74 0.71   ANIONGAP  --   --   --  54* 8 8   GENO  --   --   --  6.9* 8.1* 7.8*   GLC  --   --   --  91 93 104*       ASSESSMENT/PLAN    (U07.1) Infection due to 2019 novel coronavirus  Comment: Resolved, received Paxlovid in the hospital.  No longer in isolation.    (I50.33) Acute on chronic diastolic congestive heart failure (H)  (primary encounter diagnosis)  Comment: Weight peak 208 in hospital.   Plan:   -Daily weights: 186lb today.   -2 gram NA diet.  -Lasix 20 mg daily.    (E87.1) Hyponatremia  Comment: Sodium 136 today.   Plan:   -Continue FR to 2000cc daily.   -Encourage p.o. intake.    (I48.91) Atrial fibrillation, unspecified type (H)  Comment: INR 1.37  Plan:    -Adjusted dose, recheck Friday.    Chronic conditions:     (I63.532) Acute ischemic left PCA stroke (H)  (primary encounter diagnosis)  (I63.89) Cerebral infarction due to other mechanism (H)  (primary encounter diagnosis)  Plan: Continue atorvastatin.    (E61.1) Iron deficiency  Comment: Hemoglobin in June 11.3; mcv wnl.   Plan: Continue iron supplementation.  Continue omeprazole.  -recheck cbc 6 months.    (I10)  Hypertension  Comment: Most recent blood pressure stable.  Plan: No current medications.    (E03.9) Hypothyroidism, unspecified type  Comment: Stable  Plan:     TSH   Date Value Ref Range Status   10/23/2023 3.70 0.30 - 4.20 uIU/mL Final   06/02/2022 4.10 0.30 - 5.00 uIU/mL Final       Electronically signed by:  Jac Pitt, SACHA         Sincerely,        Jac Pitt, CNP

## 2023-11-07 NOTE — PROGRESS NOTES
[facility] GERIATRICS  Chief Complaint   Patient presents with    Hospital F/U     [place] Medical Record Number:  [id number]  Place of Service where encounter took place:  [facility] () [[id number]]    HPI:    Riley Rodriguez  is a 89 year old  (4/15/1926), who is being seen today for  Regulatory visit. HPI information obtained from: facility chart records, facility staff, patient report and [facility] chart review.     Background: He has history of A. fib on warfarin, prior stroke, hypertension, hypothyroid, he has hx of Afib on warfarin, BPH with a Pierson catheter in place.  He has been residing in the long-term care for many years, last hospitalized in June 2023.  Has been on iron supplement since that time with recent hemoglobin 12.2.      June 2023: He was admitted to [facility] on 6/5 due to hypoxia in the nursing home down to low 80s on RA.  Chest x-ray with pulm edema. Found to have urosepsis and CHF exacerbation. Family declined transfer to ICU and preferred to treat conservatively and keep comfortable. IV lasix transitioned to PO; he weaned off supplemental o2 and now on RA breathing comfortably. Peak weight 208lbs. Today 180. He is also on 2 gram sodium diet and 2000ml FR which we will loosen up as able.   Sepsis thought s/s to catheter associated UTI. Completed antibiotics.  He remains on coumadin; monitoring INR.   He did have mild hyponatremia which improved with diuretics. Hypokalemia and hypomagnesemia also improved.     October 2023: She was rehospitalized at [facility] after experiencing weakness and poor appetite, found to have COVID-19 and completed Paxlovid, he was encephalopathic which cleared as infection improved.  Also with UTI secondary to suprapubic cath.  Hyponatremia to 126, he was started on sodium tabs twice daily.  This was possibly related to mild fluid overload versus poor p.o. intake as he had had a dental procedure the week prior and was  experiencing pain from this.  He remains on warfarin for A-fib and history of CVA.      He is seen in the facility today sitting up in his wheelchair.  INR has has been subtherapeutic at 1.37.  +1 edema bilaterally.  Sodium stable at 137.  Remains on fluid restriction.  He is feeling well, better p.o. intake and energy is back to baseline with transfers.  Denying any jaw or oral pain.      ALLERGIES: Patient has no known allergies.  PAST MEDICAL HISTORY:   Past Medical History:   Diagnosis Date    Atrial fibrillation (H)     On coumadin    Bell's palsy     right sided facial droop    CVA (cerebral vascular accident) (H)     Hypertension     Pacemaker     Urinary retention       PAST SURGICAL HISTORY:  has a past surgical history that includes IR Thoracic Aortogram (1/1/2004); IR Visceral Angiogram (1/1/2004); IR Visceral Angiogram (1/1/2004); IR Visceral Angiogram (1/1/2004); IR Miscellaneous Procedure (1/1/2004); IR Visceral Angiogram (1/1/2004); IR Miscellaneous Procedure (1/1/2004); IR Visceral Angiogram (1/1/2004); IR Visceral Angiogram (1/1/2004); IR Miscellaneous Procedure (1/1/2004); IR Visceral Angiogram (1/1/2004); IR Aortic Arch 4 Vessel Angiogram (1/1/2004); IR Suprapubic Catheter Placment (1/18/2019); remv pilonidal lesion simple; Pr Partial Excision Thyroid,Unilat; TRANSURETHRAL ELEC-SURG PROSTATECTOM; Total Hip Arthroplasty (Right); Ir Bladder Suprapubic Catheter Insertion (1/18/2019); Pr Esophagogastroduodenoscopy Transoral Diagnostic (N/A, 8/15/2020); and IR Suprapubic Catheter Change (10/19/2023).  IMMUNIZATIONS:  Immunization History   Administered Date(s) Administered    COVID-19 Bivalent 12+ (Pfizer) 09/23/2022    COVID-19 Monovalent 18+ (Moderna) 12/30/2020, 01/27/2021, 11/26/2021, 05/09/2022    DT (PEDS <7y) 10/11/2004    Flu, Unspecified 10/12/2007, 10/13/2019, 10/24/2020    Influenza (High Dose) 3 valent vaccine 10/01/2010, 09/29/2015, 10/19/2016, 09/12/2017, 10/23/2018, 10/12/2021     Influenza (IIV3) PF 10/11/2004, 11/02/2005, 10/30/2006, 10/12/2007, 11/10/2008, 09/16/2009, 09/28/2011, 09/19/2012, 09/20/2013    Influenza Vaccine 18-64 (Flublok) 10/10/2014    Influenza Vaccine >6 months (Alfuria,Fluzone) 10/10/2014, 10/10/2014    Influenza Vaccine, 6+MO IM (QUADRIVALENT W/PRESERVATIVES) 11/10/2008, 09/16/2009, 09/28/2011, 09/19/2012, 09/20/2013    Pneumo Conj 13-V (2010&after) 04/10/2015    Pneumococcal 23 valent 11/01/2001    TDAP (Adacel,Boostrix) 10/19/2012    TDAP Vaccine (Boostrix) 10/19/2012    Td (Adult), Adsorbed 10/11/2004    Td,adult,historic,unspecified 10/11/2004    Zoster vaccine, live 10/27/2015     Above immunizations pulled from [facility]. [facility] and facility records also reconciled. Outstanding information sent to  to update [facility].  Future immunizations are not needed at this point as all recommended immunizations are up to date.       Current Outpatient Medications:     acetaminophen (TYLENOL) 325 MG tablet, Take 2 tablets (650 mg) by mouth every 6 hours as needed for mild pain or other (and adjunct with moderate or severe pain or per patient request), Disp: 20 tablet, Rfl: 0    atorvastatin (LIPITOR) 40 MG tablet, Take 1 tablet (40 mg) by mouth at bedtime On hold due to Paxlovid.  Resume Atorvastatin on 10/30/23, Disp: , Rfl:     bisacodyl (DULCOLAX) 10 MG suppository, Place 1 suppository (10 mg) rectally daily as needed for constipation, Disp: 10 suppository, Rfl: 0    cyanocobalamin (VITAMIN B-12) 1000 MCG tablet, Take 1,000 mcg by mouth daily, Disp: , Rfl:     docusate sodium (COLACE) 100 MG capsule, Take 1 capsule (100 mg) by mouth 2 times daily as needed for constipation, Disp: 30 capsule, Rfl: 0    Docusate Sodium (DOCU LIQUID PO), Place 5 drops into both ears daily as needed prior to irrigation for cerumen removal, Disp: , Rfl:     dorzolamide-timolol (COSOPT) 2-0.5 % ophthalmic solution, Place 1 drop Into the left eye 2 times daily, Disp: 10  "mL, Rfl: 12    erythromycin base (ERYTHROMYCIN OPHT), Place 5 mg into both eyes At Bedtime Instill 1 ribbon in Left Eye and 1 ribbon in Right Eye at bedtime. Ensure ointment is given after eye drops to ensure proper absorption., Disp: , Rfl:     furosemide (LASIX) 20 MG tablet, Take 20 mg by mouth daily, Disp: , Rfl:     latanoprost (XALATAN) 0.005 % ophthalmic solution, Place 1 drop Into the left eye At Bedtime, Disp: 2.5 mL, Rfl: 12    levothyroxine (SYNTHROID/LEVOTHROID) 25 MCG tablet, Take 1 tablet (25 mcg) by mouth daily, Disp: 30 tablet, Rfl: 0    miconazole (MICATIN) 2 % external powder, Apply topically 2 times daily Apply to groin/body folds for redness and cutaneous fungal infection prophylaxis., Disp: 90 g, Rfl: 0    omeprazole (PRILOSEC) 20 MG DR capsule, Take 1 capsule (20 mg) by mouth daily, Disp: 30 capsule, Rfl: 0    Propylene Glycol (SYSTANE COMPLETE OP), Place 1 drop into both eyes 2 times daily At 0600 and 1630, Disp: , Rfl:     sodium chloride 1 GM tablet, Take 1 tablet (1 g) by mouth 2 times daily (with meals) for 30 days, Disp: 60 tablet, Rfl: 0    WARFARIN SODIUM PO, Take by mouth See Admin Instructions 5 mg on Tuesday Thursday Saturday and Sunday 6 mg on Monday Wednesday and Friday, Disp: , Rfl:        Post Medication Reconciliation Status:      ROS:  4 point ROS including Respiratory, CV, GI and , other than that noted in the HPI,  is negative    Vitals:  /88   Pulse 62   Temp 98.7  F (37.1  C)   Resp 18   Ht 1.803 m (5' 11\")   Wt 85.5 kg (188 lb 9.6 oz)   SpO2 98%   BMI 26.30 kg/m   Body mass index is 26.3 kg/m .  Exam:  Physical Exam : stable  General appearance: alert, appears stated age and cooperative.   Head: Normocephalic, without obvious abnormality, atraumatic, Eyes: sclera anicteric.  Lungs: respirations unlabored, LSCTA; no wheezing or rales.   Cardiovascular: regular rate.    Extremities: extremities normal, atraumatic, +1 edema bilaterally.  Skin: Skin color, " texture, turgor normal. No rashes or lesions  Neurologic: oriented. No focal deficits.   Psych: interacts well with caregivers, exhibits logical thought processes and connections, pleasant    Lab/Diagnostic data:     Most Recent 3 CBC's:  Recent Labs   Lab Test 10/26/23  0556 10/24/23  0637 10/23/23  0640   WBC 5.3 6.2 7.4   HGB 9.8* 10.3* 10.5*   MCV 85 84 83    218 200     Most Recent 3 BMP's:  Recent Labs   Lab Test 11/13/23  0542 11/06/23  0536 11/03/23  0515 11/02/23  0620 10/26/23  0556 10/24/23  0637    137 132* 151* 131* 132*   POTASSIUM  --   --   --  3.8 3.8 4.1   CHLORIDE  --   --   --  82* 98 97*   CO2  --   --   --  15* 25 27   BUN  --   --   --  11.0 9.5 8.5   CR  --   --   --  0.63* 0.74 0.71   ANIONGAP  --   --   --  54* 8 8   GENO  --   --   --  6.9* 8.1* 7.8*   GLC  --   --   --  91 93 104*       ASSESSMENT/PLAN    (U07.1) Infection due to 2019 novel coronavirus  Comment: Resolved, received Paxlovid in the hospital.  No longer in isolation.    (I50.33) Acute on chronic diastolic congestive heart failure (H)  (primary encounter diagnosis)  Comment: Weight peak 208 in hospital.   Plan:   -Daily weights: 186lb today.   -2 gram NA diet.  -Lasix 20 mg daily.    (E87.1) Hyponatremia  Comment: Sodium 136 today.   Plan:   -Continue FR to 2000cc daily.   -Encourage p.o. intake.    (I48.91) Atrial fibrillation, unspecified type (H)  Comment: INR 1.37  Plan:    -Adjusted dose, recheck Friday.    Chronic conditions:     (I63.532) Acute ischemic left PCA stroke (H)  (primary encounter diagnosis)  (I63.89) Cerebral infarction due to other mechanism (H)  (primary encounter diagnosis)  Plan: Continue atorvastatin.    (E61.1) Iron deficiency  Comment: Hemoglobin in June 11.3; mcv wnl.   Plan: Continue iron supplementation.  Continue omeprazole.  -recheck cbc 6 months.    (I10) Hypertension  Comment: Most recent blood pressure stable.  Plan: No current medications.    (E03.9) Hypothyroidism, unspecified  type  Comment: Stable  Plan:     TSH   Date Value Ref Range Status   10/23/2023 3.70 0.30 - 4.20 uIU/mL Final   06/02/2022 4.10 0.30 - 5.00 uIU/mL Final       Electronically signed by:  Jac Pitt, SACHA

## 2023-11-09 NOTE — TELEPHONE ENCOUNTER
----- Message from Jac Pitt CNP sent at 11/9/2023 11:13 AM CST -----  Continue same dose, recheck Monday.

## 2023-11-09 NOTE — TELEPHONE ENCOUNTER
[facility] Geriatrics Triage Nurse INR     Provider: EDWARD Jacobsen  Facility: [facility]  Facility Type:  LTC    Caller: Zakiya  Call Back Number: [phone]  Reason for call: INR  Diagnosis/Goal: A. Fib    Todays INR: 1.93  Last INR 1.39 (11/6), 5 mg po daily.      Heparin/Lovenox:  No  Currently on ABX?: No  Other interacting medication:  None  Missed or refused doses: No    Verbal Order/Direction given by Provider: Continue same dose of Coumadin 5 mg po daily and recheck INR on 11/13.    Provider Giving Order:  EDWARD Jacobsen    Verbal Order given to: Zakiya Jose RN

## 2023-11-16 NOTE — TELEPHONE ENCOUNTER
[facility]     ----- Message from Jac Pitt CNP sent at 11/16/2023  4:35 PM CST -----  2.5mg daily, recheck Monday.     Verbal order/direction given to: Campos  Provider Giving Order:  EDWARD Jacobsen, RN

## 2023-12-12 NOTE — LETTER
12/12/2023        RE: Riley Rodriguez  [address]  [address]  [place] [address]        [facility] GERIATRIC SERVICES    [place] Medical Record Number:  [id number]  Place of Service where encounter took place: [facility] () [[id number]]   CODE STATUS:   DNR / DNI    Chief Complaint:  Chief Complaint   Patient presents with     [facility] Regulatory     LTC 12/12/2023.        HPI:   Riley is a 89 y.o. male seen for routine physician follow up in LT at [facility]. He has hx of Afib on warfarin, prior stroke, BPH, HTN, Freedom palsy, hypothyroidism, SP catheter. He was hospitalized 8/13/2020-8/19/2020 with significant anemia, hgb down to 6.2. He was worked up in the hospital found to have duodenal ulcer, treated appropriately. He was sent in to the hospital from nursing home on 6/5/2023 due to confusion and hypoxia. Treated for UTI, resp failure/CHF.       Today:  He was hospitalized in October 2023 with UTI, COVID, encephalopathy. COVID treated with Paxlovid. Hyponatremia with Na 126, improved to 132 by hospital discharge. Chronic stable conditions Afib on warfarin, HTN, suprapubic catheter, hx of stroke. Very [place], no new complaints on visit today. No concerns per nursing.       Past Medical History:  Past Medical History:   Diagnosis Date     Atrial fibrillation (H)     On coumadin     Bell's palsy     right sided facial droop     CVA (cerebral vascular accident) (H)      Hypertension      Pacemaker      Urinary retention        Medications:  Current Outpatient Medications   Medication Sig Dispense Refill     acetaminophen (TYLENOL) 325 MG tablet Take 2 tablets (650 mg) by mouth every 6 hours as needed for mild pain or other (and adjunct with moderate or severe pain or per patient request) 20 tablet 0     atorvastatin (LIPITOR) 40 MG tablet Take 1 tablet (40 mg) by mouth at bedtime On hold due to Paxlovid.  Resume Atorvastatin on 10/30/23       bisacodyl (DULCOLAX) 10 MG  "suppository Place 1 suppository (10 mg) rectally daily as needed for constipation 10 suppository 0     cyanocobalamin (VITAMIN B-12) 1000 MCG tablet Take 1,000 mcg by mouth daily       docusate sodium (COLACE) 100 MG capsule Take 1 capsule (100 mg) by mouth 2 times daily as needed for constipation 30 capsule 0     Docusate Sodium (DOCU LIQUID PO) Place 5 drops into both ears daily as needed prior to irrigation for cerumen removal       dorzolamide-timolol (COSOPT) 2-0.5 % ophthalmic solution Place 1 drop Into the left eye 2 times daily 10 mL 12     erythromycin base (ERYTHROMYCIN OPHT) Place 5 mg into both eyes At Bedtime Instill 1 ribbon in Left Eye and 1 ribbon in Right Eye at bedtime. Ensure ointment is given after eye drops to ensure proper absorption.       furosemide (LASIX) 20 MG tablet Take 20 mg by mouth daily       latanoprost (XALATAN) 0.005 % ophthalmic solution Place 1 drop Into the left eye At Bedtime 2.5 mL 12     levothyroxine (SYNTHROID/LEVOTHROID) 25 MCG tablet Take 1 tablet (25 mcg) by mouth daily 30 tablet 0     miconazole (MICATIN) 2 % external powder Apply topically 2 times daily Apply to groin/body folds for redness and cutaneous fungal infection prophylaxis. (Patient not taking: Reported on 12/14/2023) 90 g 0     omeprazole (PRILOSEC) 20 MG DR capsule Take 1 capsule (20 mg) by mouth daily 30 capsule 0     Propylene Glycol (SYSTANE COMPLETE OP) Place 1 drop into both eyes 2 times daily At 0600 and 1630       WARFARIN SODIUM PO Take by mouth See Admin Instructions 5 mg on Tuesday Thursday Saturday and Sunday  6 mg on Monday Wednesday and Friday (Patient not taking: Reported on 12/14/2023)          Physical Exam:   General: Patient is alert, elderly male, no distress. [place].  Vitals: /64   Pulse 60   Temp 98.7  F (37.1  C)   Resp 18   Ht 1.778 m (5' 10\")   Wt 83.6 kg (184 lb 3.2 oz)   SpO2 99%   BMI 26.43 kg/m    HEENT: Head is NCAT. Nares negative. Oropharynx moist. Right facial " droop, baseline.  Neck: No JVD.  Lungs: Non labored respirations.   : SP catheter.   Extremities: Mild LE swelling, wears compression.  Musculoskeletal: Age related degen changes.   Skin: Warm and dry.   Psych: Mood is stable.       Labs:  Component      Latest Ref Rng 10/21/2023  7:53 AM 10/23/2023  6:40 AM 10/24/2023  6:37 AM 10/26/2023  5:56 AM   WBC      4.0 - 11.0 10e3/uL 8.0  7.4  6.2  5.3    RBC Count      4.40 - 5.90 10e6/uL 3.61 (L)  3.82 (L)  3.82 (L)  3.60 (L)    Hemoglobin      13.3 - 17.7 g/dL 9.8 (L)  10.5 (L)  10.3 (L)  9.8 (L)    Hematocrit      40.0 - 53.0 % 30.8 (L)  31.5 (L)  32.0 (L)  30.6 (L)    MCV      78 - 100 fL 85  83  84  85    MCH      26.5 - 33.0 pg 27.1  27.5  27.0  27.2    MCHC      31.5 - 36.5 g/dL 31.8  33.3  32.2  32.0    RDW      10.0 - 15.0 % 13.9  14.2  14.2  14.4    Platelet Count      150 - 450 10e3/uL 177  200  218  248      Component      Latest Ref Rng 11/3/2023  5:15 AM 11/6/2023  5:36 AM 11/13/2023  5:42 AM   Sodium      135 - 145 mmol/L 132 (L)  137  136         Assessment/Plan:  1. Afib. Anticoagulated with warfarin. Monitor and adjust per INR.   2. Hx of stroke. He is on atorvastatin.  3. CHF. Stable on lasix. Monitor weights, edema, clinical status.   4. Hypothyroidism. He is on replacement levothyroxine, continue.   5. SP catheter. Long term. UTI and hospitalizations June 2023, Oct 2023.   6. Hyponatremia. Labs as noted above, last Na wnl.       Electronically signed by: Nancy Fair MD           Sincerely,        Nancy Fair MD

## 2023-12-14 NOTE — LETTER
12/14/2023    Jac Pitt, CNP  [address] [facility] [address]    RE: Riley Rodriguez       Dear Colleague,     I had the pleasure of seeing Riley Rodriguez in the [facility].         [facility]   [address], [place] [address]   [email or web address]   OFFICE: [phone]            Impression and Plan     1. Atrial fibrillation (permanent) ventricular response is well-controlled based on recent device interrogations. Riley's JOSELIN?DS?-VASc  Score is at least 5 yielding an annual embolic risk of 7.2-10.0%.  Riley is maintained on warfarin therapy for CVA prophylaxis.     2.  Aortic stenosis.  This was felt moderate-severe in degree on echocardiogram 6 June 2023.  The mean gradient was documented at 25 mmHg with peak velocity of 2.9 m/s.  Calculated valve area 0.94-1.1 cm .  Dimensionless index is 2.8.  This would appear to be fairly stable.  Findings of aortic stenosis were discussed with daughters today.    3.  Mitral stenosis.  This was felt mild in degree an echocardiogram 6 June 2023     4. Hypertension. Blood pressure is fairly reasonable in the office today at 131/67 mmHg.  Plan to simply continue current management for now.     Plan on follow-up in one year. Patient will be followed intermittently through Device Clinic as well per protocol    35 minutes spent reviewing prior records (including documentation, laboratory studies, cardiac testing/imaging), interview with patient along with physical exam, planning, and subsequent documentation/crafting of note).           History of Present Illness    Once again I would like to thank you again for asking me to participate in the care of your patient, Riley Rodriguez.  As you know, but to reiterate for my own records, Riley Rodriguez is a 89 year old male with permanent atrial fibrillation. He also has a history of permanent pacemaker placement.     In follow-up today, patient  is without cardiovascular complaint.  He despite his advanced age, he tries to exercise on a regular basis.  Denies chest pain.  Breathing is comfortable.  No subjective palpitations despite permanent atrial fibrillation.  No lightheadedness.    Further review of systems is otherwise negative/noncontributory (medical record and 13 point review of systems reviewed as well and pertinent positives noted).         Cardiac Diagnostics      Echocardiogram 6 June 2023:  Normal left ventricular size and systolic performance with ejection fraction of 60 to 65%.  Moderate-severe aortic stenosis.  Mean gradient documented at 25 mmHg with peak velocity of 2.9 m/s.  Calculated valve area 0.94-1.1 cm .  Diet dimensionless index is 2.8.  Mild mitral stenosis.  Normal right ventricular size with mildly reduced right ventricular systolic performance.  Severe left atrial enlargement.  Mild-moderate right atrial enlargement.     Echocardiogram 14 October 2021:  Normal left ventricular size and systolic performance with ejection fraction of 55 to 60%.  Mild mitral stenosis.  Normal right ventricular size and systolic performance.  Severe left atrial enlargement.  Moderate right atrial enlargement.  Right ventricular systolic pressure relative to right true pressure is mildly increased.  Pulmonary artery pressure is estimated to be 35-40 mmHg plus right atrial pressure.    Echocardiogram 1 December 2017:  Normal left ventricular size and systolic performance with ejection fraction of 60%.  Mild mitral stenosis.  Trace aortic insufficiency.  Normal right ventricular size and systolic performance.  Severe left atrial enlargement.  Mild right atrial enlargement.  Right ventricular systolic pressure relative to right atrial pressure is mildly increased.  Pulmonary artery pressure estimate 35-40 mmHg plus right atrial pressure.  When compared to prior echocardiogram 10 September 2014, no significant change.    Echocardiogram 10 September  2014:  Normal left ventricular size and systolic performance with ejection fraction of 60%.  Mild concentric increased LV wall thickness.  Mild mitral stenosis.  Severe left atrial enlargement. Moderate right atrial enlargement.    Echocardiogram 7 June 2011:  Normal LV size and function with ejection fraction of 75%.   No significant valvular heart disease identified.    Device interrogation 28 August 2023 ([employer] 1272 Assurity MRI device implanted 2 October 2020):  Encounter Type: Alert remote pacemaker transmission for VHR. Courtesy check.    Device: [employer].   Pacing % /Programmed:  87% at VVIR 60.  Lead(s): Stable.  Battery longevity: 8yrs, 7mo estimated.  Presenting: Ventricular pacing and sensing 60 bpm.  Atrial high rates: n/a.  Anticoagulant: Warfarin.  Ventricular High rates: since 15 August 2023; One ventricular high rate episode on 25 August 2023 11:31PM, RV lead only, but EGM suggests NSVT duration 7 seconds 165 bpm.  Comments: Normal device function.     Device interrogation 28 July 2022 ([employer] 1272 Assurity MRI device implanted 2 October 2020):  Encounter Type: routine remote pacemaker transmission.  Device: [employer] PPM.  Pacing % /Programmed:  97% at VVIR 60.  Lead(s): stable.  Battery longevity: 9yrs, 4mo.  Presenting: ventricular pacing 62 bpm.  Anticoagulant: warfarin.  Ventricular arrhythmia: since 12 April 2022; none detected.  Device/Lead alerts: none.  Comments: Normal magnet and pacemaker function.     Device interrogation 27 August 2021 ([employer] 1272 Assurity MRI device implanted 2 October 2020):  Presenting rhythm: ventricular pacing, rate 60 bpm.  battery/lead status: stable  Arrhythmias: since last interrogation, no ventricular high rate episodes detected.   Anticoagulant: warfarin  Comments: normal magnet and pacemaker function.     Device interrogation 3 August 2021 ([employer] 1272 Assurity MRI device implanted 2 October 2020):  Type:  routine remote pacemaker transmission.  Presenting rhythm: ventricular pacing, rate 60 bpm.  battery/lead status: stable  Arrhythmias: since 23 April 2021, no ventricular high rate episodes detected. No new noise reversions.  Anticoagulant: warfarin  Comments: normal magnet and pacemaker function.     Device interrogation 15 August 2019 ([employer] [facility] 5626 [employer] XL SR device implanted 20 April 2009):  The rhythm appears to be atrial fibrillation with ventricular pacing.  Normal magnet and pacemaker function. Patient takes warfarin.           Physical Examination       /67 (BP Location: Right arm, Patient Position: Sitting, Cuff Size: Adult Large)   Pulse 60   Resp 18         Wt Readings from Last 3 Encounters:   12/12/23 83.6 kg (184 lb 3.2 oz)   11/06/23 85.5 kg (188 lb 9.6 oz)   11/01/23 84.8 kg (187 lb)       The patient is alert. Sclerae are anicteric. Mucosal membranes are moist. Jugular venous pressure is normal. No significant adenopathy/thyromegally appreciated. Lungs are clear with good expansion. On cardiovascular exam, the patient has a regular S1 and S2.  2/6 systolic murmur heard at right sternal border which is mid-to-late peaking.  Abdomen is soft and non-tender. Extremities reveal no clubbing, cyanosis.         Family History/Social History/Risk Factors   Patient does not smoke.  Family history reviewed, and family history includes Chronic Obstructive Pulmonary Disease in his father.          Medical History  Surgical History Family History Social History   Past Medical History:   Diagnosis Date    Atrial fibrillation (H)     On coumadin    Bell's palsy     right sided facial droop    CVA (cerebral vascular accident) (H)     Hypertension     Pacemaker     Urinary retention      Past Surgical History:   Procedure Laterality Date     TRANSURETHRAL ELEC-SURG PROSTATECTOM      Description: Transurethral Resection Of Prostate (TURP);  Recorded: 03/24/2008;    IR AORTIC ARCH 4 VESSEL  ANGIOGRAM  1/1/2004    IR BLADDER SUPRAPUBIC CATHETER INSERTION  1/18/2019    IR MISCELLANEOUS PROCEDURE  1/1/2004    IR MISCELLANEOUS PROCEDURE  1/1/2004    IR MISCELLANEOUS PROCEDURE  1/1/2004    IR SUPRAPUBIC CATHETER CHANGE  10/19/2023    IR SUPRAPUBIC CATHETER PLACMENT  1/18/2019    IR THORACIC AORTOGRAM  1/1/2004    IR VISCERAL ANGIOGRAM  1/1/2004    IR VISCERAL ANGIOGRAM  1/1/2004    IR VISCERAL ANGIOGRAM  1/1/2004    IR VISCERAL ANGIOGRAM  1/1/2004    IR VISCERAL ANGIOGRAM  1/1/2004    IR VISCERAL ANGIOGRAM  1/1/2004    IR VISCERAL ANGIOGRAM  1/1/2004    KY ESOPHAGOGASTRODUODENOSCOPY TRANSORAL DIAGNOSTIC N/A 8/15/2020    Procedure: ESOPHAGOGASTRODUODENOSCOPY (EGD) with biopsy;  Surgeon: Paxton Villalpando MD;  Location: [facility];  Service: Gastroenterology    KY PARTIAL EXCISION THYROID,UNILAT      Description: Thyroid Surgery Sub-Total Thyroidectomy;  Recorded: 03/24/2008;    REMV PILONIDAL LESION SIMPLE      Description: Pilonidal Cyst Resection;  Recorded: 03/24/2008;    TOTAL HIP ARTHROPLASTY Right      Family History   Problem Relation Age of Onset    Chronic Obstructive Pulmonary Disease Father         Social History     Socioeconomic History    Marital status:      Spouse name: Not on file    Number of children: Not on file    Years of education: Not on file    Highest education level: Not on file   Occupational History    Not on file   Tobacco Use    Smoking status: Former    Smokeless tobacco: Never   Substance and Sexual Activity    Alcohol use: No    Drug use: No    Sexual activity: Not on file   Other Topics Concern    Not on file   Social History Narrative    03/07/15 - The patient lives alone in his own home.     Social Determinants of Health     Financial Resource Strain: Not on file   Food Insecurity: Not on file   Transportation Needs: Not on file   Physical Activity: Not on file   Stress: Not on file   Social Connections: Not on file   Interpersonal Safety: Not on file   Housing  Stability: Not on file           Medications  Allergies   Current Outpatient Medications   Medication Sig Dispense Refill    acetaminophen (TYLENOL) 325 MG tablet Take 2 tablets (650 mg) by mouth every 6 hours as needed for mild pain or other (and adjunct with moderate or severe pain or per patient request) 20 tablet 0    atorvastatin (LIPITOR) 40 MG tablet Take 1 tablet (40 mg) by mouth at bedtime On hold due to Paxlovid.  Resume Atorvastatin on 10/30/23      bisacodyl (DULCOLAX) 10 MG suppository Place 1 suppository (10 mg) rectally daily as needed for constipation 10 suppository 0    cyanocobalamin (VITAMIN B-12) 1000 MCG tablet Take 1,000 mcg by mouth daily      docusate sodium (COLACE) 100 MG capsule Take 1 capsule (100 mg) by mouth 2 times daily as needed for constipation 30 capsule 0    Docusate Sodium (DOCU LIQUID PO) Place 5 drops into both ears daily as needed prior to irrigation for cerumen removal      dorzolamide-timolol (COSOPT) 2-0.5 % ophthalmic solution Place 1 drop Into the left eye 2 times daily 10 mL 12    erythromycin base (ERYTHROMYCIN OPHT) Place 5 mg into both eyes At Bedtime Instill 1 ribbon in Left Eye and 1 ribbon in Right Eye at bedtime. Ensure ointment is given after eye drops to ensure proper absorption.      furosemide (LASIX) 20 MG tablet Take 20 mg by mouth daily      latanoprost (XALATAN) 0.005 % ophthalmic solution Place 1 drop Into the left eye At Bedtime 2.5 mL 12    levothyroxine (SYNTHROID/LEVOTHROID) 25 MCG tablet Take 1 tablet (25 mcg) by mouth daily 30 tablet 0    omeprazole (PRILOSEC) 20 MG DR capsule Take 1 capsule (20 mg) by mouth daily 30 capsule 0    Propylene Glycol (SYSTANE COMPLETE OP) Place 1 drop into both eyes 2 times daily At 0600 and 1630      miconazole (MICATIN) 2 % external powder Apply topically 2 times daily Apply to groin/body folds for redness and cutaneous fungal infection prophylaxis. (Patient not taking: Reported on 12/14/2023) 90 g 0    WARFARIN  SODIUM PO Take by mouth See Admin Instructions 5 mg on Tuesday Thursday Saturday and Sunday  6 mg on Monday Wednesday and Friday (Patient not taking: Reported on 12/14/2023)       No Known Allergies       Lab Results    Chemistry/lipid CBC Cardiac Enzymes/BNP/TSH/INR   Recent Labs   Lab Test 12/24/18  0717   CHOL 95   HDL 37*   LDL 38   TRIG 100     Recent Labs   Lab Test 12/24/18  0717 10/18/17  1115 10/19/16  1153   LDL 38 41 53     Recent Labs   Lab Test 11/13/23  0542 11/03/23  0515 11/02/23  0620      < > 151*   POTASSIUM  --   --  3.8   CHLORIDE  --   --  82*   CO2  --   --  15*   GLC  --   --  91   BUN  --   --  11.0   CR  --   --  0.63*   GFRESTIMATED  --   --  87   GENO  --   --  6.9*    < > = values in this interval not displayed.     Recent Labs   Lab Test 11/02/23  0620 10/26/23  0556 10/24/23  0637   CR 0.63* 0.74 0.71     Recent Labs   Lab Test 10/23/18  1055 04/17/18  1026 10/18/17  1115   A1C 5.9 6.2* 6.1*          Recent Labs   Lab Test 10/26/23  0556   WBC 5.3   HGB 9.8*   HCT 30.6*   MCV 85        Recent Labs   Lab Test 10/26/23  0556 10/24/23  0637 10/23/23  0640   HGB 9.8* 10.3* 10.5*    Recent Labs   Lab Test 08/14/20  0724 08/14/20  0114 08/13/20  1936   TROPONINI 0.08 0.07 0.06     Recent Labs   Lab Test 06/05/23 2053 12/20/18  0221   BNP  --  81   NTBNPI 1,645  --      Recent Labs   Lab Test 10/23/23  0640   TSH 3.70     Recent Labs   Lab Test 12/11/23  0959 12/04/23  0522 11/27/23  0525   INR 1.79* 1.75* 2.14*          Medications  Allergies   Current Outpatient Medications   Medication Sig Dispense Refill    acetaminophen (TYLENOL) 325 MG tablet Take 2 tablets (650 mg) by mouth every 6 hours as needed for mild pain or other (and adjunct with moderate or severe pain or per patient request) 20 tablet 0    atorvastatin (LIPITOR) 40 MG tablet Take 1 tablet (40 mg) by mouth at bedtime On hold due to Paxlovid.  Resume Atorvastatin on 10/30/23      bisacodyl (DULCOLAX) 10 MG  suppository Place 1 suppository (10 mg) rectally daily as needed for constipation 10 suppository 0    cyanocobalamin (VITAMIN B-12) 1000 MCG tablet Take 1,000 mcg by mouth daily      docusate sodium (COLACE) 100 MG capsule Take 1 capsule (100 mg) by mouth 2 times daily as needed for constipation 30 capsule 0    Docusate Sodium (DOCU LIQUID PO) Place 5 drops into both ears daily as needed prior to irrigation for cerumen removal      dorzolamide-timolol (COSOPT) 2-0.5 % ophthalmic solution Place 1 drop Into the left eye 2 times daily 10 mL 12    erythromycin base (ERYTHROMYCIN OPHT) Place 5 mg into both eyes At Bedtime Instill 1 ribbon in Left Eye and 1 ribbon in Right Eye at bedtime. Ensure ointment is given after eye drops to ensure proper absorption.      furosemide (LASIX) 20 MG tablet Take 20 mg by mouth daily      latanoprost (XALATAN) 0.005 % ophthalmic solution Place 1 drop Into the left eye At Bedtime 2.5 mL 12    levothyroxine (SYNTHROID/LEVOTHROID) 25 MCG tablet Take 1 tablet (25 mcg) by mouth daily 30 tablet 0    omeprazole (PRILOSEC) 20 MG DR capsule Take 1 capsule (20 mg) by mouth daily 30 capsule 0    Propylene Glycol (SYSTANE COMPLETE OP) Place 1 drop into both eyes 2 times daily At 0600 and 1630      miconazole (MICATIN) 2 % external powder Apply topically 2 times daily Apply to groin/body folds for redness and cutaneous fungal infection prophylaxis. (Patient not taking: Reported on 12/14/2023) 90 g 0    WARFARIN SODIUM PO Take by mouth See Admin Instructions 5 mg on Tuesday Thursday Saturday and Sunday  6 mg on Monday Wednesday and Friday (Patient not taking: Reported on 12/14/2023)        No Known Allergies       Lab Results   Lab Results   Component Value Date     11/13/2023    CO2 15 11/02/2023    CO2 28 10/01/2020    BUN 11.0 11/02/2023    BUN 12 10/01/2020     Lab Results   Component Value Date    WBC 5.3 10/26/2023    HGB 9.8 10/26/2023    HCT 30.6 10/26/2023    MCV 85 10/26/2023    PLT  248 10/26/2023     Lab Results   Component Value Date    CHOL 95 12/24/2018    TRIG 100 12/24/2018    HDL 37 12/24/2018     Lab Results   Component Value Date    INR 1.79 12/11/2023     Lab Results   Component Value Date    BNP 81 12/20/2018     Lab Results   Component Value Date    TROPONINI 0.08 08/14/2020    TROPONINI 0.07 08/14/2020    TROPONINI 0.06 08/13/2020     Lab Results   Component Value Date    TSH 3.70 10/23/2023    TSH 4.10 06/02/2022                      Thank you for allowing me to participate in the care of your patient.      Sincerely,     Panchito Carrillo MD     [facility]  cc:   Zeeshan Driver MD  [address]  [place] [address]

## 2023-12-14 NOTE — PROGRESS NOTES
[facility] [address], [place] [address]   [email or web address]   OFFICE: [phone]            Impression and Plan     1. Atrial fibrillation (permanent) ventricular response is well-controlled based on recent device interrogations. Riley's JOSEILN?DS?-VASc  Score is at least 5 yielding an annual embolic risk of 7.2-10.0%.  Riley is maintained on warfarin therapy for CVA prophylaxis.     2.  Aortic stenosis.  This was felt moderate-severe in degree on echocardiogram 6 June 2023.  The mean gradient was documented at 25 mmHg with peak velocity of 2.9 m/s.  Calculated valve area 0.94-1.1 cm .  Dimensionless index is 2.8.  This would appear to be fairly stable.  Findings of aortic stenosis were discussed with daughters today.    3.  Mitral stenosis.  This was felt mild in degree an echocardiogram 6 June 2023     4. Hypertension. Blood pressure is fairly reasonable in the office today at 131/67 mmHg.  Plan to simply continue current management for now.     Plan on follow-up in one year. Patient will be followed intermittently through [facility] as well per protocol    35 minutes spent reviewing prior records (including documentation, laboratory studies, cardiac testing/imaging), interview with patient along with physical exam, planning, and subsequent documentation/crafting of note).           History of Present Illness    Once again I would like to thank you again for asking me to participate in the care of your patient, Riley Rodriguez.  As you know, but to reiterate for my own records, Riley Rodriguez is a 89 year old male with permanent atrial fibrillation. He also has a history of permanent pacemaker placement.     In follow-up today, patient is without cardiovascular complaint.  He despite his advanced age, he tries to exercise on a regular basis.  Denies chest pain.  Breathing is comfortable.  No subjective palpitations despite permanent atrial  fibrillation.  No lightheadedness.    Further review of systems is otherwise negative/noncontributory (medical record and 13 point review of systems reviewed as well and pertinent positives noted).         Cardiac Diagnostics      Echocardiogram 6 June 2023:  Normal left ventricular size and systolic performance with ejection fraction of 60 to 65%.  Moderate-severe aortic stenosis.  Mean gradient documented at 25 mmHg with peak velocity of 2.9 m/s.  Calculated valve area 0.94-1.1 cm .  Diet dimensionless index is 2.8.  Mild mitral stenosis.  Normal right ventricular size with mildly reduced right ventricular systolic performance.  Severe left atrial enlargement.  Mild-moderate right atrial enlargement.     Echocardiogram 14 October 2021:  Normal left ventricular size and systolic performance with ejection fraction of 55 to 60%.  Mild mitral stenosis.  Normal right ventricular size and systolic performance.  Severe left atrial enlargement.  Moderate right atrial enlargement.  Right ventricular systolic pressure relative to right true pressure is mildly increased.  Pulmonary artery pressure is estimated to be 35-40 mmHg plus right atrial pressure.    Echocardiogram 1 December 2017:  Normal left ventricular size and systolic performance with ejection fraction of 60%.  Mild mitral stenosis.  Trace aortic insufficiency.  Normal right ventricular size and systolic performance.  Severe left atrial enlargement.  Mild right atrial enlargement.  Right ventricular systolic pressure relative to right atrial pressure is mildly increased.  Pulmonary artery pressure estimate 35-40 mmHg plus right atrial pressure.  When compared to prior echocardiogram 10 September 2014, no significant change.    Echocardiogram 10 September 2014:  Normal left ventricular size and systolic performance with ejection fraction of 60%.  Mild concentric increased LV wall thickness.  Mild mitral stenosis.  Severe left atrial enlargement. Moderate right  atrial enlargement.    Echocardiogram 7 June 2011:  Normal LV size and function with ejection fraction of 75%.   No significant valvular heart disease identified.    Device interrogation 28 August 2023 ([employer] 1272 Assurity MRI device implanted 2 October 2020):  Encounter Type: Alert remote pacemaker transmission for VHR. Courtesy check.    Device: [employer].   Pacing % /Programmed:  87% at VVIR 60.  Lead(s): Stable.  Battery longevity: 8yrs, 7mo estimated.  Presenting: Ventricular pacing and sensing 60 bpm.  Atrial high rates: n/a.  Anticoagulant: Warfarin.  Ventricular High rates: since 15 August 2023; One ventricular high rate episode on 25 August 2023 11:31PM, RV lead only, but EGM suggests NSVT duration 7 seconds 165 bpm.  Comments: Normal device function.     Device interrogation 28 July 2022 ([employer] [facility] 1272 Assurity MRI device implanted 2 October 2020):  Encounter Type: routine remote pacemaker transmission.  Device: [employer] P[employer].  Pacing % /Programmed:  97% at VVIR 60.  Lead(s): stable.  Battery longevity: 9yrs, 4mo.  Presenting: ventricular pacing 62 bpm.  Anticoagulant: warfarin.  Ventricular arrhythmia: since 12 April 2022; none detected.  Device/Lead alerts: none.  Comments: Normal magnet and pacemaker function.     Device interrogation 27 August 2021 ([employer] [facility] 1272 Assurity MRI device implanted 2 October 2020):  Presenting rhythm: ventricular pacing, rate 60 bpm.  battery/lead status: stable  Arrhythmias: since last interrogation, no ventricular high rate episodes detected.   Anticoagulant: warfarin  Comments: normal magnet and pacemaker function.     Device interrogation 3 August 2021 ([employer] [facility] 1272 Assurity MRI device implanted 2 October 2020):  Type: routine remote pacemaker transmission.  Presenting rhythm: ventricular pacing, rate 60 bpm.  battery/lead status: stable  Arrhythmias: since 23 April 2021, no ventricular high rate episodes detected. No new  noise reversions.  Anticoagulant: warfarin  Comments: normal magnet and pacemaker function.     Device interrogation 15 August 2019 ([employer] [facility] [address] [place] XL SR device implanted 20 April 2009):  The rhythm appears to be atrial fibrillation with ventricular pacing.  Normal magnet and pacemaker function. Patient takes warfarin.           Physical Examination       /67 (BP Location: Right arm, Patient Position: Sitting, Cuff Size: Adult Large)   Pulse 60   Resp 18         Wt Readings from Last 3 Encounters:   12/12/23 83.6 kg (184 lb 3.2 oz)   11/06/23 85.5 kg (188 lb 9.6 oz)   11/01/23 84.8 kg (187 lb)       The patient is alert. Sclerae are anicteric. Mucosal membranes are moist. Jugular venous pressure is normal. No significant adenopathy/thyromegally appreciated. Lungs are clear with good expansion. On cardiovascular exam, the patient has a regular S1 and S2.  2/6 systolic murmur heard at right sternal border which is mid-to-late peaking.  Abdomen is soft and non-tender. Extremities reveal no clubbing, cyanosis.         Family History/Social History/Risk Factors   Patient does not smoke.  Family history reviewed, and family history includes Chronic Obstructive Pulmonary Disease in his father.          Medical History  Surgical History Family History Social History   Past Medical History:   Diagnosis Date    Atrial fibrillation (H)     On coumadin    Bell's palsy     right sided facial droop    CVA (cerebral vascular accident) (H)     Hypertension     Pacemaker     Urinary retention      Past Surgical History:   Procedure Laterality Date     TRANSURETHRAL ELEC-SURG PROSTATECTOM      Description: Transurethral Resection Of Prostate (TURP);  Recorded: 03/24/2008;    IR AORTIC ARCH 4 VESSEL ANGIOGRAM  1/1/2004    IR BLADDER SUPRAPUBIC CATHETER INSERTION  1/18/2019    IR MISCELLANEOUS PROCEDURE  1/1/2004    IR MISCELLANEOUS PROCEDURE  1/1/2004    IR MISCELLANEOUS PROCEDURE  1/1/2004    IR SUPRAPUBIC  CATHETER CHANGE  10/19/2023    IR SUPRAPUBIC CATHETER PLACMENT  1/18/2019    IR THORACIC AORTOGRAM  1/1/2004    IR VISCERAL ANGIOGRAM  1/1/2004    IR VISCERAL ANGIOGRAM  1/1/2004    IR VISCERAL ANGIOGRAM  1/1/2004    IR VISCERAL ANGIOGRAM  1/1/2004    IR VISCERAL ANGIOGRAM  1/1/2004    IR VISCERAL ANGIOGRAM  1/1/2004    IR VISCERAL ANGIOGRAM  1/1/2004    MT ESOPHAGOGASTRODUODENOSCOPY TRANSORAL DIAGNOSTIC N/A 8/15/2020    Procedure: ESOPHAGOGASTRODUODENOSCOPY (EGD) with biopsy;  Surgeon: Paxton Villalpando MD;  Location: [facility];  Service: Gastroenterology    MT PARTIAL EXCISION THYROID,UNILAT      Description: Thyroid Surgery Sub-Total Thyroidectomy;  Recorded: 03/24/2008;    REMV PILONIDAL LESION SIMPLE      Description: Pilonidal Cyst Resection;  Recorded: 03/24/2008;    TOTAL HIP ARTHROPLASTY Right      Family History   Problem Relation Age of Onset    Chronic Obstructive Pulmonary Disease Father         Social History     Socioeconomic History    Marital status:      Spouse name: Not on file    Number of children: Not on file    Years of education: Not on file    Highest education level: Not on file   Occupational History    Not on file   Tobacco Use    Smoking status: Former    Smokeless tobacco: Never   Substance and Sexual Activity    Alcohol use: No    Drug use: No    Sexual activity: Not on file   Other Topics Concern    Not on file   Social History Narrative    03/07/15 - The patient lives alone in his own home.     Social Determinants of Health     Financial Resource Strain: Not on file   Food Insecurity: Not on file   Transportation Needs: Not on file   Physical Activity: Not on file   Stress: Not on file   Social Connections: Not on file   Interpersonal Safety: Not on file   Housing Stability: Not on file           Medications  Allergies   Current Outpatient Medications   Medication Sig Dispense Refill    acetaminophen (TYLENOL) 325 MG tablet Take 2 tablets (650 mg) by mouth every 6 hours  as needed for mild pain or other (and adjunct with moderate or severe pain or per patient request) 20 tablet 0    atorvastatin (LIPITOR) 40 MG tablet Take 1 tablet (40 mg) by mouth at bedtime On hold due to Paxlovid.  Resume Atorvastatin on 10/30/23      bisacodyl (DULCOLAX) 10 MG suppository Place 1 suppository (10 mg) rectally daily as needed for constipation 10 suppository 0    cyanocobalamin (VITAMIN B-12) 1000 MCG tablet Take 1,000 mcg by mouth daily      docusate sodium (COLACE) 100 MG capsule Take 1 capsule (100 mg) by mouth 2 times daily as needed for constipation 30 capsule 0    Docusate Sodium (DOCU LIQUID PO) Place 5 drops into both ears daily as needed prior to irrigation for cerumen removal      dorzolamide-timolol (COSOPT) 2-0.5 % ophthalmic solution Place 1 drop Into the left eye 2 times daily 10 mL 12    erythromycin base (ERYTHROMYCIN OPHT) Place 5 mg into both eyes At Bedtime Instill 1 ribbon in Left Eye and 1 ribbon in Right Eye at bedtime. Ensure ointment is given after eye drops to ensure proper absorption.      furosemide (LASIX) 20 MG tablet Take 20 mg by mouth daily      latanoprost (XALATAN) 0.005 % ophthalmic solution Place 1 drop Into the left eye At Bedtime 2.5 mL 12    levothyroxine (SYNTHROID/LEVOTHROID) 25 MCG tablet Take 1 tablet (25 mcg) by mouth daily 30 tablet 0    omeprazole (PRILOSEC) 20 MG DR capsule Take 1 capsule (20 mg) by mouth daily 30 capsule 0    Propylene Glycol (SYSTANE COMPLETE OP) Place 1 drop into both eyes 2 times daily At 0600 and 1630      miconazole (MICATIN) 2 % external powder Apply topically 2 times daily Apply to groin/body folds for redness and cutaneous fungal infection prophylaxis. (Patient not taking: Reported on 12/14/2023) 90 g 0    WARFARIN SODIUM PO Take by mouth See Admin Instructions 5 mg on Tuesday Thursday Saturday and Sunday  6 mg on Monday Wednesday and Friday (Patient not taking: Reported on 12/14/2023)       No Known Allergies       Lab  Results    Chemistry/lipid CBC Cardiac Enzymes/BNP/TSH/INR   Recent Labs   Lab Test 12/24/18  0717   CHOL 95   HDL 37*   LDL 38   TRIG 100     Recent Labs   Lab Test 12/24/18  0717 10/18/17  1115 10/19/16  1153   LDL 38 41 53     Recent Labs   Lab Test 11/13/23  0542 11/03/23  0515 11/02/23  0620      < > 151*   POTASSIUM  --   --  3.8   CHLORIDE  --   --  82*   CO2  --   --  15*   GLC  --   --  91   BUN  --   --  11.0   CR  --   --  0.63*   GFRESTIMATED  --   --  87   GENO  --   --  6.9*    < > = values in this interval not displayed.     Recent Labs   Lab Test 11/02/23  0620 10/26/23  0556 10/24/23  0637   CR 0.63* 0.74 0.71     Recent Labs   Lab Test 10/23/18  1055 04/17/18  1026 10/18/17  1115   A1C 5.9 6.2* 6.1*          Recent Labs   Lab Test 10/26/23  0556   WBC 5.3   HGB 9.8*   HCT 30.6*   MCV 85        Recent Labs   Lab Test 10/26/23  0556 10/24/23  0637 10/23/23  0640   HGB 9.8* 10.3* 10.5*    Recent Labs   Lab Test 08/14/20  0724 08/14/20  0114 08/13/20  1936   TROPONINI 0.08 0.07 0.06     Recent Labs   Lab Test 06/05/23  2053 12/20/18  0221   BNP  --  81   NTBNPI 1,645  --      Recent Labs   Lab Test 10/23/23  0640   TSH 3.70     Recent Labs   Lab Test 12/11/23  0959 12/04/23  0522 11/27/23  0525   INR 1.79* 1.75* 2.14*          Medications  Allergies   Current Outpatient Medications   Medication Sig Dispense Refill    acetaminophen (TYLENOL) 325 MG tablet Take 2 tablets (650 mg) by mouth every 6 hours as needed for mild pain or other (and adjunct with moderate or severe pain or per patient request) 20 tablet 0    atorvastatin (LIPITOR) 40 MG tablet Take 1 tablet (40 mg) by mouth at bedtime On hold due to Paxlovid.  Resume Atorvastatin on 10/30/23      bisacodyl (DULCOLAX) 10 MG suppository Place 1 suppository (10 mg) rectally daily as needed for constipation 10 suppository 0    cyanocobalamin (VITAMIN B-12) 1000 MCG tablet Take 1,000 mcg by mouth daily      docusate sodium (COLACE) 100 MG  capsule Take 1 capsule (100 mg) by mouth 2 times daily as needed for constipation 30 capsule 0    Docusate Sodium (DOCU LIQUID PO) Place 5 drops into both ears daily as needed prior to irrigation for cerumen removal      dorzolamide-timolol (COSOPT) 2-0.5 % ophthalmic solution Place 1 drop Into the left eye 2 times daily 10 mL 12    erythromycin base (ERYTHROMYCIN OPHT) Place 5 mg into both eyes At Bedtime Instill 1 ribbon in Left Eye and 1 ribbon in Right Eye at bedtime. Ensure ointment is given after eye drops to ensure proper absorption.      furosemide (LASIX) 20 MG tablet Take 20 mg by mouth daily      latanoprost (XALATAN) 0.005 % ophthalmic solution Place 1 drop Into the left eye At Bedtime 2.5 mL 12    levothyroxine (SYNTHROID/LEVOTHROID) 25 MCG tablet Take 1 tablet (25 mcg) by mouth daily 30 tablet 0    omeprazole (PRILOSEC) 20 MG DR capsule Take 1 capsule (20 mg) by mouth daily 30 capsule 0    Propylene Glycol (SYSTANE COMPLETE OP) Place 1 drop into both eyes 2 times daily At 0600 and 1630      miconazole (MICATIN) 2 % external powder Apply topically 2 times daily Apply to groin/body folds for redness and cutaneous fungal infection prophylaxis. (Patient not taking: Reported on 12/14/2023) 90 g 0    WARFARIN SODIUM PO Take by mouth See Admin Instructions 5 mg on Tuesday Thursday Saturday and Sunday  6 mg on Monday Wednesday and Friday (Patient not taking: Reported on 12/14/2023)        No Known Allergies       Lab Results   Lab Results   Component Value Date     11/13/2023    CO2 15 11/02/2023    CO2 28 10/01/2020    BUN 11.0 11/02/2023    BUN 12 10/01/2020     Lab Results   Component Value Date    WBC 5.3 10/26/2023    HGB 9.8 10/26/2023    HCT 30.6 10/26/2023    MCV 85 10/26/2023     10/26/2023     Lab Results   Component Value Date    CHOL 95 12/24/2018    TRIG 100 12/24/2018    HDL 37 12/24/2018     Lab Results   Component Value Date    INR 1.79 12/11/2023     Lab Results   Component Value  Date    BNP 81 12/20/2018     Lab Results   Component Value Date    TROPONINI 0.08 08/14/2020    TROPONINI 0.07 08/14/2020    TROPONINI 0.06 08/13/2020     Lab Results   Component Value Date    TSH 3.70 10/23/2023    TSH 4.10 06/02/2022

## 2023-12-18 NOTE — PROGRESS NOTES
[facility] SERVICES    [place] Medical Record Number:  [id number]  Place of Service where encounter took place: [facility] () [[id number]]   CODE STATUS:   DNR / DNI    Chief Complaint:  Chief Complaint   Patient presents with    half-way Regulatory     LTC 12/12/2023.        HPI:   Riley is a 89 y.o. male seen for routine physician follow up in LTC at [facility]. He has hx of Afib on warfarin, prior stroke, BPH, HTN, [place] palsy, hypothyroidism, SP catheter. He was hospitalized 8/13/2020-8/19/2020 with significant anemia, hgb down to 6.2. He was worked up in the hospital found to have duodenal ulcer, treated appropriately. He was sent in to the hospital from nursing home on 6/5/2023 due to confusion and hypoxia. Treated for UTI, resp failure/CHF.       Today:  He was hospitalized in October 2023 with UTI, COVID, encephalopathy. COVID treated with Paxlovid. Hyponatremia with Na 126, improved to 132 by hospital discharge. Chronic stable conditions Afib on warfarin, HTN, suprapubic catheter, hx of stroke. Very [place], no new complaints on visit today. No concerns per nursing.       Past Medical History:  Past Medical History:   Diagnosis Date    Atrial fibrillation (H)     On coumadin    Bell's palsy     right sided facial droop    CVA (cerebral vascular accident) (H)     Hypertension     Pacemaker     Urinary retention        Medications:  Current Outpatient Medications   Medication Sig Dispense Refill    acetaminophen (TYLENOL) 325 MG tablet Take 2 tablets (650 mg) by mouth every 6 hours as needed for mild pain or other (and adjunct with moderate or severe pain or per patient request) 20 tablet 0    atorvastatin (LIPITOR) 40 MG tablet Take 1 tablet (40 mg) by mouth at bedtime On hold due to Paxlovid.  Resume Atorvastatin on 10/30/23      bisacodyl (DULCOLAX) 10 MG suppository Place 1 suppository (10 mg) rectally daily as needed for constipation 10 suppository 0     "cyanocobalamin (VITAMIN B-12) 1000 MCG tablet Take 1,000 mcg by mouth daily      docusate sodium (COLACE) 100 MG capsule Take 1 capsule (100 mg) by mouth 2 times daily as needed for constipation 30 capsule 0    Docusate Sodium (DOCU LIQUID PO) Place 5 drops into both ears daily as needed prior to irrigation for cerumen removal      dorzolamide-timolol (COSOPT) 2-0.5 % ophthalmic solution Place 1 drop Into the left eye 2 times daily 10 mL 12    erythromycin base (ERYTHROMYCIN OPHT) Place 5 mg into both eyes At Bedtime Instill 1 ribbon in Left Eye and 1 ribbon in Right Eye at bedtime. Ensure ointment is given after eye drops to ensure proper absorption.      furosemide (LASIX) 20 MG tablet Take 20 mg by mouth daily      latanoprost (XALATAN) 0.005 % ophthalmic solution Place 1 drop Into the left eye At Bedtime 2.5 mL 12    levothyroxine (SYNTHROID/LEVOTHROID) 25 MCG tablet Take 1 tablet (25 mcg) by mouth daily 30 tablet 0    miconazole (MICATIN) 2 % external powder Apply topically 2 times daily Apply to groin/body folds for redness and cutaneous fungal infection prophylaxis. (Patient not taking: Reported on 12/14/2023) 90 g 0    omeprazole (PRILOSEC) 20 MG DR capsule Take 1 capsule (20 mg) by mouth daily 30 capsule 0    Propylene Glycol (SYSTANE COMPLETE OP) Place 1 drop into both eyes 2 times daily At 0600 and 1630      WARFARIN SODIUM PO Take by mouth See Admin Instructions 5 mg on Tuesday Thursday Saturday and Sunday  6 mg on Monday Wednesday and Friday (Patient not taking: Reported on 12/14/2023)          Physical Exam:   General: Patient is alert, elderly male, no distress. [place].  Vitals: /64   Pulse 60   Temp 98.7  F (37.1  C)   Resp 18   Ht 1.778 m (5' 10\")   Wt 83.6 kg (184 lb 3.2 oz)   SpO2 99%   BMI 26.43 kg/m    HEENT: Head is NCAT. Nares negative. Oropharynx moist. Right facial droop, baseline.  Neck: No JVD.  Lungs: Non labored respirations.   : SP catheter.   Extremities: Mild LE swelling, " wears compression.  Musculoskeletal: Age related degen changes.   Skin: Warm and dry.   Psych: Mood is stable.       Labs:  Component      Latest Ref Rng 10/21/2023  7:53 AM 10/23/2023  6:40 AM 10/24/2023  6:37 AM 10/26/2023  5:56 AM   WBC      4.0 - 11.0 10e3/uL 8.0  7.4  6.2  5.3    RBC Count      4.40 - 5.90 10e6/uL 3.61 (L)  3.82 (L)  3.82 (L)  3.60 (L)    Hemoglobin      13.3 - 17.7 g/dL 9.8 (L)  10.5 (L)  10.3 (L)  9.8 (L)    Hematocrit      40.0 - 53.0 % 30.8 (L)  31.5 (L)  32.0 (L)  30.6 (L)    MCV      78 - 100 fL 85  83  84  85    MCH      26.5 - 33.0 pg 27.1  27.5  27.0  27.2    MCHC      31.5 - 36.5 g/dL 31.8  33.3  32.2  32.0    RDW      10.0 - 15.0 % 13.9  14.2  14.2  14.4    Platelet Count      150 - 450 10e3/uL 177  200  218  248      Component      Latest Ref Rng 11/3/2023  5:15 AM 11/6/2023  5:36 AM 11/13/2023  5:42 AM   Sodium      135 - 145 mmol/L 132 (L)  137  136         Assessment/Plan:  1. Afib. Anticoagulated with warfarin. Monitor and adjust per INR.   2. Hx of stroke. He is on atorvastatin.  3. CHF. Stable on lasix. Monitor weights, edema, clinical status.   4. Hypothyroidism. He is on replacement levothyroxine, continue.   5. SP catheter. Long term. UTI and hospitalizations June 2023, Oct 2023.   6. Hyponatremia. Labs as noted above, last Na wnl.       Electronically signed by: Nancy Fair MD

## 2023-12-18 NOTE — TELEPHONE ENCOUNTER
[facility] Message     ----- Message from Jac Pitt CNP sent at 12/18/2023 11:33 AM CST -----  Continue same dose; recheck 1 week; Recheck on 12/28 :)     Verbal order/direction given to: Deepti  Provider Giving Order:  EDWARD Jacobsen RN

## 2024-01-01 ENCOUNTER — NURSING HOME VISIT (OUTPATIENT)
Dept: GERIATRICS | Facility: CLINIC | Age: 89
End: 2024-01-01
Payer: COMMERCIAL

## 2024-01-01 ENCOUNTER — LAB REQUISITION (OUTPATIENT)
Dept: LAB | Facility: CLINIC | Age: 89
End: 2024-01-01
Payer: COMMERCIAL

## 2024-01-01 ENCOUNTER — DOCUMENTATION ONLY (OUTPATIENT)
Dept: GERIATRICS | Facility: CLINIC | Age: 89
End: 2024-01-01

## 2024-01-01 ENCOUNTER — TELEPHONE (OUTPATIENT)
Dept: GERIATRICS | Facility: CLINIC | Age: 89
End: 2024-01-01

## 2024-01-01 ENCOUNTER — TRANSFERRED RECORDS (OUTPATIENT)
Dept: HEALTH INFORMATION MANAGEMENT | Facility: CLINIC | Age: 89
End: 2024-01-01
Payer: COMMERCIAL

## 2024-01-01 ENCOUNTER — TELEPHONE (OUTPATIENT)
Dept: GERIATRICS | Facility: CLINIC | Age: 89
End: 2024-01-01
Payer: COMMERCIAL

## 2024-01-01 ENCOUNTER — DOCUMENTATION ONLY (OUTPATIENT)
Dept: GERIATRICS | Facility: CLINIC | Age: 89
End: 2024-01-01
Payer: COMMERCIAL

## 2024-01-01 ENCOUNTER — ANCILLARY PROCEDURE (OUTPATIENT)
Dept: CARDIOLOGY | Facility: CLINIC | Age: 89
End: 2024-01-01
Attending: INTERNAL MEDICINE
Payer: COMMERCIAL

## 2024-01-01 ENCOUNTER — TELEPHONE (OUTPATIENT)
Dept: CARDIOLOGY | Facility: CLINIC | Age: 89
End: 2024-01-01

## 2024-01-01 VITALS
HEIGHT: 70 IN | SYSTOLIC BLOOD PRESSURE: 111 MMHG | HEART RATE: 86 BPM | TEMPERATURE: 98.5 F | RESPIRATION RATE: 18 BRPM | DIASTOLIC BLOOD PRESSURE: 90 MMHG | OXYGEN SATURATION: 98 % | BODY MASS INDEX: 27.83 KG/M2 | WEIGHT: 194.4 LBS

## 2024-01-01 VITALS
OXYGEN SATURATION: 96 % | BODY MASS INDEX: 28.2 KG/M2 | RESPIRATION RATE: 18 BRPM | HEART RATE: 76 BPM | SYSTOLIC BLOOD PRESSURE: 138 MMHG | WEIGHT: 197 LBS | DIASTOLIC BLOOD PRESSURE: 92 MMHG | TEMPERATURE: 98.9 F | HEIGHT: 70 IN

## 2024-01-01 VITALS
TEMPERATURE: 98.7 F | OXYGEN SATURATION: 95 % | BODY MASS INDEX: 29.06 KG/M2 | HEART RATE: 66 BPM | RESPIRATION RATE: 18 BRPM | HEIGHT: 70 IN | WEIGHT: 203 LBS | DIASTOLIC BLOOD PRESSURE: 70 MMHG | SYSTOLIC BLOOD PRESSURE: 132 MMHG

## 2024-01-01 VITALS
TEMPERATURE: 98 F | HEART RATE: 62 BPM | SYSTOLIC BLOOD PRESSURE: 128 MMHG | HEIGHT: 70 IN | WEIGHT: 187 LBS | OXYGEN SATURATION: 99 % | RESPIRATION RATE: 18 BRPM | BODY MASS INDEX: 26.77 KG/M2 | DIASTOLIC BLOOD PRESSURE: 67 MMHG

## 2024-01-01 DIAGNOSIS — I48.91 UNSPECIFIED ATRIAL FIBRILLATION (H): ICD-10-CM

## 2024-01-01 DIAGNOSIS — I50.32 CHRONIC DIASTOLIC HEART FAILURE (H): Primary | ICD-10-CM

## 2024-01-01 DIAGNOSIS — R53.1 WEAKNESS: ICD-10-CM

## 2024-01-01 DIAGNOSIS — Z95.0 PACEMAKER: ICD-10-CM

## 2024-01-01 DIAGNOSIS — I50.33 ACUTE ON CHRONIC DIASTOLIC CONGESTIVE HEART FAILURE (H): ICD-10-CM

## 2024-01-01 DIAGNOSIS — E87.1 HYPO-OSMOLALITY AND HYPONATREMIA: ICD-10-CM

## 2024-01-01 DIAGNOSIS — I48.21 PERMANENT ATRIAL FIBRILLATION (H): ICD-10-CM

## 2024-01-01 DIAGNOSIS — I50.32 CHRONIC DIASTOLIC HEART FAILURE (H): ICD-10-CM

## 2024-01-01 DIAGNOSIS — E03.9 HYPOTHYROIDISM, UNSPECIFIED TYPE: ICD-10-CM

## 2024-01-01 DIAGNOSIS — L82.1 OTHER SEBORRHEIC KERATOSIS: Primary | ICD-10-CM

## 2024-01-01 DIAGNOSIS — E87.1 HYPONATREMIA: ICD-10-CM

## 2024-01-01 DIAGNOSIS — I49.5 SICK SINUS SYNDROME (H): ICD-10-CM

## 2024-01-01 DIAGNOSIS — R41.82 ALTERED MENTAL STATUS, UNSPECIFIED: ICD-10-CM

## 2024-01-01 DIAGNOSIS — Z86.73 HISTORY OF STROKE: ICD-10-CM

## 2024-01-01 DIAGNOSIS — I48.21 PERMANENT ATRIAL FIBRILLATION (H): Primary | ICD-10-CM

## 2024-01-01 LAB
ALBUMIN UR-MCNC: 30 MG/DL
ANION GAP SERPL CALCULATED.3IONS-SCNC: 11 MMOL/L (ref 7–15)
ANION GAP SERPL CALCULATED.3IONS-SCNC: 11 MMOL/L (ref 7–15)
ANION GAP SERPL CALCULATED.3IONS-SCNC: 12 MMOL/L (ref 7–15)
ANION GAP SERPL CALCULATED.3IONS-SCNC: 12 MMOL/L (ref 7–15)
APPEARANCE UR: ABNORMAL
BACTERIA UR CULT: ABNORMAL
BILIRUB UR QL STRIP: NEGATIVE
BUN SERPL-MCNC: 13.5 MG/DL (ref 8–23)
BUN SERPL-MCNC: 17.2 MG/DL (ref 8–23)
BUN SERPL-MCNC: 17.2 MG/DL (ref 8–23)
BUN SERPL-MCNC: 18.7 MG/DL (ref 8–23)
CALCIUM SERPL-MCNC: 7.9 MG/DL (ref 8.2–9.6)
CALCIUM SERPL-MCNC: 8 MG/DL (ref 8.2–9.6)
CALCIUM SERPL-MCNC: 8 MG/DL (ref 8.2–9.6)
CALCIUM SERPL-MCNC: 8.2 MG/DL (ref 8.2–9.6)
CHLORIDE SERPL-SCNC: 88 MMOL/L (ref 98–107)
CHLORIDE SERPL-SCNC: 89 MMOL/L (ref 98–107)
CHLORIDE SERPL-SCNC: 89 MMOL/L (ref 98–107)
CHLORIDE SERPL-SCNC: 90 MMOL/L (ref 98–107)
COLOR UR AUTO: YELLOW
CREAT SERPL-MCNC: 0.78 MG/DL (ref 0.67–1.17)
CREAT SERPL-MCNC: 0.85 MG/DL (ref 0.67–1.17)
CREAT SERPL-MCNC: 0.85 MG/DL (ref 0.67–1.17)
CREAT SERPL-MCNC: 0.9 MG/DL (ref 0.67–1.17)
DEPRECATED HCO3 PLAS-SCNC: 25 MMOL/L (ref 22–29)
DEPRECATED HCO3 PLAS-SCNC: 25 MMOL/L (ref 22–29)
DEPRECATED HCO3 PLAS-SCNC: 28 MMOL/L (ref 22–29)
DEPRECATED HCO3 PLAS-SCNC: 28 MMOL/L (ref 22–29)
EGFRCR SERPLBLD CKD-EPI 2021: 77 ML/MIN/1.73M2
EGFRCR SERPLBLD CKD-EPI 2021: 79 ML/MIN/1.73M2
EGFRCR SERPLBLD CKD-EPI 2021: 79 ML/MIN/1.73M2
EGFRCR SERPLBLD CKD-EPI 2021: 81 ML/MIN/1.73M2
GLUCOSE SERPL-MCNC: 111 MG/DL (ref 70–99)
GLUCOSE SERPL-MCNC: 119 MG/DL (ref 70–99)
GLUCOSE SERPL-MCNC: 119 MG/DL (ref 70–99)
GLUCOSE SERPL-MCNC: 128 MG/DL (ref 70–99)
GLUCOSE UR STRIP-MCNC: NEGATIVE MG/DL
HGB UR QL STRIP: ABNORMAL
INR PPP: 1.75 (ref 0.85–1.15)
INR PPP: 1.83 (ref 0.85–1.15)
INR PPP: 1.93 (ref 0.85–1.15)
INR PPP: 2.08 (ref 0.85–1.15)
INR PPP: 2.08 (ref 0.85–1.15)
INR PPP: 2.23 (ref 0.85–1.15)
INR PPP: 2.3 (ref 0.85–1.15)
INR PPP: 2.53 (ref 0.85–1.15)
INR PPP: 2.74 (ref 0.85–1.15)
INR PPP: 2.88 (ref 0.85–1.15)
INR PPP: 2.94 (ref 0.85–1.15)
INR PPP: 3.07 (ref 0.85–1.15)
INR PPP: 3.71 (ref 0.85–1.15)
INR PPP: 5.4 (ref 0.85–1.15)
INR PPP: 7.72 (ref 0.85–1.15)
KETONES UR STRIP-MCNC: NEGATIVE MG/DL
LEUKOCYTE ESTERASE UR QL STRIP: ABNORMAL
MDC_IDC_EPISODE_DTM: NORMAL
MDC_IDC_EPISODE_ID: NORMAL
MDC_IDC_EPISODE_TYPE: NORMAL
MDC_IDC_LEAD_CONNECTION_STATUS: NORMAL
MDC_IDC_LEAD_CONNECTION_STATUS: NORMAL
MDC_IDC_LEAD_IMPLANT_DT: NORMAL
MDC_IDC_LEAD_IMPLANT_DT: NORMAL
MDC_IDC_LEAD_LOCATION: NORMAL
MDC_IDC_LEAD_LOCATION: NORMAL
MDC_IDC_LEAD_LOCATION_DETAIL_1: NORMAL
MDC_IDC_LEAD_LOCATION_DETAIL_1: NORMAL
MDC_IDC_LEAD_MFG: NORMAL
MDC_IDC_LEAD_MFG: NORMAL
MDC_IDC_LEAD_MODEL: NORMAL
MDC_IDC_LEAD_MODEL: NORMAL
MDC_IDC_LEAD_POLARITY_TYPE: NORMAL
MDC_IDC_LEAD_POLARITY_TYPE: NORMAL
MDC_IDC_LEAD_SERIAL: NORMAL
MDC_IDC_LEAD_SERIAL: NORMAL
MDC_IDC_MSMT_BATTERY_DTM: NORMAL
MDC_IDC_MSMT_BATTERY_DTM: NORMAL
MDC_IDC_MSMT_BATTERY_REMAINING_LONGEVITY: 51 MO
MDC_IDC_MSMT_BATTERY_REMAINING_LONGEVITY: 55 MO
MDC_IDC_MSMT_BATTERY_REMAINING_PERCENTAGE: 71 %
MDC_IDC_MSMT_BATTERY_REMAINING_PERCENTAGE: 72 %
MDC_IDC_MSMT_BATTERY_RRT_TRIGGER: NORMAL
MDC_IDC_MSMT_BATTERY_RRT_TRIGGER: NORMAL
MDC_IDC_MSMT_BATTERY_STATUS: NORMAL
MDC_IDC_MSMT_BATTERY_STATUS: NORMAL
MDC_IDC_MSMT_BATTERY_VOLTAGE: 3.01 V
MDC_IDC_MSMT_BATTERY_VOLTAGE: 3.01 V
MDC_IDC_MSMT_LEADCHNL_RV_IMPEDANCE_VALUE: 310 OHM
MDC_IDC_MSMT_LEADCHNL_RV_IMPEDANCE_VALUE: 330 OHM
MDC_IDC_MSMT_LEADCHNL_RV_LEAD_CHANNEL_STATUS: NORMAL
MDC_IDC_MSMT_LEADCHNL_RV_LEAD_CHANNEL_STATUS: NORMAL
MDC_IDC_MSMT_LEADCHNL_RV_PACING_THRESHOLD_AMPLITUDE: 0.75 V
MDC_IDC_MSMT_LEADCHNL_RV_PACING_THRESHOLD_AMPLITUDE: 0.75 V
MDC_IDC_MSMT_LEADCHNL_RV_PACING_THRESHOLD_PULSEWIDTH: 0.5 MS
MDC_IDC_MSMT_LEADCHNL_RV_PACING_THRESHOLD_PULSEWIDTH: 0.5 MS
MDC_IDC_MSMT_LEADCHNL_RV_SENSING_INTR_AMPL: 11 MV
MDC_IDC_MSMT_LEADCHNL_RV_SENSING_INTR_AMPL: 12 MV
MDC_IDC_PG_IMPLANT_DTM: NORMAL
MDC_IDC_PG_IMPLANT_DTM: NORMAL
MDC_IDC_PG_MFG: NORMAL
MDC_IDC_PG_MFG: NORMAL
MDC_IDC_PG_MODEL: NORMAL
MDC_IDC_PG_MODEL: NORMAL
MDC_IDC_PG_SERIAL: NORMAL
MDC_IDC_PG_SERIAL: NORMAL
MDC_IDC_PG_TYPE: NORMAL
MDC_IDC_PG_TYPE: NORMAL
MDC_IDC_SESS_CLINIC_NAME: NORMAL
MDC_IDC_SESS_CLINIC_NAME: NORMAL
MDC_IDC_SESS_DTM: NORMAL
MDC_IDC_SESS_DTM: NORMAL
MDC_IDC_SESS_REPROGRAMMED: NO
MDC_IDC_SESS_REPROGRAMMED: NO
MDC_IDC_SESS_TYPE: NORMAL
MDC_IDC_SESS_TYPE: NORMAL
MDC_IDC_SET_BRADY_LOWRATE: 60 {BEATS}/MIN
MDC_IDC_SET_BRADY_LOWRATE: 60 {BEATS}/MIN
MDC_IDC_SET_BRADY_MAX_SENSOR_RATE: 100 {BEATS}/MIN
MDC_IDC_SET_BRADY_MAX_SENSOR_RATE: 100 {BEATS}/MIN
MDC_IDC_SET_BRADY_MODE: NORMAL
MDC_IDC_SET_BRADY_MODE: NORMAL
MDC_IDC_SET_LEADCHNL_RV_PACING_AMPLITUDE: 5 V
MDC_IDC_SET_LEADCHNL_RV_PACING_AMPLITUDE: 5 V
MDC_IDC_SET_LEADCHNL_RV_PACING_ANODE_ELECTRODE_1: NORMAL
MDC_IDC_SET_LEADCHNL_RV_PACING_ANODE_ELECTRODE_1: NORMAL
MDC_IDC_SET_LEADCHNL_RV_PACING_ANODE_LOCATION_1: NORMAL
MDC_IDC_SET_LEADCHNL_RV_PACING_ANODE_LOCATION_1: NORMAL
MDC_IDC_SET_LEADCHNL_RV_PACING_CAPTURE_MODE: NORMAL
MDC_IDC_SET_LEADCHNL_RV_PACING_CAPTURE_MODE: NORMAL
MDC_IDC_SET_LEADCHNL_RV_PACING_CATHODE_ELECTRODE_1: NORMAL
MDC_IDC_SET_LEADCHNL_RV_PACING_CATHODE_ELECTRODE_1: NORMAL
MDC_IDC_SET_LEADCHNL_RV_PACING_CATHODE_LOCATION_1: NORMAL
MDC_IDC_SET_LEADCHNL_RV_PACING_CATHODE_LOCATION_1: NORMAL
MDC_IDC_SET_LEADCHNL_RV_PACING_POLARITY: NORMAL
MDC_IDC_SET_LEADCHNL_RV_PACING_POLARITY: NORMAL
MDC_IDC_SET_LEADCHNL_RV_PACING_PULSEWIDTH: 0.5 MS
MDC_IDC_SET_LEADCHNL_RV_PACING_PULSEWIDTH: 0.5 MS
MDC_IDC_SET_LEADCHNL_RV_SENSING_ADAPTATION_MODE: NORMAL
MDC_IDC_SET_LEADCHNL_RV_SENSING_ADAPTATION_MODE: NORMAL
MDC_IDC_SET_LEADCHNL_RV_SENSING_ANODE_ELECTRODE_1: NORMAL
MDC_IDC_SET_LEADCHNL_RV_SENSING_ANODE_ELECTRODE_1: NORMAL
MDC_IDC_SET_LEADCHNL_RV_SENSING_ANODE_LOCATION_1: NORMAL
MDC_IDC_SET_LEADCHNL_RV_SENSING_ANODE_LOCATION_1: NORMAL
MDC_IDC_SET_LEADCHNL_RV_SENSING_CATHODE_ELECTRODE_1: NORMAL
MDC_IDC_SET_LEADCHNL_RV_SENSING_CATHODE_ELECTRODE_1: NORMAL
MDC_IDC_SET_LEADCHNL_RV_SENSING_CATHODE_LOCATION_1: NORMAL
MDC_IDC_SET_LEADCHNL_RV_SENSING_CATHODE_LOCATION_1: NORMAL
MDC_IDC_SET_LEADCHNL_RV_SENSING_POLARITY: NORMAL
MDC_IDC_SET_LEADCHNL_RV_SENSING_POLARITY: NORMAL
MDC_IDC_SET_LEADCHNL_RV_SENSING_SENSITIVITY: 2.5 MV
MDC_IDC_SET_LEADCHNL_RV_SENSING_SENSITIVITY: 2.5 MV
MDC_IDC_STAT_AT_DTM_END: NORMAL
MDC_IDC_STAT_AT_DTM_END: NORMAL
MDC_IDC_STAT_AT_DTM_START: NORMAL
MDC_IDC_STAT_AT_DTM_START: NORMAL
MDC_IDC_STAT_BRADY_DTM_END: NORMAL
MDC_IDC_STAT_BRADY_DTM_END: NORMAL
MDC_IDC_STAT_BRADY_DTM_START: NORMAL
MDC_IDC_STAT_BRADY_DTM_START: NORMAL
MDC_IDC_STAT_BRADY_RV_PERCENT_PACED: 30 %
MDC_IDC_STAT_BRADY_RV_PERCENT_PACED: 45 %
MDC_IDC_STAT_CRT_DTM_END: NORMAL
MDC_IDC_STAT_CRT_DTM_END: NORMAL
MDC_IDC_STAT_CRT_DTM_START: NORMAL
MDC_IDC_STAT_CRT_DTM_START: NORMAL
MDC_IDC_STAT_HEART_RATE_DTM_END: NORMAL
MDC_IDC_STAT_HEART_RATE_DTM_END: NORMAL
MDC_IDC_STAT_HEART_RATE_DTM_START: NORMAL
MDC_IDC_STAT_HEART_RATE_DTM_START: NORMAL
MDC_IDC_STAT_HEART_RATE_VENTRICULAR_MAX: 210 {BEATS}/MIN
MDC_IDC_STAT_HEART_RATE_VENTRICULAR_MAX: 240 {BEATS}/MIN
MDC_IDC_STAT_HEART_RATE_VENTRICULAR_MEAN: 72 {BEATS}/MIN
MDC_IDC_STAT_HEART_RATE_VENTRICULAR_MEAN: 78 {BEATS}/MIN
MDC_IDC_STAT_HEART_RATE_VENTRICULAR_MIN: 50 {BEATS}/MIN
MDC_IDC_STAT_HEART_RATE_VENTRICULAR_MIN: 50 {BEATS}/MIN
MUCOUS THREADS #/AREA URNS LPF: PRESENT /LPF
NITRATE UR QL: NEGATIVE
PH UR STRIP: 6 [PH] (ref 5–7)
POTASSIUM SERPL-SCNC: 2.9 MMOL/L (ref 3.4–5.3)
POTASSIUM SERPL-SCNC: 2.9 MMOL/L (ref 3.4–5.3)
POTASSIUM SERPL-SCNC: 3.5 MMOL/L (ref 3.4–5.3)
POTASSIUM SERPL-SCNC: 3.7 MMOL/L (ref 3.4–5.3)
RBC URINE: >182 /HPF
SODIUM SERPL-SCNC: 124 MMOL/L (ref 135–145)
SODIUM SERPL-SCNC: 125 MMOL/L (ref 135–145)
SODIUM SERPL-SCNC: 127 MMOL/L (ref 135–145)
SODIUM SERPL-SCNC: 128 MMOL/L (ref 135–145)
SODIUM SERPL-SCNC: 128 MMOL/L (ref 135–145)
SODIUM SERPL-SCNC: 131 MMOL/L (ref 135–145)
SODIUM SERPL-SCNC: 134 MMOL/L (ref 135–145)
SP GR UR STRIP: 1.02 (ref 1–1.03)
SQUAMOUS EPITHELIAL: 1 /HPF
UROBILINOGEN UR STRIP-MCNC: 4 MG/DL
WBC CLUMPS #/AREA URNS HPF: PRESENT /HPF
WBC URINE: >182 /HPF
YEAST #/AREA URNS HPF: ABNORMAL /HPF

## 2024-01-01 PROCEDURE — 36415 COLL VENOUS BLD VENIPUNCTURE: CPT

## 2024-01-01 PROCEDURE — 85610 PROTHROMBIN TIME: CPT | Mod: ORL | Performed by: NURSE PRACTITIONER

## 2024-01-01 PROCEDURE — 36415 COLL VENOUS BLD VENIPUNCTURE: CPT | Mod: ORL | Performed by: NURSE PRACTITIONER

## 2024-01-01 PROCEDURE — P9603 ONE-WAY ALLOW PRORATED MILES: HCPCS | Mod: ORL | Performed by: NURSE PRACTITIONER

## 2024-01-01 PROCEDURE — 99309 SBSQ NF CARE MODERATE MDM 30: CPT | Performed by: NURSE PRACTITIONER

## 2024-01-01 PROCEDURE — 36415 COLL VENOUS BLD VENIPUNCTURE: CPT | Performed by: NURSE PRACTITIONER

## 2024-01-01 PROCEDURE — 80048 BASIC METABOLIC PNL TOTAL CA: CPT | Mod: ORL

## 2024-01-01 PROCEDURE — 81001 URINALYSIS AUTO W/SCOPE: CPT | Mod: ORL | Performed by: NURSE PRACTITIONER

## 2024-01-01 PROCEDURE — 85610 PROTHROMBIN TIME: CPT | Mod: ORL

## 2024-01-01 PROCEDURE — P9603 ONE-WAY ALLOW PRORATED MILES: HCPCS | Mod: ORL

## 2024-01-01 PROCEDURE — 36415 COLL VENOUS BLD VENIPUNCTURE: CPT | Performed by: FAMILY MEDICINE

## 2024-01-01 PROCEDURE — 36415 COLL VENOUS BLD VENIPUNCTURE: CPT | Mod: ORL

## 2024-01-01 PROCEDURE — 84295 ASSAY OF SERUM SODIUM: CPT | Mod: ORL | Performed by: NURSE PRACTITIONER

## 2024-01-01 PROCEDURE — 80048 BASIC METABOLIC PNL TOTAL CA: CPT | Mod: ORL | Performed by: NURSE PRACTITIONER

## 2024-01-01 PROCEDURE — 99309 SBSQ NF CARE MODERATE MDM 30: CPT | Performed by: FAMILY MEDICINE

## 2024-01-01 PROCEDURE — 93296 REM INTERROG EVL PM/IDS: CPT | Performed by: INTERNAL MEDICINE

## 2024-01-01 PROCEDURE — P9604 ONE-WAY ALLOW PRORATED TRIP: HCPCS | Mod: ORL

## 2024-01-01 PROCEDURE — P9604 ONE-WAY ALLOW PRORATED TRIP: HCPCS | Mod: ORL | Performed by: NURSE PRACTITIONER

## 2024-01-01 PROCEDURE — 85610 PROTHROMBIN TIME: CPT | Mod: ORL | Performed by: FAMILY MEDICINE

## 2024-01-01 PROCEDURE — P9604 ONE-WAY ALLOW PRORATED TRIP: HCPCS | Mod: ORL | Performed by: FAMILY MEDICINE

## 2024-01-01 PROCEDURE — P9603 ONE-WAY ALLOW PRORATED MILES: HCPCS | Mod: ORL | Performed by: FAMILY MEDICINE

## 2024-01-01 PROCEDURE — 84295 ASSAY OF SERUM SODIUM: CPT | Mod: ORL

## 2024-01-01 PROCEDURE — 87086 URINE CULTURE/COLONY COUNT: CPT | Mod: ORL | Performed by: NURSE PRACTITIONER

## 2024-01-01 PROCEDURE — 84295 ASSAY OF SERUM SODIUM: CPT | Performed by: NURSE PRACTITIONER

## 2024-01-01 PROCEDURE — P9603 ONE-WAY ALLOW PRORATED MILES: HCPCS

## 2024-01-01 PROCEDURE — 87186 SC STD MICRODIL/AGAR DIL: CPT | Mod: ORL | Performed by: NURSE PRACTITIONER

## 2024-01-01 PROCEDURE — 93294 REM INTERROG EVL PM/LDLS PM: CPT | Performed by: INTERNAL MEDICINE

## 2024-01-01 PROCEDURE — 36415 COLL VENOUS BLD VENIPUNCTURE: CPT | Mod: ORL | Performed by: FAMILY MEDICINE

## 2024-01-01 PROCEDURE — P9604 ONE-WAY ALLOW PRORATED TRIP: HCPCS | Performed by: NURSE PRACTITIONER

## 2024-01-02 NOTE — TELEPHONE ENCOUNTER
[facility] Triage Nurse INR     Provider: EDWARD Jacobsen  Facility: [facility]  Facility Type:  LTC    Caller: Zakiya  Call Back Number: [phone]  Reason for call: INR  Diagnosis/Goal: A. Fib    Todays INR: 2.08  Last INR 1.71 (12/28), 4 mg po daily.     Heparin/Lovenox:  No  Currently on ABX?: No  Other interacting medication:  None  Missed or refused doses: No    Verbal Order/Direction given by Provider: Continue same Coumadin dose of 4 mg po daily and recheck INR on 1/5.    Provider Giving Order:  EDWARD Jacobsen    Verbal Order given to: Zakiya Jose RN

## 2024-01-05 NOTE — TELEPHONE ENCOUNTER
[facility] Message     ----- Message from Jac Pitt CNP sent at 1/5/2024 10:55 AM CST -----  Continue same dose 4mg daily, recheck 1 week on 1/12/24     Verbal order/direction given to: Jessenia  Provider Giving Order:  EDWARD Jacobsen RN

## 2024-01-12 NOTE — TELEPHONE ENCOUNTER
[facility] Message     ----- Message from Jac Pitt CNP sent at 1/12/2024  1:52 PM CST -----  Continue same dose 4mg daily, recheck 2 weeks on 1/26/24.     Verbal order/direction given to: Pat  Provider Giving Order:  EDWARD Jacobsen RN

## 2024-01-26 NOTE — TELEPHONE ENCOUNTER
[facility] Triage Nurse INR     Provider: EDWARD Jacobsen  Facility: [facility]  Facility Type:  LTC    Caller: Zakiya  Call Back Number: [phone]  Reason for call: INR  Diagnosis/Goal: A. Fib    Todays INR: 2.23  Last INR 2.08 on 1/12 - 4 mg daily    Heparin/Lovenox:  No  Currently on ABX?: No  Other interacting medication:  None  Missed or refused doses: No    Verbal Order/Direction given by Provider: Coumadin 4 mg PO daily. Check INR on 2/9/24.    Provider Giving Order:  EDWARD Jacobsen    Verbal Order given to: Zakiya Smyth RN

## 2024-02-06 NOTE — RESULT ENCOUNTER NOTE
Most stable INR ever. Continue current dose. Recheck 1 month. (I think this was done pre dental procedure) Yes

## 2024-02-23 NOTE — TELEPHONE ENCOUNTER
[facility] Geriatrics Triage Nurse INR     Provider: EDWARD Jacobsen  Facility: [facility]  Facility Type:  LTC    Caller: Zakiya  Call Back Number: [phone]  Reason for call: INR  Diagnosis/Goal: A. Fib    Todays INR: 1.75  Last INR   2/9 1.83 - 5mg F and 4mg AOD  1/26 2.23 - 4mg daily     Heparin/Lovenox:  No  Currently on ABX?: No  Other interacting medication:  None  Missed or refused doses: No    Verbal Order/Direction given by Provider:   - Coumadin 5mg MWF and 4mg AOD  - Recheck INR on 3/8/24    Provider Giving Order:  EDWARD Jacobsen    Verbal Order given to: Zakiya Pat RN

## 2024-02-28 NOTE — LETTER
2/28/2024        RE: Riley Rodriguez  [address]  [address]  [place] [address]        [facility] GERIATRICS  Chief Complaint   Patient presents with     [facility] Regulatory     [facility] Record Number:  [id number]  Place of Service where encounter took place:  [facility] () [[id number]]    HPI:    Riley Rodriguez  is a 89 year old  (4/15/1926), who is being seen today for  Regulatory visit. HPI information obtained from: facility chart records, facility staff, patient report and [facility] chart review.     Background: He has history of A. fib on warfarin, prior stroke, hypertension, hypothyroid, he has hx of Afib on warfarin, BPH with a Pierson catheter in place.  He has been residing in the long-term care for many years, last hospitalized in June 2023.  Has been on iron supplement since that time with recent hemoglobin 12.2.      June 2023: He was admitted to [facility] on 6/5 due to hypoxia in the nursing home down to low 80s on RA.  Chest x-ray with pulm edema. Found to have urosepsis and CHF exacerbation. Family declined transfer to ICU and preferred to treat conservatively and keep comfortable. IV lasix transitioned to PO; he weaned off supplemental o2 and now on RA breathing comfortably. Peak weight 208lbs. Today 180. He is also on 2 gram sodium diet and 2000ml FR which we will loosen up as able.   Sepsis thought s/s to catheter associated UTI. Completed antibiotics.  He remains on coumadin; monitoring INR.   He did have mild hyponatremia which improved with diuretics. Hypokalemia and hypomagnesemia also improved.     October 2023: She was rehospitalized at [facility] after experiencing weakness and poor appetite, found to have COVID-19 and completed Paxlovid, he was encephalopathic which cleared as infection improved.  Also with UTI secondary to suprapubic cath.  Hyponatremia to 126, he regulatorwas started on sodium tabs twice daily.  This was  possibly related to mild fluid overload versus poor p.o. intake as he had had a dental procedure the week prior and was experiencing pain from this.  He remains on warfarin for A-fib and history of CVA.      Today: Riley is seen for regulatory visit. He had a biopsy of R ear recently, awaiting results. Doing well. Denies concerns. No LE edema. Labs have been stable at last check. INR variable. Pleasant. Daughters very involved and come to facility frequently.       ALLERGIES: Patient has no known allergies.  PAST MEDICAL HISTORY:   Past Medical History:   Diagnosis Date     Atrial fibrillation (H)     On coumadin     Bell's palsy     right sided facial droop     CVA (cerebral vascular accident) (H)      Hypertension      Pacemaker      Urinary retention       PAST SURGICAL HISTORY:  has a past surgical history that includes IR Thoracic Aortogram (1/1/2004); IR Visceral Angiogram (1/1/2004); IR Visceral Angiogram (1/1/2004); IR Visceral Angiogram (1/1/2004); IR Miscellaneous Procedure (1/1/2004); IR Visceral Angiogram (1/1/2004); IR Miscellaneous Procedure (1/1/2004); IR Visceral Angiogram (1/1/2004); IR Visceral Angiogram (1/1/2004); IR Miscellaneous Procedure (1/1/2004); IR Visceral Angiogram (1/1/2004); IR Aortic Arch 4 Vessel Angiogram (1/1/2004); IR Suprapubic Catheter Placment (1/18/2019); remv pilonidal lesion simple; Pr Partial Excision Thyroid,Unilat; TRANSURETHRAL ELEC-SURG PROSTATECTOM; Total Hip Arthroplasty (Right); Ir Bladder Suprapubic Catheter Insertion (1/18/2019); Pr Esophagogastroduodenoscopy Transoral Diagnostic (N/A, 8/15/2020); and IR Suprapubic Catheter Change (10/19/2023).  IMMUNIZATIONS:  Immunization History   Administered Date(s) Administered     COVID-19 Bivalent 12+ (Pfizer) 09/23/2022     COVID-19 Monovalent 18+ (Moderna) 12/30/2020, 01/27/2021, 11/26/2021, 05/09/2022     DT (PEDS <7y) 10/11/2004     Flu, Unspecified 10/12/2007, 10/13/2019, 10/24/2020     Influenza (High Dose) 3 valent  vaccine 10/01/2010, 09/29/2015, 10/19/2016, 09/12/2017, 10/23/2018, 10/12/2021     Influenza (IIV3) PF 10/11/2004, 11/02/2005, 10/30/2006, 10/12/2007, 11/10/2008, 09/16/2009, 09/28/2011, 09/19/2012, 09/20/2013     Influenza Vaccine 18-64 (Flublok) 10/10/2014     Influenza Vaccine >6 months,quad, PF 10/10/2014, 10/10/2014     Influenza Vaccine, 6+MO IM (QUADRIVALENT W/PRESERVATIVES) 11/10/2008, 09/16/2009, 09/28/2011, 09/19/2012, 09/20/2013     Pneumo Conj 13-V (2010&after) 04/10/2015     Pneumococcal 20 valent Conjugate (Prevnar 20) 02/01/2024     Pneumococcal 23 valent 11/01/2001     TDAP (Adacel,Boostrix) 10/19/2012     TDAP Vaccine (Boostrix) 10/19/2012     Td (Adult), Adsorbed 10/11/2004     Td,adult,historic,unspecified 10/11/2004     Zoster vaccine, live 10/27/2015     Above immunizations pulled from [facility]. [facility] and facility records also reconciled. Outstanding information sent to  to update [facility].  Future immunizations are not needed at this point as all recommended immunizations are up to date.       Current Outpatient Medications:      acetaminophen (TYLENOL) 325 MG tablet, Take 2 tablets (650 mg) by mouth every 6 hours as needed for mild pain or other (and adjunct with moderate or severe pain or per patient request), Disp: 20 tablet, Rfl: 0     atorvastatin (LIPITOR) 40 MG tablet, Take 1 tablet (40 mg) by mouth at bedtime On hold due to Paxlovid.  Resume Atorvastatin on 10/30/23, Disp: , Rfl:      bisacodyl (DULCOLAX) 10 MG suppository, Place 1 suppository (10 mg) rectally daily as needed for constipation, Disp: 10 suppository, Rfl: 0     cyanocobalamin (VITAMIN B-12) 1000 MCG tablet, Take 1,000 mcg by mouth daily, Disp: , Rfl:      docusate sodium (COLACE) 100 MG capsule, Take 1 capsule (100 mg) by mouth 2 times daily as needed for constipation, Disp: 30 capsule, Rfl: 0     Docusate Sodium (DOCU LIQUID PO), Place 5 drops into both ears daily as needed prior to irrigation  "for cerumen removal, Disp: , Rfl:      dorzolamide-timolol (COSOPT) 2-0.5 % ophthalmic solution, Place 1 drop Into the left eye 2 times daily, Disp: 10 mL, Rfl: 12     erythromycin base (ERYTHROMYCIN OPHT), Place 5 mg into both eyes At Bedtime Instill 1 ribbon in Left Eye and 1 ribbon in Right Eye at bedtime. Ensure ointment is given after eye drops to ensure proper absorption., Disp: , Rfl:      furosemide (LASIX) 20 MG tablet, Take 20 mg by mouth daily, Disp: , Rfl:      latanoprost (XALATAN) 0.005 % ophthalmic solution, Place 1 drop Into the left eye At Bedtime, Disp: 2.5 mL, Rfl: 12     levothyroxine (SYNTHROID/LEVOTHROID) 25 MCG tablet, Take 1 tablet (25 mcg) by mouth daily, Disp: 30 tablet, Rfl: 0     miconazole (MICATIN) 2 % external powder, Apply topically 2 times daily Apply to groin/body folds for redness and cutaneous fungal infection prophylaxis. (Patient not taking: Reported on 12/14/2023), Disp: 90 g, Rfl: 0     omeprazole (PRILOSEC) 20 MG DR capsule, Take 1 capsule (20 mg) by mouth daily, Disp: 30 capsule, Rfl: 0     Propylene Glycol (SYSTANE COMPLETE OP), Place 1 drop into both eyes 2 times daily At 0600 and 1630, Disp: , Rfl:      WARFARIN SODIUM PO, Take by mouth See Admin Instructions 5 mg on Tuesday Thursday Saturday and Sunday 6 mg on Monday Wednesday and Friday (Patient not taking: Reported on 12/14/2023), Disp: , Rfl:        Post Medication Reconciliation Status:      ROS:  4 point ROS including Respiratory, CV, GI and , other than that noted in the HPI,  is negative    Vitals:  /67   Pulse 62   Temp 98  F (36.7  C)   Resp 18   Ht 1.778 m (5' 10\")   Wt 84.8 kg (187 lb)   SpO2 99%   BMI 26.83 kg/m   Body mass index is 26.83 kg/m .  Exam:  Physical Exam : stable  General appearance: alert, appears stated age and cooperative.   Head: Normocephalic, without obvious abnormality, atraumatic, Eyes: sclera anicteric.  Lungs: respirations unlabored, LSCTA; no wheezing or rales. "   Cardiovascular: regular rate.    Extremities: extremities normal, atraumatic, +1 edema bilaterally.  Skin: Skin color, texture, turgor normal. No rashes or lesions  Neurologic: oriented. No focal deficits.   Psych: interacts well with caregivers, exhibits logical thought processes and connections, pleasant    Lab/Diagnostic data:     Most Recent 3 CBC's:  Recent Labs   Lab Test 10/26/23  0556 10/24/23  0637 10/23/23  0640   WBC 5.3 6.2 7.4   HGB 9.8* 10.3* 10.5*   MCV 85 84 83    218 200     Most Recent 3 BMP's:  Recent Labs   Lab Test 12/18/23  0542 11/13/23  0542 11/06/23  0536 11/03/23  0515 11/02/23  0620 10/26/23  0556 10/24/23  0637    136 137   < > 151* 131* 132*   POTASSIUM  --   --   --   --  3.8 3.8 4.1   CHLORIDE  --   --   --   --  82* 98 97*   CO2  --   --   --   --  15* 25 27   BUN  --   --   --   --  11.0 9.5 8.5   CR  --   --   --   --  0.63* 0.74 0.71   ANIONGAP  --   --   --   --  54* 8 8   GENO  --   --   --   --  6.9* 8.1* 7.8*   GLC  --   --   --   --  91 93 104*    < > = values in this interval not displayed.       ASSESSMENT/PLAN      (I50.33) Acute on chronic diastolic congestive heart failure (H)  (primary encounter diagnosis)  Comment: Weight peak 208 in hospital. Has been stable. No edema.   Plan:   -Daily weights: 187lb today.   -2 gram NA diet.  -Lasix 20 mg daily.    (L82.1) Seborrheic Keratosis  (primary encounter diagnosis)  Comment: Daughter brought to derm for biopsy on R ear; healing. Denies pain.   Plan:   -Await biopsy results.     (I48.91) Atrial fibrillation, unspecified type (H)  Comment: INR has been variable.   Plan:    -Continue current dose and recheck due next week.     Chronic conditions:     (E87.1) Hyponatremia  Comment: Sodium 136 today.   Plan:   -Continue FR to 2000cc daily.   -Encourage p.o. intake.    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    (E61.1) Iron deficiency  Comment: Hemoglobin in June 11.3; mcv wnl.   Plan: Continue iron supplementation.  Continue omeprazole.  -recheck cbc 6 months.    (I10) Hypertension  Comment: Most recent blood pressure stable.  Plan: No current medications.    (E03.9) Hypothyroidism, unspecified type  Comment: Stable  Plan:     TSH   Date Value Ref Range Status   10/23/2023 3.70 0.30 - 4.20 uIU/mL Final   06/02/2022 4.10 0.30 - 5.00 uIU/mL Final       Electronically signed by:  Jac Pitt, SACHA   ,.      Sincerely,        Jac Pitt, CNP

## 2024-02-28 NOTE — PROGRESS NOTES
[facility] GERIATRICS  Chief Complaint   Patient presents with    shelter Regulatory     [facility] Medical Record Number:  [id number]  Place of Service where encounter took place:  [facility] () [[id number]]    HPI:    Riley Rodriguez  is a 89 year old  (4/15/1926), who is being seen today for  Regulatory visit. HPI information obtained from: facility chart records, facility staff, patient report and [facility] chart review.     Background: He has history of A. fib on warfarin, prior stroke, hypertension, hypothyroid, he has hx of Afib on warfarin, BPH with a Pierson catheter in place.  He has been residing in the long-term care for many years, last hospitalized in June 2023.  Has been on iron supplement since that time with recent hemoglobin 12.2.      June 2023: He was admitted to [facility] on 6/5 due to hypoxia in the nursing home down to low 80s on RA.  Chest x-ray with pulm edema. Found to have urosepsis and CHF exacerbation. Family declined transfer to ICU and preferred to treat conservatively and keep comfortable. IV lasix transitioned to PO; he weaned off supplemental o2 and now on RA breathing comfortably. Peak weight 208lbs. Today 180. He is also on 2 gram sodium diet and 2000ml FR which we will loosen up as able.   Sepsis thought s/s to catheter associated UTI. Completed antibiotics.  He remains on coumadin; monitoring INR.   He did have mild hyponatremia which improved with diuretics. Hypokalemia and hypomagnesemia also improved.     October 2023: She was rehospitalized at [facility] after experiencing weakness and poor appetite, found to have COVID-19 and completed Paxlovid, he was encephalopathic which cleared as infection improved.  Also with UTI secondary to suprapubic cath.  Hyponatremia to 126, he regulatorwas started on sodium tabs twice daily.  This was possibly related to mild fluid overload versus poor p.o. intake as he had had a dental procedure the  week prior and was experiencing pain from this.  He remains on warfarin for A-fib and history of CVA.      Today: Riley is seen for regulatory visit. He had a biopsy of R ear recently, awaiting results. Doing well. Denies concerns. No LE edema. Labs have been stable at last check. INR variable. Pleasant. Daughters very involved and come to facility frequently.       ALLERGIES: Patient has no known allergies.  PAST MEDICAL HISTORY:   Past Medical History:   Diagnosis Date    Atrial fibrillation (H)     On coumadin    Bell's palsy     right sided facial droop    CVA (cerebral vascular accident) (H)     Hypertension     Pacemaker     Urinary retention       PAST SURGICAL HISTORY:  has a past surgical history that includes IR Thoracic Aortogram (1/1/2004); IR Visceral Angiogram (1/1/2004); IR Visceral Angiogram (1/1/2004); IR Visceral Angiogram (1/1/2004); IR Miscellaneous Procedure (1/1/2004); IR Visceral Angiogram (1/1/2004); IR Miscellaneous Procedure (1/1/2004); IR Visceral Angiogram (1/1/2004); IR Visceral Angiogram (1/1/2004); IR Miscellaneous Procedure (1/1/2004); IR Visceral Angiogram (1/1/2004); IR Aortic Arch 4 Vessel Angiogram (1/1/2004); IR Suprapubic Catheter Placment (1/18/2019); remv pilonidal lesion simple; Pr Partial Excision Thyroid,Unilat; TRANSURETHRAL ELEC-SURG PROSTATECTOM; Total Hip Arthroplasty (Right); Ir Bladder Suprapubic Catheter Insertion (1/18/2019); Pr Esophagogastroduodenoscopy Transoral Diagnostic (N/A, 8/15/2020); and IR Suprapubic Catheter Change (10/19/2023).  IMMUNIZATIONS:  Immunization History   Administered Date(s) Administered    COVID-19 Bivalent 12+ (Pfizer) 09/23/2022    COVID-19 Monovalent 18+ (Moderna) 12/30/2020, 01/27/2021, 11/26/2021, 05/09/2022    DT (PEDS <7y) 10/11/2004    Flu, Unspecified 10/12/2007, 10/13/2019, 10/24/2020    Influenza (High Dose) 3 valent vaccine 10/01/2010, 09/29/2015, 10/19/2016, 09/12/2017, 10/23/2018, 10/12/2021    Influenza (IIV3) PF  10/11/2004, 11/02/2005, 10/30/2006, 10/12/2007, 11/10/2008, 09/16/2009, 09/28/2011, 09/19/2012, 09/20/2013    Influenza Vaccine 18-64 (Flublok) 10/10/2014    Influenza Vaccine >6 months,quad, PF 10/10/2014, 10/10/2014    Influenza Vaccine, 6+MO IM (QUADRIVALENT W/PRESERVATIVES) 11/10/2008, 09/16/2009, 09/28/2011, 09/19/2012, 09/20/2013    Pneumo Conj 13-V (2010&after) 04/10/2015    Pneumococcal 20 valent Conjugate (Prevnar 20) 02/01/2024    Pneumococcal 23 valent 11/01/2001    TDAP (Adacel,Boostrix) 10/19/2012    TDAP Vaccine (Boostrix) 10/19/2012    Td (Adult), Adsorbed 10/11/2004    Td,adult,historic,unspecified 10/11/2004    Zoster vaccine, live 10/27/2015     Above immunizations pulled from [facility]. [facility] and facility records also reconciled. Outstanding information sent to  to update [facility].  Future immunizations are not needed at this point as all recommended immunizations are up to date.       Current Outpatient Medications:     acetaminophen (TYLENOL) 325 MG tablet, Take 2 tablets (650 mg) by mouth every 6 hours as needed for mild pain or other (and adjunct with moderate or severe pain or per patient request), Disp: 20 tablet, Rfl: 0    atorvastatin (LIPITOR) 40 MG tablet, Take 1 tablet (40 mg) by mouth at bedtime On hold due to Paxlovid.  Resume Atorvastatin on 10/30/23, Disp: , Rfl:     bisacodyl (DULCOLAX) 10 MG suppository, Place 1 suppository (10 mg) rectally daily as needed for constipation, Disp: 10 suppository, Rfl: 0    cyanocobalamin (VITAMIN B-12) 1000 MCG tablet, Take 1,000 mcg by mouth daily, Disp: , Rfl:     docusate sodium (COLACE) 100 MG capsule, Take 1 capsule (100 mg) by mouth 2 times daily as needed for constipation, Disp: 30 capsule, Rfl: 0    Docusate Sodium (DOCU LIQUID PO), Place 5 drops into both ears daily as needed prior to irrigation for cerumen removal, Disp: , Rfl:     dorzolamide-timolol (COSOPT) 2-0.5 % ophthalmic solution, Place 1 drop Into the left  "eye 2 times daily, Disp: 10 mL, Rfl: 12    erythromycin base (ERYTHROMYCIN OPHT), Place 5 mg into both eyes At Bedtime Instill 1 ribbon in Left Eye and 1 ribbon in Right Eye at bedtime. Ensure ointment is given after eye drops to ensure proper absorption., Disp: , Rfl:     furosemide (LASIX) 20 MG tablet, Take 20 mg by mouth daily, Disp: , Rfl:     latanoprost (XALATAN) 0.005 % ophthalmic solution, Place 1 drop Into the left eye At Bedtime, Disp: 2.5 mL, Rfl: 12    levothyroxine (SYNTHROID/LEVOTHROID) 25 MCG tablet, Take 1 tablet (25 mcg) by mouth daily, Disp: 30 tablet, Rfl: 0    miconazole (MICATIN) 2 % external powder, Apply topically 2 times daily Apply to groin/body folds for redness and cutaneous fungal infection prophylaxis. (Patient not taking: Reported on 12/14/2023), Disp: 90 g, Rfl: 0    omeprazole (PRILOSEC) 20 MG DR capsule, Take 1 capsule (20 mg) by mouth daily, Disp: 30 capsule, Rfl: 0    Propylene Glycol (SYSTANE COMPLETE OP), Place 1 drop into both eyes 2 times daily At 0600 and 1630, Disp: , Rfl:     WARFARIN SODIUM PO, Take by mouth See Admin Instructions 5 mg on Tuesday Thursday Saturday and Sunday 6 mg on Monday Wednesday and Friday (Patient not taking: Reported on 12/14/2023), Disp: , Rfl:        Post Medication Reconciliation Status:      ROS:  4 point ROS including Respiratory, CV, GI and , other than that noted in the HPI,  is negative    Vitals:  /67   Pulse 62   Temp 98  F (36.7  C)   Resp 18   Ht 1.778 m (5' 10\")   Wt 84.8 kg (187 lb)   SpO2 99%   BMI 26.83 kg/m   Body mass index is 26.83 kg/m .  Exam:  Physical Exam : stable  General appearance: alert, appears stated age and cooperative.   Head: Normocephalic, without obvious abnormality, atraumatic, Eyes: sclera anicteric.  Lungs: respirations unlabored, LSCTA; no wheezing or rales.   Cardiovascular: regular rate.    Extremities: extremities normal, atraumatic, +1 edema bilaterally.  Skin: Skin color, texture, turgor " normal. No rashes or lesions  Neurologic: oriented. No focal deficits.   Psych: interacts well with caregivers, exhibits logical thought processes and connections, pleasant    Lab/Diagnostic data:     Most Recent 3 CBC's:  Recent Labs   Lab Test 10/26/23  0556 10/24/23  0637 10/23/23  0640   WBC 5.3 6.2 7.4   HGB 9.8* 10.3* 10.5*   MCV 85 84 83    218 200     Most Recent 3 BMP's:  Recent Labs   Lab Test 12/18/23  0542 11/13/23  0542 11/06/23  0536 11/03/23  0515 11/02/23  0620 10/26/23  0556 10/24/23  0637    136 137   < > 151* 131* 132*   POTASSIUM  --   --   --   --  3.8 3.8 4.1   CHLORIDE  --   --   --   --  82* 98 97*   CO2  --   --   --   --  15* 25 27   BUN  --   --   --   --  11.0 9.5 8.5   CR  --   --   --   --  0.63* 0.74 0.71   ANIONGAP  --   --   --   --  54* 8 8   GENO  --   --   --   --  6.9* 8.1* 7.8*   GLC  --   --   --   --  91 93 104*    < > = values in this interval not displayed.       ASSESSMENT/PLAN      (I50.33) Acute on chronic diastolic congestive heart failure (H)  (primary encounter diagnosis)  Comment: Weight peak 208 in hospital. Has been stable. No edema.   Plan:   -Daily weights: 187lb today.   -2 gram NA diet.  -Lasix 20 mg daily.    (L82.1) Seborrheic Keratosis  (primary encounter diagnosis)  Comment: Daughter brought to derm for biopsy on R ear; healing. Denies pain.   Plan:   -Await biopsy results.     (I48.91) Atrial fibrillation, unspecified type (H)  Comment: INR has been variable.   Plan:    -Continue current dose and recheck due next week.     Chronic conditions:     (E87.1) Hyponatremia  Comment: Sodium 136 today.   Plan:   -Continue FR to 2000cc daily.   -Encourage p.o. intake.    (I63.532) Acute ischemic left PCA stroke (H)  (primary encounter diagnosis)  (I63.89) Cerebral infarction due to other mechanism (H)  (primary encounter diagnosis)  Plan: Continue atorvastatin.    (E61.1) Iron deficiency  Comment: Hemoglobin in June 11.3; mcv wnl.   Plan: Continue iron  supplementation.  Continue omeprazole.  -recheck cbc 6 months.    (I10) Hypertension  Comment: Most recent blood pressure stable.  Plan: No current medications.    (E03.9) Hypothyroidism, unspecified type  Comment: Stable  Plan:     TSH   Date Value Ref Range Status   10/23/2023 3.70 0.30 - 4.20 uIU/mL Final   06/02/2022 4.10 0.30 - 5.00 uIU/mL Final       Electronically signed by:  Jac Pitt, SACHA   ,.

## 2024-03-15 NOTE — TELEPHONE ENCOUNTER
[facility]iatric[facility] Triage Nurse INR     Provider: Nancy Fair MD  Facility: [facility] Type:  LTC    Caller: Zakiya   Call Back Number: [phone]  Reason for call: INR  Diagnosis/Goal: A. Fib    Todays INR: 2.53  Last INR 3/8  2.30-- 5mg M, W, F and 4mg AOD.   2/23-- 1.75  5mg M, W, F and 4mg AOD.    Heparin/Lovenox:  No  Currently on ABX?: No  Other interacting medication:  None  Missed or refused doses: No    Verbal Order/Direction given by Provider: Cont 5mg M, W, F and  4mg AOD. Next INR 4/12    Provider Giving Order:  Nancy Fair MD    Verbal Order given to: Zakiya Rosario RN

## 2024-03-26 NOTE — TELEPHONE ENCOUNTER
[facility] Geriatrics Lab Note     Provider: EDWARD Jacobsen  Facility: [facility] Facility Type:  LTC    No Known Allergies    Labs Reviewed by provider: Sodium     Verbal Order/Direction given by Provider: Recheck Sodium level in 1 month    Provider giving Order:  EDWARD Jacobsen    Verbal Order given to: Maria Fernanda Smyth RN

## 2024-04-24 NOTE — TELEPHONE ENCOUNTER
[facility] Geriatrics Lab Note     Provider: EDWARD Jacobsen  Facility: [facility] Facility Type:  LTC    No Known Allergies    Labs Reviewed by provider: Sodium     Verbal Order/Direction given by Provider: Recheck sodium level on 5/8/24.    Provider giving Order:  EDWARD Jacobsen    Verbal Order given to: [phone]    Ruth Smyth RN

## 2024-04-24 NOTE — TELEPHONE ENCOUNTER
[facility] Geriatrics Lab Note     Provider: EDWARD Jacobsen  Facility: [facility] Facility Type:  LTC    No Known Allergies    Labs Reviewed by provider: sodium---from 4/23/24     Verbal Order/Direction given by Provider: Recheck sodium level on 5/8/24.      Provider giving Order:  EDWARD Jacobsen    Verbal Order given to: Zakiya([phone])    Nba Ramirez RN

## 2024-04-25 NOTE — LETTER
4/25/2024        RE: Riley Rodriguez  [address]  [address]  [place] [address]        M [facility] GERIATRIC SERVICES    [place] Medical Record Number:  [id number]  Place of Service where encounter took place: [facility] () [[id number]]   CODE STATUS:   DNR / DNI    Chief Complaint:  Chief Complaint   Patient presents with     care home Regulatory     LTC 4/25/2024.        HPI:   Riley is a 89 y.o. male who resides in LTC at [facility]. He has hx of Afib on warfarin, prior stroke, BPH, HTN, [place] palsy, hypothyroidism, SP catheter. He was hospitalized 8/13/2020-8/19/2020 with significant anemia, hgb down to 6.2. He was worked up in the hospital found to have duodenal ulcer, treated appropriately. He was sent in to the hospital from nursing home on 6/5/2023 due to confusion and hypoxia. Treated for UTI, resp failure/CHF.       Today:  Seen today for regulatory visit. Chart reviewed. He was hospitalized in October 2023 with UTI, COVID, encephalopathy. COVID treated with Paxlovid. Hyponatremia with Na 126, improved to 132 by hospital discharge. No new concerns per nursing.He has been stable. Has Afib on warfarin, HTN, suprapubic catheter, hx of stroke. Very [place].      Past Medical History:  Past Medical History:   Diagnosis Date     Atrial fibrillation (H)     On coumadin     Bell's palsy     right sided facial droop     CVA (cerebral vascular accident) (H)      Hypertension      Pacemaker      Urinary retention        Medications:  Most accurate list exists at facility.   Current Outpatient Medications   Medication Sig Dispense Refill     acetaminophen (TYLENOL) 325 MG tablet Take 2 tablets (650 mg) by mouth every 6 hours as needed for mild pain or other (and adjunct with moderate or severe pain or per patient request) 20 tablet 0     atorvastatin (LIPITOR) 40 MG tablet Take 1 tablet (40 mg) by mouth at bedtime On hold due to Paxlovid.  Resume Atorvastatin on 10/30/23        "bisacodyl (DULCOLAX) 10 MG suppository Place 1 suppository (10 mg) rectally daily as needed for constipation 10 suppository 0     cyanocobalamin (VITAMIN B-12) 1000 MCG tablet Take 1,000 mcg by mouth daily       docusate sodium (COLACE) 100 MG capsule Take 1 capsule (100 mg) by mouth 2 times daily as needed for constipation 30 capsule 0     Docusate Sodium (DOCU LIQUID PO) Place 5 drops into both ears daily as needed prior to irrigation for cerumen removal       dorzolamide-timolol (COSOPT) 2-0.5 % ophthalmic solution Place 1 drop Into the left eye 2 times daily 10 mL 12     erythromycin base (ERYTHROMYCIN OPHT) Place 5 mg into both eyes At Bedtime Instill 1 ribbon in Left Eye and 1 ribbon in Right Eye at bedtime. Ensure ointment is given after eye drops to ensure proper absorption.       furosemide (LASIX) 20 MG tablet Take 20 mg by mouth daily       latanoprost (XALATAN) 0.005 % ophthalmic solution Place 1 drop Into the left eye At Bedtime 2.5 mL 12     levothyroxine (SYNTHROID/LEVOTHROID) 25 MCG tablet Take 1 tablet (25 mcg) by mouth daily 30 tablet 0     miconazole (MICATIN) 2 % external powder Apply topically 2 times daily Apply to groin/body folds for redness and cutaneous fungal infection prophylaxis. (Patient not taking: Reported on 12/14/2023) 90 g 0     omeprazole (PRILOSEC) 20 MG DR capsule Take 1 capsule (20 mg) by mouth daily 30 capsule 0     Propylene Glycol (SYSTANE COMPLETE OP) Place 1 drop into both eyes 2 times daily At 0600 and 1630       WARFARIN SODIUM PO Take by mouth See Admin Instructions 5 mg on Tuesday Thursday Saturday and Sunday  6 mg on Monday Wednesday and Friday (Patient not taking: Reported on 12/14/2023)          Physical Exam:   General: Patient is alert, elderly male, no distress. [place].  Vitals: BP (!) 138/92   Pulse 76   Temp 98.9  F (37.2  C)   Resp 18   Ht 1.778 m (5' 10\")   Wt 89.4 kg (197 lb)   SpO2 96%   BMI 28.27 kg/m    HEENT: Head is NCAT. Nares negative. Oropharynx " moist. Right facial droop, baseline.  Lungs: Non labored respirations.   : SP catheter.   Extremities: Mild LE swelling, baseline.  Musculoskeletal: Age related degen changes.   Skin: Warm and dry.   Psych: Mood appears stable.      Labs:  Component      Latest Ref Rng 10/21/2023  7:53 AM 10/23/2023  6:40 AM 10/24/2023  6:37 AM 10/26/2023  5:56 AM   WBC      4.0 - 11.0 10e3/uL 8.0  7.4  6.2  5.3    RBC Count      4.40 - 5.90 10e6/uL 3.61 (L)  3.82 (L)  3.82 (L)  3.60 (L)    Hemoglobin      13.3 - 17.7 g/dL 9.8 (L)  10.5 (L)  10.3 (L)  9.8 (L)    Hematocrit      40.0 - 53.0 % 30.8 (L)  31.5 (L)  32.0 (L)  30.6 (L)    MCV      78 - 100 fL 85  83  84  85    MCH      26.5 - 33.0 pg 27.1  27.5  27.0  27.2    MCHC      31.5 - 36.5 g/dL 31.8  33.3  32.2  32.0    RDW      10.0 - 15.0 % 13.9  14.2  14.2  14.4    Platelet Count      150 - 450 10e3/uL 177  200  218  248      Component      Latest Ref Rng 11/3/2023  5:15 AM 11/6/2023  5:36 AM 11/13/2023  5:42 AM   Sodium      135 - 145 mmol/L 132 (L)  137  136           Assessment/Plan:  1. Afib. Anticoagulated with warfarin. Monitor and adjust per INR.   2. Hx of stroke. Continue on statin.  3. CHF. On lasix. Appears stable.   4. Hypothyroidism. Continue levothyroxine.   5. SP catheter. Long term. UTI and hospitalizations June 2023, Oct 2023.     Overall appears stable, no new orders needed.       Electronically signed by: Nancy Fair MD           Sincerely,        Nancy Fair MD

## 2024-05-06 NOTE — PROGRESS NOTES
[facility] GERIATRIC SERVICES    [place] Medical Record Number:  [id number]  Place of Service where encounter took place: [facility] () [[id number]]   CODE STATUS:   DNR / DNI    Chief Complaint:  Chief Complaint   Patient presents with    [facility] Regulatory     LTC 4/25/2024.        HPI:   Riley is a 89 y.o. male who resides in LTC at [facility]. He has hx of Afib on warfarin, prior stroke, BPH, HTN, [place] palsy, hypothyroidism, SP catheter. He was hospitalized 8/13/2020-8/19/2020 with significant anemia, hgb down to 6.2. He was worked up in the hospital found to have duodenal ulcer, treated appropriately. He was sent in to the hospital from nursing home on 6/5/2023 due to confusion and hypoxia. Treated for UTI, resp failure/CHF.       Today:  Seen today for regulatory visit. Chart reviewed. He was hospitalized in October 2023 with UTI, COVID, encephalopathy. COVID treated with Paxlovid. Hyponatremia with Na 126, improved to 132 by hospital discharge. No new concerns per nursing.He has been stable. Has Afib on warfarin, HTN, suprapubic catheter, hx of stroke. Very [place].      Past Medical History:  Past Medical History:   Diagnosis Date    Atrial fibrillation (H)     On coumadin    Bell's palsy     right sided facial droop    CVA (cerebral vascular accident) (H)     Hypertension     Pacemaker     Urinary retention        Medications:  Most accurate list exists at facility.   Current Outpatient Medications   Medication Sig Dispense Refill    acetaminophen (TYLENOL) 325 MG tablet Take 2 tablets (650 mg) by mouth every 6 hours as needed for mild pain or other (and adjunct with moderate or severe pain or per patient request) 20 tablet 0    atorvastatin (LIPITOR) 40 MG tablet Take 1 tablet (40 mg) by mouth at bedtime On hold due to Paxlovid.  Resume Atorvastatin on 10/30/23      bisacodyl (DULCOLAX) 10 MG suppository Place 1 suppository (10 mg) rectally daily as needed for constipation  "10 suppository 0    cyanocobalamin (VITAMIN B-12) 1000 MCG tablet Take 1,000 mcg by mouth daily      docusate sodium (COLACE) 100 MG capsule Take 1 capsule (100 mg) by mouth 2 times daily as needed for constipation 30 capsule 0    Docusate Sodium (DOCU LIQUID PO) Place 5 drops into both ears daily as needed prior to irrigation for cerumen removal      dorzolamide-timolol (COSOPT) 2-0.5 % ophthalmic solution Place 1 drop Into the left eye 2 times daily 10 mL 12    erythromycin base (ERYTHROMYCIN OPHT) Place 5 mg into both eyes At Bedtime Instill 1 ribbon in Left Eye and 1 ribbon in Right Eye at bedtime. Ensure ointment is given after eye drops to ensure proper absorption.      furosemide (LASIX) 20 MG tablet Take 20 mg by mouth daily      latanoprost (XALATAN) 0.005 % ophthalmic solution Place 1 drop Into the left eye At Bedtime 2.5 mL 12    levothyroxine (SYNTHROID/LEVOTHROID) 25 MCG tablet Take 1 tablet (25 mcg) by mouth daily 30 tablet 0    miconazole (MICATIN) 2 % external powder Apply topically 2 times daily Apply to groin/body folds for redness and cutaneous fungal infection prophylaxis. (Patient not taking: Reported on 12/14/2023) 90 g 0    omeprazole (PRILOSEC) 20 MG DR capsule Take 1 capsule (20 mg) by mouth daily 30 capsule 0    Propylene Glycol (SYSTANE COMPLETE OP) Place 1 drop into both eyes 2 times daily At 0600 and 1630      WARFARIN SODIUM PO Take by mouth See Admin Instructions 5 mg on Tuesday Thursday Saturday and Sunday  6 mg on Monday Wednesday and Friday (Patient not taking: Reported on 12/14/2023)          Physical Exam:   General: Patient is alert, elderly male, no distress. [place].  Vitals: BP (!) 138/92   Pulse 76   Temp 98.9  F (37.2  C)   Resp 18   Ht 1.778 m (5' 10\")   Wt 89.4 kg (197 lb)   SpO2 96%   BMI 28.27 kg/m    HEENT: Head is NCAT. Nares negative. Oropharynx moist. Right facial droop, baseline.  Lungs: Non labored respirations.   : SP catheter.   Extremities: Mild LE " swelling, baseline.  Musculoskeletal: Age related degen changes.   Skin: Warm and dry.   Psych: Mood appears stable.      Labs:  Component      Latest Ref Rng 10/21/2023  7:53 AM 10/23/2023  6:40 AM 10/24/2023  6:37 AM 10/26/2023  5:56 AM   WBC      4.0 - 11.0 10e3/uL 8.0  7.4  6.2  5.3    RBC Count      4.40 - 5.90 10e6/uL 3.61 (L)  3.82 (L)  3.82 (L)  3.60 (L)    Hemoglobin      13.3 - 17.7 g/dL 9.8 (L)  10.5 (L)  10.3 (L)  9.8 (L)    Hematocrit      40.0 - 53.0 % 30.8 (L)  31.5 (L)  32.0 (L)  30.6 (L)    MCV      78 - 100 fL 85  83  84  85    MCH      26.5 - 33.0 pg 27.1  27.5  27.0  27.2    MCHC      31.5 - 36.5 g/dL 31.8  33.3  32.2  32.0    RDW      10.0 - 15.0 % 13.9  14.2  14.2  14.4    Platelet Count      150 - 450 10e3/uL 177  200  218  248      Component      Latest Ref Rng 11/3/2023  5:15 AM 11/6/2023  5:36 AM 11/13/2023  5:42 AM   Sodium      135 - 145 mmol/L 132 (L)  137  136           Assessment/Plan:  1. Afib. Anticoagulated with warfarin. Monitor and adjust per INR.   2. Hx of stroke. Continue on statin.  3. CHF. On lasix. Appears stable.   4. Hypothyroidism. Continue levothyroxine.   5. SP catheter. Long term. UTI and hospitalizations June 2023, Oct 2023.     Overall appears stable, no new orders needed.       Electronically signed by: Nancy Fair MD

## 2024-05-08 NOTE — LETTER
5/8/2024        RE: Riley Rodriguez  [address]  [address]  [place] [address]        M [facility] GERIATRICS  Chief Complaint   Patient presents with     longterm Acute     [place] Medical Record Number:  [id number]  Place of Service where encounter took place:  [facility] () [[id number]]    HPI:    Riley Rodriguez  is a 89 year old  (4/15/1926), who is being seen today for  Regulatory visit. HPI information obtained from: facility chart records, facility staff, patient report and [facility] chart review.     Background: He has history of A. fib on warfarin, prior stroke, hypertension, hypothyroid, he has hx of Afib on warfarin, BPH with a [place] catheter in place.  He has been residing in the long-term care for many years, last hospitalized in June 2023.  Has been on iron supplement since that time with recent hemoglobin 12.2.      June 2023: He was admitted to [facility] on 6/5 due to hypoxia in the nursing home down to low 80s on RA.  Chest x-ray with pulm edema. Found to have urosepsis and CHF exacerbation. Family declined transfer to ICU and preferred to treat conservatively and keep comfortable. IV lasix transitioned to PO; he weaned off supplemental o2 and now on RA breathing comfortably. Peak weight 208lbs. Today 180. He is also on 2 gram sodium diet and 2000ml FR which we will loosen up as able.   Sepsis thought s/s to catheter associated UTI. Completed antibiotics.  He remains on coumadin; monitoring INR.   He did have mild hyponatremia which improved with diuretics. Hypokalemia and hypomagnesemia also improved.     October 2023: She was rehospitalized at [facility] after experiencing weakness and poor appetite, found to have COVID-19 and completed Paxlovid, he was encephalopathic which cleared as infection improved.  Also with UTI secondary to suprapubic cath.  Hyponatremia to 126, he regulatorwas started on sodium tabs twice daily.  This was possibly  related to mild fluid overload versus poor p.o. intake as he had had a dental procedure the week prior and was experiencing pain from this.  He remains on warfarin for A-fib and history of CVA.      Today: Riley is seen  today due to increasing edema, weight gain of 13lbs from baseline. Previously hospitalized last summer for  CHF exacerbation with peak weight of 208, today he is 203, up from baseline 190. Edema increased with L>R. No SOB or YOON. He is seen walking the hallway with SBA and his daughter besides him. He appears stable. HE does have +2 bin LE edema. Left upper arm also with mild edema. He has a BMP pending for today as he was recently taken off of fluid restriction due to stable sodium and weight and concentrated looking urine. Likely now in fluid overload. Anticipate his sodium will also be low but he is currently mentally at baseline with some increased fatigue compared to usual. Discussed with daughter plan to re-instate FR to 2000cc daily, increase lasix, follow labs and weight closely. We discussed the event of acute hypoxia, would she want him hospitalized? She thinks family would lean toward comfort care and that is what he would want.       ALLERGIES: Patient has no known allergies.  PAST MEDICAL HISTORY:   Past Medical History:   Diagnosis Date     Atrial fibrillation (H)     On coumadin     Bell's palsy     right sided facial droop     CVA (cerebral vascular accident) (H)      Hypertension      Pacemaker      Urinary retention       PAST SURGICAL HISTORY:  has a past surgical history that includes IR Thoracic Aortogram (1/1/2004); IR Visceral Angiogram (1/1/2004); IR Visceral Angiogram (1/1/2004); IR Visceral Angiogram (1/1/2004); IR Miscellaneous Procedure (1/1/2004); IR Visceral Angiogram (1/1/2004); IR Miscellaneous Procedure (1/1/2004); IR Visceral Angiogram (1/1/2004); IR Visceral Angiogram (1/1/2004); IR Miscellaneous Procedure (1/1/2004); IR Visceral Angiogram (1/1/2004); IR Aortic  Arch 4 Vessel Angiogram (1/1/2004); IR Suprapubic Catheter Placment (1/18/2019); remv pilonidal lesion simple; Pr Partial Excision Thyroid,Unilat; TRANSURETHRAL ELEC-SURG PROSTATECTOM; Total Hip Arthroplasty (Right); Ir Bladder Suprapubic Catheter Insertion (1/18/2019); Pr Esophagogastroduodenoscopy Transoral Diagnostic (N/A, 8/15/2020); and IR Suprapubic Catheter Change (10/19/2023).  IMMUNIZATIONS:  Immunization History   Administered Date(s) Administered     COVID-19 Bivalent 12+ (Pfizer) 09/23/2022     COVID-19 Monovalent 18+ (Moderna) 12/30/2020, 01/27/2021, 11/26/2021, 05/09/2022     DT (PEDS <7y) 10/11/2004     Flu, Unspecified 10/12/2007, 10/13/2019, 10/24/2020     Influenza (High Dose) 3 valent vaccine 10/01/2010, 09/29/2015, 10/19/2016, 09/12/2017, 10/23/2018, 10/12/2021     Influenza (IIV3) PF 10/11/2004, 11/02/2005, 10/30/2006, 10/12/2007, 11/10/2008, 09/16/2009, 09/28/2011, 09/19/2012, 09/20/2013     Influenza Vaccine 18-64 (Flublok) 10/10/2014     Influenza Vaccine >6 months,quad, PF 10/10/2014, 10/10/2014     Influenza Vaccine, 6+MO IM (QUADRIVALENT W/PRESERVATIVES) 11/10/2008, 09/16/2009, 09/28/2011, 09/19/2012, 09/20/2013     Pneumo Conj 13-V (2010&after) 04/10/2015     Pneumococcal 20 valent Conjugate (Prevnar 20) 02/01/2024     Pneumococcal 23 valent 11/01/2001     TDAP (Adacel,Boostrix) 10/19/2012     TDAP Vaccine (Boostrix) 10/19/2012     Td (Adult), Adsorbed 10/11/2004     Td,adult,historic,unspecified 10/11/2004     Zoster vaccine, live 10/27/2015     Above immunizations pulled from [facility]. [facility] and facility records also reconciled. Outstanding information sent to  to update [facility].  Future immunizations are not needed at this point as all recommended immunizations are up to date.       Current Outpatient Medications:      acetaminophen (TYLENOL) 325 MG tablet, Take 2 tablets (650 mg) by mouth every 6 hours as needed for mild pain or other (and adjunct with  moderate or severe pain or per patient request), Disp: 20 tablet, Rfl: 0     atorvastatin (LIPITOR) 40 MG tablet, Take 1 tablet (40 mg) by mouth at bedtime On hold due to Paxlovid.  Resume Atorvastatin on 10/30/23, Disp: , Rfl:      bisacodyl (DULCOLAX) 10 MG suppository, Place 1 suppository (10 mg) rectally daily as needed for constipation, Disp: 10 suppository, Rfl: 0     cyanocobalamin (VITAMIN B-12) 1000 MCG tablet, Take 1,000 mcg by mouth daily, Disp: , Rfl:      docusate sodium (COLACE) 100 MG capsule, Take 1 capsule (100 mg) by mouth 2 times daily as needed for constipation, Disp: 30 capsule, Rfl: 0     Docusate Sodium (DOCU LIQUID PO), Place 5 drops into both ears daily as needed prior to irrigation for cerumen removal, Disp: , Rfl:      dorzolamide-timolol (COSOPT) 2-0.5 % ophthalmic solution, Place 1 drop Into the left eye 2 times daily, Disp: 10 mL, Rfl: 12     erythromycin base (ERYTHROMYCIN OPHT), Place 5 mg into both eyes At Bedtime Instill 1 ribbon in Left Eye and 1 ribbon in Right Eye at bedtime. Ensure ointment is given after eye drops to ensure proper absorption., Disp: , Rfl:      furosemide (LASIX) 20 MG tablet, Take 20 mg by mouth daily, Disp: , Rfl:      latanoprost (XALATAN) 0.005 % ophthalmic solution, Place 1 drop Into the left eye At Bedtime, Disp: 2.5 mL, Rfl: 12     levothyroxine (SYNTHROID/LEVOTHROID) 25 MCG tablet, Take 1 tablet (25 mcg) by mouth daily, Disp: 30 tablet, Rfl: 0     miconazole (MICATIN) 2 % external powder, Apply topically 2 times daily Apply to groin/body folds for redness and cutaneous fungal infection prophylaxis. (Patient not taking: Reported on 12/14/2023), Disp: 90 g, Rfl: 0     omeprazole (PRILOSEC) 20 MG DR capsule, Take 1 capsule (20 mg) by mouth daily, Disp: 30 capsule, Rfl: 0     Propylene Glycol (SYSTANE COMPLETE OP), Place 1 drop into both eyes 2 times daily At 0600 and 1630, Disp: , Rfl:      WARFARIN SODIUM PO, Take by mouth See Admin Instructions 5 mg on  "Tuesday Thursday Saturday and Sunday 6 mg on Monday Wednesday and Friday (Patient not taking: Reported on 12/14/2023), Disp: , Rfl:        Post Medication Reconciliation Status:      ROS:  4 point ROS including Respiratory, CV, GI and , other than that noted in the HPI,  is negative    Vitals:  /70   Pulse 66   Temp 98.7  F (37.1  C)   Resp 18   Ht 1.778 m (5' 10\")   Wt 92.1 kg (203 lb)   SpO2 95%   BMI 29.13 kg/m   Body mass index is 29.13 kg/m .  Exam:  Physical Exam : stable  General appearance: alert, appears stated age and cooperative.   Head: Normocephalic, without obvious abnormality, atraumatic, Very [place].   Lungs: respirations unlabored, LSCTA; no wheezing or rales.   Cardiovascular: regular rate.    Extremities: extremities normal, atraumatic, +2 edema bilaterally. L>R.   Skin: Skin color, texture, turgor normal. No rashes or lesions  Neurologic: oriented to self. No focal deficits.   Psych: interacts well with caregivers, exhibits logical thought processes and connections, pleasant    Lab/Diagnostic data:     Most Recent 3 CBC's:  Recent Labs   Lab Test 10/26/23  0556 10/24/23  0637 10/23/23  0640   WBC 5.3 6.2 7.4   HGB 9.8* 10.3* 10.5*   MCV 85 84 83    218 200     Most Recent 3 BMP's:  Recent Labs   Lab Test 05/10/24  0524 05/08/24  0521 04/23/24  0441 11/03/23  0515 11/02/23  0620 10/26/23  0556   * 124* 131*   < > 151* 131*   POTASSIUM 3.5  --   --   --  3.8 3.8   CHLORIDE 88*  --   --   --  82* 98   CO2 25  --   --   --  15* 25   BUN 13.5  --   --   --  11.0 9.5   CR 0.78  --   --   --  0.63* 0.74   ANIONGAP 12  --   --   --  54* 8   GENO 7.9*  --   --   --  6.9* 8.1*   *  --   --   --  91 93    < > = values in this interval not displayed.       ASSESSMENT/PLAN    Discussed concerns and plan with nursing staff and patient's daughter, Homa. Dicussed the event of respiratory distress, will family want Riley hospitalized. Homa thinks they would lean more comfort care " at this time.     (I50.33) Acute on chronic diastolic congestive heart failure (H)  (primary encounter diagnosis)  Comment: Weight creeping back up, now 203 from baseline 190.   Plan:   -Daily weights: 187, 190, today 203.   -2 gram NA diet.  -Increase Lasix 40 bid.   -BMP on Friday to monitor electrolyte in the setting of hyponatremia on CHF and CKD.     (E87.1) Hyponatremia  Comment: Sodium pending, anticipate will be low due to hypervolemia.   Plan:   -Resume FR to 2000cc daily.   -Encourage p.o. solute intake.    Chronic conditions:     (I48.91) Atrial fibrillation, unspecified type (H)  Comment: INR has been variable.   Plan:    -Continue current dose and recheck due Friday.       (I63.532) Acute ischemic left PCA stroke (H)  (primary encounter diagnosis)  (I63.89) Cerebral infarction due to other mechanism (H)  (primary encounter diagnosis)  Plan: Continue atorvastatin.    (E61.1) Iron deficiency  Comment: Hemoglobin in June 11.3; mcv wnl.   Plan: Continue iron supplementation.  Continue omeprazole.  -recheck cbc 6 months.    (I10) Hypertension  Comment: Most recent blood pressure stable.  Plan: No current medications.    (E03.9) Hypothyroidism, unspecified type  Comment: Stable  Plan:     TSH   Date Value Ref Range Status   10/23/2023 3.70 0.30 - 4.20 uIU/mL Final   06/02/2022 4.10 0.30 - 5.00 uIU/mL Final       Electronically signed by:  Jac Pitt, SACHA   ,.      Sincerely,        Jac Pitt, CNP

## 2024-05-10 NOTE — TELEPHONE ENCOUNTER
[facility] [employer] Message     ----- Message from Jac Pitt CNP sent at 5/10/2024  1:07 PM CDT -----  Continue same dose, recheck 1 week due to active CHF exacerbation     Verbal order/direction given to: Zakiya  Provider Giving Order:  EDWARD Jacobsen RN

## 2024-05-14 NOTE — TELEPHONE ENCOUNTER
[facility]     Provider: EDWARD Jacobsen  Facility: [facility] Facility Type:  LTC    No Known Allergies    Verbal Order/Direction given by Provider: NEHEMIAS on 5/15/24    Provider giving Order:  EDWARD Jacobsen    Verbal Order given to: Zakiya Smyth RN

## 2024-05-14 NOTE — PROGRESS NOTES
[facility] GERIATRICS  Chief Complaint   Patient presents with    shelter Acute     [place] [facility] Record Number:  [id number]  Place of Service where encounter took place:  [facility] () [[id number]]    HPI:    Riley Rodriguez  is a 89 year old  (4/15/1926), who is being seen today for  Regulatory visit. HPI information obtained from: facility chart records, facility staff, patient report and [facility] chart review.     Background: He has history of A. fib on warfarin, prior stroke, hypertension, hypothyroid, he has hx of Afib on warfarin, BPH with a Pierson catheter in place.  He has been residing in the long-term care for many years, last hospitalized in June 2023.  Has been on iron supplement since that time with recent hemoglobin 12.2.      June 2023: He was admitted to [facility] on 6/5 due to hypoxia in the nursing home down to low 80s on RA.  Chest x-ray with pulm edema. Found to have urosepsis and CHF exacerbation. Family declined transfer to ICU and preferred to treat conservatively and keep comfortable. IV lasix transitioned to PO; he weaned off supplemental o2 and now on RA breathing comfortably. Peak weight 208lbs. Today 180. He is also on 2 gram sodium diet and 2000ml FR which we will loosen up as able.   Sepsis thought s/s to catheter associated UTI. Completed antibiotics.  He remains on coumadin; monitoring INR.   He did have mild hyponatremia which improved with diuretics. Hypokalemia and hypomagnesemia also improved.     October 2023: She was rehospitalized at [facility] after experiencing weakness and poor appetite, found to have COVID-19 and completed Paxlovid, he was encephalopathic which cleared as infection improved.  Also with UTI secondary to suprapubic cath.  Hyponatremia to 126, he regulatorwas started on sodium tabs twice daily.  This was possibly related to mild fluid overload versus poor p.o. intake as he had had a dental procedure the week  prior and was experiencing pain from this.  He remains on warfarin for A-fib and history of CVA.      Today: Riley is seen  today due to increasing edema, weight gain of 13lbs from baseline. Previously hospitalized last summer for  CHF exacerbation with peak weight of 208, today he is 203, up from baseline 190. Edema increased with L>R. No SOB or YOON. He is seen walking the hallway with SBA and his daughter besides him. He appears stable. HE does have +2 bin LE edema. Left upper arm also with mild edema. He has a BMP pending for today as he was recently taken off of fluid restriction due to stable sodium and weight and concentrated looking urine. Likely now in fluid overload. Anticipate his sodium will also be low but he is currently mentally at baseline with some increased fatigue compared to usual. Discussed with daughter plan to re-instate FR to 2000cc daily, increase lasix, follow labs and weight closely. We discussed the event of acute hypoxia, would she want him hospitalized? She thinks family would lean toward comfort care and that is what he would want.       ALLERGIES: Patient has no known allergies.  PAST MEDICAL HISTORY:   Past Medical History:   Diagnosis Date    Atrial fibrillation (H)     On coumadin    Bell's palsy     right sided facial droop    CVA (cerebral vascular accident) (H)     Hypertension     Pacemaker     Urinary retention       PAST SURGICAL HISTORY:  has a past surgical history that includes IR Thoracic Aortogram (1/1/2004); IR Visceral Angiogram (1/1/2004); IR Visceral Angiogram (1/1/2004); IR Visceral Angiogram (1/1/2004); IR Miscellaneous Procedure (1/1/2004); IR Visceral Angiogram (1/1/2004); IR Miscellaneous Procedure (1/1/2004); IR Visceral Angiogram (1/1/2004); IR Visceral Angiogram (1/1/2004); IR Miscellaneous Procedure (1/1/2004); IR Visceral Angiogram (1/1/2004); IR Aortic Arch 4 Vessel Angiogram (1/1/2004); IR Suprapubic Catheter Placment (1/18/2019); remv pilonidal lesion  simple; Pr Partial Excision Thyroid,Unilat; TRANSURETHRAL ELEC-SURG PROSTATECTOM; Total Hip Arthroplasty (Right); Ir Bladder Suprapubic Catheter Insertion (1/18/2019); Pr Esophagogastroduodenoscopy Transoral Diagnostic (N/A, 8/15/2020); and IR Suprapubic Catheter Change (10/19/2023).  IMMUNIZATIONS:  Immunization History   Administered Date(s) Administered    COVID-19 Bivalent 12+ (Pfizer) 09/23/2022    COVID-19 Monovalent 18+ (Moderna) 12/30/2020, 01/27/2021, 11/26/2021, 05/09/2022    DT (PEDS <7y) 10/11/2004    Flu, Unspecified 10/12/2007, 10/13/2019, 10/24/2020    Influenza (High Dose) 3 valent vaccine 10/01/2010, 09/29/2015, 10/19/2016, 09/12/2017, 10/23/2018, 10/12/2021    Influenza (IIV3) PF 10/11/2004, 11/02/2005, 10/30/2006, 10/12/2007, 11/10/2008, 09/16/2009, 09/28/2011, 09/19/2012, 09/20/2013    Influenza Vaccine 18-64 (Flublok) 10/10/2014    Influenza Vaccine >6 months,quad, PF 10/10/2014, 10/10/2014    Influenza Vaccine, 6+MO IM (QUADRIVALENT W/PRESERVATIVES) 11/10/2008, 09/16/2009, 09/28/2011, 09/19/2012, 09/20/2013    Pneumo Conj 13-V (2010&after) 04/10/2015    Pneumococcal 20 valent Conjugate (Prevnar 20) 02/01/2024    Pneumococcal 23 valent 11/01/2001    TDAP (Adacel,Boostrix) 10/19/2012    TDAP Vaccine (Boostrix) 10/19/2012    Td (Adult), Adsorbed 10/11/2004    Td,adult,historic,unspecified 10/11/2004    Zoster vaccine, live 10/27/2015     Above immunizations pulled from [facility]. [facility] and facility records also reconciled. Outstanding information sent to  to update [facility].  Future immunizations are not needed at this point as all recommended immunizations are up to date.       Current Outpatient Medications:     acetaminophen (TYLENOL) 325 MG tablet, Take 2 tablets (650 mg) by mouth every 6 hours as needed for mild pain or other (and adjunct with moderate or severe pain or per patient request), Disp: 20 tablet, Rfl: 0    atorvastatin (LIPITOR) 40 MG tablet, Take 1 tablet  (40 mg) by mouth at bedtime On hold due to Paxlovid.  Resume Atorvastatin on 10/30/23, Disp: , Rfl:     bisacodyl (DULCOLAX) 10 MG suppository, Place 1 suppository (10 mg) rectally daily as needed for constipation, Disp: 10 suppository, Rfl: 0    cyanocobalamin (VITAMIN B-12) 1000 MCG tablet, Take 1,000 mcg by mouth daily, Disp: , Rfl:     docusate sodium (COLACE) 100 MG capsule, Take 1 capsule (100 mg) by mouth 2 times daily as needed for constipation, Disp: 30 capsule, Rfl: 0    Docusate Sodium (DOCU LIQUID PO), Place 5 drops into both ears daily as needed prior to irrigation for cerumen removal, Disp: , Rfl:     dorzolamide-timolol (COSOPT) 2-0.5 % ophthalmic solution, Place 1 drop Into the left eye 2 times daily, Disp: 10 mL, Rfl: 12    erythromycin base (ERYTHROMYCIN OPHT), Place 5 mg into both eyes At Bedtime Instill 1 ribbon in Left Eye and 1 ribbon in Right Eye at bedtime. Ensure ointment is given after eye drops to ensure proper absorption., Disp: , Rfl:     furosemide (LASIX) 20 MG tablet, Take 20 mg by mouth daily, Disp: , Rfl:     latanoprost (XALATAN) 0.005 % ophthalmic solution, Place 1 drop Into the left eye At Bedtime, Disp: 2.5 mL, Rfl: 12    levothyroxine (SYNTHROID/LEVOTHROID) 25 MCG tablet, Take 1 tablet (25 mcg) by mouth daily, Disp: 30 tablet, Rfl: 0    miconazole (MICATIN) 2 % external powder, Apply topically 2 times daily Apply to groin/body folds for redness and cutaneous fungal infection prophylaxis. (Patient not taking: Reported on 12/14/2023), Disp: 90 g, Rfl: 0    omeprazole (PRILOSEC) 20 MG DR capsule, Take 1 capsule (20 mg) by mouth daily, Disp: 30 capsule, Rfl: 0    Propylene Glycol (SYSTANE COMPLETE OP), Place 1 drop into both eyes 2 times daily At 0600 and 1630, Disp: , Rfl:     WARFARIN SODIUM PO, Take by mouth See Admin Instructions 5 mg on Tuesday Thursday Saturday and Sunday 6 mg on Monday Wednesday and Friday (Patient not taking: Reported on 12/14/2023), Disp: , Rfl:   "      Post Medication Reconciliation Status:      ROS:  4 point ROS including Respiratory, CV, GI and , other than that noted in the HPI,  is negative    Vitals:  /70   Pulse 66   Temp 98.7  F (37.1  C)   Resp 18   Ht 1.778 m (5' 10\")   Wt 92.1 kg (203 lb)   SpO2 95%   BMI 29.13 kg/m   Body mass index is 29.13 kg/m .  Exam:  Physical Exam : stable  General appearance: alert, appears stated age and cooperative.   Head: Normocephalic, without obvious abnormality, atraumatic, Very [place].   Lungs: respirations unlabored, LSCTA; no wheezing or rales.   Cardiovascular: regular rate.    Extremities: extremities normal, atraumatic, +2 edema bilaterally. L>R.   Skin: Skin color, texture, turgor normal. No rashes or lesions  Neurologic: oriented to self. No focal deficits.   Psych: interacts well with caregivers, exhibits logical thought processes and connections, pleasant    Lab/Diagnostic data:     Most Recent 3 CBC's:  Recent Labs   Lab Test 10/26/23  0556 10/24/23  0637 10/23/23  0640   WBC 5.3 6.2 7.4   HGB 9.8* 10.3* 10.5*   MCV 85 84 83    218 200     Most Recent 3 BMP's:  Recent Labs   Lab Test 05/10/24  0524 05/08/24  0521 04/23/24  0441 11/03/23  0515 11/02/23  0620 10/26/23  0556   * 124* 131*   < > 151* 131*   POTASSIUM 3.5  --   --   --  3.8 3.8   CHLORIDE 88*  --   --   --  82* 98   CO2 25  --   --   --  15* 25   BUN 13.5  --   --   --  11.0 9.5   CR 0.78  --   --   --  0.63* 0.74   ANIONGAP 12  --   --   --  54* 8   GENO 7.9*  --   --   --  6.9* 8.1*   *  --   --   --  91 93    < > = values in this interval not displayed.       ASSESSMENT/PLAN    Discussed concerns and plan with nursing staff and patient's daughter, Homa. Dicussed the event of respiratory distress, will family want Riley hospitalized. Homa thinks they would lean more comfort care at this time.     (I50.33) Acute on chronic diastolic congestive heart failure (H)  (primary encounter diagnosis)  Comment: Weight " creeping back up, now 203 from baseline 190.   Plan:   -Daily weights: 187, 190, today 203.   -2 gram NA diet.  -Increase Lasix 40 bid.   -BMP on Friday to monitor electrolyte in the setting of hyponatremia on CHF and CKD.     (E87.1) Hyponatremia  Comment: Sodium pending, anticipate will be low due to hypervolemia.   Plan:   -Resume FR to 2000cc daily.   -Encourage p.o. solute intake.    Chronic conditions:     (I48.91) Atrial fibrillation, unspecified type (H)  Comment: INR has been variable.   Plan:    -Continue current dose and recheck due Friday.       (I63.532) Acute ischemic left PCA stroke (H)  (primary encounter diagnosis)  (I63.89) Cerebral infarction due to other mechanism (H)  (primary encounter diagnosis)  Plan: Continue atorvastatin.    (E61.1) Iron deficiency  Comment: Hemoglobin in June 11.3; mcv wnl.   Plan: Continue iron supplementation.  Continue omeprazole.  -recheck cbc 6 months.    (I10) Hypertension  Comment: Most recent blood pressure stable.  Plan: No current medications.    (E03.9) Hypothyroidism, unspecified type  Comment: Stable  Plan:     TSH   Date Value Ref Range Status   10/23/2023 3.70 0.30 - 4.20 uIU/mL Final   06/02/2022 4.10 0.30 - 5.00 uIU/mL Final       Electronically signed by:  Jac Pitt, SACHA   ,.

## 2024-05-15 NOTE — LETTER
5/15/2024        RE: Riley Rodriguez  [address]  [address]  [place] [address]        [facility] GERIATRICS  Chief Complaint   Patient presents with     [facility] Acute     [place] [facility] Record Number:  [id number]  Place of Service where encounter took place:  [facility] () [[id number]]    HPI:    Riley Rodriguez  is a 89 year old  (4/15/1926), who is being seen today for  Regulatory visit. HPI information obtained from: facility chart records, facility staff, patient report and [facility] chart review.     Background: He has history of A. fib on warfarin, prior stroke, hypertension, hypothyroid, he has hx of Afib on warfarin, BPH with a Pierson catheter in place.  He has been residing in the long-term care for many years, last hospitalized in June 2023.  Has been on iron supplement since that time with recent hemoglobin 12.2.      June 2023: He was admitted to [facility] on 6/5 due to hypoxia in the nursing home down to low 80s on RA.  Chest x-ray with pulm edema. Found to have urosepsis and CHF exacerbation. Family declined transfer to ICU and preferred to treat conservatively and keep comfortable. IV lasix transitioned to PO; he weaned off supplemental o2 and now on RA breathing comfortably. Peak weight 208lbs. Today 180. He is also on 2 gram sodium diet and 2000ml FR which we will loosen up as able.   Sepsis thought s/s to catheter associated UTI. Completed antibiotics.  He remains on coumadin; monitoring INR.   He did have mild hyponatremia which improved with diuretics. Hypokalemia and hypomagnesemia also improved.     October 2023: She was rehospitalized at [facility] after experiencing weakness and poor appetite, found to have COVID-19 and completed Paxlovid, he was encephalopathic which cleared as infection improved.  Also with UTI secondary to suprapubic cath.  Hyponatremia to 126, he regulatorwas started on sodium tabs twice daily.  This was possibly  related to mild fluid overload versus poor p.o. intake as he had had a dental procedure the week prior and was experiencing pain from this.  He remains on warfarin for A-fib and history of CVA.      Today: She is seen today to follow-up on recent hyperkalemia and hyponatremia.  He has improved with fluid restriction and increased Lasix.  Resume Lasix back to previous dosing.  Continue fluid restriction.  Recheck sodium tomorrow, slightly improved on 5/10/24. Discussed situation with nursing. Riley remains tired, fatigued, overall declining, Family highly considering hospice.  No charge visit, saw patient from [facility], discussed with nursing staff, reviewed labs and PCC weights and vital signs.     ALLERGIES: Patient has no known allergies.  PAST MEDICAL HISTORY:   Past Medical History:   Diagnosis Date     Atrial fibrillation (H)     On coumadin     Bell's palsy     right sided facial droop     CVA (cerebral vascular accident) (H)      Hypertension      Pacemaker      Urinary retention       PAST SURGICAL HISTORY:  has a past surgical history that includes IR Thoracic Aortogram (1/1/2004); IR Visceral Angiogram (1/1/2004); IR Visceral Angiogram (1/1/2004); IR Visceral Angiogram (1/1/2004); IR Miscellaneous Procedure (1/1/2004); IR Visceral Angiogram (1/1/2004); IR Miscellaneous Procedure (1/1/2004); IR Visceral Angiogram (1/1/2004); IR Visceral Angiogram (1/1/2004); IR Miscellaneous Procedure (1/1/2004); IR Visceral Angiogram (1/1/2004); IR Aortic Arch 4 Vessel Angiogram (1/1/2004); IR Suprapubic Catheter Placment (1/18/2019); remv pilonidal lesion simple; Pr Partial Excision Thyroid,Unilat; TRANSURETHRAL ELEC-SURG PROSTATECTOM; Total Hip Arthroplasty (Right); Ir Bladder Suprapubic Catheter Insertion (1/18/2019); Pr Esophagogastroduodenoscopy Transoral Diagnostic (N/A, 8/15/2020); and IR Suprapubic Catheter Change (10/19/2023).  IMMUNIZATIONS:  Immunization History   Administered Date(s) Administered     COVID-19  Bivalent 12+ (Pfizer) 09/23/2022     COVID-19 Monovalent 18+ (Moderna) 12/30/2020, 01/27/2021, 11/26/2021, 05/09/2022     DT (PEDS <7y) 10/11/2004     Flu, Unspecified 10/12/2007, 10/13/2019, 10/24/2020     Influenza (High Dose) 3 valent vaccine 10/01/2010, 09/29/2015, 10/19/2016, 09/12/2017, 10/23/2018, 10/12/2021     Influenza (IIV3) PF 10/11/2004, 11/02/2005, 10/30/2006, 10/12/2007, 11/10/2008, 09/16/2009, 09/28/2011, 09/19/2012, 09/20/2013     Influenza Vaccine 18-64 (Flublok) 10/10/2014     Influenza Vaccine >6 months,quad, PF 10/10/2014, 10/10/2014     Influenza Vaccine, 6+MO IM (QUADRIVALENT W/PRESERVATIVES) 11/10/2008, 09/16/2009, 09/28/2011, 09/19/2012, 09/20/2013     Pneumo Conj 13-V (2010&after) 04/10/2015     Pneumococcal 20 valent Conjugate (Prevnar 20) 02/01/2024     Pneumococcal 23 valent 11/01/2001     TDAP (Adacel,Boostrix) 10/19/2012     TDAP Vaccine (Boostrix) 10/19/2012     Td (Adult), Adsorbed 10/11/2004     Td,adult,historic,unspecified 10/11/2004     Zoster vaccine, live 10/27/2015     Above immunizations pulled from [facility]. [facility] and facility records also reconciled. Outstanding information sent to  to update [facility].  Future immunizations are not needed at this point as all recommended immunizations are up to date.       Current Outpatient Medications:      acetaminophen (TYLENOL) 325 MG tablet, Take 2 tablets (650 mg) by mouth every 6 hours as needed for mild pain or other (and adjunct with moderate or severe pain or per patient request), Disp: 20 tablet, Rfl: 0     atorvastatin (LIPITOR) 40 MG tablet, Take 1 tablet (40 mg) by mouth at bedtime On hold due to Paxlovid.  Resume Atorvastatin on 10/30/23, Disp: , Rfl:      bisacodyl (DULCOLAX) 10 MG suppository, Place 1 suppository (10 mg) rectally daily as needed for constipation, Disp: 10 suppository, Rfl: 0     cyanocobalamin (VITAMIN B-12) 1000 MCG tablet, Take 1,000 mcg by mouth daily, Disp: , Rfl:      docusate  "sodium (COLACE) 100 MG capsule, Take 1 capsule (100 mg) by mouth 2 times daily as needed for constipation, Disp: 30 capsule, Rfl: 0     Docusate Sodium (DOCU LIQUID PO), Place 5 drops into both ears daily as needed prior to irrigation for cerumen removal, Disp: , Rfl:      dorzolamide-timolol (COSOPT) 2-0.5 % ophthalmic solution, Place 1 drop Into the left eye 2 times daily, Disp: 10 mL, Rfl: 12     erythromycin base (ERYTHROMYCIN OPHT), Place 5 mg into both eyes At Bedtime Instill 1 ribbon in Left Eye and 1 ribbon in Right Eye at bedtime. Ensure ointment is given after eye drops to ensure proper absorption., Disp: , Rfl:      furosemide (LASIX) 20 MG tablet, Take 20 mg by mouth daily, Disp: , Rfl:      latanoprost (XALATAN) 0.005 % ophthalmic solution, Place 1 drop Into the left eye At Bedtime, Disp: 2.5 mL, Rfl: 12     levothyroxine (SYNTHROID/LEVOTHROID) 25 MCG tablet, Take 1 tablet (25 mcg) by mouth daily, Disp: 30 tablet, Rfl: 0     omeprazole (PRILOSEC) 20 MG DR capsule, Take 1 capsule (20 mg) by mouth daily, Disp: 30 capsule, Rfl: 0     Propylene Glycol (SYSTANE COMPLETE OP), Place 1 drop into both eyes 2 times daily At 0600 and 1630, Disp: , Rfl:      WARFARIN SODIUM PO, Take by mouth See Admin Instructions 5 mg on Tuesday Thursday Saturday and Sunday 6 mg on Monday Wednesday and Friday (Patient not taking: Reported on 12/14/2023), Disp: , Rfl:        Post Medication Reconciliation Status:      ROS:  4 point ROS including Respiratory, CV, GI and , other than that noted in the HPI,  is negative    Vitals:  BP (!) 111/90   Pulse 86   Temp 98.5  F (36.9  C)   Resp 18   Ht 1.778 m (5' 10\")   Wt 88.2 kg (194 lb 6.4 oz)   SpO2 98%   BMI 27.89 kg/m   Body mass index is 27.89 kg/m .  Exam:  Physical Exam : stable  General appearance: alert, appears stated age and cooperative.   Head: Normocephalic, without obvious abnormality, atraumatic, Very [place].   Lungs: respirations unlabored, LSCTA; no wheezing or " rales.   Cardiovascular: regular rate.    Extremities: extremities normal, atraumatic, +2 edema bilaterally. L>R.   Skin: Skin color, texture, turgor normal. No rashes or lesions  Neurologic: oriented to self. No focal deficits.   Psych: interacts well with caregivers, exhibits logical thought processes and connections, pleasant    Lab/Diagnostic data:     Most Recent 3 CBC's:  Recent Labs   Lab Test 10/26/23  0556 10/24/23  0637 10/23/23  0640   WBC 5.3 6.2 7.4   HGB 9.8* 10.3* 10.5*   MCV 85 84 83    218 200     Most Recent 3 BMP's:  Recent Labs   Lab Test 05/20/24  0611 05/16/24  0526 05/10/24  0524   * 128*  128* 125*   POTASSIUM 3.7 2.9*  2.9* 3.5   CHLORIDE 90* 89*  89* 88*   CO2 25 28  28 25   BUN 18.7 17.2  17.2 13.5   CR 0.90 0.85  0.85 0.78   ANIONGAP 12 11  11 12   GENO 8.2 8.0*  8.0* 7.9*   * 119*  128* 111*       ASSESSMENT/PLAN    (I50.33) Acute on chronic diastolic congestive heart failure (H)  (primary encounter diagnosis)  Comment: Weight creeping back up, now 203 from baseline 190.   Plan:   -Daily weights: 187, 190, 203, 193.   -2 gram NA diet.  -Continue Lasix 20 dailly.  -Repeat BMP on Thursday for electrolyte monitoring and hyponatremia.    (E87.1) Hyponatremia  Comment: Sodium 124 on 5/8, repeat on 5/10 was 125.  Recheck tomorrow 5/16.  Plan:   -Continue FR to 2000cc daily.   -Encourage p.o. solute intake.    Chronic conditions:     (I48.91) Atrial fibrillation, unspecified type (H)  Comment: INR has been variable.   Plan:    -Continue current dose and recheck due Friday.       (I63.532) Acute ischemic left PCA stroke (H)  (primary encounter diagnosis)  (I63.89) Cerebral infarction due to other mechanism (H)  (primary encounter diagnosis)  Plan: Continue atorvastatin.    (E61.1) Iron deficiency  Comment: Hemoglobin in June 11.3; mcv wnl.   Plan: Continue iron supplementation.  Continue omeprazole.  -recheck cbc 6 months.    (I10) Hypertension  Comment: Most recent  blood pressure stable.  Plan: No current medications.    (E03.9) Hypothyroidism, unspecified type  Comment: Stable  Plan:     TSH   Date Value Ref Range Status   10/23/2023 3.70 0.30 - 4.20 uIU/mL Final   06/02/2022 4.10 0.30 - 5.00 uIU/mL Final       Electronically signed by:  Jac Pitt CNP   ,.      Sincerely,        Jac Pitt, CNP

## 2024-05-17 NOTE — RESULT ENCOUNTER NOTE
Unsure if anyone called this in? He needs some KCL 40meq x1 now and again tonight. Repeat BMP on Monday.

## 2024-05-17 NOTE — CONFIDENTIAL NOTE
Geriatric after-hours note: received call to review BMP drawn today. Results significant for Na 128, K 2.9. Pt recently seen for HF exacerbation, volume overload. Na is stable from prior and improved trend over week. K remains low.  -Currently on lasix 20mg/d  -Edema is improving per staff  -Remains on fluid restriction  -Most recent weight 194# on 5/11 (dry wt = 190)    Plan:  Add KCl 20mEq/d dx hypokalemia  Repeat BMP next lab day, per staff will be 5/20    Cristhian Mckeon PA-C  5/16/2024, 10:08 PM

## 2024-05-20 NOTE — TELEPHONE ENCOUNTER
Orders relayed to facility nurseNi.      ----- Message from Jac Pitt CNP sent at 5/20/2024 11:13 AM CDT -----  Continue to hold, recheck INR on Wednesday.     Regarding BMP-FR 1800 ml/day.

## 2024-05-21 NOTE — TELEPHONE ENCOUNTER
5/21/24: Talked to Dtr Homa. Riley has been dealing with weakness, confusion-- LOW Sodium and Chloride. INR went up to 5.7. Daughter sees him daily at NH.      Encounter Type: Alert remote pacemaker transmission for VHR and high RV output.   Device: [employer]   Pacing % /Programmed:  30% @ VVIR 60 bpm   Lead(s): High RV output at 5.0V. Has been noted in past.   Battery longevity: 4 years, 7 months estimated   Presenting: VS 95 bpm, PVC noted.  Atrial high rates: N/A   Anticoagulant: Warfarin  Ventricular High rates: Since 3/28/2024 1 VHR episode, EGM appears to be NSVT 16 beats, duration ~4 seconds, avg rate 217 bpm. EF 60-65% per echo 6/6/23.   Comments: Normal device function.   Plan: Routed to device RN for review. Fly, Device Specialist. Add: Remote reviewed, agree with above. Called daughter Homa to discuss. See Epic note. Lidia Larose, Device RN

## 2024-05-22 NOTE — PROGRESS NOTES
[facility] GERIATRICS  Chief Complaint   Patient presents with    care home Acute     [place] Medical Record Number:  [id number]  Place of Service where encounter took place:  [facility] () [[id number]]    HPI:    Riley Rodriguez  is a 89 year old  (4/15/1926), who is being seen today for  Regulatory visit. HPI information obtained from: facility chart records, facility staff, patient report and [facility] chart review.     Background: He has history of A. fib on warfarin, prior stroke, hypertension, hypothyroid, he has hx of Afib on warfarin, BPH with a Pierson catheter in place.  He has been residing in the long-term care for many years, last hospitalized in June 2023.  Has been on iron supplement since that time with recent hemoglobin 12.2.      June 2023: He was admitted to [facility] on 6/5 due to hypoxia in the nursing home down to low 80s on RA.  Chest x-ray with pulm edema. Found to have urosepsis and CHF exacerbation. Family declined transfer to ICU and preferred to treat conservatively and keep comfortable. IV lasix transitioned to PO; he weaned off supplemental o2 and now on RA breathing comfortably. Peak weight 208lbs. Today 180. He is also on 2 gram sodium diet and 2000ml FR which we will loosen up as able.   Sepsis thought s/s to catheter associated UTI. Completed antibiotics.  He remains on coumadin; monitoring INR.   He did have mild hyponatremia which improved with diuretics. Hypokalemia and hypomagnesemia also improved.     October 2023: She was rehospitalized at [facility] after experiencing weakness and poor appetite, found to have COVID-19 and completed Paxlovid, he was encephalopathic which cleared as infection improved.  Also with UTI secondary to suprapubic cath.  Hyponatremia to 126, he regulatorwas started on sodium tabs twice daily.  This was possibly related to mild fluid overload versus poor p.o. intake as he had had a dental procedure the week  prior and was experiencing pain from this.  He remains on warfarin for A-fib and history of CVA.      Today: She is seen today to follow-up on recent hyperkalemia and hyponatremia.  He has improved with fluid restriction and increased Lasix.  Resume Lasix back to previous dosing.  Continue fluid restriction.  Recheck sodium tomorrow, slightly improved on 5/10/24. Discussed situation with nursing. Riley remains tired, fatigued, overall declining, Family highly considering hospice.  No charge visit, saw patient from [facility], discussed with nursing staff, reviewed labs and PCC weights and vital signs.     ALLERGIES: Patient has no known allergies.  PAST MEDICAL HISTORY:   Past Medical History:   Diagnosis Date    Atrial fibrillation (H)     On coumadin    Bell's palsy     right sided facial droop    CVA (cerebral vascular accident) (H)     Hypertension     Pacemaker     Urinary retention       PAST SURGICAL HISTORY:  has a past surgical history that includes IR Thoracic Aortogram (1/1/2004); IR Visceral Angiogram (1/1/2004); IR Visceral Angiogram (1/1/2004); IR Visceral Angiogram (1/1/2004); IR Miscellaneous Procedure (1/1/2004); IR Visceral Angiogram (1/1/2004); IR Miscellaneous Procedure (1/1/2004); IR Visceral Angiogram (1/1/2004); IR Visceral Angiogram (1/1/2004); IR Miscellaneous Procedure (1/1/2004); IR Visceral Angiogram (1/1/2004); IR Aortic Arch 4 Vessel Angiogram (1/1/2004); IR Suprapubic Catheter Placment (1/18/2019); remv pilonidal lesion simple; Pr Partial Excision Thyroid,Unilat; TRANSURETHRAL ELEC-SURG PROSTATECTOM; Total Hip Arthroplasty (Right); Ir Bladder Suprapubic Catheter Insertion (1/18/2019); Pr Esophagogastroduodenoscopy Transoral Diagnostic (N/A, 8/15/2020); and IR Suprapubic Catheter Change (10/19/2023).  IMMUNIZATIONS:  Immunization History   Administered Date(s) Administered    COVID-19 Bivalent 12+ (Pfizer) 09/23/2022    COVID-19 Monovalent 18+ (Moderna) 12/30/2020, 01/27/2021, 11/26/2021,  05/09/2022    DT (PEDS <7y) 10/11/2004    Flu, Unspecified 10/12/2007, 10/13/2019, 10/24/2020    Influenza (High Dose) 3 valent vaccine 10/01/2010, 09/29/2015, 10/19/2016, 09/12/2017, 10/23/2018, 10/12/2021    Influenza (IIV3) PF 10/11/2004, 11/02/2005, 10/30/2006, 10/12/2007, 11/10/2008, 09/16/2009, 09/28/2011, 09/19/2012, 09/20/2013    Influenza Vaccine 18-64 (Flublok) 10/10/2014    Influenza Vaccine >6 months,quad, PF 10/10/2014, 10/10/2014    Influenza Vaccine, 6+MO IM (QUADRIVALENT W/PRESERVATIVES) 11/10/2008, 09/16/2009, 09/28/2011, 09/19/2012, 09/20/2013    Pneumo Conj 13-V (2010&after) 04/10/2015    Pneumococcal 20 valent Conjugate (Prevnar 20) 02/01/2024    Pneumococcal 23 valent 11/01/2001    TDAP (Adacel,Boostrix) 10/19/2012    TDAP Vaccine (Boostrix) 10/19/2012    Td (Adult), Adsorbed 10/11/2004    Td,adult,historic,unspecified 10/11/2004    Zoster vaccine, live 10/27/2015     Above immunizations pulled from [facility]. [facility] and facility records also reconciled. Outstanding information sent to  to update [facility].  Future immunizations are not needed at this point as all recommended immunizations are up to date.       Current Outpatient Medications:     acetaminophen (TYLENOL) 325 MG tablet, Take 2 tablets (650 mg) by mouth every 6 hours as needed for mild pain or other (and adjunct with moderate or severe pain or per patient request), Disp: 20 tablet, Rfl: 0    atorvastatin (LIPITOR) 40 MG tablet, Take 1 tablet (40 mg) by mouth at bedtime On hold due to Paxlovid.  Resume Atorvastatin on 10/30/23, Disp: , Rfl:     bisacodyl (DULCOLAX) 10 MG suppository, Place 1 suppository (10 mg) rectally daily as needed for constipation, Disp: 10 suppository, Rfl: 0    cyanocobalamin (VITAMIN B-12) 1000 MCG tablet, Take 1,000 mcg by mouth daily, Disp: , Rfl:     docusate sodium (COLACE) 100 MG capsule, Take 1 capsule (100 mg) by mouth 2 times daily as needed for constipation, Disp: 30 capsule,  "Rfl: 0    Docusate Sodium (DOCU LIQUID PO), Place 5 drops into both ears daily as needed prior to irrigation for cerumen removal, Disp: , Rfl:     dorzolamide-timolol (COSOPT) 2-0.5 % ophthalmic solution, Place 1 drop Into the left eye 2 times daily, Disp: 10 mL, Rfl: 12    erythromycin base (ERYTHROMYCIN OPHT), Place 5 mg into both eyes At Bedtime Instill 1 ribbon in Left Eye and 1 ribbon in Right Eye at bedtime. Ensure ointment is given after eye drops to ensure proper absorption., Disp: , Rfl:     furosemide (LASIX) 20 MG tablet, Take 20 mg by mouth daily, Disp: , Rfl:     latanoprost (XALATAN) 0.005 % ophthalmic solution, Place 1 drop Into the left eye At Bedtime, Disp: 2.5 mL, Rfl: 12    levothyroxine (SYNTHROID/LEVOTHROID) 25 MCG tablet, Take 1 tablet (25 mcg) by mouth daily, Disp: 30 tablet, Rfl: 0    omeprazole (PRILOSEC) 20 MG DR capsule, Take 1 capsule (20 mg) by mouth daily, Disp: 30 capsule, Rfl: 0    Propylene Glycol (SYSTANE COMPLETE OP), Place 1 drop into both eyes 2 times daily At 0600 and 1630, Disp: , Rfl:     WARFARIN SODIUM PO, Take by mouth See Admin Instructions 5 mg on Tuesday Thursday Saturday and Sunday 6 mg on Monday Wednesday and Friday (Patient not taking: Reported on 12/14/2023), Disp: , Rfl:        Post Medication Reconciliation Status:      ROS:  4 point ROS including Respiratory, CV, GI and , other than that noted in the HPI,  is negative    Vitals:  BP (!) 111/90   Pulse 86   Temp 98.5  F (36.9  C)   Resp 18   Ht 1.778 m (5' 10\")   Wt 88.2 kg (194 lb 6.4 oz)   SpO2 98%   BMI 27.89 kg/m   Body mass index is 27.89 kg/m .  Exam:  Physical Exam : stable  General appearance: alert, appears stated age and cooperative.   Head: Normocephalic, without obvious abnormality, atraumatic, Very [place].   Lungs: respirations unlabored, LSCTA; no wheezing or rales.   Cardiovascular: regular rate.    Extremities: extremities normal, atraumatic, +2 edema bilaterally. L>R.   Skin: Skin color, " texture, turgor normal. No rashes or lesions  Neurologic: oriented to self. No focal deficits.   Psych: interacts well with caregivers, exhibits logical thought processes and connections, pleasant    Lab/Diagnostic data:     Most Recent 3 CBC's:  Recent Labs   Lab Test 10/26/23  0556 10/24/23  0637 10/23/23  0640   WBC 5.3 6.2 7.4   HGB 9.8* 10.3* 10.5*   MCV 85 84 83    218 200     Most Recent 3 BMP's:  Recent Labs   Lab Test 05/20/24  0611 05/16/24  0526 05/10/24  0524   * 128*  128* 125*   POTASSIUM 3.7 2.9*  2.9* 3.5   CHLORIDE 90* 89*  89* 88*   CO2 25 28  28 25   BUN 18.7 17.2  17.2 13.5   CR 0.90 0.85  0.85 0.78   ANIONGAP 12 11  11 12   GENO 8.2 8.0*  8.0* 7.9*   * 119*  128* 111*       ASSESSMENT/PLAN    (I50.33) Acute on chronic diastolic congestive heart failure (H)  (primary encounter diagnosis)  Comment: Weight creeping back up, now 203 from baseline 190.   Plan:   -Daily weights: 187, 190, 203, 193.   -2 gram NA diet.  -Continue Lasix 20 dailly.  -Repeat BMP on Thursday for electrolyte monitoring and hyponatremia.    (E87.1) Hyponatremia  Comment: Sodium 124 on 5/8, repeat on 5/10 was 125.  Recheck tomorrow 5/16.  Plan:   -Continue FR to 2000cc daily.   -Encourage p.o. solute intake.    Chronic conditions:     (I48.91) Atrial fibrillation, unspecified type (H)  Comment: INR has been variable.   Plan:    -Continue current dose and recheck due Friday.       (I63.532) Acute ischemic left PCA stroke (H)  (primary encounter diagnosis)  (I63.89) Cerebral infarction due to other mechanism (H)  (primary encounter diagnosis)  Plan: Continue atorvastatin.    (E61.1) Iron deficiency  Comment: Hemoglobin in June 11.3; mcv wnl.   Plan: Continue iron supplementation.  Continue omeprazole.  -recheck cbc 6 months.    (I10) Hypertension  Comment: Most recent blood pressure stable.  Plan: No current medications.    (E03.9) Hypothyroidism, unspecified type  Comment: Stable  Plan:     TSH    Date Value Ref Range Status   10/23/2023 3.70 0.30 - 4.20 uIU/mL Final   06/02/2022 4.10 0.30 - 5.00 uIU/mL Final       Electronically signed by:  Jac Pitt, SACHA   ,.

## 2024-05-28 NOTE — TELEPHONE ENCOUNTER
[facility] Geriatrics Triage Nurse INR     Provider: EDWARD Jacobsen  Facility: [facility]  Facility Type:  LT    Caller: Zakiya  Call Back Number: [phone]  Reason for call: INR  Diagnosis/Goal: A. Fib    Date INR New Dose/Orders   5/17 5.40 held   5/20 3.71 held   5/22 2.74 2 mg daily   5/28 3.07 1.5 mg        Heparin/Lovenox:  No  Currently on ABX?: No  Other interacting medication:  None  Missed or refused doses: No    Verbal Order/Direction given by Provider: Coumadin 1.5 mg PO daily. Check INR on 5/30/24.    Provider Giving Order:  EDWARD Jacobsen    Verbal Order given to: Zakiya Smyth RN

## 2024-06-03 ENCOUNTER — DOCUMENTATION ONLY (OUTPATIENT)
Dept: GERIATRICS | Facility: CLINIC | Age: 89
End: 2024-06-03
Payer: COMMERCIAL

## 2024-06-03 NOTE — PROGRESS NOTES
Patient  on 24 @ 7:05pm at [facility].  Patient was on [facility] hospice.

## 2024-06-09 NOTE — PROGRESS NOTES
[facility]    Facility:   [facility] [[id number]]   Code Status: DNR/DNI       Chief Complaint   Patient presents with     Review Of Multiple Medical Conditions     LTC 2/25/20.       HPI:   Riley is a 89 y.o. male with hx of Afib on warfarin, prior stroke, BPH, HTN, admitted to the hospital in late Dec 2018 with confusion, acute ischemic stroke. Warfarin had been on hold due to recent gluteal hematoma. He was ultimately sent to TCU. However, he was sent back to the hospital from TCU on 1/4/19 due to AMS.  Found to have E. coli UTI, admitted for sepsis secondary to catheter associated UTI. Urine culture grew Pseudomonas. Discharged back to TCU on 1/8/19 for PT, OT, nursing cares, medical management and monitoring. Subsequently transitioned to LTC status.     Today:  He has been fairly stable. Chronic eye issues for which he sees ophtho. Daughters here often, visit and support. He has Crump palsy affecting right side. He is on multiple eye drops and some ointment. Has SP catheter. He is on warfarin for hx of afib. He self propels in wheelchair. Appetite is good. No recent illness. No fever or cough. No concerns per nursing. No open areas of skin.       Past Medical History:  Past Medical History:   Diagnosis Date     Atrial fibrillation (H)     On coumadin     CVA (cerebral vascular accident) (H)      Hypertension      Pacemaker      Urinary retention        Medications:  Current Outpatient Medications   Medication Sig     acetaminophen (TYLENOL) 325 MG tablet Take 2 tablets (650 mg total) by mouth every 6 (six) hours as needed for pain.     acetaminophen (TYLENOL) 325 MG tablet Take 650 mg by mouth 3 (three) times a day. Do not exceed 3000 mg daily     atorvastatin (LIPITOR) 40 MG tablet Take 40 mg by mouth at bedtime.     bisacodyl (DULCOLAX) 10 mg suppository Insert 10 mg into the rectum daily as needed.     cycloSPORINE (RESTASIS) 0.05 % ophthalmic emulsion  Administer 1 drop to both eyes 2 (two) times a day.     dorzolamide-timolol (COSOPT) 22.3-6.8 mg/mL ophthalmic solution 1 drop to both eyes two times a day for macular degeneration and glaucoma     erythromycin base (ERYTHROMYCIN OPHT) Apply 5 mg to eye. Instill 1 ribbon in both eyes at HS and QID     latanoprost (XALATAN) 0.005 % ophthalmic solution 1 drop both eyes at bedtime for macular degeneration/glaucoma     levothyroxine (SYNTHROID, LEVOTHROID) 25 MCG tablet Take 1 tablet (25 mcg total) by mouth Daily at 6:00 am. Hypothyroid     moxifloxacin (VIGAMOX) 0.5 % ophthalmic solution Administer 1 drop to the right eye 4 (four) times a day.     petrolat,wht/min oil/sod chl (ARTIFICIAL TEARS OINTMENT OPHT) Apply 2 drops to eye every 2 (two) hours as needed. Right eye q 2 hours PRN     polyethylene glycol (MIRALAX) 17 gram packet Take 17 g by mouth daily as needed.     polymyxin B-trimethoprim (FOR POLYTRIM) 10,000 unit- 1 mg/mL Drop ophthalmic drops 1 drop 4 (four) times a day. Right eye     polyvinyl alcohol (LIQUIFILM TEARS) 1.4 % ophthalmic solution Administer 1 drop to the right eye every 2 (two) hours as needed for dry eyes.     propylene glycol (SYSTANE COMPLETE OPHT) Apply 1 drop to eye 2 (two) times a day. Both eyes     traMADol (ULTRAM) 50 mg tablet Take 25 mg by mouth every 4 (four) hours as needed for pain.     traMADol (ULTRAM) 50 mg tablet Take 25 mg by mouth daily.     UNABLE TO FIND Med Name:docu liquis- 5 gtts each ear as needed prior to irrigation     warfarin sodium (WARFARIN ORAL) Take by mouth. 1/2/20 INR 2.31  Cont 5.5mg Mon and Wed and 5mg AOD.  Next INR 1/6/20.       Physical Exam:   General: Patient is alert, elderly, Samish male, no distress.   Vitals: /66, Temp 97.6, Pulse 64, RR 18.  HEENT: Right facial droop secondary to [place]. Eyes show mild right upper lid medial eversion with injection. Has ointment in eye. Nares negative. Oropharynx moist.   Neck: Supple. No tenderness or  adenopathy. No JVD.  Lungs: Clear bilaterally. No wheezes.  Cardiovascular: Regular rate and rhythm, normal S1, S2.  Back: No spinal or CVA tenderness.  Abdomen: Soft, no tenderness on exam. Bowel sounds present. No guarding rebound or rigidity.  : SP catheter.  Extremities: Mild LE edema is noted.  Musculoskeletal: Age related degen changes.       Labs:  Lab Results   Component Value Date    WBC 12.6 (H) 10/08/2019    HGB 12.7 (L) 10/08/2019    HCT 39.0 (L) 10/08/2019    MCV 86 10/08/2019     10/08/2019     Results for orders placed or performed in visit on 10/11/19   Basic Metabolic Panel   Result Value Ref Range    Sodium 134 (L) 136 - 145 mmol/L    Potassium 4.4 3.5 - 5.0 mmol/L    Chloride 103 98 - 107 mmol/L    CO2 24 22 - 31 mmol/L    Anion Gap, Calculation 7 5 - 18 mmol/L    Glucose 86 70 - 125 mg/dL    Calcium 8.6 8.5 - 10.5 mg/dL    BUN 10 8 - 28 mg/dL    Creatinine 0.82 0.70 - 1.30 mg/dL    GFR MDRD Af Amer >60 >60 mL/min/1.73m2    GFR MDRD Non Af Amer >60 >60 mL/min/1.73m2         Assessment/Plan:  1. Debilitation. Advanced age, cognitive and physical decline, multiple co morbidities.   2. Afib. Anticoagulated with warfarin. Adequate rate control. Monitor and adjust per INR.  3. Urinary retention. Has SP catheter.   4. Hx of stroke. Ischemic in nature. On warfarin.   5. HTN. On lisinopril. BPs acceptable, continue to monitor per protocol..   6. Hypothyroidism. Continue replacement.  7. [place] palsy, right.          Electronically signed by: Nancy Fair MD     Left arm;

## 2024-07-08 ENCOUNTER — MEDICAL CORRESPONDENCE (OUTPATIENT)
Dept: HEALTH INFORMATION MANAGEMENT | Facility: CLINIC | Age: 89
End: 2024-07-08
Payer: COMMERCIAL

## 2025-07-30 NOTE — ED NOTES
CXR being done   [de-identified] : Constitutional o Appearance : well-nourished, well developed, alert, in no acute distress  Head and Face o Head :  Inspection : atraumatic, normocephalic o Face :  Inspection : no visible rash or discoloration Respiratory o Respiratory Effort: breathing unlabored  Neurologic o Mental Status Examination :  Orientation : alert and oriented X 3 Psychiatric o Mood and Affect: mood normal, affect appropriate Cardiovascular o Observation/Palpation : - no swelling Lymphatic o Additional Nodes : No palpable lymph nodes present  Lum bosacral Spine o Inspection : no visible rash or discoloration o Palpation :  left paralumbar discomfort,  o Range of Motion :  side bending to the left causes mild discomfort,  o Muscle Strength : paraspinal muscle strength and tone within normal limits o Muscle Tone : paraspinal muscle strength and tone within normal limits o Tests: straight leg test negative bilaterally, KEVIN test negative bilaterally   Right Lower Extremity o Buttock : no tenderness, swelling or deformities  o Right Hip :  Inspection/Palpation : no tenderness, swelling or deformities  Range of Motion : full and painless in all planes, no crepitance  Stability : joint stability intact  Strength : extension, flexion, adduction, abduction, internal rotation and external rotation 5/5   o Right Knee :  Inspection/Palpation : no medial compartment tenderness or lateral tenderness to palpation, no swelling, small abrasion clean and dry   Range of Motion : 0-125 active flexion and extension full and painless, no crepitance, good patellofemoral glide   Stability : mild varus valgus deformity present on provocative testing  Strength : flexion and extension 5/5  Tests and Signs : negative Anterior Drawer, negative Lachman, negative Charity  Left Lower Extremity o Buttock : no tenderness, swelling or deformities  o Left Hip :  Inspection/Palpation : no tenderness, no swelling or deformities  Range of Motion : full and painless in all planes, no crepitance  Stability : joint stability intact  Strength : extension, flexion, adduction, abduction, internal rotation and external rotation 4+/5  o Left Knee :  Inspection/Palpation : no medial compartment tenderness or lateral tenderness to palpation, no swelling  Range of Motion : 0-120 active flexion and extension full and painless, no crepitance  Stability : no valgus or varus instability present on provocative testing  Strength : flexion and extension 4+/5  Tests and Signs : negative Anterior Drawer, negative Lachman, negative Charity  Gait and Station: Gait: gait normal, varus deformity of right side worse than left, stiff choppy steps, no foot drop, no significant extremity swelling or lymphedema, good proprioception and balance, hamstrings tight right and left, good core strength,   o Knee injection : Indication- knee osteoarthritis, Anatomic location- right intra-articular joint space, Spray - area was sterilized with Betadine and alcohol and anesthetized with Ethyl Chloride , needle used-20G, Medications given- 3cc's Durolane under Ultrasound guidance. The patient tolerated the procedure well.